# Patient Record
Sex: MALE | Race: BLACK OR AFRICAN AMERICAN | Employment: UNEMPLOYED | URBAN - METROPOLITAN AREA
[De-identification: names, ages, dates, MRNs, and addresses within clinical notes are randomized per-mention and may not be internally consistent; named-entity substitution may affect disease eponyms.]

---

## 2017-03-01 ENCOUNTER — OFFICE VISIT (OUTPATIENT)
Dept: ENDOCRINOLOGY | Age: 72
End: 2017-03-01

## 2017-03-01 VITALS
SYSTOLIC BLOOD PRESSURE: 124 MMHG | BODY MASS INDEX: 41.91 KG/M2 | HEIGHT: 66 IN | RESPIRATION RATE: 16 BRPM | DIASTOLIC BLOOD PRESSURE: 62 MMHG | TEMPERATURE: 97.1 F | WEIGHT: 260.8 LBS | HEART RATE: 74 BPM

## 2017-03-01 DIAGNOSIS — I10 ESSENTIAL HYPERTENSION: ICD-10-CM

## 2017-03-01 DIAGNOSIS — E11.65 TYPE 2 DIABETES MELLITUS WITH HYPERGLYCEMIA, WITH LONG-TERM CURRENT USE OF INSULIN (HCC): Primary | ICD-10-CM

## 2017-03-01 DIAGNOSIS — Z79.4 TYPE 2 DIABETES MELLITUS WITH HYPERGLYCEMIA, WITH LONG-TERM CURRENT USE OF INSULIN (HCC): Primary | ICD-10-CM

## 2017-03-01 DIAGNOSIS — E03.4 HYPOTHYROIDISM DUE TO ACQUIRED ATROPHY OF THYROID: ICD-10-CM

## 2017-03-01 DIAGNOSIS — E78.2 MIXED HYPERLIPIDEMIA: ICD-10-CM

## 2017-03-01 NOTE — MR AVS SNAPSHOT
Visit Information Date & Time Provider Department Dept. Phone Encounter #  
 3/1/2017  2:45 PM Tomeka Tucker MD [facility] Diabetes & Endocrinology [phone] [id number] Follow-up Instructions Return in about 4 months (around 7/1/2017). Upcoming Health Maintenance Date Due Hepatitis C Screening 1945 FOOT EXAM Q1 12/25/1957 EYE EXAM RETINAL OR DILATED Q1 12/25/1957 DTaP/Tdap/Td series (1 - Tdap) 12/25/1968 FOBT Q 1 YEAR AGE 50-75 12/25/1997 ZOSTER VACCINE AGE 60> 12/25/2007 GLAUCOMA SCREENING Q2Y 12/25/2012 Pneumococcal 65+ Low/Medium Risk (1 of 2 - PCV13) 12/25/2012 MEDICARE YEARLY EXAM 12/25/2012 INFLUENZA AGE 9 TO ADULT 8/1/2016 LIPID PANEL Q1 9/21/2016 HEMOGLOBIN A1C Q6M 1/15/2017 MICROALBUMIN Q1 7/22/2017 Allergies as of 3/1/2017  Review Complete On: 3/1/2017 By: Tomeka Tucker MD  
 No Known Allergies Current Immunizations  Never Reviewed No immunizations on file. Not reviewed this visit You Were Diagnosed With   
  
 Codes Comments Type 2 diabetes mellitus with hyperglycemia, with long-term current use of insulin (HCC)    -  Primary ICD-10-CM: E11.65, Z79.4 ICD-9-CM: 250.00, 790.29, V58.67 Hypothyroidism due to acquired atrophy of thyroid     ICD-10-CM: E03.4 ICD-9-CM: 244.8, 246.8 Mixed hyperlipidemia     ICD-10-CM: E78.2 ICD-9-CM: 272.2 Essential hypertension     ICD-10-CM: I10 
ICD-9-CM: 401.9 Vitals BP  
  
  
  
  
  
 124/62 (BP 1 Location: Right arm, BP Patient Position: Sitting) BMI and BSA Data Body Mass Index Body Surface Area 42.09 kg/m 2 2.35 m 2 Preferred Pharmacy Pharmacy Name Phone 310 [facility] [address] ([address] [phone] Your Updated Medication List  
  
   
This list is accurate as of: 3/1/17  3:40 PM.  Always use your most recent med list.  
  
  
  
 ammonium lactate 12 % lotion Commonly known as:  LAC-HYDRIN Apply  to affected area as needed. rub in to affected area well  
  
 divalproex  mg tablet Commonly known as:  DEPAKOTE Take 3 Tabs by mouth two (2) times a day. docusate calcium 240 mg capsule Commonly known as:  Hien Primes Take 240 mg by mouth two (2) times a day. folic acid 1 mg tablet Commonly known as:  Google Take  by mouth daily. glucagon 1 mg injection Commonly known as:  GLUCAGON EMERGENCY KIT (HUMAN) 1 mg by IntraMUSCular route as needed. guaiFENesin 1,200 mg Ta12 ER tablet Commonly known as:  MobSmith Take 1 tablet by mouth two (2) times a day. insulin lispro 100 unit/mL injection Commonly known as:  HUMALOG  
by SubCUTAneous route. Use per SSI  
  
 insulin  unit/mL injection Commonly known as:  HumuLIN N Inject 24 units before breakfast 10 units at bedtime (Stop Humulin 70/30) insulin syringe-needle U-100 Dispense microfine 1 mL syringes with 30 gauge needle  
  
 levothyroxine 75 mcg tablet Commonly known as:  SYNTHROID  
TAKE 1 TABLET BY MOUTH EVERY DAY BEFORE BREAKFAST LORazepam 1 mg tablet Commonly known as:  ATIVAN Take 1 mg by mouth three (3) times daily. losartan 50 mg tablet Commonly known as:  COZAAR Take 50 mg by mouth daily. metFORMIN 500 mg tablet Commonly known as:  GLUCOPHAGE Take 500 mg by mouth two (2) times daily (with meals). ONETOUCH ULTRA TEST strip Generic drug:  glucose blood VI test strips USE AS DIRECTED TO TEST 2-3 TIMES DAILY potassium chloride SA 10 mEq capsule Commonly known as:  Zoe Guido Take 1 Cap by mouth daily as needed. pravastatin 20 mg tablet Commonly known as:  PRAVACHOL Take 20 mg by mouth nightly. raNITIdine 300 mg tablet Commonly known as:  ZANTAC Take 300 mg by mouth daily. thioridazine 50 mg tablet Commonly known as:  MELLARIL  
 Take 50 mg by mouth three (3) times daily. topiramate 200 mg tablet Commonly known as:  TOPAMAX Take  by mouth two (2) times a day. triamterene-hydroCHLOROthiazide 37.5-25 mg per tablet Commonly known as:  Jerral Cullens Take 1 Tab by mouth daily. trihexyphenidyl 2 mg tablet Commonly known as:  ARTANE Take 2 mg by mouth three (3) times daily. TRUEPLUS LANCETS 30 gauge Misc Generic drug:  lancets USE AS DIRECTED 2 TO 3 TIMES DAILY  
  
 * ULTICARE 1 mL 30 gauge x 1/2\" Syrg Generic drug:  Insulin Syringe-Needle U-100  
USE AS DIRECTED * Insulin Syringe-Needle U-100 1 mL 31 gauge x 5/16 Syrg VITAMIN D2 50,000 unit capsule Generic drug:  ergocalciferol Take 50,000 Units by mouth every seven (7) days. * Notice: This list has 2 medication(s) that are the same as other medications prescribed for you. Read the directions carefully, and ask your doctor or other care provider to review them with you. We Performed the Following HEMOGLOBIN A1C WITH EAG [25629 CPT(R)] LDL, DIRECT [id number] CPT(R)] METABOLIC PANEL, COMPREHENSIVE [73431 CPT(R)] MICROALBUMIN, UR, RAND W/ MICROALBUMIN/CREA RATIO [id number] CPT(R)] TSH 3RD GENERATION [86089 CPT(R)] Follow-up Instructions Return in about 4 months (around 7/1/2017). Introducing [facility]! Kelli Hardin introduces [employer] patient portal. Now you can access parts of your medical record, email your doctor's office, and request medication refills online. 1. In your internet browser, go to [email or web address] 2. Click on the First Time User? Click Here link in the Sign In box. You will see the New Member Sign Up page. 3. Enter your [employer] Access Code exactly as it appears below. You will not need to use this code after youve completed the sign-up process. If you do not sign up before the expiration date, you must request a new code. · [employer] Access Code: [email or web address] Expires: 5/30/2017  3:40 PM 
 
4. Enter the last four digits of your Social Security Number (xxxx) and Date of Birth (mm/dd/yyyy) as indicated and click Submit. You will be taken to the next sign-up page. 5. Create a Fighters ID. This will be your [employer] login ID and cannot be changed, so think of one that is secure and easy to remember. 6. Create a [employer] password. You can change your password at any time. 7. Enter your Password Reset Question and Answer. This can be used at a later time if you forget your password. 8. Enter your e-mail address. You will receive e-mail notification when new information is available in [address] 9. Click Sign Up. You can now view and download portions of your medical record. 10. Click the Download Summary menu link to download a portable copy of your medical information. If you have questions, please visit the Frequently Asked Questions section of the [employer] website. Remember, [employer] is NOT to be used for urgent needs. For medical emergencies, dial 911. Now available from your iPhone and Android! Please provide this summary of care documentation to your next provider. Your primary care clinician is listed as [facility] If you have any questions after today's visit, please call [phone].

## 2017-03-01 NOTE — PROGRESS NOTES
Camilo Rodrgiez is a 73 y.o. male here for   Chief Complaint   Patient presents with    Diabetes     5 mo f/u    Thyroid Problem    Cholesterol Problem    Hypertension       Functional glucose monitor and record keeping system? - yes  Eye exam within last year? - not sure  Foot exam within last year? - yes recently    Lab Results   Component Value Date/Time    Hemoglobin A1c 7.8 07/15/2016 12:00 AM    Hemoglobin A1c (POC) 6.6 12/18/2015 11:05 AM    Hemoglobin A1c, External 7.9 02/07/2015 11:37 AM       Wt Readings from Last 3 Encounters:   07/22/16 268 lb (121.6 kg)   04/15/16 285 lb 11.2 oz (129.6 kg)   12/18/15 300 lb (136.1 kg)     Temp Readings from Last 3 Encounters:   07/22/16 98.2 °F (36.8 °C) (Oral)   04/15/16 99.1 °F (37.3 °C) (Oral)   12/18/15 98.2 °F (36.8 °C) (Oral)     BP Readings from Last 3 Encounters:   07/22/16 126/79   04/15/16 119/66   12/18/15 142/70     Pulse Readings from Last 3 Encounters:   07/22/16 84   04/15/16 87   12/18/15 90

## 2017-03-01 NOTE — PROGRESS NOTES
History of Present Illness: Geovany Pantoja is a 74 y.o. male here for fu  of  Type 2 Diabetes Mellitus. He is here with his brother. Glucose  is better     Checking blood glucose twice daily,  No hypoglycemia. Checking glucose at home  Has not noticed significant hypoglycemia     Compliant with medications                Type 2 Diabetes was diagnosed  >10yrs . Cardiovascular risk factors: diabetes mellitus, obesity, male gender, hypertension   History of DKA: 2010      Wt Readings from Last 3 Encounters:   03/01/17 260 lb 12.8 oz (118.3 kg)   07/22/16 268 lb (121.6 kg)   04/15/16 285 lb 11.2 oz (129.6 kg)       BP Readings from Last 3 Encounters:   03/01/17 124/62   07/22/16 126/79   04/15/16 119/66           Past Medical History:   Diagnosis Date    Acute renal failure (HCC)     DKA (diabetic ketoacidoses) (HCC)     GERD (gastroesophageal reflux disease)     HTN (hypertension)     Schizophreniform disorder (HCC)     Seizure disorder (HCC)     Type II or unspecified type diabetes mellitus without mention of complication, uncontrolled      Current Outpatient Prescriptions   Medication Sig    ONETOUCH ULTRA TEST strip USE AS DIRECTED TO TEST 2-3 TIMES DAILY    potassium chloride SA (MICRO-K) 10 mEq capsule Take 1 Cap by mouth daily as needed.  divalproex DR (DEPAKOTE) 500 mg tablet Take 3 Tabs by mouth two (2) times a day.  Insulin Syringe-Needle U-100 1 mL 31 gauge x 5/16 syrg     triamterene-hydrochlorothiazide (MAXZIDE) 37.5-25 mg per tablet Take 1 Tab by mouth daily.  insulin NPH (HUMULIN N) 100 unit/mL injection Inject 24 units before breakfast 10 units at bedtime (Stop Humulin 70/30)    ULTICARE 1 mL 30 gauge x 1/2\" syrg USE AS DIRECTED    levothyroxine (SYNTHROID) 75 mcg tablet TAKE 1 TABLET BY MOUTH EVERY DAY BEFORE BREAKFAST    metFORMIN (GLUCOPHAGE) 500 mg tablet Take 500 mg by mouth two (2) times daily (with meals).     insulin lispro (HUMALOG) 100 unit/mL injection by SubCUTAneous route. Use per SSI    TRUEPLUS LANCETS 30 gauge misc USE AS DIRECTED 2 TO 3 TIMES DAILY    ergocalciferol (VITAMIN D2) 50,000 unit capsule Take 50,000 Units by mouth every seven (7) days.  losartan (COZAAR) 50 mg tablet Take 50 mg by mouth daily.  LORazepam (ATIVAN) 1 mg tablet Take 1 mg by mouth three (3) times daily.  guaiFENesin (MUCINEX) 1,200 mg tp12 ER tablet Take 1 tablet by mouth two (2) times a day.  insulin syringe-needle U-100 Dispense microfine 1 mL syringes with 30 gauge needle    pravastatin (PRAVACHOL) 20 mg tablet Take 20 mg by mouth nightly.  folic acid (FOLVITE) 1 mg tablet Take  by mouth daily.  ammonium lactate (LAC-HYDRIN) 12 % lotion Apply  to affected area as needed. rub in to affected area well    trihexyphenidyl (ARTANE) 2 mg tablet Take 2 mg by mouth three (3) times daily.  thioridazine (MELLARIL) 50 mg tablet Take 50 mg by mouth three (3) times daily.  docusate calcium (SURFAK) 240 mg capsule Take 240 mg by mouth two (2) times a day.  topiramate (TOPAMAX) 200 mg tablet Take  by mouth two (2) times a day.  ranitidine (ZANTAC) 300 mg tablet Take 300 mg by mouth daily.  glucagon (GLUCAGON EMERGENCY) 1 mg injection 1 mg by IntraMUSCular route as needed. No current facility-administered medications for this visit. No Known Allergies      Review of Systems: patient answers no to most of the questions  -   -     Physical Examination:   Blood pressure 124/62, pulse 74, temperature 97.1 °F (36.2 °C), temperature source Oral, resp. rate 16, height 5' 6\" (1.676 m), weight 260 lb 12.8 oz (118.3 kg). Estimated body mass index is 42.09 kg/(m^2) as calculated from the following:    Height as of this encounter: 5' 6\" (1.676 m). -   Weight as of this encounter: 260 lb 12.8 oz (118.3 kg).   - General:  no distress,no eye contact,obese  - HEENT: no pallor, no periorbital edema  - Neck: supple, no thyromegaly  - Cardiovascular: regular, normal rate, normal S1 and S2  - Respiratory: clear to auscultation bilaterally  - Gastrointestinal: soft, nontender, obese,  BS +  - Musculoskeletal:  trace edema  - Neurological: alert   - Psychiatric: normal mood and affect  - Skin: color, texture, turgor normal.       Data Reviewed:     [x] Glucose records reviewed. [x] See glucose records for details (to be scanned). [x] A1C  [] Reviewed labs    2/13 GFR 76   Non   LFTs nl  TSH 13 - started on LT4    Lab Results   Component Value Date/Time    Hemoglobin A1c 6.9 03/01/2017 03:45 PM    Hemoglobin A1c 7.8 07/15/2016 12:00 AM    Hemoglobin A1c 8.2 09/21/2015 11:15 AM    Glucose 125 03/01/2017 03:45 PM    Glucose  12/18/2015 11:05 AM    Microalb/Creat ratio (ug/mg creat.) <5.9 07/22/2016 12:00 AM    LDL,Direct 82 03/01/2017 03:45 PM    LDL, calculated 102 09/21/2015 11:15 AM    Creatinine 1.05 03/01/2017 03:45 PM    Hemoglobin A1c, External 7.9 02/07/2015 11:37 AM      Lab Results   Component Value Date/Time    ALT (SGPT) 7 03/01/2017 03:45 PM    AST (SGOT) 12 03/01/2017 03:45 PM    Alk. phosphatase 55 03/01/2017 03:45 PM    Bilirubin, total <0.2 03/01/2017 03:45 PM     Lab Results   Component Value Date/Time    GFR est AA 83 03/01/2017 03:45 PM    GFR est non-AA 72 03/01/2017 03:45 PM    Creatinine 1.05 03/01/2017 03:45 PM    BUN 13 03/01/2017 03:45 PM    Sodium 143 03/01/2017 03:45 PM    Potassium 4.4 03/01/2017 03:45 PM    Chloride 104 03/01/2017 03:45 PM    CO2 24 03/01/2017 03:45 PM      Lab Results   Component Value Date/Time    TSH 3.090 03/01/2017 03:45 PM      Lab Results   Component Value Date/Time    Glucose 125 03/01/2017 03:45 PM    Glucose  12/18/2015 11:05 AM          Last Point of Care HGB A1C  No components found for: POCA1C   Lab Results   Component Value Date/Time    TSH 3.090 03/01/2017 03:45 PM              Assessment/Plan:     1.  Type 2 Diabetes Mellitus,   Lab Results   Component Value Date/Time    Hemoglobin A1c 6.9 03/01/2017 03:45 PM    Hemoglobin A1c (POC) 6.6 12/18/2015 11:05 AM    Hemoglobin A1c, External 7.9 02/07/2015 11:37 AM     NPH ( Novolin N or Humulin N ) 24  units before breakfast  10 units at bedtime   Mr. Rajni Mcgraw is mentally challenged and dependent on his sister for diabetes care. The antipsychotic medications can worsen the insulin resistance. Given comorbidities, seizure disorder, inability to detect or report hypoglycemia tight glycemic control is not the goal in his case. 2. . HTN : continue ARB    3.  Seizure disorder : on antiepileptic medications,  vitamin D supplementation for bone health. 4 . Hypothyroid - euthyroid    5. Hyperlipidemia: continue pravastatin      Thank you for allowing me to participate in the care of this patient.     Yimi Miranda MD

## 2017-03-02 LAB
ALBUMIN SERPL-MCNC: 4 G/DL (ref 3.6–4.8)
ALBUMIN/GLOB SERPL: 1.1 {RATIO} (ref 1.1–2.5)
ALP SERPL-CCNC: 55 IU/L (ref 39–117)
ALT SERPL-CCNC: 7 IU/L (ref 0–44)
AST SERPL-CCNC: 12 IU/L (ref 0–40)
BILIRUB SERPL-MCNC: <0.2 MG/DL (ref 0–1.2)
BUN SERPL-MCNC: 13 MG/DL (ref 8–27)
BUN/CREAT SERPL: 12 (ref 10–22)
CALCIUM SERPL-MCNC: 9.5 MG/DL (ref 8.6–10.2)
CHLORIDE SERPL-SCNC: 104 MMOL/L (ref 96–106)
CO2 SERPL-SCNC: 24 MMOL/L (ref 18–29)
CREAT SERPL-MCNC: 1.05 MG/DL (ref 0.76–1.27)
EST. AVERAGE GLUCOSE BLD GHB EST-MCNC: 151 MG/DL
GLOBULIN SER CALC-MCNC: 3.6 G/DL (ref 1.5–4.5)
GLUCOSE SERPL-MCNC: 125 MG/DL (ref 65–99)
HBA1C MFR BLD: 6.9 % (ref 4.8–5.6)
LDLC SERPL DIRECT ASSAY-MCNC: 82 MG/DL (ref 0–99)
POTASSIUM SERPL-SCNC: 4.4 MMOL/L (ref 3.5–5.2)
PROT SERPL-MCNC: 7.6 G/DL (ref 6–8.5)
SODIUM SERPL-SCNC: 143 MMOL/L (ref 134–144)
TSH SERPL DL<=0.005 MIU/L-ACNC: 3.09 UIU/ML (ref 0.45–4.5)

## 2017-04-23 RX ORDER — GLUCOSAM/CHON-MSM1/C/MANG/BOSW 500-416.6
TABLET ORAL
Qty: 100 LANCET | Refills: 11 | Status: SHIPPED | OUTPATIENT
Start: 2017-04-23 | End: 2021-01-01

## 2017-05-09 ENCOUNTER — IP HISTORICAL/CONVERTED ENCOUNTER (OUTPATIENT)
Dept: OTHER | Age: 72
End: 2017-05-09

## 2017-05-20 RX ORDER — GLUCOSAM/CHON-MSM1/C/MANG/BOSW 500-416.6
TABLET ORAL
Qty: 100 LANCET | Refills: 11 | Status: SHIPPED | OUTPATIENT
Start: 2017-05-20 | End: 2017-11-01 | Stop reason: SDUPTHER

## 2017-07-31 RX ORDER — CALCIUM CARB/VITAMIN D3/VIT K1 500-100-40
TABLET,CHEWABLE ORAL
Qty: 200 SYRINGE | Refills: 11 | Status: SHIPPED | OUTPATIENT
Start: 2017-07-31 | End: 2017-11-01 | Stop reason: SDUPTHER

## 2017-11-01 ENCOUNTER — OFFICE VISIT (OUTPATIENT)
Dept: ENDOCRINOLOGY | Age: 72
End: 2017-11-01

## 2017-11-01 VITALS
SYSTOLIC BLOOD PRESSURE: 136 MMHG | DIASTOLIC BLOOD PRESSURE: 76 MMHG | RESPIRATION RATE: 14 BRPM | BODY MASS INDEX: 40.27 KG/M2 | HEART RATE: 86 BPM | HEIGHT: 66 IN | OXYGEN SATURATION: 96 % | TEMPERATURE: 97.7 F | WEIGHT: 250.6 LBS

## 2017-11-01 DIAGNOSIS — E11.29 TYPE II OR UNSPECIFIED TYPE DIABETES MELLITUS WITH RENAL MANIFESTATIONS, UNCONTROLLED(250.42) (HCC): Primary | ICD-10-CM

## 2017-11-01 DIAGNOSIS — E03.4 HYPOTHYROIDISM DUE TO ACQUIRED ATROPHY OF THYROID: ICD-10-CM

## 2017-11-01 DIAGNOSIS — E11.65 TYPE 2 DIABETES MELLITUS WITH HYPERGLYCEMIA, WITH LONG-TERM CURRENT USE OF INSULIN (HCC): Primary | ICD-10-CM

## 2017-11-01 DIAGNOSIS — E78.2 MIXED HYPERLIPIDEMIA: ICD-10-CM

## 2017-11-01 DIAGNOSIS — I10 ESSENTIAL HYPERTENSION WITH GOAL BLOOD PRESSURE LESS THAN 140/90: ICD-10-CM

## 2017-11-01 DIAGNOSIS — E11.65 TYPE II OR UNSPECIFIED TYPE DIABETES MELLITUS WITH RENAL MANIFESTATIONS, UNCONTROLLED(250.42) (HCC): Primary | ICD-10-CM

## 2017-11-01 DIAGNOSIS — Z79.4 TYPE 2 DIABETES MELLITUS WITH HYPERGLYCEMIA, WITH LONG-TERM CURRENT USE OF INSULIN (HCC): Primary | ICD-10-CM

## 2017-11-01 RX ORDER — CALCIUM CARB/VITAMIN D3/VIT K1 500-100-40
TABLET,CHEWABLE ORAL
Qty: 100 SYRINGE | Refills: 4 | Status: SHIPPED | OUTPATIENT
Start: 2017-11-01 | End: 2020-12-30 | Stop reason: ALTCHOICE

## 2017-11-01 RX ORDER — QUETIAPINE FUMARATE 50 MG/1
50 TABLET, FILM COATED ORAL 3 TIMES DAILY
COMMUNITY
End: 2020-12-30 | Stop reason: ALTCHOICE

## 2017-11-01 NOTE — PROGRESS NOTES
Sharon Moreno is a 73 y.o. male here for   Chief Complaint   Patient presents with    Diabetes    Thyroid Problem       Functional glucose monitor and record keeping system? - yes  Eye exam within last year? - has appt coming up  Foot exam within last year? - Feb 2017    1. Have you been to the ER, urgent care clinic since your last visit? Hospitalized since your last visit? -no    2. Have you seen or consulted any other health care providers outside of the [address] [place] [address] since your last visit? Include any pap smears or colon screening. -PCP      Lab Results   Component Value Date/Time    Hemoglobin A1c 6.9 03/01/2017 03:45 PM    Hemoglobin A1c (POC) 6.6 12/18/2015 11:05 AM    Hemoglobin A1c, External 7.9 02/07/2015 11:37 AM       Wt Readings from Last 3 Encounters:   03/01/17 260 lb 12.8 oz (118.3 kg)   07/22/16 268 lb (121.6 kg)   04/15/16 285 lb 11.2 oz (129.6 kg)     Temp Readings from Last 3 Encounters:   03/01/17 97.1 °F (36.2 °C) (Oral)   07/22/16 98.2 °F (36.8 °C) (Oral)   04/15/16 99.1 °F (37.3 °C) (Oral)     BP Readings from Last 3 Encounters:   03/01/17 124/62   07/22/16 126/79   04/15/16 119/66     Pulse Readings from Last 3 Encounters:   03/01/17 74   07/22/16 84   04/15/16 87

## 2017-11-01 NOTE — MR AVS SNAPSHOT
Visit Information Date & Time Provider Department Dept. Phone Encounter #  
 11/1/2017  1:45 PM Sherren Fischer, MD [facility] Diabetes & Endocrinology [phone] [id number] Follow-up Instructions Return in about 4 months (around 3/1/2018). Upcoming Health Maintenance Date Due Hepatitis C Screening 1945 FOOT EXAM Q1 12/25/1957 EYE EXAM RETINAL OR DILATED Q1 12/25/1957 DTaP/Tdap/Td series (1 - Tdap) 12/25/1968 FOBT Q 1 YEAR AGE 50-75 12/25/1997 ZOSTER VACCINE AGE 60> 10/25/2007 GLAUCOMA SCREENING Q2Y 12/25/2012 Pneumococcal 65+ Low/Medium Risk (1 of 2 - PCV13) 12/25/2012 MEDICARE YEARLY EXAM 12/25/2012 LIPID PANEL Q1 9/21/2016 MICROALBUMIN Q1 7/22/2017 INFLUENZA AGE 9 TO ADULT 8/1/2017 HEMOGLOBIN A1C Q6M 9/1/2017 Allergies as of 11/1/2017  Review Complete On: 11/1/2017 By: Sherren Fischer, MD  
 No Known Allergies Current Immunizations  Never Reviewed No immunizations on file. Not reviewed this visit You Were Diagnosed With   
  
 Codes Comments Hypothyroidism due to acquired atrophy of thyroid    -  Primary ICD-10-CM: E03.4 ICD-9-CM: 244.8, 246.8 Mixed hyperlipidemia     ICD-10-CM: E78.2 ICD-9-CM: 272.2 Essential hypertension with goal blood pressure less than 140/90     ICD-10-CM: I10 
ICD-9-CM: 401.9 Type 2 diabetes mellitus with hyperglycemia, with long-term current use of insulin (HCC)     ICD-10-CM: E11.65, Z79.4 ICD-9-CM: 250.00, 790.29, V58.67 Vitals BP Pulse Temp Resp Height(growth percentile) Weight(growth percentile) 136/76 (BP 1 Location: Left arm, BP Patient Position: Sitting) 86 97.7 °F (36.5 °C) (Oral) 14 5' 6\" (1.676 m) 250 lb 9.6 oz (113.7 kg) SpO2 BMI Smoking Status 96% 40.45 kg/m2 Never Smoker Vitals History BMI and BSA Data Body Mass Index Body Surface Area 40.45 kg/m 2 2.3 m 2 Preferred Pharmacy Pharmacy Name Phone 310 [facility] [address] [facility] [address] [facility] ([address] [phone] Your Updated Medication List  
  
   
This list is accurate as of: 11/1/17  2:22 PM.  Always use your most recent med list.  
  
  
  
  
 ammonium lactate 12 % lotion Commonly known as:  LAC-HYDRIN Apply  to affected area as needed. rub in to affected area well  
  
 divalproex  mg tablet Commonly known as:  DEPAKOTE Take 3 Tabs by mouth two (2) times a day. docusate calcium 240 mg capsule Commonly known as:  Catheline Balzarine Take 240 mg by mouth nightly. folic acid 1 mg tablet Commonly known as:  Google Take  by mouth daily. glucagon 1 mg injection Commonly known as:  GLUCAGON EMERGENCY KIT (HUMAN) 1 mg by IntraMUSCular route as needed. guaiFENesin 1,200 mg Ta12 ER tablet Commonly known as:  Joseph & Joseph Take 1 tablet by mouth two (2) times a day. HumuLIN N 100 unit/mL injection Generic drug:  insulin NPH INJECT 20 UNITS BEFORE BREAKFAST AND 10 UNITS EVERY NIGHT AT BEDTIME  
  
 insulin lispro 100 unit/mL injection Commonly known as:  HUMALOG  
by SubCUTAneous route. Use per SSI  
  
 insulin syringe-needle U-100 Dispense microfine 1 mL syringes with 30 gauge needle * Insulin Syringe-Needle U-100 1 mL 31 gauge x 5/16 Syrg * Insulin Syringe-Needle U-100 1 mL 31 gauge x 5/16 Syrg USE AS DIRECTED  
  
 levothyroxine 75 mcg tablet Commonly known as:  SYNTHROID  
TAKE 1 TABLET BY MOUTH EVERY DAY BEFORE BREAKFAST LORazepam 1 mg tablet Commonly known as:  ATIVAN Take 1 mg by mouth two (2) times daily as needed. losartan 50 mg tablet Commonly known as:  COZAAR Take 50 mg by mouth daily. metFORMIN 500 mg tablet Commonly known as:  GLUCOPHAGE Take 500 mg by mouth two (2) times daily (with meals). * ONETOUCH ULTRA TEST strip Generic drug:  glucose blood VI test strips USE AS DIRECTED TO TEST 2-3 TIMES DAILY  
  
 * ONETOUCH ULTRA TEST strip Generic drug:  glucose blood VI test strips USE AS DIRECTED TO TEST 2-3 TIMES DAILY potassium chloride SA 10 mEq capsule Commonly known as:  Georganna Males Take 1 Cap by mouth two (2) times a day. pravastatin 20 mg tablet Commonly known as:  PRAVACHOL Take 20 mg by mouth nightly. QUEtiapine 50 mg tablet Commonly known as:  SEROquel Take 50 mg by mouth three (3) times daily. raNITIdine 300 mg tablet Commonly known as:  ZANTAC Take 300 mg by mouth daily. thioridazine 50 mg tablet Commonly known as:  MELLARIL Take 50 mg by mouth three (3) times daily. topiramate 200 mg tablet Commonly known as:  TOPAMAX Take  by mouth two (2) times a day. triamterene-hydroCHLOROthiazide 37.5-25 mg per tablet Commonly known as:  Keo Yasmine Take 1 Tab by mouth daily. trihexyphenidyl 2 mg tablet Commonly known as:  ARTANE Take 2 mg by mouth three (3) times daily. TRUEPLUS LANCETS 30 gauge Misc Generic drug:  lancets USE AS DIRECTED TWO TO THREE TIMES DAILY  
  
 VITAMIN D2 50,000 unit capsule Generic drug:  ergocalciferol Take 50,000 Units by mouth every seven (7) days. * Notice: This list has 4 medication(s) that are the same as other medications prescribed for you. Read the directions carefully, and ask your doctor or other care provider to review them with you. We Performed the Following HEMOGLOBIN A1C WITH EAG [80935 CPT(R)] LDL, DIRECT [id number] CPT(R)] METABOLIC PANEL, COMPREHENSIVE [58190 CPT(R)] Follow-up Instructions Return in about 4 months (around 3/1/2018). Introducing [facility]! Corona Harrison introduces [employer] patient portal. Now you can access parts of your medical record, email your doctor's office, and request medication refills online. 1. In your internet browser, go to [email or web address] 2. Click on the First Time User? Click Here link in the Sign In box. You will see the New Member Sign Up page. 3. Enter your [email or web address] Access Code exactly as it appears below. You will not need to use this code after youve completed the sign-up process. If you do not sign up before the expiration date, you must request a new code. · [email or web address] Access Code: [email or web address] Expires: 1/30/2018  1:51 PM 
 
4. Enter the last four digits of your Social Security Number (xxxx) and Date of Birth (mm/dd/yyyy) as indicated and click Submit. You will be taken to the next sign-up page. 5. Create a [employer] ID. This will be your [employer] login ID and cannot be changed, so think of one that is secure and easy to remember. 6. Create a [employer] password. You can change your password at any time. 7. Enter your Password Reset Question and Answer. This can be used at a later time if you forget your password. 8. Enter your e-mail address. You will receive e-mail notification when new information is available in [address] 9. Click Sign Up. You can now view and download portions of your medical record. 10. Click the Download Summary menu link to download a portable copy of your medical information. If you have questions, please visit the Frequently Asked Questions section of the [email or web address] website. Remember, [employer] is NOT to be used for urgent needs. For medical emergencies, dial 911. Now available from your iPhone and Android! Please provide this summary of care documentation to your next provider. Your primary care clinician is listed as Sonya Downey. If you have any questions after today's visit, please call [phone].

## 2017-11-01 NOTE — PROGRESS NOTES
History of Present Illness: Tre Acosta is a 74 y.o. male here for fu  of  Type 2 Diabetes Mellitus. He is here with his brother. Lost weight - eating better    Glucose  is better     Checking blood glucose twice daily,  No hypoglycemia. Checking glucose at home  Has not noticed significant hypoglycemia     Compliant with medications    Type 2 Diabetes was diagnosed  >10yrs . Cardiovascular risk factors: diabetes mellitus, obesity, male gender, hypertension   History of DKA: 2010      Wt Readings from Last 3 Encounters:   11/01/17 250 lb 9.6 oz (113.7 kg)   03/01/17 260 lb 12.8 oz (118.3 kg)   07/22/16 268 lb (121.6 kg)       BP Readings from Last 3 Encounters:   11/01/17 136/76   03/01/17 124/62   07/22/16 126/79           Past Medical History:   Diagnosis Date    Acute renal failure (HCC)     DKA (diabetic ketoacidoses) (HCC)     GERD (gastroesophageal reflux disease)     HTN (hypertension)     Schizophreniform disorder (HCC)     Seizure disorder (HCC)     Type II or unspecified type diabetes mellitus without mention of complication, uncontrolled      Current Outpatient Prescriptions   Medication Sig    QUEtiapine (SEROQUEL) 50 mg tablet Take 50 mg by mouth three (3) times daily.  ONETOUCH ULTRA TEST strip USE AS DIRECTED TO TEST 2-3 TIMES DAILY    TRUEPLUS LANCETS 30 gauge misc USE AS DIRECTED TWO TO THREE TIMES DAILY    HUMULIN N 100 unit/mL injection INJECT 20 UNITS BEFORE BREAKFAST AND 10 UNITS EVERY NIGHT AT BEDTIME    ONETOUCH ULTRA TEST strip USE AS DIRECTED TO TEST 2-3 TIMES DAILY    potassium chloride SA (MICRO-K) 10 mEq capsule Take 1 Cap by mouth two (2) times a day.  divalproex DR (DEPAKOTE) 500 mg tablet Take 3 Tabs by mouth two (2) times a day.  Insulin Syringe-Needle U-100 1 mL 31 gauge x 5/16 syrg     triamterene-hydrochlorothiazide (MAXZIDE) 37.5-25 mg per tablet Take 1 Tab by mouth daily.     levothyroxine (SYNTHROID) 75 mcg tablet TAKE 1 TABLET BY MOUTH EVERY DAY BEFORE BREAKFAST    metFORMIN (GLUCOPHAGE) 500 mg tablet Take 500 mg by mouth two (2) times daily (with meals).  insulin lispro (HUMALOG) 100 unit/mL injection by SubCUTAneous route. Use per SSI    ergocalciferol (VITAMIN D2) 50,000 unit capsule Take 50,000 Units by mouth every seven (7) days.  losartan (COZAAR) 50 mg tablet Take 50 mg by mouth daily.  LORazepam (ATIVAN) 1 mg tablet Take 1 mg by mouth two (2) times daily as needed.  insulin syringe-needle U-100 Dispense microfine 1 mL syringes with 30 gauge needle    pravastatin (PRAVACHOL) 20 mg tablet Take 20 mg by mouth nightly.  trihexyphenidyl (ARTANE) 2 mg tablet Take 2 mg by mouth three (3) times daily.  thioridazine (MELLARIL) 50 mg tablet Take 50 mg by mouth three (3) times daily.  docusate calcium (SURFAK) 240 mg capsule Take 240 mg by mouth nightly.  topiramate (TOPAMAX) 200 mg tablet Take  by mouth two (2) times a day.  ranitidine (ZANTAC) 300 mg tablet Take 300 mg by mouth daily.  glucagon (GLUCAGON EMERGENCY) 1 mg injection 1 mg by IntraMUSCular route as needed.  Insulin Syringe-Needle U-100 1 mL 31 gauge x 5/16 syrg USE AS DIRECTED Dx code E11.65    guaiFENesin (MUCINEX) 1,200 mg tp12 ER tablet Take 1 tablet by mouth two (2) times a day.  folic acid (FOLVITE) 1 mg tablet Take  by mouth daily.  ammonium lactate (LAC-HYDRIN) 12 % lotion Apply  to affected area as needed. rub in to affected area well     No current facility-administered medications for this visit. No Known Allergies      Review of Systems: patient answers no to most of the questions  -   -     Physical Examination:   Blood pressure 136/76, pulse 86, temperature 97.7 °F (36.5 °C), temperature source Oral, resp. rate 14, height 5' 6\" (1.676 m), weight 250 lb 9.6 oz (113.7 kg), SpO2 96 %. Estimated body mass index is 40.45 kg/(m^2) as calculated from the following:    Height as of this encounter: 5' 6\" (1.676 m).   -   Weight as of this encounter: 250 lb 9.6 oz (113.7 kg). - General:  no distress,no eye contact,obese  - HEENT: no pallor, no periorbital edema  - Neck: supple, no thyromegaly  - Cardiovascular: regular, normal rate, normal S1 and S2  - Respiratory: clear to auscultation bilaterally  - Gastrointestinal: soft, nontender, obese,  BS +  - Musculoskeletal:  trace edema  - Neurological: alert   - Psychiatric: normal mood and affect  - Skin: color, texture, turgor normal.       Data Reviewed:     [x] Glucose records reviewed. [x] See glucose records for details (to be scanned). [x] A1C  [] Reviewed labs    2/13 GFR 76   Non   LFTs nl  TSH 13 - started on LT4    Lab Results   Component Value Date/Time    Hemoglobin A1c 6.5 11/01/2017 02:37 PM    Hemoglobin A1c 6.9 03/01/2017 03:45 PM    Hemoglobin A1c 7.8 07/15/2016 12:00 AM    Glucose 93 11/01/2017 02:37 PM    Glucose  12/18/2015 11:05 AM    Microalb/Creat ratio (ug/mg creat.) <5.9 07/22/2016 12:00 AM    LDL,Direct 92 11/01/2017 02:37 PM    LDL, calculated 102 09/21/2015 11:15 AM    Creatinine 1.07 11/01/2017 02:37 PM    Hemoglobin A1c, External 7.9 02/07/2015 11:37 AM      Lab Results   Component Value Date/Time    ALT (SGPT) 8 11/01/2017 02:37 PM    AST (SGOT) 17 11/01/2017 02:37 PM    Alk.  phosphatase 51 11/01/2017 02:37 PM    Bilirubin, total <0.2 11/01/2017 02:37 PM     Lab Results   Component Value Date/Time    GFR est AA 81 11/01/2017 02:37 PM    GFR est non-AA 70 11/01/2017 02:37 PM    Creatinine 1.07 11/01/2017 02:37 PM    BUN 13 11/01/2017 02:37 PM    Sodium 143 11/01/2017 02:37 PM    Potassium 4.2 11/01/2017 02:37 PM    Chloride 103 11/01/2017 02:37 PM    CO2 26 11/01/2017 02:37 PM      Lab Results   Component Value Date/Time    TSH 3.090 03/01/2017 03:45 PM      Lab Results   Component Value Date/Time    Glucose 93 11/01/2017 02:37 PM    Glucose  12/18/2015 11:05 AM          Last Point of Care HGB A1C  No components found for: POCA1C   Lab Results Component Value Date/Time    TSH 3.090 03/01/2017 03:45 PM              Assessment/Plan:     1. Type 2 Diabetes Mellitus,   Lab Results   Component Value Date/Time    Hemoglobin A1c 6.5 11/01/2017 02:37 PM    Hemoglobin A1c (POC) 6.6 12/18/2015 11:05 AM    Hemoglobin A1c, External 7.9 02/07/2015 11:37 AM     Controlled     NPH ( Novolin N or Humulin N ) 20  units before breakfast  10 units at bedtime   Mr. Indy Clark is mentally challenged and dependent on his sister for diabetes care. The antipsychotic medications can worsen the insulin resistance. Given comorbidities, seizure disorder, inability to detect or report hypoglycemia tight glycemic control is not the goal in his case. 2. . HTN : continue ARB    3.  Seizure disorder : on antiepileptic medications,  vitamin D supplementation for bone health. 4 . Hypothyroid - euthyroid    5. Hyperlipidemia: continue pravastatin      Thank you for allowing me to participate in the care of this patient.     Prince Tinsley MD

## 2017-11-02 LAB
ALBUMIN SERPL-MCNC: 4.1 G/DL (ref 3.6–4.8)
ALBUMIN/GLOB SERPL: 1.1 {RATIO} (ref 1.2–2.2)
ALP SERPL-CCNC: 51 IU/L (ref 39–117)
ALT SERPL-CCNC: 8 IU/L (ref 0–44)
AST SERPL-CCNC: 17 IU/L (ref 0–40)
BILIRUB SERPL-MCNC: <0.2 MG/DL (ref 0–1.2)
BUN SERPL-MCNC: 13 MG/DL (ref 8–27)
BUN/CREAT SERPL: 12 (ref 10–24)
CALCIUM SERPL-MCNC: 9.7 MG/DL (ref 8.6–10.2)
CHLORIDE SERPL-SCNC: 103 MMOL/L (ref 96–106)
CO2 SERPL-SCNC: 26 MMOL/L (ref 18–29)
CREAT SERPL-MCNC: 1.07 MG/DL (ref 0.76–1.27)
EST. AVERAGE GLUCOSE BLD GHB EST-MCNC: 140 MG/DL
GFR SERPLBLD CREATININE-BSD FMLA CKD-EPI: 70 ML/MIN/1.73
GFR SERPLBLD CREATININE-BSD FMLA CKD-EPI: 81 ML/MIN/1.73
GLOBULIN SER CALC-MCNC: 3.7 G/DL (ref 1.5–4.5)
GLUCOSE SERPL-MCNC: 93 MG/DL (ref 65–99)
HBA1C MFR BLD: 6.5 % (ref 4.8–5.6)
LDLC SERPL DIRECT ASSAY-MCNC: 92 MG/DL (ref 0–99)
POTASSIUM SERPL-SCNC: 4.2 MMOL/L (ref 3.5–5.2)
PROT SERPL-MCNC: 7.8 G/DL (ref 6–8.5)
SODIUM SERPL-SCNC: 143 MMOL/L (ref 134–144)

## 2018-03-02 ENCOUNTER — ED HISTORICAL/CONVERTED ENCOUNTER (OUTPATIENT)
Dept: OTHER | Age: 73
End: 2018-03-02

## 2018-03-18 RX ORDER — BLOOD SUGAR DIAGNOSTIC
STRIP MISCELLANEOUS
Qty: 100 STRIP | Refills: 0 | Status: SHIPPED | OUTPATIENT
Start: 2018-03-18 | End: 2018-04-21 | Stop reason: SDUPTHER

## 2018-04-16 RX ORDER — INSULIN HUMAN 100 [IU]/ML
INJECTION, SUSPENSION SUBCUTANEOUS
Qty: 10 ML | Refills: 0 | Status: SHIPPED | OUTPATIENT
Start: 2018-04-16 | End: 2018-05-30 | Stop reason: SDUPTHER

## 2018-04-22 RX ORDER — BLOOD SUGAR DIAGNOSTIC
STRIP MISCELLANEOUS
Qty: 100 STRIP | Refills: 0 | Status: SHIPPED | OUTPATIENT
Start: 2018-04-22 | End: 2018-05-26 | Stop reason: SDUPTHER

## 2018-05-31 RX ORDER — INSULIN HUMAN 100 [IU]/ML
INJECTION, SUSPENSION SUBCUTANEOUS
Qty: 10 ML | Refills: 0 | Status: SHIPPED | OUTPATIENT
Start: 2018-05-31 | End: 2018-08-16 | Stop reason: SDUPTHER

## 2018-06-06 ENCOUNTER — OP HISTORICAL/CONVERTED ENCOUNTER (OUTPATIENT)
Dept: OTHER | Age: 73
End: 2018-06-06

## 2018-06-15 ENCOUNTER — OFFICE VISIT (OUTPATIENT)
Dept: ENDOCRINOLOGY | Age: 73
End: 2018-06-15

## 2018-06-15 VITALS
BODY MASS INDEX: 39.32 KG/M2 | WEIGHT: 244.7 LBS | SYSTOLIC BLOOD PRESSURE: 133 MMHG | HEART RATE: 114 BPM | HEIGHT: 66 IN | RESPIRATION RATE: 12 BRPM | DIASTOLIC BLOOD PRESSURE: 86 MMHG | OXYGEN SATURATION: 90 %

## 2018-06-15 DIAGNOSIS — I10 ESSENTIAL HYPERTENSION WITH GOAL BLOOD PRESSURE LESS THAN 140/90: ICD-10-CM

## 2018-06-15 DIAGNOSIS — E03.4 HYPOTHYROIDISM DUE TO ACQUIRED ATROPHY OF THYROID: ICD-10-CM

## 2018-06-15 DIAGNOSIS — E78.2 MIXED HYPERLIPIDEMIA: ICD-10-CM

## 2018-06-15 DIAGNOSIS — E11.65 TYPE 2 DIABETES MELLITUS WITH HYPERGLYCEMIA, UNSPECIFIED WHETHER LONG TERM INSULIN USE (HCC): Primary | ICD-10-CM

## 2018-06-15 PROBLEM — E66.01 SEVERE OBESITY (BMI 35.0-39.9): Status: ACTIVE | Noted: 2018-06-15

## 2018-06-15 RX ORDER — ZONISAMIDE 100 MG/1
200 CAPSULE ORAL 2 TIMES DAILY
COMMUNITY
End: 2020-12-30 | Stop reason: ALTCHOICE

## 2018-06-15 NOTE — PROGRESS NOTES
Sebastian Lion is a 81 y.o. male here for   Chief Complaint   Patient presents with    Diabetes    Thyroid Problem    Hypertension       Functional glucose monitor and record keeping system? - yes  Eye exam within last year? - not within last year  Foot exam within last year? - due today    1. Have you been to the ER, urgent care clinic since your last visit? Hospitalized since your last visit? - Depakote levels too high    2. Have you seen or consulted any other health care providers outside of the [address] [place] [address] since your last visit?   Include any pap smears or colon screening.- PCP

## 2018-06-15 NOTE — MR AVS SNAPSHOT
[address] [facility] [address] [phone] Patient: Jennyfer Perea MRN: [id number] :1945 Visit Information Date & Time Provider Department Dept. Phone Encounter #  
 6/15/2018  2:30 PM Laurie Garcia MD [facility] [phone] [id number] Follow-up Instructions Return in about 5 months (around 11/15/2018). Upcoming Health Maintenance Date Due Hepatitis C Screening 1945 FOOT EXAM Q1 1957 EYE EXAM RETINAL OR DILATED Q1 1957 DTaP/Tdap/Td series (1 - Tdap) 1968 FOBT Q 1 YEAR AGE 50-75 1997 ZOSTER VACCINE AGE 60> 10/25/2007 GLAUCOMA SCREENING Q2Y 2012 Pneumococcal 65+ Low/Medium Risk (1 of 2 - PCV13) 2012 LIPID PANEL Q1 2016 MICROALBUMIN Q1 2017 HEMOGLOBIN A1C Q6M 2018 Influenza Age 5 to Adult 2018 Allergies as of 6/15/2018  Review Complete On: 6/15/2018 By: Laurie Garcia MD  
 No Known Allergies Current Immunizations  Never Reviewed No immunizations on file. Not reviewed this visit You Were Diagnosed With   
  
 Codes Comments Hypothyroidism due to acquired atrophy of thyroid    -  Primary ICD-10-CM: E03.4 ICD-9-CM: 244.8, 246.8 Type 2 diabetes mellitus with hyperglycemia, unspecified whether long term insulin use (HCC)     ICD-10-CM: E11.65 ICD-9-CM: 250.00 Vitals BP Pulse Resp Height(growth percentile) Weight(growth percentile) SpO2  
 133/86 (BP 1 Location: Left arm, BP Patient Position: Sitting) (!) 114 12 5' 6\" (1.676 m) 244 lb 11.2 oz (111 kg) 90% BMI Smoking Status 39.5 kg/m2 Never Smoker Vitals History BMI and BSA Data Body Mass Index Body Surface Area  
 39.5 kg/m 2 2.27 m 2 Preferred Pharmacy Pharmacy Name Phone  [place] [address] AT [facility] [address] [facility] ([phone] Your Updated Medication List  
  
   
This list is accurate as of 6/15/18  3:05 PM.  Always use your most recent med list.  
  
  
  
  
 ammonium lactate 12 % lotion Commonly known as:  LAC-HYDRIN Apply  to affected area as needed. rub in to affected area well  
  
 divalproex  mg tablet Commonly known as:  DEPAKOTE Take 3 Tabs by mouth two (2) times a day. docusate calcium 240 mg capsule Commonly known as:  Adonis Shall Take 240 mg by mouth nightly. folic acid 1 mg tablet Commonly known as:  [employer] Take  by mouth daily. glucagon 1 mg injection Commonly known as:  GLUCAGON EMERGENCY KIT (HUMAN) 1 mg by IntraMUSCular route as needed. guaiFENesin 1,200 mg Ta12 ER tablet Commonly known as:  [employer] Take 1 tablet by mouth two (2) times a day. HumuLIN N NPH U-100 Insulin 100 unit/mL injection Generic drug:  insulin NPH INJECT 20 UNITS BEFORE BREAKFAST AND 10 UNITS EVERY NIGHT AT BEDTIME  
  
 insulin lispro 100 unit/mL injection Commonly known as:  HUMALOG  
by SubCUTAneous route. Use per SSI  
  
 insulin syringe-needle U-100 Dispense microfine 1 mL syringes with 30 gauge needle * Insulin Syringe-Needle U-100 1 mL 31 gauge x 5/16 Syrg * Insulin Syringe-Needle U-100 1 mL 31 gauge x 5/16 Syrg USE AS DIRECTED Dx code E11.65  
  
 levothyroxine 75 mcg tablet Commonly known as:  SYNTHROID  
TAKE 1 TABLET BY MOUTH EVERY DAY BEFORE BREAKFAST LORazepam 1 mg tablet Commonly known as:  ATIVAN Take 1 mg by mouth two (2) times daily as needed. losartan 50 mg tablet Commonly known as:  COZAAR Take 50 mg by mouth daily. metFORMIN 500 mg tablet Commonly known as:  GLUCOPHAGE Take 500 mg by mouth two (2) times daily (with meals). * ONETOUCH ULTRA TEST strip Generic drug:  glucose blood VI test strips USE AS DIRECTED TO TEST 2-3 TIMES DAILY * ONETOUCH ULTRA BLUE TEST STRIP strip Generic drug:  glucose blood VI test strips USE AS DIRECTED TO TEST 2-3 TIMES DAILY potassium chloride SA 10 mEq capsule Commonly known as:  Cristina Fiddler Take 1 Cap by mouth two (2) times a day. pravastatin 20 mg tablet Commonly known as:  PRAVACHOL Take 20 mg by mouth nightly. QUEtiapine 50 mg tablet Commonly known as:  SEROquel Take 50 mg by mouth three (3) times daily. raNITIdine 300 mg tablet Commonly known as:  ZANTAC Take 300 mg by mouth daily. thioridazine 50 mg tablet Commonly known as:  MELLARIL Take 50 mg by mouth three (3) times daily. topiramate 200 mg tablet Commonly known as:  TOPAMAX Take  by mouth two (2) times a day. triamterene-hydroCHLOROthiazide 37.5-25 mg per tablet Commonly known as:  Ronald Kerr Take 1 Tab by mouth daily. trihexyphenidyl 2 mg tablet Commonly known as:  ARTANE Take 2 mg by mouth three (3) times daily. TRUEPLUS LANCETS 30 gauge Misc Generic drug:  lancets USE AS DIRECTED TWO TO THREE TIMES DAILY  
  
 VITAMIN D2 50,000 unit capsule Generic drug:  ergocalciferol Take 50,000 Units by mouth every seven (7) days. zonisamide 100 mg capsule Commonly known as:  Layne Hoof Take 200 mg by mouth two (2) times a day. * Notice: This list has 4 medication(s) that are the same as other medications prescribed for you. Read the directions carefully, and ask your doctor or other care provider to review them with you. Follow-up Instructions Return in about 5 months (around 11/15/2018). Introducing [facility]! [facility] introduces [facility] patient portal. Now you can access parts of your medical record, email your doctor's office, and request medication refills online. 1. In your internet browser, go to [email or web address] 2. Click on the First Time User? Click Here link in the Sign In box.  You will see the New Member Sign Up page. 3. Enter your [employer] Access Code exactly as it appears below. You will not need to use this code after youve completed the sign-up process. If you do not sign up before the expiration date, you must request a new code. · [employer] Access Code: [email or web address] Expires: 9/13/2018  3:05 PM 
 
4. Enter the last four digits of your Social Security Number (xxxx) and Date of Birth (mm/dd/yyyy) as indicated and click Submit. You will be taken to the next sign-up page. 5. Create a [employer] ID. This will be your [employer] login ID and cannot be changed, so think of one that is secure and easy to remember. 6. Create a [employer] password. You can change your password at any time. 7. Enter your Password Reset Question and Answer. This can be used at a later time if you forget your password. 8. Enter your e-mail address. You will receive e-mail notification when new information is available in [address]. 9. Click Sign Up. You can now view and download portions of your medical record. 10. Click the Download Summary menu link to download a portable copy of your medical information. If you have questions, please visit the Frequently Asked Questions section of the [employer] website. Remember, [employer] is NOT to be used for urgent needs. For medical emergencies, dial 911. Now available from your iPhone and Android! Please provide this summary of care documentation to your next provider. Your primary care clinician is listed as Grace York. If you have any questions after today's visit, please call [phone].

## 2018-06-15 NOTE — PROGRESS NOTES
History of Present Illness: Lydia Rg is a 74 y.o. male here for fu  of  Type 2 Diabetes Mellitus. He is here with his brother. He went to the ER because of change in his behavior, Depakote levels were found to be elevated and after adjusting he has improved. Blood glucose on the log is high, in the hospital blood glucose was normal.    Checking blood glucose twice daily,  No hypoglycemia. Compliant with medications    Type 2 Diabetes was diagnosed  >10yrs . Cardiovascular risk factors: diabetes mellitus, obesity, male gender, hypertension   History of DKA: 2010      Wt Readings from Last 3 Encounters:   06/15/18 244 lb 11.2 oz (111 kg)   11/01/17 250 lb 9.6 oz (113.7 kg)   03/01/17 260 lb 12.8 oz (118.3 kg)       BP Readings from Last 3 Encounters:   06/15/18 133/86   11/01/17 136/76   03/01/17 124/62           Past Medical History:   Diagnosis Date    Acute renal failure (HCC)     DKA (diabetic ketoacidoses) (HCC)     GERD (gastroesophageal reflux disease)     HTN (hypertension)     Schizophreniform disorder (HCC)     Seizure disorder (HCC)     Type II or unspecified type diabetes mellitus without mention of complication, uncontrolled      Current Outpatient Prescriptions   Medication Sig    zonisamide (ZONEGRAN) 100 mg capsule Take 200 mg by mouth two (2) times a day.  HUMULIN N NPH U-100 INSULIN 100 unit/mL injection INJECT 20 UNITS BEFORE BREAKFAST AND 10 UNITS EVERY NIGHT AT BEDTIME    ONETOUCH ULTRA BLUE TEST STRIP strip USE AS DIRECTED TO TEST 2-3 TIMES DAILY    QUEtiapine (SEROQUEL) 50 mg tablet Take 50 mg by mouth three (3) times daily.  Insulin Syringe-Needle U-100 1 mL 31 gauge x 5/16 syrg USE AS DIRECTED Dx code E11.65    TRUEPLUS LANCETS 30 gauge misc USE AS DIRECTED TWO TO THREE TIMES DAILY    ONETOUCH ULTRA TEST strip USE AS DIRECTED TO TEST 2-3 TIMES DAILY    potassium chloride SA (MICRO-K) 10 mEq capsule Take 1 Cap by mouth two (2) times a day.     divalproex DR (DEPAKOTE) 500 mg tablet Take 3 Tabs by mouth two (2) times a day.  triamterene-hydrochlorothiazide (MAXZIDE) 37.5-25 mg per tablet Take 1 Tab by mouth daily.  levothyroxine (SYNTHROID) 75 mcg tablet TAKE 1 TABLET BY MOUTH EVERY DAY BEFORE BREAKFAST    metFORMIN (GLUCOPHAGE) 500 mg tablet Take 500 mg by mouth two (2) times daily (with meals).  ergocalciferol (VITAMIN D2) 50,000 unit capsule Take 50,000 Units by mouth every seven (7) days.  losartan (COZAAR) 50 mg tablet Take 50 mg by mouth daily.  LORazepam (ATIVAN) 1 mg tablet Take 1 mg by mouth two (2) times daily as needed.  insulin syringe-needle U-100 Dispense microfine 1 mL syringes with 30 gauge needle    pravastatin (PRAVACHOL) 20 mg tablet Take 20 mg by mouth nightly.  folic acid (FOLVITE) 1 mg tablet Take  by mouth daily.  trihexyphenidyl (ARTANE) 2 mg tablet Take 2 mg by mouth three (3) times daily.  thioridazine (MELLARIL) 50 mg tablet Take 50 mg by mouth three (3) times daily.  docusate calcium (SURFAK) 240 mg capsule Take 240 mg by mouth nightly.  topiramate (TOPAMAX) 200 mg tablet Take  by mouth two (2) times a day.  ranitidine (ZANTAC) 300 mg tablet Take 300 mg by mouth daily.  glucagon (GLUCAGON EMERGENCY) 1 mg injection 1 mg by IntraMUSCular route as needed.  Insulin Syringe-Needle U-100 1 mL 31 gauge x 5/16 syrg     insulin lispro (HUMALOG) 100 unit/mL injection by SubCUTAneous route. Use per SSI    guaiFENesin (MUCINEX) 1,200 mg tp12 ER tablet Take 1 tablet by mouth two (2) times a day.  ammonium lactate (LAC-HYDRIN) 12 % lotion Apply  to affected area as needed. rub in to affected area well     No current facility-administered medications for this visit. No Known Allergies      Review of Systems: patient answers no to most of the questions  -   -     Physical Examination:   Blood pressure 133/86, pulse (!) 114, resp.  rate 12, height 5' 6\" (1.676 m), weight 244 lb 11.2 oz (111 kg), SpO2 90 %. Estimated body mass index is 39.5 kg/(m^2) as calculated from the following:    Height as of this encounter: 5' 6\" (1.676 m). -   Weight as of this encounter: 244 lb 11.2 oz (111 kg). - General:  no distress,no eye contact,obese  - HEENT: no pallor, no periorbital edema  - Neck: supple, no thyromegaly  - Cardiovascular: regular, normal rate, normal S1 and S2  - Respiratory: clear to auscultation bilaterally  - Gastrointestinal: soft, nontender, obese,  BS +  - Musculoskeletal:  trace edema  - Neurological: alert   - Psychiatric: normal mood and affect  - Skin: color, texture, turgor normal.     Diabetic foot exam: June 2018    Left Foot:   Visual Exam: callous - great toe    Pulse DP: 1+ (weak)   Filament test: absent sensation    Vibratory sensation: absent      Right Foot:   Visual Exam: callous - great toe   Pulse DP: 1+ (weak)   Filament test: absent sensation    Vibratory sensation: absent    Data Reviewed:     [x] Glucose records reviewed. [x] See glucose records for details (to be scanned). [x] A1C  [] Reviewed labs    2/13 GFR 76   Non   LFTs nl  TSH 13 - started on LT4    Lab Results   Component Value Date/Time    Hemoglobin A1c 6.5 (H) 11/01/2017 02:37 PM    Hemoglobin A1c 6.9 (H) 03/01/2017 03:45 PM    Hemoglobin A1c 7.8 (H) 07/15/2016 12:00 AM    Glucose 93 11/01/2017 02:37 PM    Glucose  12/18/2015 11:05 AM    Microalb/Creat ratio (ug/mg creat.) <5.9 07/22/2016 12:00 AM    LDL,Direct 92 11/01/2017 02:37 PM    LDL, calculated 102 (H) 09/21/2015 11:15 AM    Creatinine 1.07 11/01/2017 02:37 PM    Hemoglobin A1c, External 7.9 02/07/2015 11:37 AM      Lab Results   Component Value Date/Time    ALT (SGPT) 8 11/01/2017 02:37 PM    AST (SGOT) 17 11/01/2017 02:37 PM    Alk.  phosphatase 51 11/01/2017 02:37 PM    Bilirubin, total <0.2 11/01/2017 02:37 PM     Lab Results   Component Value Date/Time    GFR est AA 81 11/01/2017 02:37 PM    GFR est non-AA 70 11/01/2017 02:37 PM Creatinine 1.07 11/01/2017 02:37 PM    BUN 13 11/01/2017 02:37 PM    Sodium 143 11/01/2017 02:37 PM    Potassium 4.2 11/01/2017 02:37 PM    Chloride 103 11/01/2017 02:37 PM    CO2 26 11/01/2017 02:37 PM      Lab Results   Component Value Date/Time    TSH 3.090 03/01/2017 03:45 PM      Lab Results   Component Value Date/Time    Glucose 93 11/01/2017 02:37 PM    Glucose  12/18/2015 11:05 AM          Last Point of Care HGB A1C  No components found for: POCA1C   Lab Results   Component Value Date/Time    TSH 3.090 03/01/2017 03:45 PM              Assessment/Plan:     1. Type 2 Diabetes Mellitus,   Lab Results   Component Value Date/Time    Hemoglobin A1c 6.5 (H) 11/01/2017 02:37 PM    Hemoglobin A1c (POC) 6.6 12/18/2015 11:05 AM    Hemoglobin A1c, External 7.9 02/07/2015 11:37 AM     Labs today  Change the glucose monitor    NPH ( Novolin N or Humulin N ) 20  units before breakfast  10 units at bedtime   Mr. Paty Omer is mentally challenged and dependent on his sister for diabetes care. The antipsychotic medications can worsen the insulin resistance. Given comorbidities, seizure disorder, inability to detect or report hypoglycemia tight glycemic control is not the goal in his case. 2. . HTN : continue ARB    3.  Seizure disorder : on antiepileptic medications,  vitamin D supplementation for bone health. 4 . Hypothyroid - euthyroid    5. Hyperlipidemia: continue pravastatin      Thank you for allowing me to participate in the care of this patient.     Rossy Stone MD    Caregiver verbalized understanding

## 2018-06-16 LAB
ALBUMIN SERPL-MCNC: 4.2 G/DL (ref 3.5–4.8)
ALBUMIN/GLOB SERPL: 1.2 {RATIO} (ref 1.2–2.2)
ALP SERPL-CCNC: 63 IU/L (ref 39–117)
ALT SERPL-CCNC: 13 IU/L (ref 0–44)
AST SERPL-CCNC: 14 IU/L (ref 0–40)
BILIRUB SERPL-MCNC: <0.2 MG/DL (ref 0–1.2)
BUN SERPL-MCNC: 16 MG/DL (ref 8–27)
BUN/CREAT SERPL: 13 (ref 10–24)
CALCIUM SERPL-MCNC: 9.7 MG/DL (ref 8.6–10.2)
CHLORIDE SERPL-SCNC: 105 MMOL/L (ref 96–106)
CO2 SERPL-SCNC: 24 MMOL/L (ref 20–29)
CREAT SERPL-MCNC: 1.25 MG/DL (ref 0.76–1.27)
CREAT UR-MCNC: NORMAL MG/DL
EST. AVERAGE GLUCOSE BLD GHB EST-MCNC: 134 MG/DL
GFR SERPLBLD CREATININE-BSD FMLA CKD-EPI: 58 ML/MIN/1.73
GFR SERPLBLD CREATININE-BSD FMLA CKD-EPI: 67 ML/MIN/1.73
GLOBULIN SER CALC-MCNC: 3.6 G/DL (ref 1.5–4.5)
GLUCOSE SERPL-MCNC: 78 MG/DL (ref 65–99)
HBA1C MFR BLD: 6.3 % (ref 4.8–5.6)
INTERPRETATION: NORMAL
LDLC SERPL DIRECT ASSAY-MCNC: 64 MG/DL (ref 0–99)
Lab: NORMAL
MICROALBUMIN UR-MCNC: NORMAL
POTASSIUM SERPL-SCNC: 4.1 MMOL/L (ref 3.5–5.2)
PROT SERPL-MCNC: 7.8 G/DL (ref 6–8.5)
SODIUM SERPL-SCNC: 143 MMOL/L (ref 134–144)

## 2018-06-17 PROBLEM — E11.21 TYPE 2 DIABETES WITH NEPHROPATHY (HCC): Status: ACTIVE | Noted: 2018-06-17

## 2018-09-28 LAB
CREATININE, EXTERNAL: 0.87
HBA1C MFR BLD HPLC: 6.2 %

## 2018-11-14 ENCOUNTER — OFFICE VISIT (OUTPATIENT)
Dept: ENDOCRINOLOGY | Age: 73
End: 2018-11-14

## 2018-11-14 VITALS
OXYGEN SATURATION: 97 % | HEIGHT: 66 IN | HEART RATE: 90 BPM | TEMPERATURE: 98.1 F | RESPIRATION RATE: 16 BRPM | WEIGHT: 243.9 LBS | BODY MASS INDEX: 39.2 KG/M2 | DIASTOLIC BLOOD PRESSURE: 79 MMHG | SYSTOLIC BLOOD PRESSURE: 141 MMHG

## 2018-11-14 DIAGNOSIS — I10 ESSENTIAL HYPERTENSION WITH GOAL BLOOD PRESSURE LESS THAN 140/90: ICD-10-CM

## 2018-11-14 DIAGNOSIS — E78.2 MIXED HYPERLIPIDEMIA: ICD-10-CM

## 2018-11-14 DIAGNOSIS — E11.21 TYPE 2 DIABETES WITH NEPHROPATHY (HCC): ICD-10-CM

## 2018-11-14 DIAGNOSIS — E03.4 HYPOTHYROIDISM DUE TO ACQUIRED ATROPHY OF THYROID: ICD-10-CM

## 2018-11-14 DIAGNOSIS — Z79.4 TYPE 2 DIABETES MELLITUS WITH HYPERGLYCEMIA, WITH LONG-TERM CURRENT USE OF INSULIN (HCC): Primary | ICD-10-CM

## 2018-11-14 DIAGNOSIS — E11.65 TYPE 2 DIABETES MELLITUS WITH HYPERGLYCEMIA, WITH LONG-TERM CURRENT USE OF INSULIN (HCC): Primary | ICD-10-CM

## 2018-11-14 RX ORDER — CHOLECALCIFEROL (VITAMIN D3) 125 MCG
CAPSULE ORAL AS NEEDED
COMMUNITY
End: 2020-12-30 | Stop reason: ALTCHOICE

## 2018-11-14 RX ORDER — INSULIN PUMP SYRINGE, 3 ML
EACH MISCELLANEOUS
Qty: 1 KIT | Refills: 0 | Status: SHIPPED | OUTPATIENT
Start: 2018-11-14 | End: 2021-01-01

## 2018-11-14 NOTE — PROGRESS NOTES
Cooper Spivey is a 81 y.o. male here for Chief Complaint Patient presents with  Diabetes Functional glucose monitor and record keeping system? - yes Eye exam within last year? - not yet Foot exam within last year? - on file 1. Have you been to the ER, urgent care clinic since your last visit? Hospitalized since your last visit? -no 
 
2. Have you seen or consulted any other health care providers outside of the [address] [place] [address] since your last visit? Include any pap smears or colon screening. -PCP

## 2018-11-14 NOTE — PROGRESS NOTES
History of Present Illness: Danish Verma is a 74 y.o. male here for fu  of  Type 2 Diabetes Mellitus. He is here with his brother. Mood has improved and he is eating less, he has lost weight Some of the psychotropic medications have been adjusted, no hypoglycemia Checking blood glucose twice daily, Compliant with medications Type 2 Diabetes was diagnosed  >10yrs . Cardiovascular risk factors: diabetes mellitus, obesity, male gender, hypertension History of DKA: 2010 Wt Readings from Last 3 Encounters:  
11/14/18 243 lb 14.4 oz (110.6 kg) 06/15/18 244 lb 11.2 oz (111 kg) 11/01/17 250 lb 9.6 oz (113.7 kg) BP Readings from Last 3 Encounters:  
11/14/18 141/79  
06/15/18 133/86  
11/01/17 136/76 Past Medical History:  
Diagnosis Date  Acute renal failure ([facility]  DKA (diabetic ketoacidoses) ([facility]  GERD (gastroesophageal reflux disease)  HTN (hypertension)  Schizophreniform disorder ([facility]  Seizure disorder ([facility]  Type II or unspecified type diabetes mellitus without mention of complication, uncontrolled Current Outpatient Medications Medication Sig  cholecalciferol, vitamin D3, (VITAMIN D3) 2,000 unit tab Take  by mouth as needed.  HUMULIN N NPH U-100 INSULIN 100 unit/mL injection INJECT 20 UNITS BEFORE BREAKFAST AND 10 UNITS EVERY NIGHT AT BEDTIME  zonisamide (ZONEGRAN) 100 mg capsule Take 200 mg by mouth two (2) times a day.  glucose blood VI test strips (ONETOUCH VERIO) strip Use as directed to check blood sugars 3 times daily. DX Code: E11.65  
 ONETOUCH ULTRA BLUE TEST STRIP strip USE AS DIRECTED TO TEST 2-3 TIMES DAILY  QUEtiapine (SEROQUEL) 50 mg tablet Take 50 mg by mouth three (3) times daily.  Insulin Syringe-Needle U-100 1 mL 31 gauge x 5/16 syrg USE AS DIRECTED Dx code E11.65  
 TRUEPLUS LANCETS 30 gauge misc USE AS DIRECTED TWO TO THREE TIMES DAILY  ONETOUCH ULTRA TEST strip USE AS DIRECTED TO TEST 2-3 TIMES DAILY  potassium chloride SA (MICRO-K) 10 mEq capsule Take 1 Cap by mouth two (2) times a day.  Insulin Syringe-Needle U-100 1 mL 31 gauge x 5/16 syrg  triamterene-hydrochlorothiazide (MAXZIDE) 37.5-25 mg per tablet Take 1 Tab by mouth daily.  levothyroxine (SYNTHROID) 75 mcg tablet TAKE 1 TABLET BY MOUTH EVERY DAY BEFORE BREAKFAST  metFORMIN (GLUCOPHAGE) 500 mg tablet Take 500 mg by mouth two (2) times daily (with meals).  losartan (COZAAR) 50 mg tablet Take 50 mg by mouth daily.  LORazepam (ATIVAN) 1 mg tablet Take 1 mg by mouth two (2) times daily as needed.  insulin syringe-needle U-100 Dispense microfine 1 mL syringes with 30 gauge needle  pravastatin (PRAVACHOL) 20 mg tablet Take 20 mg by mouth nightly.  folic acid (FOLVITE) 1 mg tablet Take  by mouth daily.  trihexyphenidyl (ARTANE) 2 mg tablet Take 2 mg by mouth three (3) times daily.  docusate calcium (SURFAK) 240 mg capsule Take 240 mg by mouth nightly.  topiramate (TOPAMAX) 200 mg tablet Take  by mouth two (2) times a day.  ranitidine (ZANTAC) 300 mg tablet Take 300 mg by mouth daily.  glucagon (GLUCAGON EMERGENCY) 1 mg injection 1 mg by IntraMUSCular route as needed. No current facility-administered medications for this visit. No Known Allergies Review of Systems: patient answers no to most of the questions -  
- Physical Examination: 
 Blood pressure 141/79, pulse 90, temperature 98.1 °F (36.7 °C), temperature source Oral, resp. rate 16, height 5' 6\" (1.676 m), weight 243 lb 14.4 oz (110.6 kg), SpO2 97 %. Estimated body mass index is 39.37 kg/m² as calculated from the following: 
  Height as of this encounter: 5' 6\" (1.676 m). -   Weight as of this encounter: 243 lb 14.4 oz (110.6 kg). - General:  no distress,no eye contact,obese 
- HEENT: no pallor, no periorbital edema 
- Neck: supple, no thyromegaly - Cardiovascular: regular, normal rate, normal S1 and S2 
- Respiratory: clear to auscultation bilaterally - Gastrointestinal: soft, nontender, obese,  BS + 
- Musculoskeletal:  trace edema 
- Neurological: alert - Psychiatric: normal mood and affect 
- Skin: color, texture, turgor normal.  
 
Diabetic foot exam: June 2018 Left Foot: 
 Visual Exam: callous - great toe Pulse DP: 1+ (weak) Filament test: absent sensation Vibratory sensation: absent Right Foot: 
 Visual Exam: callous - great toe Pulse DP: 1+ (weak) Filament test: absent sensation Vibratory sensation: absent Data Reviewed:  
 
[x] Glucose records reviewed. [x] See glucose records for details (to be scanned). [x] A1C [] Reviewed labs 2/13 GFR 76   Non   LFTs nl TSH 13 - started on LT4 Lab Results Component Value Date/Time Hemoglobin A1c 6.3 (H) 06/15/2018 03:32 PM  
 Hemoglobin A1c 6.5 (H) 11/01/2017 02:37 PM  
 Hemoglobin A1c 6.9 (H) 03/01/2017 03:45 PM  
 Glucose 78 06/15/2018 03:32 PM  
 Glucose  12/18/2015 11:05 AM  
 Microalb/Creat ratio (ug/mg creat.) <5.9 07/22/2016 12:00 AM  
 LDL,Direct 64 06/15/2018 03:32 PM  
 LDL, calculated 102 (H) 09/21/2015 11:15 AM  
 Creatinine 1.25 06/15/2018 03:32 PM  
 Hemoglobin A1c, External 7.9 02/07/2015 11:37 AM  
  
Lab Results Component Value Date/Time ALT (SGPT) 13 06/15/2018 03:32 PM  
 AST (SGOT) 14 06/15/2018 03:32 PM  
 Alk. phosphatase 63 06/15/2018 03:32 PM  
 Bilirubin, total <0.2 06/15/2018 03:32 PM  
 
Lab Results Component Value Date/Time GFR est AA 67 06/15/2018 03:32 PM  
 GFR est non-AA 58 (L) 06/15/2018 03:32 PM  
 Creatinine 1.25 06/15/2018 03:32 PM  
 BUN 16 06/15/2018 03:32 PM  
 Sodium 143 06/15/2018 03:32 PM  
 Potassium 4.1 06/15/2018 03:32 PM  
 Chloride 105 06/15/2018 03:32 PM  
 CO2 24 06/15/2018 03:32 PM  
  
Lab Results Component Value Date/Time TSH 3.090 03/01/2017 03:45 PM  
  
Lab Results Component Value Date/Time Glucose 78 06/15/2018 03:32 PM  
 Glucose  12/18/2015 11:05 AM  
  
 
 
Last Point of Care HGB A1C No components found for: POCA1C Lab Results Component Value Date/Time TSH 3.090 03/01/2017 03:45 PM  
  
 
 
 
 
Assessment/Plan: 1. Type 2 Diabetes Mellitus, Lab Results Component Value Date/Time Hemoglobin A1c 6.3 (H) 06/15/2018 03:32 PM  
 Hemoglobin A1c (POC) 6.6 12/18/2015 11:05 AM  
 Hemoglobin A1c, External 7.9 02/07/2015 11:37 AM  
A1c 6.2 September 2018 NPH ( Novolin N or Humulin N ) 20  units before breakfast  10 units at bedtime Mr. Gwendolyn West is mentally challenged and dependent on his sister for diabetes care. The antipsychotic medications can worsen the insulin resistance. Given comorbidities, seizure disorder, inability to detect or report hypoglycemia tight glycemic control is not the goal in his case. 2. . HTN : continue ARB 3.  Seizure disorder : on antiepileptic medications,  vitamin D supplementation for bone health. 4 . Hypothyroid - euthyroid 5. Hyperlipidemia: continue pravastatin Thank you for allowing me to participate in the care of this patient. Margy Monge MD 
 
Caregiver verbalized understanding

## 2018-12-22 ENCOUNTER — OP HISTORICAL/CONVERTED ENCOUNTER (OUTPATIENT)
Dept: OTHER | Age: 73
End: 2018-12-22

## 2018-12-29 ENCOUNTER — IP HISTORICAL/CONVERTED ENCOUNTER (OUTPATIENT)
Dept: OTHER | Age: 73
End: 2018-12-29

## 2019-03-26 PROBLEM — F03.90 DEMENTIA (HCC): Status: ACTIVE | Noted: 2017-07-26

## 2019-06-22 ENCOUNTER — IP HISTORICAL/CONVERTED ENCOUNTER (OUTPATIENT)
Dept: OTHER | Age: 74
End: 2019-06-22

## 2020-02-13 ENCOUNTER — OP HISTORICAL/CONVERTED ENCOUNTER (OUTPATIENT)
Dept: OTHER | Age: 75
End: 2020-02-13

## 2020-03-09 ENCOUNTER — OP HISTORICAL/CONVERTED ENCOUNTER (OUTPATIENT)
Dept: OTHER | Age: 75
End: 2020-03-09

## 2020-03-09 LAB — HBA1C MFR BLD HPLC: 6.8 %

## 2020-06-18 ENCOUNTER — IP HISTORICAL/CONVERTED ENCOUNTER (OUTPATIENT)
Dept: OTHER | Age: 75
End: 2020-06-18

## 2020-09-01 RX ORDER — METFORMIN HYDROCHLORIDE 500 MG/1
TABLET ORAL
Qty: 180 TAB | Refills: 0 | Status: SHIPPED | OUTPATIENT
Start: 2020-09-01 | End: 2020-11-30

## 2020-10-30 RX ORDER — PRAVASTATIN SODIUM 20 MG/1
TABLET ORAL
Qty: 90 TAB | Refills: 0 | Status: SHIPPED | OUTPATIENT
Start: 2020-10-30 | End: 2020-11-09

## 2020-11-19 VITALS
OXYGEN SATURATION: 96 % | TEMPERATURE: 98.6 F | HEART RATE: 65 BPM | BODY MASS INDEX: 36.53 KG/M2 | RESPIRATION RATE: 18 BRPM | SYSTOLIC BLOOD PRESSURE: 127 MMHG | WEIGHT: 241 LBS | HEIGHT: 68 IN | DIASTOLIC BLOOD PRESSURE: 82 MMHG

## 2020-11-30 RX ORDER — METFORMIN HYDROCHLORIDE 500 MG/1
TABLET ORAL
Qty: 180 TAB | Refills: 0 | Status: SHIPPED | OUTPATIENT
Start: 2020-11-30 | End: 2020-12-30 | Stop reason: SDUPTHER

## 2020-12-30 ENCOUNTER — OFFICE VISIT (OUTPATIENT)
Dept: INTERNAL MEDICINE CLINIC | Age: 75
End: 2020-12-30
Payer: MEDICAID

## 2020-12-30 VITALS
HEIGHT: 68 IN | WEIGHT: 254 LBS | DIASTOLIC BLOOD PRESSURE: 84 MMHG | HEART RATE: 72 BPM | BODY MASS INDEX: 38.49 KG/M2 | RESPIRATION RATE: 18 BRPM | OXYGEN SATURATION: 98 % | SYSTOLIC BLOOD PRESSURE: 120 MMHG

## 2020-12-30 DIAGNOSIS — K21.9 GASTROESOPHAGEAL REFLUX DISEASE WITHOUT ESOPHAGITIS: ICD-10-CM

## 2020-12-30 DIAGNOSIS — E66.01 MORBID OBESITY (HCC): ICD-10-CM

## 2020-12-30 DIAGNOSIS — G40.909 SEIZURE DISORDER (HCC): ICD-10-CM

## 2020-12-30 DIAGNOSIS — E78.2 MIXED HYPERLIPIDEMIA: ICD-10-CM

## 2020-12-30 DIAGNOSIS — F20.81 SCHIZOPHRENIFORM DISORDER (HCC): ICD-10-CM

## 2020-12-30 DIAGNOSIS — E55.9 VITAMIN D DEFICIENCY: ICD-10-CM

## 2020-12-30 DIAGNOSIS — F03.90 DEMENTIA WITHOUT BEHAVIORAL DISTURBANCE, UNSPECIFIED DEMENTIA TYPE: ICD-10-CM

## 2020-12-30 DIAGNOSIS — I10 ESSENTIAL HYPERTENSION: ICD-10-CM

## 2020-12-30 DIAGNOSIS — E11.8 CONTROLLED TYPE 2 DIABETES MELLITUS WITH COMPLICATION, WITHOUT LONG-TERM CURRENT USE OF INSULIN (HCC): ICD-10-CM

## 2020-12-30 LAB — GLUCOSE POC: 130 MG/DL

## 2020-12-30 PROCEDURE — 99214 OFFICE O/P EST MOD 30 MIN: CPT | Performed by: INTERNAL MEDICINE

## 2020-12-30 PROCEDURE — 82962 GLUCOSE BLOOD TEST: CPT | Performed by: INTERNAL MEDICINE

## 2020-12-30 RX ORDER — LACOSAMIDE 200 MG/1
200 TABLET ORAL 2 TIMES DAILY
COMMUNITY
End: 2021-01-01 | Stop reason: SDUPTHER

## 2020-12-30 RX ORDER — DIVALPROEX SODIUM 500 MG/1
500 TABLET, DELAYED RELEASE ORAL 2 TIMES DAILY
COMMUNITY

## 2020-12-30 RX ORDER — METFORMIN HYDROCHLORIDE 500 MG/1
500 TABLET ORAL 2 TIMES DAILY WITH MEALS
Qty: 180 TAB | Refills: 2 | Status: ON HOLD | OUTPATIENT
Start: 2020-12-30 | End: 2021-01-01 | Stop reason: SDUPTHER

## 2020-12-30 RX ORDER — LEVETIRACETAM 1000 MG/1
1000 TABLET ORAL 2 TIMES DAILY
COMMUNITY

## 2020-12-30 RX ORDER — HYDROCHLOROTHIAZIDE 25 MG/1
25 TABLET ORAL EVERY MORNING
Qty: 90 TAB | Refills: 2 | Status: SHIPPED | OUTPATIENT
Start: 2020-12-30 | End: 2021-01-01

## 2020-12-30 RX ORDER — OLANZAPINE 2.5 MG/1
2.5 TABLET ORAL 2 TIMES DAILY
COMMUNITY
End: 2021-01-01 | Stop reason: SDUPTHER

## 2020-12-30 RX ORDER — HYDROCHLOROTHIAZIDE 25 MG/1
25 TABLET ORAL DAILY
COMMUNITY
End: 2020-12-30 | Stop reason: SDUPTHER

## 2020-12-30 RX ORDER — DOCUSATE CALCIUM 240 MG
240 CAPSULE ORAL
Qty: 90 CAP | Refills: 2 | Status: SHIPPED | OUTPATIENT
Start: 2020-12-30 | End: 2021-01-01

## 2020-12-30 RX ORDER — LEVOTHYROXINE SODIUM 75 UG/1
75 TABLET ORAL
Qty: 90 TAB | Refills: 2 | Status: SHIPPED | OUTPATIENT
Start: 2020-12-30 | End: 2021-01-01 | Stop reason: SDUPTHER

## 2020-12-30 RX ORDER — AMLODIPINE BESYLATE 5 MG/1
5 TABLET ORAL DAILY
COMMUNITY
End: 2021-01-01

## 2020-12-30 NOTE — PROGRESS NOTES
Lucy Conde is a 70 y.o. male and presents with Follow Up Chronic Condition (htn ,kirill ,diabetes ) He is brought by his brother according to his brother for last 1 year he does not have any recent admission in the hospital and recently, he was visited by home health, but he does not know, his sister takes care of him Mr. Javier Croft has intellectual deficiency with intermittent deficiency and mental,, retardation with, history of schizophreniform disorder with history of dementia and seizure disorder. He also has hypertension with diabetes and today his random blood sugar is 130. According to his brother his sister checks blood sugar off and on he does not have hypoglycemia after being off from insulin taking Metformin ,. Patient does not participate in providing history due to his,  neurological and mental condition, he walks with walker. He does not remain too drowsy,. He has consulted virtually with Dr. Jaime aMrtin,. He is due for his blood work. He has good appetite. His BMI is 38.6. Review of Systems Review of Systems Constitutional: Negative. HENT: Negative for hearing loss, sinus pain and sore throat. Eyes: Negative for blurred vision. Respiratory: Negative for cough, shortness of breath and wheezing. Cardiovascular: Negative. Gastrointestinal: Negative. Genitourinary: Negative. Musculoskeletal:  
      walking with walker Neurological: Negative for dizziness, tingling, tremors and headaches. Patient has neurological and mental health problems and  having probably severe intelligent deficiency Psychiatric/Behavioral: Negative for depression. The patient does not have insomnia. Difficult to assess Past Medical History:  
Diagnosis Date  Acute renal failure ([facility] [address] [facility]  Anemia  Arthritis  DKA (diabetic ketoacidoses) ([facility] [facility]  GERD (gastroesophageal reflux disease)  HTN (hypertension)  Hypercholesterolemia  Schizophrenia ([facility]  Schizophreniform disorder ([facility]  Seizure disorder ([facility]  Type II or unspecified type diabetes mellitus without mention of complication, uncontrolled No past surgical history on file. Social History Socioeconomic History  Marital status: SINGLE Spouse name: Not on file  Number of children: Not on file  Years of education: Not on file  Highest education level: Not on file Tobacco Use  Smoking status: Never Smoker  Smokeless tobacco: Never Used Substance and Sexual Activity  Alcohol use: No  
 Drug use: No  
 Sexual activity: Never Family History Problem Relation Age of Onset  Stroke Father Current Outpatient Medications Medication Sig Dispense Refill  lacosamide (Vimpat) 200 mg tab tablet Take 200 mg by mouth two (2) times a day.  levETIRAcetam 1,000 mg tablet Take 1,000 mg by mouth two (2) times a day.  OLANZapine (ZyPREXA) 2.5 mg tablet Take 2.5 mg by mouth two (2) times a day.  divalproex DR (DEPAKOTE) 500 mg tablet Take 500 mg by mouth two (2) times a day. Take two tablets twice daily  amLODIPine (NORVASC) 5 mg tablet Take 5 mg by mouth daily.  hydroCHLOROthiazide (HYDRODIURIL) 25 mg tablet Take 1 Tab by mouth Every morning. 90 Tab 2  
 docusate calcium (SURFAK) 240 mg capsule Take 1 Cap by mouth nightly. 90 Cap 2  
 metFORMIN (GLUCOPHAGE) 500 mg tablet Take 1 Tab by mouth two (2) times daily (with meals). 180 Tab 2  
 levothyroxine (SYNTHROID) 75 mcg tablet Take 1 Tab by mouth every morning. Take in early morning. 90 Tab 2  pravastatin (PRAVACHOL) 20 mg tablet TAKE 1 TABLET BY MOUTH EVERY DAY AT BEDTIME 90 Tab 1  
 losartan (COZAAR) 100 mg tablet TAKE 1 TABLET BY MOUTH EVERY DAY AS DIRECTED 90 Tab 1  Blood-Glucose Meter (ONETOUCH ULTRA2) monitoring kit Use to check blood glucose 3 times daily.  Dx code: E11.65 1 Kit 0  
  glucose blood VI test strips (ONETOUCH VERIO) strip Use as directed to check blood sugars 3 times daily. DX Code: E11.65 100 Strip 11  
 ONETOUCH ULTRA BLUE TEST STRIP strip USE AS DIRECTED TO TEST 2-3 TIMES DAILY 100 Strip 0  
 TRUEPLUS LANCETS 30 gauge misc USE AS DIRECTED TWO TO THREE TIMES DAILY 100 Lancet 11  
 ONETOUCH ULTRA TEST strip USE AS DIRECTED TO TEST 2-3 TIMES DAILY 300 Strip 98  
 glucagon (GLUCAGON EMERGENCY) 1 mg injection 1 mg by IntraMUSCular route as needed. 1 mg 2 No Known Allergies Objective: 
Visit Vitals /84 (BP 1 Location: Left arm, BP Patient Position: Sitting) Pulse 72 Resp 18 Ht 5' 8\" (1.727 m) Wt 254 lb (115.2 kg) SpO2 98% BMI 38.62 kg/m² Physical Exam:  
Constitutional: General Appearance: . Well dressed, not answering questions much,, sleepy level of Distress: NAD. Psychiatric: Mental Status: normal mood and affect Orientation: to time, place, and person. ,normal eye contact. Head: Head: normocephalic and atraumatic. Eyes: Pupils: PERRLA. Sclerae: non-icteric. Neck: Neck: supple, trachea midline, and no masses. Lymph Nodes: no cervical LAD. Thyroid: no enlargement or nodules and non-tender. Lungs: Respiratory effort: no dyspnea. Auscultation: no wheezing, rales/crackles, or rhonchi and breath sounds normal and good air movement. Cardiovascular: Apical Impulse: not displaced. Heart Auscultation: normal S1 and S2; no murmurs, rubs, or gallops; and RRR. Neck vessels: no carotid bruits. Pulses including femoral / pedal: normal throughout. Abdomen: Bowel Sounds: normal. Inspection and Palpation: no tenderness, guarding, or masses and soft and non-distended. Liver: non-tender and no hepatomegaly. Spleen: non-tender and no splenomegaly. Musculoskeletal[de-identified] Extremities: no edema,no varicosities. No Calf tenderness. Neurologic: Gait and Station:  walking with walker Motor Strength not able to assess Skin: Inspection and palpation: no rash, lesions, or ulcer. Results for orders placed or performed in visit on 12/30/20 AMB POC GLUCOSE BLOOD, BY GLUCOSE MONITORING DEVICE Result Value Ref Range Glucose  mg/dL Assessment/Plan: ICD-10-CM ICD-9-CM 1. Essential hypertension  I10 401.9 CBC WITH AUTOMATED DIFF  
   METABOLIC PANEL, COMPREHENSIVE 2. Controlled type 2 diabetes mellitus with complication, without long-term current use of insulin ([facility])  E11.8 250.90 LIPID PANEL  
   HEMOGLOBIN A1C WITH EAG  
   TSH 3RD GENERATION  
   AMB POC GLUCOSE BLOOD, BY GLUCOSE MONITORING DEVICE 3. Mixed hyperlipidemia  E78.2 272.2 4. Seizure disorder ([facility]  G40.909 345.90   
5. Dementia without behavioral disturbance, unspecified dementia type ([facility]  F03.90 294.20   
6. Gastroesophageal reflux disease without esophagitis  K21.9 530.81   
7. Morbid obesity ([facility]  E66.01 278.01   
8. Schizophreniform disorder ([facility]  F20.81 295.40   
9. Vitamin D deficiency  E55.9 268.9 VITAMIN D, 25 HYDROXY Orders Placed This Encounter  CBC WITH AUTOMATED DIFF  
 METABOLIC PANEL, COMPREHENSIVE  LIPID PANEL  
 HEMOGLOBIN A1C WITH EAG  
 TSH 3RD GENERATION  
 VITAMIN D, 25 HYDROXY  AMB POC GLUCOSE BLOOD, BY GLUCOSE MONITORING DEVICE  
 lacosamide (Vimpat) 200 mg tab tablet Sig: Take 200 mg by mouth two (2) times a day.  levETIRAcetam 1,000 mg tablet Sig: Take 1,000 mg by mouth two (2) times a day.  DISCONTD: hydroCHLOROthiazide (HYDRODIURIL) 25 mg tablet Sig: Take 25 mg by mouth daily.  OLANZapine (ZyPREXA) 2.5 mg tablet Sig: Take 2.5 mg by mouth two (2) times a day.  divalproex DR (DEPAKOTE) 500 mg tablet Sig: Take 500 mg by mouth two (2) times a day. Take two tablets twice daily  amLODIPine (NORVASC) 5 mg tablet Sig: Take 5 mg by mouth daily.  hydroCHLOROthiazide (HYDRODIURIL) 25 mg tablet Sig: Take 1 Tab by mouth Every morning. Dispense:  90 Tab Refill:  2  
 docusate calcium (SURFAK) 240 mg capsule Sig: Take 1 Cap by mouth nightly. Dispense:  90 Cap Refill:  2  
 metFORMIN (GLUCOPHAGE) 500 mg tablet Sig: Take 1 Tab by mouth two (2) times daily (with meals). Dispense:  180 Tab Refill:  2  
 levothyroxine (SYNTHROID) 75 mcg tablet Sig: Take 1 Tab by mouth every morning. Take in early morning. Dispense:  90 Tab Refill:  2 Hypertension controlled continue amlodipine 5 mg once a day. Hydrochlorothiazide 25 mg/day. Losartan 100 mg/day. Diet low in sodium. Labs ordered. Type 2 diabetes mellitus controlled. No episodes of hypoglycemia. Random blood sugar is 130 in the office. His sister checks blood sugar at home but no log available his brother brought him here. Patient is having psychological and neurological problems and not able to participate in providing history. Continue Metformin 500 mg twice a day with meal.  Diabetic diet. Try to lose weight. Continue low-dose aspirin and statin. Labs ordered. Regular ophthalmologic checkup. Hypercholesteremia continue pravastatin 20 mg at bedtime. Diet low in fat. Labs ordered. Hypothyroidism labs ordered. Continue levothyroxine 75 mcg once a day. Refills given. Seizure disorder  with history of severe intelligent deficiency and psychological mental health problems since childhood and history of follow-up with Dr. Wesly Mancilla since many years and and getting all antiseizure medications including levetiracetam, Vimpat, olanzapine divalproex/Depakote,. Follow the recommendation given by Dr. Wesly Mancilla. Patient has no behavioral health problems. Patient is quiet walking with walker and does not participate in history and no agitation or violent behavior. History of vitamin D deficiency. Labs ordered. GERD continue omeprazole. Morbid obesity recommended to restrict excessive calories in the diet and diet low in starch sugar and fat. Fasting labs ordered. Refills given. Follow-up in 6 months. Avoid hypoglycemia and hypoglycemia education given. lose weight, increase physical activity, continue present plan, routine labs ordered, call if any problems There are no Patient Instructions on file for this visit. Follow-up and Dispositions · Return in about 6 months (around 6/30/2021) for htn kirill ,diabetes ,fasting labs now.

## 2021-01-01 ENCOUNTER — TELEPHONE (OUTPATIENT)
Dept: INTERNAL MEDICINE CLINIC | Age: 76
End: 2021-01-01

## 2021-01-01 ENCOUNTER — APPOINTMENT (OUTPATIENT)
Dept: GENERAL RADIOLOGY | Age: 76
DRG: 426 | End: 2021-01-01
Attending: HOSPITALIST
Payer: MEDICAID

## 2021-01-01 ENCOUNTER — HOSPITAL ENCOUNTER (EMERGENCY)
Age: 76
Discharge: HOME | End: 2021-10-07
Attending: STUDENT IN AN ORGANIZED HEALTH CARE EDUCATION/TRAINING PROGRAM
Payer: MEDICAID

## 2021-01-01 ENCOUNTER — APPOINTMENT (OUTPATIENT)
Dept: GENERAL RADIOLOGY | Age: 76
DRG: 426 | End: 2021-01-01
Attending: EMERGENCY MEDICINE
Payer: MEDICAID

## 2021-01-01 ENCOUNTER — APPOINTMENT (OUTPATIENT)
Dept: GENERAL RADIOLOGY | Age: 76
End: 2021-01-01
Attending: EMERGENCY MEDICINE
Payer: MEDICAID

## 2021-01-01 ENCOUNTER — APPOINTMENT (OUTPATIENT)
Dept: CT IMAGING | Age: 76
End: 2021-01-01
Attending: STUDENT IN AN ORGANIZED HEALTH CARE EDUCATION/TRAINING PROGRAM
Payer: MEDICAID

## 2021-01-01 ENCOUNTER — APPOINTMENT (OUTPATIENT)
Dept: CT IMAGING | Age: 76
DRG: 463 | End: 2021-01-01
Attending: EMERGENCY MEDICINE
Payer: MEDICAID

## 2021-01-01 ENCOUNTER — APPOINTMENT (OUTPATIENT)
Dept: GENERAL RADIOLOGY | Age: 76
End: 2021-01-01
Attending: STUDENT IN AN ORGANIZED HEALTH CARE EDUCATION/TRAINING PROGRAM
Payer: MEDICAID

## 2021-01-01 ENCOUNTER — APPOINTMENT (OUTPATIENT)
Dept: NON INVASIVE DIAGNOSTICS | Age: 76
DRG: 426 | End: 2021-01-01
Attending: HOSPITALIST
Payer: MEDICAID

## 2021-01-01 ENCOUNTER — HOSPITAL ENCOUNTER (EMERGENCY)
Age: 76
Discharge: HOME | End: 2021-03-13
Attending: STUDENT IN AN ORGANIZED HEALTH CARE EDUCATION/TRAINING PROGRAM
Payer: MEDICAID

## 2021-01-01 ENCOUNTER — HOSPITAL ENCOUNTER (INPATIENT)
Age: 76
LOS: 9 days | Discharge: HOME HEALTH | DRG: 463 | End: 2021-06-12
Attending: STUDENT IN AN ORGANIZED HEALTH CARE EDUCATION/TRAINING PROGRAM | Admitting: INTERNAL MEDICINE
Payer: MEDICAID

## 2021-01-01 ENCOUNTER — HOSPITAL ENCOUNTER (INPATIENT)
Age: 76
LOS: 8 days | Discharge: HOME HEALTH | DRG: 426 | End: 2021-05-26
Attending: EMERGENCY MEDICINE | Admitting: HOSPITALIST
Payer: MEDICAID

## 2021-01-01 ENCOUNTER — HOSPITAL ENCOUNTER (EMERGENCY)
Age: 76
Discharge: HOME | End: 2021-06-30
Attending: EMERGENCY MEDICINE
Payer: MEDICAID

## 2021-01-01 ENCOUNTER — OFFICE VISIT (OUTPATIENT)
Dept: INTERNAL MEDICINE CLINIC | Age: 76
End: 2021-01-01
Payer: MEDICAID

## 2021-01-01 ENCOUNTER — APPOINTMENT (OUTPATIENT)
Dept: GENERAL RADIOLOGY | Age: 76
DRG: 463 | End: 2021-01-01
Attending: EMERGENCY MEDICINE
Payer: MEDICAID

## 2021-01-01 ENCOUNTER — APPOINTMENT (OUTPATIENT)
Dept: CT IMAGING | Age: 76
DRG: 426 | End: 2021-01-01
Attending: EMERGENCY MEDICINE
Payer: MEDICAID

## 2021-01-01 ENCOUNTER — APPOINTMENT (OUTPATIENT)
Dept: GENERAL RADIOLOGY | Age: 76
DRG: 463 | End: 2021-01-01
Attending: PHYSICIAN ASSISTANT
Payer: MEDICAID

## 2021-01-01 VITALS
TEMPERATURE: 98.5 F | BODY MASS INDEX: 32.95 KG/M2 | OXYGEN SATURATION: 94 % | HEIGHT: 69 IN | WEIGHT: 222.44 LBS | DIASTOLIC BLOOD PRESSURE: 96 MMHG | SYSTOLIC BLOOD PRESSURE: 149 MMHG | RESPIRATION RATE: 20 BRPM | HEART RATE: 83 BPM

## 2021-01-01 VITALS
OXYGEN SATURATION: 98 % | HEIGHT: 72 IN | WEIGHT: 245 LBS | TEMPERATURE: 98.4 F | RESPIRATION RATE: 19 BRPM | HEART RATE: 74 BPM | BODY MASS INDEX: 33.18 KG/M2 | SYSTOLIC BLOOD PRESSURE: 148 MMHG | DIASTOLIC BLOOD PRESSURE: 90 MMHG

## 2021-01-01 VITALS
DIASTOLIC BLOOD PRESSURE: 68 MMHG | BODY MASS INDEX: 33.05 KG/M2 | RESPIRATION RATE: 12 BRPM | SYSTOLIC BLOOD PRESSURE: 120 MMHG | OXYGEN SATURATION: 98 % | HEIGHT: 72 IN | WEIGHT: 244 LBS | HEART RATE: 80 BPM

## 2021-01-01 VITALS
BODY MASS INDEX: 31.93 KG/M2 | RESPIRATION RATE: 18 BRPM | DIASTOLIC BLOOD PRESSURE: 82 MMHG | TEMPERATURE: 97.3 F | HEART RATE: 87 BPM | WEIGHT: 215.61 LBS | SYSTOLIC BLOOD PRESSURE: 151 MMHG | OXYGEN SATURATION: 97 % | HEIGHT: 69 IN

## 2021-01-01 VITALS
RESPIRATION RATE: 19 BRPM | HEART RATE: 87 BPM | BODY MASS INDEX: 35.89 KG/M2 | SYSTOLIC BLOOD PRESSURE: 151 MMHG | DIASTOLIC BLOOD PRESSURE: 86 MMHG | HEIGHT: 72 IN | OXYGEN SATURATION: 96 % | WEIGHT: 265 LBS | TEMPERATURE: 97.9 F

## 2021-01-01 VITALS
WEIGHT: 200 LBS | HEART RATE: 68 BPM | BODY MASS INDEX: 27.09 KG/M2 | TEMPERATURE: 98.6 F | DIASTOLIC BLOOD PRESSURE: 97 MMHG | OXYGEN SATURATION: 98 % | RESPIRATION RATE: 14 BRPM | HEIGHT: 72 IN | SYSTOLIC BLOOD PRESSURE: 157 MMHG

## 2021-01-01 DIAGNOSIS — Q87.89: ICD-10-CM

## 2021-01-01 DIAGNOSIS — F20.81 SCHIZOPHRENIFORM DISORDER (HCC): ICD-10-CM

## 2021-01-01 DIAGNOSIS — Z09 HOSPITAL DISCHARGE FOLLOW-UP: ICD-10-CM

## 2021-01-01 DIAGNOSIS — R73.9 HYPERGLYCEMIA: ICD-10-CM

## 2021-01-01 DIAGNOSIS — F03.90 DEMENTIA WITHOUT BEHAVIORAL DISTURBANCE, UNSPECIFIED DEMENTIA TYPE: ICD-10-CM

## 2021-01-01 DIAGNOSIS — I10 HYPERTENSION, UNSPECIFIED TYPE: Primary | ICD-10-CM

## 2021-01-01 DIAGNOSIS — Q66.80: ICD-10-CM

## 2021-01-01 DIAGNOSIS — R56.9 SEIZURE (HCC): ICD-10-CM

## 2021-01-01 DIAGNOSIS — E87.6 HYPOKALEMIA: ICD-10-CM

## 2021-01-01 DIAGNOSIS — G40.909 SEIZURE DISORDER (HCC): Primary | ICD-10-CM

## 2021-01-01 DIAGNOSIS — G40.909 SEIZURE DISORDER (HCC): ICD-10-CM

## 2021-01-01 DIAGNOSIS — R41.82 ALTERED MENTAL STATUS, UNSPECIFIED ALTERED MENTAL STATUS TYPE: Primary | ICD-10-CM

## 2021-01-01 DIAGNOSIS — N17.9 ACUTE RENAL FAILURE, UNSPECIFIED ACUTE RENAL FAILURE TYPE (HCC): ICD-10-CM

## 2021-01-01 DIAGNOSIS — F72 SEVERE INTELLECTUAL DISABILITY WITH INTELLIGENCE QUOTIENT 20 TO 34: ICD-10-CM

## 2021-01-01 DIAGNOSIS — Q75.2: ICD-10-CM

## 2021-01-01 DIAGNOSIS — E87.0 HYPERNATREMIA: ICD-10-CM

## 2021-01-01 DIAGNOSIS — E11.65 UNCONTROLLED TYPE 2 DIABETES MELLITUS WITH HYPERGLYCEMIA (HCC): ICD-10-CM

## 2021-01-01 DIAGNOSIS — E86.0 DEHYDRATION: Primary | ICD-10-CM

## 2021-01-01 DIAGNOSIS — E78.2 MIXED HYPERLIPIDEMIA: ICD-10-CM

## 2021-01-01 DIAGNOSIS — R26.9 GAIT ABNORMALITY: ICD-10-CM

## 2021-01-01 DIAGNOSIS — Q16.1: ICD-10-CM

## 2021-01-01 DIAGNOSIS — R79.89 ABNORMAL LFTS: ICD-10-CM

## 2021-01-01 DIAGNOSIS — I10 ESSENTIAL HYPERTENSION: ICD-10-CM

## 2021-01-01 DIAGNOSIS — G25.2 RESTING TREMOR: Primary | ICD-10-CM

## 2021-01-01 DIAGNOSIS — E86.0 DEHYDRATION: ICD-10-CM

## 2021-01-01 DIAGNOSIS — G40.919 BREAKTHROUGH SEIZURE (HCC): Primary | ICD-10-CM

## 2021-01-01 LAB
ALBUMIN SERPL-MCNC: 2.3 G/DL (ref 3.5–5)
ALBUMIN SERPL-MCNC: 2.4 G/DL (ref 3.5–5)
ALBUMIN SERPL-MCNC: 2.6 G/DL (ref 3.5–5)
ALBUMIN SERPL-MCNC: 2.7 G/DL (ref 3.5–5)
ALBUMIN SERPL-MCNC: 2.9 G/DL (ref 3.5–5)
ALBUMIN SERPL-MCNC: 3.3 G/DL (ref 3.5–5)
ALBUMIN SERPL-MCNC: 3.4 G/DL (ref 3.5–5)
ALBUMIN SERPL-MCNC: 3.6 G/DL (ref 3.5–5)
ALBUMIN/GLOB SERPL: 0.5 {RATIO} (ref 1.1–2.2)
ALBUMIN/GLOB SERPL: 0.6 {RATIO} (ref 1.1–2.2)
ALBUMIN/GLOB SERPL: 0.6 {RATIO} (ref 1.1–2.2)
ALBUMIN/GLOB SERPL: 0.7 {RATIO} (ref 1.1–2.2)
ALBUMIN/GLOB SERPL: 0.8 {RATIO} (ref 1.1–2.2)
ALBUMIN/GLOB SERPL: 0.8 {RATIO} (ref 1.1–2.2)
ALP SERPL-CCNC: 53 U/L (ref 45–117)
ALP SERPL-CCNC: 62 U/L (ref 45–117)
ALP SERPL-CCNC: 63 U/L (ref 45–117)
ALP SERPL-CCNC: 65 U/L (ref 45–117)
ALP SERPL-CCNC: 70 U/L (ref 45–117)
ALP SERPL-CCNC: 81 U/L (ref 45–117)
ALP SERPL-CCNC: 88 U/L (ref 45–117)
ALP SERPL-CCNC: 94 U/L (ref 45–117)
ALT SERPL-CCNC: 11 U/L (ref 12–78)
ALT SERPL-CCNC: 12 U/L (ref 12–78)
ALT SERPL-CCNC: 16 U/L (ref 12–78)
ALT SERPL-CCNC: 20 U/L (ref 12–78)
ALT SERPL-CCNC: 20 U/L (ref 12–78)
ALT SERPL-CCNC: 26 U/L (ref 12–78)
ALT SERPL-CCNC: 30 U/L (ref 12–78)
ALT SERPL-CCNC: 46 U/L (ref 12–78)
AMMONIA PLAS-SCNC: 52 UMOL/L
AMPHET UR QL SCN: NEGATIVE
ANION GAP SERPL CALC-SCNC: 1 MMOL/L (ref 5–15)
ANION GAP SERPL CALC-SCNC: 1 MMOL/L (ref 5–15)
ANION GAP SERPL CALC-SCNC: 2 MMOL/L (ref 5–15)
ANION GAP SERPL CALC-SCNC: 3 MMOL/L (ref 5–15)
ANION GAP SERPL CALC-SCNC: 4 MMOL/L (ref 5–15)
ANION GAP SERPL CALC-SCNC: 4 MMOL/L (ref 5–15)
ANION GAP SERPL CALC-SCNC: 5 MMOL/L (ref 5–15)
ANION GAP SERPL CALC-SCNC: 6 MMOL/L (ref 5–15)
ANION GAP SERPL CALC-SCNC: 7 MMOL/L (ref 5–15)
ANION GAP SERPL CALC-SCNC: 7 MMOL/L (ref 5–15)
ANION GAP SERPL CALC-SCNC: 8 MMOL/L (ref 5–15)
ANION GAP SERPL CALC-SCNC: 8 MMOL/L (ref 5–15)
ANION GAP SERPL CALC-SCNC: 9 MMOL/L (ref 5–15)
ANION GAP SERPL CALC-SCNC: 9 MMOL/L (ref 5–15)
APPEARANCE UR: ABNORMAL
ARTERIAL PATENCY WRIST A: ABNORMAL
AST SERPL W P-5'-P-CCNC: 12 U/L (ref 15–37)
AST SERPL W P-5'-P-CCNC: 12 U/L (ref 15–37)
AST SERPL W P-5'-P-CCNC: 13 U/L (ref 15–37)
AST SERPL W P-5'-P-CCNC: 16 U/L (ref 15–37)
AST SERPL W P-5'-P-CCNC: 33 U/L (ref 15–37)
AST SERPL W P-5'-P-CCNC: 39 U/L (ref 15–37)
AST SERPL W P-5'-P-CCNC: 48 U/L (ref 15–37)
AST SERPL W P-5'-P-CCNC: 79 U/L (ref 15–37)
ATRIAL RATE: 109 BPM
ATRIAL RATE: 114 BPM
ATRIAL RATE: 72 BPM
ATRIAL RATE: 80 BPM
ATRIAL RATE: 99 BPM
BACTERIA SPEC CULT: NORMAL
BACTERIA URNS QL MICRO: NEGATIVE /HPF
BARBITURATES UR QL SCN: NEGATIVE
BASE DEFICIT BLDV-SCNC: 0.9 MMOL/L (ref 0–2)
BASE EXCESS BLDA CALC-SCNC: 7.7 MMOL/L (ref 0–2)
BASOPHILS # BLD: 0 K/UL (ref 0–0.1)
BASOPHILS # BLD: 0.1 K/UL (ref 0–0.1)
BASOPHILS NFR BLD: 0 % (ref 0–1)
BASOPHILS NFR BLD: 1 % (ref 0–1)
BDY SITE: ABNORMAL
BDY SITE: ABNORMAL
BENZODIAZ UR QL: NEGATIVE
BILIRUB SERPL-MCNC: 0.2 MG/DL (ref 0.2–1)
BILIRUB SERPL-MCNC: 0.3 MG/DL (ref 0.2–1)
BILIRUB SERPL-MCNC: 0.3 MG/DL (ref 0.2–1)
BILIRUB SERPL-MCNC: 0.5 MG/DL (ref 0.2–1)
BILIRUB SERPL-MCNC: 0.6 MG/DL (ref 0.2–1)
BILIRUB UR QL: NEGATIVE
BNP SERPL-MCNC: 262 PG/ML
BNP SERPL-MCNC: 265 PG/ML
BNP SERPL-MCNC: 306 PG/ML
BUN SERPL-MCNC: 12 MG/DL (ref 6–20)
BUN SERPL-MCNC: 13 MG/DL (ref 6–20)
BUN SERPL-MCNC: 13 MG/DL (ref 6–20)
BUN SERPL-MCNC: 14 MG/DL (ref 6–20)
BUN SERPL-MCNC: 15 MG/DL (ref 6–20)
BUN SERPL-MCNC: 15 MG/DL (ref 6–20)
BUN SERPL-MCNC: 16 MG/DL (ref 6–20)
BUN SERPL-MCNC: 19 MG/DL (ref 6–20)
BUN SERPL-MCNC: 21 MG/DL (ref 6–20)
BUN SERPL-MCNC: 21 MG/DL (ref 6–20)
BUN SERPL-MCNC: 24 MG/DL (ref 6–20)
BUN SERPL-MCNC: 25 MG/DL (ref 6–20)
BUN SERPL-MCNC: 26 MG/DL (ref 6–20)
BUN SERPL-MCNC: 27 MG/DL (ref 6–20)
BUN SERPL-MCNC: 27 MG/DL (ref 6–20)
BUN SERPL-MCNC: 30 MG/DL (ref 6–20)
BUN SERPL-MCNC: 31 MG/DL (ref 6–20)
BUN SERPL-MCNC: 33 MG/DL (ref 6–20)
BUN SERPL-MCNC: 34 MG/DL (ref 6–20)
BUN SERPL-MCNC: 34 MG/DL (ref 6–20)
BUN SERPL-MCNC: 35 MG/DL (ref 6–20)
BUN SERPL-MCNC: 36 MG/DL (ref 6–20)
BUN SERPL-MCNC: 36 MG/DL (ref 6–20)
BUN SERPL-MCNC: 8 MG/DL (ref 6–20)
BUN SERPL-MCNC: 9 MG/DL (ref 6–20)
BUN/CREAT SERPL: 12 (ref 12–20)
BUN/CREAT SERPL: 12 (ref 12–20)
BUN/CREAT SERPL: 13 (ref 12–20)
BUN/CREAT SERPL: 13 (ref 12–20)
BUN/CREAT SERPL: 14 (ref 12–20)
BUN/CREAT SERPL: 14 (ref 12–20)
BUN/CREAT SERPL: 15 (ref 12–20)
BUN/CREAT SERPL: 17 (ref 12–20)
BUN/CREAT SERPL: 17 (ref 12–20)
BUN/CREAT SERPL: 18 (ref 12–20)
BUN/CREAT SERPL: 19 (ref 12–20)
BUN/CREAT SERPL: 19 (ref 12–20)
BUN/CREAT SERPL: 20 (ref 12–20)
BUN/CREAT SERPL: 21 (ref 12–20)
BUN/CREAT SERPL: 22 (ref 12–20)
BUN/CREAT SERPL: 23 (ref 12–20)
BUN/CREAT SERPL: 24 (ref 12–20)
BUN/CREAT SERPL: 24 (ref 12–20)
BUN/CREAT SERPL: 25 (ref 12–20)
BUN/CREAT SERPL: 27 (ref 12–20)
BUN/CREAT SERPL: 29 (ref 12–20)
BUN/CREAT SERPL: 30 (ref 12–20)
BUN/CREAT SERPL: 30 (ref 12–20)
BUN/CREAT SERPL: 31 (ref 12–20)
BUN/CREAT SERPL: 31 (ref 12–20)
CA-I BLD-MCNC: 10 MG/DL (ref 8.5–10.1)
CA-I BLD-MCNC: 10.2 MG/DL (ref 8.5–10.1)
CA-I BLD-MCNC: 11 MG/DL (ref 8.5–10.1)
CA-I BLD-MCNC: 7 MG/DL (ref 8.5–10.1)
CA-I BLD-MCNC: 8.3 MG/DL (ref 8.5–10.1)
CA-I BLD-MCNC: 8.3 MG/DL (ref 8.5–10.1)
CA-I BLD-MCNC: 8.4 MG/DL (ref 8.5–10.1)
CA-I BLD-MCNC: 8.4 MG/DL (ref 8.5–10.1)
CA-I BLD-MCNC: 8.6 MG/DL (ref 8.5–10.1)
CA-I BLD-MCNC: 8.6 MG/DL (ref 8.5–10.1)
CA-I BLD-MCNC: 8.7 MG/DL (ref 8.5–10.1)
CA-I BLD-MCNC: 8.8 MG/DL (ref 8.5–10.1)
CA-I BLD-MCNC: 8.9 MG/DL (ref 8.5–10.1)
CA-I BLD-MCNC: 9 MG/DL (ref 8.5–10.1)
CA-I BLD-MCNC: 9.1 MG/DL (ref 8.5–10.1)
CA-I BLD-MCNC: 9.2 MG/DL (ref 8.5–10.1)
CA-I BLD-MCNC: 9.2 MG/DL (ref 8.5–10.1)
CA-I BLD-MCNC: 9.3 MG/DL (ref 8.5–10.1)
CA-I BLD-MCNC: 9.4 MG/DL (ref 8.5–10.1)
CA-I BLD-MCNC: 9.4 MG/DL (ref 8.5–10.1)
CA-I BLD-MCNC: 9.5 MG/DL (ref 8.5–10.1)
CA-I BLD-MCNC: 9.5 MG/DL (ref 8.5–10.1)
CA-I BLD-MCNC: 9.6 MG/DL (ref 8.5–10.1)
CALCULATED P AXIS, ECG09: 46 DEGREES
CALCULATED P AXIS, ECG09: 61 DEGREES
CALCULATED P AXIS, ECG09: 63 DEGREES
CALCULATED P AXIS, ECG09: 67 DEGREES
CALCULATED P AXIS, ECG09: 70 DEGREES
CALCULATED R AXIS, ECG10: -30 DEGREES
CALCULATED R AXIS, ECG10: -33 DEGREES
CALCULATED R AXIS, ECG10: -33 DEGREES
CALCULATED R AXIS, ECG10: -39 DEGREES
CALCULATED R AXIS, ECG10: -48 DEGREES
CALCULATED T AXIS, ECG11: -23 DEGREES
CALCULATED T AXIS, ECG11: -59 DEGREES
CALCULATED T AXIS, ECG11: 112 DEGREES
CALCULATED T AXIS, ECG11: 22 DEGREES
CALCULATED T AXIS, ECG11: 50 DEGREES
CANNABINOIDS UR QL SCN: NEGATIVE
CHLORIDE SERPL-SCNC: 103 MMOL/L (ref 97–108)
CHLORIDE SERPL-SCNC: 104 MMOL/L (ref 97–108)
CHLORIDE SERPL-SCNC: 105 MMOL/L (ref 97–108)
CHLORIDE SERPL-SCNC: 105 MMOL/L (ref 97–108)
CHLORIDE SERPL-SCNC: 107 MMOL/L (ref 97–108)
CHLORIDE SERPL-SCNC: 108 MMOL/L (ref 97–108)
CHLORIDE SERPL-SCNC: 109 MMOL/L (ref 97–108)
CHLORIDE SERPL-SCNC: 111 MMOL/L (ref 97–108)
CHLORIDE SERPL-SCNC: 114 MMOL/L (ref 97–108)
CHLORIDE SERPL-SCNC: 115 MMOL/L (ref 97–108)
CHLORIDE SERPL-SCNC: 115 MMOL/L (ref 97–108)
CHLORIDE SERPL-SCNC: 116 MMOL/L (ref 97–108)
CHLORIDE SERPL-SCNC: 117 MMOL/L (ref 97–108)
CHLORIDE SERPL-SCNC: 117 MMOL/L (ref 97–108)
CHLORIDE SERPL-SCNC: 121 MMOL/L (ref 97–108)
CHLORIDE SERPL-SCNC: 121 MMOL/L (ref 97–108)
CHLORIDE SERPL-SCNC: 122 MMOL/L (ref 97–108)
CHLORIDE SERPL-SCNC: 124 MMOL/L (ref 97–108)
CHLORIDE SERPL-SCNC: 124 MMOL/L (ref 97–108)
CHLORIDE SERPL-SCNC: 125 MMOL/L (ref 97–108)
CHLORIDE SERPL-SCNC: 127 MMOL/L (ref 97–108)
CHLORIDE SERPL-SCNC: 128 MMOL/L (ref 97–108)
CHLORIDE SERPL-SCNC: 129 MMOL/L (ref 97–108)
CHLORIDE SERPL-SCNC: 130 MMOL/L (ref 97–108)
CO2 SERPL-SCNC: 24 MMOL/L (ref 21–32)
CO2 SERPL-SCNC: 25 MMOL/L (ref 21–32)
CO2 SERPL-SCNC: 25 MMOL/L (ref 21–32)
CO2 SERPL-SCNC: 26 MMOL/L (ref 21–32)
CO2 SERPL-SCNC: 27 MMOL/L (ref 21–32)
CO2 SERPL-SCNC: 28 MMOL/L (ref 21–32)
CO2 SERPL-SCNC: 29 MMOL/L (ref 21–32)
CO2 SERPL-SCNC: 30 MMOL/L (ref 21–32)
CO2 SERPL-SCNC: 31 MMOL/L (ref 21–32)
CO2 SERPL-SCNC: 32 MMOL/L (ref 21–32)
CO2 SERPL-SCNC: 32 MMOL/L (ref 21–32)
CO2 SERPL-SCNC: 34 MMOL/L (ref 21–32)
COCAINE UR QL SCN: NEGATIVE
COLOR UR: ABNORMAL
CREAT SERPL-MCNC: 0.66 MG/DL (ref 0.7–1.3)
CREAT SERPL-MCNC: 0.76 MG/DL (ref 0.7–1.3)
CREAT SERPL-MCNC: 0.81 MG/DL (ref 0.7–1.3)
CREAT SERPL-MCNC: 0.81 MG/DL (ref 0.7–1.3)
CREAT SERPL-MCNC: 0.82 MG/DL (ref 0.7–1.3)
CREAT SERPL-MCNC: 0.83 MG/DL (ref 0.7–1.3)
CREAT SERPL-MCNC: 0.85 MG/DL (ref 0.7–1.3)
CREAT SERPL-MCNC: 0.86 MG/DL (ref 0.7–1.3)
CREAT SERPL-MCNC: 0.87 MG/DL (ref 0.7–1.3)
CREAT SERPL-MCNC: 0.88 MG/DL (ref 0.7–1.3)
CREAT SERPL-MCNC: 0.89 MG/DL (ref 0.7–1.3)
CREAT SERPL-MCNC: 0.94 MG/DL (ref 0.7–1.3)
CREAT SERPL-MCNC: 0.97 MG/DL (ref 0.7–1.3)
CREAT SERPL-MCNC: 0.97 MG/DL (ref 0.7–1.3)
CREAT SERPL-MCNC: 0.99 MG/DL (ref 0.7–1.3)
CREAT SERPL-MCNC: 1 MG/DL (ref 0.7–1.3)
CREAT SERPL-MCNC: 1.01 MG/DL (ref 0.7–1.3)
CREAT SERPL-MCNC: 1.08 MG/DL (ref 0.7–1.3)
CREAT SERPL-MCNC: 1.08 MG/DL (ref 0.7–1.3)
CREAT SERPL-MCNC: 1.13 MG/DL (ref 0.7–1.3)
CREAT SERPL-MCNC: 1.19 MG/DL (ref 0.7–1.3)
CREAT SERPL-MCNC: 1.42 MG/DL (ref 0.7–1.3)
CREAT SERPL-MCNC: 1.59 MG/DL (ref 0.7–1.3)
CREAT SERPL-MCNC: 1.75 MG/DL (ref 0.7–1.3)
CREAT SERPL-MCNC: 1.85 MG/DL (ref 0.7–1.3)
DIAGNOSIS, 93000: NORMAL
DIFFERENTIAL METHOD BLD: ABNORMAL
DIFFERENTIAL METHOD BLD: NORMAL
DRUG SCRN COMMENT,DRGCM: NORMAL
ECHO AO ROOT DIAM: 3.3 CM
ECHO AV PEAK GRADIENT: 6 MMHG
ECHO EST RA PRESSURE: 3 MMHG
ECHO LA AREA 4C: 9.17 CM2
ECHO LA MAJOR AXIS: 3.8 CM
ECHO LA MINOR AXIS: 1.76 CM
ECHO LV E' SEPTAL VELOCITY: 5.95 CM/S
ECHO LV EDV A2C: 75.7 CM3
ECHO LV EJECTION FRACTION BIPLANE: 65 % (ref 55–100)
ECHO LV ESV A2C: 20.6 CM3
ECHO LV INTERNAL DIMENSION DIASTOLIC: 4.23 CM (ref 4.2–5.9)
ECHO LV INTERNAL DIMENSION SYSTOLIC: 2.74 CM
ECHO LV IVSD: 1.39 CM (ref 0.6–1)
ECHO LV MASS 2D: 221.8 G (ref 88–224)
ECHO LV MASS INDEX 2D: 102.7 G/M2 (ref 49–115)
ECHO LV POSTERIOR WALL DIASTOLIC: 1.37 CM (ref 0.6–1)
ECHO LVOT PEAK GRADIENT: 4 MMHG
ECHO MV A VELOCITY: 76.5 CM/S
ECHO MV AREA PHT: 2.68 CM2
ECHO MV E DECELERATION TIME (DT): 197 MS
ECHO MV E VELOCITY: 49.4 CM/S
ECHO MV E/A RATIO: 0.65
ECHO MV E/E' SEPTAL: 8.3
ECHO MV PRESSURE HALF TIME (PHT): 82 MS
ECHO PV PEAK INSTANTANEOUS GRADIENT SYSTOLIC: 6 MMHG
ECHO PV REGURGITANT MAX VELOCITY: 104 CM/S
ECHO PV REGURGITANT MAX VELOCITY: 118 CM/S
ECHO PV REGURGITANT MAX VELOCITY: 119 CM/S
ECHO PVEIN A DURATION: 106 MS
ECHO PVEIN A VELOCITY: 42.8 CM/S
ECHO RA AREA 4C: 10.13 CM2
ECHO RIGHT VENTRICULAR SYSTOLIC PRESSURE (RVSP): 32 MMHG
ECHO RV INTERNAL DIMENSION: 2.97 CM
ECHO TV MAX VELOCITY: 268 CM/S
ECHO TV REGURGITANT PEAK GRADIENT: 29 MMHG
EOSINOPHIL # BLD: 0 K/UL (ref 0–0.4)
EOSINOPHIL # BLD: 0.1 K/UL (ref 0–0.4)
EOSINOPHIL # BLD: 0.2 K/UL (ref 0–0.4)
EOSINOPHIL NFR BLD: 0 % (ref 0–7)
EOSINOPHIL NFR BLD: 0 % (ref 0–7)
EOSINOPHIL NFR BLD: 1 % (ref 0–7)
EOSINOPHIL NFR BLD: 2 % (ref 0–7)
EOSINOPHIL NFR BLD: 3 % (ref 0–7)
ERYTHROCYTE [DISTWIDTH] IN BLOOD BY AUTOMATED COUNT: 12.7 % (ref 11.5–14.5)
ERYTHROCYTE [DISTWIDTH] IN BLOOD BY AUTOMATED COUNT: 12.8 % (ref 11.5–14.5)
ERYTHROCYTE [DISTWIDTH] IN BLOOD BY AUTOMATED COUNT: 12.9 % (ref 11.5–14.5)
ERYTHROCYTE [DISTWIDTH] IN BLOOD BY AUTOMATED COUNT: 13.2 % (ref 11.5–14.5)
ERYTHROCYTE [DISTWIDTH] IN BLOOD BY AUTOMATED COUNT: 13.3 % (ref 11.5–14.5)
ERYTHROCYTE [DISTWIDTH] IN BLOOD BY AUTOMATED COUNT: 13.4 % (ref 11.5–14.5)
ERYTHROCYTE [DISTWIDTH] IN BLOOD BY AUTOMATED COUNT: 13.9 % (ref 11.5–14.5)
ERYTHROCYTE [DISTWIDTH] IN BLOOD BY AUTOMATED COUNT: 14.1 % (ref 11.5–14.5)
ERYTHROCYTE [DISTWIDTH] IN BLOOD BY AUTOMATED COUNT: 14.3 % (ref 11.5–14.5)
ERYTHROCYTE [DISTWIDTH] IN BLOOD BY AUTOMATED COUNT: 14.7 % (ref 11.5–14.5)
ERYTHROCYTE [DISTWIDTH] IN BLOOD BY AUTOMATED COUNT: 14.7 % (ref 11.5–14.5)
EST. AVERAGE GLUCOSE BLD GHB EST-MCNC: 263 MG/DL
GAS FLOW.O2 O2 DELIVERY SYS: 1 L/MIN
GAS FLOW.O2 O2 DELIVERY SYS: 2 L/MIN
GLOBULIN SER CALC-MCNC: 3.7 G/DL (ref 2–4)
GLOBULIN SER CALC-MCNC: 4.2 G/DL (ref 2–4)
GLOBULIN SER CALC-MCNC: 4.3 G/DL (ref 2–4)
GLOBULIN SER CALC-MCNC: 4.6 G/DL (ref 2–4)
GLOBULIN SER CALC-MCNC: 4.6 G/DL (ref 2–4)
GLOBULIN SER CALC-MCNC: 4.9 G/DL (ref 2–4)
GLOBULIN SER CALC-MCNC: 5.2 G/DL (ref 2–4)
GLOBULIN SER CALC-MCNC: 5.9 G/DL (ref 2–4)
GLUCOSE BLD STRIP.AUTO-MCNC: 141 MG/DL (ref 65–117)
GLUCOSE BLD STRIP.AUTO-MCNC: 147 MG/DL (ref 65–117)
GLUCOSE BLD STRIP.AUTO-MCNC: 151 MG/DL (ref 65–117)
GLUCOSE BLD STRIP.AUTO-MCNC: 151 MG/DL (ref 65–117)
GLUCOSE BLD STRIP.AUTO-MCNC: 157 MG/DL (ref 65–117)
GLUCOSE BLD STRIP.AUTO-MCNC: 162 MG/DL (ref 65–117)
GLUCOSE BLD STRIP.AUTO-MCNC: 163 MG/DL (ref 65–117)
GLUCOSE BLD STRIP.AUTO-MCNC: 171 MG/DL (ref 65–117)
GLUCOSE BLD STRIP.AUTO-MCNC: 179 MG/DL (ref 65–117)
GLUCOSE BLD STRIP.AUTO-MCNC: 183 MG/DL (ref 65–117)
GLUCOSE BLD STRIP.AUTO-MCNC: 186 MG/DL (ref 65–117)
GLUCOSE BLD STRIP.AUTO-MCNC: 187 MG/DL (ref 65–117)
GLUCOSE BLD STRIP.AUTO-MCNC: 189 MG/DL (ref 65–117)
GLUCOSE BLD STRIP.AUTO-MCNC: 190 MG/DL (ref 65–117)
GLUCOSE BLD STRIP.AUTO-MCNC: 192 MG/DL (ref 65–117)
GLUCOSE BLD STRIP.AUTO-MCNC: 194 MG/DL (ref 65–117)
GLUCOSE BLD STRIP.AUTO-MCNC: 194 MG/DL (ref 65–117)
GLUCOSE BLD STRIP.AUTO-MCNC: 200 MG/DL (ref 65–117)
GLUCOSE BLD STRIP.AUTO-MCNC: 206 MG/DL (ref 65–117)
GLUCOSE BLD STRIP.AUTO-MCNC: 206 MG/DL (ref 65–117)
GLUCOSE BLD STRIP.AUTO-MCNC: 207 MG/DL (ref 65–117)
GLUCOSE BLD STRIP.AUTO-MCNC: 209 MG/DL (ref 65–117)
GLUCOSE BLD STRIP.AUTO-MCNC: 209 MG/DL (ref 65–117)
GLUCOSE BLD STRIP.AUTO-MCNC: 210 MG/DL (ref 65–117)
GLUCOSE BLD STRIP.AUTO-MCNC: 210 MG/DL (ref 65–117)
GLUCOSE BLD STRIP.AUTO-MCNC: 211 MG/DL (ref 65–117)
GLUCOSE BLD STRIP.AUTO-MCNC: 212 MG/DL (ref 65–117)
GLUCOSE BLD STRIP.AUTO-MCNC: 213 MG/DL (ref 65–117)
GLUCOSE BLD STRIP.AUTO-MCNC: 214 MG/DL (ref 65–117)
GLUCOSE BLD STRIP.AUTO-MCNC: 216 MG/DL (ref 65–117)
GLUCOSE BLD STRIP.AUTO-MCNC: 217 MG/DL (ref 65–117)
GLUCOSE BLD STRIP.AUTO-MCNC: 218 MG/DL (ref 65–117)
GLUCOSE BLD STRIP.AUTO-MCNC: 220 MG/DL (ref 65–117)
GLUCOSE BLD STRIP.AUTO-MCNC: 223 MG/DL (ref 65–117)
GLUCOSE BLD STRIP.AUTO-MCNC: 225 MG/DL (ref 65–117)
GLUCOSE BLD STRIP.AUTO-MCNC: 225 MG/DL (ref 65–117)
GLUCOSE BLD STRIP.AUTO-MCNC: 228 MG/DL (ref 65–117)
GLUCOSE BLD STRIP.AUTO-MCNC: 230 MG/DL (ref 65–117)
GLUCOSE BLD STRIP.AUTO-MCNC: 230 MG/DL (ref 65–117)
GLUCOSE BLD STRIP.AUTO-MCNC: 235 MG/DL (ref 65–117)
GLUCOSE BLD STRIP.AUTO-MCNC: 238 MG/DL (ref 65–117)
GLUCOSE BLD STRIP.AUTO-MCNC: 242 MG/DL (ref 65–117)
GLUCOSE BLD STRIP.AUTO-MCNC: 244 MG/DL (ref 65–117)
GLUCOSE BLD STRIP.AUTO-MCNC: 246 MG/DL (ref 65–117)
GLUCOSE BLD STRIP.AUTO-MCNC: 246 MG/DL (ref 65–117)
GLUCOSE BLD STRIP.AUTO-MCNC: 253 MG/DL (ref 65–117)
GLUCOSE BLD STRIP.AUTO-MCNC: 255 MG/DL (ref 65–117)
GLUCOSE BLD STRIP.AUTO-MCNC: 265 MG/DL (ref 65–117)
GLUCOSE BLD STRIP.AUTO-MCNC: 268 MG/DL (ref 65–117)
GLUCOSE BLD STRIP.AUTO-MCNC: 268 MG/DL (ref 65–117)
GLUCOSE BLD STRIP.AUTO-MCNC: 272 MG/DL (ref 65–117)
GLUCOSE BLD STRIP.AUTO-MCNC: 278 MG/DL (ref 65–117)
GLUCOSE BLD STRIP.AUTO-MCNC: 283 MG/DL (ref 65–117)
GLUCOSE BLD STRIP.AUTO-MCNC: 285 MG/DL (ref 65–117)
GLUCOSE BLD STRIP.AUTO-MCNC: 287 MG/DL (ref 65–117)
GLUCOSE BLD STRIP.AUTO-MCNC: 287 MG/DL (ref 65–117)
GLUCOSE BLD STRIP.AUTO-MCNC: 292 MG/DL (ref 65–117)
GLUCOSE BLD STRIP.AUTO-MCNC: 295 MG/DL (ref 65–117)
GLUCOSE BLD STRIP.AUTO-MCNC: 299 MG/DL (ref 65–117)
GLUCOSE BLD STRIP.AUTO-MCNC: 299 MG/DL (ref 65–117)
GLUCOSE BLD STRIP.AUTO-MCNC: 301 MG/DL (ref 65–117)
GLUCOSE BLD STRIP.AUTO-MCNC: 301 MG/DL (ref 65–117)
GLUCOSE BLD STRIP.AUTO-MCNC: 305 MG/DL (ref 65–117)
GLUCOSE BLD STRIP.AUTO-MCNC: 308 MG/DL (ref 65–117)
GLUCOSE BLD STRIP.AUTO-MCNC: 310 MG/DL (ref 65–117)
GLUCOSE BLD STRIP.AUTO-MCNC: 310 MG/DL (ref 65–117)
GLUCOSE BLD STRIP.AUTO-MCNC: 311 MG/DL (ref 65–117)
GLUCOSE BLD STRIP.AUTO-MCNC: 320 MG/DL (ref 65–117)
GLUCOSE BLD STRIP.AUTO-MCNC: 321 MG/DL (ref 65–117)
GLUCOSE BLD STRIP.AUTO-MCNC: 323 MG/DL (ref 65–117)
GLUCOSE BLD STRIP.AUTO-MCNC: 323 MG/DL (ref 65–117)
GLUCOSE BLD STRIP.AUTO-MCNC: 354 MG/DL (ref 65–117)
GLUCOSE BLD STRIP.AUTO-MCNC: 355 MG/DL (ref 65–117)
GLUCOSE BLD STRIP.AUTO-MCNC: 357 MG/DL (ref 65–117)
GLUCOSE BLD STRIP.AUTO-MCNC: 371 MG/DL (ref 65–117)
GLUCOSE BLD STRIP.AUTO-MCNC: 383 MG/DL (ref 65–117)
GLUCOSE BLD STRIP.AUTO-MCNC: 392 MG/DL (ref 65–117)
GLUCOSE BLD STRIP.AUTO-MCNC: 400 MG/DL (ref 65–117)
GLUCOSE BLD STRIP.AUTO-MCNC: 427 MG/DL (ref 65–117)
GLUCOSE BLD STRIP.AUTO-MCNC: 442 MG/DL (ref 65–117)
GLUCOSE BLD STRIP.AUTO-MCNC: 443 MG/DL (ref 65–117)
GLUCOSE BLD STRIP.AUTO-MCNC: 446 MG/DL (ref 65–117)
GLUCOSE BLD STRIP.AUTO-MCNC: 461 MG/DL (ref 65–117)
GLUCOSE BLD STRIP.AUTO-MCNC: 506 MG/DL (ref 65–117)
GLUCOSE POC: 130 MG/DL
GLUCOSE SERPL-MCNC: 113 MG/DL (ref 65–100)
GLUCOSE SERPL-MCNC: 160 MG/DL (ref 65–100)
GLUCOSE SERPL-MCNC: 195 MG/DL (ref 65–100)
GLUCOSE SERPL-MCNC: 202 MG/DL (ref 65–100)
GLUCOSE SERPL-MCNC: 204 MG/DL (ref 65–100)
GLUCOSE SERPL-MCNC: 223 MG/DL (ref 65–100)
GLUCOSE SERPL-MCNC: 232 MG/DL (ref 65–100)
GLUCOSE SERPL-MCNC: 232 MG/DL (ref 65–100)
GLUCOSE SERPL-MCNC: 245 MG/DL (ref 65–100)
GLUCOSE SERPL-MCNC: 252 MG/DL (ref 65–100)
GLUCOSE SERPL-MCNC: 272 MG/DL (ref 65–100)
GLUCOSE SERPL-MCNC: 273 MG/DL (ref 65–100)
GLUCOSE SERPL-MCNC: 282 MG/DL (ref 65–100)
GLUCOSE SERPL-MCNC: 285 MG/DL (ref 65–100)
GLUCOSE SERPL-MCNC: 287 MG/DL (ref 65–100)
GLUCOSE SERPL-MCNC: 292 MG/DL (ref 65–100)
GLUCOSE SERPL-MCNC: 294 MG/DL (ref 65–100)
GLUCOSE SERPL-MCNC: 297 MG/DL (ref 65–100)
GLUCOSE SERPL-MCNC: 297 MG/DL (ref 65–100)
GLUCOSE SERPL-MCNC: 315 MG/DL (ref 65–100)
GLUCOSE SERPL-MCNC: 351 MG/DL (ref 65–100)
GLUCOSE SERPL-MCNC: 368 MG/DL (ref 65–100)
GLUCOSE SERPL-MCNC: 369 MG/DL (ref 65–100)
GLUCOSE SERPL-MCNC: 531 MG/DL (ref 65–100)
GLUCOSE SERPL-MCNC: 632 MG/DL (ref 65–100)
GLUCOSE UR STRIP.AUTO-MCNC: >500 MG/DL
HBA1C MFR BLD: 10.8 % (ref 4–5.6)
HCO3 BLDA-SCNC: 31 MMOL/L (ref 22–26)
HCO3 BLDV-SCNC: 23 MMOL/L (ref 22–26)
HCT VFR BLD AUTO: 36.9 % (ref 36.6–50.3)
HCT VFR BLD AUTO: 37 % (ref 36.6–50.3)
HCT VFR BLD AUTO: 38 % (ref 36.6–50.3)
HCT VFR BLD AUTO: 38.5 % (ref 36.6–50.3)
HCT VFR BLD AUTO: 38.9 % (ref 36.6–50.3)
HCT VFR BLD AUTO: 40.3 % (ref 36.6–50.3)
HCT VFR BLD AUTO: 43 % (ref 36.6–50.3)
HCT VFR BLD AUTO: 43 % (ref 36.6–50.3)
HCT VFR BLD AUTO: 43.1 % (ref 36.6–50.3)
HCT VFR BLD AUTO: 43.2 % (ref 36.6–50.3)
HCT VFR BLD AUTO: 46 % (ref 36.6–50.3)
HCT VFR BLD AUTO: 47.1 % (ref 36.6–50.3)
HCT VFR BLD AUTO: 50.4 % (ref 36.6–50.3)
HCT VFR BLD AUTO: 51.3 % (ref 36.6–50.3)
HCT VFR BLD AUTO: 52.5 % (ref 36.6–50.3)
HGB BLD-MCNC: 12 G/DL (ref 12.1–17)
HGB BLD-MCNC: 12 G/DL (ref 12.1–17)
HGB BLD-MCNC: 12.2 G/DL (ref 12.1–17)
HGB BLD-MCNC: 12.3 G/DL (ref 12.1–17)
HGB BLD-MCNC: 12.8 G/DL (ref 12.1–17)
HGB BLD-MCNC: 12.8 G/DL (ref 12.1–17)
HGB BLD-MCNC: 13.7 G/DL (ref 12.1–17)
HGB BLD-MCNC: 13.9 G/DL (ref 12.1–17)
HGB BLD-MCNC: 14 G/DL (ref 12.1–17)
HGB BLD-MCNC: 14.3 G/DL (ref 12.1–17)
HGB BLD-MCNC: 14.4 G/DL (ref 12.1–17)
HGB BLD-MCNC: 14.5 G/DL (ref 12.1–17)
HGB BLD-MCNC: 15 G/DL (ref 12.1–17)
HGB BLD-MCNC: 16 G/DL (ref 12.1–17)
HGB BLD-MCNC: 16.5 G/DL (ref 12.1–17)
HGB UR QL STRIP: ABNORMAL
IMM GRANULOCYTES # BLD AUTO: 0 K/UL
IMM GRANULOCYTES # BLD AUTO: 0 K/UL (ref 0–0.04)
IMM GRANULOCYTES # BLD AUTO: 0.1 K/UL (ref 0–0.04)
IMM GRANULOCYTES # BLD AUTO: 0.3 K/UL (ref 0–0.04)
IMM GRANULOCYTES NFR BLD AUTO: 0 %
IMM GRANULOCYTES NFR BLD AUTO: 0 % (ref 0–0.5)
IMM GRANULOCYTES NFR BLD AUTO: 1 % (ref 0–0.5)
IMM GRANULOCYTES NFR BLD AUTO: 2 % (ref 0–0.5)
IMM GRANULOCYTES NFR BLD AUTO: 5 % (ref 0–0.5)
KETONES UR QL STRIP.AUTO: NEGATIVE MG/DL
LACTATE SERPL-SCNC: 1.6 MMOL/L (ref 0.4–2)
LACTATE SERPL-SCNC: 2 MMOL/L (ref 0.4–2)
LACTATE SERPL-SCNC: 2 MMOL/L (ref 0.4–2)
LACTATE SERPL-SCNC: 2.4 MMOL/L (ref 0.4–2)
LEUKOCYTE ESTERASE UR QL STRIP.AUTO: NEGATIVE
LEVETIRACETAM SERPL-MCNC: 15.3 UG/ML (ref 10–40)
LYMPHOCYTES # BLD: 1.3 K/UL (ref 0.8–3.5)
LYMPHOCYTES # BLD: 1.8 K/UL (ref 0.8–3.5)
LYMPHOCYTES # BLD: 2 K/UL (ref 0.8–3.5)
LYMPHOCYTES # BLD: 2.3 K/UL (ref 0.8–3.5)
LYMPHOCYTES # BLD: 2.4 K/UL (ref 0.8–3.5)
LYMPHOCYTES # BLD: 2.5 K/UL (ref 0.8–3.5)
LYMPHOCYTES # BLD: 2.5 K/UL (ref 0.8–3.5)
LYMPHOCYTES # BLD: 2.6 K/UL (ref 0.8–3.5)
LYMPHOCYTES # BLD: 2.6 K/UL (ref 0.8–3.5)
LYMPHOCYTES # BLD: 2.7 K/UL (ref 0.8–3.5)
LYMPHOCYTES NFR BLD: 10 % (ref 12–49)
LYMPHOCYTES NFR BLD: 12 % (ref 12–49)
LYMPHOCYTES NFR BLD: 21 % (ref 12–49)
LYMPHOCYTES NFR BLD: 26 % (ref 12–49)
LYMPHOCYTES NFR BLD: 29 % (ref 12–49)
LYMPHOCYTES NFR BLD: 34 % (ref 12–49)
LYMPHOCYTES NFR BLD: 36 % (ref 12–49)
LYMPHOCYTES NFR BLD: 37 % (ref 12–49)
LYMPHOCYTES NFR BLD: 38 % (ref 12–49)
LYMPHOCYTES NFR BLD: 39 % (ref 12–49)
LYMPHOCYTES NFR BLD: 39 % (ref 12–49)
LYMPHOCYTES NFR BLD: 40 % (ref 12–49)
MCH RBC QN AUTO: 31.1 PG (ref 26–34)
MCH RBC QN AUTO: 31.2 PG (ref 26–34)
MCH RBC QN AUTO: 31.3 PG (ref 26–34)
MCH RBC QN AUTO: 31.3 PG (ref 26–34)
MCH RBC QN AUTO: 31.4 PG (ref 26–34)
MCH RBC QN AUTO: 31.4 PG (ref 26–34)
MCH RBC QN AUTO: 31.5 PG (ref 26–34)
MCH RBC QN AUTO: 31.6 PG (ref 26–34)
MCH RBC QN AUTO: 31.8 PG (ref 26–34)
MCH RBC QN AUTO: 31.9 PG (ref 26–34)
MCH RBC QN AUTO: 32 PG (ref 26–34)
MCH RBC QN AUTO: 32.4 PG (ref 26–34)
MCHC RBC AUTO-ENTMCNC: 29.7 G/DL (ref 30–36.5)
MCHC RBC AUTO-ENTMCNC: 29.8 G/DL (ref 30–36.5)
MCHC RBC AUTO-ENTMCNC: 29.8 G/DL (ref 30–36.5)
MCHC RBC AUTO-ENTMCNC: 31.2 G/DL (ref 30–36.5)
MCHC RBC AUTO-ENTMCNC: 31.2 G/DL (ref 30–36.5)
MCHC RBC AUTO-ENTMCNC: 31.4 G/DL (ref 30–36.5)
MCHC RBC AUTO-ENTMCNC: 31.6 G/DL (ref 30–36.5)
MCHC RBC AUTO-ENTMCNC: 31.8 G/DL (ref 30–36.5)
MCHC RBC AUTO-ENTMCNC: 32.2 G/DL (ref 30–36.5)
MCHC RBC AUTO-ENTMCNC: 32.9 G/DL (ref 30–36.5)
MCHC RBC AUTO-ENTMCNC: 33 G/DL (ref 30–36.5)
MCHC RBC AUTO-ENTMCNC: 33.3 G/DL (ref 30–36.5)
MCHC RBC AUTO-ENTMCNC: 33.3 G/DL (ref 30–36.5)
MCHC RBC AUTO-ENTMCNC: 33.4 G/DL (ref 30–36.5)
MCHC RBC AUTO-ENTMCNC: 33.7 G/DL (ref 30–36.5)
MCV RBC AUTO: 100.8 FL (ref 80–99)
MCV RBC AUTO: 101.4 FL (ref 80–99)
MCV RBC AUTO: 102.2 FL (ref 80–99)
MCV RBC AUTO: 105.4 FL (ref 80–99)
MCV RBC AUTO: 105.5 FL (ref 80–99)
MCV RBC AUTO: 107.2 FL (ref 80–99)
MCV RBC AUTO: 94.3 FL (ref 80–99)
MCV RBC AUTO: 94.9 FL (ref 80–99)
MCV RBC AUTO: 95.1 FL (ref 80–99)
MCV RBC AUTO: 95.8 FL (ref 80–99)
MCV RBC AUTO: 96.8 FL (ref 80–99)
MCV RBC AUTO: 97.1 FL (ref 80–99)
MCV RBC AUTO: 98.1 FL (ref 80–99)
MCV RBC AUTO: 98.1 FL (ref 80–99)
MCV RBC AUTO: 99 FL (ref 80–99)
METHADONE UR QL: NEGATIVE
MONOCYTES # BLD: 0.3 K/UL (ref 0–1)
MONOCYTES # BLD: 0.4 K/UL (ref 0–1)
MONOCYTES # BLD: 0.4 K/UL (ref 0–1)
MONOCYTES # BLD: 0.5 K/UL (ref 0–1)
MONOCYTES # BLD: 0.6 K/UL (ref 0–1)
MONOCYTES # BLD: 0.7 K/UL (ref 0–1)
MONOCYTES # BLD: 0.8 K/UL (ref 0–1)
MONOCYTES NFR BLD: 10 % (ref 5–13)
MONOCYTES NFR BLD: 11 % (ref 5–13)
MONOCYTES NFR BLD: 4 % (ref 5–13)
MONOCYTES NFR BLD: 4 % (ref 5–13)
MONOCYTES NFR BLD: 5 % (ref 5–13)
MONOCYTES NFR BLD: 6 % (ref 5–13)
MONOCYTES NFR BLD: 7 % (ref 5–13)
MONOCYTES NFR BLD: 9 % (ref 5–13)
MONOCYTES NFR BLD: 9 % (ref 5–13)
MRSA DNA SPEC QL NAA+PROBE: NOT DETECTED
MRSA DNA SPEC QL NAA+PROBE: NOT DETECTED
MV DEC SLOPE: 2100 MM/S2
MV DEC SLOPE: 2100 MM/S2
NEUTS SEG # BLD: 15.2 K/UL (ref 1.8–8)
NEUTS SEG # BLD: 2.3 K/UL (ref 1.8–8)
NEUTS SEG # BLD: 2.9 K/UL (ref 1.8–8)
NEUTS SEG # BLD: 2.9 K/UL (ref 1.8–8)
NEUTS SEG # BLD: 3.1 K/UL (ref 1.8–8)
NEUTS SEG # BLD: 3.2 K/UL (ref 1.8–8)
NEUTS SEG # BLD: 3.4 K/UL (ref 1.8–8)
NEUTS SEG # BLD: 3.5 K/UL (ref 1.8–8)
NEUTS SEG # BLD: 3.7 K/UL (ref 1.8–8)
NEUTS SEG # BLD: 4 K/UL (ref 1.8–8)
NEUTS SEG # BLD: 5.3 K/UL (ref 1.8–8)
NEUTS SEG # BLD: 5.7 K/UL (ref 1.8–8)
NEUTS SEG # BLD: 7.4 K/UL (ref 1.8–8)
NEUTS SEG # BLD: 8.6 K/UL (ref 1.8–8)
NEUTS SEG NFR BLD: 40 % (ref 32–75)
NEUTS SEG NFR BLD: 48 % (ref 32–75)
NEUTS SEG NFR BLD: 50 % (ref 32–75)
NEUTS SEG NFR BLD: 50 % (ref 32–75)
NEUTS SEG NFR BLD: 51 % (ref 32–75)
NEUTS SEG NFR BLD: 52 % (ref 32–75)
NEUTS SEG NFR BLD: 52 % (ref 32–75)
NEUTS SEG NFR BLD: 53 % (ref 32–75)
NEUTS SEG NFR BLD: 56 % (ref 32–75)
NEUTS SEG NFR BLD: 63 % (ref 32–75)
NEUTS SEG NFR BLD: 66 % (ref 32–75)
NEUTS SEG NFR BLD: 70 % (ref 32–75)
NEUTS SEG NFR BLD: 81 % (ref 32–75)
NEUTS SEG NFR BLD: 85 % (ref 32–75)
NITRITE UR QL STRIP.AUTO: NEGATIVE
NRBC # BLD: 0 K/UL (ref 0–0.01)
NRBC # BLD: 0.02 K/UL (ref 0–0.01)
NRBC # BLD: 0.02 K/UL (ref 0–0.01)
NRBC # BLD: 0.03 K/UL (ref 0–0.01)
NRBC # BLD: 0.04 K/UL (ref 0–0.01)
NRBC # BLD: 0.06 K/UL (ref 0–0.01)
NRBC # BLD: 0.1 K/UL (ref 0–0.01)
NRBC # BLD: 0.12 K/UL (ref 0–0.01)
NRBC BLD-RTO: 0 PER 100 WBC
NRBC BLD-RTO: 0.3 PER 100 WBC
NRBC BLD-RTO: 0.4 PER 100 WBC
NRBC BLD-RTO: 0.5 PER 100 WBC
NRBC BLD-RTO: 0.7 PER 100 WBC
NRBC BLD-RTO: 0.9 PER 100 WBC
OPIATES UR QL: NEGATIVE
P-R INTERVAL, ECG05: 112 MS
P-R INTERVAL, ECG05: 114 MS
P-R INTERVAL, ECG05: 122 MS
P-R INTERVAL, ECG05: 128 MS
P-R INTERVAL, ECG05: 132 MS
PCO2 BLDA: 52 MMHG (ref 35–45)
PCO2 BLDV: 59.5 MMHG (ref 40–50)
PCP UR QL: NEGATIVE
PERFORMED BY, TECHID: ABNORMAL
PH BLDA: 7.42 [PH] (ref 7.35–7.45)
PH BLDV: 7.27 [PH] (ref 7.31–7.41)
PH UR STRIP: 5 [PH] (ref 5–8)
PLATELET # BLD AUTO: 104 K/UL (ref 150–400)
PLATELET # BLD AUTO: 172 K/UL (ref 150–400)
PLATELET # BLD AUTO: 189 K/UL (ref 150–400)
PLATELET # BLD AUTO: 201 K/UL (ref 150–400)
PLATELET # BLD AUTO: 224 K/UL (ref 150–400)
PLATELET # BLD AUTO: 235 K/UL (ref 150–400)
PLATELET # BLD AUTO: 236 K/UL (ref 150–400)
PLATELET # BLD AUTO: 250 K/UL (ref 150–400)
PLATELET # BLD AUTO: 257 K/UL (ref 150–400)
PLATELET # BLD AUTO: 269 K/UL (ref 150–400)
PLATELET # BLD AUTO: 270 K/UL (ref 150–400)
PLATELET # BLD AUTO: 276 K/UL (ref 150–400)
PLATELET # BLD AUTO: 285 K/UL (ref 150–400)
PLATELET # BLD AUTO: 329 K/UL (ref 150–400)
PLATELET # BLD AUTO: 335 K/UL (ref 150–400)
PMV BLD AUTO: 10 FL (ref 8.9–12.9)
PMV BLD AUTO: 10.5 FL (ref 8.9–12.9)
PMV BLD AUTO: 10.9 FL (ref 8.9–12.9)
PMV BLD AUTO: 11.1 FL (ref 8.9–12.9)
PMV BLD AUTO: 11.2 FL (ref 8.9–12.9)
PMV BLD AUTO: 11.2 FL (ref 8.9–12.9)
PMV BLD AUTO: 11.3 FL (ref 8.9–12.9)
PMV BLD AUTO: 11.6 FL (ref 8.9–12.9)
PMV BLD AUTO: 11.7 FL (ref 8.9–12.9)
PMV BLD AUTO: 11.9 FL (ref 8.9–12.9)
PMV BLD AUTO: 12 FL (ref 8.9–12.9)
PMV BLD AUTO: 12.1 FL (ref 8.9–12.9)
PMV BLD AUTO: 12.2 FL (ref 8.9–12.9)
PMV BLD AUTO: 12.3 FL (ref 8.9–12.9)
PMV BLD AUTO: 12.3 FL (ref 8.9–12.9)
PO2 BLDA: 73 MMHG (ref 75–100)
PO2 BLDV: 41 MMHG (ref 36–42)
POTASSIUM SERPL-SCNC: 2.8 MMOL/L (ref 3.5–5.1)
POTASSIUM SERPL-SCNC: 3 MMOL/L (ref 3.5–5.1)
POTASSIUM SERPL-SCNC: 3.1 MMOL/L (ref 3.5–5.1)
POTASSIUM SERPL-SCNC: 3.1 MMOL/L (ref 3.5–5.1)
POTASSIUM SERPL-SCNC: 3.2 MMOL/L (ref 3.5–5.1)
POTASSIUM SERPL-SCNC: 3.3 MMOL/L (ref 3.5–5.1)
POTASSIUM SERPL-SCNC: 3.4 MMOL/L (ref 3.5–5.1)
POTASSIUM SERPL-SCNC: 3.4 MMOL/L (ref 3.5–5.1)
POTASSIUM SERPL-SCNC: 3.5 MMOL/L (ref 3.5–5.1)
POTASSIUM SERPL-SCNC: 3.5 MMOL/L (ref 3.5–5.1)
POTASSIUM SERPL-SCNC: 3.6 MMOL/L (ref 3.5–5.1)
POTASSIUM SERPL-SCNC: 3.7 MMOL/L (ref 3.5–5.1)
POTASSIUM SERPL-SCNC: 3.8 MMOL/L (ref 3.5–5.1)
POTASSIUM SERPL-SCNC: 4 MMOL/L (ref 3.5–5.1)
POTASSIUM SERPL-SCNC: 4.1 MMOL/L (ref 3.5–5.1)
PROT SERPL-MCNC: 6.4 G/DL (ref 6.4–8.2)
PROT SERPL-MCNC: 6.5 G/DL (ref 6.4–8.2)
PROT SERPL-MCNC: 7 G/DL (ref 6.4–8.2)
PROT SERPL-MCNC: 7.7 G/DL (ref 6.4–8.2)
PROT SERPL-MCNC: 7.8 G/DL (ref 6.4–8.2)
PROT SERPL-MCNC: 7.8 G/DL (ref 6.4–8.2)
PROT SERPL-MCNC: 8.2 G/DL (ref 6.4–8.2)
PROT SERPL-MCNC: 9.2 G/DL (ref 6.4–8.2)
PROT UR STRIP-MCNC: 100 MG/DL
Q-T INTERVAL, ECG07: 360 MS
Q-T INTERVAL, ECG07: 368 MS
Q-T INTERVAL, ECG07: 376 MS
Q-T INTERVAL, ECG07: 394 MS
Q-T INTERVAL, ECG07: 440 MS
QRS DURATION, ECG06: 86 MS
QRS DURATION, ECG06: 92 MS
QRS DURATION, ECG06: 94 MS
QRS DURATION, ECG06: 96 MS
QRS DURATION, ECG06: 98 MS
QTC CALCULATION (BEZET), ECG08: 424 MS
QTC CALCULATION (BEZET), ECG08: 431 MS
QTC CALCULATION (BEZET), ECG08: 482 MS
QTC CALCULATION (BEZET), ECG08: 484 MS
QTC CALCULATION (BEZET), ECG08: 606 MS
RBC # BLD AUTO: 3.77 M/UL (ref 4.1–5.7)
RBC # BLD AUTO: 3.82 M/UL (ref 4.1–5.7)
RBC # BLD AUTO: 3.84 M/UL (ref 4.1–5.7)
RBC # BLD AUTO: 3.85 M/UL (ref 4.1–5.7)
RBC # BLD AUTO: 4.02 M/UL (ref 4.1–5.7)
RBC # BLD AUTO: 4.11 M/UL (ref 4.1–5.7)
RBC # BLD AUTO: 4.36 M/UL (ref 4.1–5.7)
RBC # BLD AUTO: 4.45 M/UL (ref 4.1–5.7)
RBC # BLD AUTO: 4.47 M/UL (ref 4.1–5.7)
RBC # BLD AUTO: 4.52 M/UL (ref 4.1–5.7)
RBC # BLD AUTO: 4.53 M/UL (ref 4.1–5.7)
RBC # BLD AUTO: 4.57 M/UL (ref 4.1–5.7)
RBC # BLD AUTO: 4.7 M/UL (ref 4.1–5.7)
RBC # BLD AUTO: 5.02 M/UL (ref 4.1–5.7)
RBC # BLD AUTO: 5.18 M/UL (ref 4.1–5.7)
RBC #/AREA URNS HPF: ABNORMAL /HPF (ref 0–5)
RBC MORPH BLD: ABNORMAL
SAO2 % BLD: 95 %
SAO2 % BLDV: 66 % (ref 60–80)
SAO2% DEVICE SAO2% SENSOR NAME: ABNORMAL
SAO2% DEVICE SAO2% SENSOR NAME: ABNORMAL
SODIUM SERPL-SCNC: 138 MMOL/L (ref 136–145)
SODIUM SERPL-SCNC: 139 MMOL/L (ref 136–145)
SODIUM SERPL-SCNC: 140 MMOL/L (ref 136–145)
SODIUM SERPL-SCNC: 143 MMOL/L (ref 136–145)
SODIUM SERPL-SCNC: 143 MMOL/L (ref 136–145)
SODIUM SERPL-SCNC: 145 MMOL/L (ref 136–145)
SODIUM SERPL-SCNC: 147 MMOL/L (ref 136–145)
SODIUM SERPL-SCNC: 148 MMOL/L (ref 136–145)
SODIUM SERPL-SCNC: 148 MMOL/L (ref 136–145)
SODIUM SERPL-SCNC: 149 MMOL/L (ref 136–145)
SODIUM SERPL-SCNC: 150 MMOL/L (ref 136–145)
SODIUM SERPL-SCNC: 151 MMOL/L (ref 136–145)
SODIUM SERPL-SCNC: 155 MMOL/L (ref 136–145)
SODIUM SERPL-SCNC: 156 MMOL/L (ref 136–145)
SODIUM SERPL-SCNC: 158 MMOL/L (ref 136–145)
SODIUM SERPL-SCNC: 159 MMOL/L (ref 136–145)
SODIUM SERPL-SCNC: 159 MMOL/L (ref 136–145)
SODIUM SERPL-SCNC: 160 MMOL/L (ref 136–145)
SODIUM SERPL-SCNC: 161 MMOL/L (ref 136–145)
SODIUM SERPL-SCNC: 163 MMOL/L (ref 136–145)
SP GR UR REFRACTOMETRY: 1.03 (ref 1–1.03)
SPECIAL REQUESTS,SREQ: NORMAL
T4 FREE SERPL-MCNC: 1.2 NG/DL (ref 0.8–1.5)
TROPONIN I SERPL-MCNC: 0.07 NG/ML
TROPONIN I SERPL-MCNC: 0.14 NG/ML
TROPONIN I SERPL-MCNC: 0.22 NG/ML
TROPONIN I SERPL-MCNC: 0.33 NG/ML
TROPONIN I SERPL-MCNC: 0.33 NG/ML
TROPONIN I SERPL-MCNC: <0.05 NG/ML
TSH SERPL DL<=0.05 MIU/L-ACNC: 4.47 UIU/ML (ref 0.36–3.74)
UROBILINOGEN UR QL STRIP.AUTO: 0.1 EU/DL (ref 0.1–1)
VENTRICULAR RATE, ECG03: 109 BPM
VENTRICULAR RATE, ECG03: 114 BPM
VENTRICULAR RATE, ECG03: 72 BPM
VENTRICULAR RATE, ECG03: 80 BPM
VENTRICULAR RATE, ECG03: 99 BPM
WBC # BLD AUTO: 10.7 K/UL (ref 4.1–11.1)
WBC # BLD AUTO: 10.7 K/UL (ref 4.1–11.1)
WBC # BLD AUTO: 13.1 K/UL (ref 4.1–11.1)
WBC # BLD AUTO: 17.8 K/UL (ref 4.1–11.1)
WBC # BLD AUTO: 5.4 K/UL (ref 4.1–11.1)
WBC # BLD AUTO: 5.8 K/UL (ref 4.1–11.1)
WBC # BLD AUTO: 6 K/UL (ref 4.1–11.1)
WBC # BLD AUTO: 6.1 K/UL (ref 4.1–11.1)
WBC # BLD AUTO: 6.6 K/UL (ref 4.1–11.1)
WBC # BLD AUTO: 6.8 K/UL (ref 4.1–11.1)
WBC # BLD AUTO: 6.9 K/UL (ref 4.1–11.1)
WBC # BLD AUTO: 7 K/UL (ref 4.1–11.1)
WBC # BLD AUTO: 7.2 K/UL (ref 4.1–11.1)
WBC # BLD AUTO: 8.4 K/UL (ref 4.1–11.1)
WBC # BLD AUTO: 8.5 K/UL (ref 4.1–11.1)
WBC URNS QL MICRO: ABNORMAL /HPF (ref 0–4)

## 2021-01-01 PROCEDURE — 74011636637 HC RX REV CODE- 636/637: Performed by: INTERNAL MEDICINE

## 2021-01-01 PROCEDURE — 84484 ASSAY OF TROPONIN QUANT: CPT

## 2021-01-01 PROCEDURE — C9254 INJECTION, LACOSAMIDE: HCPCS | Performed by: HOSPITALIST

## 2021-01-01 PROCEDURE — 82962 GLUCOSE BLOOD TEST: CPT

## 2021-01-01 PROCEDURE — 36415 COLL VENOUS BLD VENIPUNCTURE: CPT

## 2021-01-01 PROCEDURE — 83605 ASSAY OF LACTIC ACID: CPT

## 2021-01-01 PROCEDURE — 99214 OFFICE O/P EST MOD 30 MIN: CPT | Performed by: INTERNAL MEDICINE

## 2021-01-01 PROCEDURE — 87040 BLOOD CULTURE FOR BACTERIA: CPT

## 2021-01-01 PROCEDURE — 74011250637 HC RX REV CODE- 250/637: Performed by: PHYSICIAN ASSISTANT

## 2021-01-01 PROCEDURE — 65660000001 HC RM ICU INTERMED STEPDOWN

## 2021-01-01 PROCEDURE — 94760 N-INVAS EAR/PLS OXIMETRY 1: CPT

## 2021-01-01 PROCEDURE — 1111F DSCHRG MED/CURRENT MED MERGE: CPT | Performed by: INTERNAL MEDICINE

## 2021-01-01 PROCEDURE — 97530 THERAPEUTIC ACTIVITIES: CPT

## 2021-01-01 PROCEDURE — 92610 EVALUATE SWALLOWING FUNCTION: CPT

## 2021-01-01 PROCEDURE — 65610000006 HC RM INTENSIVE CARE

## 2021-01-01 PROCEDURE — 99285 EMERGENCY DEPT VISIT HI MDM: CPT

## 2021-01-01 PROCEDURE — 85025 COMPLETE CBC W/AUTO DIFF WBC: CPT

## 2021-01-01 PROCEDURE — 74011250636 HC RX REV CODE- 250/636: Performed by: HOSPITALIST

## 2021-01-01 PROCEDURE — 74011250636 HC RX REV CODE- 250/636: Performed by: INTERNAL MEDICINE

## 2021-01-01 PROCEDURE — 74011636637 HC RX REV CODE- 636/637: Performed by: HOSPITALIST

## 2021-01-01 PROCEDURE — 74011250637 HC RX REV CODE- 250/637: Performed by: HOSPITALIST

## 2021-01-01 PROCEDURE — 74011000250 HC RX REV CODE- 250: Performed by: INTERNAL MEDICINE

## 2021-01-01 PROCEDURE — 80053 COMPREHEN METABOLIC PANEL: CPT

## 2021-01-01 PROCEDURE — 65270000029 HC RM PRIVATE

## 2021-01-01 PROCEDURE — 74011250636 HC RX REV CODE- 250/636: Performed by: PHYSICIAN ASSISTANT

## 2021-01-01 PROCEDURE — 77010033678 HC OXYGEN DAILY

## 2021-01-01 PROCEDURE — 74011636637 HC RX REV CODE- 636/637: Performed by: STUDENT IN AN ORGANIZED HEALTH CARE EDUCATION/TRAINING PROGRAM

## 2021-01-01 PROCEDURE — 74011000250 HC RX REV CODE- 250: Performed by: STUDENT IN AN ORGANIZED HEALTH CARE EDUCATION/TRAINING PROGRAM

## 2021-01-01 PROCEDURE — 80048 BASIC METABOLIC PNL TOTAL CA: CPT

## 2021-01-01 PROCEDURE — 74011636637 HC RX REV CODE- 636/637: Performed by: PHYSICIAN ASSISTANT

## 2021-01-01 PROCEDURE — 71045 X-RAY EXAM CHEST 1 VIEW: CPT

## 2021-01-01 PROCEDURE — 74011250637 HC RX REV CODE- 250/637: Performed by: INTERNAL MEDICINE

## 2021-01-01 PROCEDURE — 93306 TTE W/DOPPLER COMPLETE: CPT

## 2021-01-01 PROCEDURE — 74011250636 HC RX REV CODE- 250/636: Performed by: EMERGENCY MEDICINE

## 2021-01-01 PROCEDURE — 70450 CT HEAD/BRAIN W/O DYE: CPT

## 2021-01-01 PROCEDURE — 74011000250 HC RX REV CODE- 250: Performed by: HOSPITALIST

## 2021-01-01 PROCEDURE — 96375 TX/PRO/DX INJ NEW DRUG ADDON: CPT

## 2021-01-01 PROCEDURE — 82962 GLUCOSE BLOOD TEST: CPT | Performed by: INTERNAL MEDICINE

## 2021-01-01 PROCEDURE — 74011000258 HC RX REV CODE- 258: Performed by: HOSPITALIST

## 2021-01-01 PROCEDURE — 93005 ELECTROCARDIOGRAM TRACING: CPT

## 2021-01-01 PROCEDURE — 97161 PT EVAL LOW COMPLEX 20 MIN: CPT

## 2021-01-01 PROCEDURE — 83880 ASSAY OF NATRIURETIC PEPTIDE: CPT

## 2021-01-01 PROCEDURE — 87086 URINE CULTURE/COLONY COUNT: CPT

## 2021-01-01 PROCEDURE — 85027 COMPLETE CBC AUTOMATED: CPT

## 2021-01-01 PROCEDURE — 82803 BLOOD GASES ANY COMBINATION: CPT

## 2021-01-01 PROCEDURE — 96376 TX/PRO/DX INJ SAME DRUG ADON: CPT

## 2021-01-01 PROCEDURE — 74011250636 HC RX REV CODE- 250/636: Performed by: STUDENT IN AN ORGANIZED HEALTH CARE EDUCATION/TRAINING PROGRAM

## 2021-01-01 PROCEDURE — 97162 PT EVAL MOD COMPLEX 30 MIN: CPT

## 2021-01-01 PROCEDURE — 97165 OT EVAL LOW COMPLEX 30 MIN: CPT

## 2021-01-01 PROCEDURE — 83036 HEMOGLOBIN GLYCOSYLATED A1C: CPT

## 2021-01-01 PROCEDURE — 92526 ORAL FUNCTION THERAPY: CPT

## 2021-01-01 PROCEDURE — 74011250636 HC RX REV CODE- 250/636

## 2021-01-01 PROCEDURE — 87641 MR-STAPH DNA AMP PROBE: CPT

## 2021-01-01 PROCEDURE — 80307 DRUG TEST PRSMV CHEM ANLYZR: CPT

## 2021-01-01 PROCEDURE — 81001 URINALYSIS AUTO W/SCOPE: CPT

## 2021-01-01 PROCEDURE — 84439 ASSAY OF FREE THYROXINE: CPT

## 2021-01-01 PROCEDURE — 84443 ASSAY THYROID STIM HORMONE: CPT

## 2021-01-01 PROCEDURE — 96374 THER/PROPH/DIAG INJ IV PUSH: CPT

## 2021-01-01 PROCEDURE — 99284 EMERGENCY DEPT VISIT MOD MDM: CPT

## 2021-01-01 PROCEDURE — 82140 ASSAY OF AMMONIA: CPT

## 2021-01-01 PROCEDURE — 80177 DRUG SCRN QUAN LEVETIRACETAM: CPT

## 2021-01-01 PROCEDURE — 72190 X-RAY EXAM OF PELVIS: CPT

## 2021-01-01 PROCEDURE — 74011636637 HC RX REV CODE- 636/637: Performed by: EMERGENCY MEDICINE

## 2021-01-01 RX ORDER — AMLODIPINE BESYLATE 5 MG/1
10 TABLET ORAL DAILY
Status: DISCONTINUED | OUTPATIENT
Start: 2021-01-01 | End: 2021-01-01 | Stop reason: HOSPADM

## 2021-01-01 RX ORDER — ISOPROPYL ALCOHOL 70 ML/100ML
1 SWAB TOPICAL ONCE
Qty: 100 PAD | Refills: 3 | Status: SHIPPED | OUTPATIENT
Start: 2021-01-01 | End: 2021-01-01

## 2021-01-01 RX ORDER — AMLODIPINE BESYLATE 10 MG/1
10 TABLET ORAL DAILY
Qty: 30 TABLET | Refills: 0 | Status: SHIPPED | OUTPATIENT
Start: 2021-01-01 | End: 2021-01-01 | Stop reason: SDUPTHER

## 2021-01-01 RX ORDER — SODIUM CHLORIDE 0.9 % (FLUSH) 0.9 %
5-40 SYRINGE (ML) INJECTION AS NEEDED
Status: DISCONTINUED | OUTPATIENT
Start: 2021-01-01 | End: 2021-01-01

## 2021-01-01 RX ORDER — LORAZEPAM 2 MG/ML
2 INJECTION INTRAMUSCULAR ONCE
Status: COMPLETED | OUTPATIENT
Start: 2021-01-01 | End: 2021-01-01

## 2021-01-01 RX ORDER — LACOSAMIDE 200 MG/1
200 TABLET ORAL 2 TIMES DAILY
Qty: 60 TABLET | Refills: 1 | Status: SHIPPED | OUTPATIENT
Start: 2021-01-01

## 2021-01-01 RX ORDER — ONDANSETRON 4 MG/1
4 TABLET, ORALLY DISINTEGRATING ORAL
Status: DISCONTINUED | OUTPATIENT
Start: 2021-01-01 | End: 2021-01-01 | Stop reason: HOSPADM

## 2021-01-01 RX ORDER — DEXTROSE 50 % IN WATER (D50W) INTRAVENOUS SYRINGE
25-50 AS NEEDED
Status: DISCONTINUED | OUTPATIENT
Start: 2021-01-01 | End: 2021-01-01 | Stop reason: HOSPADM

## 2021-01-01 RX ORDER — HEPARIN SODIUM 5000 [USP'U]/ML
5000 INJECTION, SOLUTION INTRAVENOUS; SUBCUTANEOUS EVERY 8 HOURS
Status: DISCONTINUED | OUTPATIENT
Start: 2021-01-01 | End: 2021-01-01 | Stop reason: HOSPADM

## 2021-01-01 RX ORDER — LOSARTAN POTASSIUM 50 MG/1
50 TABLET ORAL DAILY
Qty: 30 TABLET | Refills: 2 | Status: SHIPPED | OUTPATIENT
Start: 2021-01-01 | End: 2021-01-01 | Stop reason: SDUPTHER

## 2021-01-01 RX ORDER — LORAZEPAM 2 MG/ML
1 INJECTION INTRAMUSCULAR ONCE
Status: COMPLETED | OUTPATIENT
Start: 2021-01-01 | End: 2021-01-01

## 2021-01-01 RX ORDER — AMOXICILLIN 500 MG/1
500 CAPSULE ORAL EVERY 8 HOURS
Status: DISCONTINUED | OUTPATIENT
Start: 2021-01-01 | End: 2021-01-01 | Stop reason: HOSPADM

## 2021-01-01 RX ORDER — POTASSIUM CHLORIDE 20 MEQ/1
40 TABLET, EXTENDED RELEASE ORAL
Status: DISCONTINUED | OUTPATIENT
Start: 2021-01-01 | End: 2021-01-01

## 2021-01-01 RX ORDER — IBUPROFEN 200 MG
CAPSULE ORAL
Qty: 100 STRIP | Refills: 3 | Status: SHIPPED | OUTPATIENT
Start: 2021-01-01

## 2021-01-01 RX ORDER — PRAVASTATIN SODIUM 20 MG/1
20 TABLET ORAL
Qty: 90 TABLET | Refills: 1 | Status: SHIPPED | OUTPATIENT
Start: 2021-01-01 | End: 2021-01-01 | Stop reason: SDUPTHER

## 2021-01-01 RX ORDER — LOSARTAN POTASSIUM 50 MG/1
50 TABLET ORAL DAILY
Qty: 90 TABLET | Refills: 2 | Status: SHIPPED | OUTPATIENT
Start: 2021-01-01

## 2021-01-01 RX ORDER — SODIUM CHLORIDE 450 MG/100ML
100 INJECTION, SOLUTION INTRAVENOUS CONTINUOUS
Status: DISCONTINUED | OUTPATIENT
Start: 2021-01-01 | End: 2021-01-01

## 2021-01-01 RX ORDER — POTASSIUM CHLORIDE 1.5 G/1.77G
20 POWDER, FOR SOLUTION ORAL 2 TIMES DAILY WITH MEALS
Status: DISCONTINUED | OUTPATIENT
Start: 2021-01-01 | End: 2021-01-01 | Stop reason: HOSPADM

## 2021-01-01 RX ORDER — LEVETIRACETAM 500 MG/1
1000 TABLET ORAL 2 TIMES DAILY
Status: DISCONTINUED | OUTPATIENT
Start: 2021-01-01 | End: 2021-01-01

## 2021-01-01 RX ORDER — MAGNESIUM SULFATE 100 %
4 CRYSTALS MISCELLANEOUS AS NEEDED
Status: DISCONTINUED | OUTPATIENT
Start: 2021-01-01 | End: 2021-01-01 | Stop reason: SDUPTHER

## 2021-01-01 RX ORDER — LORAZEPAM 2 MG/ML
1 INJECTION INTRAMUSCULAR
Status: COMPLETED | OUTPATIENT
Start: 2021-01-01 | End: 2021-01-01

## 2021-01-01 RX ORDER — METFORMIN HYDROCHLORIDE 500 MG/1
500 TABLET ORAL 2 TIMES DAILY WITH MEALS
Status: CANCELLED | OUTPATIENT
Start: 2021-01-01

## 2021-01-01 RX ORDER — LEVETIRACETAM 250 MG/1
1000 TABLET ORAL 2 TIMES DAILY
Status: DISCONTINUED | OUTPATIENT
Start: 2021-01-01 | End: 2021-01-01

## 2021-01-01 RX ORDER — METFORMIN HYDROCHLORIDE 500 MG/1
500 TABLET ORAL 2 TIMES DAILY WITH MEALS
Qty: 180 TABLET | Refills: 2 | Status: SHIPPED | OUTPATIENT
Start: 2021-01-01 | End: 2021-01-01 | Stop reason: SDUPTHER

## 2021-01-01 RX ORDER — LACOSAMIDE 100 MG/1
200 TABLET ORAL 2 TIMES DAILY
Status: DISCONTINUED | OUTPATIENT
Start: 2021-01-01 | End: 2021-01-01 | Stop reason: HOSPADM

## 2021-01-01 RX ORDER — DIVALPROEX SODIUM 500 MG/1
500 TABLET, DELAYED RELEASE ORAL 2 TIMES DAILY
Status: DISCONTINUED | OUTPATIENT
Start: 2021-01-01 | End: 2021-01-01

## 2021-01-01 RX ORDER — ONDANSETRON 2 MG/ML
4 INJECTION INTRAMUSCULAR; INTRAVENOUS
Status: DISCONTINUED | OUTPATIENT
Start: 2021-01-01 | End: 2021-01-01 | Stop reason: HOSPADM

## 2021-01-01 RX ORDER — METFORMIN HYDROCHLORIDE 500 MG/1
500 TABLET ORAL 2 TIMES DAILY WITH MEALS
Qty: 180 TABLET | Refills: 2 | Status: SHIPPED | OUTPATIENT
Start: 2021-01-01

## 2021-01-01 RX ORDER — OLANZAPINE 2.5 MG/1
2.5 TABLET ORAL 2 TIMES DAILY
Qty: 180 TABLET | Refills: 0 | Status: ON HOLD | OUTPATIENT
Start: 2021-01-01 | End: 2022-01-01 | Stop reason: SDUPTHER

## 2021-01-01 RX ORDER — LEVETIRACETAM 500 MG/1
1000 TABLET ORAL 2 TIMES DAILY
Status: DISCONTINUED | OUTPATIENT
Start: 2021-01-01 | End: 2021-01-01 | Stop reason: HOSPADM

## 2021-01-01 RX ORDER — ACETAMINOPHEN 325 MG/1
650 TABLET ORAL
Status: DISCONTINUED | OUTPATIENT
Start: 2021-01-01 | End: 2021-01-01 | Stop reason: HOSPADM

## 2021-01-01 RX ORDER — DEXTROSE MONOHYDRATE 50 MG/ML
125 INJECTION, SOLUTION INTRAVENOUS CONTINUOUS
Status: DISCONTINUED | OUTPATIENT
Start: 2021-01-01 | End: 2021-01-01

## 2021-01-01 RX ORDER — POTASSIUM CHLORIDE 1.5 G/1.77G
40 POWDER, FOR SOLUTION ORAL
Status: COMPLETED | OUTPATIENT
Start: 2021-01-01 | End: 2021-01-01

## 2021-01-01 RX ORDER — POLYETHYLENE GLYCOL 3350 17 G/17G
17 POWDER, FOR SOLUTION ORAL DAILY PRN
Status: DISCONTINUED | OUTPATIENT
Start: 2021-01-01 | End: 2021-01-01 | Stop reason: HOSPADM

## 2021-01-01 RX ORDER — INSULIN LISPRO 100 [IU]/ML
INJECTION, SOLUTION INTRAVENOUS; SUBCUTANEOUS
Qty: 90 VIAL | Refills: 1 | Status: SHIPPED
Start: 2021-01-01 | End: 2021-01-01 | Stop reason: ALTCHOICE

## 2021-01-01 RX ORDER — LEVETIRACETAM 10 MG/ML
1000 INJECTION INTRAVASCULAR ONCE
Status: COMPLETED | OUTPATIENT
Start: 2021-01-01 | End: 2021-01-01

## 2021-01-01 RX ORDER — LORAZEPAM 2 MG/ML
INJECTION INTRAMUSCULAR
Status: COMPLETED
Start: 2021-01-01 | End: 2021-01-01

## 2021-01-01 RX ORDER — INSULIN GLARGINE 100 [IU]/ML
25 INJECTION, SOLUTION SUBCUTANEOUS
Status: DISCONTINUED | OUTPATIENT
Start: 2021-01-01 | End: 2021-01-01 | Stop reason: HOSPADM

## 2021-01-01 RX ORDER — AMLODIPINE BESYLATE 5 MG/1
10 TABLET ORAL
Status: DISCONTINUED | OUTPATIENT
Start: 2021-01-01 | End: 2021-01-01 | Stop reason: HOSPADM

## 2021-01-01 RX ORDER — LEVOTHYROXINE SODIUM 75 UG/1
75 TABLET ORAL
Status: DISCONTINUED | OUTPATIENT
Start: 2021-01-01 | End: 2021-01-01 | Stop reason: HOSPADM

## 2021-01-01 RX ORDER — POTASSIUM CHLORIDE 20 MEQ/1
40 TABLET, EXTENDED RELEASE ORAL 2 TIMES DAILY
Status: DISCONTINUED | OUTPATIENT
Start: 2021-01-01 | End: 2021-01-01

## 2021-01-01 RX ORDER — LEVETIRACETAM 10 MG/ML
1000 INJECTION INTRAVASCULAR ONCE
Status: DISCONTINUED | OUTPATIENT
Start: 2021-01-01 | End: 2021-01-01 | Stop reason: SDUPTHER

## 2021-01-01 RX ORDER — LEVOTHYROXINE SODIUM 75 UG/1
75 TABLET ORAL
Qty: 90 TABLET | Refills: 2 | Status: SHIPPED | OUTPATIENT
Start: 2021-01-01

## 2021-01-01 RX ORDER — OLANZAPINE 2.5 MG/1
2.5 TABLET ORAL 2 TIMES DAILY
Status: DISCONTINUED | OUTPATIENT
Start: 2021-01-01 | End: 2021-01-01 | Stop reason: HOSPADM

## 2021-01-01 RX ORDER — NALOXONE HYDROCHLORIDE 1 MG/ML
1 INJECTION INTRAMUSCULAR; INTRAVENOUS; SUBCUTANEOUS
Status: COMPLETED | OUTPATIENT
Start: 2021-01-01 | End: 2021-01-01

## 2021-01-01 RX ORDER — AMLODIPINE BESYLATE 5 MG/1
5 TABLET ORAL DAILY
Status: DISCONTINUED | OUTPATIENT
Start: 2021-01-01 | End: 2021-01-01

## 2021-01-01 RX ORDER — INSULIN GLARGINE 100 [IU]/ML
18 INJECTION, SOLUTION SUBCUTANEOUS
Status: DISCONTINUED | OUTPATIENT
Start: 2021-01-01 | End: 2021-01-01

## 2021-01-01 RX ORDER — INSULIN LISPRO 100 [IU]/ML
INJECTION, SOLUTION INTRAVENOUS; SUBCUTANEOUS
Status: DISCONTINUED | OUTPATIENT
Start: 2021-01-01 | End: 2021-01-01 | Stop reason: HOSPADM

## 2021-01-01 RX ORDER — SODIUM CHLORIDE 9 MG/ML
100 INJECTION, SOLUTION INTRAVENOUS CONTINUOUS
Status: DISCONTINUED | OUTPATIENT
Start: 2021-01-01 | End: 2021-01-01

## 2021-01-01 RX ORDER — SODIUM CHLORIDE 450 MG/100ML
75 INJECTION, SOLUTION INTRAVENOUS CONTINUOUS
Status: DISCONTINUED | OUTPATIENT
Start: 2021-01-01 | End: 2021-01-01

## 2021-01-01 RX ORDER — LACOSAMIDE 200 MG/1
200 TABLET ORAL 2 TIMES DAILY
Status: DISCONTINUED | OUTPATIENT
Start: 2021-01-01 | End: 2021-01-01 | Stop reason: HOSPADM

## 2021-01-01 RX ORDER — MAGNESIUM SULFATE HEPTAHYDRATE 40 MG/ML
2 INJECTION, SOLUTION INTRAVENOUS ONCE
Status: COMPLETED | OUTPATIENT
Start: 2021-01-01 | End: 2021-01-01

## 2021-01-01 RX ORDER — ENOXAPARIN SODIUM 100 MG/ML
40 INJECTION SUBCUTANEOUS DAILY
Status: DISCONTINUED | OUTPATIENT
Start: 2021-01-01 | End: 2021-01-01

## 2021-01-01 RX ORDER — LEVETIRACETAM 10 MG/ML
1000 INJECTION INTRAVASCULAR EVERY 12 HOURS
Status: COMPLETED | OUTPATIENT
Start: 2021-01-01 | End: 2021-01-01

## 2021-01-01 RX ORDER — INSULIN GLARGINE 100 [IU]/ML
25 INJECTION, SOLUTION SUBCUTANEOUS
Qty: 30 VIAL | Refills: 0 | Status: SHIPPED | OUTPATIENT
Start: 2021-01-01 | End: 2021-01-01 | Stop reason: ALTCHOICE

## 2021-01-01 RX ORDER — PANTOPRAZOLE SODIUM 40 MG/1
40 TABLET, DELAYED RELEASE ORAL
Status: DISCONTINUED | OUTPATIENT
Start: 2021-01-01 | End: 2021-01-01 | Stop reason: HOSPADM

## 2021-01-01 RX ORDER — INSULIN GLARGINE 100 [IU]/ML
15 INJECTION, SOLUTION SUBCUTANEOUS
Status: DISCONTINUED | OUTPATIENT
Start: 2021-01-01 | End: 2021-01-01

## 2021-01-01 RX ORDER — MAGNESIUM SULFATE 100 %
4 CRYSTALS MISCELLANEOUS AS NEEDED
Status: DISCONTINUED | OUTPATIENT
Start: 2021-01-01 | End: 2021-01-01 | Stop reason: HOSPADM

## 2021-01-01 RX ORDER — LACOSAMIDE 200 MG/1
200 TABLET ORAL 2 TIMES DAILY
COMMUNITY

## 2021-01-01 RX ORDER — PRAVASTATIN SODIUM 20 MG/1
20 TABLET ORAL
Status: DISCONTINUED | OUTPATIENT
Start: 2021-01-01 | End: 2021-01-01 | Stop reason: HOSPADM

## 2021-01-01 RX ORDER — INSULIN PUMP SYRINGE, 3 ML
EACH MISCELLANEOUS
Qty: 1 KIT | Refills: 1 | Status: SHIPPED | OUTPATIENT
Start: 2021-01-01

## 2021-01-01 RX ORDER — ACETAMINOPHEN 650 MG/1
650 SUPPOSITORY RECTAL
Status: DISCONTINUED | OUTPATIENT
Start: 2021-01-01 | End: 2021-01-01 | Stop reason: HOSPADM

## 2021-01-01 RX ORDER — HEPARIN SODIUM 5000 [USP'U]/ML
5000 INJECTION, SOLUTION INTRAVENOUS; SUBCUTANEOUS EVERY 8 HOURS
Status: DISCONTINUED | OUTPATIENT
Start: 2021-01-01 | End: 2021-01-01

## 2021-01-01 RX ORDER — DIVALPROEX SODIUM 125 MG/1
500 CAPSULE, COATED PELLETS ORAL EVERY 12 HOURS
Status: DISCONTINUED | OUTPATIENT
Start: 2021-01-01 | End: 2021-01-01 | Stop reason: HOSPADM

## 2021-01-01 RX ORDER — LANCETS
EACH MISCELLANEOUS
Qty: 1 EACH | Refills: 11 | Status: SHIPPED | OUTPATIENT
Start: 2021-01-01

## 2021-01-01 RX ORDER — INSULIN LISPRO 100 [IU]/ML
5 INJECTION, SOLUTION INTRAVENOUS; SUBCUTANEOUS
Status: DISCONTINUED | OUTPATIENT
Start: 2021-01-01 | End: 2021-01-01 | Stop reason: HOSPADM

## 2021-01-01 RX ORDER — DIVALPROEX SODIUM 250 MG/1
500 TABLET, DELAYED RELEASE ORAL 2 TIMES DAILY
Status: DISCONTINUED | OUTPATIENT
Start: 2021-01-01 | End: 2021-01-01 | Stop reason: HOSPADM

## 2021-01-01 RX ORDER — SODIUM CHLORIDE 0.9 % (FLUSH) 0.9 %
5-40 SYRINGE (ML) INJECTION EVERY 8 HOURS
Status: DISCONTINUED | OUTPATIENT
Start: 2021-01-01 | End: 2021-01-01

## 2021-01-01 RX ORDER — OLANZAPINE 2.5 MG/1
2.5 TABLET ORAL 2 TIMES DAILY
Status: DISCONTINUED | OUTPATIENT
Start: 2021-01-01 | End: 2021-01-01

## 2021-01-01 RX ORDER — LABETALOL HCL 20 MG/4 ML
10 SYRINGE (ML) INTRAVENOUS
Status: DISCONTINUED | OUTPATIENT
Start: 2021-01-01 | End: 2021-01-01 | Stop reason: HOSPADM

## 2021-01-01 RX ORDER — PRAVASTATIN SODIUM 20 MG/1
20 TABLET ORAL
Qty: 90 TABLET | Refills: 2 | Status: SHIPPED | OUTPATIENT
Start: 2021-01-01

## 2021-01-01 RX ORDER — INSULIN LISPRO 100 [IU]/ML
INJECTION, SOLUTION INTRAVENOUS; SUBCUTANEOUS EVERY 4 HOURS
Status: DISCONTINUED | OUTPATIENT
Start: 2021-01-01 | End: 2021-01-01 | Stop reason: HOSPADM

## 2021-01-01 RX ORDER — INSULIN LISPRO 100 [IU]/ML
INJECTION, SOLUTION INTRAVENOUS; SUBCUTANEOUS EVERY 6 HOURS
Status: DISCONTINUED | OUTPATIENT
Start: 2021-01-01 | End: 2021-01-01

## 2021-01-01 RX ORDER — LEVETIRACETAM 10 MG/ML
1000 INJECTION INTRAVASCULAR EVERY 12 HOURS
Status: DISCONTINUED | OUTPATIENT
Start: 2021-01-01 | End: 2021-01-01

## 2021-01-01 RX ORDER — AMLODIPINE BESYLATE 10 MG/1
10 TABLET ORAL DAILY
Qty: 90 TABLET | Refills: 2 | Status: SHIPPED | OUTPATIENT
Start: 2021-01-01

## 2021-01-01 RX ADMIN — LEVETIRACETAM 1000 MG: 10 INJECTION INTRAVENOUS at 22:16

## 2021-01-01 RX ADMIN — CEFTRIAXONE SODIUM 1 G: 1 INJECTION, POWDER, FOR SOLUTION INTRAMUSCULAR; INTRAVENOUS at 14:57

## 2021-01-01 RX ADMIN — HEPARIN SODIUM 5000 UNITS: 5000 INJECTION INTRAVENOUS; SUBCUTANEOUS at 23:02

## 2021-01-01 RX ADMIN — INSULIN LISPRO 2 UNITS: 100 INJECTION, SOLUTION INTRAVENOUS; SUBCUTANEOUS at 22:34

## 2021-01-01 RX ADMIN — INSULIN GLARGINE 25 UNITS: 100 INJECTION, SOLUTION SUBCUTANEOUS at 22:29

## 2021-01-01 RX ADMIN — INSULIN LISPRO 5 UNITS: 100 INJECTION, SOLUTION INTRAVENOUS; SUBCUTANEOUS at 16:22

## 2021-01-01 RX ADMIN — DEXTROSE MONOHYDRATE 125 ML/HR: 50 INJECTION, SOLUTION INTRAVENOUS at 22:50

## 2021-01-01 RX ADMIN — LACOSAMIDE 200 MG: 200 TABLET, FILM COATED ORAL at 10:25

## 2021-01-01 RX ADMIN — LEVETIRACETAM 1000 MG: 10 INJECTION INTRAVENOUS at 14:08

## 2021-01-01 RX ADMIN — DIVALPROEX SODIUM 500 MG: 250 TABLET, DELAYED RELEASE ORAL at 23:01

## 2021-01-01 RX ADMIN — OLANZAPINE 2.5 MG: 2.5 TABLET, FILM COATED ORAL at 22:29

## 2021-01-01 RX ADMIN — PRAVASTATIN SODIUM 20 MG: 20 TABLET ORAL at 21:57

## 2021-01-01 RX ADMIN — OLANZAPINE 2.5 MG: 2.5 TABLET, FILM COATED ORAL at 08:46

## 2021-01-01 RX ADMIN — INSULIN LISPRO 4 UNITS: 100 INJECTION, SOLUTION INTRAVENOUS; SUBCUTANEOUS at 13:01

## 2021-01-01 RX ADMIN — HEPARIN SODIUM 5000 UNITS: 5000 INJECTION INTRAVENOUS; SUBCUTANEOUS at 05:14

## 2021-01-01 RX ADMIN — WHITE PETROLATUM,ZINC OXIDE: 57; 17 PASTE TOPICAL at 16:00

## 2021-01-01 RX ADMIN — LEVETIRACETAM 1000 MG: 500 TABLET ORAL at 22:34

## 2021-01-01 RX ADMIN — PRAVASTATIN SODIUM 20 MG: 20 TABLET ORAL at 21:40

## 2021-01-01 RX ADMIN — CEFTRIAXONE SODIUM 1 G: 1 INJECTION, POWDER, FOR SOLUTION INTRAMUSCULAR; INTRAVENOUS at 15:14

## 2021-01-01 RX ADMIN — INSULIN GLARGINE 25 UNITS: 100 INJECTION, SOLUTION SUBCUTANEOUS at 23:17

## 2021-01-01 RX ADMIN — AMLODIPINE BESYLATE 10 MG: 5 TABLET ORAL at 10:32

## 2021-01-01 RX ADMIN — INSULIN LISPRO 5 UNITS: 100 INJECTION, SOLUTION INTRAVENOUS; SUBCUTANEOUS at 13:01

## 2021-01-01 RX ADMIN — INSULIN LISPRO 5 UNITS: 100 INJECTION, SOLUTION INTRAVENOUS; SUBCUTANEOUS at 11:34

## 2021-01-01 RX ADMIN — INSULIN GLARGINE 25 UNITS: 100 INJECTION, SOLUTION SUBCUTANEOUS at 22:28

## 2021-01-01 RX ADMIN — DIVALPROEX SODIUM 500 MG: 125 CAPSULE, COATED PELLETS ORAL at 21:09

## 2021-01-01 RX ADMIN — INSULIN LISPRO 3 UNITS: 100 INJECTION, SOLUTION INTRAVENOUS; SUBCUTANEOUS at 08:20

## 2021-01-01 RX ADMIN — INSULIN LISPRO 5 UNITS: 100 INJECTION, SOLUTION INTRAVENOUS; SUBCUTANEOUS at 08:19

## 2021-01-01 RX ADMIN — WHITE PETROLATUM,ZINC OXIDE: 57; 17 PASTE TOPICAL at 10:47

## 2021-01-01 RX ADMIN — INSULIN LISPRO 5 UNITS: 100 INJECTION, SOLUTION INTRAVENOUS; SUBCUTANEOUS at 18:25

## 2021-01-01 RX ADMIN — DIVALPROEX SODIUM 500 MG: 125 CAPSULE, COATED PELLETS ORAL at 09:58

## 2021-01-01 RX ADMIN — WHITE PETROLATUM,ZINC OXIDE: 57; 17 PASTE TOPICAL at 22:00

## 2021-01-01 RX ADMIN — AMOXICILLIN 500 MG: 500 CAPSULE ORAL at 21:20

## 2021-01-01 RX ADMIN — INSULIN LISPRO 5 UNITS: 100 INJECTION, SOLUTION INTRAVENOUS; SUBCUTANEOUS at 09:50

## 2021-01-01 RX ADMIN — PANTOPRAZOLE SODIUM 40 MG: 40 TABLET, DELAYED RELEASE ORAL at 09:50

## 2021-01-01 RX ADMIN — INSULIN LISPRO 5 UNITS: 100 INJECTION, SOLUTION INTRAVENOUS; SUBCUTANEOUS at 11:30

## 2021-01-01 RX ADMIN — AMLODIPINE BESYLATE 10 MG: 5 TABLET ORAL at 09:50

## 2021-01-01 RX ADMIN — LEVETIRACETAM 1000 MG: 10 INJECTION INTRAVENOUS at 21:57

## 2021-01-01 RX ADMIN — LEVETIRACETAM 1000 MG: 500 TABLET ORAL at 10:08

## 2021-01-01 RX ADMIN — HEPARIN SODIUM 5000 UNITS: 5000 INJECTION INTRAVENOUS; SUBCUTANEOUS at 23:48

## 2021-01-01 RX ADMIN — LEVETIRACETAM 1000 MG: 500 TABLET ORAL at 08:18

## 2021-01-01 RX ADMIN — INSULIN LISPRO 7 UNITS: 100 INJECTION, SOLUTION INTRAVENOUS; SUBCUTANEOUS at 13:00

## 2021-01-01 RX ADMIN — PANTOPRAZOLE SODIUM 40 MG: 40 TABLET, DELAYED RELEASE ORAL at 10:32

## 2021-01-01 RX ADMIN — INSULIN LISPRO 5 UNITS: 100 INJECTION, SOLUTION INTRAVENOUS; SUBCUTANEOUS at 15:30

## 2021-01-01 RX ADMIN — INSULIN LISPRO 10 UNITS: 100 INJECTION, SOLUTION INTRAVENOUS; SUBCUTANEOUS at 11:46

## 2021-01-01 RX ADMIN — DIVALPROEX SODIUM 500 MG: 250 TABLET, DELAYED RELEASE ORAL at 09:50

## 2021-01-01 RX ADMIN — INSULIN LISPRO 5 UNITS: 100 INJECTION, SOLUTION INTRAVENOUS; SUBCUTANEOUS at 08:45

## 2021-01-01 RX ADMIN — INSULIN GLARGINE 25 UNITS: 100 INJECTION, SOLUTION SUBCUTANEOUS at 21:10

## 2021-01-01 RX ADMIN — INSULIN LISPRO 5 UNITS: 100 INJECTION, SOLUTION INTRAVENOUS; SUBCUTANEOUS at 18:26

## 2021-01-01 RX ADMIN — HEPARIN SODIUM 5000 UNITS: 5000 INJECTION INTRAVENOUS; SUBCUTANEOUS at 00:43

## 2021-01-01 RX ADMIN — INSULIN LISPRO 2 UNITS: 100 INJECTION, SOLUTION INTRAVENOUS; SUBCUTANEOUS at 22:37

## 2021-01-01 RX ADMIN — INSULIN LISPRO 7 UNITS: 100 INJECTION, SOLUTION INTRAVENOUS; SUBCUTANEOUS at 12:44

## 2021-01-01 RX ADMIN — INSULIN LISPRO 4 UNITS: 100 INJECTION, SOLUTION INTRAVENOUS; SUBCUTANEOUS at 23:02

## 2021-01-01 RX ADMIN — WHITE PETROLATUM,ZINC OXIDE: 57; 17 PASTE TOPICAL at 08:20

## 2021-01-01 RX ADMIN — LEVOTHYROXINE SODIUM 75 MCG: 75 TABLET ORAL at 08:35

## 2021-01-01 RX ADMIN — LEVOTHYROXINE SODIUM 75 MCG: 75 TABLET ORAL at 05:15

## 2021-01-01 RX ADMIN — INSULIN LISPRO 10 UNITS: 100 INJECTION, SOLUTION INTRAVENOUS; SUBCUTANEOUS at 23:28

## 2021-01-01 RX ADMIN — INSULIN HUMAN 10 UNITS: 100 INJECTION, SOLUTION PARENTERAL at 16:26

## 2021-01-01 RX ADMIN — LABETALOL HYDROCHLORIDE 10 MG: 5 INJECTION, SOLUTION INTRAVENOUS at 18:40

## 2021-01-01 RX ADMIN — AMOXICILLIN 500 MG: 500 CAPSULE ORAL at 18:26

## 2021-01-01 RX ADMIN — OLANZAPINE 2.5 MG: 2.5 TABLET, FILM COATED ORAL at 23:16

## 2021-01-01 RX ADMIN — LEVETIRACETAM 1000 MG: 10 INJECTION INTRAVENOUS at 23:16

## 2021-01-01 RX ADMIN — DIVALPROEX SODIUM 500 MG: 125 CAPSULE, COATED PELLETS ORAL at 09:06

## 2021-01-01 RX ADMIN — LACOSAMIDE 200 MG: 100 TABLET, FILM COATED ORAL at 08:45

## 2021-01-01 RX ADMIN — HEPARIN SODIUM 5000 UNITS: 5000 INJECTION INTRAVENOUS; SUBCUTANEOUS at 11:05

## 2021-01-01 RX ADMIN — SODIUM CHLORIDE 100 ML/HR: 9 INJECTION, SOLUTION INTRAVENOUS at 22:18

## 2021-01-01 RX ADMIN — DIVALPROEX SODIUM 500 MG: 250 TABLET, DELAYED RELEASE ORAL at 12:58

## 2021-01-01 RX ADMIN — INSULIN LISPRO 2 UNITS: 100 INJECTION, SOLUTION INTRAVENOUS; SUBCUTANEOUS at 21:21

## 2021-01-01 RX ADMIN — LACOSAMIDE 200 MG: 100 TABLET, FILM COATED ORAL at 01:09

## 2021-01-01 RX ADMIN — AMLODIPINE BESYLATE 10 MG: 5 TABLET ORAL at 11:35

## 2021-01-01 RX ADMIN — OLANZAPINE 2.5 MG: 2.5 TABLET, FILM COATED ORAL at 08:18

## 2021-01-01 RX ADMIN — WHITE PETROLATUM,ZINC OXIDE: 57; 17 PASTE TOPICAL at 16:23

## 2021-01-01 RX ADMIN — POTASSIUM CHLORIDE 40 MEQ: 1.5 FOR SOLUTION ORAL at 13:01

## 2021-01-01 RX ADMIN — HEPARIN SODIUM 5000 UNITS: 5000 INJECTION INTRAVENOUS; SUBCUTANEOUS at 00:32

## 2021-01-01 RX ADMIN — POTASSIUM CHLORIDE 20 MEQ: 1.5 FOR SOLUTION ORAL at 16:22

## 2021-01-01 RX ADMIN — DIVALPROEX SODIUM 500 MG: 250 TABLET, DELAYED RELEASE ORAL at 08:35

## 2021-01-01 RX ADMIN — Medication 10 ML: at 13:49

## 2021-01-01 RX ADMIN — LACOSAMIDE 200 MG: 200 TABLET, FILM COATED ORAL at 10:04

## 2021-01-01 RX ADMIN — SODIUM CHLORIDE: 234 INJECTION, SOLUTION, CONCENTRATE INTRAVENOUS at 06:30

## 2021-01-01 RX ADMIN — HEPARIN SODIUM 5000 UNITS: 5000 INJECTION INTRAVENOUS; SUBCUTANEOUS at 17:54

## 2021-01-01 RX ADMIN — INSULIN GLARGINE 25 UNITS: 100 INJECTION, SOLUTION SUBCUTANEOUS at 21:58

## 2021-01-01 RX ADMIN — SODIUM CHLORIDE: 234 INJECTION, SOLUTION, CONCENTRATE INTRAVENOUS at 16:31

## 2021-01-01 RX ADMIN — Medication 10 ML: at 17:42

## 2021-01-01 RX ADMIN — WHITE PETROLATUM,ZINC OXIDE: 57; 17 PASTE TOPICAL at 18:08

## 2021-01-01 RX ADMIN — INSULIN LISPRO 5 UNITS: 100 INJECTION, SOLUTION INTRAVENOUS; SUBCUTANEOUS at 17:54

## 2021-01-01 RX ADMIN — INSULIN LISPRO 3 UNITS: 100 INJECTION, SOLUTION INTRAVENOUS; SUBCUTANEOUS at 10:04

## 2021-01-01 RX ADMIN — AMLODIPINE BESYLATE 10 MG: 5 TABLET ORAL at 08:46

## 2021-01-01 RX ADMIN — WHITE PETROLATUM,ZINC OXIDE: 57; 17 PASTE TOPICAL at 09:00

## 2021-01-01 RX ADMIN — INSULIN LISPRO 4 UNITS: 100 INJECTION, SOLUTION INTRAVENOUS; SUBCUTANEOUS at 16:19

## 2021-01-01 RX ADMIN — LEVOTHYROXINE SODIUM 75 MCG: 75 TABLET ORAL at 06:24

## 2021-01-01 RX ADMIN — OLANZAPINE 2.5 MG: 2.5 TABLET, FILM COATED ORAL at 09:59

## 2021-01-01 RX ADMIN — INSULIN LISPRO 7 UNITS: 100 INJECTION, SOLUTION INTRAVENOUS; SUBCUTANEOUS at 13:47

## 2021-01-01 RX ADMIN — WHITE PETROLATUM,ZINC OXIDE: 57; 17 PASTE TOPICAL at 17:43

## 2021-01-01 RX ADMIN — WHITE PETROLATUM,ZINC OXIDE: 57; 17 PASTE TOPICAL at 08:46

## 2021-01-01 RX ADMIN — NALOXONE HYDROCHLORIDE 1 MG: 1 INJECTION PARENTERAL at 14:09

## 2021-01-01 RX ADMIN — HEPARIN SODIUM 5000 UNITS: 5000 INJECTION INTRAVENOUS; SUBCUTANEOUS at 17:36

## 2021-01-01 RX ADMIN — HEPARIN SODIUM 5000 UNITS: 5000 INJECTION INTRAVENOUS; SUBCUTANEOUS at 04:09

## 2021-01-01 RX ADMIN — INSULIN LISPRO 5 UNITS: 100 INJECTION, SOLUTION INTRAVENOUS; SUBCUTANEOUS at 17:42

## 2021-01-01 RX ADMIN — INSULIN LISPRO 5 UNITS: 100 INJECTION, SOLUTION INTRAVENOUS; SUBCUTANEOUS at 18:09

## 2021-01-01 RX ADMIN — AMLODIPINE BESYLATE 10 MG: 5 TABLET ORAL at 08:18

## 2021-01-01 RX ADMIN — PRAVASTATIN SODIUM 20 MG: 20 TABLET ORAL at 22:30

## 2021-01-01 RX ADMIN — DIVALPROEX SODIUM 500 MG: 250 TABLET, DELAYED RELEASE ORAL at 10:32

## 2021-01-01 RX ADMIN — AMOXICILLIN 500 MG: 500 CAPSULE ORAL at 22:04

## 2021-01-01 RX ADMIN — PANTOPRAZOLE SODIUM 40 MG: 40 TABLET, DELAYED RELEASE ORAL at 08:34

## 2021-01-01 RX ADMIN — OLANZAPINE 2.5 MG: 2.5 TABLET, FILM COATED ORAL at 22:35

## 2021-01-01 RX ADMIN — LEVOTHYROXINE SODIUM 75 MCG: 75 TABLET ORAL at 06:22

## 2021-01-01 RX ADMIN — DEXTROSE MONOHYDRATE 125 ML/HR: 50 INJECTION, SOLUTION INTRAVENOUS at 13:47

## 2021-01-01 RX ADMIN — HEPARIN SODIUM 5000 UNITS: 5000 INJECTION INTRAVENOUS; SUBCUTANEOUS at 18:23

## 2021-01-01 RX ADMIN — WHITE PETROLATUM,ZINC OXIDE: 57; 17 PASTE TOPICAL at 10:03

## 2021-01-01 RX ADMIN — LACOSAMIDE 200 MG: 200 TABLET, FILM COATED ORAL at 21:40

## 2021-01-01 RX ADMIN — PANTOPRAZOLE SODIUM 40 MG: 40 TABLET, DELAYED RELEASE ORAL at 15:33

## 2021-01-01 RX ADMIN — INSULIN GLARGINE 25 UNITS: 100 INJECTION, SOLUTION SUBCUTANEOUS at 21:14

## 2021-01-01 RX ADMIN — INSULIN LISPRO 2 UNITS: 100 INJECTION, SOLUTION INTRAVENOUS; SUBCUTANEOUS at 11:04

## 2021-01-01 RX ADMIN — INSULIN LISPRO 5 UNITS: 100 INJECTION, SOLUTION INTRAVENOUS; SUBCUTANEOUS at 15:52

## 2021-01-01 RX ADMIN — DIVALPROEX SODIUM 500 MG: 125 CAPSULE, COATED PELLETS ORAL at 21:15

## 2021-01-01 RX ADMIN — OLANZAPINE 2.5 MG: 2.5 TABLET, FILM COATED ORAL at 13:49

## 2021-01-01 RX ADMIN — PRAVASTATIN SODIUM 20 MG: 10 TABLET ORAL at 23:01

## 2021-01-01 RX ADMIN — LACOSAMIDE 200 MG: 100 TABLET, FILM COATED ORAL at 09:50

## 2021-01-01 RX ADMIN — INSULIN LISPRO 4 UNITS: 100 INJECTION, SOLUTION INTRAVENOUS; SUBCUTANEOUS at 19:35

## 2021-01-01 RX ADMIN — HEPARIN SODIUM 5000 UNITS: 5000 INJECTION INTRAVENOUS; SUBCUTANEOUS at 23:38

## 2021-01-01 RX ADMIN — INSULIN LISPRO 4 UNITS: 100 INJECTION, SOLUTION INTRAVENOUS; SUBCUTANEOUS at 12:50

## 2021-01-01 RX ADMIN — OLANZAPINE 2.5 MG: 2.5 TABLET, FILM COATED ORAL at 22:16

## 2021-01-01 RX ADMIN — DEXTROSE MONOHYDRATE 125 ML/HR: 50 INJECTION, SOLUTION INTRAVENOUS at 07:39

## 2021-01-01 RX ADMIN — OLANZAPINE 2.5 MG: 2.5 TABLET, FILM COATED ORAL at 21:09

## 2021-01-01 RX ADMIN — LEVETIRACETAM 1000 MG: 500 TABLET ORAL at 23:16

## 2021-01-01 RX ADMIN — LEVETIRACETAM 1000 MG: 10 INJECTION INTRAVENOUS at 08:57

## 2021-01-01 RX ADMIN — INSULIN LISPRO 7 UNITS: 100 INJECTION, SOLUTION INTRAVENOUS; SUBCUTANEOUS at 09:58

## 2021-01-01 RX ADMIN — LACOSAMIDE 200 MG: 100 TABLET, FILM COATED ORAL at 09:00

## 2021-01-01 RX ADMIN — HEPARIN SODIUM 5000 UNITS: 5000 INJECTION INTRAVENOUS; SUBCUTANEOUS at 22:08

## 2021-01-01 RX ADMIN — Medication 10 ML: at 23:03

## 2021-01-01 RX ADMIN — OLANZAPINE 2.5 MG: 2.5 TABLET, FILM COATED ORAL at 21:16

## 2021-01-01 RX ADMIN — OLANZAPINE 2.5 MG: 2.5 TABLET, FILM COATED ORAL at 14:44

## 2021-01-01 RX ADMIN — INSULIN GLARGINE 15 UNITS: 100 INJECTION, SOLUTION SUBCUTANEOUS at 23:01

## 2021-01-01 RX ADMIN — OLANZAPINE 2.5 MG: 2.5 TABLET, FILM COATED ORAL at 10:36

## 2021-01-01 RX ADMIN — OLANZAPINE 2.5 MG: 2.5 TABLET, FILM COATED ORAL at 08:34

## 2021-01-01 RX ADMIN — INSULIN LISPRO 3 UNITS: 100 INJECTION, SOLUTION INTRAVENOUS; SUBCUTANEOUS at 16:22

## 2021-01-01 RX ADMIN — INSULIN LISPRO 5 UNITS: 100 INJECTION, SOLUTION INTRAVENOUS; SUBCUTANEOUS at 18:08

## 2021-01-01 RX ADMIN — INSULIN LISPRO 11 UNITS: 100 INJECTION, SOLUTION INTRAVENOUS; SUBCUTANEOUS at 18:26

## 2021-01-01 RX ADMIN — INSULIN LISPRO 3 UNITS: 100 INJECTION, SOLUTION INTRAVENOUS; SUBCUTANEOUS at 08:51

## 2021-01-01 RX ADMIN — HEPARIN SODIUM 5000 UNITS: 5000 INJECTION INTRAVENOUS; SUBCUTANEOUS at 07:01

## 2021-01-01 RX ADMIN — INSULIN LISPRO 2 UNITS: 100 INJECTION, SOLUTION INTRAVENOUS; SUBCUTANEOUS at 23:38

## 2021-01-01 RX ADMIN — AMOXICILLIN 500 MG: 500 CAPSULE ORAL at 14:19

## 2021-01-01 RX ADMIN — LEVETIRACETAM 1000 MG: 10 INJECTION INTRAVENOUS at 21:15

## 2021-01-01 RX ADMIN — INSULIN GLARGINE 25 UNITS: 100 INJECTION, SOLUTION SUBCUTANEOUS at 22:16

## 2021-01-01 RX ADMIN — LEVETIRACETAM 1000 MG: 500 TABLET ORAL at 21:20

## 2021-01-01 RX ADMIN — LACOSAMIDE 200 MG: 100 TABLET, FILM COATED ORAL at 08:19

## 2021-01-01 RX ADMIN — HEPARIN SODIUM 5000 UNITS: 5000 INJECTION INTRAVENOUS; SUBCUTANEOUS at 06:47

## 2021-01-01 RX ADMIN — DIVALPROEX SODIUM 500 MG: 250 TABLET, DELAYED RELEASE ORAL at 23:17

## 2021-01-01 RX ADMIN — LACOSAMIDE 200 MG: 200 TABLET, FILM COATED ORAL at 21:16

## 2021-01-01 RX ADMIN — INSULIN LISPRO 4 UNITS: 100 INJECTION, SOLUTION INTRAVENOUS; SUBCUTANEOUS at 17:00

## 2021-01-01 RX ADMIN — HEPARIN SODIUM 5000 UNITS: 5000 INJECTION INTRAVENOUS; SUBCUTANEOUS at 18:32

## 2021-01-01 RX ADMIN — INSULIN GLARGINE 15 UNITS: 100 INJECTION, SOLUTION SUBCUTANEOUS at 22:39

## 2021-01-01 RX ADMIN — DIVALPROEX SODIUM 500 MG: 250 TABLET, DELAYED RELEASE ORAL at 22:29

## 2021-01-01 RX ADMIN — OLANZAPINE 2.5 MG: 2.5 TABLET, FILM COATED ORAL at 22:39

## 2021-01-01 RX ADMIN — INSULIN LISPRO 5 UNITS: 100 INJECTION, SOLUTION INTRAVENOUS; SUBCUTANEOUS at 23:01

## 2021-01-01 RX ADMIN — INSULIN LISPRO 15 UNITS: 100 INJECTION, SOLUTION INTRAVENOUS; SUBCUTANEOUS at 21:15

## 2021-01-01 RX ADMIN — INSULIN LISPRO 3 UNITS: 100 INJECTION, SOLUTION INTRAVENOUS; SUBCUTANEOUS at 07:51

## 2021-01-01 RX ADMIN — CEFTRIAXONE SODIUM 1 G: 1 INJECTION, POWDER, FOR SOLUTION INTRAMUSCULAR; INTRAVENOUS at 14:43

## 2021-01-01 RX ADMIN — DIVALPROEX SODIUM 500 MG: 250 TABLET, DELAYED RELEASE ORAL at 21:20

## 2021-01-01 RX ADMIN — OLANZAPINE 2.5 MG: 2.5 TABLET, FILM COATED ORAL at 11:56

## 2021-01-01 RX ADMIN — INSULIN LISPRO 10 UNITS: 100 INJECTION, SOLUTION INTRAVENOUS; SUBCUTANEOUS at 12:14

## 2021-01-01 RX ADMIN — LACOSAMIDE 200 MG: 200 TABLET, FILM COATED ORAL at 09:59

## 2021-01-01 RX ADMIN — SODIUM CHLORIDE: 234 INJECTION, SOLUTION, CONCENTRATE INTRAVENOUS at 03:00

## 2021-01-01 RX ADMIN — LEVOTHYROXINE SODIUM 75 MCG: 75 TABLET ORAL at 06:47

## 2021-01-01 RX ADMIN — LEVETIRACETAM 1000 MG: 10 INJECTION INTRAVENOUS at 21:11

## 2021-01-01 RX ADMIN — HEPARIN SODIUM 5000 UNITS: 5000 INJECTION INTRAVENOUS; SUBCUTANEOUS at 03:29

## 2021-01-01 RX ADMIN — POTASSIUM CHLORIDE 40 MEQ: 1.5 FOR SOLUTION ORAL at 17:55

## 2021-01-01 RX ADMIN — LACOSAMIDE 200 MG: 100 TABLET, FILM COATED ORAL at 21:20

## 2021-01-01 RX ADMIN — DIVALPROEX SODIUM 500 MG: 125 CAPSULE, COATED PELLETS ORAL at 08:56

## 2021-01-01 RX ADMIN — LEVETIRACETAM 1000 MG: 500 TABLET ORAL at 22:04

## 2021-01-01 RX ADMIN — LACOSAMIDE 200 MG: 200 TABLET, FILM COATED ORAL at 09:06

## 2021-01-01 RX ADMIN — HEPARIN SODIUM 5000 UNITS: 5000 INJECTION INTRAVENOUS; SUBCUTANEOUS at 12:58

## 2021-01-01 RX ADMIN — OLANZAPINE 2.5 MG: 2.5 TABLET, FILM COATED ORAL at 15:34

## 2021-01-01 RX ADMIN — OLANZAPINE 2.5 MG: 2.5 TABLET, FILM COATED ORAL at 22:04

## 2021-01-01 RX ADMIN — PRAVASTATIN SODIUM 20 MG: 10 TABLET ORAL at 23:16

## 2021-01-01 RX ADMIN — Medication 10 ML: at 15:15

## 2021-01-01 RX ADMIN — HEPARIN SODIUM 5000 UNITS: 5000 INJECTION INTRAVENOUS; SUBCUTANEOUS at 09:40

## 2021-01-01 RX ADMIN — LEVOTHYROXINE SODIUM 75 MCG: 75 TABLET ORAL at 06:30

## 2021-01-01 RX ADMIN — HEPARIN SODIUM 5000 UNITS: 5000 INJECTION INTRAVENOUS; SUBCUTANEOUS at 18:08

## 2021-01-01 RX ADMIN — INSULIN LISPRO 18 UNITS: 100 INJECTION, SOLUTION INTRAVENOUS; SUBCUTANEOUS at 14:44

## 2021-01-01 RX ADMIN — LACOSAMIDE 200 MG: 200 TABLET, FILM COATED ORAL at 08:57

## 2021-01-01 RX ADMIN — DIVALPROEX SODIUM 500 MG: 250 TABLET, DELAYED RELEASE ORAL at 09:30

## 2021-01-01 RX ADMIN — LORAZEPAM 2 MG: 2 INJECTION INTRAMUSCULAR at 23:13

## 2021-01-01 RX ADMIN — SODIUM CHLORIDE 200 MG: 0.9 INJECTION INTRAVENOUS at 00:57

## 2021-01-01 RX ADMIN — OLANZAPINE 2.5 MG: 2.5 TABLET, FILM COATED ORAL at 21:20

## 2021-01-01 RX ADMIN — LEVETIRACETAM 1000 MG: 500 TABLET ORAL at 22:36

## 2021-01-01 RX ADMIN — LEVETIRACETAM 1000 MG: 10 INJECTION INTRAVENOUS at 10:04

## 2021-01-01 RX ADMIN — HEPARIN SODIUM 5000 UNITS: 5000 INJECTION INTRAVENOUS; SUBCUTANEOUS at 05:35

## 2021-01-01 RX ADMIN — INSULIN LISPRO 7 UNITS: 100 INJECTION, SOLUTION INTRAVENOUS; SUBCUTANEOUS at 10:06

## 2021-01-01 RX ADMIN — INSULIN LISPRO 4 UNITS: 100 INJECTION, SOLUTION INTRAVENOUS; SUBCUTANEOUS at 05:17

## 2021-01-01 RX ADMIN — DIVALPROEX SODIUM 500 MG: 250 TABLET, DELAYED RELEASE ORAL at 08:18

## 2021-01-01 RX ADMIN — AMLODIPINE BESYLATE 5 MG: 5 TABLET ORAL at 09:30

## 2021-01-01 RX ADMIN — HEPARIN SODIUM 5000 UNITS: 5000 INJECTION INTRAVENOUS; SUBCUTANEOUS at 09:07

## 2021-01-01 RX ADMIN — OLANZAPINE 2.5 MG: 2.5 TABLET, FILM COATED ORAL at 10:03

## 2021-01-01 RX ADMIN — OLANZAPINE 2.5 MG: 2.5 TABLET, FILM COATED ORAL at 10:25

## 2021-01-01 RX ADMIN — LACOSAMIDE 200 MG: 100 TABLET, FILM COATED ORAL at 23:01

## 2021-01-01 RX ADMIN — PRAVASTATIN SODIUM 20 MG: 20 TABLET ORAL at 21:15

## 2021-01-01 RX ADMIN — INSULIN LISPRO 2 UNITS: 100 INJECTION, SOLUTION INTRAVENOUS; SUBCUTANEOUS at 11:26

## 2021-01-01 RX ADMIN — DIVALPROEX SODIUM 500 MG: 125 CAPSULE, COATED PELLETS ORAL at 22:16

## 2021-01-01 RX ADMIN — AMOXICILLIN 500 MG: 500 CAPSULE ORAL at 06:00

## 2021-01-01 RX ADMIN — INSULIN LISPRO 7 UNITS: 100 INJECTION, SOLUTION INTRAVENOUS; SUBCUTANEOUS at 19:47

## 2021-01-01 RX ADMIN — INSULIN GLARGINE 25 UNITS: 100 INJECTION, SOLUTION SUBCUTANEOUS at 22:00

## 2021-01-01 RX ADMIN — OLANZAPINE 2.5 MG: 2.5 TABLET, FILM COATED ORAL at 09:49

## 2021-01-01 RX ADMIN — POTASSIUM CHLORIDE 20 MEQ: 1.5 FOR SOLUTION ORAL at 09:50

## 2021-01-01 RX ADMIN — INSULIN LISPRO 4 UNITS: 100 INJECTION, SOLUTION INTRAVENOUS; SUBCUTANEOUS at 22:28

## 2021-01-01 RX ADMIN — INSULIN LISPRO 4 UNITS: 100 INJECTION, SOLUTION INTRAVENOUS; SUBCUTANEOUS at 04:27

## 2021-01-01 RX ADMIN — LACOSAMIDE 200 MG: 200 TABLET, FILM COATED ORAL at 22:30

## 2021-01-01 RX ADMIN — INSULIN LISPRO 5 UNITS: 100 INJECTION, SOLUTION INTRAVENOUS; SUBCUTANEOUS at 12:45

## 2021-01-01 RX ADMIN — INSULIN LISPRO 5 UNITS: 100 INJECTION, SOLUTION INTRAVENOUS; SUBCUTANEOUS at 11:26

## 2021-01-01 RX ADMIN — INSULIN LISPRO 10 UNITS: 100 INJECTION, SOLUTION INTRAVENOUS; SUBCUTANEOUS at 19:44

## 2021-01-01 RX ADMIN — INSULIN LISPRO 3 UNITS: 100 INJECTION, SOLUTION INTRAVENOUS; SUBCUTANEOUS at 11:51

## 2021-01-01 RX ADMIN — INSULIN GLARGINE 18 UNITS: 100 INJECTION, SOLUTION SUBCUTANEOUS at 21:21

## 2021-01-01 RX ADMIN — WHITE PETROLATUM,ZINC OXIDE: 57; 17 PASTE TOPICAL at 01:12

## 2021-01-01 RX ADMIN — WHITE PETROLATUM,ZINC OXIDE: 57; 17 PASTE TOPICAL at 22:10

## 2021-01-01 RX ADMIN — PRAVASTATIN SODIUM 20 MG: 10 TABLET ORAL at 01:10

## 2021-01-01 RX ADMIN — INSULIN GLARGINE 25 UNITS: 100 INJECTION, SOLUTION SUBCUTANEOUS at 22:33

## 2021-01-01 RX ADMIN — LEVETIRACETAM 1000 MG: 500 TABLET ORAL at 22:29

## 2021-01-01 RX ADMIN — HEPARIN SODIUM 5000 UNITS: 5000 INJECTION INTRAVENOUS; SUBCUTANEOUS at 16:19

## 2021-01-01 RX ADMIN — INSULIN LISPRO 5 UNITS: 100 INJECTION, SOLUTION INTRAVENOUS; SUBCUTANEOUS at 17:41

## 2021-01-01 RX ADMIN — PANTOPRAZOLE SODIUM 40 MG: 40 TABLET, DELAYED RELEASE ORAL at 11:35

## 2021-01-01 RX ADMIN — AMLODIPINE BESYLATE 10 MG: 5 TABLET ORAL at 12:58

## 2021-01-01 RX ADMIN — HEPARIN SODIUM 5000 UNITS: 5000 INJECTION INTRAVENOUS; SUBCUTANEOUS at 23:36

## 2021-01-01 RX ADMIN — PANTOPRAZOLE SODIUM 40 MG: 40 TABLET, DELAYED RELEASE ORAL at 10:07

## 2021-01-01 RX ADMIN — INSULIN LISPRO 4 UNITS: 100 INJECTION, SOLUTION INTRAVENOUS; SUBCUTANEOUS at 00:02

## 2021-01-01 RX ADMIN — LEVOTHYROXINE SODIUM 75 MCG: 75 TABLET ORAL at 06:35

## 2021-01-01 RX ADMIN — HEPARIN SODIUM 5000 UNITS: 5000 INJECTION INTRAVENOUS; SUBCUTANEOUS at 05:39

## 2021-01-01 RX ADMIN — INSULIN LISPRO 5 UNITS: 100 INJECTION, SOLUTION INTRAVENOUS; SUBCUTANEOUS at 09:51

## 2021-01-01 RX ADMIN — AMOXICILLIN 500 MG: 500 CAPSULE ORAL at 13:24

## 2021-01-01 RX ADMIN — LEVETIRACETAM 1000 MG: 500 TABLET ORAL at 08:35

## 2021-01-01 RX ADMIN — INSULIN LISPRO 3 UNITS: 100 INJECTION, SOLUTION INTRAVENOUS; SUBCUTANEOUS at 08:34

## 2021-01-01 RX ADMIN — INSULIN LISPRO 3 UNITS: 100 INJECTION, SOLUTION INTRAVENOUS; SUBCUTANEOUS at 00:53

## 2021-01-01 RX ADMIN — LEVETIRACETAM 1000 MG: 10 INJECTION INTRAVENOUS at 23:03

## 2021-01-01 RX ADMIN — WHITE PETROLATUM,ZINC OXIDE: 57; 17 PASTE TOPICAL at 08:35

## 2021-01-01 RX ADMIN — WHITE PETROLATUM,ZINC OXIDE: 57; 17 PASTE TOPICAL at 18:27

## 2021-01-01 RX ADMIN — HEPARIN SODIUM 5000 UNITS: 5000 INJECTION INTRAVENOUS; SUBCUTANEOUS at 23:26

## 2021-01-01 RX ADMIN — LEVETIRACETAM 1000 MG: 10 INJECTION INTRAVENOUS at 09:08

## 2021-01-01 RX ADMIN — POTASSIUM CHLORIDE 40 MEQ: 1.5 FOR SOLUTION ORAL at 11:56

## 2021-01-01 RX ADMIN — LEVETIRACETAM 1000 MG: 10 INJECTION INTRAVENOUS at 10:03

## 2021-01-01 RX ADMIN — POTASSIUM CHLORIDE 40 MEQ: 1.5 FOR SOLUTION ORAL at 13:14

## 2021-01-01 RX ADMIN — HEPARIN SODIUM 5000 UNITS: 5000 INJECTION INTRAVENOUS; SUBCUTANEOUS at 11:35

## 2021-01-01 RX ADMIN — WHITE PETROLATUM,ZINC OXIDE: 57; 17 PASTE TOPICAL at 23:21

## 2021-01-01 RX ADMIN — PRAVASTATIN SODIUM 20 MG: 10 TABLET ORAL at 22:36

## 2021-01-01 RX ADMIN — INSULIN LISPRO 2 UNITS: 100 INJECTION, SOLUTION INTRAVENOUS; SUBCUTANEOUS at 15:53

## 2021-01-01 RX ADMIN — AMOXICILLIN 500 MG: 500 CAPSULE ORAL at 23:17

## 2021-01-01 RX ADMIN — SODIUM CHLORIDE 75 ML/HR: 4.5 INJECTION, SOLUTION INTRAVENOUS at 17:00

## 2021-01-01 RX ADMIN — LACOSAMIDE 200 MG: 200 TABLET, FILM COATED ORAL at 21:08

## 2021-01-01 RX ADMIN — LEVETIRACETAM 1000 MG: 10 INJECTION INTRAVENOUS at 10:26

## 2021-01-01 RX ADMIN — LEVETIRACETAM 1000 MG: 10 INJECTION INTRAVENOUS at 09:59

## 2021-01-01 RX ADMIN — WHITE PETROLATUM,ZINC OXIDE: 57; 17 PASTE TOPICAL at 02:58

## 2021-01-01 RX ADMIN — INSULIN LISPRO 5 UNITS: 100 INJECTION, SOLUTION INTRAVENOUS; SUBCUTANEOUS at 11:33

## 2021-01-01 RX ADMIN — WHITE PETROLATUM,ZINC OXIDE: 57; 17 PASTE TOPICAL at 23:20

## 2021-01-01 RX ADMIN — INSULIN LISPRO 3 UNITS: 100 INJECTION, SOLUTION INTRAVENOUS; SUBCUTANEOUS at 17:26

## 2021-01-01 RX ADMIN — PRAVASTATIN SODIUM 20 MG: 10 TABLET ORAL at 22:29

## 2021-01-01 RX ADMIN — DIVALPROEX SODIUM 500 MG: 250 TABLET, DELAYED RELEASE ORAL at 22:37

## 2021-01-01 RX ADMIN — LEVOTHYROXINE SODIUM 75 MCG: 75 TABLET ORAL at 05:35

## 2021-01-01 RX ADMIN — Medication 10 ML: at 14:00

## 2021-01-01 RX ADMIN — DIVALPROEX SODIUM 500 MG: 250 TABLET, DELAYED RELEASE ORAL at 08:45

## 2021-01-01 RX ADMIN — HEPARIN SODIUM 5000 UNITS: 5000 INJECTION INTRAVENOUS; SUBCUTANEOUS at 12:44

## 2021-01-01 RX ADMIN — LACOSAMIDE 200 MG: 200 TABLET, FILM COATED ORAL at 10:05

## 2021-01-01 RX ADMIN — INSULIN LISPRO 7 UNITS: 100 INJECTION, SOLUTION INTRAVENOUS; SUBCUTANEOUS at 10:31

## 2021-01-01 RX ADMIN — SODIUM CHLORIDE 1000 ML: 9 INJECTION, SOLUTION INTRAVENOUS at 13:49

## 2021-01-01 RX ADMIN — MAGNESIUM SULFATE HEPTAHYDRATE 2 G: 40 INJECTION, SOLUTION INTRAVENOUS at 11:56

## 2021-01-01 RX ADMIN — HEPARIN SODIUM 5000 UNITS: 5000 INJECTION INTRAVENOUS; SUBCUTANEOUS at 11:27

## 2021-01-01 RX ADMIN — LEVOTHYROXINE SODIUM 75 MCG: 75 TABLET ORAL at 07:01

## 2021-01-01 RX ADMIN — INSULIN LISPRO 3 UNITS: 100 INJECTION, SOLUTION INTRAVENOUS; SUBCUTANEOUS at 09:07

## 2021-01-01 RX ADMIN — WHITE PETROLATUM,ZINC OXIDE: 57; 17 PASTE TOPICAL at 02:53

## 2021-01-01 RX ADMIN — LEVETIRACETAM 1000 MG: 500 TABLET ORAL at 08:46

## 2021-01-01 RX ADMIN — PANTOPRAZOLE SODIUM 40 MG: 40 TABLET, DELAYED RELEASE ORAL at 06:35

## 2021-01-01 RX ADMIN — LACOSAMIDE 200 MG: 100 TABLET, FILM COATED ORAL at 22:42

## 2021-01-01 RX ADMIN — DIVALPROEX SODIUM 500 MG: 125 CAPSULE, COATED PELLETS ORAL at 10:04

## 2021-01-01 RX ADMIN — PANTOPRAZOLE SODIUM 40 MG: 40 TABLET, DELAYED RELEASE ORAL at 06:24

## 2021-01-01 RX ADMIN — PRAVASTATIN SODIUM 20 MG: 20 TABLET ORAL at 21:16

## 2021-01-01 RX ADMIN — AMOXICILLIN 500 MG: 500 CAPSULE ORAL at 06:35

## 2021-01-01 RX ADMIN — DEXTROSE MONOHYDRATE 125 ML/HR: 50 INJECTION, SOLUTION INTRAVENOUS at 07:01

## 2021-01-01 RX ADMIN — LEVOTHYROXINE SODIUM 75 MCG: 75 TABLET ORAL at 09:13

## 2021-01-01 RX ADMIN — PRAVASTATIN SODIUM 20 MG: 10 TABLET ORAL at 21:20

## 2021-01-01 RX ADMIN — INSULIN HUMAN 10 UNITS: 100 INJECTION, SOLUTION PARENTERAL at 15:12

## 2021-01-01 RX ADMIN — LEVETIRACETAM 1000 MG: 500 TABLET ORAL at 09:50

## 2021-01-01 RX ADMIN — SODIUM CHLORIDE: 234 INJECTION, SOLUTION, CONCENTRATE INTRAVENOUS at 16:19

## 2021-01-01 RX ADMIN — PRAVASTATIN SODIUM 20 MG: 10 TABLET ORAL at 22:39

## 2021-01-01 RX ADMIN — LEVOTHYROXINE SODIUM 75 MCG: 75 TABLET ORAL at 05:22

## 2021-01-01 RX ADMIN — LACOSAMIDE 200 MG: 100 TABLET, FILM COATED ORAL at 09:30

## 2021-01-01 RX ADMIN — AMOXICILLIN 500 MG: 500 CAPSULE ORAL at 14:25

## 2021-01-01 RX ADMIN — WHITE PETROLATUM,ZINC OXIDE: 57; 17 PASTE TOPICAL at 23:42

## 2021-01-01 RX ADMIN — LEVETIRACETAM 1000 MG: 10 INJECTION INTRAVENOUS at 09:13

## 2021-01-01 RX ADMIN — OLANZAPINE 2.5 MG: 2.5 TABLET, FILM COATED ORAL at 01:09

## 2021-01-01 RX ADMIN — LACOSAMIDE 200 MG: 200 TABLET, FILM COATED ORAL at 21:57

## 2021-01-01 RX ADMIN — DIVALPROEX SODIUM 500 MG: 125 CAPSULE, COATED PELLETS ORAL at 10:14

## 2021-01-01 RX ADMIN — OLANZAPINE 2.5 MG: 2.5 TABLET, FILM COATED ORAL at 21:57

## 2021-01-01 RX ADMIN — PANTOPRAZOLE SODIUM 40 MG: 40 TABLET, DELAYED RELEASE ORAL at 06:11

## 2021-01-01 RX ADMIN — LEVOTHYROXINE SODIUM 75 MCG: 75 TABLET ORAL at 11:38

## 2021-01-01 RX ADMIN — INSULIN LISPRO 5 UNITS: 100 INJECTION, SOLUTION INTRAVENOUS; SUBCUTANEOUS at 13:48

## 2021-01-01 RX ADMIN — INSULIN LISPRO 2 UNITS: 100 INJECTION, SOLUTION INTRAVENOUS; SUBCUTANEOUS at 22:04

## 2021-01-01 RX ADMIN — LACOSAMIDE 200 MG: 200 TABLET, FILM COATED ORAL at 09:13

## 2021-01-01 RX ADMIN — INSULIN LISPRO 10 UNITS: 100 INJECTION, SOLUTION INTRAVENOUS; SUBCUTANEOUS at 19:21

## 2021-01-01 RX ADMIN — INSULIN LISPRO 3 UNITS: 100 INJECTION, SOLUTION INTRAVENOUS; SUBCUTANEOUS at 08:45

## 2021-01-01 RX ADMIN — LEVOTHYROXINE SODIUM 75 MCG: 75 TABLET ORAL at 05:38

## 2021-01-01 RX ADMIN — INSULIN LISPRO 20 UNITS: 100 INJECTION, SOLUTION INTRAVENOUS; SUBCUTANEOUS at 08:55

## 2021-01-01 RX ADMIN — AMLODIPINE BESYLATE 10 MG: 5 TABLET ORAL at 08:35

## 2021-01-01 RX ADMIN — LEVETIRACETAM 1000 MG: 500 TABLET ORAL at 22:39

## 2021-01-01 RX ADMIN — WHITE PETROLATUM,ZINC OXIDE: 57; 17 PASTE TOPICAL at 09:52

## 2021-01-01 RX ADMIN — HEPARIN SODIUM 5000 UNITS: 5000 INJECTION INTRAVENOUS; SUBCUTANEOUS at 17:00

## 2021-01-01 RX ADMIN — LACOSAMIDE 200 MG: 200 TABLET, FILM COATED ORAL at 22:16

## 2021-01-01 RX ADMIN — INSULIN LISPRO 10 UNITS: 100 INJECTION, SOLUTION INTRAVENOUS; SUBCUTANEOUS at 17:10

## 2021-01-01 RX ADMIN — WHITE PETROLATUM,ZINC OXIDE: 57; 17 PASTE TOPICAL at 22:51

## 2021-01-01 RX ADMIN — DIVALPROEX SODIUM 500 MG: 125 CAPSULE, COATED PELLETS ORAL at 10:26

## 2021-01-01 RX ADMIN — DIVALPROEX SODIUM 500 MG: 125 CAPSULE, COATED PELLETS ORAL at 21:57

## 2021-01-01 RX ADMIN — SODIUM CHLORIDE: 234 INJECTION, SOLUTION, CONCENTRATE INTRAVENOUS at 13:52

## 2021-01-01 RX ADMIN — DIVALPROEX SODIUM 500 MG: 250 TABLET, DELAYED RELEASE ORAL at 22:34

## 2021-01-01 RX ADMIN — OLANZAPINE 2.5 MG: 2.5 TABLET, FILM COATED ORAL at 22:37

## 2021-01-01 RX ADMIN — WHITE PETROLATUM,ZINC OXIDE: 57; 17 PASTE TOPICAL at 09:33

## 2021-01-01 RX ADMIN — DIVALPROEX SODIUM 500 MG: 250 TABLET, DELAYED RELEASE ORAL at 01:10

## 2021-01-01 RX ADMIN — AMOXICILLIN 500 MG: 500 CAPSULE ORAL at 06:24

## 2021-01-01 RX ADMIN — DIVALPROEX SODIUM 500 MG: 250 TABLET, DELAYED RELEASE ORAL at 22:04

## 2021-01-01 RX ADMIN — INSULIN LISPRO 5 UNITS: 100 INJECTION, SOLUTION INTRAVENOUS; SUBCUTANEOUS at 09:31

## 2021-01-01 RX ADMIN — LACOSAMIDE 200 MG: 200 TABLET, FILM COATED ORAL at 09:41

## 2021-01-01 RX ADMIN — INSULIN LISPRO 4 UNITS: 100 INJECTION, SOLUTION INTRAVENOUS; SUBCUTANEOUS at 00:43

## 2021-01-01 RX ADMIN — INSULIN LISPRO 4 UNITS: 100 INJECTION, SOLUTION INTRAVENOUS; SUBCUTANEOUS at 23:27

## 2021-01-01 RX ADMIN — DIVALPROEX SODIUM 500 MG: 250 TABLET, DELAYED RELEASE ORAL at 10:07

## 2021-01-01 RX ADMIN — HEPARIN SODIUM 5000 UNITS: 5000 INJECTION INTRAVENOUS; SUBCUTANEOUS at 08:55

## 2021-01-01 RX ADMIN — PRAVASTATIN SODIUM 20 MG: 10 TABLET ORAL at 22:34

## 2021-01-01 RX ADMIN — OLANZAPINE 2.5 MG: 2.5 TABLET, FILM COATED ORAL at 22:30

## 2021-01-01 RX ADMIN — LEVETIRACETAM 1000 MG: 500 TABLET ORAL at 09:29

## 2021-01-01 RX ADMIN — AMOXICILLIN 500 MG: 500 CAPSULE ORAL at 22:34

## 2021-01-01 RX ADMIN — CEFTRIAXONE SODIUM 1 G: 1 INJECTION, POWDER, FOR SOLUTION INTRAMUSCULAR; INTRAVENOUS at 17:41

## 2021-01-01 RX ADMIN — INSULIN LISPRO 4 UNITS: 100 INJECTION, SOLUTION INTRAVENOUS; SUBCUTANEOUS at 04:01

## 2021-01-01 RX ADMIN — INSULIN LISPRO 10 UNITS: 100 INJECTION, SOLUTION INTRAVENOUS; SUBCUTANEOUS at 21:14

## 2021-01-01 RX ADMIN — Medication 10 ML: at 05:29

## 2021-01-01 RX ADMIN — DEXTROSE MONOHYDRATE 125 ML/HR: 50 INJECTION, SOLUTION INTRAVENOUS at 19:30

## 2021-01-01 RX ADMIN — INSULIN LISPRO 5 UNITS: 100 INJECTION, SOLUTION INTRAVENOUS; SUBCUTANEOUS at 13:47

## 2021-01-01 RX ADMIN — INSULIN LISPRO 4 UNITS: 100 INJECTION, SOLUTION INTRAVENOUS; SUBCUTANEOUS at 17:30

## 2021-01-01 RX ADMIN — INSULIN LISPRO 5 UNITS: 100 INJECTION, SOLUTION INTRAVENOUS; SUBCUTANEOUS at 10:07

## 2021-01-01 RX ADMIN — HEPARIN SODIUM 5000 UNITS: 5000 INJECTION INTRAVENOUS; SUBCUTANEOUS at 23:32

## 2021-01-01 RX ADMIN — INSULIN LISPRO 10 UNITS: 100 INJECTION, SOLUTION INTRAVENOUS; SUBCUTANEOUS at 17:32

## 2021-01-01 RX ADMIN — PRAVASTATIN SODIUM 20 MG: 20 TABLET ORAL at 22:16

## 2021-01-01 RX ADMIN — DIVALPROEX SODIUM 500 MG: 250 TABLET, DELAYED RELEASE ORAL at 11:35

## 2021-01-01 RX ADMIN — INSULIN LISPRO 2 UNITS: 100 INJECTION, SOLUTION INTRAVENOUS; SUBCUTANEOUS at 23:36

## 2021-01-01 RX ADMIN — INSULIN LISPRO 3 UNITS: 100 INJECTION, SOLUTION INTRAVENOUS; SUBCUTANEOUS at 04:45

## 2021-01-01 RX ADMIN — INSULIN LISPRO 4 UNITS: 100 INJECTION, SOLUTION INTRAVENOUS; SUBCUTANEOUS at 13:14

## 2021-01-01 RX ADMIN — Medication 10 ML: at 05:55

## 2021-01-01 RX ADMIN — HEPARIN SODIUM 5000 UNITS: 5000 INJECTION INTRAVENOUS; SUBCUTANEOUS at 23:28

## 2021-01-01 RX ADMIN — INSULIN LISPRO 4 UNITS: 100 INJECTION, SOLUTION INTRAVENOUS; SUBCUTANEOUS at 10:25

## 2021-01-01 RX ADMIN — INSULIN LISPRO 5 UNITS: 100 INJECTION, SOLUTION INTRAVENOUS; SUBCUTANEOUS at 10:31

## 2021-01-01 RX ADMIN — INSULIN LISPRO 5 UNITS: 100 INJECTION, SOLUTION INTRAVENOUS; SUBCUTANEOUS at 18:24

## 2021-01-01 RX ADMIN — LEVOTHYROXINE SODIUM 75 MCG: 75 TABLET ORAL at 09:41

## 2021-01-01 RX ADMIN — HEPARIN SODIUM 5000 UNITS: 5000 INJECTION INTRAVENOUS; SUBCUTANEOUS at 17:11

## 2021-01-01 RX ADMIN — SODIUM CHLORIDE 100 ML/HR: 4.5 INJECTION, SOLUTION INTRAVENOUS at 15:10

## 2021-01-01 RX ADMIN — LACOSAMIDE 200 MG: 100 TABLET, FILM COATED ORAL at 22:29

## 2021-01-01 RX ADMIN — LACOSAMIDE 200 MG: 100 TABLET, FILM COATED ORAL at 22:04

## 2021-01-01 RX ADMIN — OLANZAPINE 2.5 MG: 2.5 TABLET, FILM COATED ORAL at 00:01

## 2021-01-01 RX ADMIN — WHITE PETROLATUM,ZINC OXIDE: 57; 17 PASTE TOPICAL at 18:26

## 2021-01-01 RX ADMIN — DIVALPROEX SODIUM 500 MG: 250 TABLET, DELAYED RELEASE ORAL at 22:39

## 2021-01-01 RX ADMIN — HEPARIN SODIUM 5000 UNITS: 5000 INJECTION INTRAVENOUS; SUBCUTANEOUS at 18:21

## 2021-01-01 RX ADMIN — POTASSIUM CHLORIDE 20 MEQ: 1.5 FOR SOLUTION ORAL at 10:32

## 2021-01-01 RX ADMIN — OLANZAPINE 2.5 MG: 2.5 TABLET, FILM COATED ORAL at 08:57

## 2021-01-01 RX ADMIN — OLANZAPINE 2.5 MG: 2.5 TABLET, FILM COATED ORAL at 09:40

## 2021-01-01 RX ADMIN — LEVETIRACETAM 1000 MG: 500 TABLET ORAL at 01:10

## 2021-01-01 RX ADMIN — HEPARIN SODIUM 5000 UNITS: 5000 INJECTION INTRAVENOUS; SUBCUTANEOUS at 10:07

## 2021-01-01 RX ADMIN — LACOSAMIDE 200 MG: 100 TABLET, FILM COATED ORAL at 08:34

## 2021-01-01 RX ADMIN — DIVALPROEX SODIUM 500 MG: 125 CAPSULE, COATED PELLETS ORAL at 09:40

## 2021-01-01 RX ADMIN — INSULIN GLARGINE 18 UNITS: 100 INJECTION, SOLUTION SUBCUTANEOUS at 01:10

## 2021-01-01 RX ADMIN — HEPARIN SODIUM 5000 UNITS: 5000 INJECTION INTRAVENOUS; SUBCUTANEOUS at 10:00

## 2021-01-01 RX ADMIN — LORAZEPAM 2 MG: 2 INJECTION INTRAMUSCULAR; INTRAVENOUS at 23:13

## 2021-01-01 RX ADMIN — PRAVASTATIN SODIUM 20 MG: 10 TABLET ORAL at 22:04

## 2021-01-01 RX ADMIN — LEVETIRACETAM 1000 MG: 500 TABLET ORAL at 12:58

## 2021-01-01 RX ADMIN — LEVOTHYROXINE SODIUM 75 MCG: 75 TABLET ORAL at 05:56

## 2021-01-01 RX ADMIN — AMOXICILLIN 500 MG: 500 CAPSULE ORAL at 05:22

## 2021-01-01 RX ADMIN — LEVOTHYROXINE SODIUM 75 MCG: 75 TABLET ORAL at 06:11

## 2021-01-01 RX ADMIN — INSULIN LISPRO 3 UNITS: 100 INJECTION, SOLUTION INTRAVENOUS; SUBCUTANEOUS at 01:11

## 2021-01-01 RX ADMIN — INSULIN GLARGINE 25 UNITS: 100 INJECTION, SOLUTION SUBCUTANEOUS at 21:40

## 2021-01-01 RX ADMIN — DIVALPROEX SODIUM 500 MG: 125 CAPSULE, COATED PELLETS ORAL at 21:40

## 2021-01-01 RX ADMIN — LORAZEPAM 1 MG: 2 INJECTION INTRAMUSCULAR; INTRAVENOUS at 14:32

## 2021-01-01 RX ADMIN — LACOSAMIDE 200 MG: 100 TABLET, FILM COATED ORAL at 10:36

## 2021-01-01 RX ADMIN — HEPARIN SODIUM 5000 UNITS: 5000 INJECTION INTRAVENOUS; SUBCUTANEOUS at 18:26

## 2021-01-01 RX ADMIN — LACOSAMIDE 200 MG: 100 TABLET, FILM COATED ORAL at 22:34

## 2021-01-01 RX ADMIN — LEVETIRACETAM 1000 MG: 10 INJECTION INTRAVENOUS at 23:29

## 2021-01-01 RX ADMIN — INSULIN LISPRO 5 UNITS: 100 INJECTION, SOLUTION INTRAVENOUS; SUBCUTANEOUS at 11:04

## 2021-01-01 RX ADMIN — INSULIN LISPRO 7 UNITS: 100 INJECTION, SOLUTION INTRAVENOUS; SUBCUTANEOUS at 15:37

## 2021-01-01 RX ADMIN — LACOSAMIDE 200 MG: 100 TABLET, FILM COATED ORAL at 10:07

## 2021-01-01 RX ADMIN — HEPARIN SODIUM 5000 UNITS: 5000 INJECTION INTRAVENOUS; SUBCUTANEOUS at 17:26

## 2021-01-01 RX ADMIN — INSULIN LISPRO 4 UNITS: 100 INJECTION, SOLUTION INTRAVENOUS; SUBCUTANEOUS at 04:13

## 2021-01-01 RX ADMIN — LEVETIRACETAM 1000 MG: 500 TABLET ORAL at 11:35

## 2021-01-01 RX ADMIN — INSULIN LISPRO 2 UNITS: 100 INJECTION, SOLUTION INTRAVENOUS; SUBCUTANEOUS at 17:42

## 2021-01-01 RX ADMIN — HEPARIN SODIUM 5000 UNITS: 5000 INJECTION INTRAVENOUS; SUBCUTANEOUS at 15:43

## 2021-01-01 RX ADMIN — INSULIN LISPRO 5 UNITS: 100 INJECTION, SOLUTION INTRAVENOUS; SUBCUTANEOUS at 08:34

## 2021-01-01 RX ADMIN — LACOSAMIDE 200 MG: 100 TABLET, FILM COATED ORAL at 22:37

## 2021-01-01 RX ADMIN — INSULIN GLARGINE 18 UNITS: 100 INJECTION, SOLUTION SUBCUTANEOUS at 22:38

## 2021-01-01 RX ADMIN — LORAZEPAM 1 MG: 2 INJECTION INTRAMUSCULAR; INTRAVENOUS at 23:50

## 2021-01-01 RX ADMIN — LACOSAMIDE 200 MG: 100 TABLET, FILM COATED ORAL at 11:38

## 2021-01-01 RX ADMIN — LEVETIRACETAM 1000 MG: 500 TABLET ORAL at 23:01

## 2021-01-01 RX ADMIN — LACOSAMIDE 200 MG: 200 TABLET, FILM COATED ORAL at 21:15

## 2021-01-01 RX ADMIN — INSULIN LISPRO 4 UNITS: 100 INJECTION, SOLUTION INTRAVENOUS; SUBCUTANEOUS at 21:07

## 2021-01-01 RX ADMIN — INSULIN GLARGINE 25 UNITS: 100 INJECTION, SOLUTION SUBCUTANEOUS at 22:03

## 2021-01-01 RX ADMIN — SODIUM CHLORIDE: 234 INJECTION, SOLUTION, CONCENTRATE INTRAVENOUS at 21:39

## 2021-01-01 RX ADMIN — AMLODIPINE BESYLATE 5 MG: 5 TABLET ORAL at 10:07

## 2021-01-01 RX ADMIN — SODIUM CHLORIDE 100 ML/HR: 9 INJECTION, SOLUTION INTRAVENOUS at 12:00

## 2021-01-01 RX ADMIN — AMOXICILLIN 500 MG: 500 CAPSULE ORAL at 05:53

## 2021-01-01 RX ADMIN — LACOSAMIDE 200 MG: 100 TABLET, FILM COATED ORAL at 23:16

## 2021-01-01 RX ADMIN — HEPARIN SODIUM 5000 UNITS: 5000 INJECTION INTRAVENOUS; SUBCUTANEOUS at 09:30

## 2021-01-01 RX ADMIN — WHITE PETROLATUM,ZINC OXIDE: 57; 17 PASTE TOPICAL at 15:53

## 2021-01-01 RX ADMIN — INSULIN LISPRO 10 UNITS: 100 INJECTION, SOLUTION INTRAVENOUS; SUBCUTANEOUS at 17:31

## 2021-01-01 RX ADMIN — HEPARIN SODIUM 5000 UNITS: 5000 INJECTION INTRAVENOUS; SUBCUTANEOUS at 03:01

## 2021-01-01 RX ADMIN — PRAVASTATIN SODIUM 20 MG: 20 TABLET ORAL at 21:09

## 2021-01-01 RX ADMIN — SODIUM CHLORIDE 100 ML/HR: 9 INJECTION, SOLUTION INTRAVENOUS at 17:36

## 2021-01-01 RX ADMIN — HEPARIN SODIUM 5000 UNITS: 5000 INJECTION INTRAVENOUS; SUBCUTANEOUS at 19:38

## 2021-01-01 RX ADMIN — INSULIN LISPRO 4 UNITS: 100 INJECTION, SOLUTION INTRAVENOUS; SUBCUTANEOUS at 10:04

## 2021-01-01 RX ADMIN — HEPARIN SODIUM 5000 UNITS: 5000 INJECTION INTRAVENOUS; SUBCUTANEOUS at 15:31

## 2021-01-01 RX ADMIN — LEVETIRACETAM 1000 MG: 500 TABLET ORAL at 10:32

## 2021-01-01 RX ADMIN — DIVALPROEX SODIUM 500 MG: 125 CAPSULE, COATED PELLETS ORAL at 22:29

## 2021-01-01 RX ADMIN — LABETALOL HYDROCHLORIDE 10 MG: 5 INJECTION, SOLUTION INTRAVENOUS at 00:09

## 2021-01-01 RX ADMIN — HEPARIN SODIUM 5000 UNITS: 5000 INJECTION INTRAVENOUS; SUBCUTANEOUS at 02:55

## 2021-01-01 RX ADMIN — OLANZAPINE 2.5 MG: 2.5 TABLET, FILM COATED ORAL at 11:38

## 2021-03-13 NOTE — ED NOTES
Received report from night shift RN. Was advised Pt is autistic and was ready for discharge. Called brother to get him to come  Pt.

## 2021-03-13 NOTE — ED NOTES
Patient responding to verbal stimuli and following commands. Repsonding to questions and when asked if he felt better said \"yes\". Provider informed. Vitals stable, HR improved, oxygen maintaining >95% with no supplemental oxygen.

## 2021-03-13 NOTE — ED NOTES
During triaging patient in the room patient actively began seizing. Oxygen saturation dropped to high 80's and HR increased to 130's. . Oxygen applied. 2mg Ativan IV given, seizure ceased. 1g keppra IV given. Dr. Maris Mancini at bedside. Occupational Therapy  Visit Type: initial evaluation  Precautions:  Medical precautions:  fall risk; contact precautions.  The patient is a 81 year old female who was admitted to [facility] on 2/8/2021 with diarrhea.  She has h/o CVA with residual right sided weakness, CKD on peritoneal dialysis, spinal fusion.  Patient seen on 4n nursing unit.      Hearing: hard of hearing  Safety Measures: chair alarm    SUBJECTIVE                                                                                                            Patient agreed to participate in therapy this date.  \"My  helps me with everything.\"    Pain     At onset of session (out of 10): 0  RN informed on pain level      OBJECTIVE                                                                                                              Level of consciousness: alert (0/3 for STM)    Oriented to person, place and situation     Disoriented to time    Affect/Behavior: calm, cooperative and pleasant  Functional Communication/Cognition    Overall status:  Impaired  Range of Motion (measured in degrees unless otherwise indicated)  ROM Details: Limited AROM to R UE due to prior CVA.  Contracted at elbow and poor volitional mobility observed to shoulder/elbow/wrist and digits.  Pt explained her spouse does assist c AAROM/PROM exs to R UE.  L UE AROM WFL.    Transfers:      Sit to stand: minimal assist    Stand to sit: minimal assist    Training completed:    Tasks: sit to stand, stand to sit and stand pivot  Activities of Daily Living (ADLs):  Eating:     Assist: set up and minimal assist    Position: chair  Lower Body Dressing:     Assist: moderate assist    Position: chair and standing      Interventions                                                                                                                   Rehab Safety  Therapist donned the following PPE for this patient encounter:  Gloves, Surgical Mask and Face Shield    The  patient was wearing a mask during this session:  No    Therapist with patient for approx 23 minutes.        ASSESSMENT                                                                                                                Patient presents at baseline which was moderate assist with functional household mobility and activities of daily living.  For safe return to prior living situation the patient needs to be at a moderate assist/min a  level for ADL skills.      Patient is currently functioning at moderate assist/min a  for ADL skills and moderate assist/min a for IADL.  Pt c poor STM(0/3), 0x3.  Pt has care from her spouse c all BADL.  No further OT needs, pt performing at her baseline.        Discharge Recommendations    Recommendations for Discharge: OT IL: Patient requires 24 hour nonskilled assistance to perform mobility and/or ADLs safely                   Skilled therapy is not required due to performing at her baseline  Progress: improving as expected  Clinical decision making: Low - Patient has few limitations (1-3), comorbidities and/or complexities, as noted in problem focused assessment noted above, that impact their occupational profile.  Resulting in few treatment options and no task modification consistent with low clinical decision making complexity.    End of Session:   Safety measures: alarm system in place/re-engaged    PLAN                                                                                                                          Suggestions for next session as indicated:             Agreement to plan and goals: patient agrees with goals and treatment plan      Documented in the chart in the following areas: Prior Level of Function. Pain. Assessment. Plan. Patient Education.

## 2021-03-13 NOTE — ED TRIAGE NOTES
Patient arrived via EMS for a seizure complaint. Patient is currently postictal, patient was not given any medications. Patient has a hx of epilepsy.

## 2021-03-13 NOTE — ED PROVIDER NOTES
EMERGENCY DEPARTMENT HISTORY AND PHYSICAL EXAM 
 
 
Date: 3/12/2021 Patient Name: Celia Freitas History of Presenting Illness Chief Complaint Patient presents with  Seizure History Provided By: EMS, patient's brother [facility] [place] HPI: Celia Freitas, 70 y.o. male with a past medical history significant seizure, dm, htn presents to the ED with cc of seizure at home today. Patient has known seizure disorder, as per brother patient had several small seizures this evening, no generalized seizure, but was had noted shaking in extremities. Patient has been admitted in the past for status epilepticus, patient's brother called EMS, on arrival EMS noted no seizures, continued to be tremulous. No medication given on route. Blood glucose noted to be 220 in field. On arrival patient awake, not following commands, still  tremulous in extremities. Cannot obtain history from patient. There are no other complaints, changes, or physical findings at this time. PCP: Allison Sanchez MD 
 
Current Outpatient Medications Medication Sig Dispense Refill  lacosamide (Vimpat) 200 mg tab tablet Take 200 mg by mouth two (2) times a day.  levETIRAcetam 1,000 mg tablet Take 1,000 mg by mouth two (2) times a day.  OLANZapine (ZyPREXA) 2.5 mg tablet Take 2.5 mg by mouth two (2) times a day.  divalproex DR (DEPAKOTE) 500 mg tablet Take 500 mg by mouth two (2) times a day. Take two tablets twice daily  amLODIPine (NORVASC) 5 mg tablet Take 5 mg by mouth daily.  hydroCHLOROthiazide (HYDRODIURIL) 25 mg tablet Take 1 Tab by mouth Every morning. 90 Tab 2  
 docusate calcium (SURFAK) 240 mg capsule Take 1 Cap by mouth nightly. 90 Cap 2  
 metFORMIN (GLUCOPHAGE) 500 mg tablet Take 1 Tab by mouth two (2) times daily (with meals). 180 Tab 2  
 levothyroxine (SYNTHROID) 75 mcg tablet Take 1 Tab by mouth every morning. Take in early morning. 90 Tab 2  pravastatin (PRAVACHOL) 20 mg tablet TAKE 1 TABLET BY MOUTH EVERY DAY AT BEDTIME 90 Tab 1  
 losartan (COZAAR) 100 mg tablet TAKE 1 TABLET BY MOUTH EVERY DAY AS DIRECTED 90 Tab 1  Blood-Glucose Meter (ONETOUCH ULTRA2) monitoring kit Use to check blood glucose 3 times daily. Dx code: E11.65 1 Kit 0  
 glucose blood VI test strips (ONETOUCH VERIO) strip Use as directed to check blood sugars 3 times daily. DX Code: E11.65 100 Strip 11  
 ONETOUCH ULTRA BLUE TEST STRIP strip USE AS DIRECTED TO TEST 2-3 TIMES DAILY 100 Strip 0  
 TRUEPLUS LANCETS 30 gauge misc USE AS DIRECTED TWO TO THREE TIMES DAILY 100 Lancet 11  
 ONETOUCH ULTRA TEST strip USE AS DIRECTED TO TEST 2-3 TIMES DAILY 300 Strip 98  
 glucagon (GLUCAGON EMERGENCY) 1 mg injection 1 mg by IntraMUSCular route as needed. 1 mg 2 Past History Past Medical History: 
Past Medical History:  
Diagnosis Date  Acute renal failure ([facility] [address]  Anemia  Arthritis  DKA (diabetic ketoacidoses) ([facility] [address]  GERD (gastroesophageal reflux disease)  HTN (hypertension)  Hypercholesterolemia  Schizophrenia ([facility] [address]  Schizophreniform disorder ([facility] [address]  Seizure disorder ([facility] [address]  Type II or unspecified type diabetes mellitus without mention of complication, uncontrolled Past Surgical History: No past surgical history on file. Family History: 
Family History Problem Relation Age of Onset  Stroke Father Social History: 
Social History Tobacco Use  Smoking status: Never Smoker  Smokeless tobacco: Never Used Substance Use Topics  Alcohol use: No  
 Drug use: No  
 
 
Allergies: 
No Known Allergies Review of Systems Review of Systems Unable to perform ROS: Acuity of condition Physical Exam  
 
Physical Exam 
Vitals signs and nursing note reviewed. Constitutional:   
   Appearance: Normal appearance. He is normal weight. HENT:  
   Head: Normocephalic and atraumatic.   
   Nose: Nose normal.  
   Mouth/Throat:  
   Mouth: Mucous membranes are moist.  
Eyes:  
   Extraocular Movements: Extraocular movements intact. Pupils: Pupils are equal, round, and reactive to light. Neck: Musculoskeletal: Normal range of motion and neck supple. Cardiovascular:  
   Rate and Rhythm: Regular rhythm. Tachycardia present. Pulses: Normal pulses. Heart sounds: Normal heart sounds. Pulmonary:  
   Breath sounds: Normal breath sounds. Abdominal:  
   General: Abdomen is flat. Bowel sounds are normal.  
   Palpations: Abdomen is soft. Musculoskeletal: Normal range of motion. General: No swelling or tenderness. Skin: 
   General: Skin is warm and dry. Capillary Refill: Capillary refill takes less than 2 seconds. Neurological:  
   General: No focal deficit present. Mental Status: He is alert. Cranial Nerves: No cranial nerve deficit. Sensory: No sensory deficit. Motor: No weakness. Comments: tremulous Psychiatric:     
   Mood and Affect: Mood normal.  
 
 
 
Diagnostic Study Results Labs - Recent Results (from the past 12 hour(s)) CBC WITH AUTOMATED DIFF Collection Time: 03/12/21 11:15 PM  
Result Value Ref Range WBC 8.5 4.1 - 11.1 K/uL  
 RBC 4.02 (L) 4.10 - 5.70 M/uL  
 HGB 12.8 12.1 - 17.0 g/dL HCT 38.9 36.6 - 50.3 % MCV 96.8 80.0 - 99.0 FL  
 MCH 31.8 26.0 - 34.0 PG  
 MCHC 32.9 30.0 - 36.5 g/dL  
 RDW 12.9 11.5 - 14.5 % PLATELET 344 617 - 802 K/uL MPV 11.2 8.9 - 12.9 FL  
 NEUTROPHILS 66 32 - 75 % LYMPHOCYTES 26 12 - 49 % MONOCYTES 6 5 - 13 % EOSINOPHILS 1 0 - 7 % BASOPHILS 0 0 - 1 % IMMATURE GRANULOCYTES 1 (H) 0.0 - 0.5 % ABS. NEUTROPHILS 5.7 1.8 - 8.0 K/UL  
 ABS. LYMPHOCYTES 2.3 0.8 - 3.5 K/UL  
 ABS. MONOCYTES 0.5 0.0 - 1.0 K/UL  
 ABS. EOSINOPHILS 0.0 0.0 - 0.4 K/UL  
 ABS. BASOPHILS 0.0 0.0 - 0.1 K/UL  
 ABS. IMM. GRANS. 0.1 (H) 0.00 - 0.04 K/UL  
 DF AUTOMATED METABOLIC PANEL, COMPREHENSIVE  Collection Time: 03/12/21 11:15 PM  
Result Value Ref Range Sodium 140 136 - 145 mmol/L Potassium 3.8 3.5 - 5.1 mmol/L Chloride 107 97 - 108 mmol/L  
 CO2 28 21 - 32 mmol/L Anion gap 5 5 - 15 mmol/L Glucose 160 (H) 65 - 100 mg/dL BUN 8 6 - 20 mg/dL Creatinine 0.66 (L) 0.70 - 1.30 mg/dL BUN/Creatinine ratio 12 12 - 20 GFR est AA >60 >60 ml/min/1.73m2 GFR est non-AA >60 >60 ml/min/1.73m2 Calcium 7.0 (L) 8.5 - 10.1 mg/dL Bilirubin, total 0.2 0.2 - 1.0 mg/dL AST (SGOT) 39 (H) 15 - 37 U/L  
 ALT (SGPT) 16 12 - 78 U/L Alk. phosphatase 53 45 - 117 U/L Protein, total 6.4 6.4 - 8.2 g/dL Albumin 2.7 (L) 3.5 - 5.0 g/dL Globulin 3.7 2.0 - 4.0 g/dL A-G Ratio 0.7 (L) 1.1 - 2.2 Radiologic Studies -  
[unfilled] CT Results  (Last 48 hours) None CXR Results  (Last 48 hours) None Medical Decision Making and ED Course I am the first provider for this patient. I reviewed the vital signs, available nursing notes, past medical history, past surgical history, family history and social history. Vital Signs-Reviewed the patient's vital signs. Patient Vitals for the past 12 hrs: 
 Temp Pulse Resp BP SpO2  
03/13/21 0736  89 15 (!) 142/75 98 % 03/13/21 0306 97.9 °F (36.6 °C)      
03/13/21 0203  98 16 (!) 169/91 97 % 03/12/21 2336  (!) 112 28  94 % 03/12/21 2256 100.2 °F (37.9 °C) (!) 104 19 (!) 167/89 96 % EKG interpretation: (Preliminary) completed 2319, interpreted 2320 by myself Sinus tach 114, no ST elevations, left axis deviation intermittent poor baseline due to tremulousness Records Reviewed: Nursing Notes and Old Medical Records The patient presents with seizure with a differential diagnosis of breakthrough seizure, electrolyte abnormality, noncompliance with medication, status epilepticus Provider Notes (Medical Decision Making): MDM  
53-year-old male, past medical history of seizure disorder, schizophreniform disorder, intellectual delay, presents emergency department from home due to seizure activity at home. On arrival patient awake, nonverbal, tremulous in extremities, not providing history. Physical exam shows nonverbal male, stable vitals, afebrile, tachycardic. Not following commands, however opens eyes and pays attention to voice. Immediately after evaluation patient suddenly became less responsive, tremors increased and heart rate increased to 130. Assumed patient having a generalized seizure, ativan 2mg IV ordered, patient continued to be tremulous, keppra 1gm IV ordered. Patient ED Course:  
Initial assessment performed. The patients presenting problems have been discussed, and they are in agreement with the care plan formulated and outlined with them. I have encouraged them to ask questions as they arise throughout their visit. ED Course as of Mar 13 0821 Sat Mar 13, 2021  
0136 Reassessed, sleeping, vital signs have normalized, heart rate ,   
 [PZ] 0203 Patient awake alert, responding to questions now, at this time suspect that patient may have not taken a dose of antiepileptic medications, or had a breakthrough seizure, will continue to observe do not necessarily feel patient requires admission at this time. [PZ] 3822 Patient observed in ER for several hours no seizure-like activities continues to be resting comfortably, when awoken and spoken to. At this point feel that patient is safe to be discharged home. [PZ] ED Course User Index [PZ] Yolanda Escoto MD  
 
 
 
Procedures Lanie Judd MD 
Procedures Disposition Discharge Remove if not discharged DISCHARGE PLAN: 
1. Current Discharge Medication List  
  
CONTINUE these medications which have NOT CHANGED Details  
lacosamide (Vimpat) 200 mg tab tablet Take 200 mg by mouth two (2) times a day. levETIRAcetam 1,000 mg tablet Take 1,000 mg by mouth two (2) times a day. OLANZapine (ZyPREXA) 2.5 mg tablet Take 2.5 mg by mouth two (2) times a day. divalproex DR (DEPAKOTE) 500 mg tablet Take 500 mg by mouth two (2) times a day. Take two tablets twice daily  
  
amLODIPine (NORVASC) 5 mg tablet Take 5 mg by mouth daily. hydroCHLOROthiazide (HYDRODIURIL) 25 mg tablet Take 1 Tab by mouth Every morning. Qty: 90 Tab, Refills: 2  
  
docusate calcium (SURFAK) 240 mg capsule Take 1 Cap by mouth nightly. Qty: 90 Cap, Refills: 2  
  
metFORMIN (GLUCOPHAGE) 500 mg tablet Take 1 Tab by mouth two (2) times daily (with meals). Qty: 180 Tab, Refills: 2  
  
levothyroxine (SYNTHROID) 75 mcg tablet Take 1 Tab by mouth every morning. Take in early morning. Qty: 90 Tab, Refills: 2  
  
pravastatin (PRAVACHOL) 20 mg tablet TAKE 1 TABLET BY MOUTH EVERY DAY AT BEDTIME Qty: 90 Tab, Refills: 1  
  
losartan (COZAAR) 100 mg tablet TAKE 1 TABLET BY MOUTH EVERY DAY AS DIRECTED Qty: 90 Tab, Refills: 1 Comments: **Patient requests 90 days supply** Blood-Glucose Meter (ONETOUCH ULTRA2) monitoring kit Use to check blood glucose 3 times daily. Dx code: E11.65 Qty: 1 Kit, Refills: 0 Associated Diagnoses: Type 2 diabetes mellitus with hyperglycemia, with long-term current use of insulin ([address] ! ! glucose blood VI test strips (ONETOUCH VERIO) strip Use as directed to check blood sugars 3 times daily. DX Code: E11.65 Qty: 100 Strip, Refills: 11  
 Associated Diagnoses: Hypothyroidism due to acquired atrophy of thyroid; Type 2 diabetes mellitus with hyperglycemia, unspecified whether long term insulin use ([facility])  
  
!! ONETOUCH ULTRA BLUE TEST STRIP strip USE AS DIRECTED TO TEST 2-3 TIMES DAILY Qty: 100 Strip, Refills: 0  
  
TRUEPLUS LANCETS 30 gauge misc USE AS DIRECTED TWO TO THREE TIMES DAILY Qty: 100 Lancet, Refills: 11  
  
!! ONETOUCH ULTRA TEST strip USE AS DIRECTED TO TEST 2-3 TIMES DAILY Qty: 300 Strip, Refills: 98  
 Comments: **Patient requests 90 days supply**  
 glucagon (GLUCAGON EMERGENCY) 1 mg injection 1 mg by IntraMUSCular route as needed. Qty: 1 mg, Refills: 2 Associated Diagnoses: DM (diabetes mellitus) ([address] ! ! - Potential duplicate medications found. Please discuss with provider. 2.  
Follow-up Information Follow up With Specialties Details Why Contact Info Eloy Hanley MD Internal Medicine In 3 days As needed [address] [facility] [address] [id number] [phone] [facility] EMERGENCY DEPT Emergency Medicine  If symptoms worsen [address] [address] [phone] 3. Return to ED if worse Diagnosis Clinical Impression: 1. Breakthrough seizure ([facility] [address] Attestations: 
 
Vikash Pool MD 
 
Please note that this dictation was completed with [employer], the computer voice recognition software. Quite often unanticipated grammatical, syntax, homophones, and other interpretive errors are inadvertently transcribed by the computer software. Please disregard these errors. Please excuse any errors that have escaped final proofreading. Thank you.

## 2021-05-18 PROBLEM — G93.40 ACUTE ENCEPHALOPATHY: Status: ACTIVE | Noted: 2021-01-01

## 2021-05-18 NOTE — ED PROVIDER NOTES
EMERGENCY DEPARTMENT HISTORY AND PHYSICAL EXAM 
 
 
Date: 5/18/2021 Patient Name: Feliberto Corea History of Presenting Illness Chief Complaint Patient presents with  Unresponsive History Provided By: EMS 
 
HPI: Feliberto Corea, 70 y.o. male presents to the ED with cc of Chief Complaint Patient presents with  Unresponsive Patient was brought by EMS with complaint of unresponsiveness and possible seizure activity, history of diabetes and seizure activity in the past also possible history of narcotic use in the past, Narcan was given in route with no response, patient responsive to painful stimuli There are no other complaints, changes, or physical findings at this time. PCP: Cele Paz MD 
 
No current facility-administered medications on file prior to encounter. Current Outpatient Medications on File Prior to Encounter Medication Sig Dispense Refill  amLODIPine (NORVASC) 5 mg tablet TAKE 1 TABLET BY MOUTH EVERY DAY IN THE MORNING 90 Tab 1  
 lacosamide (Vimpat) 200 mg tab tablet Take 200 mg by mouth two (2) times a day.  levETIRAcetam 1,000 mg tablet Take 1,000 mg by mouth two (2) times a day.  OLANZapine (ZyPREXA) 2.5 mg tablet Take 2.5 mg by mouth two (2) times a day.  divalproex DR (DEPAKOTE) 500 mg tablet Take 500 mg by mouth two (2) times a day. Take two tablets twice daily  hydroCHLOROthiazide (HYDRODIURIL) 25 mg tablet Take 1 Tab by mouth Every morning. 90 Tab 2  
 metFORMIN (GLUCOPHAGE) 500 mg tablet Take 1 Tab by mouth two (2) times daily (with meals). 180 Tab 2  
 levothyroxine (SYNTHROID) 75 mcg tablet Take 1 Tab by mouth every morning. Take in early morning. 90 Tab 2  
 [DISCONTINUED] docusate calcium (SURFAK) 240 mg capsule Take 1 Cap by mouth nightly. 90 Cap 2  
 [DISCONTINUED] pravastatin (PRAVACHOL) 20 mg tablet TAKE 1 TABLET BY MOUTH EVERY DAY AT BEDTIME 90 Tab 1  [DISCONTINUED] losartan (COZAAR) 100 mg tablet TAKE 1 TABLET BY MOUTH EVERY DAY AS DIRECTED 90 Tab 1  [DISCONTINUED] Blood-Glucose Meter (ONETOUCH ULTRA2) monitoring kit Use to check blood glucose 3 times daily. Dx code: E11.65 1 Kit 0  
 [DISCONTINUED] glucose blood VI test strips (ONETOUCH VERIO) strip Use as directed to check blood sugars 3 times daily. DX Code: E11.65 100 Strip 11  
 [DISCONTINUED] ONETOUCH ULTRA BLUE TEST STRIP strip USE AS DIRECTED TO TEST 2-3 TIMES DAILY 100 Strip 0  
 [DISCONTINUED] TRUEPLUS LANCETS 30 gauge misc USE AS DIRECTED TWO TO THREE TIMES DAILY 100 Lancet 11  
 [DISCONTINUED] ONETOUCH ULTRA TEST strip USE AS DIRECTED TO TEST 2-3 TIMES DAILY 300 Strip 98  [DISCONTINUED] glucagon (GLUCAGON EMERGENCY) 1 mg injection 1 mg by IntraMUSCular route as needed. 1 mg 2 Past History Past Medical History: 
Past Medical History:  
Diagnosis Date  Acute renal failure ([facility] [address]  Anemia  Arthritis  DKA (diabetic ketoacidoses) ([facility] [address]  GERD (gastroesophageal reflux disease)  HTN (hypertension)  Hypercholesterolemia  Schizophrenia ([facility] [address]  Schizophreniform disorder ([facility] [address]  Seizure disorder ([facility] [address]  Type II or unspecified type diabetes mellitus without mention of complication, uncontrolled Past Surgical History: No past surgical history on file. Family History: 
Family History Problem Relation Age of Onset  Stroke Father Social History: 
Social History Tobacco Use  Smoking status: Never Smoker  Smokeless tobacco: Never Used Substance Use Topics  Alcohol use: No  
 Drug use: No  
 
 
Allergies: 
No Known Allergies Review of Systems Review of Systems Unable to perform ROS: Mental status change Physical Exam  
Physical Exam 
Vitals signs and nursing note reviewed. Constitutional:   
   Appearance: Normal appearance. He is obese. HENT:  
   Head: Normocephalic and atraumatic. Nose: No congestion or rhinorrhea.   
Eyes:  
   Pupils: Pupils are equal, round, and reactive to light. Neck: Musculoskeletal: Normal range of motion and neck supple. Cardiovascular:  
   Rate and Rhythm: Regular rhythm. Tachycardia present. Pulmonary:  
   Effort: Pulmonary effort is normal.  
   Breath sounds: Normal breath sounds. Abdominal:  
   General: Abdomen is flat. Bowel sounds are normal. There is no distension. Tenderness: There is no abdominal tenderness. There is no guarding. Musculoskeletal: Normal range of motion. Skin: 
   General: Skin is warm and dry. Neurological:  
   General: No focal deficit present. Mental Status: He is alert. Comments: Respond to painful stimuli, not responsive to verbal commands Diagnostic Study Results Labs - Recent Results (from the past 12 hour(s)) CBC WITH AUTOMATED DIFF Collection Time: 05/18/21  2:00 PM  
Result Value Ref Range WBC 10.7 4.1 - 11.1 K/uL  
 RBC 5.02 4.10 - 5.70 M/uL  
 HGB 16.0 12.1 - 17.0 g/dL HCT 51.3 (H) 36.6 - 50.3 % .2 (H) 80.0 - 99.0 FL  
 MCH 31.9 26.0 - 34.0 PG  
 MCHC 31.2 30.0 - 36.5 g/dL  
 RDW 13.9 11.5 - 14.5 % PLATELET 690 201 - 025 K/uL MPV 12.0 8.9 - 12.9 FL  
 NRBC 0.3 (H) 0.0  WBC ABSOLUTE NRBC 0.03 (H) 0.00 - 0.01 K/uL NEUTROPHILS 81 (H) 32 - 75 % LYMPHOCYTES 12 12 - 49 % MONOCYTES 7 5 - 13 % EOSINOPHILS 0 0 - 7 % BASOPHILS 0 0 - 1 % IMMATURE GRANULOCYTES 0 0 - 0.5 % ABS. NEUTROPHILS 8.6 (H) 1.8 - 8.0 K/UL  
 ABS. LYMPHOCYTES 1.3 0.8 - 3.5 K/UL  
 ABS. MONOCYTES 0.8 0.0 - 1.0 K/UL  
 ABS. EOSINOPHILS 0.0 0.0 - 0.4 K/UL  
 ABS. BASOPHILS 0.0 0.0 - 0.1 K/UL  
 ABS. IMM. GRANS. 0.0 0.00 - 0.04 K/UL  
 DF AUTOMATED METABOLIC PANEL, COMPREHENSIVE Collection Time: 05/18/21  2:00 PM  
Result Value Ref Range Sodium 160 (H) 136 - 145 mmol/L Potassium 3.7 3.5 - 5.1 mmol/L Chloride 125 (H) 97 - 108 mmol/L  
 CO2 26 21 - 32 mmol/L Anion gap 9 5 - 15 mmol/L  Glucose 632 (HH) 65 - 100 mg/dL  
 BUN 35 (H) 6 - 20 mg/dL Creatinine 1.75 (H) 0.70 - 1.30 mg/dL BUN/Creatinine ratio 20 12 - 20 GFR est AA 47 (L) >60 ml/min/1.73m2 GFR est non-AA 38 (L) >60 ml/min/1.73m2 Calcium 10.0 8.5 - 10.1 mg/dL Bilirubin, total 0.5 0.2 - 1.0 mg/dL AST (SGOT) 48 (H) 15 - 37 U/L  
 ALT (SGPT) 26 12 - 78 U/L Alk. phosphatase 65 45 - 117 U/L Protein, total 8.2 6.4 - 8.2 g/dL Albumin 3.6 3.5 - 5.0 g/dL Globulin 4.6 (H) 2.0 - 4.0 g/dL A-G Ratio 0.8 (L) 1.1 - 2.2    
TROPONIN I Collection Time: 05/18/21  2:00 PM  
Result Value Ref Range Troponin-I, Qt. 0.14 (H) <0.05 ng/mL BNP Collection Time: 05/18/21  2:00 PM  
Result Value Ref Range NT pro- (H) <125 pg/mL EKG, 12 LEAD, INITIAL Collection Time: 05/18/21  2:06 PM  
Result Value Ref Range Ventricular Rate 99 BPM  
 Atrial Rate 99 BPM  
 P-R Interval 122 ms QRS Duration 98 ms Q-T Interval 376 ms QTC Calculation (Bezet) 482 ms Calculated P Axis 67 degrees Calculated R Axis -39 degrees Calculated T Axis 50 degrees Diagnosis Normal sinus rhythm Left axis deviation Nonspecific ST abnormality Prolonged QT Abnormal ECG When compared with ECG of 12-MAR-2021 23:19, 
ST now depressed in Anterior leads T wave inversion no longer evident in Inferior leads T wave inversion no longer evident in Anterior leads Confirmed by Edward Bonilla [phone]) on 5/18/2021 2:13:11 PM 
  
GLUCOSE, POC Collection Time: 05/18/21  4:22 PM  
Result Value Ref Range Glucose (POC) 446 (H) 65 - 117 mg/dL Performed by Jose Antonio Olivo, POC Collection Time: 05/18/21  5:12 PM  
Result Value Ref Range Glucose (POC) 442 (H) 65 - 117 mg/dL Performed by Ramos Vance   
TROPONIN I Collection Time: 05/18/21  5:30 PM  
Result Value Ref Range Troponin-I, Qt. 0.22 (H) <0.05 ng/mL LACTIC ACID Collection Time: 05/18/21  5:30 PM  
Result Value Ref Range  Lactic acid 2.0 0.4 - 2.0 mmol/L GLUCOSE, POC Collection Time: 05/18/21  6:36 PM  
Result Value Ref Range Glucose (POC) 443 (H) 65 - 117 mg/dL Performed by Jennifer Alicia Labs reviewed by me Radiologic Studies -  
XR CHEST PORT Final Result Underexpanded lungs with bibasilar atelectasis. XR CHEST PORT Final Result No acute pulmonary process. CT HEAD WO CONT Final Result No acute intracranial process. Other findings as above. XR PELV 3 V Final Result No evidence of fracture. CT Results  (Last 48 hours) 05/18/21 1455  CT HEAD WO CONT Final result Impression:  No acute intracranial process. Other findings as above. Narrative:  CT head, 5/18/2021 History: Unresponsive. Technique: No intravenous contrast was administered. Multiple contiguous axial  
images were acquired from the vertex to the skull base. Coronal and sagittal  
reconstruction was made. All CT scans at this facility are performed using dose reduction optimization  
techniques as appropriate to perform the exam including the following: Automated  
exposure control, adjustments of the mA and/or kV according to patient size, or  
use iterative reconstruction technique. Comparison: Including CT-6/18/2020. Findings:  Cortical and cerebellar volume loss and associated ventricular system  
dilatation are stable. Gray-white differentiation is preserved. No acute  
intracranial hemorrhage or midline shift is identified. The calvarium is intact. The orbits and globes are intact. There is trace opacification of the left  
maxillary sinus. There is no specific CT evidence of acute sinusitis. The  
remainder of the visualized paranasal sinuses and mastoid air cells are clear. CXR Results  (Last 48 hours) 05/18/21 1645  XR CHEST PORT Final result Impression:  Underexpanded lungs with bibasilar atelectasis.   
   
  
 Narrative: Chest, frontal view, 5/18/2021 History: Aspiration. Comparison: Including chest, same day. Findings: The cardiac silhouette is within normal limits. The lungs are  
underexpanded with bibasilar atelectasis. No hydrostatic edema is present. No  
pleural effusions or pneumothorax is identified. The osseous structures are  
stable. 05/18/21 1617  XR CHEST PORT Final result Impression:  No acute pulmonary process. Narrative:  Chest, frontal view, 5/18/2021 History: Altered mental status. Comparison: Including chest 6/24/2019. Findings: The cardiac silhouette is within normal limits. The lungs are under  
expanded. No hydrostatic edema is present. No focal consolidation, pleural  
effusions or pneumothorax is identified. No acute osseous findings are  
definitively seen. Medical Decision Making I am the first provider for this patient. I reviewed the vital signs, available nursing notes, past medical history, past surgical history, family history and social history. RADIOLOGY report and LABS reviewed by me Vital Signs-Reviewed the patient's vital signs. Patient Vitals for the past 12 hrs: 
 Temp Pulse Resp BP SpO2  
05/18/21 1813 97.6 °F (36.4 °C) 89 16 133/72 98 % 05/18/21 1726 98.2 °F (36.8 °C) 92 20 97/61 96 % 05/18/21 1630 98.4 °F (36.9 °C) 98 16 128/69 96 % 05/18/21 1515 98.6 °F (37 °C) 97 16 138/65 96 % 05/18/21 1435     96 % 05/18/21 1411  100 16 133/70 97 % 05/18/21 1346 98.8 °F (37.1 °C) (!) 101 22 124/61 94 % EKG interpretation: (Preliminary) Records Reviewed: Nurse's note. Provider Notes (Medical Decision Making): 
 
Patient presents with DIFF DX : Seizure, hypoglycemia, dehydration ED Course:  
Initial assessment performed. The patients presenting problems have been discussed, and they are in agreement with the care plan formulated and outlined with them.   I have encouraged them to ask questions as they arise throughout their visit. CRITICAL CARE NOTE : 
3:25 PM 
Amount of Critical Care Time: ___60_(minutes)__ IMPENDING DETERIORATION -Airway, Respiratory and CNS 
ASSOCIATED RISK FACTORS - Dysrhythmia, Metabolic changes, Dehydration and CNS Decompensation MANAGEMENT- Bedside Assessment INTERPRETATION -  Xrays, CT Scan and ECG INTERVENTIONS - Neurologic interventions CASE REVIEW - Hospitalist/Intensivist 
TREATMENT RESPONSE -Stable PERFORMED BY - Self NOTES   : 
I have spent critical care time involved in lab review, consultations with specialist, family decision- making, bedside attention and documentation. This time excludes time spent in any separate billed procedures. During this entire length of time I was immediately available to the patient . Miguelangel Yun MD 
 
 
 
 
 
 
 
 
TREATMENT RESPONSE -Stable Miguelangel Yun MD 
 
 
Disposition: 
Admitted Diagnostic tests were reviewed and questions answered. Diagnosis, care plan and treatment options were discussed. The patient understand instructions and will follow up as directed. Condition stable Admitting Provider: 
Radha Woo MD  
 
Consulting Provider: No ref. provider found DISCHARGE PLAN: 
1. Current Discharge Medication List  
  
 
2. Follow-up Information None 3. Return to ED if worse Diagnosis Clinical Impression: ICD-10-CM ICD-9-CM 1. Altered mental status, unspecified altered mental status type  R41.82 780.97 2. Seizure ([address]  R56.9 780.39   
3. Hyperglycemia  R73.9 790.29   
4. Atresia of external auditory canal, vertical talus, and hypertelorism syndrome  Q87.89 759.89   
 Q16.1 744.02   
 Q66.80 754.61   
 Q75.2 756.0 5. Dehydration  E86.0 276.51   
6. Acute renal failure, unspecified acute renal failure type ([address]  N17.9 584.9 Attestations: 
 
Miguelangel Yun MD 
 
Please note that this dictation was completed with Pedro the computer voice recognition software. Quite often unanticipated grammatical, syntax, homophones, and other interpretive errors are inadvertently transcribed by the computer software. Please disregard these errors. Please excuse any errors that have escaped final proofreading. Thank you.

## 2021-05-18 NOTE — Clinical Note
Status[de-identified] INPATIENT [101]   Type of Bed: Remote Telemetry [29]   Cardiac Monitoring Required?: No   Inpatient Hospitalization Certified Necessary for the Following Reasons: 9.  Other (further clarification in H&P documentation)   Admitting Diagnosis: Acute encephalopathy [[id number]]   Admitting Physician: Arlene Garcia [[id number]]   Attending Physician: Arlene Garcia [[id number]]   Estimated Length of Stay: 3-4 Midnights   Discharge Plan[de-identified] Extended Care Facility (e.g. Adult Home, Nursing Home, etc.)

## 2021-05-18 NOTE — ED NOTES
Discussed patients condition with dr. Lisbet Headley, elevated blood glucose, elevated trop, and sodium level, and pt continues to be altered at this time.  switched pt to ICU admission. Notified bed coordinator.

## 2021-05-18 NOTE — PROGRESS NOTES
5/18/21. CM called contact [facility] Bryan Loud @ [phone]) - message left to return call to CM/ED. Unable to interview pt - unresponsive.

## 2021-05-18 NOTE — H&P
History and Physical 
 
Subjective: Chief Complaint : unresponsive  Since 1 days Source of information : EMS/ ER. History of present illness:  
 
75F, h/o seizures, DMII on oral meds, schizophrenia with unresponsiveness since 1 day He is from a group home and patient is unresponsive, so history is unclear Gradual, wprsening, constant, drosy but waking up to painful stimuli ER: seizure s/p keppra and ativan. She also received opiods for possible opiod overdose. Also, Na 160 with sugar 640. On 4L NC 
       CT head negative for IC bleed XR pelvis to fracture Denies any diarrhea, fever, chills, falls, prior to arrival 
 
Past Medical History:  
Diagnosis Date  Acute renal failure ([facility] [address]  Anemia  Arthritis  DKA (diabetic ketoacidoses) ([facility] [address]  GERD (gastroesophageal reflux disease)  HTN (hypertension)  Hypercholesterolemia  Schizophrenia ([facility] [address]  Schizophreniform disorder ([facility] [address]  Seizure disorder ([facility] [address]  Type II or unspecified type diabetes mellitus without mention of complication, uncontrolled No past surgical history on file. Family History Problem Relation Age of Onset  Stroke Father Social History Tobacco Use  Smoking status: Never Smoker  Smokeless tobacco: Never Used Substance Use Topics  Alcohol use: No  
   
Prior to Admission medications Medication Sig Start Date End Date Taking? Authorizing Provider  
amLODIPine (NORVASC) 5 mg tablet TAKE 1 TABLET BY MOUTH EVERY DAY IN THE MORNING 5/16/21   Esteban Saldana MD  
lacosamide (Vimpat) 200 mg tab tablet Take 200 mg by mouth two (2) times a day. Provider, Historical  
levETIRAcetam 1,000 mg tablet Take 1,000 mg by mouth two (2) times a day. Provider, Historical  
OLANZapine (ZyPREXA) 2.5 mg tablet Take 2.5 mg by mouth two (2) times a day.     Provider, Historical  
divalproex DR (DEPAKOTE) 500 mg tablet Take 500 mg by mouth two (2) times a day. Take two tablets twice daily    Provider, Camilla  
hydroCHLOROthiazide (HYDRODIURIL) 25 mg tablet Take 1 Tab by mouth Every morning. 12/30/20   Davie Posey MD  
docusate calcium (SURFAK) 240 mg capsule Take 1 Cap by mouth nightly. 12/30/20   Davie Posey MD  
metFORMIN (GLUCOPHAGE) 500 mg tablet Take 1 Tab by mouth two (2) times daily (with meals). 12/30/20   Davie Posey MD  
levothyroxine (SYNTHROID) 75 mcg tablet Take 1 Tab by mouth every morning. Take in early morning. 12/30/20   Davie Posey MD  
pravastatin (PRAVACHOL) 20 mg tablet TAKE 1 TABLET BY MOUTH EVERY DAY AT BEDTIME 11/9/20   Davie Posey MD  
losartan (COZAAR) 100 mg tablet TAKE 1 TABLET BY MOUTH EVERY DAY AS DIRECTED 11/9/20   Davie Posey MD  
Blood-Glucose Meter [facility] ULTRA2) monitoring kit Use to check blood glucose 3 times daily. Dx code: E11.65 11/14/18   Allegra Scott MD  
glucose blood VI test strips (ONETOUCH VERIO) strip Use as directed to check blood sugars 3 times daily. DX Code: E11.65 6/15/18   Allegra Scott MD  
ONETOUCH ULTRA BLUE TEST STRIP strip USE AS DIRECTED TO TEST 2-3 TIMES DAILY 5/28/18   Allegra Scott MD  
TRUEPLUS LANCETS 30 gauge misc USE AS DIRECTED TWO TO THREE TIMES DAILY 4/23/17   Allegra Scott MD  
ONETOUCH ULTRA TEST strip USE AS DIRECTED TO TEST 2-3 TIMES DAILY 9/19/16   Allegra Scott MD  
glucagon (GLUCAGON EMERGENCY) 1 mg injection 1 mg by IntraMUSCular route as needed. 2/25/13   Allegra Scott MD  
 
No Known Allergies Review of Systems: 
Unable to perform ROS due to acute encephaloapthy Vitals:  
 
Visit Vitals /70 Pulse 100 Temp 98.8 °F (37.1 °C) Resp 16 Ht 5' 9\" (1.753 m) Wt 120.2 kg (265 lb) SpO2 96% BMI 39.13 kg/m² Physical Exam 
Vitals signs and nursing note reviewed. Constitutional:   
   Appearance: Normal appearance. He is obese. HENT:  
   Head: Normocephalic and atraumatic.   
   Nose: No congestion or rhinorrhea. Eyes:  
   Pupils: Pupils are equal, round, and reactive to light. Neck: Musculoskeletal: Normal range of motion and neck supple. Cardiovascular:  
   Rate and Rhythm: Regular rhythm. Tachycardia present. Pulmonary:  
   Effort: Pulmonary effort is normal.  
   Breath sounds: Normal breath sounds. Abdominal:  
   General: Abdomen is flat. Bowel sounds are normal. There is no distension. Tenderness: There is no abdominal tenderness. There is no guarding. Musculoskeletal: Normal range of motion. Skin: 
   General: Skin is warm and dry. Neurological:  
   General: No focal deficit present. Mental Status: He is alert. Comments: Respond to painful stimuli, not responsive to verbal commands Data Review:  
Recent Results (from the past 24 hour(s)) CBC WITH AUTOMATED DIFF Collection Time: 05/18/21  2:00 PM  
Result Value Ref Range WBC 10.7 4.1 - 11.1 K/uL  
 RBC 5.02 4.10 - 5.70 M/uL  
 HGB 16.0 12.1 - 17.0 g/dL HCT 51.3 (H) 36.6 - 50.3 % .2 (H) 80.0 - 99.0 FL  
 MCH 31.9 26.0 - 34.0 PG  
 MCHC 31.2 30.0 - 36.5 g/dL  
 RDW 13.9 11.5 - 14.5 % PLATELET 914 831 - 888 K/uL MPV 12.0 8.9 - 12.9 FL  
 NRBC 0.3 (H) 0.0  WBC ABSOLUTE NRBC 0.03 (H) 0.00 - 0.01 K/uL NEUTROPHILS 81 (H) 32 - 75 % LYMPHOCYTES 12 12 - 49 % MONOCYTES 7 5 - 13 % EOSINOPHILS 0 0 - 7 % BASOPHILS 0 0 - 1 % IMMATURE GRANULOCYTES 0 0 - 0.5 % ABS. NEUTROPHILS 8.6 (H) 1.8 - 8.0 K/UL  
 ABS. LYMPHOCYTES 1.3 0.8 - 3.5 K/UL  
 ABS. MONOCYTES 0.8 0.0 - 1.0 K/UL  
 ABS. EOSINOPHILS 0.0 0.0 - 0.4 K/UL  
 ABS. BASOPHILS 0.0 0.0 - 0.1 K/UL  
 ABS. IMM. GRANS. 0.0 0.00 - 0.04 K/UL  
 DF AUTOMATED METABOLIC PANEL, COMPREHENSIVE Collection Time: 05/18/21  2:00 PM  
Result Value Ref Range Sodium 160 (H) 136 - 145 mmol/L Potassium 3.7 3.5 - 5.1 mmol/L Chloride 125 (H) 97 - 108 mmol/L  
 CO2 26 21 - 32 mmol/L  Anion gap 9 5 - 15 mmol/L Glucose 632 (HH) 65 - 100 mg/dL BUN 35 (H) 6 - 20 mg/dL Creatinine 1.75 (H) 0.70 - 1.30 mg/dL BUN/Creatinine ratio 20 12 - 20 GFR est AA 47 (L) >60 ml/min/1.73m2 GFR est non-AA 38 (L) >60 ml/min/1.73m2 Calcium 10.0 8.5 - 10.1 mg/dL Bilirubin, total 0.5 0.2 - 1.0 mg/dL AST (SGOT) 48 (H) 15 - 37 U/L  
 ALT (SGPT) 26 12 - 78 U/L Alk. phosphatase 65 45 - 117 U/L Protein, total 8.2 6.4 - 8.2 g/dL Albumin 3.6 3.5 - 5.0 g/dL Globulin 4.6 (H) 2.0 - 4.0 g/dL A-G Ratio 0.8 (L) 1.1 - 2.2    
TROPONIN I Collection Time: 05/18/21  2:00 PM  
Result Value Ref Range Troponin-I, Qt. 0.14 (H) <0.05 ng/mL BNP Collection Time: 05/18/21  2:00 PM  
Result Value Ref Range NT pro- (H) <125 pg/mL EKG, 12 LEAD, INITIAL Collection Time: 05/18/21  2:06 PM  
Result Value Ref Range Ventricular Rate 99 BPM  
 Atrial Rate 99 BPM  
 P-R Interval 122 ms QRS Duration 98 ms Q-T Interval 376 ms QTC Calculation (Bezet) 482 ms Calculated P Axis 67 degrees Calculated R Axis -39 degrees Calculated T Axis 50 degrees Diagnosis Normal sinus rhythm Left axis deviation Nonspecific ST abnormality Prolonged QT Abnormal ECG When compared with ECG of 12-MAR-2021 23:19, 
ST now depressed in Anterior leads T wave inversion no longer evident in Inferior leads T wave inversion no longer evident in Anterior leads Confirmed by Silke Dc [phone]) on 5/18/2021 2:13:11 PM 
  
 
 
 
 
 
Assessment and Plan : (1) Acute encephalopathy : GCS 13. Metabolic and seizure. s/t hypernatremia Start 0.22% saline, strict ACCU control. S/p ativan and keppra. Continue keppra, lacosamide and consult neurology. I am going to hold olanzapine for today. (2) Acute hypoxic respiratory failure: likely central with ativan/ keppra. Will order XR chest for any aspiration. (3) hypernatremia: 0.22 saline. Repeat at 2100 hours and then in AM. Strict sugar control (4) ANDRES: continue IVF. (5) DMII: complicates #1. SSI. Hold metformin 
 
(6) HTN: hold amlodipine, HCTZ, losartan. Resume as BP tolerates 
 
(7) HLD: pravastatin (8) Schizophrenia: olanzipine. Will hold today. (9) increased troponin: type II. Repeat Hussein. ECHO  
 
DVT ppx: heparin subQ DISPO: PT once alert. Likely 3-4 days of stay. But would need rehab at the least 
 
 
Signed By: Caleb Valdovinos MD   
 May 18, 2021

## 2021-05-18 NOTE — CONSULTS
Neurology Consult Note HPI Mr. Colt Foote is a 70 y.o.  male with a history significant for Seizures, Schizophrenia, HTN, HL, DM2, GERD who presented with decreased responsiveness. Per chart review, patient reportedly has been having decreased responsiveness and confusion for about a day prior to presentation which has slowly been worsening. In the ED, there was concern for possible seizure activity for which patient was treated with IV lorazepam and loaded with IV levetiracetam.  Patient was also given naloxone for concern for opioid overdose. Emergent CT head was negative for any acute findings. On initial workup, patient with sodium of 160, glucose of 632, creatinine of 1.75 and increasing troponins from 0.14 to 0.22. Patient lethargic and confused on examination but able to follow central peripheral commands throughout. No receptive aphasia but difficult to test for expressive language due to adentition and dysarthria. Home AEDs: Levetiracetam 1000 BID, Lacosamide 200 BID, Valproic Acid  BID 
 
  
CT WO 
NA[place] 
[facility] IMPRESSION Mr. Colt Foote is a 70 y.o.  male with the above history presented with decreased responsiveness and increasing confusion over day prior to presentation. There is concern for seizure activity for which patient was treated with IV lorazepam and then subsequently loaded with IV levetiracetam in the ED. Emergent CT head was negative for any acute findings. Patient's initial workup concerning for hypernatremia, elevated glucose, elevated creatinine and increasing troponins. Etiology of patient's encephalopathy likely related to toxic/metabolic derangements given elevated glucose and sodium. This likely resulted in decreasing seizure threshold and breakthrough seizure. Will recommend continue home AED dosing at the current time and address metabolic derangements. Reviewed patient's CT head which is reassuring.  
 
Patient with a head tremor on examination which may be a result of metabolic derangements but underlying essential tremor is also possible. RECOMMENDATIONS Encephalopathy, Likely d/t Toxic/Metabolic Derangements Breakthrough Seizure Activity Possible Essential Tremor Associated: Hypernatremia, Hyperglycemia, Elevated Creat, Elevated Creat   
-Q4Hr NeuroChecks, TELE 
-Seizure Precautions 
-Seizure Prophylaxis: Levetiracetam 1000 BID, Lacosamide 200 BID, Valproic Acid  BID 
-Do not drop sodium by more than 12 mEq and a 24 hour period to avoid central osmotic demyelination 
-STAT IV Lorazepam 2 mg with any clinical seizure activity lasting > 3 minutes and contact Neurology for further recommendations -PT/OT/ST as needed 
-Management of metabolic/infectious derangements to referring teams Review of Systems Unable to fully ascertain due to mental status Physical/Neurological Exam 
General: Elderly -American male Cardiovascular: tachycardic at times Pulmonary: no increased work of breathing Gastrointestinal/Abdomen: soft w/hypoactive bowel sounds Skin: warm and dry Patient lethargic but awakens with minor physical stimulation and follows a central and peripheral commands persistently No receptive aphasia but difficult to test expressive language due to dysarthria Pupils react to light bilaterally; EOM Intact Blinks to threat bilaterally Intact to light touch on face bilaterally No facial droop No gross hearing loss Tongue is midline Motor: Antigravity throughout to command High-frequency low amplitude persistent head tremor Sensation to light touch intact grossly throughout Past Medical History:  
Diagnosis Date  Acute renal failure ([address]  Anemia  Arthritis  DKA (diabetic ketoacidoses) ([address]  GERD (gastroesophageal reflux disease)  HTN (hypertension)  Hypercholesterolemia  Schizophrenia ([facility]  Schizophreniform disorder ([facility]  Seizure disorder ([facility]  Type II or unspecified type diabetes mellitus without mention of complication, uncontrolled No past surgical history on file. No Known Allergies Family History Problem Relation Age of Onset  Stroke Father Relationships Social connections  Talks on phone: Not on file  Gets together: Not on file  Attends [employer] service: Not on file  Active member of club or organization: Not on file  Attends meetings of clubs or organizations: Not on file  Relationship status: Not on file Medications Current Outpatient Medications Medication Instructions  amLODIPine (NORVASC) 5 mg tablet TAKE 1 TABLET BY MOUTH EVERY DAY IN THE MORNING  
 divalproex DR (DEPAKOTE) 500 mg, Oral, 2 TIMES DAILY, Take two tablets twice daily  hydroCHLOROthiazide (HYDRODIURIL) 25 mg, Oral, EVERY MORNING  
 lacosamide (VIMPAT) 200 mg, Oral, 2 TIMES DAILY  levETIRAcetam 1,000 mg, Oral, 2 TIMES DAILY  levothyroxine (SYNTHROID) 75 mcg, Oral, 7AM, Take in early morning.  metFORMIN (GLUCOPHAGE) 500 mg, Oral, 2 TIMES DAILY WITH MEALS  
 OLANZapine (ZYPREXA) 2.5 mg, Oral, 2 TIMES DAILY Current Facility-Administered Medications Medication Dose Route Frequency  sodium chloride 4 MEQ/ML (23.4%) 0.225 % in sterile water 1,000 mL infusion   IntraVENous CONTINUOUS  
 divalproex DR (DEPAKOTE) tablet 500 mg  500 mg Oral BID  lacosamide (VIMPAT) tablet 200 mg  200 mg Oral BID  levETIRAcetam (KEPPRA) tablet 1,000 mg  1,000 mg Oral BID  [START ON 5/19/2021] levothyroxine (SYNTHROID) tablet 75 mcg  75 mcg Oral 7am  
 pravastatin (PRAVACHOL) tablet 20 mg  20 mg Oral QHS  dextrose (D50W) injection syrg 12.5-25 g  25-50 mL IntraVENous PRN  
 glucagon (GLUCAGEN) injection 1 mg  1 mg IntraMUSCular PRN  
 heparin (porcine) injection 5,000 Units  5,000 Units SubCUTAneous Q8H  
 glucose chewable tablet 16 g  4 Tab Oral PRN  
 dextrose (D50W) injection syrg 12.5-25 g  25-50 mL IntraVENous PRN  
 insulin lispro (HUMALOG) injection   SubCUTAneous Q4H  
 insulin glargine (LANTUS) injection 15 Units  15 Units SubCUTAneous QHS Current Outpatient Medications Medication Sig  
 amLODIPine (NORVASC) 5 mg tablet TAKE 1 TABLET BY MOUTH EVERY DAY IN THE MORNING  
 lacosamide (Vimpat) 200 mg tab tablet Take 200 mg by mouth two (2) times a day.  levETIRAcetam 1,000 mg tablet Take 1,000 mg by mouth two (2) times a day.  OLANZapine (ZyPREXA) 2.5 mg tablet Take 2.5 mg by mouth two (2) times a day.  divalproex DR (DEPAKOTE) 500 mg tablet Take 500 mg by mouth two (2) times a day. Take two tablets twice daily  hydroCHLOROthiazide (HYDRODIURIL) 25 mg tablet Take 1 Tab by mouth Every morning.  metFORMIN (GLUCOPHAGE) 500 mg tablet Take 1 Tab by mouth two (2) times daily (with meals).  levothyroxine (SYNTHROID) 75 mcg tablet Take 1 Tab by mouth every morning. Take in early morning. Objective Temp:  [97.6 °F (36.4 °C)-98.8 °F (37.1 °C)] Pulse (Heart Rate):  [] BP: ()/(61-72) Resp Rate:  [16-22] O2 Sat (%):  [94 %-98 %] Weight:  [120.2 kg (265 lb)] No intake or output data in the 24 hours ending 05/18/21 1851 Wt Readings from Last 3 Encounters:  
05/18/21 120.2 kg (265 lb)  
03/12/21 120.2 kg (265 lb) 12/30/20 115.2 kg (254 lb) Labs Recent Results (from the past 24 hour(s)) CBC WITH AUTOMATED DIFF Collection Time: 05/18/21  2:00 PM  
Result Value Ref Range WBC 10.7 4.1 - 11.1 K/uL  
 RBC 5.02 4.10 - 5.70 M/uL  
 HGB 16.0 12.1 - 17.0 g/dL HCT 51.3 (H) 36.6 - 50.3 % .2 (H) 80.0 - 99.0 FL  
 MCH 31.9 26.0 - 34.0 PG  
 MCHC 31.2 30.0 - 36.5 g/dL  
 RDW 13.9 11.5 - 14.5 % PLATELET 353 531 - 400 K/uL MPV 12.0 8.9 - 12.9 FL  
 NRBC 0.3 (H) 0.0  WBC ABSOLUTE NRBC 0.03 (H) 0.00 - 0.01 K/uL NEUTROPHILS 81 (H) 32 - 75 % LYMPHOCYTES 12 12 - 49 % MONOCYTES 7 5 - 13 % EOSINOPHILS 0 0 - 7 %  BASOPHILS 0 0 - 1 % IMMATURE GRANULOCYTES 0 0 - 0.5 % ABS. NEUTROPHILS 8.6 (H) 1.8 - 8.0 K/UL  
 ABS. LYMPHOCYTES 1.3 0.8 - 3.5 K/UL  
 ABS. MONOCYTES 0.8 0.0 - 1.0 K/UL  
 ABS. EOSINOPHILS 0.0 0.0 - 0.4 K/UL  
 ABS. BASOPHILS 0.0 0.0 - 0.1 K/UL  
 ABS. IMM. GRANS. 0.0 0.00 - 0.04 K/UL  
 DF AUTOMATED METABOLIC PANEL, COMPREHENSIVE Collection Time: 05/18/21  2:00 PM  
Result Value Ref Range Sodium 160 (H) 136 - 145 mmol/L Potassium 3.7 3.5 - 5.1 mmol/L Chloride 125 (H) 97 - 108 mmol/L  
 CO2 26 21 - 32 mmol/L Anion gap 9 5 - 15 mmol/L Glucose 632 (HH) 65 - 100 mg/dL BUN 35 (H) 6 - 20 mg/dL Creatinine 1.75 (H) 0.70 - 1.30 mg/dL BUN/Creatinine ratio 20 12 - 20 GFR est AA 47 (L) >60 ml/min/1.73m2 GFR est non-AA 38 (L) >60 ml/min/1.73m2 Calcium 10.0 8.5 - 10.1 mg/dL Bilirubin, total 0.5 0.2 - 1.0 mg/dL AST (SGOT) 48 (H) 15 - 37 U/L  
 ALT (SGPT) 26 12 - 78 U/L Alk. phosphatase 65 45 - 117 U/L Protein, total 8.2 6.4 - 8.2 g/dL Albumin 3.6 3.5 - 5.0 g/dL Globulin 4.6 (H) 2.0 - 4.0 g/dL A-G Ratio 0.8 (L) 1.1 - 2.2    
TROPONIN I Collection Time: 05/18/21  2:00 PM  
Result Value Ref Range Troponin-I, Qt. 0.14 (H) <0.05 ng/mL BNP Collection Time: 05/18/21  2:00 PM  
Result Value Ref Range NT pro- (H) <125 pg/mL EKG, 12 LEAD, INITIAL Collection Time: 05/18/21  2:06 PM  
Result Value Ref Range Ventricular Rate 99 BPM  
 Atrial Rate 99 BPM  
 P-R Interval 122 ms QRS Duration 98 ms Q-T Interval 376 ms QTC Calculation (Bezet) 482 ms Calculated P Axis 67 degrees Calculated R Axis -39 degrees Calculated T Axis 50 degrees Diagnosis Normal sinus rhythm Left axis deviation Nonspecific ST abnormality Prolonged QT Abnormal ECG When compared with ECG of 12-MAR-2021 23:19, 
ST now depressed in Anterior leads T wave inversion no longer evident in Inferior leads T wave inversion no longer evident in Anterior leads Confirmed by Jolynn Horvath [phone]) on 5/18/2021 2:13:11 PM 
  
GLUCOSE, POC Collection Time: 05/18/21  4:22 PM  
Result Value Ref Range Glucose (POC) 446 (H) 65 - 117 mg/dL Performed by Sylvia Diana, POC Collection Time: 05/18/21  5:12 PM  
Result Value Ref Range Glucose (POC) 442 (H) 65 - 117 mg/dL Performed by Gricelda Davis   
TROPONIN I Collection Time: 05/18/21  5:30 PM  
Result Value Ref Range Troponin-I, Qt. 0.22 (H) <0.05 ng/mL LACTIC ACID Collection Time: 05/18/21  5:30 PM  
Result Value Ref Range Lactic acid 2.0 0.4 - 2.0 mmol/L  
GLUCOSE, POC Collection Time: 05/18/21  6:36 PM  
Result Value Ref Range Glucose (POC) 443 (H) 65 - 117 mg/dL Performed by Gricelda Davis Significant Diagnostic Studies All images independently visualized XR CHEST PORT Final Result Underexpanded lungs with bibasilar atelectasis. XR CHEST PORT Final Result No acute pulmonary process. CT HEAD WO CONT Final Result No acute intracranial process. Other findings as above. XR PELV 3 V Final Result No evidence of fracture. This document has been prepared by the Dragon voice recognition system, typographical errors may have occurred.  Attempts have been made to correct errors, however inadvertent errors may persist.

## 2021-05-19 NOTE — PROGRESS NOTES
Patient lethargic and difficult to keep awake. Unable to perform bedside swallow eval at this time. Per nsg, patient tolerated PO meds and sips of water w/o coughing. ST to follow.

## 2021-05-19 NOTE — PROGRESS NOTES
NEUROLOGY PROGRESS NOTE Admission History/Pertinent Events Jelena Cade is a 70y.o. year old male who presented on 5/18/2021. Patient has a past medical history of Seizures, Schizophrenia, HTN, HL, DM2, GERD who presented with decreased responsiveness. Per chart review, patient reportedly has been having decreased responsiveness and confusion for about a day prior to presentation which has slowly been worsening. In the ED, there was concern for possible seizure activity for which patient was treated with IV lorazepam and loaded with IV levetiracetam.  Patient was also given naloxone for concern for opioid overdose. Emergent CT head was negative for any acute findings. On initial workup, patient with sodium of 160, glucose of 632, creatinine of 1.75 and increasing troponins from 0.14 to 0.22. Home AEDs: Levetiracetam 1000 BID, Lacosamide 200 BID, Valproic Acid  BID ASSESSMENT/PLAN Impression Will continue patient on AEDs per below and continue to treat metabolic derangements. [address] Plan Encephalopathy, Likely d/t Toxic/Metabolic Derangements Breakthrough Seizure Activity Possible Essential Tremor Associated: Hypernatremia, Hyperglycemia, Elevated Creat 
-Q6Hr NeuroChecks 
-Seizure Precautions 
-Seizure Prophylaxis: Levetiracetam 1000 BID, Lacosamide 200 BID, Valproic Acid  BID 
-Do not drop sodium by more than 12 mEq and a 24 hour period to avoid central osmotic demyelination 
-STAT IV Lorazepam 2 mg with any clinical seizure activity lasting > 3 minutes and contact Neurology for further recommendations -PT/OT/ST as needed 
-Management of metabolic/infectious derangements to referring teams SUBJECTIVE Patient sodium 159 this morning. No other significant changes to his neurological examination. Physical/Neurological Exam 
General: Elderly -American male Patient lethargic but awakens with minor physical stimulation and follows a central and peripheral commands persistently No significant receptive or expressive aphasia; moderate dysarthria Pupils react to light bilaterally; EOM Intact Blinks to threat bilaterally Intact to light touch on face bilaterally No facial droop Motor: Antigravity throughout to command High-frequency low amplitude persistent head tremor Sensation to light touch intact grossly throughout OBJECTIVE Vital Signs Temp:  [97.6 °F (36.4 °C)-98.8 °F (37.1 °C)] Pulse (Heart Rate):  [] BP: ()/(61-72) Resp Rate:  [15-22] O2 Sat (%):  [94 %-98 %] Weight:  [100.9 kg (222 lb 7.1 oz)-120.2 kg (265 lb)] MEDICATIONS Current Facility-Administered Medications:  
  sodium chloride 4 MEQ/ML (23.4%) 0.225 % in sterile water 1,000 mL infusion, , IntraVENous, CONTINUOUS, Maryuri Osborne MD, Last Rate: 100 mL/hr at 05/18/21 1631, New Bag at 05/18/21 1631   divalproex DR (DEPAKOTE) tablet 500 mg, 500 mg, Oral, BID, Scotty Nuñez MD 
  lacosamide (VIMPAT) tablet 200 mg, 200 mg, Oral, BID, Scotty Nuñez MD 
  levETIRAcetam (KEPPRA) tablet 1,000 mg, 1,000 mg, Oral, BID, Scotty Nuñez MD 
  levothyroxine (SYNTHROID) tablet 75 mcg, 75 mcg, Oral, 7am, Marisol Nuñez MD 
  pravastatin (PRAVACHOL) tablet 20 mg, 20 mg, Oral, QHS, Scotty Nuñez MD 
  dextrose (D50W) injection syrg 12.5-25 g, 25-50 mL, IntraVENous, PRN, Maryuri Osborne MD 
  glucagon [facility]) injection 1 mg, 1 mg, IntraMUSCular, PRN, Maryuri Osborne MD 
  heparin (porcine) injection 5,000 Units, 5,000 Units, SubCUTAneous, Q8H, Maryuri Osborne MD, 5,000 Units at 05/18/21 2302   glucose chewable tablet 16 g, 4 Tablet, Oral, PRN, Maryuri Osborne MD 
  dextrose (D50W) injection syrg 12.5-25 g, 25-50 mL, IntraVENous, PRN, Maryuri Osborne MD 
  insulin lispro (HUMALOG) injection, , SubCUTAneous, Q4H, Maryuri Osborne MD, 5 Units at 05/18/21 7850   insulin glargine (LANTUS) injection 15 Units, 15 Units, SubCUTAneous, QHS, Tory Arita MD, 15 Units at 05/18/21 2301 Labs: I've reviewed the labs for today This document has been prepared by the Dragon voice recognition system, typographical errors may have occurred.  Attempts have been made to correct errors, however inadvertent errors may persist.

## 2021-05-19 NOTE — PROGRESS NOTES
Progress Note Patient: Amber Vargas MRN: [id number]  SSN: [id number] YOB: 1945  Age: 70 y.o. Sex: male Admit Date: 5/18/2021 LOS: 1 day Subjective:  
 
75F, h/o seizures, DMII on oral meds, schizophrenia with unresponsiveness since 1 day likely s/t hypernatremia in the setting of ANDRES, seizures, His baseline isnt clear, but appears a little alert compared to yesterday. Will continue 0.225% saline. Will now consult nephrology. Aggressively treat uncontrolled sugars. NG placement- if morning labs doesnot show improvement in Na will add free water through NG. Review of Systems: 
Unable to perform ROS due to encephalopathy Objective:  
 
Vitals:  
 05/19/21 0400 05/19/21 0500 05/19/21 0600 05/19/21 0700 BP: (!) 155/72 (!) 160/75 (!) 151/76 Pulse: 72 74 72 Resp: 15 15 14 Temp:    98.2 °F (36.8 °C) SpO2: 98% 99% 98% Weight:      
Height:      
  
 
Intake and Output: 
Current Shift: No intake/output data recorded. Last three shifts: No intake/output data recorded. Physical Exam:  
Physical Exam 
Vitals signs and nursing note reviewed. Constitutional:   
   Appearance: Normal appearance. He is obese. HENT:  
   Head: Normocephalic and atraumatic.  
   Nose: No congestion or rhinorrhea. Eyes:  
   Pupils: Pupils are equal, round, and reactive to light. Neck:  
   Musculoskeletal: Normal range of motion and neck supple. Cardiovascular:  
   Rate and Rhythm: Regular rhythm. Tachycardia present. Pulmonary:  
   Effort: Pulmonary effort is normal.  
   Breath sounds: Normal breath sounds. Abdominal:  
   General: Abdomen is flat. Bowel sounds are normal. There is no distension.  
   Tenderness: There is no abdominal tenderness. There is no guarding. Musculoskeletal: Normal range of motion. Skin: 
   General: Skin is warm and dry. Neurological:  
   General: No focal deficit present.  
   Mental Status: He is alert.    Comments: Respond to painful stimuli, not responsive to verbal commands  
 
Lab/Data Review: 
Recent Results (from the past 24 hour(s)) CBC WITH AUTOMATED DIFF Collection Time: 05/18/21  2:00 PM  
Result Value Ref Range WBC 10.7 4.1 - 11.1 K/uL  
 RBC 5.02 4.10 - 5.70 M/uL  
 HGB 16.0 12.1 - 17.0 g/dL HCT 51.3 (H) 36.6 - 50.3 % .2 (H) 80.0 - 99.0 FL  
 MCH 31.9 26.0 - 34.0 PG  
 MCHC 31.2 30.0 - 36.5 g/dL  
 RDW 13.9 11.5 - 14.5 % PLATELET 413 058 - 451 K/uL MPV 12.0 8.9 - 12.9 FL  
 NRBC 0.3 (H) 0.0  WBC ABSOLUTE NRBC 0.03 (H) 0.00 - 0.01 K/uL NEUTROPHILS 81 (H) 32 - 75 % LYMPHOCYTES 12 12 - 49 % MONOCYTES 7 5 - 13 % EOSINOPHILS 0 0 - 7 % BASOPHILS 0 0 - 1 % IMMATURE GRANULOCYTES 0 0 - 0.5 % ABS. NEUTROPHILS 8.6 (H) 1.8 - 8.0 K/UL  
 ABS. LYMPHOCYTES 1.3 0.8 - 3.5 K/UL  
 ABS. MONOCYTES 0.8 0.0 - 1.0 K/UL  
 ABS. EOSINOPHILS 0.0 0.0 - 0.4 K/UL  
 ABS. BASOPHILS 0.0 0.0 - 0.1 K/UL  
 ABS. IMM. GRANS. 0.0 0.00 - 0.04 K/UL  
 DF AUTOMATED METABOLIC PANEL, COMPREHENSIVE Collection Time: 05/18/21  2:00 PM  
Result Value Ref Range Sodium 160 (H) 136 - 145 mmol/L Potassium 3.7 3.5 - 5.1 mmol/L Chloride 125 (H) 97 - 108 mmol/L  
 CO2 26 21 - 32 mmol/L Anion gap 9 5 - 15 mmol/L Glucose 632 (HH) 65 - 100 mg/dL BUN 35 (H) 6 - 20 mg/dL Creatinine 1.75 (H) 0.70 - 1.30 mg/dL BUN/Creatinine ratio 20 12 - 20 GFR est AA 47 (L) >60 ml/min/1.73m2 GFR est non-AA 38 (L) >60 ml/min/1.73m2 Calcium 10.0 8.5 - 10.1 mg/dL Bilirubin, total 0.5 0.2 - 1.0 mg/dL AST (SGOT) 48 (H) 15 - 37 U/L  
 ALT (SGPT) 26 12 - 78 U/L Alk. phosphatase 65 45 - 117 U/L Protein, total 8.2 6.4 - 8.2 g/dL Albumin 3.6 3.5 - 5.0 g/dL Globulin 4.6 (H) 2.0 - 4.0 g/dL A-G Ratio 0.8 (L) 1.1 - 2.2    
TROPONIN I Collection Time: 05/18/21  2:00 PM  
Result Value Ref Range Troponin-I, Qt. 0.14 (H) <0.05 ng/mL BNP  Collection Time: 05/18/21 2:00 PM  
Result Value Ref Range NT pro- (H) <125 pg/mL EKG, 12 LEAD, INITIAL Collection Time: 05/18/21  2:06 PM  
Result Value Ref Range Ventricular Rate 99 BPM  
 Atrial Rate 99 BPM  
 P-R Interval 122 ms QRS Duration 98 ms Q-T Interval 376 ms QTC Calculation (Bezet) 482 ms Calculated P Axis 67 degrees Calculated R Axis -39 degrees Calculated T Axis 50 degrees Diagnosis Normal sinus rhythm Left axis deviation Nonspecific ST abnormality Prolonged QT Abnormal ECG When compared with ECG of 12-MAR-2021 23:19, 
ST now depressed in Anterior leads T wave inversion no longer evident in Inferior leads T wave inversion no longer evident in Anterior leads Confirmed by Radha Moreno [phone]) on 5/18/2021 2:13:11 PM 
  
GLUCOSE, POC Collection Time: 05/18/21  4:22 PM  
Result Value Ref Range Glucose (POC) 446 (H) 65 - 117 mg/dL Performed by Sharda Maya, POC Collection Time: 05/18/21  5:12 PM  
Result Value Ref Range Glucose (POC) 442 (H) 65 - 117 mg/dL Performed by [place] [employer]   
TROPONIN I Collection Time: 05/18/21  5:30 PM  
Result Value Ref Range Troponin-I, Qt. 0.22 (H) <0.05 ng/mL LACTIC ACID Collection Time: 05/18/21  5:30 PM  
Result Value Ref Range Lactic acid 2.0 0.4 - 2.0 mmol/L  
GLUCOSE, POC Collection Time: 05/18/21  6:36 PM  
Result Value Ref Range Glucose (POC) 443 (H) 65 - 117 mg/dL Performed by Sharda Maya, POC Collection Time: 05/18/21  9:59 PM  
Result Value Ref Range Glucose (POC) 323 (H) 65 - 117 mg/dL Performed by [address] [place] [address],  [address], BASIC Collection Time: 05/18/21 11:10 PM  
Result Value Ref Range Sodium 161 (HH) 136 - 145 mmol/L Potassium 2.8 (L) 3.5 - 5.1 mmol/L Chloride 128 (H) 97 - 108 mmol/L  
 CO2 30 21 - 32 mmol/L Anion gap 3 (L) 5 - 15 mmol/L Glucose 368 (H) 65 - 100 mg/dL BUN 34 (H) 6 - 20 mg/dL  Creatinine 1.59 (H) 0.70 - 1.30 mg/dL  
 BUN/Creatinine ratio 21 (H) 12 - 20 GFR est AA 52 (L) >60 ml/min/1.73m2 GFR est non-AA 43 (L) >60 ml/min/1.73m2 Calcium 10.2 (H) 8.5 - 10.1 mg/dL TROPONIN I Collection Time: 05/18/21 11:10 PM  
Result Value Ref Range Troponin-I, Qt. 0.33 (H) <0.05 ng/mL LACTIC ACID Collection Time: 05/18/21 11:10 PM  
Result Value Ref Range Lactic acid 2.0 0.4 - 2.0 mmol/L  
AMMONIA Collection Time: 05/18/21 11:10 PM  
Result Value Ref Range Ammonia 52 (H) <32 umol/L  
GLUCOSE, POC Collection Time: 05/19/21  4:15 AM  
Result Value Ref Range Glucose (POC) 292 (H) 65 - 117 mg/dL Performed by [facility] [employer], [address], BASIC Collection Time: 05/19/21  5:30 AM  
Result Value Ref Range Sodium 160 (H) 136 - 145 mmol/L Potassium 3.7 3.5 - 5.1 mmol/L Chloride 129 (H) 97 - 108 mmol/L  
 CO2 27 21 - 32 mmol/L Anion gap 4 (L) 5 - 15 mmol/L Glucose 351 (H) 65 - 100 mg/dL BUN 36 (H) 6 - 20 mg/dL Creatinine 1.42 (H) 0.70 - 1.30 mg/dL BUN/Creatinine ratio 25 (H) 12 - 20 GFR est AA 59 (L) >60 ml/min/1.73m2 GFR est non-AA 49 (L) >60 ml/min/1.73m2 Calcium 10.0 8.5 - 10.1 mg/dL TROPONIN I Collection Time: 05/19/21  5:30 AM  
Result Value Ref Range Troponin-I, Qt. 0.33 (H) <0.05 ng/mL GLUCOSE, POC Collection Time: 05/19/21  7:33 AM  
Result Value Ref Range Glucose (POC) 354 (H) 65 - 117 mg/dL Performed by Bren Peer Assessment and plan:   
 
(1) Acute encephalopathy : GCS 13. Metabolic and seizure. s/t hypernatremia Start 0.22% saline, strict ACCU control. S/p ativan and keppra. Continue keppra, lacosamide and consult neurology. I am going to hold olanzapine for today.  
  
(2) Acute hypoxic respiratory failure: likely central with ativan/ keppra. Will order XR chest for any aspiration.  
  
(3) hypernatremia: 0.22 saline.  Repeat at 2100 hours and then in AM. Strict sugar control 
  
(4) ANDRES: continue IVF. 
  
(5) DMII: complicates #1. SSI. Hold metformin 
  
(6) HTN: hold amlodipine, HCTZ, losartan. Resume as BP tolerates 
  
(7) HLD: pravastatin 
  
(8) Schizophrenia: olanzipine. Will hold today.  
  
(9) increased troponin: type II. Repeat Hussein. ECHO  
  
DVT ppx: heparin subQ 
  
DISPO: PT once alert. Likely 3-4 days of stay. But would need rehab at the least 
 
Signed By: Augie Schwab, MD   
 May 19, 2021

## 2021-05-19 NOTE — WOUND CARE
Wound Care Note: Wound Care into see patient because of high risk pressure injury, Oracio score 11. Pt very lethargic. Assisted JORDAN Crockett in transferring patient to low air loss mattress. Slight redness to scrotum,  Primo fit in place. Skin Care & Pressure Relief Recommendations: 
Minimize layers of linen Pads under patient to optimize support surface and microclimate Turn/reposition approximately every 2 hours. Pillow Wedges Manage incontinence Promote continence; Skin Protective lotion to buttocks and sacrum daily and as needed with incontinence care Offload heels with pillows or offloading boots.  
 
Discussed above plan with JORDAN Crockett

## 2021-05-19 NOTE — PROGRESS NOTES
Weekly Skin assessment completed with Mindy Tai RN. Patient has multiple scabs area on his right anterior shin, otherwise his skin is dry and intact.

## 2021-05-19 NOTE — ROUTINE PROCESS
TRANSFER - OUT REPORT: 
 
Verbal report given to lisa sweeney on Bishop Kessler  being transferred to icu (unit) for routine progression of care Report consisted of patients Situation, Background, Assessment and  
Recommendations(SBAR). Information from the following report(s) SBAR, Kardex, ED Summary and Intake/Output was reviewed with the receiving nurse. Lines:  
Peripheral IV 05/18/21 Anterior;Distal;Right Forearm (Active) Peripheral IV 05/18/21 Anterior; Left Hand (Active) Opportunity for questions and clarification was provided. Patient transported with: 
 O2 @ 2 liters Registered Nurse

## 2021-05-20 NOTE — PROGRESS NOTES
SPEECH LANGUAGE PATHOLOGY BEDSIDE SWALLOW EVALUATIONS Patient: Alexa Godinez  (70 y.o. ) Date: 5/20/2021 Primary Diagnosis: Acute encephalopathy Precautions:  Fall ASSESSMENT : 
Patient sleeping upon arrival, verbally responds however keeps eyes closed throughout evaluation. He is oriented x1 and follows basic commands. Patient perseverates on speaking of the devil. Dry oral mucosa ?oral candida. Patient presents w/ mild oropharyngeal dysphagia. Oral phase c/b reduced bolus formation and manipulation improves w/ verbal cue and delayed A-P transit. Pharyngeal phase c/b mild swallow delay (intermittent) and wfl HLE upon palpation. Overt s/s of pen/asp c/b cough x1 w/ thin via straw. Patient will benefit from skilled intervention to address the above impairments. Patients rehabilitation potential is considered to be Good PLAN : 
Recommendations and Planned Interventions: 
Rec ground/thin w/ strict asp/GERD precautions. ONLY feed when alert, slow rate of intake, small bites/sips and no straws. Frequency/Duration: Patient will be followed by speech-language pathology 4 times a week to address goals. Discharge Recommendations: [place] SUBJECTIVE:  
Patient talking about devil and praying throughout session. OBJECTIVE:  
Patient admitted w/ decreased responsiveness and increased confusion. Past Medical History:  
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   
   
  
 Narrative: Chest, frontal view, 5/18/2021 History: Aspiration. Comparison: Including chest, same day. Findings: The cardiac silhouette is within normal limits. The lungs are  
underexpanded with bibasilar atelectasis. No hydrostatic edema is present. No  
pleural effusions or pneumothorax is identified. The osseous structures are  
stable. 05/18/21 1617  XR CHEST PORT Final result Impression:  No acute pulmonary process. Narrative:  Chest, frontal view, 5/18/2021 History: Altered mental status. Comparison: Including chest 6/24/2019. Findings: The cardiac silhouette is within normal limits. The lungs are under  
expanded. No hydrostatic edema is present. No focal consolidation, pleural  
effusions or pneumothorax is identified. No acute osseous findings are  
definitively seen. Diet prior to admission: unknown Current Diet:  DIET DIABETIC CONSISTENT CARB Cognitive and Communication Status: 
Neurologic State: [place]  Orientation Level: Oriented to person Cognition: Decreased attention/concentration, Follows commands, Impaired decision making Swallowing Evaluation:  
Oral Assessment: 
Oral Assessment Labial: No impairment Dentition: Limited Oral Hygiene: white coating on tongue and b/l buccal cavity Lingual: No impairment Velum: No impairment Mandible: No impairment P.O. Trials: 
Patient Position: upright in bed Vocal quality prior to P.O.: Low volume Consistency Presented: Puree; Solid; Thin liquid How Presented: SLP-fed/presented;Cup/sip;Spoon;Straw;Successive swallows Bolus Acceptance: No impairment Bolus Formation/Control: Impaired Type of Impairment: Delayed;Mastication Propulsion: Delayed (# of seconds) Oral Residue: Less than 10% of bolus Initiation of Swallow: Delayed (# of seconds) Laryngeal Elevation: Functional 
Aspiration Signs/Symptoms: Strong cough Pharyngeal Phase Characteristics: No impairment, issues, or problems Oral Phase Severity: Mild Pharyngeal Phase Severity : Mild Voice: 
 
Vocal Quality: Low volume Pain: 
Pain Scale 1: (P) Numeric (0 - 10) Pain Intensity 1: (P) 0 After treatment:  
Patient left in no apparent distress in bed, Call bell within reach, and Nursing notified COMMUNICATION/EDUCATION:  
Patient's alertness and confusion negatively impacts comprehension and carryover of education. The patient's plan of care including recommendations, planned interventions, and recommended diet changes were discussed with: Registered nurse. Patient is unable to participate in goal setting and plan of care. Problem: Dysphagia (Adult) Goal: *Acute Goals and Plan of Care (Insert Text) Description: Speech Therapy Swallow Goals Initiated 5/20/2021 
-Patient will tolerate ground diet with thin liquids without clinical indicators of aspiration given minimal cues within 7day(s). -Patient will tolerate PO trials without clinical indicators of aspiration given minimal cues within 7 day(s). -Patient will participate in modified barium swallow study within 7 day(s). -Patient will demonstrate understanding of swallow safety precautions and aspiration precautions, diet recs with minimal cues within 7 day(s). Outcome: Initial 
 Thank you for this referral. 
Mariluz Burrows M.S., M.Ed., CCC-SLP Time Calculation: 32 mins

## 2021-05-20 NOTE — PROGRESS NOTES
Progress Note Patient: Jelena Cade MRN: [id number]  SSN: [id number] YOB: 1945  Age: 70 y.o. Sex: male Admit Date: 5/18/2021 LOS: 2 days Subjective:  
 
75F, h/o seizures, DMII on oral meds, schizophrenia with unresponsiveness since 1 day likely s/t hypernatremia in the setting of ANDRES, seizures, His baseline isnt clear, but appears a little alert compared to yesterday. Will continue 0.225% saline. Will now consult nephrology. Aggressively treat uncontrolled sugars. NG placement- if morning labs doesnot show improvement in Na will add free water through NG. Review of Systems: 
Unable to perform ROS due to encephalopathy Objective:  
 
Vitals:  
 05/20/21 0700 05/20/21 0800 05/20/21 0900 05/20/21 1300 BP: (!) 157/81 (!) 157/74 (!) 141/63 (!) 140/70 Pulse: 74 73 75 73 Resp: 15 16 15 17 Temp:   97.4 °F (36.3 °C) 98 °F (36.7 °C) SpO2: 96% 96% 93% 92% Weight:      
Height:      
  
 
Intake and Output: 
Current Shift: 05/20 0701 - 05/20 1900 In: 400 [P.O.:400] Out: - Last three shifts: 05/18 1901 - 05/20 0700 In: -  
Out: 552 [HCFDA:573] Physical Exam:  
Physical Exam 
Vitals signs and nursing note reviewed. Constitutional:   
   Appearance: Normal appearance. He is obese. HENT:  
   Head: Normocephalic and atraumatic.  
   Nose: No congestion or rhinorrhea. Eyes:  
   Pupils: Pupils are equal, round, and reactive to light. Neck:  
   Musculoskeletal: Normal range of motion and neck supple. Cardiovascular:  
   Rate and Rhythm: Regular rhythm. Tachycardia present. Pulmonary:  
   Effort: Pulmonary effort is normal.  
   Breath sounds: Normal breath sounds. Abdominal:  
   General: Abdomen is flat. Bowel sounds are normal. There is no distension.  
   Tenderness: There is no abdominal tenderness. There is no guarding. Musculoskeletal: Normal range of motion. Skin: 
   General: Skin is warm and dry.   
Neurological:    General: No focal deficit present.  
   Mental Status: He is alert.  
   Comments: Respond to painful stimuli, not responsive to verbal commands  
 
Lab/Data Review: 
Recent Results (from the past 24 hour(s)) GLUCOSE, POC Collection Time: 05/19/21  3:19 PM  
Result Value Ref Range Glucose (POC) 214 (H) 65 - 117 mg/dL Performed by Kd Vera   
MRSA SCREEN - PCR (NASAL) Collection Time: 05/19/21  4:25 PM  
Result Value Ref Range MRSA by PCR, Nasal Not Detected Not Detected GLUCOSE, POC Collection Time: 05/19/21  7:30 PM  
Result Value Ref Range Glucose (POC) 218 (H) 65 - 117 mg/dL Performed by Mitzi Cochran   
GLUCOSE, POC Collection Time: 05/19/21 11:31 PM  
Result Value Ref Range Glucose (POC) 200 (H) 65 - 117 mg/dL Performed by [address] [place] [address] SW[facility], [address], BASIC Collection Time: 05/20/21  3:15 AM  
Result Value Ref Range Sodium 159 (H) 136 - 145 mmol/L Potassium 3.3 (L) 3.5 - 5.1 mmol/L Chloride 124 (H) 97 - 108 mmol/L  
 CO2 34 (H) 21 - 32 mmol/L Anion gap 1 (L) 5 - 15 mmol/L Glucose 232 (H) 65 - 100 mg/dL BUN 36 (H) 6 - 20 mg/dL Creatinine 1.19 0.70 - 1.30 mg/dL BUN/Creatinine ratio 30 (H) 12 - 20 GFR est AA >60 >60 ml/min/1.73m2 GFR est non-AA 60 (L) >60 ml/min/1.73m2 Calcium 9.2 8.5 - 10.1 mg/dL CBC WITH AUTOMATED DIFF Collection Time: 05/20/21  3:15 AM  
Result Value Ref Range WBC 10.7 4.1 - 11.1 K/uL  
 RBC 5.18 4.10 - 5.70 M/uL  
 HGB 16.5 12.1 - 17.0 g/dL HCT 52.5 (H) 36.6 - 50.3 % .4 (H) 80.0 - 99.0 FL  
 MCH 31.9 26.0 - 34.0 PG  
 MCHC 31.4 30.0 - 36.5 g/dL  
 RDW 14.1 11.5 - 14.5 % PLATELET 974 (L) 145 - 400 K/uL MPV 12.1 8.9 - 12.9 FL  
 NRBC 0.9 (H) 0.0  WBC ABSOLUTE NRBC 0.10 (H) 0.00 - 0.01 K/uL NEUTROPHILS 70 32 - 75 % LYMPHOCYTES 21 12 - 49 % MONOCYTES 7 5 - 13 % EOSINOPHILS 1 0 - 7 % BASOPHILS 0 0 - 1 % IMMATURE GRANULOCYTES 1 (H) 0 - 0.5 % ABS. NEUTROPHILS 7.4 1.8 - 8.0 K/UL  
 ABS. LYMPHOCYTES 2.3 0.8 - 3.5 K/UL  
 ABS. MONOCYTES 0.7 0.0 - 1.0 K/UL  
 ABS. EOSINOPHILS 0.1 0.0 - 0.4 K/UL  
 ABS. BASOPHILS 0.0 0.0 - 0.1 K/UL  
 ABS. IMM. GRANS. 0.1 (H) 0.00 - 0.04 K/UL  
 DF AUTOMATED Assessment and plan:   
 
(1) Acute encephalopathy : GCS 13. Metabolic and seizure. s/t hypernatremia Start 0.22% saline, strict ACCU control. S/p ativan and keppra. Continue keppra, lacosamide and consult neurology. I am going to hold olanzapine for today.  
  
(2) Acute hypoxic respiratory failure: likely central with ativan/ keppra. Will order XR chest for any aspiration.  
  
(3) hypernatremia: 0.22 saline. Repeat at 2100 hours and then in AM. Strict sugar control 
  
(4) ANDRES: continue IVF. 
  
(5) DMII: complicates #1. SSI. Hold metformin 
  
(6) HTN: hold amlodipine, HCTZ, losartan. Resume as BP tolerates 
  
(7) HLD: pravastatin 
  
(8) Schizophrenia: olanzipine. Will hold today.  
  
(9) increased troponin: type II. Repeat Hussein. ECHO  
  
DVT ppx: heparin subQ 
  
DISPO: PT once alert. Likely 3-4 days of stay. But would need rehab at the least 
 
Signed By: Lula Subramanian MD   
 May 20, 2021

## 2021-05-20 NOTE — PROGRESS NOTES
Patient transferred to [facility].  Telephone report called to receiving RN prior. Pt VSS. Patient lethargic but responds to voice. No signs distress

## 2021-05-20 NOTE — CONSULTS
Consult Date: 5/20/2021 IP CONSULT TO NEPHROLOGY Consult performed by: Abhilash Noland MD 
Consult ordered by: Tiarra Peter MD 
 
 
 
 
Subjective HISTORY OF PRESENTING ILLNESS : Patient is 61-year-old -American male with a history of hypertension, diabetes, seizures, schizophrenia, acid reflux who is here for altered mental state found to have hyper glycemia and hyponatremia. He is accordingly started on IV fluids and both his sodium and potassium have improved. While he has been lethargic needing soft wrist restraints, his mental state has improved and he can answer a few questions and give his name and location. He still confused why he is here and is not able to answer any questions in detail. He is currently receiving 0.225 NaCl at 100 mL/h. Past Medical History:  
Diagnosis Date  Acute renal failure ([facility]  Anemia  Arthritis  DKA (diabetic ketoacidoses) ([facility]  GERD (gastroesophageal reflux disease)  HTN (hypertension)  Hypercholesterolemia  Schizophrenia ([facility]  Schizophreniform disorder ([facility]  Seizure disorder ([facility]  Type II or unspecified type diabetes mellitus without mention of complication, uncontrolled No past surgical history on file. Family History Problem Relation Age of Onset  Stroke Father Social History Tobacco Use  Smoking status: Never Smoker  Smokeless tobacco: Never Used Substance Use Topics  Alcohol use: No  
   
Current Facility-Administered Medications Medication Dose Route Frequency Provider Last Rate Last Admin  insulin glargine (LANTUS) injection 25 Units  25 Units SubCUTAneous QHS Tiarra Peter MD   25 Units at 05/19/21 2200  levETIRAcetam in saline (iso-os) (KEPPRA) infusion 1,000 mg  1,000 mg IntraVENous Q12H Tiarra Peter MD   1,000 mg at 05/20/21 1004  divalproex (DEPAKOTE SPRINKLE) capsule 500 mg  500 mg Oral Q12H Tiarra Peter MD   500 mg at 05/20/21 1004  
 zinc oxide-white petrolatum 17-57 % topical paste   Topical TID Carlos Braxton MD   Given at 05/20/21 0900  
 sodium chloride 4 MEQ/ML (23.4%) 0.225 % in sterile water 1,000 mL infusion   IntraVENous CONTINUOUS Nedra De Santiago  mL/hr at 05/20/21 0954 Rate Change at 05/20/21 8003  lacosamide (VIMPAT) tablet 200 mg  200 mg Oral BID Carlos Braxton MD   200 mg at 05/20/21 1005  levothyroxine (SYNTHROID) tablet 75 mcg  75 mcg Oral 7am Carlos Braxton MD   75 mcg at 05/20/21 5449  pravastatin (PRAVACHOL) tablet 20 mg  20 mg Oral QHS Carlos Braxton MD   20 mg at 05/19/21 2115  dextrose (D50W) injection syrg 12.5-25 g  25-50 mL IntraVENous PRN Carlos Braxton MD      
 glucagon [facility]) injection 1 mg  1 mg IntraMUSCular PRN Carlos Braxton MD      
 heparin (porcine) injection 5,000 Units  5,000 Units SubCUTAneous Q8H Carlos Braxton MD   5,000 Units at 05/20/21 1582  
 glucose chewable tablet 16 g  4 Tablet Oral PRN Carlos Braxton MD      
 dextrose (D50W) injection syrg 12.5-25 g  25-50 mL IntraVENous PRN Carlos Braxton MD      
 insulin lispro (HUMALOG) injection   SubCUTAneous Q4H Carlos Braxton MD   4 Units at 05/20/21 1004 Review of Systems Unable to perform ROS: Mental status change All other systems reviewed and are negative. Objective Vital signs for last 24 hours: 
Visit Vitals BP (!) 151/81 Pulse 72 Temp 97.4 °F (36.3 °C) Resp 15 Ht 5' 9\" (1.753 m) Wt 100.9 kg (222 lb 7.1 oz) SpO2 96% BMI 32.85 kg/m² [facility], Well Nourished, No Acute Distress, Alert & Oriented x 3, appropriate affect HEENT - atraumatic normocephalic No pallor or icterus Neck- supple, no JVD 
CV- regular rate and rhythm  
no MRG Lung- clear bilaterally Abd- soft, nontender, nondistended Ext- no edema bilaterally. Skin- warm and dry; rash Urine appearancedark kimi-colored Recent Results (from the past 24 hour(s)) ECHO ADULT COMPLETE Collection Time: 05/19/21 11:22 AM  
Result Value Ref Range LV Mass .8 88.0 - 224.0 g  
 LV Mass AL Index 102.7 49.0 - 115.0 g/m2 Pulmonic Regurgitant End Max Velocity 118.00 cm/s AoV PG 6.00 mmHg IVSd 1.39 (A) 0.60 - 1.00 cm LVIDd 4.23 4.20 - 5.90 cm LVIDs 2.74 cm Pulmonic Regurgitant End Max Velocity 104.00 cm/s LVOT Peak Gradient 4.00 mmHg LVPWd 1.37 (A) 0.60 - 1.00 cm LV E' Septal Velocity 5.95 cm/s LV ED Vol A2C 75.70 cm3 BP EF 65.0 55.0 - 100.0 % LV ES Vol A2C 20.60 cm3 E/E' septal 8.30 Mitral Valve Deceleration [place] 2,100.00 mm/s2 Mitral Valve Deceleration [place] 2,100.00 mm/s2 Mitral Valve E Wave Deceleration Time 197.00 ms Mitral Valve Pressure Half-time 82.00 ms MV A James 76.50 cm/s  
 MV E James 49.40 cm/s  
 MVA (PHT) 2.68 cm2  
 MV E/A 0.65 Pulmonic Regurgitant End Max Velocity 119.00 cm/s Pulmonic Valve Systolic Peak Instantaneous Gradient 6.00 mmHg P Vein A Dur 106.00 ms Pulmonary Vein \"A\" Wave Velocity 42.80 cm/s  
 Est. RA Pressure 3.00 mmHg RVIDd 2.97 cm RVSP 32.00 mmHg Tricuspid Valve Max Velocity 268.00 cm/s Triscuspid Valve Regurgitation Peak Gradient 29.00 mmHg Right Atrial Area 4C 10.13 cm2 LA Area 4C 9.17 cm2 Left Atrium Minor Axis 1.76 cm Left Atrium Major Axis 3.80 cm Ao Root D 3.30 cm GLUCOSE, POC Collection Time: 05/19/21 11:23 AM  
Result Value Ref Range Glucose (POC) 310 (H) 65 - 117 mg/dL Performed by Candido Medley, POC Collection Time: 05/19/21  3:19 PM  
Result Value Ref Range Glucose (POC) 214 (H) 65 - 117 mg/dL Performed by Lorelei Jane   
MRSA SCREEN - PCR (NASAL) Collection Time: 05/19/21  4:25 PM  
Result Value Ref Range MRSA by PCR, Nasal Not Detected Not Detected GLUCOSE, POC Collection Time: 05/19/21  7:30 PM  
Result Value Ref Range Glucose (POC) 218 (H) 65 - 117 mg/dL  Performed by IZAIAH HERMANN   
GLUCOSE, POC Collection Time: 05/19/21 11:31 PM  
Result Value Ref Range Glucose (POC) 200 (H) 65 - 117 mg/dL Performed by [address] [place] [address],  [address], BASIC Collection Time: 05/20/21  3:15 AM  
Result Value Ref Range Sodium 159 (H) 136 - 145 mmol/L Potassium 3.3 (L) 3.5 - 5.1 mmol/L Chloride 124 (H) 97 - 108 mmol/L  
 CO2 34 (H) 21 - 32 mmol/L Anion gap 1 (L) 5 - 15 mmol/L Glucose 232 (H) 65 - 100 mg/dL BUN 36 (H) 6 - 20 mg/dL Creatinine 1.19 0.70 - 1.30 mg/dL BUN/Creatinine ratio 30 (H) 12 - 20 GFR est AA >60 >60 ml/min/1.73m2 GFR est non-AA 60 (L) >60 ml/min/1.73m2 Calcium 9.2 8.5 - 10.1 mg/dL CBC WITH AUTOMATED DIFF Collection Time: 05/20/21  3:15 AM  
Result Value Ref Range WBC 10.7 4.1 - 11.1 K/uL  
 RBC 5.18 4.10 - 5.70 M/uL  
 HGB 16.5 12.1 - 17.0 g/dL HCT 52.5 (H) 36.6 - 50.3 % .4 (H) 80.0 - 99.0 FL  
 MCH 31.9 26.0 - 34.0 PG  
 MCHC 31.4 30.0 - 36.5 g/dL  
 RDW 14.1 11.5 - 14.5 % PLATELET 917 (L) 991 - 400 K/uL MPV 12.1 8.9 - 12.9 FL  
 NRBC 0.9 (H) 0.0  WBC ABSOLUTE NRBC 0.10 (H) 0.00 - 0.01 K/uL NEUTROPHILS 70 32 - 75 % LYMPHOCYTES 21 12 - 49 % MONOCYTES 7 5 - 13 % EOSINOPHILS 1 0 - 7 % BASOPHILS 0 0 - 1 % IMMATURE GRANULOCYTES 1 (H) 0 - 0.5 % ABS. NEUTROPHILS 7.4 1.8 - 8.0 K/UL  
 ABS. LYMPHOCYTES 2.3 0.8 - 3.5 K/UL  
 ABS. MONOCYTES 0.7 0.0 - 1.0 K/UL  
 ABS. EOSINOPHILS 0.1 0.0 - 0.4 K/UL  
 ABS. BASOPHILS 0.0 0.0 - 0.1 K/UL  
 ABS. IMM. GRANS. 0.1 (H) 0.00 - 0.04 K/UL  
 DF AUTOMATED Intake/Output Summary (Last 24 hours) at 5/20/2021 1107 Last data filed at 5/20/2021 0959 Gross per 24 hour Intake  Output 600 ml Net -600 ml Current Shift: No intake/output data recorded. Last 3 Shifts: 05/18 1901 - 05/20 0700 In: -  
Out: 281 [[id number]] Physical Exam  
 
Data Review:  
Recent Results (from the past 24 hour(s)) ECHO ADULT COMPLETE  Collection Time: 05/19/21 11:22 AM  
Result Value Ref Range LV Mass .8 88.0 - 224.0 g  
 LV Mass AL Index 102.7 49.0 - 115.0 g/m2 Pulmonic Regurgitant End Max Velocity 118.00 cm/s AoV PG 6.00 mmHg IVSd 1.39 (A) 0.60 - 1.00 cm LVIDd 4.23 4.20 - 5.90 cm LVIDs 2.74 cm Pulmonic Regurgitant End Max Velocity 104.00 cm/s LVOT Peak Gradient 4.00 mmHg LVPWd 1.37 (A) 0.60 - 1.00 cm LV E' Septal Velocity 5.95 cm/s LV ED Vol A2C 75.70 cm3 BP EF 65.0 55.0 - 100.0 % LV ES Vol A2C 20.60 cm3 E/E' septal 8.30 Mitral Valve Deceleration Trigg 2,100.00 mm/s2 Mitral Valve Deceleration Trigg 2,100.00 mm/s2 Mitral Valve E Wave Deceleration Time 197.00 ms Mitral Valve Pressure Half-time 82.00 ms MV A James 76.50 cm/s  
 MV E James 49.40 cm/s  
 MVA (PHT) 2.68 cm2  
 MV E/A 0.65 Pulmonic Regurgitant End Max Velocity 119.00 cm/s Pulmonic Valve Systolic Peak Instantaneous Gradient 6.00 mmHg P Vein A Dur 106.00 ms Pulmonary Vein \"A\" Wave Velocity 42.80 cm/s  
 Est. RA Pressure 3.00 mmHg RVIDd 2.97 cm RVSP 32.00 mmHg Tricuspid Valve Max Velocity 268.00 cm/s Triscuspid Valve Regurgitation Peak Gradient 29.00 mmHg Right Atrial Area 4C 10.13 cm2 LA Area 4C 9.17 cm2 Left Atrium Minor Axis 1.76 cm Left Atrium Major Axis 3.80 cm Ao Root D 3.30 cm GLUCOSE, POC Collection Time: 05/19/21 11:23 AM  
Result Value Ref Range Glucose (POC) 310 (H) 65 - 117 mg/dL Performed by Genia Vazquez, POC Collection Time: 05/19/21  3:19 PM  
Result Value Ref Range Glucose (POC) 214 (H) 65 - 117 mg/dL Performed by Aletha Houston   
MRSA SCREEN - PCR (NASAL) Collection Time: 05/19/21  4:25 PM  
Result Value Ref Range MRSA by PCR, Nasal Not Detected Not Detected GLUCOSE, POC Collection Time: 05/19/21  7:30 PM  
Result Value Ref Range Glucose (POC) 218 (H) 65 - 117 mg/dL Performed by Tess Michael   
GLUCOSE, POC  Collection Time: 05/19/21 11:31 PM  
Result Value Ref Range Glucose (POC) 200 (H) 65 - 117 mg/dL Performed by [address] [place] [address] [employer] [facility], [address], BASIC Collection Time: 05/20/21  3:15 AM  
Result Value Ref Range Sodium 159 (H) 136 - 145 mmol/L Potassium 3.3 (L) 3.5 - 5.1 mmol/L Chloride 124 (H) 97 - 108 mmol/L  
 CO2 34 (H) 21 - 32 mmol/L Anion gap 1 (L) 5 - 15 mmol/L Glucose 232 (H) 65 - 100 mg/dL BUN 36 (H) 6 - 20 mg/dL Creatinine 1.19 0.70 - 1.30 mg/dL BUN/Creatinine ratio 30 (H) 12 - 20 GFR est AA >60 >60 ml/min/1.73m2 GFR est non-AA 60 (L) >60 ml/min/1.73m2 Calcium 9.2 8.5 - 10.1 mg/dL CBC WITH AUTOMATED DIFF Collection Time: 05/20/21  3:15 AM  
Result Value Ref Range WBC 10.7 4.1 - 11.1 K/uL  
 RBC 5.18 4.10 - 5.70 M/uL  
 HGB 16.5 12.1 - 17.0 g/dL HCT 52.5 (H) 36.6 - 50.3 % .4 (H) 80.0 - 99.0 FL  
 MCH 31.9 26.0 - 34.0 PG  
 MCHC 31.4 30.0 - 36.5 g/dL  
 RDW 14.1 11.5 - 14.5 % PLATELET 414 (L) 422 - 400 K/uL MPV 12.1 8.9 - 12.9 FL  
 NRBC 0.9 (H) 0.0  WBC ABSOLUTE NRBC 0.10 (H) 0.00 - 0.01 K/uL NEUTROPHILS 70 32 - 75 % LYMPHOCYTES 21 12 - 49 % MONOCYTES 7 5 - 13 % EOSINOPHILS 1 0 - 7 % BASOPHILS 0 0 - 1 % IMMATURE GRANULOCYTES 1 (H) 0 - 0.5 % ABS. NEUTROPHILS 7.4 1.8 - 8.0 K/UL  
 ABS. LYMPHOCYTES 2.3 0.8 - 3.5 K/UL  
 ABS. MONOCYTES 0.7 0.0 - 1.0 K/UL  
 ABS. EOSINOPHILS 0.1 0.0 - 0.4 K/UL  
 ABS. BASOPHILS 0.0 0.0 - 0.1 K/UL  
 ABS. IMM. GRANS. 0.1 (H) 0.00 - 0.04 K/UL  
 DF AUTOMATED    
 
 
 
XR CHEST PORT Final Result Underexpanded lungs with bibasilar atelectasis. XR CHEST PORT Final Result No acute pulmonary process. CT HEAD WO CONT Final Result No acute intracranial process. Other findings as above. XR PELV 3 V Final Result No evidence of fracture. Patient Active Problem List  
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  
 Hypothyroidism due to acquired atrophy of thyroid E03.4  Mixed hyperlipidemia E78.2  
 Essential hypertension with goal blood pressure less than 140/90 I10  Severe obesity (BMI 35.0-39. 9) E66.01  
 Type 2 diabetes with nephropathy ([facility]) E11.21  
 D[facility] F03.90  
 Controlled type 2 diabetes mellitus with complication, without long-term current use of insulin ([facility]) E11.8  Vitamin D deficiency E55.9  Morbid obesity ([facility]) E66.01  
 Acute encephalopathy G93.40 DIAGNOSES: 
1. Severe hypernatremia with confused mental state- improving 2. Hypokalemiaimproving 3. Hyperglycemiaimproving 4. Encephalopathy due to above 5. History of hypothyroidism 6. History of hypertension 7. History of dementia/schizophreniform disorder 8. Severe dehydration DISCUSSION: 
 Potassium has been aptly corrected  Sodium needs to be adjusted to blood glucose values.  Current sodium is 162.  The goal sodium for correction is going to be 154 to 156  by tomorrow morning  Underlying cause for this is dehydrationsevere or overdiuresisfurther details yet to be ascertained. PLAN: 
 Check TSH  Check urine osmolality  Track I/oscurrently with a pure wick male catheter which is acceptable as long as we can track urine output.  As per calculations, raise the 0.225 normal saline to run at 220 mill per hour.  BMP to be done every 8 hoursMD to be called if sodium gets worse or more than 159 or corrects too fast that is less than 155. Thanks for consulting me. Please don't hesitate to contact me if any questions arise of if I can assist in any manner. This dictation was done by dragon, computer voice recognition software.   Often unanticipated grammatical, syntax, phones and other interpretive errors are inadvertently transcribed. Please excuse errors that have escaped final proofreading. Please contact me if you suspect dictation or transcription errors. Dr Felisha Colin 
[address], [address] Cell Phone: [phone] Office phone: [phone] Fax: [phone]

## 2021-05-20 NOTE — PROGRESS NOTES
NEUROLOGY PROGRESS NOTE Admission History/Pertinent Events Travis Cunningham is a 70y.o. year old male who presented on 5/18/2021. Patient has a past medical history of Seizures, Schizophrenia, HTN, HL, DM2, GERD who presented with decreased responsiveness. Per chart review, patient reportedly has been having decreased responsiveness and confusion for about a day prior to presentation which has slowly been worsening. In the ED, there was concern for possible seizure activity for which patient was treated with IV lorazepam and loaded with IV levetiracetam.  Patient was also given naloxone for concern for opioid overdose. Emergent CT head was negative for any acute findings. On initial workup, patient with sodium of 160, glucose of 632, creatinine of 1.75 and increasing troponins from 0.14 to 0.22. Home AEDs: Levetiracetam 1000 BID, Lacosamide 200 BID, Valproic Acid  BID ASSESSMENT/PLAN Impression Will continue patient on AEDs per below and continue to treat metabolic derangements. [address] Plan Encephalopathy, Likely d/t Toxic/Metabolic Derangements Breakthrough Seizure Activity Possible Essential Tremor Associated: Hypernatremia, Hyperglycemia, Elevated Creat 
-Q8Hr NeuroChecks 
-Seizure Precautions 
-Seizure Prophylaxis: Levetiracetam 1000 BID, Lacosamide 200 BID, Valproic Acid  BID 
-Do not drop sodium by more than 12 mEq and a 24 hour period to avoid central osmotic demyelination 
-STAT IV Lorazepam 2 mg with any clinical seizure activity lasting > 3 minutes and contact Neurology for further recommendations -PT/OT/ST as needed 
-Management of metabolic/infectious derangements to referring teams SUBJECTIVE Patient with systolic blood pressures up to 200s. Patient remains lethargic and mildly confused but able to follow central peripheral commands and answer questions appropriately when engaged.  
 
 
Physical/Neurological Exam 
General: Elderly -American male Patient lethargic but awakens with minor physical stimulation and follows a central and peripheral commands persistently No significant receptive or expressive aphasia; moderate dysarthria Pupils react to light bilaterally; EOM Intact Blinks to threat bilaterally Intact to light touch on face bilaterally No facial droop Motor: Antigravity throughout to command High-frequency low amplitude persistent head tremor Sensation to light touch intact grossly throughout OBJECTIVE Vital Signs Temp:  [97.6 °F (36.4 °C)-98.8 °F (37.1 °C)] Pulse (Heart Rate):  [] BP: ()/() Resp Rate:  [12-30] O2 Sat (%):  [91 %-99 %] Weight:  [100.9 kg (222 lb 7.1 oz)-120.2 kg (265 lb)] MEDICATIONS Current Facility-Administered Medications:  
  insulin glargine (LANTUS) injection 25 Units, 25 Units, SubCUTAneous, QHS, Maylin Diaz MD, 25 Units at 05/19/21 2200   levETIRAcetam in saline (iso-os) (KEPPRA) infusion 1,000 mg, 1,000 mg, IntraVENous, Q12H, Maylin Diaz MD, 1,000 mg at 05/19/21 2115   divalproex (DEPAKOTE SPRINKLE) capsule 500 mg, 500 mg, Oral, Q12H, Maylin Diaz MD, 500 mg at 05/19/21 2115   zinc oxide-white petrolatum 17-57 % topical paste, , Topical, TID, Maylin Diaz MD, Given at 05/20/21 9659   sodium chloride 4 MEQ/ML (23.4%) 0.225 % in sterile water 1,000 mL infusion, , IntraVENous, CONTINUOUS, Maylin Diaz MD, Last Rate: 100 mL/hr at 05/20/21 0300, New Bag at 05/20/21 0300 
  lacosamide (VIMPAT) tablet 200 mg, 200 mg, Oral, BID, Maylin Diaz MD, 200 mg at 05/19/21 2115   levothyroxine (SYNTHROID) tablet 75 mcg, 75 mcg, Oral, 7am, Maylin Diaz MD, 75 mcg at 05/20/21 9499   pravastatin (PRAVACHOL) tablet 20 mg, 20 mg, Oral, QHS, Maylin Diaz MD, 20 mg at 05/19/21 2115   dextrose (D50W) injection syrg 12.5-25 g, 25-50 mL, IntraVENous, PRN, Savannah, Mike Harrison MD 
  glucagon [facility]) injection 1 mg, 1 mg, IntraMUSCular, PRN, Maryuri Osborne MD 
  heparin (porcine) injection 5,000 Units, 5,000 Units, SubCUTAneous, Q8H, Maryuri Osborne MD, 5,000 Units at 05/20/21 3890 
  glucose chewable tablet 16 g, 4 Tablet, Oral, PRN, Maryuri Osborne MD 
  dextrose (D50W) injection syrg 12.5-25 g, 25-50 mL, IntraVENous, PRN, Maryuri Osborne MD 
  insulin lispro (HUMALOG) injection, , SubCUTAneous, Q4H, Maryuri Osborne MD, 2 Units at 05/19/21 1086 Labs: I've reviewed the labs for today This document has been prepared by the Dragon voice recognition system, typographical errors may have occurred.  Attempts have been made to correct errors, however inadvertent errors may persist.

## 2021-05-21 NOTE — PROGRESS NOTES
Bedside and Verbal shift change report given to Linda Chin RN  (oncoming nurse) by Wilian Dos Santos (offgoing nurse). Report included the following information SBAR, MAR and Recent Results.

## 2021-05-21 NOTE — PROGRESS NOTES
NEUROLOGY PROGRESS NOTE Admission History/Pertinent Events Jelena Cade is a 70y.o. year old male who presented on 5/18/2021. Patient has a past medical history of Seizures, Schizophrenia, HTN, HL, DM2, GERD who presented with decreased responsiveness. Per chart review, patient reportedly has been having decreased responsiveness and confusion for about a day prior to presentation which has slowly been worsening. In the ED, there was concern for possible seizure activity for which patient was treated with IV lorazepam and loaded with IV levetiracetam.  Patient was also given naloxone for concern for opioid overdose. Emergent CT head was negative for any acute findings. On initial workup, patient with sodium of 160, glucose of 632, creatinine of 1.75 and increasing troponins from 0.14 to 0.22. Home AEDs: Levetiracetam 1000 BID, Lacosamide 200 BID, Valproic Acid  BID ASSESSMENT/PLAN Impression Will continue patient on AEDs per below and continue to treat metabolic derangements. [address] Plan Encephalopathy, Likely d/t Toxic/Metabolic Derangements Breakthrough Seizure Activity Possible Essential Tremor Associated: Hypernatremia, Hyperglycemia, Elevated Creat 
-Q8Hr NeuroChecks 
-Seizure Precautions 
-Seizure Prophylaxis: Levetiracetam 1000 BID, Lacosamide 200 BID, Valproic Acid  BID 
-Do not drop sodium by more than 12 mEq and a 24 hour period to avoid central osmotic demyelination 
-STAT IV Lorazepam 2 mg with any clinical seizure activity lasting > 3 minutes and contact Neurology for further recommendations -PT/OT/ST as needed 
-Management of metabolic/infectious derangements to referring teams SUBJECTIVE Patient sodium at 150. No significant other neurologic changes on examination. Physical/Neurological Exam 
General: Elderly -American male Patient lethargic but awakens with minor physical stimulation and follows a central and peripheral commands persistently No significant receptive or expressive aphasia; moderate dysarthria Pupils react to light bilaterally; EOM Intact Blinks to threat bilaterally Intact to light touch on face bilaterally No facial droop Motor: Antigravity throughout to command High-frequency low amplitude persistent head tremor Sensation to light touch intact grossly throughout OBJECTIVE Vital Signs Temp:  [97.4 °F (36.3 °C)-98.8 °F (37.1 °C)] Pulse (Heart Rate):  [] BP: ()/() Resp Rate:  [12-30] O2 Sat (%):  [91 %-99 %] Weight:  [100.9 kg (222 lb 7.1 oz)-120.2 kg (265 lb)] MEDICATIONS Current Facility-Administered Medications:  
  labetaloL (NORMODYNE;TRANDATE) 20 mg/4 mL (5 mg/mL) injection 10 mg, 10 mg, IntraVENous, Q4H PRN, John Arcos MD, 10 mg at 05/20/21 1840 
  insulin glargine (LANTUS) injection 25 Units, 25 Units, SubCUTAneous, QHS, John Arcos MD, 25 Units at 05/20/21 2140   levETIRAcetam in saline (iso-os) (KEPPRA) infusion 1,000 mg, 1,000 mg, IntraVENous, Q12H, John Arcos MD, 1,000 mg at 05/20/21 2303   divalproex (DEPAKOTE SPRINKLE) capsule 500 mg, 500 mg, Oral, Q12H, John Arcos MD, 500 mg at 05/20/21 2140   zinc oxide-white petrolatum 17-57 % topical paste, , Topical, TID, John Arcos MD, Given at 05/20/21 2320   sodium chloride 4 MEQ/ML (23.4%) 0.225 % in sterile water 1,000 mL infusion, , IntraVENous, CONTINUOUS, Rayray Mujica MD, Last Rate: 220 mL/hr at 05/21/21 0630, New Bag at 05/21/21 0630 
  lacosamide (VIMPAT) tablet 200 mg, 200 mg, Oral, BID, John Arcos MD, 200 mg at 05/20/21 2140   levothyroxine (SYNTHROID) tablet 75 mcg, 75 mcg, Oral, 7am, John Arcos MD, 75 mcg at 05/21/21 0630   pravastatin (PRAVACHOL) tablet 20 mg, 20 mg, Oral, QHS, BhatyChris MD, 20 mg at 05/20/21 2140 
  dextrose (D50W) injection syrg 12.5-25 g, 25-50 mL, IntraVENous, PRN, Savannah, Vivian Leonardo MD 
  glucagon [facility]) injection 1 mg, 1 mg, IntraMUSCular, PRN, Rebecca Pretty MD 
  heparin (porcine) injection 5,000 Units, 5,000 Units, SubCUTAneous, Q8H, Rebecca Pretty MD, 5,000 Units at 05/20/21 2348   glucose chewable tablet 16 g, 4 Tablet, Oral, PRN, Rebecca Pretty MD 
  dextrose (D50W) injection syrg 12.5-25 g, 25-50 mL, IntraVENous, PRN, Rebecca Pretty MD 
  insulin lispro (HUMALOG) injection, , SubCUTAneous, Q4H, Rebecca Pretty MD, 4 Units at 05/21/21 3791 Labs: I've reviewed the labs for today This document has been prepared by the Dragon voice recognition system, typographical errors may have occurred.  Attempts have been made to correct errors, however inadvertent errors may persist.

## 2021-05-21 NOTE — PROGRESS NOTES
Subjective: 
Patient was seen and examined. He was lethargic and nonverbal to me. He does open his eyes. Apparently Storey was placed and lifted out. As a result he has hematuria now. Currently we have the male pure wick catheter which is still leaking and urine output is not accurate. Past Medical History:  
Diagnosis Date  Acute renal failure ([facility] [address]  Anemia  Arthritis  DKA (diabetic ketoacidoses) ([facility] [address]  GERD (gastroesophageal reflux disease)  HTN (hypertension)  Hypercholesterolemia  Schizophrenia ([facility] [address]  Schizophreniform disorder ([facility] [address]  Seizure disorder ([facility] [address]  Type II or unspecified type diabetes mellitus without mention of complication, uncontrolled No past surgical history on file. Family History Problem Relation Age of Onset  Stroke Father Social History Tobacco Use  Smoking status: Never Smoker  Smokeless tobacco: Never Used Substance Use Topics  Alcohol use: No  
   
Current Facility-Administered Medications Medication Dose Route Frequency Provider Last Rate Last Admin  OLANZapine (ZyPREXA) tablet 2.5 mg  2.5 mg Oral BID Brant Eldridge MD   2.5 mg at 05/21/21 1156  labetaloL (NORMODYNE;TRANDATE) 20 mg/4 mL (5 mg/mL) injection 10 mg  10 mg IntraVENous Q4H PRN Brant Eldridge MD   10 mg at 05/20/21 1840  
 insulin glargine (LANTUS) injection 25 Units  25 Units SubCUTAneous QHS Brant Eldridge MD   25 Units at 05/20/21 2140  levETIRAcetam in saline (iso-os) (KEPPRA) infusion 1,000 mg  1,000 mg IntraVENous Q12H Brant Eldridge MD   1,000 mg at 05/21/21 3833  divalproex (DEPAKOTE SPRINKLE) capsule 500 mg  500 mg Oral Q12H Brant Eldridge MD   500 mg at 05/21/21 2996  zinc oxide-white petrolatum 17-57 % topical paste   Topical TID Brant Eldridge MD   Given at 05/21/21 0900  
 lacosamide (VIMPAT) tablet 200 mg  200 mg Oral BID Brant Eldridge MD   200 mg at 05/21/21 0405  levothyroxine (SYNTHROID) tablet 75 mcg  75 mcg Oral 7am Zuleyma Hull MD   75 mcg at 05/21/21 0630  pravastatin (PRAVACHOL) tablet 20 mg  20 mg Oral QHS Zuleyma Hull MD   20 mg at 05/20/21 2140  
 dextrose (D50W) injection syrg 12.5-25 g  25-50 mL IntraVENous PRCLYDE Hull MD      
 glucagon [facility]) injection 1 mg  1 mg IntraMUSCular PRN Zuleyma Hull MD      
 heparin (porcine) injection 5,000 Units  5,000 Units SubCUTAneous Q8H Zuleyma Hull MD   5,000 Units at 05/21/21 3585  
 glucose chewable tablet 16 g  4 Tablet Oral PRN Zuleyma Hull MD      
 dextrose (D50W) injection syrg 12.5-25 g  25-50 mL IntraVENous PRN Zuleyma Hull MD      
 insulin lispro (HUMALOG) injection   SubCUTAneous Q4H Zuleyma Hull MD   4 Units at 05/21/21 1314 Review of Systems Unable to perform ROS: Mental status change All other systems reviewed and are negative. Objective Vital signs for last 24 hours: 
Visit Vitals /78 (BP 1 Location: Right upper arm, BP Patient Position: At rest) Pulse 79 Temp 98.8 °F (37.1 °C) Resp 16 Ht 5' 9\" (1.753 m) Wt 100.9 kg (222 lb 7.1 oz) SpO2 97% BMI 32.85 kg/m² General-obese, ill-appearing, lethargic, unable to orient HEENT - atraumatic normocephalic No pallor or icterus Neck- supple, no JVD 
CV- regular rate and rhythm  
no MRG Lung- clear bilaterally Abd- soft, nontender, nondistended Ext- no edema bilaterally. Skin- warm and dry; rash Urine appearanceluisito hematuria; pure wick catheter Recent Results (from the past 24 hour(s)) GLUCOSE, POC Collection Time: 05/20/21  5:25 PM  
Result Value Ref Range Glucose (POC) 216 (H) 65 - 117 mg/dL Performed by Raúl Fernandez(PCT) METABOLIC PANEL, BASIC Collection Time: 05/20/21  7:49 PM  
Result Value Ref Range Sodium 155 (H) 136 - 145 mmol/L Potassium 3.5 3.5 - 5.1 mmol/L  Chloride 121 (H) 97 - 108 mmol/L  
 CO2 31 21 - 32 mmol/L Anion gap 3 (L) 5 - 15 mmol/L Glucose 195 (H) 65 - 100 mg/dL BUN 31 (H) 6 - 20 mg/dL Creatinine 1.00 0.70 - 1.30 mg/dL BUN/Creatinine ratio 31 (H) 12 - 20 GFR est AA >60 >60 ml/min/1.73m2 GFR est non-AA >60 >60 ml/min/1.73m2 Calcium 8.9 8.5 - 10.1 mg/dL GLUCOSE, POC Collection Time: 05/20/21  8:04 PM  
Result Value Ref Range Glucose (POC) 162 (H) 65 - 117 mg/dL Performed by Lay Lauren, POC Collection Time: 05/20/21 11:53 PM  
Result Value Ref Range Glucose (POC) 187 (H) 65 - 117 mg/dL Performed by Apolonia Michel METABOLIC PANEL, BASIC Collection Time: 05/21/21  5:01 AM  
Result Value Ref Range Sodium 151 (H) 136 - 145 mmol/L Potassium 3.3 (L) 3.5 - 5.1 mmol/L Chloride 117 (H) 97 - 108 mmol/L  
 CO2 28 21 - 32 mmol/L Anion gap 6 5 - 15 mmol/L Glucose 195 (H) 65 - 100 mg/dL BUN 26 (H) 6 - 20 mg/dL Creatinine 0.86 0.70 - 1.30 mg/dL BUN/Creatinine ratio 30 (H) 12 - 20 GFR est AA >60 >60 ml/min/1.73m2 GFR est non-AA >60 >60 ml/min/1.73m2 Calcium 8.6 8.5 - 10.1 mg/dL GLUCOSE, POC Collection Time: 05/21/21  5:04 AM  
Result Value Ref Range Glucose (POC) 211 (H) 65 - 117 mg/dL Performed by Av Koo, POC Collection Time: 05/21/21  8:01 AM  
Result Value Ref Range Glucose (POC) 194 (H) 65 - 117 mg/dL Performed by [facility] METABOLIC PANEL, BASIC Collection Time: 05/21/21 10:46 AM  
Result Value Ref Range Sodium 148 (H) 136 - 145 mmol/L Potassium 3.2 (L) 3.5 - 5.1 mmol/L Chloride 115 (H) 97 - 108 mmol/L  
 CO2 30 21 - 32 mmol/L Anion gap 3 (L) 5 - 15 mmol/L Glucose 204 (H) 65 - 100 mg/dL BUN 27 (H) 6 - 20 mg/dL Creatinine 0.94 0.70 - 1.30 mg/dL BUN/Creatinine ratio 29 (H) 12 - 20 GFR est AA >60 >60 ml/min/1.73m2 GFR est non-AA >60 >60 ml/min/1.73m2 Calcium 8.3 (L) 8.5 - 10.1 mg/dL GLUCOSE, POC  Collection Time: 05/21/21 11:29 AM  
Result Value Ref Range Glucose (POC) 213 (H) 65 - 117 mg/dL Performed by Vinicio Raines, POC Collection Time: 05/21/21  3:26 PM  
Result Value Ref Range Glucose (POC) 230 (H) 65 - 117 mg/dL Performed by Lauren Monroe No intake or output data in the 24 hours ending 05/21/21 1605 Current Shift: No intake/output data recorded. Last 3 Shifts: 05/19 1901 - 05/21 0700 In: 400 [P.O.:400] Out: 600 [Urine:600] Physical Exam  
 
Data Review:  
Recent Results (from the past 24 hour(s)) GLUCOSE, POC Collection Time: 05/20/21  5:25 PM  
Result Value Ref Range Glucose (POC) 216 (H) 65 - 117 mg/dL Performed by Ani Fernandez(PCT) METABOLIC PANEL, BASIC Collection Time: 05/20/21  7:49 PM  
Result Value Ref Range Sodium 155 (H) 136 - 145 mmol/L Potassium 3.5 3.5 - 5.1 mmol/L Chloride 121 (H) 97 - 108 mmol/L  
 CO2 31 21 - 32 mmol/L Anion gap 3 (L) 5 - 15 mmol/L Glucose 195 (H) 65 - 100 mg/dL BUN 31 (H) 6 - 20 mg/dL Creatinine 1.00 0.70 - 1.30 mg/dL BUN/Creatinine ratio 31 (H) 12 - 20 GFR est AA >60 >60 ml/min/1.73m2 GFR est non-AA >60 >60 ml/min/1.73m2 Calcium 8.9 8.5 - 10.1 mg/dL GLUCOSE, POC Collection Time: 05/20/21  8:04 PM  
Result Value Ref Range Glucose (POC) 162 (H) 65 - 117 mg/dL Performed by Reva Hoyos, POC Collection Time: 05/20/21 11:53 PM  
Result Value Ref Range Glucose (POC) 187 (H) 65 - 117 mg/dL Performed by Sally [place] METABOLIC PANEL, BASIC Collection Time: 05/21/21  5:01 AM  
Result Value Ref Range Sodium 151 (H) 136 - 145 mmol/L Potassium 3.3 (L) 3.5 - 5.1 mmol/L Chloride 117 (H) 97 - 108 mmol/L  
 CO2 28 21 - 32 mmol/L Anion gap 6 5 - 15 mmol/L Glucose 195 (H) 65 - 100 mg/dL BUN 26 (H) 6 - 20 mg/dL Creatinine 0.86 0.70 - 1.30 mg/dL BUN/Creatinine ratio 30 (H) 12 - 20 GFR est AA >60 >60 ml/min/1.73m2 GFR est non-AA >60 >60 ml/min/1.73m2  Calcium 8.6 8.5 - 10.1 mg/dL GLUCOSE, POC Collection Time: 05/21/21  5:04 AM  
Result Value Ref Range Glucose (POC) 211 (H) 65 - 117 mg/dL Performed by Karl Roman, POC Collection Time: 05/21/21  8:01 AM  
Result Value Ref Range Glucose (POC) 194 (H) 65 - 117 mg/dL Performed by [facility] METABOLIC PANEL, BASIC Collection Time: 05/21/21 10:46 AM  
Result Value Ref Range Sodium 148 (H) 136 - 145 mmol/L Potassium 3.2 (L) 3.5 - 5.1 mmol/L Chloride 115 (H) 97 - 108 mmol/L  
 CO2 30 21 - 32 mmol/L Anion gap 3 (L) 5 - 15 mmol/L Glucose 204 (H) 65 - 100 mg/dL BUN 27 (H) 6 - 20 mg/dL Creatinine 0.94 0.70 - 1.30 mg/dL BUN/Creatinine ratio 29 (H) 12 - 20 GFR est AA >60 >60 ml/min/1.73m2 GFR est non-AA >60 >60 ml/min/1.73m2 Calcium 8.3 (L) 8.5 - 10.1 mg/dL GLUCOSE, POC Collection Time: 05/21/21 11:29 AM  
Result Value Ref Range Glucose (POC) 213 (H) 65 - 117 mg/dL Performed by Chepe Matias, POC Collection Time: 05/21/21  3:26 PM  
Result Value Ref Range Glucose (POC) 230 (H) 65 - 117 mg/dL Performed by Oscar King   
 
 
 
XR CHEST PORT Final Result Underexpanded lungs with bibasilar atelectasis. XR CHEST PORT Final Result No acute pulmonary process. CT HEAD WO CONT Final Result No acute intracranial process. Other findings as above. XR PELV 3 V Final Result No evidence of fracture. Patient Active Problem List  
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  
 Hypothyroidism due to acquired atrophy of thyroid E03.4  Mixed hyperlipidemia E78.2  
 Essential hypertension with goal blood pressure less than 140/90 I10  Severe obesity (BMI 35.0-39. 9) E66.01  
 Type 2 diabetes with nephropathy ([facility]) E11.21  
 Dementia ([facility] [address] F03.90  
 Controlled type 2 diabetes mellitus with complication, without long-term current use of insulin ([facility]) E11.8  Vitamin D deficiency E55.9  Morbid obesity ([facility]) E66.01  
 Acute encephalopathy G93.40 DIAGNOSES: 
1. Mild hyper natremiasodium improved mostly 2. Hypokalemiaimproving 3. Hyperglycemiaimproving 4. Encephalopathy due to above 5. History of hypothyroidism 6. History of hypertension 7. History of dementia/schizophreniform disorder 8. Severe dehydration DISCUSSION: 
 Sodium improved by 11 units  k is almost normal 
 
PLAN: 
 Will give normal saline in an attempt to keep sodium level from falling further.  If he is getting fluid overloaded , use lasix 40 mg iV Thanks for consulting me. Please don't hesitate to contact me if any questions arise of if I can assist in any manner. This dictation was done by dragon, computer voice recognition software. Often unanticipated grammatical, syntax, phones and other interpretive errors are inadvertently transcribed. Please excuse errors that have escaped final proofreading. Please contact me if you suspect dictation or transcription errors. Dr Lora Borden 
[address], [address] [place], [address] [place] [address] Cell Phone: [phone] Office phone: [phone] Fax: [phone]

## 2021-05-21 NOTE — PROGRESS NOTES
Reason for Admission:  Acute Encephalopathy RUR Score:   18% Plan for utilizing home health:      ? PCP: First and Last name:  Deidre Baptiste MD 
 
 Name of Practice:  
 Are you a current patient: Yes/No: Yes Approximate date of last visit: Has an appointment scheduled for June. Pt was seen by his PCP 4 months ago. Can you participate in a virtual visit with your PCP:  
                 
Current Advanced Directive/Advance Care Plan: Full Code Healthcare Decision Maker:  
Rachael Montiel (brother) [phone] Click here to complete [employer] including selection of the Healthcare Decision Maker Relationship (ie \"Primary\") Transition of Care Plan:                   
 
CM stopped by pt's room. Pt was resting with mitts on. Family was not present. Cm called pt's emergency contact - Rachael Montiel [phone]. Mr. Rachael Montiel indicated he is pt's brother and POA. CM verified home address. Pt lives with his brother and his brother's wife, however pt stays with his sister while his brother is at work. Pt has a wheelchair, cane, and bedside commode. Pt is not current with home health services. Cm asked Mr. Lauren Kelly if he would like pt receiving home health services and he indicated he does not want to make a decision at this time. Cm to f/up with Mr. Lauren Kelly at a later time for his decision on home health. Mr. Lauren Kelly indicated a  by the name of Oksana Trivedi is working on getting hand rails installed for the patient. Mr. Lauren Kelly states Oksana Trivedi is a  in [place]. Hospital visitation was discussed. Cm will continue to follow.

## 2021-05-21 NOTE — ROUTINE PROCESS
Bedside and Verbal shift change report given to Jah Esparza RN (oncoming nurse) by Mario Mcdermott (offgoing nurse). Report included the following information SBAR, Kardex, Intake/Output, MAR, Accordion and Recent Results.

## 2021-05-21 NOTE — PROGRESS NOTES
SPEECH LANGUAGE PATHOLOGY DYSPHAGIA TREATMENT Patient: Donnie Duque (22 y.o. male) Date: 5/21/2021 Diagnosis: Acute encephalopathy [G93.40] Precautions:  Aspiration and fall ASSESSMENT : 
Patient drowsy, oriented x1, and follows basic commands w/ mod cues. PO trials of thin, hard and soft solids. Oral phase c/b slow mastication s/t alertness and dentition w/ reduced A-P transit. Pharyngeal phase c/b mild swallow delay and HLE is wfl upon palpation. No overt s/s of pen/asp observed. Patient remains aspiration risk s/t alertness and confusion. Patient will benefit from skilled intervention to address the above impairments. Patients rehabilitation potential is considered to be Fair PLAN : 
Recommendations and Planned Interventions: 
Rec ground/thin w/ strict asp/GERD precautions. ONLY feed when alert, slow rate of intake, small bites/sips and no straws. Frequency/Duration: Patient will be followed by speech-language pathology 4 times a week to address goals. Discharge Recommendations: [facility] SUBJECTIVE:  
Patient denies difficulty w/ lunch. PCT reports consumed 100% w/o coughing. OBJECTIVE:  
 
Past Medical History:  
Diagnosis Date Acute renal failure ([address] Anemia Arthritis DKA (diabetic ketoacidoses) ([facility] [address] GERD (gastroesophageal reflux disease) HTN (hypertension) Hypercholesterolemia Schizophrenia ([facility] [address] Schizophreniform disorder ([facility] [address] Seizure disorder ([facility] [address] Type II or unspecified type diabetes mellitus without mention of complication, uncontrolled XR CHEST PORT Final Result Underexpanded lungs with bibasilar atelectasis. XR CHEST PORT Final Result No acute pulmonary process. CT HEAD WO CONT Final Result No acute intracranial process. Other findings as above. XR PELV 3 V Final Result No evidence of fracture. Current Diet:  DIET DIABETIC CONSISTENT CARB Cognitive and Communication Status: 
Neurologic State: Martha Romp Orientation Level: Oriented to person Cognition: Appropriate for age attention/concentration Pain: 
Pain Scale 1: Visual 
Pain Intensity 1: 0 After treatment:  
Patient left in no apparent distress in bed, Call bell within reach, and Nursing notified COMMUNICATION/EDUCATION:  
Patient's alertness and confusion negatively impacts comprehension and carryover of education. The patient's plan of care including recommendations, planned interventions, and recommended diet changes were discussed with: Registered nurse. Patient is unable to participate in goal setting and plan of care. Problem: Dysphagia (Adult) Goal: *Acute Goals and Plan of Care (Insert Text) Description: Speech Therapy Swallow Goals Initiated 5/20/2021 
-Patient will tolerate ground diet with thin liquids without clinical indicators of aspiration given minimal cues within 7day(s). -Patient will tolerate PO trials without clinical indicators of aspiration given minimal cues within 7 day(s). -Patient will participate in modified barium swallow study within 7 day(s). -Patient will demonstrate understanding of swallow safety precautions and aspiration precautions, diet recs with minimal cues within 7 day(s). Outcome: Progressing Towards Goal 
 Thank you for this referral. 
Heath Colon M.S., M.Ed., CCC-SLP Time Calculation: 8 mins

## 2021-05-21 NOTE — WOUND CARE
WCN consulted for Oracio Score of 12 / Risk Assessment. WCN evaluated patient on 5/19/21. Preventative measures in place. PrimoFit in place and working properly, heel boots in place, and order for wedge and zinc paste in place. Per Cora Almonte, sacrum intact, no wounds other than a few scabs on RLE. Re-consult WCN if skin condition changes.

## 2021-05-21 NOTE — PROGRESS NOTES
Comprehensive Nutrition Assessment Type and Reason for Visit: Initial (poor PO, dysphagia) Nutrition Recommendations/Plan:  
Continue Diabetic, Ground/ Thin diet Add Ensure Compact TID Add Ensure Pdg TID Document all PO intakes in EMR Nutrition Assessment:  AMS pta. CT head negative. Suspect encephalopathy 2/2 toxic/metabolic derangements. Remains lethargic, requiring restraints. Initially NPO x2, SLP eval'd yesterday and rec'd Ground/thin. Remains ordered. No PO intakes documented in EMR- spoke with RN who reports limited d/t AMS, pt requires slow 1:1 feeds and frequently refuses. RD to add supplementation. Pt may benefit from NG placement for initiation of TF if PO intakes remain poor over weekend. Labs: Na 148, BUN 27, Cr wnl, , BG , K 3.2. Meds: insulin, heparin, keppra, statin, zinc. 
 
Malnutrition Assessment: 
Malnutrition Status:  Severe malnutrition Context:  Acute illness Findings of the 6 clinical characteristics of malnutrition:  
Energy Intake:  7 - 50% or less of est energy requirements for 5 or more days (poor PO x3) Weight Loss:  7.00 - Greater than 7.5% over 3 months Body Fat Loss:  No significant body fat loss, Muscle Mass Loss:  No significant muscle mass loss, Fluid Accumulation:  No significant fluid accumulation,   
 
Estimated Daily Nutrient Needs: 
Energy (kcal): 2025kcal (20kcal/kg); Weight Used for Energy Requirements: Current Protein (g): 101g (1g/kg); Weight Used for Protein Requirements: Current Fluid (ml/day): 2025mL; Method Used for Fluid Requirements: 1 ml/kcal 
 
Nutrition Related Findings:  NFPE without acute findings. No documented hx dysphagia, n/v, or c/d. No recent BM documented, +BS. No edema documented. Wounds:   
None Current Nutrition Therapies: DIET DIABETIC CONSISTENT CARB Mechanical Soft Anthropometric Measures: 
· Height:  5' 9\" (175.3 cm) · Current Body Wt:  100.9 kg (222 lb 7.1 oz) (5/19) · Ideal Body Wt:  160 lbs:  139 % · BMI Category:  Obese class 1 (BMI 30.0-34.9) (80kg) Wt Readings from Last 3 Encounters:  
05/19/21 100.9 kg (222 lb 7.1 oz) 03/12/21 120.2 kg (265 lb) 12/30/20 115.2 kg (254 lb) Limited wt hx indicates 15%BW loss x 6 months, presuming wt from 03/12 in error. Nutrition Diagnosis: · Biting/chewing (masticatory) difficulty related to cognitive or neurological impairment as evidenced by  (SLP rec'd dysphagia diet) Nutrition Interventions:  
Food and/or Nutrient Delivery: Continue current diet, Start oral nutrition supplement Nutrition Education and Counseling: No recommendations at this time Coordination of Nutrition Care: Continue to monitor while inpatient Goals: 
Meet >75% EENS via PO, Wt loss +/- 0.5kg per week, Maintain skin integrity Nutrition Monitoring and Evaluation:  
Behavioral-Environmental Outcomes: None identified Food/Nutrient Intake Outcomes: Food and nutrient intake, Supplement intake Physical Signs/Symptoms Outcomes: Weight, Skin Discharge Planning: Too soon to determine Electronically signed by Apolinar Palacios on 5/21/2021 at 3:46 PM 
 
Contact:  Emily/Neonatologist Neonatologist/Emily

## 2021-05-21 NOTE — PROGRESS NOTES
Dr. 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

## 2021-05-22 NOTE — PROGRESS NOTES
NEUROLOGY PROGRESS NOTE Admission History/Pertinent Events Victor Hugo Light is a 70y.o. year old male who presented on 5/18/2021. Patient has a past medical history of Seizures, Schizophrenia, HTN, HL, DM2, GERD who presented with decreased responsiveness. Per chart review, patient reportedly has been having decreased responsiveness and confusion for about a day prior to presentation which has slowly been worsening. In the ED, there was concern for possible seizure activity for which patient was treated with IV lorazepam and loaded with IV levetiracetam.  Patient was also given naloxone for concern for opioid overdose. Emergent CT head was negative for any acute findings. On initial workup, patient with sodium of 160, glucose of 632, creatinine of 1.75 and increasing troponins from 0.14 to 0.22. Home AEDs: Levetiracetam 1000 BID, Lacosamide 200 BID, Valproic Acid  BID ASSESSMENT/PLAN Impression Will continue patient on AEDs per below and continue to treat metabolic derangements. [address] Plan Encephalopathy, Likely d/t Toxic/Metabolic Derangements Breakthrough Seizure Activity Possible Essential Tremor Associated: Hypernatremia, Hyperglycemia, Elevated Creat 
-Q8Hr NeuroChecks 
-Seizure Precautions 
-Seizure Prophylaxis: Levetiracetam 1000 BID, Lacosamide 200 BID, Valproic Acid  BID 
-Do not drop sodium by more than 12 mEq and a 24 hour period to avoid central osmotic demyelination 
-STAT IV Lorazepam 2 mg with any clinical seizure activity lasting > 3 minutes and contact Neurology for further recommendations -PT/OT/ST as needed 
-Management of metabolic/infectious derangements to referring teams Please contact neurology with any further questions/concerns SUBJECTIVE Sodium at 148 this morning. No significant other neurologic changes on examination. Physical/Neurological Exam 
General: Elderly -American male Patient lethargic but awakens with minor physical stimulation and follows a central and peripheral commands persistently No significant receptive or expressive aphasia; moderate dysarthria Pupils react to light bilaterally; EOM Intact Blinks to threat bilaterally Intact to light touch on face bilaterally No facial droop Motor: Antigravity throughout to command High-frequency low amplitude persistent head tremor Sensation to light touch intact grossly throughout OBJECTIVE Vital Signs Temp:  [97.4 °F (36.3 °C)-99.1 °F (37.3 °C)] Pulse (Heart Rate):  [68-93] BP: (127-197)/() Resp Rate:  [12-30] O2 Sat (%):  [91 %-98 %] Weight: -- MEDICATIONS Current Facility-Administered Medications:  
  OLANZapine (ZyPREXA) tablet 2.5 mg, 2.5 mg, Oral, BID, Lonna Leyden, MD, 2.5 mg at 05/22/21 0857 
  0.9% sodium chloride infusion, 100 mL/hr, IntraVENous, CONTINUOUS, Shadia Mujica MD, Last Rate: 100 mL/hr at 05/21/21 1736, 100 mL/hr at 05/21/21 1736   labetaloL (NORMODYNE;TRANDATE) 20 mg/4 mL (5 mg/mL) injection 10 mg, 10 mg, IntraVENous, Q4H PRN, Lonna Leyden, MD, 10 mg at 05/20/21 1840 
  insulin glargine (LANTUS) injection 25 Units, 25 Units, SubCUTAneous, QHS, Lonna Leyden, MD, 25 Units at 05/21/21 2114   levETIRAcetam in saline (iso-os) (KEPPRA) infusion 1,000 mg, 1,000 mg, IntraVENous, Q12H, Lonna Leyden, MD, 1,000 mg at 05/22/21 3445   divalproex (DEPAKOTE SPRINKLE) capsule 500 mg, 500 mg, Oral, Q12H, Lonna Leyden, MD, 500 mg at 05/22/21 0856 
  zinc oxide-white petrolatum 17-57 % topical paste, , Topical, TID, Lonna Leyden, MD, Given at 05/22/21 0900 
  lacosamide (VIMPAT) tablet 200 mg, 200 mg, Oral, BID, Lonna Leyden, MD, 200 mg at 05/22/21 0758   levothyroxine (SYNTHROID) tablet 75 mcg, 75 mcg, Oral, 7am, Lonna Leyden, MD, 75 mcg at 05/22/21 0796   pravastatin (PRAVACHOL) tablet 20 mg, 20 mg, Oral, QHS, Lonna Leyden, MD, 20 mg at 05/21/21 2116 
  dextrose (D50W) injection syrg 12.5-25 g, 25-50 mL, IntraVENous, PRN, Topher Nuñez MD 
  glucagon [facility]) injection 1 mg, 1 mg, IntraMUSCular, PRN, Madelin Banuelos MD 
  heparin (porcine) injection 5,000 Units, 5,000 Units, SubCUTAneous, Q8H, Madelin Banuelos MD, 5,000 Units at 05/22/21 0701 
  glucose chewable tablet 16 g, 4 Tablet, Oral, PRN, Madelin Banuelos MD 
  dextrose (D50W) injection syrg 12.5-25 g, 25-50 mL, IntraVENous, PRN, Madelin Banuelos MD 
  insulin lispro (HUMALOG) injection, , SubCUTAneous, Q4H, Madelin Banuelos MD, 3 Units at 05/22/21 0742 Labs: I've reviewed the labs for today This document has been prepared by the Dragon voice recognition system, typographical errors may have occurred.  Attempts have been made to correct errors, however inadvertent errors may persist.

## 2021-05-22 NOTE — PROGRESS NOTES
Problem: Pressure Injury - Risk of 
Goal: *Prevention of pressure injury Description: Document Oracio Scale and appropriate interventions in the flowsheet. Outcome: Progressing Towards Goal 
Note: Pressure Injury Interventions: 
Sensory Interventions: Assess changes in LOC, Assess need for specialty bed, Avoid rigorous massage over bony prominences, Discuss PT/OT consult with provider, Float heels, Keep linens dry and wrinkle-free, Maintain/enhance activity level, Minimize linen layers, Monitor skin under medical devices, Pad between skin to skin, Pressure redistribution bed/mattress (bed type), Turn and reposition approx. every two hours (pillows and wedges if needed) Moisture Interventions: Absorbent underpads, Apply protective barrier, creams and emollients, Assess need for specialty bed, Check for incontinence Q2 hours and as needed, Internal/External urinary devices, Maintain skin hydration (lotion/cream), Limit adult briefs, Minimize layers, Moisture barrier, Offer toileting Q_hr Activity Interventions: Pressure redistribution bed/mattress(bed type), PT/OT evaluation, Assess need for specialty bed Mobility Interventions: Float heels, HOB 30 degrees or less, Pressure redistribution bed/mattress (bed type), PT/OT evaluation, Turn and reposition approx. every two hours(pillow and wedges) Nutrition Interventions: Document food/fluid/supplement intake, Discuss nutritional consult with provider, Offer support with meals,snacks and hydration Friction and Shear Interventions: HOB 30 degrees or less, Apply protective barrier, creams and emollients, Minimize layers Problem: Patient Education: Go to Patient Education Activity Goal: Patient/Family Education Outcome: Progressing Towards Goal 
  
Problem: Falls - Risk of 
Goal: *Absence of Falls Description: Document Camamalia Pillai Fall Risk and appropriate interventions in the flowsheet.  
Outcome: Progressing Towards Goal 
Note: Fall Risk Interventions: 
  
 
Mentation Interventions: Adequate sleep, hydration, pain control, Bed/chair exit alarm, Evaluate medications/consider consulting pharmacy, Increase mobility, Room close to nurse's station, Reorient patient, Toileting rounds, Update white board Medication Interventions: Bed/chair exit alarm, Evaluate medications/consider consulting pharmacy, Patient to call before getting OOB Elimination Interventions: Bed/chair exit alarm, Call light in reach, Patient to call for help with toileting needs, Stay With Me (per policy), Toileting schedule/hourly rounds Problem: Patient Education: Go to Patient Education Activity Goal: Patient/Family Education Outcome: Progressing Towards Goal 
  
Problem: Diabetes Self-Management Goal: *Disease process and treatment process Description: Define diabetes and identify own type of diabetes; list 3 options for treating diabetes. Outcome: Progressing Towards Goal 
Goal: *Incorporating nutritional management into lifestyle Description: Describe effect of type, amount and timing of food on blood glucose; list 3 methods for planning meals. Outcome: Progressing Towards Goal 
Goal: *Incorporating physical activity into lifestyle Description: State effect of exercise on blood glucose levels. Outcome: Progressing Towards Goal 
Goal: *Developing strategies to promote health/change behavior Description: Define the ABC's of diabetes; identify appropriate screenings, schedule and personal plan for screenings. Outcome: Progressing Towards Goal 
Goal: *Using medications safely Description: State effect of diabetes medications on diabetes; name diabetes medication taking, action and side effects. Outcome: Progressing Towards Goal 
Goal: *Monitoring blood glucose, interpreting and using results Description: Identify recommended blood glucose targets  and personal targets.  
Outcome: Progressing Towards Goal 
Goal: *Prevention, detection, treatment of acute complications Description: List symptoms of hyper- and hypoglycemia; describe how to treat low blood sugar and actions for lowering  high blood glucose level. Outcome: Progressing Towards Goal 
Goal: *Prevention, detection and treatment of chronic complications Description: Define the natural course of diabetes and describe the relationship of blood glucose levels to long term complications of diabetes. Outcome: Progressing Towards Goal 
Goal: *Developing strategies to address psychosocial issues Description: Describe feelings about living with diabetes; identify support needed and support network Outcome: Progressing Towards Goal 
Goal: *Insulin pump training Outcome: Progressing Towards Goal 
Goal: *Sick day guidelines Outcome: Progressing Towards Goal 
Goal: *Patient Specific Goal (EDIT GOAL, INSERT TEXT) Outcome: Progressing Towards Goal 
  
Problem: Patient Education: Go to Patient Education Activity Goal: Patient/Family Education Outcome: Progressing Towards Goal 
  
Problem: Patient Education: Go to Patient Education Activity Goal: Patient/Family Education Outcome: Progressing Towards Goal

## 2021-05-22 NOTE — PROGRESS NOTES
Problem: Pressure Injury - Risk of 
Goal: *Prevention of pressure injury Description: Document Oracio Scale and appropriate interventions in the flowsheet. Outcome: Progressing Towards Goal 
Note: Pressure Injury Interventions: 
Sensory Interventions: Assess changes in LOC, Assess need for specialty bed, Avoid rigorous massage over bony prominences, Float heels, Keep linens dry and wrinkle-free, Maintain/enhance activity level, Minimize linen layers, Monitor skin under medical devices Moisture Interventions: Absorbent underpads, Apply protective barrier, creams and emollients, Assess need for specialty bed, Maintain skin hydration (lotion/cream), Minimize layers, Moisture barrier Activity Interventions: Increase time out of bed, Pressure redistribution bed/mattress(bed type), PT/OT evaluation Mobility Interventions: HOB 30 degrees or less, Pressure redistribution bed/mattress (bed type), PT/OT evaluation Nutrition Interventions: Document food/fluid/supplement intake Friction and Shear Interventions: Lift sheet, Minimize layers Problem: Patient Education: Go to Patient Education Activity Goal: Patient/Family Education Outcome: Progressing Towards Goal 
  
Problem: Falls - Risk of 
Goal: *Absence of Falls Description: Document Baylee Pillai Fall Risk and appropriate interventions in the flowsheet. Outcome: Progressing Towards Goal 
Note: Fall Risk Interventions: 
  
 
Mentation Interventions: Adequate sleep, hydration, pain control, Bed/chair exit alarm, Door open when patient unattended, Evaluate medications/consider consulting pharmacy, More frequent rounding, Reorient patient, Room close to nurse's station, Toileting rounds, Update white board Medication Interventions: Bed/chair exit alarm, Evaluate medications/consider consulting pharmacy Elimination Interventions: Bed/chair exit alarm, Call light in reach, Toileting schedule/hourly rounds Problem: Patient Education: Go to Patient Education Activity Goal: Patient/Family Education Outcome: Progressing Towards Goal 
  
Problem: Diabetes Self-Management Goal: *Disease process and treatment process Description: Define diabetes and identify own type of diabetes; list 3 options for treating diabetes. Outcome: Progressing Towards Goal 
Goal: *Incorporating nutritional management into lifestyle Description: Describe effect of type, amount and timing of food on blood glucose; list 3 methods for planning meals. Outcome: Progressing Towards Goal 
Goal: *Incorporating physical activity into lifestyle Description: State effect of exercise on blood glucose levels. Outcome: Progressing Towards Goal 
Goal: *Developing strategies to promote health/change behavior Description: Define the ABC's of diabetes; identify appropriate screenings, schedule and personal plan for screenings. Outcome: Progressing Towards Goal 
Goal: *Using medications safely Description: State effect of diabetes medications on diabetes; name diabetes medication taking, action and side effects. Outcome: Progressing Towards Goal 
Goal: *Monitoring blood glucose, interpreting and using results Description: Identify recommended blood glucose targets  and personal targets. Outcome: Progressing Towards Goal 
Goal: *Prevention, detection, treatment of acute complications Description: List symptoms of hyper- and hypoglycemia; describe how to treat low blood sugar and actions for lowering  high blood glucose level. Outcome: Progressing Towards Goal 
Goal: *Prevention, detection and treatment of chronic complications Description: Define the natural course of diabetes and describe the relationship of blood glucose levels to long term complications of diabetes. Outcome: Progressing Towards Goal 
Goal: *Developing strategies to address psychosocial issues Description: Describe feelings about living with diabetes; identify support needed and support network Outcome: Progressing Towards Goal 
Goal: *Insulin pump training Outcome: Progressing Towards Goal 
Goal: *Sick day guidelines Outcome: Progressing Towards Goal 
Goal: *Patient Specific Goal (EDIT GOAL, INSERT TEXT) Outcome: Progressing Towards Goal 
  
Problem: Patient Education: Go to Patient Education Activity Goal: Patient/Family Education Outcome: Progressing Towards Goal 
  
Problem: Patient Education: Go to Patient Education Activity Goal: Patient/Family Education Outcome: Progressing Towards Goal

## 2021-05-22 NOTE — PROGRESS NOTES
Bedside and Verbal shift change report given to Swati Subramanian RN (oncoming nurse) by Lora Clark (offgoing nurse). Report included the following information SBAR, Kardex, Intake/Output, MAR, Accordion and Recent Results.

## 2021-05-22 NOTE — PROGRESS NOTES
Progress Note Patient: Vernon Burton MRN: [id number]  SSN: [id number] YOB: 1945  Age: 70 y.o. Sex: male Admit Date: 5/18/2021 LOS: 4 days Subjective:  
 
75F, h/o seizures, DMII on oral meds, schizophrenia with unresponsiveness since 1 day likely s/t hypernatremia in the setting of ANDRES, seizures, His baseline isnt clear, but appears a little alert compared to yesterday. Will continue 0.225% saline. Will now consult nephrology. Aggressively treat uncontrolled sugars. NG placement- if morning labs doesnot show improvement in Na will add free water through NG. Review of Systems: 
Unable to perform ROS due to encephalopathy Objective:  
 
Vitals:  
 05/21/21 5806 05/21/21 1553 05/21/21 2019 05/22/21 5677 BP: 130/78 (!) 146/74 138/76 135/77 Pulse:  82 93 85 Resp: 16 16 18 16 Temp: 98.8 °F (37.1 °C) 98.9 °F (37.2 °C) 99.1 °F (37.3 °C) 99.1 °F (37.3 °C) SpO2: 97% 97% 98% 94% Weight:      
Height:  5' 9\" (1.753 m) Intake and Output: 
Current Shift: 05/22 0701 - 05/22 1900 In: 240 [P.O.:240] Out: 825 [Urine:825] Last three shifts: 05/20 1901 - 05/22 0700 In: 540 [P.O.:540] Out: 550 [Urine:550] Physical Exam:  
Physical Exam 
Vitals signs and nursing note reviewed. Constitutional:   
   Appearance: Normal appearance. He is obese. HENT:  
   Head: Normocephalic and atraumatic.  
   Nose: No congestion or rhinorrhea. Eyes:  
   Pupils: Pupils are equal, round, and reactive to light. Neck:  
   Musculoskeletal: Normal range of motion and neck supple. Cardiovascular:  
   Rate and Rhythm: Regular rhythm. Tachycardia present. Pulmonary:  
   Effort: Pulmonary effort is normal.  
   Breath sounds: Normal breath sounds. Abdominal:  
   General: Abdomen is flat. Bowel sounds are normal. There is no distension.  
   Tenderness: There is no abdominal tenderness. There is no guarding. Musculoskeletal: Normal range of motion. Skin: 
   General: Skin is warm and dry. Neurological:  
   General: No focal deficit present.  
   Mental Status: He is alert.  
   Comments: Respond to painful stimuli, not responsive to verbal commands  
 
Lab/Data Review: 
Recent Results (from the past 24 hour(s)) GLUCOSE, POC Collection Time: 05/21/21  3:26 PM  
Result Value Ref Range Glucose (POC) 230 (H) 65 - 117 mg/dL Performed by Joaquina Barnes Collection Time: 05/21/21  7:36 PM  
Result Value Ref Range Sodium 147 (H) 136 - 145 mmol/L Potassium 3.8 3.5 - 5.1 mmol/L Chloride 115 (H) 97 - 108 mmol/L  
 CO2 31 21 - 32 mmol/L Anion gap 1 (L) 5 - 15 mmol/L Glucose 292 (H) 65 - 100 mg/dL BUN 25 (H) 6 - 20 mg/dL Creatinine 0.99 0.70 - 1.30 mg/dL BUN/Creatinine ratio 25 (H) 12 - 20 GFR est AA >60 >60 ml/min/1.73m2 GFR est non-AA >60 >60 ml/min/1.73m2 Calcium 8.4 (L) 8.5 - 10.1 mg/dL GLUCOSE, POC Collection Time: 05/21/21  8:22 PM  
Result Value Ref Range Glucose (POC) 310 (H) 65 - 117 mg/dL Performed by Abril Villalobos GLUCOSE, POC Collection Time: 05/22/21 12:31 AM  
Result Value Ref Range Glucose (POC) 210 (H) 65 - 117 mg/dL Performed by Storm [facility] METABOLIC PANEL, BASIC Collection Time: 05/22/21  3:52 AM  
Result Value Ref Range Sodium 150 (H) 136 - 145 mmol/L Potassium 3.4 (L) 3.5 - 5.1 mmol/L Chloride 117 (H) 97 - 108 mmol/L  
 CO2 28 21 - 32 mmol/L Anion gap 5 5 - 15 mmol/L Glucose 195 (H) 65 - 100 mg/dL BUN 21 (H) 6 - 20 mg/dL Creatinine 0.87 0.70 - 1.30 mg/dL BUN/Creatinine ratio 24 (H) 12 - 20 GFR est AA >60 >60 ml/min/1.73m2 GFR est non-AA >60 >60 ml/min/1.73m2 Calcium 8.3 (L) 8.5 - 10.1 mg/dL GLUCOSE, POC Collection Time: 05/22/21  4:19 AM  
Result Value Ref Range Glucose (POC) 157 (H) 65 - 117 mg/dL Performed by VERENICE QUEZADA   
GLUCOSE, POC  Collection Time: 05/22/21  7:28 AM  
Result Value Ref Range Glucose (POC) 151 (H) 65 - 117 mg/dL Performed by Roddy Marshall, POC Collection Time: 05/22/21 11:41 AM  
Result Value Ref Range Glucose (POC) 223 (H) 65 - 117 mg/dL Performed by Eamon Dunne Assessment and plan:   
 
(1) Acute encephalopathy : GCS 13. Metabolic and seizure. s/t hypernatremia Start 0.22% saline, strict ACCU control. S/p ativan and keppra. Continue keppra, lacosamide and consult neurology. I am going to hold olanzapine for today.  
  
(2) Acute hypoxic respiratory failure: likely central with ativan/ keppra. Will order XR chest for any aspiration.  
  
(3) hypernatremia: 0.22 saline. Repeat at 2100 hours and then in AM. Strict sugar control 
  
(4) ANDRES: continue IVF. 
  
(5) DMII: complicates #1. SSI. Hold metformin 
  
(6) HTN: hold amlodipine, HCTZ, losartan. Resume as BP tolerates 
  
(7) HLD: pravastatin 
  
(8) Schizophrenia: olanzipine. Will hold today.  
  
(9) increased troponin: type II. Repeat Hussein. ECHO  
  
DVT ppx: heparin subQ 
  
DISPO: PT once alert. Likely 3-4 days of stay. But would need rehab at the least 
 
Signed By: Hever Mcdermott MD   
 May 22, 2021

## 2021-05-22 NOTE — PROGRESS NOTES
Subjective: 
Patient is seen and examined. He is more alert and doesn't have any clue why he is here No resp distress noted Past Medical History:  
Diagnosis Date  Acute renal failure ([facility] [address]  Anemia  Arthritis  DKA (diabetic ketoacidoses) [facility]  GERD (gastroesophageal reflux disease)  HTN (hypertension)  Hypercholesterolemia  Schizophrenia ([facility]  Schizophreniform disorder ([facility]  Seizure disorder ([facility]  Type II or unspecified type diabetes mellitus without mention of complication, uncontrolled No past surgical history on file. Family History Problem Relation Age of Onset  Stroke Father Social History Tobacco Use  Smoking status: Never Smoker  Smokeless tobacco: Never Used Substance Use Topics  Alcohol use: No  
   
Current Facility-Administered Medications Medication Dose Route Frequency Provider Last Rate Last Admin  potassium chloride (KLOR-CON) packet for solution 40 mEq  40 mEq Oral Q2H Lorry Bence, MD   40 mEq at 05/22/21 1301  OLANZapine (ZyPREXA) tablet 2.5 mg  2.5 mg Oral BID Lorry Bence, MD   2.5 mg at 05/22/21 0857  
 0.9% sodium chloride infusion  100 mL/hr IntraVENous CONTINUOUS Beatrice Stafford  mL/hr at 05/21/21 1736 100 mL/hr at 05/21/21 1736  labetaloL (NORMODYNE;TRANDATE) 20 mg/4 mL (5 mg/mL) injection 10 mg  10 mg IntraVENous Q4H PRN Lorry Bence, MD   10 mg at 05/20/21 1840  
 insulin glargine (LANTUS) injection 25 Units  25 Units SubCUTAneous QHS Lorry Bence, MD   25 Units at 05/21/21 2114  levETIRAcetam in saline (iso-os) (KEPPRA) infusion 1,000 mg  1,000 mg IntraVENous Q12H Lorry Bence, MD   1,000 mg at 05/22/21 1445  divalproex (DEPAKOTE SPRINKLE) capsule 500 mg  500 mg Oral Q12H Lorry Bence, MD   500 mg at 05/22/21 3630  
 zinc oxide-white petrolatum 17-57 % topical paste   Topical TID Lorry Bence, MD   Given at 05/22/21 0900  
 lacosamide (VIMPAT) tablet 200 mg  200 mg Oral BID Louie Rowan MD   200 mg at 05/22/21 1103  levothyroxine (SYNTHROID) tablet 75 mcg  75 mcg Oral 7am Louie Rowan MD   75 mcg at 05/22/21 3783  pravastatin (PRAVACHOL) tablet 20 mg  20 mg Oral QHS Louie Rowan MD   20 mg at 05/21/21 2116  dextrose (D50W) injection syrg 12.5-25 g  25-50 mL IntraVENous PRN Louie Rowan MD      
 glucagon [facility]) injection 1 mg  1 mg IntraMUSCular PRN Louie Rowan MD      
 heparin (porcine) injection 5,000 Units  5,000 Units SubCUTAneous Q8H Louie Rowan MD   5,000 Units at 05/22/21 0701  
 glucose chewable tablet 16 g  4 Tablet Oral PRN Louie Rowan MD      
 dextrose (D50W) injection syrg 12.5-25 g  25-50 mL IntraVENous PRN Louie Rowan MD      
 insulin lispro (HUMALOG) injection   SubCUTAneous Q4H Louie Rowan MD   4 Units at 05/22/21 1301 Review of Systems Unable to perform ROS: Mental status change All other systems reviewed and are negative. Objective Vital signs for last 24 hours: 
Visit Vitals /77 (BP 1 Location: Right upper arm, BP Patient Position: At rest) Pulse 85 Temp 99.1 °F (37.3 °C) Resp 16 Ht 5' 9\" (1.753 m) Wt 100.9 kg (222 lb 7.1 oz) SpO2 94% BMI 32.85 kg/m² Recent Results (from the past 24 hour(s)) METABOLIC PANEL, BASIC Collection Time: 05/21/21  7:36 PM  
Result Value Ref Range Sodium 147 (H) 136 - 145 mmol/L Potassium 3.8 3.5 - 5.1 mmol/L Chloride 115 (H) 97 - 108 mmol/L  
 CO2 31 21 - 32 mmol/L Anion gap 1 (L) 5 - 15 mmol/L Glucose 292 (H) 65 - 100 mg/dL BUN 25 (H) 6 - 20 mg/dL Creatinine 0.99 0.70 - 1.30 mg/dL BUN/Creatinine ratio 25 (H) 12 - 20 GFR est AA >60 >60 ml/min/1.73m2 GFR est non-AA >60 >60 ml/min/1.73m2 Calcium 8.4 (L) 8.5 - 10.1 mg/dL GLUCOSE, POC Collection Time: 05/21/21  8:22 PM  
Result Value Ref Range  Glucose (POC) 310 (H) 65 - 117 mg/dL Performed by Delonte Rodríguez GLUCOSE, POC Collection Time: 05/22/21 12:31 AM  
Result Value Ref Range Glucose (POC) 210 (H) 65 - 117 mg/dL Performed by Jimena Villeda METABOLIC PANEL, BASIC Collection Time: 05/22/21  3:52 AM  
Result Value Ref Range Sodium 150 (H) 136 - 145 mmol/L Potassium 3.4 (L) 3.5 - 5.1 mmol/L Chloride 117 (H) 97 - 108 mmol/L  
 CO2 28 21 - 32 mmol/L Anion gap 5 5 - 15 mmol/L Glucose 195 (H) 65 - 100 mg/dL BUN 21 (H) 6 - 20 mg/dL Creatinine 0.87 0.70 - 1.30 mg/dL BUN/Creatinine ratio 24 (H) 12 - 20 GFR est AA >60 >60 ml/min/1.73m2 GFR est non-AA >60 >60 ml/min/1.73m2 Calcium 8.3 (L) 8.5 - 10.1 mg/dL GLUCOSE, POC Collection Time: 05/22/21  4:19 AM  
Result Value Ref Range Glucose (POC) 157 (H) 65 - 117 mg/dL Performed by VERENICE VELOZ, POC Collection Time: 05/22/21  7:28 AM  
Result Value Ref Range Glucose (POC) 151 (H) 65 - 117 mg/dL Performed by Vanessa Headley, POC Collection Time: 05/22/21 11:41 AM  
Result Value Ref Range Glucose (POC) 223 (H) 65 - 117 mg/dL Performed by Jim Hatch   
TROPONIN I Collection Time: 05/22/21  3:30 PM  
Result Value Ref Range Troponin-I, Qt. <0.05 <0.05 ng/mL METABOLIC PANEL, BASIC Collection Time: 05/22/21  3:30 PM  
Result Value Ref Range Sodium 148 (H) 136 - 145 mmol/L Potassium 3.8 3.5 - 5.1 mmol/L Chloride 116 (H) 97 - 108 mmol/L  
 CO2 30 21 - 32 mmol/L Anion gap 2 (L) 5 - 15 mmol/L Glucose 315 (H) 65 - 100 mg/dL BUN 21 (H) 6 - 20 mg/dL Creatinine 0.97 0.70 - 1.30 mg/dL BUN/Creatinine ratio 22 (H) 12 - 20 GFR est AA >60 >60 ml/min/1.73m2 GFR est non-AA >60 >60 ml/min/1.73m2 Calcium 8.4 (L) 8.5 - 10.1 mg/dL Intake/Output Summary (Last 24 hours) at 5/22/2021 1703 Last data filed at 5/22/2021 6810 Gross per 24 hour Intake 540 ml Output 1375 ml Net -835 ml Current Shift: 05/22 0701 - 05/22 1900 In: 240 [P.O.:240] Out: 825 [Urine:825] Last 3 Shifts: 05/20 1901 - 05/22 0700 In: 540 [P.O.:540] Out: 550 [Urine:550] Physical Exam 
Constitutional:   
   Appearance: He is obese. HENT:  
   Head: Atraumatic. Nose: Nose normal.  
   Mouth/Throat:  
   Mouth: Mucous membranes are moist.  
Cardiovascular:  
   Rate and Rhythm: Normal rate and regular rhythm. Heart sounds: Normal heart sounds. Musculoskeletal:  
   Cervical back: Neck supple. Neurological:  
   Mental Status: He is alert. Data Review:  
Recent Results (from the past 24 hour(s)) METABOLIC PANEL, BASIC Collection Time: 05/21/21  7:36 PM  
Result Value Ref Range Sodium 147 (H) 136 - 145 mmol/L Potassium 3.8 3.5 - 5.1 mmol/L Chloride 115 (H) 97 - 108 mmol/L  
 CO2 31 21 - 32 mmol/L Anion gap 1 (L) 5 - 15 mmol/L Glucose 292 (H) 65 - 100 mg/dL BUN 25 (H) 6 - 20 mg/dL Creatinine 0.99 0.70 - 1.30 mg/dL BUN/Creatinine ratio 25 (H) 12 - 20 GFR est AA >60 >60 ml/min/1.73m2 GFR est non-AA >60 >60 ml/min/1.73m2 Calcium 8.4 (L) 8.5 - 10.1 mg/dL GLUCOSE, POC Collection Time: 05/21/21  8:22 PM  
Result Value Ref Range Glucose (POC) 310 (H) 65 - 117 mg/dL Performed by Poncho Zhou GLUCOSE, POC Collection Time: 05/22/21 12:31 AM  
Result Value Ref Range Glucose (POC) 210 (H) 65 - 117 mg/dL Performed by Janelle Banerjee METABOLIC PANEL, BASIC Collection Time: 05/22/21  3:52 AM  
Result Value Ref Range Sodium 150 (H) 136 - 145 mmol/L Potassium 3.4 (L) 3.5 - 5.1 mmol/L Chloride 117 (H) 97 - 108 mmol/L  
 CO2 28 21 - 32 mmol/L Anion gap 5 5 - 15 mmol/L Glucose 195 (H) 65 - 100 mg/dL BUN 21 (H) 6 - 20 mg/dL Creatinine 0.87 0.70 - 1.30 mg/dL BUN/Creatinine ratio 24 (H) 12 - 20 GFR est AA >60 >60 ml/min/1.73m2 GFR est non-AA >60 >60 ml/min/1.73m2 Calcium 8.3 (L) 8.5 - 10.1 mg/dL GLUCOSE, POC  Collection Time: 05/22/21  4:19 AM Result Value Ref Range Glucose (POC) 157 (H) 65 - 117 mg/dL Performed by VERENICE QUEZADA   
GLUCOSE, POC Collection Time: 05/22/21  7:28 AM  
Result Value Ref Range Glucose (POC) 151 (H) 65 - 117 mg/dL Performed by Yamileth Carmona, POC Collection Time: 05/22/21 11:41 AM  
Result Value Ref Range Glucose (POC) 223 (H) 65 - 117 mg/dL Performed by Rupal Yang   
TROPONIN I Collection Time: 05/22/21  3:30 PM  
Result Value Ref Range Troponin-I, Qt. <0.05 <0.05 ng/mL METABOLIC PANEL, BASIC Collection Time: 05/22/21  3:30 PM  
Result Value Ref Range Sodium 148 (H) 136 - 145 mmol/L Potassium 3.8 3.5 - 5.1 mmol/L Chloride 116 (H) 97 - 108 mmol/L  
 CO2 30 21 - 32 mmol/L Anion gap 2 (L) 5 - 15 mmol/L Glucose 315 (H) 65 - 100 mg/dL BUN 21 (H) 6 - 20 mg/dL Creatinine 0.97 0.70 - 1.30 mg/dL BUN/Creatinine ratio 22 (H) 12 - 20 GFR est AA >60 >60 ml/min/1.73m2 GFR est non-AA >60 >60 ml/min/1.73m2 Calcium 8.4 (L) 8.5 - 10.1 mg/dL XR CHEST PORT Final Result Underexpanded lungs with bibasilar atelectasis. XR CHEST PORT Final Result No acute pulmonary process. CT HEAD WO CONT Final Result No acute intracranial process. Other findings as above. XR PELV 3 V Final Result No evidence of fracture. Patient Active Problem List  
Diagnosis Code  DM (diabetes mellitus) ([facility] [address] E11.9  Seizures (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  
 Hypothyroidism due to acquired atrophy of thyroid E03.4  Mixed hyperlipidemia E78.2  
 Essential hypertension with goal blood pressure less than 140/90 I10  Severe obesity (BMI 35.0-39. 9) E66.01  
 Type 2 diabetes with nephropathy (HCC) E11.21  
 Dementia ([address] F03.90  
 Controlled type 2 diabetes mellitus with complication, without long-term current use of insulin ([facility]) E11.8  Vitamin D deficiency E55.9  Morbid obesity ([facility]) E66.01  
 Acute encephalopathy G93.40 DIAGNOSES: 
1. Mild hyper natremia-  sodium remains high 2. Hypokalemia -3.4 3. Hyperglycemiaimproving 4. Encephalopathy due to above 5. History of hypothyroidism 6. History of hypertension 7. History of dementia/schizophreniform disorder 8. Severe dehydration Plan-   
Continue IV fluids - but change it to half normal and run at  75 mL/hour 
 obtain BMP only daily 
 keep up with intake and output 
 supplement potassium to maintain potassium between 4-4.5

## 2021-05-22 NOTE — PROGRESS NOTES
Progress Note Patient: Josy Kelley MRN: [id number]  SSN: [id number] YOB: 1945  Age: 70 y.o. Sex: male Admit Date: 5/18/2021 LOS: 4 days Subjective:  
 
75F, h/o seizures, DMII on oral meds, schizophrenia with unresponsiveness since 1 day likely s/t hypernatremia in the setting of ANDRES, seizures, His baseline isnt clear, but appears a little alert compared to yesterday. Na improved. Will consult PT today for disposition. Resumed all home meds. Review of Systems: 
Unable to perform ROS due to encephalopathy Objective:  
 
Vitals:  
 05/21/21 4485 05/21/21 1553 05/21/21 2019 05/22/21 1477 BP: 130/78 (!) 146/74 138/76 135/77 Pulse:  82 93 85 Resp: 16 16 18 16 Temp: 98.8 °F (37.1 °C) 98.9 °F (37.2 °C) 99.1 °F (37.3 °C) 99.1 °F (37.3 °C) SpO2: 97% 97% 98% 94% Weight:      
Height:  5' 9\" (1.753 m) Intake and Output: 
Current Shift: 05/22 0701 - 05/22 1900 In: 240 [P.O.:240] Out: 825 [Urine:825] Last three shifts: 05/20 1901 - 05/22 0700 In: 540 [P.O.:540] Out: 550 [Urine:550] Physical Exam:  
Physical Exam 
Vitals signs and nursing note reviewed. Constitutional:   
   Appearance: Normal appearance. He is obese. HENT:  
   Head: Normocephalic and atraumatic.  
   Nose: No congestion or rhinorrhea. Eyes:  
   Pupils: Pupils are equal, round, and reactive to light. Neck:  
   Musculoskeletal: Normal range of motion and neck supple. Cardiovascular:  
   Rate and Rhythm: Regular rhythm. Tachycardia present. Pulmonary:  
   Effort: Pulmonary effort is normal.  
   Breath sounds: Normal breath sounds. Abdominal:  
   General: Abdomen is flat. Bowel sounds are normal. There is no distension.  
   Tenderness: There is no abdominal tenderness. There is no guarding. Musculoskeletal: Normal range of motion. Skin: 
   General: Skin is warm and dry.   
Neurological:  
   General: No focal deficit present.  
   Mental Status: He is alert.  
   Comments: Respond to painful stimuli, not responsive to verbal commands  
 
Lab/Data Review: 
Recent Results (from the past 24 hour(s)) GLUCOSE, POC Collection Time: 05/21/21  3:26 PM  
Result Value Ref Range Glucose (POC) 230 (H) 65 - 117 mg/dL Performed by Darlene Boas Collection Time: 05/21/21  7:36 PM  
Result Value Ref Range Sodium 147 (H) 136 - 145 mmol/L Potassium 3.8 3.5 - 5.1 mmol/L Chloride 115 (H) 97 - 108 mmol/L  
 CO2 31 21 - 32 mmol/L Anion gap 1 (L) 5 - 15 mmol/L Glucose 292 (H) 65 - 100 mg/dL BUN 25 (H) 6 - 20 mg/dL Creatinine 0.99 0.70 - 1.30 mg/dL BUN/Creatinine ratio 25 (H) 12 - 20 GFR est AA >60 >60 ml/min/1.73m2 GFR est non-AA >60 >60 ml/min/1.73m2 Calcium 8.4 (L) 8.5 - 10.1 mg/dL GLUCOSE, POC Collection Time: 05/21/21  8:22 PM  
Result Value Ref Range Glucose (POC) 310 (H) 65 - 117 mg/dL Performed by Kam Rodrigez GLUCOSE, POC Collection Time: 05/22/21 12:31 AM  
Result Value Ref Range Glucose (POC) 210 (H) 65 - 117 mg/dL Performed by Miguel Ramirez METABOLIC PANEL, BASIC Collection Time: 05/22/21  3:52 AM  
Result Value Ref Range Sodium 150 (H) 136 - 145 mmol/L Potassium 3.4 (L) 3.5 - 5.1 mmol/L Chloride 117 (H) 97 - 108 mmol/L  
 CO2 28 21 - 32 mmol/L Anion gap 5 5 - 15 mmol/L Glucose 195 (H) 65 - 100 mg/dL BUN 21 (H) 6 - 20 mg/dL Creatinine 0.87 0.70 - 1.30 mg/dL BUN/Creatinine ratio 24 (H) 12 - 20 GFR est AA >60 >60 ml/min/1.73m2 GFR est non-AA >60 >60 ml/min/1.73m2 Calcium 8.3 (L) 8.5 - 10.1 mg/dL GLUCOSE, POC Collection Time: 05/22/21  4:19 AM  
Result Value Ref Range Glucose (POC) 157 (H) 65 - 117 mg/dL Performed by VERENICE QUEZADA   
GLUCOSE, POC Collection Time: 05/22/21  7:28 AM  
Result Value Ref Range Glucose (POC) 151 (H) 65 - 117 mg/dL Performed by Trevor Dudley, POC  Collection Time: 05/22/21 11:41 AM  
Result Value Ref Range Glucose (POC) 223 (H) 65 - 117 mg/dL Performed by [facility] Human Assessment and plan:   
 
(1) Acute encephalopathy : GCS 13. Metabolic and seizure. s/t hypernatremia Start 0.22% saline, strict ACCU control. S/p ativan and keppra. Continue keppra, lacosamide and consult neurology. I am going to hold olanzapine for today.  
  
(2) Acute hypoxic respiratory failure: likely central with ativan/ keppra. Will order XR chest for any aspiration.  
  
(3) hypernatremia: 0.22 saline. Repeat at 2100 hours and then in AM. Strict sugar control 
  
(4) ANDRES: continue IVF. 
  
(5) DMII: complicates #1. SSI. Hold metformin 
  
(6) HTN: hold amlodipine, HCTZ, losartan. Resume as BP tolerates 
  
(7) HLD: pravastatin 
  
(8) Schizophrenia: olanzipine 
  
  
DVT ppx: heparin subQ 
  
DISPO:PT today Signed By: Catrachito Eagle MD   
 May 22, 2021

## 2021-05-23 NOTE — PROGRESS NOTES
Problem: Pressure Injury - Risk of 
Goal: *Prevention of pressure injury Description: Document Oracio Scale and appropriate interventions in the flowsheet. Outcome: Progressing Towards Goal 
Note: Pressure Injury Interventions: 
Sensory Interventions: Assess changes in LOC, Assess need for specialty bed, Keep linens dry and wrinkle-free, Maintain/enhance activity level, Minimize linen layers, Turn and reposition approx. every two hours (pillows and wedges if needed) Moisture Interventions: Absorbent underpads, Apply protective barrier, creams and emollients, Assess need for specialty bed, Maintain skin hydration (lotion/cream), Moisture barrier, Minimize layers Activity Interventions: Increase time out of bed, Pressure redistribution bed/mattress(bed type), PT/OT evaluation Mobility Interventions: HOB 30 degrees or less, Pressure redistribution bed/mattress (bed type), PT/OT evaluation Nutrition Interventions: Document food/fluid/supplement intake Friction and Shear Interventions: Lift sheet, Minimize layers Problem: Patient Education: Go to Patient Education Activity Goal: Patient/Family Education Outcome: Progressing Towards Goal 
  
Problem: Falls - Risk of 
Goal: *Absence of Falls Description: Document Chavez Maillard Fall Risk and appropriate interventions in the flowsheet. Outcome: Progressing Towards Goal 
Note: Fall Risk Interventions: 
  
 
Mentation Interventions: Adequate sleep, hydration, pain control, Reorient patient, Self-releasing belt Medication Interventions: Bed/chair exit alarm, Evaluate medications/consider consulting pharmacy, Teach patient to arise slowly Elimination Interventions: Bed/chair exit alarm, Call light in reach, Patient to call for help with toileting needs Problem: Patient Education: Go to Patient Education Activity Goal: Patient/Family Education Outcome: Progressing Towards Goal 
  
Problem: Diabetes Self-Management Goal: *Disease process and treatment process Description: Define diabetes and identify own type of diabetes; list 3 options for treating diabetes. Outcome: Progressing Towards Goal 
Goal: *Incorporating nutritional management into lifestyle Description: Describe effect of type, amount and timing of food on blood glucose; list 3 methods for planning meals. Outcome: Progressing Towards Goal 
Goal: *Incorporating physical activity into lifestyle Description: State effect of exercise on blood glucose levels. Outcome: Progressing Towards Goal 
Goal: *Developing strategies to promote health/change behavior Description: Define the ABC's of diabetes; identify appropriate screenings, schedule and personal plan for screenings. Outcome: Progressing Towards Goal 
Goal: *Using medications safely Description: State effect of diabetes medications on diabetes; name diabetes medication taking, action and side effects. Outcome: Progressing Towards Goal 
Goal: *Monitoring blood glucose, interpreting and using results Description: Identify recommended blood glucose targets  and personal targets. Outcome: Progressing Towards Goal 
Goal: *Prevention, detection, treatment of acute complications Description: List symptoms of hyper- and hypoglycemia; describe how to treat low blood sugar and actions for lowering  high blood glucose level. Outcome: Progressing Towards Goal 
Goal: *Prevention, detection and treatment of chronic complications Description: Define the natural course of diabetes and describe the relationship of blood glucose levels to long term complications of diabetes. Outcome: Progressing Towards Goal 
Goal: *Developing strategies to address psychosocial issues Description: Describe feelings about living with diabetes; identify support needed and support network Outcome: Progressing Towards Goal 
Goal: *Insulin pump training Outcome: Progressing Towards Goal 
Goal: *Sick day guidelines Outcome: Progressing Towards Goal 
Goal: *Patient Specific Goal (EDIT GOAL, INSERT TEXT) Outcome: Progressing Towards Goal 
  
Problem: Patient Education: Go to Patient Education Activity Goal: Patient/Family Education Outcome: Progressing Towards Goal 
  
Problem: Patient Education: Go to Patient Education Activity Goal: Patient/Family Education Outcome: Progressing Towards Goal

## 2021-05-23 NOTE — PROGRESS NOTES
Subjective: 
Patient is seen and examined. patient is doing well, better than yesterday 
 serum sodium is down to 148 No resp distress noted Past Medical History:  
Diagnosis Date  Acute renal failure ([facility] [facility]  Anemia  Arthritis  DKA (diabetic ketoacidoses) ([facility] [address] [facility]  GERD (gastroesophageal reflux disease)  HTN (hypertension)  Hypercholesterolemia  Schizophrenia ([facility] [address] [facility]  [facility]  Seizure disorder ([facility]  Type II or unspecified type diabetes mellitus without mention of complication, uncontrolled No past surgical history on file. Family History Problem Relation Age of Onset  Stroke Father Social History Tobacco Use  Smoking status: Never Smoker  Smokeless tobacco: Never Used Substance Use Topics  Alcohol use: No  
   
Current Facility-Administered Medications Medication Dose Route Frequency Provider Last Rate Last Admin  
 0.45% sodium chloride infusion  75 mL/hr IntraVENous CONTINUOUS Roseanna Pendleton MD      
 OLANZapine (ZyPREXA) tablet 2.5 mg  2.5 mg Oral BID Roseanna Pendleton MD   2.5 mg at 05/23/21 1003  labetaloL (NORMODYNE;TRANDATE) 20 mg/4 mL (5 mg/mL) injection 10 mg  10 mg IntraVENous Q4H PRN Roseanna Pendleton MD   10 mg at 05/20/21 1840  
 insulin glargine (LANTUS) injection 25 Units  25 Units SubCUTAneous QHS Roseanna Pendleton MD   25 Units at 05/22/21 2216  levETIRAcetam in saline (iso-os) (KEPPRA) infusion 1,000 mg  1,000 mg IntraVENous Q12H Roseanna Pendleton MD   1,000 mg at 05/23/21 1003  divalproex (DEPAKOTE SPRINKLE) capsule 500 mg  500 mg Oral Q12H Roseanna Pendleton MD   500 mg at 05/23/21 1004  zinc oxide-white petrolatum 17-57 % topical paste   Topical TID Roseanna Pendleton MD   Given at 05/23/21 1003  lacosamide (VIMPAT) tablet 200 mg  200 mg Oral BID Roseanna Pendleton MD   200 mg at 05/23/21 1004  levothyroxine (SYNTHROID) tablet 75 mcg  75 mcg Oral 7am Savannah Scotty Salinas MD   75 mcg at 05/23/21 2438  pravastatin (PRAVACHOL) tablet 20 mg  20 mg Oral QHS Arden Roberts MD   20 mg at 05/22/21 2216  dextrose (D50W) injection syrg 12.5-25 g  25-50 mL IntraVENous PRN Arden Roberts MD      
 glucagon [facility]) injection 1 mg  1 mg IntraMUSCular PRN Arden Roberts MD      
 heparin (porcine) injection 5,000 Units  5,000 Units SubCUTAneous Q8H Arden Roberts MD   5,000 Units at 05/23/21 0535  
 glucose chewable tablet 16 g  4 Tablet Oral PRN Arden Roberts MD      
 dextrose (D50W) injection syrg 12.5-25 g  25-50 mL IntraVENous PRN Arden Roberts MD      
 insulin lispro (HUMALOG) injection   SubCUTAneous Q4H Arden Roberts MD   4 Units at 05/23/21 1250 Objective Vital signs for last 24 hours: 
Visit Vitals BP (!) 164/96 (BP 1 Location: Left upper arm, BP Patient Position: At rest) Pulse 83 Temp 98 °F (36.7 °C) Resp 16 Ht 5' 9\" (1.753 m) Wt 100.9 kg (222 lb 7.1 oz) SpO2 94% BMI 32.85 kg/m² Recent Results (from the past 8 hour(s)) GLUCOSE, POC Collection Time: 05/23/21  7:43 AM  
Result Value Ref Range Glucose (POC) 151 (H) 65 - 117 mg/dL Performed by Yordy Kumar, POC Collection Time: 05/23/21 12:44 PM  
Result Value Ref Range Glucose (POC) 228 (H) 65 - 117 mg/dL Performed by Rossana Garcia Intake/Output Summary (Last 24 hours) at 5/23/2021 1537 Last data filed at 5/23/2021 1003 Gross per 24 hour Intake 720 ml Output 1700 ml Net -980 ml  
  
Current Shift: 05/23 0701 - 05/23 1900 In: 360 [P.O.:360] Out: 250 [Urine:250] Last 3 Shifts: 05/21 1901 - 05/23 0700 In: 1260 [P.O.:1260] Out: 1531 [Urine:2275] Physical Exam 
Constitutional:   
   Appearance: Normal appearance. He is obese. HENT:  
   Head: Atraumatic.   
   Nose: Nose normal.  
   Mouth/Throat:  
   Mouth: Mucous membranes are moist.  
Cardiovascular:  
   Rate and Rhythm: Normal rate and regular rhythm. Heart sounds: Normal heart sounds. Musculoskeletal:  
   Cervical back: Neck supple. Skin: 
   General: Skin is warm and dry. Neurological:  
   Mental Status: He is alert. Comments: Patient is more alert responding to questions appropriately Psychiatric:     
   Mood and Affect: Mood normal.     
   Behavior: Behavior normal.  
 
  
 serum sodium 148 
 k 3.8 Data Review:  
Recent Results (from the past 24 hour(s)) GLUCOSE, POC Collection Time: 05/22/21  5:53 PM  
Result Value Ref Range Glucose (POC) 323 (H) 65 - 117 mg/dL Performed by Haley Sparks, POC Collection Time: 05/22/21  7:40 PM  
Result Value Ref Range Glucose (POC) 371 (H) 65 - 117 mg/dL Performed by Tk Lazolich GLUCOSE, POC Collection Time: 05/22/21 11:22 PM  
Result Value Ref Range Glucose (POC) 320 (H) 65 - 117 mg/dL Performed by Tk Lazolich GLUCOSE, POC Collection Time: 05/23/21  4:09 AM  
Result Value Ref Range Glucose (POC) 209 (H) 65 - 117 mg/dL Performed by Tk Lazolich GLUCOSE, POC Collection Time: 05/23/21  7:43 AM  
Result Value Ref Range Glucose (POC) 151 (H) 65 - 117 mg/dL Performed by Windy Arenas, POC Collection Time: 05/23/21 12:44 PM  
Result Value Ref Range Glucose (POC) 228 (H) 65 - 117 mg/dL Performed by Jimena Tyson   
 
 
 
XR CHEST PORT Final Result Underexpanded lungs with bibasilar atelectasis. XR CHEST PORT Final Result No acute pulmonary process. CT HEAD WO CONT Final Result No acute intracranial process. Other findings as above. XR PELV 3 V Final Result No evidence of fracture. Patient Active Problem List  
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  
 Hypothyroidism due to acquired atrophy of thyroid E03.4  Mixed hyperlipidemia E78.2  
 Essential hypertension with goal blood pressure less than 140/90 I10  Severe obesity (BMI 35.0-39. 9) E66.01  
 Type 2 diabetes with nephropathy (HCC) E11.21  
 Dementia ([address] F03.90  
 Controlled type 2 diabetes mellitus with complication, without long-term current use of insulin (HCC) E11.8  Vitamin D deficiency E55.9  Morbid obesity (HCC) E66.01  
 Acute encephalopathy G93.40 DIAGNOSES: 
1. Mild hypernatremia-    improving 2. Hypokalemia - resolved 3. Hyperglycemiaimproving 4. Encephalopathy due to above 5. History of hypothyroidism 6. History of hypertension 7. History of dementia/schizophreniform disorder 8. Severe dehydration Plan-   
 encourage p.o. fluids 
 stop IV fluids 
 repeat BMP in am

## 2021-05-23 NOTE — PROGRESS NOTES
Progress Note Patient: Danielle Garcia MRN: [id number]  SSN: [id number] YOB: 1945  Age: 70 y.o. Sex: male Admit Date: 5/18/2021 LOS: 5 days Subjective:  
 
75F, h/o seizures, DMII on oral meds, schizophrenia with unresponsiveness since 1 day likely s/t hypernatremia in the setting of ANDRES, seizures, His baseline isnt clear, he is alert, and oriented x2. Na improved. Will consult PT today for disposition. Resumed all home meds. Patient may very well do [facility] has wheel chair and family do make sure that 24/7 care is there. Will let PT assess disposition. Review of Systems: 
Unable to perform ROS due to encephalopathy Objective:  
 
Vitals:  
 05/22/21 5885 05/22/21 1931 05/23/21 0230 05/23/21 5338 BP: 135/77 (!) 152/76 (!) 143/79 (!) 164/96 Pulse: 85 94 85 83 Resp: 16 16 16 16 Temp: 99.1 °F (37.3 °C) 100.1 °F (37.8 °C) 98.5 °F (36.9 °C) 98 °F (36.7 °C) SpO2: 94% 92% 94% 94% Weight:      
Height:      
  
 
Intake and Output: 
Current Shift: 05/23 0701 - 05/23 1900 In: 360 [P.O.:360] Out: 250 [Urine:250] Last three shifts: 05/21 1901 - 05/23 0700 In: 1260 [P.O.:1260] Out: 7877 [Urine:2275] Physical Exam:  
Physical Exam 
Vitals signs and nursing note reviewed. Constitutional:   
   Appearance: Normal appearance. He is obese. HENT:  
   Head: Normocephalic and atraumatic.  
   Nose: No congestion or rhinorrhea. Eyes:  
   Pupils: Pupils are equal, round, and reactive to light. Neck:  
   Musculoskeletal: Normal range of motion and neck supple. Cardiovascular:  
   Rate and Rhythm: Regular rhythm. Tachycardia present. Pulmonary:  
   Effort: Pulmonary effort is normal.  
   Breath sounds: Normal breath sounds. Abdominal:  
   General: Abdomen is flat. Bowel sounds are normal. There is no distension.  
   Tenderness: There is no abdominal tenderness. There is no guarding. Musculoskeletal: Normal range of motion.   
Skin:    General: Skin is warm and dry. Neurological:  
   General: No focal deficit present.  
   Mental Status: He is alert.  
   Comments: Respond to painful stimuli, not responsive to verbal commands  
 
Lab/Data Review: 
Recent Results (from the past 24 hour(s)) TROPONIN I Collection Time: 05/22/21  3:30 PM  
Result Value Ref Range Troponin-I, Qt. <0.05 <0.05 ng/mL METABOLIC PANEL, BASIC Collection Time: 05/22/21  3:30 PM  
Result Value Ref Range Sodium 148 (H) 136 - 145 mmol/L Potassium 3.8 3.5 - 5.1 mmol/L Chloride 116 (H) 97 - 108 mmol/L  
 CO2 30 21 - 32 mmol/L Anion gap 2 (L) 5 - 15 mmol/L Glucose 315 (H) 65 - 100 mg/dL BUN 21 (H) 6 - 20 mg/dL Creatinine 0.97 0.70 - 1.30 mg/dL BUN/Creatinine ratio 22 (H) 12 - 20 GFR est AA >60 >60 ml/min/1.73m2 GFR est non-AA >60 >60 ml/min/1.73m2 Calcium 8.4 (L) 8.5 - 10.1 mg/dL GLUCOSE, POC Collection Time: 05/22/21  5:53 PM  
Result Value Ref Range Glucose (POC) 323 (H) 65 - 117 mg/dL Performed by Saeed Vega, POC Collection Time: 05/22/21  7:40 PM  
Result Value Ref Range Glucose (POC) 371 (H) 65 - 117 mg/dL Performed by Radhames Sigala GLUCOSE, POC Collection Time: 05/22/21 11:22 PM  
Result Value Ref Range Glucose (POC) 320 (H) 65 - 117 mg/dL Performed by Radhames Sigala GLUCOSE, POC Collection Time: 05/23/21  4:09 AM  
Result Value Ref Range Glucose (POC) 209 (H) 65 - 117 mg/dL Performed by Radhames Sigala GLUCOSE, POC Collection Time: 05/23/21  7:43 AM  
Result Value Ref Range Glucose (POC) 151 (H) 65 - 117 mg/dL Performed by Elli Mederos Assessment and plan:   
 
(1) Acute encephalopathy : GCS 14. On 0.45% saline (2) Acute hypoxic respiratory failure: likely central with ativan/ keppra. Will order XR chest for any aspiration.  
  
(3) hypernatremia:0.45% saline 
  
(4) ANDRES: continue IVF. 
  
(5) DMII: complicates #1. SSI.  Hold metformin 
  
(6) HTN: hold amlodipine, HCTZ, losartan. Resume as BP tolerates 
  
(7) HLD: pravastatin 
  
(8) Schizophrenia: olanzipine 
  
  
DVT ppx: heparin subQ 
  
DISPO:PT today, likely dc emiliana if they recommend [facility] Signed By: Rafael Townsend MD   
 May 23, 2021

## 2021-05-23 NOTE — ROUTINE PROCESS
Bedside and verbal shift change report given to Tomas Foster RN (oncoming nurse) by Lam Brock RN (offgoing nurse). Report included the following information SBAR, Kardex, Intake/Output, MAR, Recent Results, and Quality Measures.

## 2021-05-23 NOTE — ROUTINE PROCESS
Bedside and Verbal shift change report given to Trent Araujo RN (oncoming nurse) by Rosemarie Enrique (offgoing nurse). Report included the following information SBAR, Kardex, Intake/Output, MAR, Accordion and Recent Results.

## 2021-05-24 NOTE — ROUTINE PROCESS
Bedside and verbal shift change report given to Catracho Hawthorne RN (oncoming nurse) by Patti Stewart RN (offgoing nurse). Report included the following information SBAR, Kardex, Intake/Output, MAR, Recent Results, and Quality Measures.

## 2021-05-24 NOTE — PROGRESS NOTES
Subjective: 
Patient is seen and examined. He is comfortable lying supine in bed. Past Medical History:  
Diagnosis Date  Acute renal failure ([facility] [address]  Anemia  Arthritis  [facility]  GERD (gastroesophageal reflux disease)  HTN (hypertension)  Hypercholesterolemia  Schizophrenia ([facility]  Schizophreniform disorder ([facility]  Seizure disorder ([facility]  Type II or unspecified type diabetes mellitus without mention of complication, uncontrolled No past surgical history on file. Family History Problem Relation Age of Onset  Stroke Father Social History Tobacco Use  Smoking status: Never Smoker  Smokeless tobacco: Never Used Substance Use Topics  Alcohol use: No  
   
Current Facility-Administered Medications Medication Dose Route Frequency Provider Last Rate Last Admin  
 0.45% sodium chloride infusion  75 mL/hr IntraVENous CONTINUOUS Henrry Triplett MD 75 mL/hr at 05/23/21 1700 75 mL/hr at 05/23/21 1700  OLANZapine (ZyPREXA) tablet 2.5 mg  2.5 mg Oral BID Henrry Triplett MD   2.5 mg at 05/24/21 1025  labetaloL (NORMODYNE;TRANDATE) 20 mg/4 mL (5 mg/mL) injection 10 mg  10 mg IntraVENous Q4H PRN Henrry Triplett MD   10 mg at 05/20/21 1840  
 insulin glargine (LANTUS) injection 25 Units  25 Units SubCUTAneous QHS Henrry Triplett MD   25 Units at 05/23/21 2110  levETIRAcetam in saline (iso-os) (KEPPRA) infusion 1,000 mg  1,000 mg IntraVENous Q12H Henryr Triplett MD   1,000 mg at 05/24/21 1026  divalproex (DEPAKOTE SPRINKLE) capsule 500 mg  500 mg Oral Q12H Henrry Triplett MD   500 mg at 05/24/21 1026  
 zinc oxide-white petrolatum 17-57 % topical paste   Topical TID Henrry Triplett MD   Given at 05/24/21 0900  
 lacosamide (VIMPAT) tablet 200 mg  200 mg Oral BID Henrry Triplett MD   200 mg at 05/24/21 1025  levothyroxine (SYNTHROID) tablet 75 mcg  75 mcg Oral 7am Henrry Triplett MD 75 mcg at 05/24/21 3497  pravastatin (PRAVACHOL) tablet 20 mg  20 mg Oral QHS Maryuri Osborne MD   20 mg at 05/23/21 2109  
 dextrose (D50W) injection syrg 12.5-25 g  25-50 mL IntraVENous PRCLYDE Osborne MD      
 glucagon [facility]) injection 1 mg  1 mg IntraMUSCular PRN Maryuri Osborne MD      
 heparin (porcine) injection 5,000 Units  5,000 Units SubCUTAneous Q8H Maryuri Osborne MD   5,000 Units at 05/24/21 0539  
 glucose chewable tablet 16 g  4 Tablet Oral PRN Maryuri Osborne MD      
 dextrose (D50W) injection syrg 12.5-25 g  25-50 mL IntraVENous PRN Maryuri Osborne MD      
 insulin lispro (HUMALOG) injection   SubCUTAneous Q4H Maryuri Osborne MD   10 Units at 05/24/21 1214 Objective Vital signs for last 24 hours: 
Visit Vitals BP (!) 153/83 (BP 1 Location: Left upper arm, BP Patient Position: At rest;Lying) Pulse (!) 105 Temp 98.7 °F (37.1 °C) Resp 20 Ht 5' 9\" (1.753 m) Wt 100.9 kg (222 lb 7.1 oz) SpO2 93% BMI 32.85 kg/m² Recent Results (from the past 8 hour(s)) GLUCOSE, POC Collection Time: 05/24/21 11:57 AM  
Result Value Ref Range Glucose (POC) 320 (H) 65 - 117 mg/dL Performed by Arturo Cooper Intake/Output Summary (Last 24 hours) at 5/24/2021 1623 Last data filed at 5/24/2021 1400 Gross per 24 hour Intake 720 ml Output 1501 ml Net -781 ml  
  
Current Shift: 05/24 0701 - 05/24 1900 In: 240 [P.O.:240] Out: 701 [Urine:700] Last 3 Shifts: 05/22 1901 - 05/24 0700 In: 1200 [P.O.:1200] Out: 2500 [Urine:2500] Physical Exam 
Constitutional:   
   Appearance: He is obese. HENT:  
   Mouth/Throat:  
   Mouth: Mucous membranes are moist.  
Cardiovascular:  
   Rate and Rhythm: Normal rate and regular rhythm. Heart sounds: Normal heart sounds. Abdominal:  
   General: Bowel sounds are normal.  
   Palpations: Abdomen is soft. Skin: 
   General: Skin is warm and dry.   
Neurological:  
   General: No focal deficit present. Mental Status: He is alert. Answers simple questions appropriately Data Review:  
Recent Results (from the past 24 hour(s)) GLUCOSE, POC Collection Time: 05/23/21  7:08 PM  
Result Value Ref Range Glucose (POC) 320 (H) 65 - 117 mg/dL Performed by Kaley Quick GLUCOSE, POC Collection Time: 05/23/21 11:16 PM  
Result Value Ref Range Glucose (POC) 235 (H) 65 - 117 mg/dL Performed by Kaley Quick GLUCOSE, POC Collection Time: 05/24/21  5:59 AM  
Result Value Ref Range Glucose (POC) 179 (H) 65 - 117 mg/dL Performed by [facility] Jim(PCT) GLUCOSE, POC Collection Time: 05/24/21  8:11 AM  
Result Value Ref Range Glucose (POC) 225 (H) 65 - 117 mg/dL Performed by Hanny Hartmann GLUCOSE, POC Collection Time: 05/24/21 11:57 AM  
Result Value Ref Range Glucose (POC) 320 (H) 65 - 117 mg/dL Performed by Hanny Hartmann   
 
 
 
XR CHEST PORT Final Result Underexpanded lungs with bibasilar atelectasis. XR CHEST PORT Final Result No acute pulmonary process. CT HEAD WO CONT Final Result No acute intracranial process. Other findings as above. XR PELV 3 V Final Result No evidence of fracture. Patient Active Problem List  
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  
 Hypothyroidism due to acquired atrophy of thyroid E03.4  Mixed hyperlipidemia E78.2  
 Essential hypertension with goal blood pressure less than 140/90 I10  Severe obesity (BMI 35.0-39. 9) E66.01  
 Type 2 diabetes with nephropathy (HCC) E11.21  
 Dementia ([facility] [address] F03.90  
 Controlled type 2 diabetes mellitus with complication, without long-term current use of insulin (HCC) E11.8  Vitamin D deficiency E55.9  Morbid obesity (HCC) E66.01  
 Acute encephalopathy G93.40 DIAGNOSES: 
1. Mild hypernatremia 2. Hypokalemia - resolved 3. Hyperglycemia 4. Encephalopathy resolved 5. History of hypothyroidism 6. History of hypertension 7. History of dementia/schizophrenia Plan-   
 encourage p.o. fluids 
 repeat BMP in am

## 2021-05-24 NOTE — PROGRESS NOTES
Problem: Mobility Impaired (Adult and Pediatric) Goal: *Acute Goals and Plan of Care (Insert Text) Description: CGA x1 with rolling L and R in bed x7 days CGA x1 with supine to sit EOB x7 days CGAx 1-2 for sit to stand transfer and stand pivot transfer from bed to chair x 7 days Pt will be able to participate with LE HEP with min cuing for technique x7 days Outcome: Not Met PHYSICAL THERAPY EVALUATION Patient: Victor Hugo Light (10 y.o. male) Date: 5/24/2021 Primary Diagnosis: Acute encephalopathy [G93.40] Precautions: fall ASSESSMENT 
 
79yo M admitted to hospital after being found unresponsive at group home. Pt admitted with ARF/encephalopathy now presents to PT with decreased bed mob, transfers, LE strength, gt, activity tolerance, balance, and overall functional mobility. PMH listed below. Pt confused upon PT arrival, unclear if subjective hx/PLOF given is accurate. Unclear of home environment pt comes from, per chart pt from group home, unclear if it is 1 or 2 stories. Pt states he uses his w/c, but unclear if pt ambulates much at baseline. It appears that he mostly may transfer from bed to his w/c per his report given this session. Unclear how much assist pt requires with ADLs at baseline. Currently, pt is mod A with rolling L and R in bed. Pt mod A for supine to sit EOB, max A to return to supine. Pt was able to perform sit to stand x 3 trials. First 2 trials he was able to stand with min Ax2 and last trial required mod Ax2 to stand. Pt only able to stand for brief periods of time before needing to sit back down. Unable to assess ambulation at this time as pt not able to stand long enough to be able to prep for amb, however unclear how much pt actually ambulates at baseline, he may be close to his functional baseline. Pt may benefit from skilled PT to address his functional deficits. Recommend d/c back to group home with HHPT at this time.     
 
PLAN : 
Recommendations and Planned Interventions: bed mobility training, transfer training, gait training, therapeutic exercises, patient and family training/education, and therapeutic activities Frequency/Duration: Patient will be followed by physical therapy:  3 times a week to address goals. Recommendation for discharge: (in order for the patient to meet his/her long term goals) Return to group home with HHPT SUBJECTIVE:  
Patient supine in bed upon PT arrival, agreeable to work with PT OBJECTIVE DATA SUMMARY:  
HISTORY:   
Past Medical History:  
Diagnosis Date Acute renal failure ([facility] Anemia Arthritis [facility]diabetic k[facility]acid[facility] GERD (gastroesophageal reflux disease) HTN (hypertension) Hypercholesterolemia Schizophrenia ([facility] Sch[facility]iform disorder [facility] Seizure disorder ([facility] Type II or unspecified type diabetes mellitus without mention of complication, uncontrolled No past surgical history on file. Personal factors and/or comorbidities impacting plan of care:  
 
Home Situation Home Environment: Group [place] One/Two Story Residence: Other (Comment) (unknown) Living Alone:  (unknown) Support Systems: Other (comments) (staff at group [place]) Patient Expects to be Discharged to[de-identified] Unknown Current DME Used/Available at Home: Wheelchair EXAMINATION/PRESENTATION/DECISION MAKING:  
Critical Behavior: 
Neurologic State: Alert, Confused Orientation Level: Oriented to person, Disoriented to situation, Disoriented to place, Disoriented to time Cognition: Decreased attention/concentration, Decreased command following, Impulsive Edema: slight edema noted in L hand upon removal of soft mitt Range Of Motion: 
AROM: Generally decreased, functional 
 B LE Strength:   
Strength: Generally decreased, functional 
Grossly 4-/5 Tone & Sensation:  
Sensation appears intact to LT B LE 
 
 
Functional Mobility: 
Bed Mobility: 
Rolling:  Moderate assistance Supine to Sit: Moderate assistance;Minimum assistance Sit to Supine: Maximum assistance;Assist x2 Transfers: 
Sit to Stand: Minimum assistance; Moderate assistance;Assist x2 (x3 trials, min A for first 2 trials mod A x last trial) Stand to Sit: Minimum assistance; Moderate assistance Balance:  
 Poor standing balance as pt fatigues. Pt did not req a lot of assistance x first 2 trials for standing, however as he fatigues his balance decreases and he requires more assist to stand and maintain balance Ambulation/Gait Training: 
Deferred, see assessment above Functional Measure: 
[address] [place] [id number] AM-PAC 6 Clicks Basic Mobility Inpatient Short Form How much difficulty does the patient currently have. .. Unable A Lot A Little None 1. Turning over in bed (including adjusting bedclothes, sheets and blankets)? [] 1   [] 2   [x] 3   [] 4  
2. Sitting down on and standing up from a chair with arms ( e.g., wheelchair, bedside commode, etc.)   [] 1   [] 2   [x] 3   [] 4  
3. Moving from lying on back to sitting on the side of the bed? [] 1   [] 2   [x] 3   [] 4 How much help from another person does the patient currently need. .. Total A Lot A Little None 4. Moving to and from a bed to a chair (including a wheelchair)? [] 1   [x] 2   [] 3   [] 4  
5. Need to walk in hospital room? [] 1   [x] 2   [] 3   [] 4  
6. Climbing 3-5 steps with a railing? [] 1   [x] 2   [] 3   [] 4  
© 2007, Trustees of [address] [place] [address] [address] [address], under license to [employer]. All rights reserved Score:  Initial: 15 Most Recent: X (Date: -- ) Interpretation of Tool:  Represents activities that are increasingly more difficult (i.e. Bed mobility, Transfers, Gait). Score 24 23 22-20 19-15 14-10 9-7 6 Modifier CH CI CJ CK CL CM CN Physical Therapy Evaluation Charge Determination History Examination Presentation Decision-Making MEDIUM  Complexity : 1-2 comorbidities / personal factors will impact the outcome/ POC  MEDIUM Complexity : 3 Standardized tests and measures addressing body structure, function, activity limitation and / or participation in recreation  LOW Complexity : Stable, uncomplicated  Other outcome measures [facility]  MEDIUM Based on the above components, the patient evaluation is determined to be of the following complexity level: MEDIUM Pain Rating: 
No c/o pain during session today Activity Tolerance:  
Fair Please refer to the flowsheet for vital signs taken during this treatment. After treatment patient left in no apparent distress:  
Supine in bed, Heels elevated for pressure relief, Call bell within reach, Bed / chair alarm activated, and Side rails x 3 
 
 
COMMUNICATION/EDUCATION:  
The patients plan of care was discussed with: Registered nurse and Case management. Patient is unable to participate in goal setting and plan of care. Thank you for this referral. 
Alexsandra Villanueva Time Calculation: 32 mins

## 2021-05-24 NOTE — PROGRESS NOTES
Progress Note Patient: Raheem Glynn MRN: [id number]  SSN: [id number] YOB: 1945  Age: 70 y.o. Sex: male Admit Date: 5/18/2021 LOS: 6 days Subjective:  
 
75F, h/o seizures, DMII on oral meds, schizophrenia with unresponsiveness since 1 day likely s/t hypernatremia in the setting of ANDRES, seizures, His baseline isnt clear, he is alert, and oriented x2. CURRENT Na improved. PT for disposition Likely need SNF Review of Systems: 
Unable to perform ROS due to encephalopathy Objective:  
 
Vitals:  
 05/23/21 0743 05/23/21 1542 05/23/21 1902 05/24/21 0215 BP: (!) 164/96 (!) 157/82 (!) 142/79 (!) 154/80 Pulse: 83 83 85 74 Resp: 16 16 16 18 Temp: 98 °F (36.7 °C) 99 °F (37.2 °C) 99.1 °F (37.3 °C) 98.4 °F (36.9 °C) SpO2: 94% 95% 95% 95% Weight:      
Height:      
  
 
Intake and Output: 
Current Shift: No intake/output data recorded. Last three shifts: 05/22 1901 - 05/24 0700 In: 1200 [P.O.:1200] Out: 2500 [Urine:2500] Physical Exam:  
Physical Exam 
Vitals signs and nursing note reviewed. Constitutional:   
   Appearance: Normal appearance. He is obese. HENT:  
   Head: Normocephalic and atraumatic.  
   Nose: No congestion or rhinorrhea. Eyes:  
   Pupils: Pupils are equal, round, and reactive to light. Neck:  
   Musculoskeletal: Normal range of motion and neck supple. Cardiovascular:  
   Rate and Rhythm: Regular rhythm. Tachycardia present. Pulmonary:  
   Effort: Pulmonary effort is normal.  
   Breath sounds: Normal breath sounds. Abdominal:  
   General: Abdomen is flat. Bowel sounds are normal. There is no distension.  
   Tenderness: There is no abdominal tenderness. There is no guarding. Musculoskeletal: Normal range of motion. Skin: 
   General: Skin is warm and dry.   
Neurological:  
   General: No focal deficit present.  
   Mental Status: He is alert.  
   Comments: Respond to painful stimuli, not responsive to verbal commands  
 
Lab/Data Review: 
Recent Results (from the past 24 hour(s)) GLUCOSE, POC Collection Time: 05/23/21  4:04 PM  
Result Value Ref Range Glucose (POC) 305 (H) 65 - 117 mg/dL Performed by Leonie Fuentes, POC Collection Time: 05/23/21  7:08 PM  
Result Value Ref Range Glucose (POC) 320 (H) 65 - 117 mg/dL Performed by Mar Alberto GLUCOSE, POC Collection Time: 05/23/21 11:16 PM  
Result Value Ref Range Glucose (POC) 235 (H) 65 - 117 mg/dL Performed by Mar Alberto GLUCOSE, POC Collection Time: 05/24/21  5:59 AM  
Result Value Ref Range Glucose (POC) 179 (H) 65 - 117 mg/dL Performed by Juanis Fernandez(PCT) GLUCOSE, POC Collection Time: 05/24/21  8:11 AM  
Result Value Ref Range Glucose (POC) 225 (H) 65 - 117 mg/dL Performed by Tawny Dunne GLUCOSE, POC Collection Time: 05/24/21 11:57 AM  
Result Value Ref Range Glucose (POC) 320 (H) 65 - 117 mg/dL Performed by Tawny Dunne Assessment and plan:   
 
(1) Acute encephalopathy : GCS 14. On 0.45% saline (2) Acute hypoxic respiratory failure: resolved 
  
(3) hypernatremia: 0.45% saline 
  
(4) ANDRES: continue IVF. 
  
(5) DMII: complicates #1. SSI. Hold metformin 
  
(6) HTN: hold amlodipine, HCTZ, losartan. Resume as BP tolerates 
  
(7) HLD: pravastatin 
  
(8) Schizophrenia: olanzipine 
  
  
DVT ppx: heparin subQ 
  
DISPO:likely need SNF,  PT for evaluation Signed By: Caleb Valdovinos MD   
 May 24, 2021

## 2021-05-24 NOTE — PROGRESS NOTES
Problem: Pressure Injury - Risk of 
Goal: *Prevention of pressure injury Description: Document Oracio Scale and appropriate interventions in the flowsheet. Outcome: Progressing Towards Goal 
Note: Pressure Injury Interventions: 
Sensory Interventions: Assess changes in LOC, Assess need for specialty bed, Avoid rigorous massage over bony prominences, Keep linens dry and wrinkle-free, Maintain/enhance activity level, Minimize linen layers, Monitor skin under medical devices Moisture Interventions: Absorbent underpads, Apply protective barrier, creams and emollients, Assess need for specialty bed, Maintain skin hydration (lotion/cream), Minimize layers, Moisture barrier Activity Interventions: Increase time out of bed, Pressure redistribution bed/mattress(bed type), PT/OT evaluation Mobility Interventions: Assess need for specialty bed, HOB 30 degrees or less, Pressure redistribution bed/mattress (bed type), PT/OT evaluation Nutrition Interventions: Document food/fluid/supplement intake Friction and Shear Interventions: Lift sheet, Lift team/patient mobility team, Minimize layers Problem: Patient Education: Go to Patient Education Activity Goal: Patient/Family Education Outcome: Progressing Towards Goal 
  
Problem: Falls - Risk of 
Goal: *Absence of Falls Description: Document Baylee Pillai Fall Risk and appropriate interventions in the flowsheet. Outcome: Progressing Towards Goal 
Note: Fall Risk Interventions: 
  
 
Mentation Interventions: Adequate sleep, hydration, pain control, Bed/chair exit alarm, Door open when patient unattended, Evaluate medications/consider consulting pharmacy, Increase mobility, More frequent rounding, Reorient patient, Toileting rounds, Update white board Medication Interventions: Bed/chair exit alarm, Patient to call before getting OOB Elimination Interventions: Bed/chair exit alarm, Call light in reach, Patient to call for help with toileting needs Problem: Patient Education: Go to Patient Education Activity Goal: Patient/Family Education Outcome: Progressing Towards Goal 
  
Problem: Diabetes Self-Management Goal: *Disease process and treatment process Description: Define diabetes and identify own type of diabetes; list 3 options for treating diabetes. Outcome: Progressing Towards Goal 
Goal: *Incorporating nutritional management into lifestyle Description: Describe effect of type, amount and timing of food on blood glucose; list 3 methods for planning meals. Outcome: Progressing Towards Goal 
Goal: *Incorporating physical activity into lifestyle Description: State effect of exercise on blood glucose levels. Outcome: Progressing Towards Goal 
Goal: *Developing strategies to promote health/change behavior Description: Define the ABC's of diabetes; identify appropriate screenings, schedule and personal plan for screenings. Outcome: Progressing Towards Goal 
Goal: *Using medications safely Description: State effect of diabetes medications on diabetes; name diabetes medication taking, action and side effects. Outcome: Progressing Towards Goal 
Goal: *Monitoring blood glucose, interpreting and using results Description: Identify recommended blood glucose targets  and personal targets. Outcome: Progressing Towards Goal 
Goal: *Prevention, detection, treatment of acute complications Description: List symptoms of hyper- and hypoglycemia; describe how to treat low blood sugar and actions for lowering  high blood glucose level. Outcome: Progressing Towards Goal 
Goal: *Prevention, detection and treatment of chronic complications Description: Define the natural course of diabetes and describe the relationship of blood glucose levels to long term complications of diabetes. Outcome: Progressing Towards Goal 
Goal: *Developing strategies to address psychosocial issues Description: Describe feelings about living with diabetes; identify support needed and support network Outcome: Progressing Towards Goal 
Goal: *Insulin pump training Outcome: Progressing Towards Goal 
Goal: *Sick day guidelines Outcome: Progressing Towards Goal 
Goal: *Patient Specific Goal (EDIT GOAL, INSERT TEXT) Outcome: Progressing Towards Goal 
  
Problem: Patient Education: Go to Patient Education Activity Goal: Patient/Family Education Outcome: Progressing Towards Goal 
  
Problem: Patient Education: Go to Patient Education Activity Goal: Patient/Family Education Outcome: Progressing Towards Goal

## 2021-05-25 NOTE — PROGRESS NOTES
Subjective: 
Patient is seen and examined. He is comfortable lying supine in bed. He answers simple questions. He is on aspiration precautions. Past Medical History:  
Diagnosis Date  Acute renal failure ([facility]  Anemia  Arthritis  [facility]  GERD (gastroesophageal reflux disease)  HTN (hypertension)  Hypercholesterolemia  Schizophrenia ([facility]  [facility]  Seizure disorder ([facility]  Type II or unspecified type diabetes mellitus without mention of complication, uncontrolled No past surgical history on file. Family History Problem Relation Age of Onset  Stroke Father Social History Tobacco Use  Smoking status: Never Smoker  Smokeless tobacco: Never Used Substance Use Topics  Alcohol use: No  
   
Current Facility-Administered Medications Medication Dose Route Frequency Provider Last Rate Last Admin  OLANZapine (ZyPREXA) tablet 2.5 mg  2.5 mg Oral BID Yamileth Cross MD   2.5 mg at 05/25/21 2993  labetaloL (NORMODYNE;TRANDATE) 20 mg/4 mL (5 mg/mL) injection 10 mg  10 mg IntraVENous Q4H PRN Yamileth Cross MD   10 mg at 05/25/21 0009  insulin glargine (LANTUS) injection 25 Units  25 Units SubCUTAneous QHS Yamileth Cross MD   25 Units at 05/24/21 2158  divalproex (DEPAKOTE SPRINKLE) capsule 500 mg  500 mg Oral Q12H Yamileth Cross MD   500 mg at 05/25/21 1960  zinc oxide-white petrolatum 17-57 % topical paste   Topical TID Yamileth Cross MD   Given at 05/25/21 0900  
 lacosamide (VIMPAT) tablet 200 mg  200 mg Oral BID Yamileth Cross MD   200 mg at 05/25/21 3332  levothyroxine (SYNTHROID) tablet 75 mcg  75 mcg Oral 7am Yamileth Cross MD   75 mcg at 05/25/21 3311  pravastatin (PRAVACHOL) tablet 20 mg  20 mg Oral QHS Yamileth Cross MD   20 mg at 05/24/21 2157  dextrose (D50W) injection syrg 12.5-25 g  25-50 mL IntraVENous PRN Yamileth Bouquet, MD      
 glucagon (GLUCAGEN) injection 1 mg  1 mg IntraMUSCular PRN Brant Eldridge MD      
 heparin (porcine) injection 5,000 Units  5,000 Units SubCUTAneous Q8H Brant Eldridge MD   5,000 Units at 05/25/21 0514  
 glucose chewable tablet 16 g  4 Tablet Oral PRN Brant Eldridge MD      
 dextrose (D50W) injection syrg 12.5-25 g  25-50 mL IntraVENous PRN Brant Eldridge MD      
 insulin lispro (HUMALOG) injection   SubCUTAneous Q4H Brant Eldridge MD   7 Units at 05/25/21 2577 Objective Vital signs for last 24 hours: 
Visit Vitals BP (!) 140/77 Pulse 74 Temp 98 °F (36.7 °C) Resp 17 Ht 5' 9\" (1.753 m) Wt 100.9 kg (222 lb 7.1 oz) SpO2 100% BMI 32.85 kg/m² Recent Results (from the past 8 hour(s)) GLUCOSE, POC Collection Time: 05/25/21  8:11 AM  
Result Value Ref Range Glucose (POC) 217 (H) 65 - 117 mg/dL Performed by Miguel Le GLUCOSE, POC Collection Time: 05/25/21 12:21 PM  
Result Value Ref Range Glucose (POC) 190 (H) 65 - 117 mg/dL Performed by Luis Yousif Intake/Output Summary (Last 24 hours) at 5/25/2021 1558 Last data filed at 5/25/2021 0002 Gross per 24 hour Intake  Output 1200 ml Net -1200 ml Current Shift: No intake/output data recorded. Last 3 Shifts: 05/23 1901 - 05/25 0700 In: 240 [P.O.:240] Out: 1901 [WEQOZ:4904] Physical Exam 
Constitutional:   
   Appearance: He is obese. HENT:  
   Mouth/Throat:  
   Mouth: Mucous membranes are moist.  
Cardiovascular:  
   Rate and Rhythm: Normal rate and regular rhythm. Heart sounds: Normal heart sounds. Abdominal:  
   General: Bowel sounds are normal.  
   Palpations: Abdomen is soft. Skin: 
   General: Skin is warm and dry. Neurological:  
   General: No focal deficit present. Mental Status: He is alert. Answers simple questions appropriately Data Review:  
Recent Results (from the past 24 hour(s)) METABOLIC PANEL, BASIC Collection Time: 05/24/21  4:13 PM  
Result Value Ref Range Sodium 139 136 - 145 mmol/L Potassium 3.6 3.5 - 5.1 mmol/L Chloride 105 97 - 108 mmol/L  
 CO2 29 21 - 32 mmol/L Anion gap 5 5 - 15 mmol/L Glucose 297 (H) 65 - 100 mg/dL BUN 13 6 - 20 mg/dL Creatinine 0.97 0.70 - 1.30 mg/dL BUN/Creatinine ratio 13 12 - 20 GFR est AA >60 >60 ml/min/1.73m2 GFR est non-AA >60 >60 ml/min/1.73m2 Calcium 9.5 8.5 - 10.1 mg/dL GLUCOSE, POC Collection Time: 05/24/21  7:39 PM  
Result Value Ref Range Glucose (POC) 287 (H) 65 - 117 mg/dL Performed by Helcrow [email or web address] GLUCOSE, POC Collection Time: 05/24/21 11:23 PM  
Result Value Ref Range Glucose (POC) 246 (H) 65 - 117 mg/dL Performed by [email or web address] GLUCOSE, POC Collection Time: 05/25/21  4:19 AM  
Result Value Ref Range Glucose (POC) 230 (H) 65 - 117 mg/dL Performed by Heloise Heimlich METABOLIC PANEL, BASIC Collection Time: 05/25/21  7:48 AM  
Result Value Ref Range Sodium 140 136 - 145 mmol/L Potassium 3.4 (L) 3.5 - 5.1 mmol/L Chloride 107 97 - 108 mmol/L  
 CO2 27 21 - 32 mmol/L Anion gap 6 5 - 15 mmol/L Glucose 232 (H) 65 - 100 mg/dL BUN 12 6 - 20 mg/dL Creatinine 0.85 0.70 - 1.30 mg/dL BUN/Creatinine ratio 14 12 - 20 GFR est AA >60 >60 ml/min/1.73m2 GFR est non-AA >60 >60 ml/min/1.73m2 Calcium 9.1 8.5 - 10.1 mg/dL GLUCOSE, POC Collection Time: 05/25/21  8:11 AM  
Result Value Ref Range Glucose (POC) 217 (H) 65 - 117 mg/dL Performed by Laura Loving GLUCOSE, POC Collection Time: 05/25/21 12:21 PM  
Result Value Ref Range Glucose (POC) 190 (H) 65 - 117 mg/dL Performed by Cal January   
 
 
 
XR CHEST PORT Final Result Underexpanded lungs with bibasilar atelectasis. XR CHEST PORT Final Result No acute pulmonary process. CT HEAD WO CONT Final Result No acute intracranial process. Other findings as above.   
  
  
  
XR PELV 3 V Final Result No evidence of fracture. Patient Active Problem List  
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  
 Hypothyroidism due to acquired atrophy of thyroid E03.4  Mixed hyperlipidemia E78.2  
 Essential hypertension with goal blood pressure less than 140/90 I10  Severe obesity (BMI 35.0-39. 9) E66.01  
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  
 Dementia [facility] F03.90  
 Controlled type 2 diabetes mellitus with complication, without long-term current use of insulin ([facility]) E11.8  Vitamin D deficiency E55.9  Morbid obesity ([facility]) E66.01  
 Acute encephalopathy G93.40 DIAGNOSES: 
1. Hypernatremia-resolved 2. Hypokalemia - resolved 3. Hyperglycemia 4. Encephalopathy resolved 5. History of hypothyroidism 6. History of hypertension. Last blood pressure is 140 /77 
7. History of dementia/schizophrenia Plan-   
 encourage p.o. fluids

## 2021-05-25 NOTE — PROGRESS NOTES
CM spoke to patient brother Estela Dale [phone]) regarding if he had made a decision regarding home health for patient. Brother reported that he spoke to sister and the patient currently receives home health with a company. Brother cannot remember home health company name. Brother will call his sister to find out home health company name and will call CM back.

## 2021-05-25 NOTE — PROGRESS NOTES
Comprehensive Nutrition Assessment Type and Reason for Visit: Reassess (interim) Nutrition Recommendations/Plan:  
Continue Diabetic, Mechanical soft diet 
  
Continue Ensure Compact TID Continue Ensure Pdg TID Document all PO intakes in EMR Nutrition Assessment:  AMS pta. CT head negative. Acute encephalopathy 2/2 toxic/metabolic derangements. Remains lethargic, requiring restraints. Initially NPO x2, SLP eval'd 5/20and rec'd Ground/thin, now upgraded to mechanical soft. . Pt consumed 26-50% of B on 5/21, and 100% ONS on 5/23. Spoke with RN today who reports pt ate 100% of B, but refused L. Pt does well with ONS. Pt requires slow 1:1 feeds and frequently refuses. Pt may benefit from NG placement for initiation of TF if PO intakes remain poor. Labs: Glu , K 3.4. Meds: insulin, heparin, statin, zinc.  
 
Malnutrition Assessment: 
Malnutrition Status:  Severe malnutrition Context:  Acute illness Findings of the 6 clinical characteristics of malnutrition:  
Energy Intake:  7 - 50% or less of est energy requirements for 5 or more days (poor PO x3) Weight Loss:  7.00 - Greater than 7.5% over 3 months Body Fat Loss:  No significant body fat loss, Muscle Mass Loss:  No significant muscle mass loss, Fluid Accumulation:  No significant fluid accumulation,   
 
Estimated Daily Nutrient Needs: 
Energy (kcal): 2025kcal (20kcal/kg); Weight Used for Energy Requirements: Current Protein (g): 101g (1g/kg); Weight Used for Protein Requirements: Current Fluid (ml/day): 2025mL; Method Used for Fluid Requirements: 1 ml/kcal 
 
Nutrition Related Findings:  NFPE without acute findings. No documented hx dysphagia, n/v, or c/d. Last BM documented on 5/25, +BS. No edema documented. Wounds:   
None Current Nutrition Therapies: DIET DIABETIC CONSISTENT CARB Mechanical Soft DIET NUTRITIONAL SUPPLEMENTS Breakfast, Lunch, Dinner; [employer] DIET NUTRITIONAL SUPPLEMENTS Breakfast, Lunch, Dinner; [employer] Anthropometric Measures: 
· Height:  5' 9\" (175.3 cm) · Current Body Wt:  100.9 kg (222 lb 7.1 oz) (5/19) · Ideal Body Wt:  160 lbs:  139 % · BMI Category:  Obese class 1 (BMI 30.0-34.9) (80kg) Wt Readings from Last 3 Encounters:  
05/19/21 100.9 kg (222 lb 7.1 oz) 03/12/21 120.2 kg (265 lb) 12/30/20 115.2 kg (254 lb) * Wt change of 12.3% in 5 months Nutrition Diagnosis: · Biting/chewing (masticatory) difficulty related to cognitive or neurological impairment as evidenced by  (SLP rec'd dysphagia diet) Nutrition Interventions:  
Food and/or Nutrient Delivery: Continue current diet, Start oral nutrition supplement Nutrition Education and Counseling: No recommendations at this time Coordination of Nutrition Care: Continue to monitor while inpatient Goals: 
Meet >75% EENS via PO, Wt loss +/- 0.5kg per week, Maintain skin integrity Nutrition Monitoring and Evaluation:  
Behavioral-Environmental Outcomes: None identified Food/Nutrient Intake Outcomes: Food and nutrient intake, Supplement intake Physical Signs/Symptoms Outcomes: Weight, Skin Discharge Planning: Too soon to determine Electronically signed by Fran Orellana RD on 5/25/2021 at 3:45 PM 
 
Contact: [phone]

## 2021-05-25 NOTE — ROUTINE PROCESS
Bedside and verbal shift change report given to Jaswinder Salcido RN (oncoming nurse) by Luan Aponte RN (offgoing nurse). Report included the following information SBAR, Kardex, Intake/Output, MAR, Recent Results, and Quality Measures.

## 2021-05-25 NOTE — PROGRESS NOTES
Problem: Pressure Injury - Risk of 
Goal: *Prevention of pressure injury Description: Document Oracio Scale and appropriate interventions in the flowsheet. Outcome: Progressing Towards Goal 
Note: Pressure Injury Interventions: 
Sensory Interventions: Assess changes in LOC, Assess need for specialty bed, Avoid rigorous massage over bony prominences, Keep linens dry and wrinkle-free, Maintain/enhance activity level, Minimize linen layers Moisture Interventions: Absorbent underpads, Apply protective barrier, creams and emollients, Assess need for specialty bed, Maintain skin hydration (lotion/cream), Minimize layers, Moisture barrier Activity Interventions: PT/OT evaluation Mobility Interventions: HOB 30 degrees or less, Pressure redistribution bed/mattress (bed type), PT/OT evaluation Nutrition Interventions: Document food/fluid/supplement intake Friction and Shear Interventions: Lift sheet, Minimize layers Problem: Patient Education: Go to Patient Education Activity Goal: Patient/Family Education Outcome: Progressing Towards Goal 
  
Problem: Falls - Risk of 
Goal: *Absence of Falls Description: Document Fracisco Raza Fall Risk and appropriate interventions in the flowsheet. Outcome: Progressing Towards Goal 
Note: Fall Risk Interventions: 
  
 
Mentation Interventions: Adequate sleep, hydration, pain control, More frequent rounding, Reorient patient Medication Interventions: Assess postural VS orthostatic hypotension, Patient to call before getting OOB Elimination Interventions: Bed/chair exit alarm, Call light in reach, Patient to call for help with toileting needs Problem: Patient Education: Go to Patient Education Activity Goal: Patient/Family Education Outcome: Progressing Towards Goal 
  
Problem: Diabetes Self-Management Goal: *Disease process and treatment process Description: Define diabetes and identify own type of diabetes; list 3 options for treating diabetes. Outcome: Progressing Towards Goal 
Goal: *Incorporating nutritional management into lifestyle Description: Describe effect of type, amount and timing of food on blood glucose; list 3 methods for planning meals. Outcome: Progressing Towards Goal 
Goal: *Incorporating physical activity into lifestyle Description: State effect of exercise on blood glucose levels. Outcome: Progressing Towards Goal 
Goal: *Developing strategies to promote health/change behavior Description: Define the ABC's of diabetes; identify appropriate screenings, schedule and personal plan for screenings. Outcome: Progressing Towards Goal 
Goal: *Using medications safely Description: State effect of diabetes medications on diabetes; name diabetes medication taking, action and side effects. Outcome: Progressing Towards Goal 
Goal: *Monitoring blood glucose, interpreting and using results Description: Identify recommended blood glucose targets  and personal targets. Outcome: Progressing Towards Goal 
Goal: *Prevention, detection, treatment of acute complications Description: List symptoms of hyper- and hypoglycemia; describe how to treat low blood sugar and actions for lowering  high blood glucose level. Outcome: Progressing Towards Goal 
Goal: *Prevention, detection and treatment of chronic complications Description: Define the natural course of diabetes and describe the relationship of blood glucose levels to long term complications of diabetes. Outcome: Progressing Towards Goal 
Goal: *Developing strategies to address psychosocial issues Description: Describe feelings about living with diabetes; identify support needed and support network Outcome: Progressing Towards Goal 
Goal: *Insulin pump training Outcome: Progressing Towards Goal 
Goal: *Sick day guidelines Outcome: Progressing Towards Goal 
Goal: *Patient Specific Goal (EDIT GOAL, INSERT TEXT) Outcome: Progressing Towards Goal 
  
Problem: Patient Education: Go to Patient Education Activity Goal: Patient/Family Education Outcome: Progressing Towards Goal 
  
Problem: Patient Education: Go to Patient Education Activity Goal: Patient/Family Education Outcome: Progressing Towards Goal 
  
Problem: Patient Education: Go to Patient Education Activity Goal: Patient/Family Education Outcome: Progressing Towards Goal

## 2021-05-25 NOTE — PROGRESS NOTES
Progress Note Patient: Feliberto Corea MRN: [id number]  SSN: [id number] YOB: 1945  Age: 70 y.o. Sex: male Admit Date: 5/18/2021 LOS: 7 days Subjective:  
 
75F, h/o seizures, DMII on oral meds, schizophrenia with unresponsiveness since 1 day likely s/t hypernatremia in the setting of ANDRES, seizures, His baseline isnt clear, he is alert, and oriented x2. CURRENT Na improved. Patient is on IV fluids, Oral intake looks good, will d/c IV fluids and watch BMP off iV fluids. Review of Systems: 
Limited review of systems  Due to AMS. Objective:  
 
Vitals:  
 05/24/21 0830 05/24/21 1936 05/25/21 0002 05/25/21 0032 BP: (!) 153/83 (!) 154/79 (!) 170/84 (!) 140/77 Pulse: (!) 105 83 74 Resp: 20 17 17 Temp: 98.7 °F (37.1 °C) 98.8 °F (37.1 °C) 98 °F (36.7 °C) SpO2: 93% 96% 100% Weight:      
Height:      
  
 
Intake and Output: 
Current Shift: No intake/output data recorded. Last three shifts: 05/23 1901 - 05/25 0700 In: 240 [P.O.:240] Out: 1901 [CVAQM:[id number]] PHYSICAL EXAM: 
General: Alert and awake, In nodistress Skin: Extremities and face reveal no rashes. No joseph erythema. No telangiectasias on the chest wall. HEENT: Sclerae anicteric. Extra-occular muscles are intact. No oral ulcers. No ENT discharge. The neck is supple. Cardiovascular: Regular rate and rhythm. No murmurs, gallops, or rubs. PMI nondisplaced. Carotids without bruits. Respiratory: Comfortable breathing with no accessory muscle use. Clear breath sounds with no wheezes, rales, or rhonchi. GI: Abdomen nondistended, soft, and nontender. Normal active bowel sounds. No enlargement of the liver or spleen. No masses palpable. Rectal: Deferred Musculoskeletal: No pitting edema of the lower legs. Extremities have good range of motion. No costovertebral tenderness. Neurological: awake, follow commands Psychiatric: Mood appears appropriate with judgement intact. Lab/Data Review: 
Recent Results (from the past 24 hour(s)) GLUCOSE, POC Collection Time: 05/24/21 11:57 AM  
Result Value Ref Range Glucose (POC) 320 (H) 65 - 117 mg/dL Performed by [place]am [place] GLUCOSE, POC Collection Time: 05/24/21  3:52 PM  
Result Value Ref Range Glucose (POC) 321 (H) 65 - 117 mg/dL Performed by Maegan Howard Collection Time: 05/24/21  4:13 PM  
Result Value Ref Range Sodium 139 136 - 145 mmol/L Potassium 3.6 3.5 - 5.1 mmol/L Chloride 105 97 - 108 mmol/L  
 CO2 29 21 - 32 mmol/L Anion gap 5 5 - 15 mmol/L Glucose 297 (H) 65 - 100 mg/dL BUN 13 6 - 20 mg/dL Creatinine 0.97 0.70 - 1.30 mg/dL BUN/Creatinine ratio 13 12 - 20 GFR est AA >60 >60 ml/min/1.73m2 GFR est non-AA >60 >60 ml/min/1.73m2 Calcium 9.5 8.5 - 10.1 mg/dL GLUCOSE, POC Collection Time: 05/24/21  7:39 PM  
Result Value Ref Range Glucose (POC) 287 (H) 65 - 117 mg/dL Performed by Ankit Anthony GLUCOSE, POC Collection Time: 05/24/21 11:23 PM  
Result Value Ref Range Glucose (POC) 246 (H) 65 - 117 mg/dL Performed by Ankit Anthony GLUCOSE, POC Collection Time: 05/25/21  4:19 AM  
Result Value Ref Range Glucose (POC) 230 (H) 65 - 117 mg/dL Performed by Ankit Anthony METABOLIC PANEL, BASIC Collection Time: 05/25/21  7:48 AM  
Result Value Ref Range Sodium 140 136 - 145 mmol/L Potassium 3.4 (L) 3.5 - 5.1 mmol/L Chloride 107 97 - 108 mmol/L  
 CO2 27 21 - 32 mmol/L Anion gap 6 5 - 15 mmol/L Glucose 232 (H) 65 - 100 mg/dL BUN 12 6 - 20 mg/dL Creatinine 0.85 0.70 - 1.30 mg/dL BUN/Creatinine ratio 14 12 - 20 GFR est AA >60 >60 ml/min/1.73m2 GFR est non-AA >60 >60 ml/min/1.73m2 Calcium 9.1 8.5 - 10.1 mg/dL GLUCOSE, POC Collection Time: 05/25/21  8:11 AM  
Result Value Ref Range Glucose (POC) 217 (H) 65 - 117 mg/dL Performed by Stephanie Hand Assessment and plan: (1) Acute encephalopathy : GCS 14. D/c IV fluids (2) Acute hypoxic respiratory failure: resolved 
  
(3) Hypernatremia: 0.45% saline 
  
(4) ANDRES: hold iv fluids and watch. 
  
(5) DMII: complicates #1. SSI. Hold metformin 
  
(6) HTN: Hold amlodipine, HCTZ, Losartan. Resume as BP tolerates 
  
(7) HLD: Pravastatin 
  
(8) Schizophrenia: olanzipine 
  
DVT ppx: heparin subQ 
  
DISPO: PT evaluation appreciated Signed By: Jennifer Cohen MD   
 May 25, 2021

## 2021-05-25 NOTE — PROGRESS NOTES
Midline catheter placed to left basilic, patient is down the mitchell from nursing station with mits on but continues to pull IV accesses. Suggestion was made to primary RN Q. To get an order for soft wrist restraints because patient is running out of IV access points.

## 2021-05-26 PROBLEM — E87.0 HYPERNATREMIA: Status: ACTIVE | Noted: 2021-01-01

## 2021-05-26 NOTE — DISCHARGE SUMMARY
HOSPITALIST DISCHARGE SUMMARY 
 
NAME: Juliane Jimenez :  1945 MRN:  [id number] Date/Time:  2021 9:42 AM 
 
DISCHARGE DIAGNOSIS: 
Principal Problem: 
  Acute encephalopathy (2021) Active Problems: 
  DM (diabetes mellitus) ([facility] [address] (2013) Seizures ([address] (2013) Schizophreniform disorder ([address] () Essential hypertension (2015) Hypothyroidism due to acquired atrophy of thyroid (2015) Mixed hyperlipidemia (2016) Severe obesity with body mass index (BMI) of 35.0 to 39.9 with serious comorbidity ([facility] [address] (6/15/2018) Type 2 diabetes with nephropathy ([facility] [address] (2018) Dementia ([facility] [address] (2017) Hypernatremia (2021) Admission Diagnosis: 
Encephalopathy CONSULTATIONS: Nephrology Procedures: see electronic medical records for all procedures/Xrays and details which were not copied into this note but were reviewed prior to creation of Plan. Please follow-up tests/labs that are still pendin. None DISCHARGE SUMMARY/HOSPITAL COURSE: for full details see H&P, daily progress notes, labs, consult notes. Briefly As Per HPI: 
72-year-old male with history of seizure disorder, schizophrenia, dementia, diabetes mellitus, hypertension was admitted to the hospital with a complaint of acute renal failure and seizures. Patient was found to have hyponatremia. He was given IV hydration. Patient's renal functions have slowly improved now. His mental status is also returned to baseline. Patient was seen by physical therapy and home with home health OT PT has been recommended. I have discussed patient's condition with patient's sister who agrees with the discharge plan. 
_______________________________________________________________________ Patient seen and examined by me on day of discharge. Pertinent findings are: 
Vitals: 
Visit Vitals BP (!) 152/82 (BP 1 Location: Left upper arm, BP Patient Position: At rest) Pulse 63 Temp 97.6 °F (36.4 °C) Resp 16 Ht 5' 9\" (1.753 m) Wt 100.9 kg (222 lb 7.1 oz) SpO2 94% BMI 32.85 kg/m² Temp (24hrs), Av.1 °F (36.7 °C), Min:97.6 °F (36.4 °C), Max:98.5 °F (36.9 °C) Last 24hr Input/Output: 
 
Intake/Output Summary (Last 24 hours) at [address]216 Last data filed at 2021 2614 Gross per 24 hour Intake  Output 600 ml Net -600 ml PHYSICAL EXAM:  
General: Alert and awake Skin: Extremities and face reveal no rashes. No joseph erythema. No telangiectasias on the chest wall. HEENT: Sclerae anicteric. Extra-occular muscles are intact. No oral ulcers. No ENT discharge. The neck is supple. Cardiovascular: Regular rate and rhythm. No murmurs, gallops, or rubs. PMI nondisplaced. Carotids without bruits. Respiratory: Comfortable breathing with no accessory muscle use. Clear breath sounds with no wheezes, rales, or rhonchi. GI: Abdomen nondistended, soft, and nontender. Normal active bowel sounds. No enlargement of the liver or spleen. No masses palpable. Rectal: Deferred Musculoskeletal: No pitting edema of the lower legs. Extremities have good range of motion. No costovertebral tenderness. Neurological:  Patient is alert and oriented. Power 5/5 Psychiatric: Mood appears appropriate with judgement intact. Lymphatic: No cervical or supraclavicular adenopathy. See Discharge Instructions for further details. _______________________________________________________________________ DISPOSITION:   
Home with Family:   
Home with HH/PT/OT/RN: x  
SNF/LTC:   
LUIS MANUEL:   
OTHER:   
________________________________________________________________________ Medications Reviewed: 
Current Discharge Medication List  
  
CONTINUE these medications which have CHANGED Details  
pravastatin (PRAVACHOL) 20 mg tablet Take 1 Tablet by mouth nightly. Qty: 90 Tablet, Refills: 1 Start date: 2021  
  
metFORMIN (GLUCOPHAGE) 500 mg tablet Take 1 Tablet by mouth two (2) times daily (with meals). Qty: 180 Tablet, Refills: 2 Start date: 5/26/2021 CONTINUE these medications which have NOT CHANGED Details  
amLODIPine (NORVASC) 5 mg tablet TAKE 1 TABLET BY MOUTH EVERY DAY IN THE MORNING Qty: 90 Tab, Refills: 1  
  
lacosamide (Vimpat) 200 mg tab tablet Take 200 mg by mouth two (2) times a day. levETIRAcetam 1,000 mg tablet Take 1,000 mg by mouth two (2) times a day. OLANZapine (ZyPREXA) 2.5 mg tablet Take 2.5 mg by mouth two (2) times a day. divalproex DR (DEPAKOTE) 500 mg tablet Take 500 mg by mouth two (2) times a day. Take two tablets twice daily  
  
levothyroxine (SYNTHROID) 75 mcg tablet Take 1 Tab by mouth every morning. Take in early morning. Qty: 90 Tab, Refills: 2 STOP taking these medications  
  
 hydroCHLOROthiazide (HYDRODIURIL) 25 mg tablet Comments:  
Reason for Stopping:   
   
  
 
 
PMH/ reviewed - no change compared to H&P 
________________________________________________________________________ Recommended diet:  DIET DIABETIC CONSISTENT CARB 
DIET NUTRITIONAL SUPPLEMENTS 
DIET NUTRITIONAL SUPPLEMENTS Recommended activity:Activity as tolerated Follow Up: Follow-up Information Follow up With Specialties Details Why Contact Info Ernestina Pryor MD Internal Medicine In 1 week  [id number] [facility] [address] [id number] [phone] 
  
  
 
 
 ______________________________________________________________________ Total time spent in discharge (min): >35 min  
________________________________________________________________________ Care Plan discussed with: 
  Comments Patient x Family  x   
RN x Care Manager x Consultant:     
________________________________________________________________________ Attending Physician: Ace Pashto, MD 
________________________________________________________________________ **Lab Data Reviewed: Recent Results (from the past 24 hour(s)) GLUCOSE, POC Collection Time: 05/25/21 12:21 PM  
Result Value Ref Range Glucose (POC) 190 (H) 65 - 117 mg/dL Performed by Chela Negrete GLUCOSE, POC Collection Time: 05/25/21  3:43 PM  
Result Value Ref Range Glucose (POC) 163 (H) 65 - 117 mg/dL Performed by Melida Pineda, POC Collection Time: 05/25/21  8:23 PM  
Result Value Ref Range Glucose (POC) 212 (H) 65 - 117 mg/dL Performed by Melida Pineda, POC Collection Time: 05/25/21 11:27 PM  
Result Value Ref Range Glucose (POC) 220 (H) 65 - 117 mg/dL Performed by Sylvia Tay   
GLUCOSE, POC Collection Time: 05/26/21  3:56 AM  
Result Value Ref Range Glucose (POC) 207 (H) 65 - 117 mg/dL Performed by Hipolito Dhaliwal GLUCOSE, POC Collection Time: 05/26/21  8:31 AM  
Result Value Ref Range Glucose (POC) 189 (H) 65 - 117 mg/dL Performed by Aura Fernandez(PCT) XR CHEST PORT Final Result Underexpanded lungs with bibasilar atelectasis. XR CHEST PORT Final Result No acute pulmonary process. CT HEAD WO CONT Final Result No acute intracranial process. Other findings as above. XR PELV 3 V Final Result No evidence of fracture. Recent Days: 
No results for input(s): WBC, HGB, HCT, PLT, HGBEXT, HCTEXT, PLTEXT in the last 72 hours. Recent Labs  
  05/25/21 
0748 05/24/21 
1613  139  
K 3.4* 3.6  105 CO2 27 29 * 297* BUN 12 13 CREA 0.85 0.97 CA 9.1 9.5

## 2021-05-26 NOTE — DISCHARGE INSTRUCTIONS
Patient Education     Altered Mental Status: Care Instructions  Your Care Instructions  Altered mental status is a change in how well your brain is working. As a result, you may be confused, be less alert than usual, or act in odd ways. This may include seeing or hearing things that aren't really there (hallucinations). A mental status change has many possible causes. For example, it may be the result of an infection, an imbalance of chemicals in the body, or a chronic disease such as diabetes or COPD. It can also be caused by things such as a head injury, taking certain medicines, or using alcohol or drugs. The doctor may do tests to look for the cause. These tests may include urine tests, blood tests, and imaging tests such as a CT scan. Sometimes a clear cause isn't found. But tests can help the doctor rule out a serious cause of your symptoms. A change in mental status can be scary. But mental status will often return to normal when the cause is treated. So it is important to get any follow-up testing or treatment the doctor has suggested. The doctor has checked you carefully, but problems can develop later. If you notice any problems or new symptoms, get medical treatment right away. Follow-up care is a key part of your treatment and safety. Be sure to make and go to all appointments, and call your doctor if you are having problems. It's also a good idea to know your test results and keep a list of the medicines you take. How can you care for yourself at home? · Be safe with medicines. Take your medicines exactly as prescribed. Call your doctor if you think you are having a problem with your medicine. · Have another adult stay with you until you are better. This can help keep you safe. Ask that person to watch for signs that your mental status is getting worse. When should you call for help? Call 911 anytime you think you may need emergency care.  For example, call if:  · You passed out (lost consciousness). Call your doctor now or seek immediate medical care if:  · Your mental status is getting worse. · You have new symptoms, such as a fever, chills, or shortness of breath. · You do not feel safe. Watch closely for changes in your health, and be sure to contact your doctor if:  · You do not get better as expected. Where can you learn more? Go to ZeaVision.be  Enter J452 in the search box to learn more about \"Altered Mental Status: Care Instructions. \"   © 0309-9903 [employer]. Care instructions adapted under license by [address] [place] [address] (which disclaims liability or warranty for this information). This care instruction is for use with your licensed healthcare professional. If you have questions about a medical condition or this instruction, always ask your healthcare professional. [place] [address] any warranty or liability for your use of this information. Content Version: 58.8.454669; Current as of: November 20, 2015           Patient Education        Dehydration: Care Instructions  Your Care Instructions  Dehydration happens when your body loses too much fluid. This might happen when you do not drink enough water or you lose large amounts of fluids from your body because of diarrhea, vomiting, or sweating. Severe dehydration can be life-threatening. Water and minerals called electrolytes help put your body fluids back in balance. Learn the early signs of fluid loss, and drink more fluids to prevent dehydration. Follow-up care is a key part of your treatment and safety. Be sure to make and go to all appointments, and call your doctor if you are having problems. It's also a good idea to know your test results and keep a list of the medicines you take. How can you care for yourself at home? · To prevent dehydration, drink plenty of fluids. Choose water and other caffeine-free clear liquids until you feel better.  If you have kidney, heart, or liver disease and have to limit fluids, talk with your doctor before you increase the amount of fluids you drink. · If you do not feel like eating or drinking, try taking small sips of water, sports drinks, or other rehydration drinks. · Get plenty of rest.  To prevent dehydration  · Add more fluids to your diet and daily routine, unless your doctor has told you not to. · During hot weather, drink more fluids. Drink even more fluids if you exercise a lot. Stay away from drinks with alcohol or caffeine. · Watch for the symptoms of dehydration. These include:  ? A dry, sticky mouth. ? Not much urine. ? Dry and sunken eyes. ? Feeling very tired. · Learn what problems can lead to dehydration. These include:  ? Diarrhea, fever, and vomiting. ? Any illness with a fever, such as pneumonia or the flu. ? Activities that cause heavy sweating, such as endurance races and heavy outdoor work in hot or humid weather. ? Alcohol or drug use or problems caused by quitting their use (withdrawal). ? Certain medicines, such as cold and allergy pills (antihistamines), diet pills (diuretics), and laxatives. ? Certain diseases, such as diabetes, cancer, and heart or kidney disease. When should you call for help? Call 911 anytime you think you may need emergency care. For example, call if:    · You passed out (lost consciousness). Call your doctor now or seek immediate medical care if:    · You are confused and cannot think clearly.     · You are dizzy or lightheaded, or you feel like you may faint.     · You have signs of needing more fluids. You have sunken eyes, a dry mouth, and you pass only a little urine.     · You cannot keep fluids down. Watch closely for changes in your health, and be sure to contact your doctor if:    · You are not making tears.     · Your skin is very dry and sags slowly back into place after you pinch it.     · Your mouth and eyes are very dry. Where can you learn more?   Go to http://www.gray.com/  Enter Y656 in the search box to learn more about \"Dehydration: Care Instructions. \"  Current as of: February 26, 2020               Content Version: 12.8  © 0858-5965 [employer]. Care instructions adapted under license by [employer] (which disclaims liability or warranty for this information). If you have questions about a medical condition or this instruction, always ask your healthcare professional. [place] [address] any warranty or liability for your use of this information.

## 2021-05-26 NOTE — PROGRESS NOTES
CM spoke with pt's brother - Rd Stephens [phone] to f/up on home health and to notify him of discharge. Mr. Marga Hancock was already aware of discharge. He indicated he spoke with the doctor about an hour ago. Mr. Marga Hancock is currently out of town, but will be at the hospital around 5-5:30pm to  patient and take him home. Mr. Marga Hancock provided writer with his sister Nii Santillan phone# [phone] to f/up on home health. Cm called pt's sister Nii Santillan [phone]. Patricia provided writer with the following agency name: [facility]. Cm questioned if "[facility] [employer] provided therapy services. Cm was informed Cm can speak with Mrs. Claudy Zheng at [facility]. Cm called "[facility] [facility] [phone]. Cm was informed Mrs. Wells was not available. Cm was informed they only provide personal care services, not therapy. "[facility] will continue to provide services either this evening or tomorrow. Cm called pt's brother - Mr. Sauceda back. Mr. Marga Hancock is agreeable with writer finding a home health company in network with [employer], however he would like to know if pt will be financially responsible or not for PT/OT services and that will determine if he will want home health or not. Referral made via [employer]. ______________________________________________ Pt accepted with [facility]. Per the [employer] health company, pt does not have a copay for home health services. Cm message them via [employer] to f/up with pt's brother - Delmy to see if he still wants home health for pt. Discharge order uploaded via [employer]. SOC: 5/28/21 (Thursday)

## 2021-05-26 NOTE — PROGRESS NOTES
Discharge plan of care/case management plan validated with provider discharge order. I have reviewed discharge instructions with the Pt brother, The pt brother verbalized understanding.

## 2021-05-27 NOTE — TELEPHONE ENCOUNTER
Patient discharged from [facility]. Will be receiving Home Health services. Will you follow?

## 2021-06-03 PROBLEM — E86.0 DEHYDRATION: Status: ACTIVE | Noted: 2021-01-01

## 2021-06-03 NOTE — ED PROVIDER NOTES
EMERGENCY DEPARTMENT HISTORY AND PHYSICAL EXAM 
 
 
Date: 6/3/2021 Patient Name: Victor Hugo Light History of Presenting Illness Chief Complaint Patient presents with  Altered mental status History Provided By: EMS, primary nurse HPI: Victor Hugo Light, 70 y.o. male with a past medical history significant diabetes, hypertension, hyperlipidemia and Schizophrenia, seizure disorder presents to the ED with cc of weakness, altered mental status, presents from home. Patient has history of encephalopathy, hyponatremia, seizures. Patient was admitted 2 weeks ago for similar. Unable to obtain history from patient, according to primary nurse patient lives at home with his mother who is also chronically ill. There are no other complaints, changes, or physical findings at this time. PCP: Deidre Baptiste MD 
 
Current Facility-Administered Medications Medication Dose Route Frequency Provider Last Rate Last Admin  insulin regular (NOVOLIN R, HUMULIN R) injection 10 Units  10 Units IntraVENous NOW Jodi Leija MD      
 0.45% sodium chloride infusion  100 mL/hr IntraVENous CONTINUOUS Jodi Leija MD      
 
Current Outpatient Medications Medication Sig Dispense Refill  pravastatin (PRAVACHOL) 20 mg tablet Take 1 Tablet by mouth nightly. 90 Tablet 1  
 metFORMIN (GLUCOPHAGE) 500 mg tablet Take 1 Tablet by mouth two (2) times daily (with meals). 180 Tablet 2  
 amLODIPine (NORVASC) 5 mg tablet TAKE 1 TABLET BY MOUTH EVERY DAY IN THE MORNING 90 Tab 1  
 lacosamide (Vimpat) 200 mg tab tablet Take 200 mg by mouth two (2) times a day.  levETIRAcetam 1,000 mg tablet Take 1,000 mg by mouth two (2) times a day.  OLANZapine (ZyPREXA) 2.5 mg tablet Take 2.5 mg by mouth two (2) times a day.  divalproex DR (DEPAKOTE) 500 mg tablet Take 500 mg by mouth two (2) times a day. Take two tablets twice daily  levothyroxine (SYNTHROID) 75 mcg tablet Take 1 Tab by mouth every morning. Take in early morning. 90 Tab 2 Past History Past Medical History: 
Past Medical History:  
Diagnosis Date  Acute [facility] failure ([facility]  Anemia  Arthritis  DKA (diabetic ketoacidoses) ([facility]  GERD (gastroesophageal reflux disease)  HTN (hypertension)  Hypercholesterolemia  Schizophrenia ([facility]  Schizophreniform disorder ([facility]  Seizure disorder ([facility]  Type II or unspecified type diabetes mellitus without mention of complication, uncontrolled Past Surgical History: No past surgical history on file. Family History: 
Family History Problem Relation Age of Onset  Stroke Father Social History: 
Social History Tobacco Use  Smoking status: Never Smoker  Smokeless tobacco: Never Used Substance Use Topics  Alcohol use: No  
 Drug use: No  
 
 
Allergies: 
No Known Allergies Review of Systems Review of Systems Unable to perform ROS: Mental status change Physical Exam  
 
Physical Exam 
Vitals and nursing note reviewed. Constitutional:   
   Appearance: Normal appearance. He is obese. He is ill-appearing. HENT:  
   Head: Normocephalic and atraumatic. Nose: Nose normal.  
   Mouth/Throat:  
   Mouth: Mucous membranes are dry. Eyes:  
   Extraocular Movements: Extraocular movements intact. Pupils: Pupils are equal, round, and reactive to light. Cardiovascular:  
   Rate and Rhythm: Regular rhythm. Tachycardia present. Pulses: Normal pulses. Heart sounds: Normal heart sounds. Pulmonary:  
   Effort: Pulmonary effort is normal.  
   Breath sounds: Normal breath sounds. Abdominal:  
   General: Abdomen is flat. Bowel sounds are normal.  
   Palpations: Abdomen is soft. Musculoskeletal:     
   General: No swelling or tenderness. Normal range of motion. Cervical back: Normal range of motion and neck supple. No rigidity. Lymphadenopathy:  
   Cervical: No cervical adenopathy.   
Skin: 
 General: Skin is warm and dry. Capillary Refill: Capillary refill takes less than 2 seconds. Neurological:  
   General: No focal deficit present. Mental Status: He is lethargic. Cranial Nerves: No cranial nerve deficit. Sensory: No sensory deficit. Motor: No weakness. Comments: Patient lethargic, awakens to voice, however nonverbal, does not follow commands. Diagnostic Study Results Labs - Recent Results (from the past 12 hour(s)) METABOLIC PANEL, COMPREHENSIVE Collection Time: 06/03/21  1:30 PM  
Result Value Ref Range Sodium 159 (H) 136 - 145 mmol/L Potassium 3.8 3.5 - 5.1 mmol/L Chloride 122 (H) 97 - 108 mmol/L  
 CO2 28 21 - 32 mmol/L Anion gap 9 5 - 15 mmol/L Glucose 531 (H) 65 - 100 mg/dL BUN 34 (H) 6 - 20 mg/dL Creatinine 1.85 (H) 0.70 - 1.30 mg/dL BUN/Creatinine ratio 18 12 - 20 GFR est AA 44 (L) >60 ml/min/1.73m2 GFR est non-AA 36 (L) >60 ml/min/1.73m2 Calcium 11.0 (H) 8.5 - 10.1 mg/dL Bilirubin, total 0.6 0.2 - 1.0 mg/dL AST (SGOT) 79 (H) 15 - 37 U/L  
 ALT (SGPT) 46 12 - 78 U/L Alk. phosphatase 94 45 - 117 U/L Protein, total 9.2 (H) 6.4 - 8.2 g/dL Albumin 3.3 (L) 3.5 - 5.0 g/dL Globulin 5.9 (H) 2.0 - 4.0 g/dL A-G Ratio 0.6 (L) 1.1 - 2.2    
TROPONIN I Collection Time: 06/03/21  1:30 PM  
Result Value Ref Range Troponin-I, Qt. 0.07 (H) <0.05 ng/mL LACTIC ACID Collection Time: 06/03/21  1:30 PM  
Result Value Ref Range Lactic acid 2.4 (HH) 0.4 - 2.0 mmol/L  
VENOUS BLOOD GAS Collection Time: 06/03/21  1:30 PM  
Result Value Ref Range VENOUS PH 7.27 (L) 7.31 - 7.41 VENOUS PCO2 59.5 (H) 40 - 50 mmHg VENOUS PO2 41 36 - 42 mmHg VENOUS O2 SATURATION 66 60 - 80 % VENOUS BICARBONATE 23 22 - 26 mmol/L  
 VENOUS BASE DEFICIT 0.9 0 - 2 mmol/L  
 O2 METHOD Nasal Cannula O2 FLOW RATE 2 L/min SITE Right Radial    
BNP  Collection Time: 06/03/21  1:30 PM  
Result Value Ref Range NT pro- (H) <125 pg/mL Radiologic Studies -  
[unfilled] CT Results  (Last 48 hours) 06/03/21 1410  CT HEAD WO CONT Final result Impression:  1. No acute findings. 2.  Stable appearance of advanced cortical and cerebellar volume loss. 3.  Minor right ethmoid sinus opacification. Narrative:     
History: Patient is unresponsive. Comparison: CT May 18, 2021. Technique: Continuous helical CT images of the head were obtained without IV  
contrast and reconstructed in 3 mm axial intervals. Coronal and sagittal  
reconstructions were also performed. Dose reduction: Department policy specifies  
that all CT scans at this facility are performed using dose reduction  
optimization techniques as appropriate to a performed exam including the  
following: automated exposure control, adjustments of the mA and/or kV according  
to patient size, or use of iterative reconstruction technique. Findings: There is significant cortical and cerebellar volume loss especially in the left  
frontal region, though similar/unchanged compared to prior exam.  
   
There is no evidence of acute hemorrhage, mass or mass effect. The extracalvarial soft tissues are unremarkable. The skull base and calvarium  
are intact. Minimal opacification of the right posterior ethmoid sinuses,  
otherwise the paranasal sinuses are clear. The globes are intact with lenses in  
place. CXR Results  (Last 48 hours) 06/03/21 1348  XR CHEST PORT Final result Impression:  Findings/impression:  
   
Lungs are underinflated. Streaky bibasilar airspace disease/atelectasis. No  
pleural effusion or pneumothorax. Cardiomediastinal contours are within normal limits. No pulmonary edema. No acute osseous abnormality identified. Narrative:  Study: XR CHEST PORT Clinical indication: unresponsive Comparison: Chest x-ray 5/18/2021 Medical Decision Making and ED Course I am the first provider for this patient. I reviewed the vital signs, available nursing notes, past medical history, past surgical history, family history and social history. Vital Signs-Reviewed the patient's vital signs. Patient Vitals for the past 12 hrs: 
 Temp Pulse Resp BP SpO2  
06/03/21 1319 98.3 °F (36.8 °C) (!) 110 16 (!) 150/91 94 % EKG interpretation: (Preliminary) Sinus tach 109, no ST elevations, nonspecific ST changes inferior lateral leads Records Reviewed: Nursing Notes, Old Medical Records and Previous electrocardiograms The patient presents with weakness altered mental status with a differential diagnosis of sepsis, hyperglycemia, dehydration, metabolic encephalopathy, seizure disorder Provider Notes (Medical Decision Making): MDM  
30-year-old male, history of schizophrenia, diabetes, hypertension, seizure disorder, presents from home for reported altered mental status. Patient somnolent, responds to voice, dry mucous membranes, mild tachycardia 110. Otherwise vital stable, normotensive, afebrile, saturations 94%, or sick noted to be high at triage Patient maintaining airway at this time no need for intervention. We will draw basic lab work including VBG, chest x-ray CT head ordered. will order 1 L normal saline, continue to assess ED Course:  
Initial assessment performed. The patients presenting problems have been discussed, and they are in agreement with the care plan formulated and outlined with them. I have encouraged them to ask questions as they arise throughout their visit. ED Course as of Jun 03 1442 Thu Jun 03, 2021  
1434 Lab work resulting, lactic acid elevated 2.4, metabolic panel shows marked hypernatremia 159, hyperchloremia 122, bicarb 28 glucose 531 anion gap only 9. Patient is showing some renal insufficiency BUN 34 creatinine 125. [PZ] [phone] X-ray shows streaky bibasilar artifact, no clear infiltrate or effusion. Urinalysis ordered, blood cultures ordered today still pending.  
 [PZ] ED Course User Index [PZ] Sharon Braun MD  
 
2:41 PM 
Discussed case with Dr. Rich Valles, will admit patient for dehyration, ams, hyperglycemia Procedures Natividad Abreu MD 
Procedures Disposition Admitted Diagnosis Clinical Impression: 1. Dehydration 2. Hyperglycemia 3. Hypernatremia Attestations: 
 
Natividad Abreu MD 
 
Please note that this dictation was completed with [email or web address], the computer voice recognition software. Quite often unanticipated grammatical, syntax, homophones, and other interpretive errors are inadvertently transcribed by the computer software. Please disregard these errors. Please excuse any errors that have escaped final proofreading. Thank you.

## 2021-06-03 NOTE — ROUTINE PROCESS
TRANSFER - OUT REPORT: 
 
Verbal report given to Yadira RN(name) on Librauvhamzah Bound  being transferred to [address](unit) for routine progression of care Report consisted of patients Situation, Background, Assessment and  
Recommendations(SBAR). Information from the following report(s) SBAR, ED Summary, Intake/Output, MAR, Recent Results and Cardiac Rhythm Sinus Tach was reviewed with the receiving nurse. Lines:  
Peripheral IV 06/03/21 Left Antecubital (Active) Site Assessment Clean, dry, & intact 06/03/21 1330 Phlebitis Assessment 0 06/03/21 1330 Infiltration Assessment 0 06/03/21 1330 Dressing Status Clean, dry, & intact 06/03/21 1330 Dressing Type Transparent;Tape 06/03/21 1330 Hub Color/Line Status Pink 06/03/21 1330 Peripheral IV 06/03/21 Right; Anterior;Mid Forearm (Active) Site Assessment Clean, dry, & intact 06/03/21 1527 Phlebitis Assessment 0 06/03/21 1527 Infiltration Assessment 0 06/03/21 1527 Dressing Status Clean, dry, & intact 06/03/21 1527 Dressing Type Tape;Transparent;Elastic bandage 06/03/21 1527 Hub Color/Line Status Pink 06/03/21 1527 Opportunity for questions and clarification was provided. Patient transported with: 
 O2 @ 2 liters [employer] Personal belongings

## 2021-06-03 NOTE — ACP (ADVANCE CARE PLANNING)
Advance Care Planning Healthcare Decision Maker:  
 
  Primary Decision Maker: [facility] - [phone] Secondary Decision Maker: Sunita [place] [phone]

## 2021-06-03 NOTE — PROGRESS NOTES
6/3/21. PCP is Dr. Ishmael Henry. D/C Plan is home with home health & DME. Pt lives @ home with his sister Brooke Summers @ [phone]) & his brother Migel Vanessa @ [phone]) assist. Pt also has home PCA care x2 weekly via [facility] [employer]. Per brother he has recently spoke with insurance company regarding a hospital bed & w/c - needed to assist with mobility & transfers. Informed approved, but needed MD order. Pt had appt with MD today - but came to ED. Brother verbally consented to Choice Letter for home health & DME ( w/c & hospital bed. Referrals sent via [employer]. Pt will need transportation assist home.

## 2021-06-03 NOTE — ED TRIAGE NOTES
Altered Mental status and Unresponsive with EMS. Arrived to ED responding to voice, altered. Hx of DM with  EMS.

## 2021-06-03 NOTE — H&P
History & Physical 
 
Primary Care Provider: Joycelyn Greer MD 
Source of Information: Patient History of Presenting Illness:  
Doc Hearn is a 70 y.o. male with history of dementia, schizophrenia and seizure disorder who presents from home with reports of alteration in mentation. No history can be obtained from the patient because he is altered. No family member present at bedside. According to ED physician, patient lives at home with a chronically sick mother. He was noted to be altered for EMS call. CT scan of the brain unremarkable. Chest x-ray negative for obvious consolidation. Urinalysis pending. Patient is arousable but significantly dehydrated with elevated sodium and chloride levels. . Blood glucose noted to be over 600 with normal bicarbonate. He was given a bolus of IV normal saline in the ED and insulin 10 units. Will be admitted for further treatment Review of Systems: 
Review of systems not obtained due to patient factors. Past Medical History:  
Diagnosis Date  Acute renal failure ([address]  Anemia  Arthritis  DKA (diabetic ketoacidoses) [facility]  GERD (gastroesophageal reflux disease)  HTN (hypertension)  Hypercholesterolemia  Schizophrenia ([facility]  Schizophreniform disorder ([facility]  Seizure disorder ([facility]  Type II or unspecified type diabetes mellitus without mention of complication, uncontrolled Prior to Admission medications Medication Sig Start Date End Date Taking? Authorizing Provider  
pravastatin (PRAVACHOL) 20 mg tablet Take 1 Tablet by mouth nightly. 5/26/21   Keyon Caro MD  
metFORMIN (GLUCOPHAGE) 500 mg tablet Take 1 Tablet by mouth two (2) times daily (with meals).  5/26/21   Keyon Caro MD  
amLODIPine (NORVASC) 5 mg tablet TAKE 1 TABLET BY MOUTH EVERY DAY IN THE MORNING 5/16/21   Joycelyn Greer MD  
lacosamide (Vimpat) 200 mg tab tablet Take 200 mg by mouth two (2) times a day. Provider, Historical  
levETIRAcetam 1,000 mg tablet Take 1,000 mg by mouth two (2) times a day. Provider, Historical  
OLANZapine (ZyPREXA) 2.5 mg tablet Take 2.5 mg by mouth two (2) times a day. Provider, Historical  
divalproex DR (DEPAKOTE) 500 mg tablet Take 500 mg by mouth two (2) times a day. Take two tablets twice daily    Provider, Historical  
levothyroxine (SYNTHROID) 75 mcg tablet Take 1 Tab by mouth every morning. Take in early morning. 12/30/20   Everardo Paez MD  
 
No Known Allergies Family History Problem Relation Age of Onset  Stroke Father SOCIAL HISTORY: 
Patient resides: 
Independently Assisted Living SNF With family care x Smoking history:  
None x Former Chronic Alcohol history:  
None x Social   
Chronic Ambulates:  
Independently   
w/cane   
w/walker   
w/wc x CODE STATUS: 
DNR Full x Other Objective:  
 
Physical Exam:  
 
Visit Vitals BP (!) 153/81 Pulse (!) 112 Temp 98.3 °F (36.8 °C) Resp 22 Ht 5' 9\" (1.753 m) Wt 100.7 kg (222 lb) SpO2 94% BMI 32.78 kg/m² O2 Flow Rate (L/min): 2 l/min O2 Device: Nasal cannula General:  Arousable but confused, no distress, appears stated age. Head:  Normocephalic, without obvious abnormality, atraumatic. Eyes:  Conjunctivae/corneas clear. PERRL, EOMs intact. Nose: Nares normal. Septum midline. Mucosa normal. No drainage or sinus tenderness. Throat: Lips, mucosa dry, and tongue normal. Teeth and gums normal.  
Neck: Supple, symmetrical, trachea midline, no adenopathy, thyroid: no enlargement/tenderness/nodules, no carotid bruit and no JVD. Back:   Symmetric, no curvature. ROM normal. No CVA tenderness. Lungs:   Clear to auscultation bilaterally. Chest wall:  No tenderness or deformity. Heart:  Regular rate and rhythm, S1, S2 normal, no murmur, click, rub or gallop. Abdomen:   Soft, non-tender.  Bowel sounds normal. No masses,  No organomegaly. Extremities: Extremities normal, atraumatic, no cyanosis or edema. Pulses: 2+ and symmetric all extremities. Skin: Skin color, texture, turgor normal. No rashes or lesions Neurologic: CNII-XII intact. No motor or sensory deficits. EKG:  nonspecific ST and T waves changes. Data Review:  
 
Recent Days: 
Recent Labs  
  06/03/21 
1330 WBC 17.8* HGB 15.0  
HCT 50.4*  Recent Labs  
  06/03/21 
1330 *  
K 3.8 * CO2 28  
* BUN 34* CREA 1.85* CA 11.0* ALB 3.3* TBILI 0.6 ALT 46 No results for input(s): PH, PCO2, PO2, HCO3, FIO2 in the last 72 hours. 24 Hour Results: 
Recent Results (from the past 24 hour(s)) CBC WITH AUTOMATED DIFF Collection Time: 06/03/21  1:30 PM  
Result Value Ref Range WBC 17.8 (H) 4.1 - 11.1 K/uL  
 RBC 4.70 4. 10 - 5.70 M/uL  
 HGB 15.0 12.1 - 17.0 g/dL HCT 50.4 (H) 36.6 - 50.3 % .2 (H) 80.0 - 99.0 FL  
 MCH 31.9 26.0 - 34.0 PG  
 MCHC 29.8 (L) 30.0 - 36.5 g/dL  
 RDW 14.7 (H) 11.5 - 14.5 % PLATELET 940 853 - 743 K/uL MPV 11.7 8.9 - 12.9 FL  
 NRBC 0.7 (H) 0.0  WBC ABSOLUTE NRBC 0.12 (H) 0.00 - 0.01 K/uL NEUTROPHILS 85 (H) 32 - 75 % LYMPHOCYTES 10 (L) 12 - 49 % MONOCYTES 4 (L) 5 - 13 % EOSINOPHILS 0 0 - 7 % BASOPHILS 0 0 - 1 % IMMATURE GRANULOCYTES 1 (H) 0 - 0.5 % ABS. NEUTROPHILS 15.2 (H) 1.8 - 8.0 K/UL  
 ABS. LYMPHOCYTES 1.8 0.8 - 3.5 K/UL  
 ABS. MONOCYTES 0.7 0.0 - 1.0 K/UL  
 ABS. EOSINOPHILS 0.0 0.0 - 0.4 K/UL  
 ABS. BASOPHILS 0.1 0.0 - 0.1 K/UL  
 ABS. IMM. GRANS. 0.1 (H) 0.00 - 0.04 K/UL  
 DF AUTOMATED METABOLIC PANEL, COMPREHENSIVE Collection Time: 06/03/21  1:30 PM  
Result Value Ref Range Sodium 159 (H) 136 - 145 mmol/L Potassium 3.8 3.5 - 5.1 mmol/L Chloride 122 (H) 97 - 108 mmol/L  
 CO2 28 21 - 32 mmol/L Anion gap 9 5 - 15 mmol/L Glucose 531 (H) 65 - 100 mg/dL BUN 34 (H) 6 - 20 mg/dL  Creatinine 1.85 (H) 0.70 - 1.30 mg/dL BUN/Creatinine ratio 18 12 - 20 GFR est AA 44 (L) >60 ml/min/1.73m2 GFR est non-AA 36 (L) >60 ml/min/1.73m2 Calcium 11.0 (H) 8.5 - 10.1 mg/dL Bilirubin, total 0.6 0.2 - 1.0 mg/dL AST (SGOT) 79 (H) 15 - 37 U/L  
 ALT (SGPT) 46 12 - 78 U/L Alk. phosphatase 94 45 - 117 U/L Protein, total 9.2 (H) 6.4 - 8.2 g/dL Albumin 3.3 (L) 3.5 - 5.0 g/dL Globulin 5.9 (H) 2.0 - 4.0 g/dL A-G Ratio 0.6 (L) 1.1 - 2.2    
TROPONIN I Collection Time: 06/03/21  1:30 PM  
Result Value Ref Range Troponin-I, Qt. 0.07 (H) <0.05 ng/mL LACTIC ACID Collection Time: 06/03/21  1:30 PM  
Result Value Ref Range Lactic acid 2.4 (HH) 0.4 - 2.0 mmol/L  
VENOUS BLOOD GAS Collection Time: 06/03/21  1:30 PM  
Result Value Ref Range VENOUS PH 7.27 (L) 7.31 - 7.41 VENOUS PCO2 59.5 (H) 40 - 50 mmHg VENOUS PO2 41 36 - 42 mmHg VENOUS O2 SATURATION 66 60 - 80 % VENOUS BICARBONATE 23 22 - 26 mmol/L  
 VENOUS BASE DEFICIT 0.9 0 - 2 mmol/L  
 O2 METHOD Nasal Cannula O2 FLOW RATE 2 L/min SITE Right Radial    
BNP Collection Time: 06/03/21  1:30 PM  
Result Value Ref Range NT pro- (H) <125 pg/mL Imaging:  
CT HEAD WO CONT Final Result 1. No acute findings. 2.  Stable appearance of advanced cortical and cerebellar volume loss. 3.  Minor right ethmoid sinus opacification. XR CHEST PORT Final Result Findings/impression:  
  
Lungs are underinflated. Streaky bibasilar airspace disease/atelectasis. No  
pleural effusion or pneumothorax. Cardiomediastinal contours are within normal limits. No pulmonary edema. No acute osseous abnormality identified. Assessment:  
 
Dehydration likely from poor oral intake Acute encephalopathy Probably urinary tract infection Alzheimer's dementia History of schizophrenia Uncontrolled diabetes Acute Kidney injury from dehydration Plan:  
 
Admit to medical telemetry floor inpatient Blood and urine cultures obtained and empiric IV Rocephin started Continue half-normal saline at 100 cc an hour Obtain speech therapies evaluation prior to initiation of oral feeds Blood sugar AC at bedtime/sliding scale insulin DVT GI prophylaxis Monitor daily electrolytes PT OT evaluation once patient fully awakes He is full code by default Signed By: Lidya Raymond MD   
 [place] 3, 2021

## 2021-06-03 NOTE — PROGRESS NOTES
Reason for Admission:  Dehydration RUR Score:   19% PCP: First and Last name:   Leighann Norwood MD 
 
 Name of Practice:  
 Are you a current patient: Yes/No: Yes Approximate date of last visit: Seen approx 4 mos ago & call recently regarding DME ( Bed & W/C). Can you participate in a virtual visit if needed: Yes/call Do you (patient/family) have any concerns for transition/discharge? [address] to assist with repositioning & transfers. Plan for utilizing home health: Brother ( Leane Litten ) signed Choice Letter for home health & DME. Current Advanced Directive/Advance Care Plan:  Full Code Healthcare Decision Maker:  
Primary HCDM is brother: Leane Litten @ [phone]. Transition of Care Plan:    D/C Plan is home with home health, hospital bed, & w/c. Pt lives with sister Meeta Tez Summers) & brother  ( Leane Litten) assists. Pt has PCA service via [facility]. Pt will need transportation home.

## 2021-06-04 NOTE — PROGRESS NOTES
2 person skin assessment completed on admission with Sana Gomez RN and noted small scabs to upper bilateral shins, but no skin breakdown noted.

## 2021-06-04 NOTE — PROGRESS NOTES
Physician Progress Note Carl Sosa 
CSN #:                  [id number] :                       1945 ADMIT DATE:       6/3/2021 1:12 PM 
[address] [place] DATE: 
RESPONDING 
PROVIDER #:        Juliet PITTMAN PA-C 
 
 
 
 
QUERY TEXT: 
 
Pt admitted with AMS and has encephalopathy documented. If possible, please document in progress notes and discharge summary further specificity regarding the type of encephalopathy: 
 
 
 
The medical record reflects the following: 
Risk Factors: 70year old male, AMS, history of dementia Clinical Indicators: 6/3 H&P - Acute encephalopathy Probably urinary tract infection WBC: 17.8-13.1 Na: 159-163 Glucose: 531-369 Creatinine: 1.85-1.13 Lactic Acid: 2.4-1.6 Treatment: IVF D5 125ml/hr, IV Ceftriaxone, Insulin Please call [phone] with any questions Options provided: 
-- Anoxic encephalopathy 
-- Metabolic encephalopathy 
-- Septic encephalopathy 
-- Other - I will add my own diagnosis -- Disagree - Not applicable / Not valid -- Disagree - Clinically unable to determine / Unknown 
-- Refer to Clinical Documentation Reviewer PROVIDER RESPONSE TEXT: 
 
This patient has metabolic encephalopathy. Query created by:  Fiordaliza aGrza on 2021 11:37 AM 
 
 
Electronically signed by:  Monisha Silva PA-C 2021 12:49 PM

## 2021-06-04 NOTE — PROGRESS NOTES
Hospitalist Progress Note Subjective:  
Daily Progress Note: 6/4/2021 7:33 AM 
 
Hospital Course: Patient is a 79-year-old male with a history of dementia, schizophrenia, seizure disorders that presented to the emergency room on 6/3/2021 for altered mental status. CT of the head showed no acute findings, stable appearance of advanced cortical and cerebral volume loss, minor right ethmoid sinus opacification. Chest x-ray shows lungs underinflated, streaky airspace disease/atelectasis, no pleural effusion or pneumothorax. Blood cultures are pending. Urinalysis showed signs concerning for UTI. Urine culture is pending. Patient currently on IV Rocephin. Lactic acid elevated at 2.4 but after hydration trended down to 1.6. Also of note patient sodium was elevated at 159. The following day patient sodium increased to 163. Changed half-normal saline to dextrose 150 cc/h. HGB A1C in May 10.8. Patient on insulin sliding scale and have it on Lantus 15 units at bedtime along with basal insulin 5 units with each meal. 
 
 
Subjective: No acute issues overnight. Patient is still altered. Current Facility-Administered Medications Medication Dose Route Frequency  dextrose 5% infusion  125 mL/hr IntraVENous CONTINUOUS  
 sodium chloride (NS) flush 5-40 mL  5-40 mL IntraVENous Q8H  
 sodium chloride (NS) flush 5-40 mL  5-40 mL IntraVENous PRN  
 acetaminophen (TYLENOL) tablet 650 mg  650 mg Oral Q6H PRN Or  
 acetaminophen (TYLENOL) suppository 650 mg  650 mg Rectal Q6H PRN  polyethylene glycol (MIRALAX) packet 17 g  17 g Oral DAILY PRN  
 ondansetron (ZOFRAN ODT) tablet 4 mg  4 mg Oral Q8H PRN Or  
 ondansetron (ZOFRAN) injection 4 mg  4 mg IntraVENous Q6H PRN  
 enoxaparin (LOVENOX) injection 40 mg  40 mg SubCUTAneous DAILY  cefTRIAXone (ROCEPHIN) 1 g in sterile water (preservative free) 10 mL IV syringe  1 g IntraVENous Q24H  
 amLODIPine (NORVASC) tablet 5 mg  5 mg Oral DAILY  divalproex DR (DEPAKOTE) tablet 500 mg  500 mg Oral BID  lacosamide (VIMPAT) tablet 200 mg  200 mg Oral BID  levETIRAcetam (KEPPRA) tablet 1,000 mg  1,000 mg Oral BID  levothyroxine (SYNTHROID) tablet 75 mcg  75 mcg Oral 7am  
 OLANZapine (ZyPREXA) tablet 2.5 mg  2.5 mg Oral BID  pravastatin (PRAVACHOL) tablet 20 mg  20 mg Oral QHS  insulin lispro (HUMALOG) injection   SubCUTAneous AC&HS Review of Systems Constitutional: No fevers, No chills, No sweats, ++ fatigue, ++ Weakness Eyes: No redness Ears, nose, mouth, throat, and face: No nasal congestion, No sore throat, No voice change Respiratory: No Shortness of Breath, No cough, No wheezing Cardiovascular: No chest pain, No palpitations, No extremity edema Gastrointestinal: No nausea, No vomiting, No diarrhea, No abdominal pain Genitourinary: No frequency, No dysuria, No hematuria Integument/breast: No skin lesion(s) Neurological: No Confusion, No headaches, No dizziness Objective:  
 
Visit Vitals /70 Pulse 93 Temp 98.2 °F (36.8 °C) Resp 18 Ht 5' 9\" (1.753 m) Wt 97.8 kg (215 lb 9.8 oz) SpO2 94% BMI 31.84 kg/m² O2 Flow Rate (L/min): 3 l/min O2 Device: Nasal cannula Temp (24hrs), Av.2 °F (36.8 °C), Min:97.9 °F (36.6 °C), Max:98.3 °F (36.8 °C) No intake/output data recorded.  1901 -  0700 In: 1060 [P.O.:50; I.V.:1010] Out: 450 [Urine:450] PHYSICAL EXAM: 
Constitutional: lethargic. No acute distress Skin: Extremities and face reveal no rashes. HEENT: Sclerae anicteric. Extra-occular muscles are intact. No oral ulcers. The neck is supple and no masses. Cardiovascular: Regular rate and rhythm. Respiratory:  Clear breath sounds bilaterally with no wheezes, rales, or rhonchi. GI: Abdomen nondistended, soft, and nontender. Normal active bowel sounds. Musculoskeletal: No pitting edema of the lower legs. Able to move all ext Neurological:  Patient is alert and oriented.  Cranial nerves II-XII grossly intact Psychiatric: Mood appears appropriate Data Review Recent Results (from the past 24 hour(s)) EKG, 12 LEAD, INITIAL Collection Time: 06/03/21  1:17 PM  
Result Value Ref Range Ventricular Rate 109 BPM  
 Atrial Rate 109 BPM  
 P-R Interval 114 ms QRS Duration 86 ms  
 Q-T Interval 360 ms QTC Calculation (Bezet) 484 ms Calculated P Axis 70 degrees Calculated R Axis -48 degrees Calculated T Axis 112 degrees Diagnosis Sinus tachycardia Left anterior fascicular block Nonspecific ST and T wave abnormality Abnormal ECG When compared with ECG of 18-MAY-2021 14:06, No significant change was found Confirmed by [facility] Hospital[facility]UAN C Leonor 85 [phone]) on 6/3/2021 4:24:36 PM 
  
CBC WITH AUTOMATED DIFF Collection Time: 06/03/21  1:30 PM  
Result Value Ref Range WBC 17.8 (H) 4.1 - 11.1 K/uL  
 RBC 4.70 4. 10 - 5.70 M/uL  
 HGB 15.0 12.1 - 17.0 g/dL HCT 50.4 (H) 36.6 - 50.3 % .2 (H) 80.0 - 99.0 FL  
 MCH 31.9 26.0 - 34.0 PG  
 MCHC 29.8 (L) 30.0 - 36.5 g/dL  
 RDW 14.7 (H) 11.5 - 14.5 % PLATELET 856 610 - 144 K/uL MPV 11.7 8.9 - 12.9 FL  
 NRBC 0.7 (H) 0.0  WBC ABSOLUTE NRBC 0.12 (H) 0.00 - 0.01 K/uL NEUTROPHILS 85 (H) 32 - 75 % LYMPHOCYTES 10 (L) 12 - 49 % MONOCYTES 4 (L) 5 - 13 % EOSINOPHILS 0 0 - 7 % BASOPHILS 0 0 - 1 % IMMATURE GRANULOCYTES 1 (H) 0 - 0.5 % ABS. NEUTROPHILS 15.2 (H) 1.8 - 8.0 K/UL  
 ABS. LYMPHOCYTES 1.8 0.8 - 3.5 K/UL  
 ABS. MONOCYTES 0.7 0.0 - 1.0 K/UL  
 ABS. EOSINOPHILS 0.0 0.0 - 0.4 K/UL  
 ABS. BASOPHILS 0.1 0.0 - 0.1 K/UL  
 ABS. IMM. GRANS. 0.1 (H) 0.00 - 0.04 K/UL  
 DF AUTOMATED METABOLIC PANEL, COMPREHENSIVE Collection Time: 06/03/21  1:30 PM  
Result Value Ref Range Sodium 159 (H) 136 - 145 mmol/L Potassium 3.8 3.5 - 5.1 mmol/L Chloride 122 (H) 97 - 108 mmol/L  
 CO2 28 21 - 32 mmol/L Anion gap 9 5 - 15 mmol/L Glucose 531 (H) 65 - 100 mg/dL  BUN 34 (H) 6 - 20 mg/dL Creatinine 1.85 (H) 0.70 - 1.30 mg/dL BUN/Creatinine ratio 18 12 - 20 GFR est AA 44 (L) >60 ml/min/1.73m2 GFR est non-AA 36 (L) >60 ml/min/1.73m2 Calcium 11.0 (H) 8.5 - 10.1 mg/dL Bilirubin, total 0.6 0.2 - 1.0 mg/dL AST (SGOT) 79 (H) 15 - 37 U/L  
 ALT (SGPT) 46 12 - 78 U/L Alk. phosphatase 94 45 - 117 U/L Protein, total 9.2 (H) 6.4 - 8.2 g/dL Albumin 3.3 (L) 3.5 - 5.0 g/dL Globulin 5.9 (H) 2.0 - 4.0 g/dL A-G Ratio 0.6 (L) 1.1 - 2.2    
TROPONIN I Collection Time: 06/03/21  1:30 PM  
Result Value Ref Range Troponin-I, Qt. 0.07 (H) <0.05 ng/mL LACTIC ACID Collection Time: 06/03/21  1:30 PM  
Result Value Ref Range Lactic acid 2.4 (HH) 0.4 - 2.0 mmol/L  
VENOUS BLOOD GAS Collection Time: 06/03/21  1:30 PM  
Result Value Ref Range VENOUS PH 7.27 (L) 7.31 - 7.41 VENOUS PCO2 59.5 (H) 40 - 50 mmHg VENOUS PO2 41 36 - 42 mmHg VENOUS O2 SATURATION 66 60 - 80 % VENOUS BICARBONATE 23 22 - 26 mmol/L  
 VENOUS BASE DEFICIT 0.9 0 - 2 mmol/L  
 O2 METHOD Nasal Cannula O2 FLOW RATE 2 L/min SITE Right Radial    
BNP Collection Time: 06/03/21  1:30 PM  
Result Value Ref Range NT pro- (H) <125 pg/mL GLUCOSE, POC Collection Time: 06/03/21  3:32 PM  
Result Value Ref Range Glucose (POC) 427 (H) 65 - 117 mg/dL Performed by Ana Link, POC Collection Time: 06/03/21  4:45 PM  
Result Value Ref Range Glucose (POC) 461 (H) 65 - 117 mg/dL Performed by JUANI RAMIREZ   
LACTIC ACID Collection Time: 06/03/21  6:50 PM  
Result Value Ref Range Lactic acid 1.6 0.4 - 2.0 mmol/L METABOLIC PANEL, BASIC Collection Time: 06/04/21  5:19 AM  
Result Value Ref Range Sodium 163 (HH) 136 - 145 mmol/L Potassium 3.8 3.5 - 5.1 mmol/L Chloride 127 (H) 97 - 108 mmol/L  
 CO2 29 21 - 32 mmol/L Anion gap 7 5 - 15 mmol/L Glucose 369 (H) 65 - 100 mg/dL BUN 30 (H) 6 - 20 mg/dL  Creatinine 1.13 0.70 - 1.30 mg/dL  
 BUN/Creatinine ratio 27 (H) 12 - 20 GFR est AA >60 >60 ml/min/1.73m2 GFR est non-AA >60 >60 ml/min/1.73m2 Calcium 10.0 8.5 - 10.1 mg/dL CBC W/O DIFF Collection Time: 06/04/21  5:19 AM  
Result Value Ref Range WBC 13.1 (H) 4.1 - 11.1 K/uL  
 RBC 4.47 4.10 - 5.70 M/uL  
 HGB 14.0 12.1 - 17.0 g/dL HCT 47.1 36.6 - 50.3 % .4 (H) 80.0 - 99.0 FL  
 MCH 31.3 26.0 - 34.0 PG  
 MCHC 29.7 (L) 30.0 - 36.5 g/dL  
 RDW 14.7 (H) 11.5 - 14.5 % PLATELET 472 089 - 781 K/uL MPV 11.6 8.9 - 12.9 FL  
 NRBC 0.5 (H) 0.0  WBC ABSOLUTE NRBC 0.06 (H) 0.00 - 0.01 K/uL Radiology review: CT of head/Chest xray Assessment: 1. Acute kidney injury/hypernatremia/dehydration due to poor oral intake 2. Acute encephalopathy 3. UTI 4. Alzheimer's dementia 5. History of schizophrenia 6. Uncontrolled type 2 diabetes 7. Levothyroxine Plan: 1. Patient sodium increased 163. Change to D50. Renal function trending downward. 2.  CT of the head unremarkable. 3.  Blood cultures are pending. Urinalysis shows signs concerning for UTI. Urine culture pending. On IV Rocephin. Lactic acidosis has resolved 4. Patient on Depakote and Zyprexa 5. Continue with Keppra and Vimpat 6. Glucose levels are elevated. Last hemoglobin C in May 2000 2110.8. Continue with insulin sliding scale. We will add on basal insulin along with 15 units of Lantus at bedtime 7. Continue with Synthroid 8. CBC BMP in a.m. CODE STATUS Full DVT prophylaxis: Heparin Ulcer prophylaxis: Protonix Care Plan discussed with: Patient/Family, Nurse and  Total time spent with patient: 34 minutes.

## 2021-06-04 NOTE — PROGRESS NOTES
Bedside shift change report given to Eleonora Blake (oncoming nurse) by Andria Kennedy (offgoing nurse). Report included the following information SBAR.

## 2021-06-04 NOTE — PROGRESS NOTES
Problem: Falls - Risk of 
Goal: *Absence of Falls Description: Document Joshua Geovani Fall Risk and appropriate interventions in the flowsheet. Outcome: Not Progressing Towards Goal 
Note: Fall Risk Interventions: 
  
 
Mentation Interventions: Bed/chair exit alarm Medication Interventions: Bed/chair exit alarm Elimination Interventions: Bed/chair exit alarm, Call light in reach Patient confused x4 and constantly squirming down in bed, bed alarm on and in room beside nurse's station.

## 2021-06-05 NOTE — WOUND CARE
Wound Care Note: Wound Care into see patient because of Oracio Score of 12 / Risk Assessment Skin Care & Pressure Relief Recommendations: 
Minimize layers of linen Pads under patient to optimize support surface and microclimate Turn/reposition approximately every 2 hours. Pillow Wedges Manage incontinence Promote continence; Skin Protective lotion to buttocks and sacrum daily and as needed with incontinence care Offload heels with pillows or offloading boots. Discussed above plan with Mirian Mantilla Rn: [address] [place] [address] for  incontinence (wrapped applied). Float heels with pillows for offloading and to prevent skin breakdown. Apply zinc paste to buttocks and sacrum and to prevent MASD. Use foam wedge to turn q2h at 30 degrees to offload sacrum and buttocks and prevent skin breakdown. Apply waffle overlay over foam mattress (I delivered to room and informed [address]). Blanchable erythema to right buttock also has 2 slightly darkened areas that need to be monitored closely. Incontinent care provided and linens changed with RN, see flowchart. Re-consult [facility] if skin condition changes.

## 2021-06-05 NOTE — PROGRESS NOTES
Hospitalist Progress Note Subjective:  
Daily Progress Note: 6/5/2021 7:33 AM 
 
Hospital Course: Patient is a 81-year-old male with a history of dementia, schizophrenia, seizure disorders that presented to the emergency room on 6/3/2021 for altered mental status. CT of the head showed no acute findings, stable appearance of advanced cortical and cerebral volume loss, minor right ethmoid sinus opacification. Chest x-ray shows lungs underinflated, streaky airspace disease/atelectasis, no pleural effusion or pneumothorax. Blood cultures are pending. Urinalysis showed signs concerning for UTI. Urine culture is pending. Patient currently on IV Rocephin. Lactic acid elevated at 2.4 but after hydration trended down to 1.6. Also of note patient sodium was elevated at 159. The following day patient sodium increased to 163. Changed half-normal saline to dextrose 150 cc/h. HGB A1C in May 10.8. Patient on insulin sliding scale and Lantus 18 units at bedtime along with basal insulin 5 units with each meal. 
 
 
Subjective: No acute issues overnight. Patient is alert to self and place. Denies any pain. Current Facility-Administered Medications Medication Dose Route Frequency  insulin glargine (LANTUS) injection 18 Units  18 Units SubCUTAneous QHS  dextrose 5% infusion  125 mL/hr IntraVENous CONTINUOUS  
 insulin lispro (HUMALOG) injection 5 Units  5 Units SubCUTAneous TIDAC  heparin (porcine) injection 5,000 Units  5,000 Units SubCUTAneous Q8H  
 pantoprazole (PROTONIX) tablet 40 mg  40 mg Oral ACB  sodium chloride (NS) flush 5-40 mL  5-40 mL IntraVENous Q8H  
 sodium chloride (NS) flush 5-40 mL  5-40 mL IntraVENous PRN  
 acetaminophen (TYLENOL) tablet 650 mg  650 mg Oral Q6H PRN Or  
 acetaminophen (TYLENOL) suppository 650 mg  650 mg Rectal Q6H PRN  polyethylene glycol (MIRALAX) packet 17 g  17 g Oral DAILY PRN  
 ondansetron (ZOFRAN ODT) tablet 4 mg  4 mg Oral Q8H PRN  Or  
 ondansetron (ZOFRAN) injection 4 mg  4 mg IntraVENous Q6H PRN  
 cefTRIAXone (ROCEPHIN) 1 g in sterile water (preservative free) 10 mL IV syringe  1 g IntraVENous Q24H  
 amLODIPine (NORVASC) tablet 5 mg  5 mg Oral DAILY  divalproex DR (DEPAKOTE) tablet 500 mg  500 mg Oral BID  lacosamide (VIMPAT) tablet 200 mg  200 mg Oral BID  levETIRAcetam (KEPPRA) tablet 1,000 mg  1,000 mg Oral BID  levothyroxine (SYNTHROID) tablet 75 mcg  75 mcg Oral 7am  
 OLANZapine (ZyPREXA) tablet 2.5 mg  2.5 mg Oral BID  pravastatin (PRAVACHOL) tablet 20 mg  20 mg Oral QHS  insulin lispro (HUMALOG) injection   SubCUTAneous AC&HS Review of Systems Constitutional: No fevers, No chills, No sweats, ++ fatigue, ++ Weakness Eyes: No redness Ears, nose, mouth, throat, and face: No nasal congestion, No sore throat, No voice change Respiratory: No Shortness of Breath, No cough, No wheezing Cardiovascular: No chest pain, No palpitations, No extremity edema Gastrointestinal: No nausea, No vomiting, No diarrhea, No abdominal pain Genitourinary: No frequency, No dysuria, No hematuria Integument/breast: No skin lesion(s) Neurological: + Confusion, No headaches, No dizziness Objective:  
 
Visit Vitals BP (!) 154/89 (BP 1 Location: Left upper arm, BP Patient Position: At rest) Pulse 75 Temp 98.2 °F (36.8 °C) Resp 18 Ht 5' 9\" (1.753 m) Wt 97.8 kg (215 lb 9.8 oz) SpO2 94% BMI 31.84 kg/m² O2 Flow Rate (L/min): 2 l/min O2 Device: Nasal cannula Temp (24hrs), Av.1 °F (36.7 °C), Min:97.8 °F (36.6 °C), Max:98.2 °F (36.8 °C) No intake/output data recorded.  1901 -  0700 In: 2421.7 [P.O.:520; I.V.:1901.7] Out: 1250 [[id number]] PHYSICAL EXAM: 
Constitutional: lethargic. No acute distress Skin: Extremities and face reveal no rashes. HEENT: Sclerae anicteric. Extra-occular muscles are intact. No oral ulcers. The neck is supple and no masses.   
Cardiovascular: RRR Respiratory:  Clear breath sounds bilaterally with no wheezes, rales, or rhonchi. GI: Abdomen nondistended, soft, and nontender. Normal active bowel sounds. Musculoskeletal: No pitting edema of the lower legs. Able to move all ext Neurological:  Patient is alert and oriented. Cranial nerves II-XII grossly intact Psychiatric: Mood appears appropriate Data Review Recent Results (from the past 24 hour(s)) GLUCOSE, POC Collection Time: 06/04/21 12:40 PM  
Result Value Ref Range Glucose (POC) 506 (H) 65 - 117 mg/dL Performed by Viral Coy, POC Collection Time: 06/04/21  4:30 PM  
Result Value Ref Range Glucose (POC) 255 (H) 65 - 117 mg/dL Performed by Isabella [facility] METABOLIC PANEL, BASIC Collection Time: 06/04/21  4:40 PM  
Result Value Ref Range Sodium 160 (H) 136 - 145 mmol/L Potassium 4.1 3.5 - 5.1 mmol/L Chloride 130 (H) 97 - 108 mmol/L  
 CO2 25 21 - 32 mmol/L Anion gap 5 5 - 15 mmol/L Glucose 294 (H) 65 - 100 mg/dL BUN 27 (H) 6 - 20 mg/dL Creatinine 1.08 0.70 - 1.30 mg/dL BUN/Creatinine ratio 25 (H) 12 - 20 GFR est AA >60 >60 ml/min/1.73m2 GFR est non-AA >60 >60 ml/min/1.73m2 Calcium 9.6 8.5 - 10.1 mg/dL GLUCOSE, POC Collection Time: 06/04/21  7:22 PM  
Result Value Ref Range Glucose (POC) 183 (H) 65 - 117 mg/dL Performed by Sayra Edge, BASIC Collection Time: 06/05/21  6:10 AM  
Result Value Ref Range Sodium 158 (H) 136 - 145 mmol/L Potassium 3.3 (L) 3.5 - 5.1 mmol/L Chloride 124 (H) 97 - 108 mmol/L  
 CO2 31 21 - 32 mmol/L Anion gap 3 (L) 5 - 15 mmol/L Glucose 285 (H) 65 - 100 mg/dL BUN 24 (H) 6 - 20 mg/dL Creatinine 1.01 0.70 - 1.30 mg/dL BUN/Creatinine ratio 24 (H) 12 - 20 GFR est AA >60 >60 ml/min/1.73m2 GFR est non-AA >60 >60 ml/min/1.73m2 Calcium 9.4 8.5 - 10.1 mg/dL CBC WITH AUTOMATED DIFF  Collection Time: 06/05/21  6:10 AM  
Result Value Ref Range WBC 8.4 4.1 - 11.1 K/uL  
 RBC 4.36 4.10 - 5.70 M/uL  
 HGB 13.7 12.1 - 17.0 g/dL HCT 46.0 36.6 - 50.3 % .5 (H) 80.0 - 99.0 FL  
 MCH 31.4 26.0 - 34.0 PG  
 MCHC 29.8 (L) 30.0 - 36.5 g/dL  
 RDW 14.3 11.5 - 14.5 % PLATELET 942 407 - 127 K/uL MPV 11.9 8.9 - 12.9 FL  
 NRBC 0.5 (H) 0.0  WBC ABSOLUTE NRBC 0.04 (H) 0.00 - 0.01 K/uL NEUTROPHILS 63 32 - 75 % LYMPHOCYTES 29 12 - 49 % MONOCYTES 4 (L) 5 - 13 % EOSINOPHILS 3 0 - 7 % BASOPHILS 1 0 - 1 % IMMATURE GRANULOCYTES 0 0 - 0.5 % ABS. NEUTROPHILS 5.3 1.8 - 8.0 K/UL  
 ABS. LYMPHOCYTES 2.5 0.8 - 3.5 K/UL  
 ABS. MONOCYTES 0.3 0.0 - 1.0 K/UL  
 ABS. EOSINOPHILS 0.2 0.0 - 0.4 K/UL  
 ABS. BASOPHILS 0.1 0.0 - 0.1 K/UL  
 ABS. IMM. GRANS. 0.0 0.00 - 0.04 K/UL  
 DF AUTOMATED    
GLUCOSE, POC Collection Time: 06/05/21  8:17 AM  
Result Value Ref Range Glucose (POC) 283 (H) 65 - 117 mg/dL Performed by Niko Mccall Radiology review: CT of head/Chest xray Assessment: 1. Acute kidney injury/hypernatremia/dehydration due to poor oral intake 2. Acute encephalopathy 3. UTI 4. Alzheimer's dementia 5. History of schizophrenia 6. Uncontrolled type 2 diabetes 7. Levothyroxine Plan: 1. Patient sodium increased 163 --> 158. On D50. Renal function trending downward. 2.  CT of the head unremarkable. 3.  Blood cultures remain negative. Urinalysis shows signs concerning for UTI. Urine culture pending. On IV Rocephin. Lactic acidosis has resolved 4. Patient on Depakote and Zyprexa 5. Continue with Keppra and Vimpat 6. Glucose levels are elevated. Last hemoglobin A1C in May 2021 was 10.8. Continue with insulin sliding scale. Increase lantus to 18 units. Add basal insulin along with 15 units of Lantus at bedtime 7. Continue with Synthroid 8. CBC BMP in a.m. CODE STATUS Full DVT prophylaxis: Heparin Ulcer prophylaxis: Protonix Care Plan discussed with: Patient/Family, Nurse and  Total time spent with patient: 33 minutes.

## 2021-06-06 NOTE — PROGRESS NOTES
Bedside shift change report given to MARLON Amor (oncoming nurse) by Opal Ghosh (offgoing nurse). Report included the following information SBAR.

## 2021-06-06 NOTE — PROGRESS NOTES
Problem: Mobility Impaired (Adult and Pediatric) Goal: *Acute Goals and Plan of Care (Insert Text) Description: Bed mobility with Ind within 7 days. Transfers with CGA within 7 days. Amb approx 5 ft with LRAD and CGA within 7 days. Ind with HEP for LE within 7 days. Outcome: Not Met PHYSICAL THERAPY EVALUATION Patient: Ana Cristina Cristina (85 y.o. male) Date: 6/6/2021 Primary Diagnosis: Dehydration [E86.0] Precautions:   Fall, Bed Alarm ASSESSMENT Based on the objective data described below, the patient presents with Decr ROM, strength, bed mobility, transfers, balance, gait, activity tolerance, safety awareness, and functional mobility. Pt admitted 6/3/21 following increased AMS and unresponsives admitted for Dehydration likely from poor oral intake, Acute encephalopathy, and possible urinary tract infection. Pmx: dementia, schizophrenia, acute renal failure, DKA, arthritis, anemia, HTN, DM II, and seizure disorder . Pt states they live with mother and sister in 1 lvl home with no TINA. Pt reports assisted PLOF with dressing/bathing and Mod I amb with SPC. Pt is a  questionable historian due to AMS, provided information may be untrue, unclear of PLOF and home environment. Pt reports having WC, SPC< hospital bed, and supp O2. Pt agreeable to PT evaluation, A&O x 3 not oriented to time and situation. Pt presents confused, lethargic with condom cath and on 2L supp O2; denies pain and recent falls. Pt completed bed mobility with Mod A/Mod A x2, transfers with Mod Ax2, tolerates 20s of supported standing with Max A x2, unable to take standing marches due to weakness. Pt has poor activity tolerance. Pt educated on LE HEP to complete throughout the day to prevent decline. Pt would benefit from acute skilled PT services to address above deficits and progress towards goals. PT d/c recc SNF when medically appropriate. Current Level of Function Impacting Discharge (mobility/balance):  Mod A x2 for mobility, AMS Patient will benefit from skilled therapy intervention to address the above noted impairments. PLAN : 
Recommendations and Planned Interventions: bed mobility training, transfer training, gait training, therapeutic exercises, neuromuscular re-education, patient and family training/education, and therapeutic activities Frequency/Duration: Patient will be followed by physical therapy:  3 times a week to address goals. Recommendation for discharge: (in order for the patient to meet his/her long term goals) PT d/c rec SNF when medically appropriate. This discharge recommendation: 
Has been made in collaboration with the attending provider and/or case management IF patient discharges home will need the following DME: patient owns DME required for discharge SUBJECTIVE:  
Patient stated I walk with a cane.  OBJECTIVE DATA SUMMARY:  
HISTORY:   
Past Medical History:  
Diagnosis Date Acute renal failure ([facility] [address] Anemia Arthritis DKA (diabetic ketoacidoses) ([facility] [address] GERD (gastroesophageal reflux disease) HTN (hypertension) Hypercholesterolemia Schizophrenia ([facility] [address] Schizophreniform disorder ([facility] [address] Seizure disorder ([facility] [address] Type II or unspecified type diabetes mellitus without mention of complication, uncontrolled No past surgical history on file. Personal factors and/or comorbidities impacting plan of care: complicated medical history, multiple co morbidities, unreliable historian, unknown PLOF Home Situation Home Environment: Private residence # Steps to Enter: 0 One/Two Story Residence: One story # of Interior Steps:  (unknown) Living Alone: No 
Support Systems: Family member(s) Patient Expects to be Discharged to[de-identified] Skilled nursing facility Current DME Used/Available at Home: Cane, straight, Hospital bed, Wheelchair EXAMINATION/PRESENTATION/DECISION MAKING:  
Critical Behavior: 
Neurologic State: Confused, Lethargic Orientation Level: Oriented to person, Oriented to place Cognition: Decreased attention/concentration, Decreased command following, Impaired decision making Hearing: Auditory Auditory Impairment: None Range Of Motion: 
AROM: Within functional limits Strength:   
Strength: Generally decreased, functional (Gross BLE 3+/5) Tone & Sensation:  
 Sensation: Intact Functional Mobility: 
Bed Mobility: 
Rolling: Moderate assistance Supine to Sit: Moderate assistance;Maximum assistance Sit to Supine: Moderate assistance;Assist x2 Scooting: Moderate assistance;Assist x2 Transfers: 
Sit to Stand: Moderate assistance;Assist x2 Stand to Sit: Moderate assistance;Assist x2 Balance:  
Sitting: Impaired; With support Sitting - Static: Fair (occasional) Sitting - Dynamic: Fair (occasional) Standing: Impaired;Pull to stand; With support Standing - Static: Poor Standing - Dynamic : Poor Pt completed bed mobility with Mod A/Mod A x2, transfers with Mod Ax2, tolerates 20s of supported standing with Max A x2 for 2 trials, unable to take standing marches due to weakness. Pt req max cues for hand placement, sequencing, upright sitting and standing, and safety throughout to prevent LOB/falls. Pt has poor activity tolerance. Pt educated on LE HEP to complete throughout the day to prevent decline. Pt repositioned with Max A x2 to [facility] in bed semisupine with all needs met, set up with breakfast tray and notified CNA of need to be listed as a feeder due to BUE shakiness and inability to efficiently feed himself. Functional Measure: 
[address] [facility] [address] [address] AM-PAC 6 Clicks Basic Mobility Inpatient Short Form How much difficulty does the patient currently have. .. Unable A Lot A Little None 1. Turning over in bed (including adjusting bedclothes, sheets and blankets)? [] 1   [x] 2   [] 3   [] 4  
2.   Sitting down on and standing up from a chair with arms ( e.g., wheelchair, bedside commode, etc.)   [] 1 [x] 2   [] 3   [] 4  
3. Moving from lying on back to sitting on the side of the bed? [] 1   [x] 2   [] 3   [] 4 How much help from another person does the patient currently need. .. Total A Lot A Little None 4. Moving to and from a bed to a chair (including a wheelchair)? [] 1   [x] 2   [] 3   [] 4  
5. Need to walk in hospital room? [x] 1   [] 2   [] 3   [] 4  
6. Climbing 3-5 steps with a railing? [x] 1   [] 2   [] 3   [] 4  
© , Trustees of [facility] [employer], under license to [employer]. All rights reserved Score:  Initial: 10 Most Recent: X (Date: -- ) Interpretation of Tool:  Represents activities that are increasingly more difficult (i.e. Bed mobility, Transfers, Gait). Score 24 23 22-20 19-15 14-10 9-7 6 Modifier CH CI CJ CK CL CM CN Physical Therapy Evaluation Charge Determination History Examination Presentation Decision-Making HIGH Complexity :3+ comorbidities / personal factors will impact the outcome/ POC  HIGH Complexity : 4+ Standardized tests and measures addressing body structure, function, activity limitation and / or participation in recreation  MEDIUM Complexity : Evolving with changing characteristics  Other Functional Measure [facility] 6 10 Based on the above components, the patient evaluation is determined to be of the following complexity level: MEDIUM Pain Ratin/10 Activity Tolerance:  
Poor Please refer to the flowsheet for vital signs taken during this treatment. After treatment patient left in no apparent distress:  
Supine in bed, Call bell within reach, Bed / chair alarm activated, and Side rails x 3 
 
COMMUNICATION/EDUCATION:  
The patients plan of care was discussed with: Occupational therapist and Registered nurse. PT/OT sessions occurred together for increased safety of pt and clinician. Fall prevention education was provided and the patient/caregiver indicated understanding.  and Patient/family have participated as able in goal setting and plan of care. Thank you for this referral. 
Jules Shepard, PT, DPT Time Calculation: 34 mins

## 2021-06-06 NOTE — PROGRESS NOTES
Problem: Self Care Deficits Care Plan (Adult) Goal: *Acute Goals and Plan of Care (Insert Text) Description: Pt will be SBA sup<->sit in prep for EOB ADL's Pt will be min A  LB dressing EOB level Pt will be SBA  sit EOB 10 minutes in prep for EOB ADL's Pt will be SBA  grooming EOB level Pt will be SBA  sit<-> prep for toilet transfer Pt will be SBA  BSC progress to standard toilet transfer with LRAD Pt will be SBA  toileting/cloth mgmt LRAD Pt will progress to SBA  grooming standing sink Pt will be SBA bathing sitting/standing sink/chair level LRAD Pt will be MI chantell UE HEP in prep for self care tasks Outcome: Not Met OCCUPATIONAL THERAPY EVALUATION Patient: Rianna Mccullough (26 y.o. male) Date: 6/6/2021 Primary Diagnosis: Dehydration [E86.0] Precautions: Fall, Bed Alarm ASSESSMENT Pt is 71 y/o male came [facility] with AMS and adm 6/3/2021 for dehydration, acute encephalopathy, uncontrolled DM, ANDRES, hypernatremia. Pt has hx of dementia, schizophrenia, seizure disorder, anemia, arthritis, DKA, GERD, HTN, hypercholesterolemia, schizophreniform disorder, seizure disorder, dm type II. Pt received seated EOB with PT present, A&O to self, place (not situation or time) and agreeable for OT/PT eval/tx. Per pt report, pt lives with mother and sister in 1 story home with no steps to enter and requires assist for self care and is MI for functional transfers/mobility with use of SPC however accuracy of information unknown (per chart review, pt lives with sister and brother, has PCA 2 times a week and family looking into hospital bed and WC).   
 
Pt currently presents with decreased balance, decreased activity tolerance, generalized weakness, decreased safety awareness and increased need for assist with self care (SBA to min A self feeding 2/2 tremors, total A LB dressing EOB simulated, total A toileting simulated during stance)) and functional transfers/mobility (mod Ax2 sit<->stand, mod Ax2 sit->sup and scooting bed level). Pt would benefit from skilled OT services while at [facility] in order to increase safety and independence with self care and functional transfers/mobility. D/C recommendation TBD between SNF vs HHOT with 24/7 care pending PLOF information confirmation. Other factors to consider for discharge: time since onset, severity of deficits, PLOF 
   
 
PLAN : 
Recommendations and Planned Interventions: self care training, functional mobility training, therapeutic exercise, balance training, therapeutic activities, endurance activities, patient education, and home safety training Frequency/Duration: Patient will be followed by occupational therapy 3 times a week to address goals. Recommendation for discharge: (in order for the patient to meet his/her long term goals) TBD SNF vs HHOT pending PLOF information This discharge recommendation: 
Has been made in collaboration with the attending provider and/or case management IF patient discharges home will need the following DME: WC, BSC  
 
 
SUBJECTIVE:  
Patient stated I can eat it when set up for self feeding OBJECTIVE DATA SUMMARY:  
HISTORY:  
Past Medical History:  
Diagnosis Date Acute renal failure ([facility] [address] Anemia Arthritis DKA (diabetic ketoacidoses) ([facility] [address] GERD (gastroesophageal reflux disease) HTN (hypertension) Hypercholesterolemia Schizophrenia ([facility] [address] Schizophreniform disorder ([facility] [address] Seizure disorder ([facility] [address] Type II or unspecified type diabetes mellitus without mention of complication, uncontrolled No past surgical history on file. Expanded or extensive additional review of patient history:  
 
Home Situation Home Environment: Private residence # Steps to Enter: 0 One/Two Story Residence: One story # of Interior Steps:  (unknown) Living Alone: No 
Support Systems: Family member(s) Patient Expects to be Discharged to[de-identified] Skilled nursing facility Current DME Used/Available at Home: [facility], [place], straight, Hospital bed, Wheelchair PLOF: Pt requires assist for ADLS/IADLS, MI with mobility prior to admission. Hand dominance: Right EXAMINATION OF PERFORMANCE DEFICITS: 
Cognitive/Behavioral Status: 
Neurologic State: Confused; Lethargic Orientation Level: Oriented to person;Oriented to place Cognition: Decreased attention/concentration;Decreased command following; Impaired decision making Hearing: Auditory Auditory Impairment: None Range of Motion: 
AROM: Generally decreased, functional 
  
Strength: 
Strength: Generally decreased, functional (chantell UE grossly observed to be 3+/5) Tone & Sensation: 
Sensation: Intact Balance: 
Sitting: Impaired; With support Sitting - Static: Fair (occasional) Sitting - Dynamic: Fair (occasional) Standing: Impaired;Pull to stand; With support Standing - Static: Constant support;Poor Standing - Dynamic : Constant support;Poor Functional Mobility and Transfers for ADLs: 
Bed Mobility: 
Rolling: Moderate assistance Supine to Sit: Moderate assistance;Maximum assistance Sit to Supine: Moderate assistance;Assist x2 Scooting: Moderate assistance;Assist x2 Transfers: 
Sit to Stand: Moderate assistance;Assist x2 Stand to Sit: Moderate assistance;Assist x2 Assistive Device : Gait Belt;Walker, rolling ADL Assessment: 
Feeding: Stand-by assistance;Minimum assistance Oral Facial Hygiene/Grooming: Stand-by assistance Lower Body Dressing: Total assistance (simulated) Toileting: Total assistance (simulated) ADL Intervention and task modifications: 
Feeding Feeding Assistance: Set-up; Stand-by assistance;Minimum assistance (pt with intermittent tremors requiring assist) Container Management: Set-up Cutting Food: Set-up Utensil Management: Set-up; Stand-by assistance;Minimum assistance Food to Mouth: Set-up; Stand-by assistance;Minimum assistance Drink to Mouth: Set-up; Stand-by assistance;Minimum assistance Grooming Grooming Assistance: Stand-by assistance Position Performed:  (semi supine in bed) Washing Face: Stand-by assistance Washing Hands: Stand-by assistance Lower Body Dressing Assistance Socks: Total assistance (dependent) Position Performed: Seated edge of bed Toileting Toileting Assistance: Total assistance(dependent) (simulated) [address] [place] [address] AM-PACTM \"6 Clicks\" Daily Activity Inpatient Short Form How much help from another person does the patient currently need. .. Total; A Lot A Little None 1. Putting on and taking off regular lower body clothing? []  1 [x]  2 []  3 []  4  
2. Bathing (including washing, rinsing, drying)? []  1 [x]  2 []  3 []  4  
3. Toileting, which includes using toilet, bedpan or urinal? [] 1 [x]  2 []  3 []  4  
4. Putting on and taking off regular upper body clothing? []  1 [x]  2 []  3 []  4  
5. Taking care of personal grooming such as brushing teeth? []  1 []  2 [x]  3 []  4  
6. Eating meals? []  1 []  2 [x]  3 []  4  
© 2007, Trustees of [address] [place] [id number], under license to [employer]. All rights reserved Score: 14/24 Interpretation of Tool:  Represents clinically-significant functional categories (i.e. Activities of daily living). Percentage of Impairment CH 
 
0% 
 CI 
 
1-19% CJ 
 
20-39% CK 
 
40-59% CL 
 
60-79% CM 
 
80-99% CN  
 
100% [facility] Score 6-24 24 23 20-22 15-19 10-14 7-9 6 Occupational Therapy Evaluation Charge Determination History Examination Decision-Making LOW Complexity : Brief history review  MEDIUM Complexity : 3-5 performance deficits relating to physical, cognitive , or psychosocial skils that result in activity limitations and / or participation restrictions MEDIUM Complexity : Patient may present with comorbidities that affect occupational performnce.  Miniml to moderate modification of tasks or assistance (eg, physical or verbal ) with assesment(s) is necessary to enable patient to complete evaluation Based on the above components, the patient evaluation is determined to be of the following complexity level: LOW Pain Rating: 
No pain reported Activity Tolerance:  
Fair and requires rest breaks Please refer to the flowsheet for vital signs taken during this treatment. After treatment patient left in no apparent distress:   
Supine in bed, Call bell within reach, Bed / chair alarm activated, and Side rails x 3 
 
COMMUNICATION/EDUCATION:  
The patients plan of care was discussed with: Physical therapist and Registered nurse. PT/OT sessions occurred together for increased safety of pt and clinician. Home safety education was provided and the patient/caregiver indicated understanding. and Patient/family have participated as able in goal setting and plan of care. This patients plan of care is appropriate for delegation to [facility]. Thank you for this referral. 
Quang Fernandes Time Calculation: 24 mins

## 2021-06-06 NOTE — PROGRESS NOTES
Hospitalist Progress Note Subjective:  
Daily Progress Note: 6/6/2021 7:33 AM 
 
Hospital Course: Patient is a 44-year-old male with a history of dementia, schizophrenia, seizure disorders that presented to the emergency room on 6/3/2021 for altered mental status. CT of the head showed no acute findings, stable appearance of advanced cortical and cerebral volume loss, minor right ethmoid sinus opacification. Chest x-ray shows lungs underinflated, streaky airspace disease/atelectasis, no pleural effusion or pneumothorax. Blood cultures are pending. Urinalysis showed signs concerning for UTI. Urine culture is pending. Patient currently on IV Rocephin. Lactic acid elevated at 2.4 but after hydration trended down to 1.6. Also of note patient sodium was elevated at 159. The following day patient sodium increased to 163. Changed half-normal saline to dextrose 150 cc/h. HGB A1C in May 10.8. Patient on insulin sliding scale and Lantus 18 units at bedtime along with basal insulin 5 units with each meal. Blood pressure has been elevated. Started on Norvasc 10mg daily. Subjective: No acute issues overnight. Denies any pain Current Facility-Administered Medications Medication Dose Route Frequency  insulin glargine (LANTUS) injection 18 Units  18 Units SubCUTAneous QHS  zinc oxide-white petrolatum 17-57 % topical paste   Topical TID  dextrose 5% infusion  125 mL/hr IntraVENous CONTINUOUS  
 insulin lispro (HUMALOG) injection 5 Units  5 Units SubCUTAneous TIDAC  heparin (porcine) injection 5,000 Units  5,000 Units SubCUTAneous Q8H  
 pantoprazole (PROTONIX) tablet 40 mg  40 mg Oral ACB  sodium chloride (NS) flush 5-40 mL  5-40 mL IntraVENous Q8H  
 sodium chloride (NS) flush 5-40 mL  5-40 mL IntraVENous PRN  
 acetaminophen (TYLENOL) tablet 650 mg  650 mg Oral Q6H PRN Or  
 acetaminophen (TYLENOL) suppository 650 mg  650 mg Rectal Q6H PRN  polyethylene glycol (MIRALAX) packet 17 g  17 g Oral DAILY PRN  
 ondansetron (ZOFRAN ODT) tablet 4 mg  4 mg Oral Q8H PRN Or  
 ondansetron (ZOFRAN) injection 4 mg  4 mg IntraVENous Q6H PRN  
 cefTRIAXone (ROCEPHIN) 1 g in sterile water (preservative free) 10 mL IV syringe  1 g IntraVENous Q24H  
 amLODIPine (NORVASC) tablet 5 mg  5 mg Oral DAILY  divalproex DR (DEPAKOTE) tablet 500 mg  500 mg Oral BID  lacosamide (VIMPAT) tablet 200 mg  200 mg Oral BID  levETIRAcetam (KEPPRA) tablet 1,000 mg  1,000 mg Oral BID  levothyroxine (SYNTHROID) tablet 75 mcg  75 mcg Oral 7am  
 OLANZapine (ZyPREXA) tablet 2.5 mg  2.5 mg Oral BID  pravastatin (PRAVACHOL) tablet 20 mg  20 mg Oral QHS  insulin lispro (HUMALOG) injection   SubCUTAneous AC&HS Review of Systems Constitutional: No fevers, No chills, No sweats, ++ fatigue, ++ Weakness Eyes: No redness Ears, nose, mouth, throat, and face: No nasal congestion, No sore throat, No voice change Respiratory: No Shortness of Breath, No cough, No wheezing Cardiovascular: No chest pain, No palpitations, No extremity edema Gastrointestinal: No nausea, No vomiting, No diarrhea, No abdominal pain Genitourinary: No frequency, No dysuria, No hematuria Integument/breast: No skin lesion(s) Neurological: + intermittent Confusion, No headaches, No dizziness Objective:  
 
Visit Vitals BP (!) 174/73 (BP 1 Location: Left lower arm, BP Patient Position: At rest) Pulse 75 Temp 97.6 °F (36.4 °C) Resp 18 Ht 5' 9\" (1.753 m) Wt 97.8 kg (215 lb 9.8 oz) SpO2 98% BMI 31.84 kg/m² O2 Flow Rate (L/min): 2 l/min O2 Device: Nasal cannula Temp (24hrs), Av.2 °F (36.8 °C), Min:97.6 °F (36.4 °C), Max:99 °F (37.2 °C) No intake/output data recorded.  1901 -  0700 In: 2951.7 [P.O.:60; I.V.:2891.7] Out: 400 [Urine:400] PHYSICAL EXAM: 
Constitutional:  No acute distress Skin: Extremities and face reveal no rashes. HEENT: Sclerae anicteric.  Extra-occular muscles are intact. Cardiovascular: RRR Respiratory:  Clear breath sounds bilaterally with no wheezes, rales, or rhonchi. GI: Abdomen nondistended, soft, and nontender. Normal active bowel sounds. Musculoskeletal: No pitting edema of the lower legs. Able to move all ext Neurological:  Patient is alert and oriented. Cranial nerves II-XII grossly intact Psychiatric: Mood appears appropriate Data Review Recent Results (from the past 24 hour(s)) GLUCOSE, POC Collection Time: 06/05/21 12:40 PM  
Result Value Ref Range Glucose (POC) 311 (H) 65 - 117 mg/dL Performed by Fabricio Manzano, POC Collection Time: 06/05/21  6:11 PM  
Result Value Ref Range Glucose (POC) 355 (H) 65 - 117 mg/dL Performed by Charmayne Burr GLUCOSE, POC Collection Time: 06/05/21  8:11 PM  
Result Value Ref Range Glucose (POC) 225 (H) 65 - 117 mg/dL Performed by Nata Rosenthal GLUCOSE, POC Collection Time: 06/06/21  7:40 AM  
Result Value Ref Range Glucose (POC) 295 (H) 65 - 117 mg/dL Performed by Roxanna Ricks Radiology review: CT of head/Chest xray Assessment: 1. Acute kidney injury/hypernatremia/dehydration due to poor oral intake 2. Acute encephalopathy 3. UTI 4. Alzheimer's dementia 5. History of schizophrenia 6. Uncontrolled type 2 diabetes 7. Levothyroxine 8. HTN Plan: 1. Patient sodium increased 163 --> 158 --> pending for today. On D50. Renal function trending downward. 2.  CT of the head unremarkable. 3.  Blood cultures remain negative. Urinalysis shows signs concerning for UTI. Urine culture pending. On IV Rocephin. Lactic acidosis has resolved 4. Patient on Depakote and Zyprexa 5. Continue with Keppra and Vimpat 6. Glucose levels are elevated. Last hemoglobin A1C in May 2021 was 10.8. Continue with insulin sliding scale. Increased lantus to 18 units. On ISS 7. Continue with Synthroid 8.  BP elevated. Started on Norvasc 9.  CBC and Bmp in the AM  
 
CODE STATUS Full DVT prophylaxis: Heparin Ulcer prophylaxis: Protonix Care Plan discussed with: Patient/Family, Nurse and  Total time spent with patient: 34 minutes.

## 2021-06-07 NOTE — PROGRESS NOTES
CM is working to arrange [place] and DME for patient at discharge. Referrals have been sent awaiting approval at this time.

## 2021-06-07 NOTE — PROGRESS NOTES
Bedside shift change report given to MARLON Amor (oncoming nurse) by Andria Kennedy (offgoing nurse). Report included the following information SBAR.

## 2021-06-07 NOTE — PROGRESS NOTES
Hospitalist Progress Note Subjective:  
Daily Progress Note: 6/7/2021 7:33 AM 
 
Hospital Course: Patient is a 79-year-old male with a history of dementia, schizophrenia, seizure disorders that presented to the emergency room on 6/3/2021 for altered mental status. CT of the head showed no acute findings, stable appearance of advanced cortical and cerebral volume loss, minor right ethmoid sinus opacification. Chest x-ray shows lungs underinflated, streaky airspace disease/atelectasis, no pleural effusion or pneumothorax. Blood cultures are pending. Urinalysis showed signs concerning for UTI. Urine culture is pending. Patient currently on IV Rocephin. We will stop and start oral amoxicillin for 3 more days to complete a 1 week course. Lactic acid elevated at 2.4 but after hydration trended down to 1.6. Also of note patient sodium was elevated at 159. The following day patient sodium increased to 163. Changed half-normal saline to dextrose 150 cc/h. HGB A1C in May 10.8. Patient on insulin sliding scale and Lantus 18 units at bedtime along with basal insulin 5 units with each meal. Blood pressure has been elevated. Started on Norvasc 10mg daily. Subjective: No acute issues overnight. Denies any pain Current Facility-Administered Medications Medication Dose Route Frequency  amLODIPine (NORVASC) tablet 10 mg  10 mg Oral DAILY  rivaroxaban (XARELTO) tablet 10 mg  10 mg Oral DAILY WITH BREAKFAST  insulin glargine (LANTUS) injection 18 Units  18 Units SubCUTAneous QHS  zinc oxide-white petrolatum 17-57 % topical paste   Topical TID  insulin lispro (HUMALOG) injection 5 Units  5 Units SubCUTAneous TIDAC  pantoprazole (PROTONIX) tablet 40 mg  40 mg Oral ACB  sodium chloride (NS) flush 5-40 mL  5-40 mL IntraVENous Q8H  
 sodium chloride (NS) flush 5-40 mL  5-40 mL IntraVENous PRN  
 acetaminophen (TYLENOL) tablet 650 mg  650 mg Oral Q6H PRN  Or  
 acetaminophen (TYLENOL) suppository 650 mg  650 mg Rectal Q6H PRN  polyethylene glycol (MIRALAX) packet 17 g  17 g Oral DAILY PRN  
 ondansetron (ZOFRAN ODT) tablet 4 mg  4 mg Oral Q8H PRN Or  
 ondansetron (ZOFRAN) injection 4 mg  4 mg IntraVENous Q6H PRN  
 cefTRIAXone (ROCEPHIN) 1 g in sterile water (preservative free) 10 mL IV syringe  1 g IntraVENous Q24H  
 divalproex DR (DEPAKOTE) tablet 500 mg  500 mg Oral BID  lacosamide (VIMPAT) tablet 200 mg  200 mg Oral BID  levETIRAcetam (KEPPRA) tablet 1,000 mg  1,000 mg Oral BID  levothyroxine (SYNTHROID) tablet 75 mcg  75 mcg Oral 7am  
 OLANZapine (ZyPREXA) tablet 2.5 mg  2.5 mg Oral BID  pravastatin (PRAVACHOL) tablet 20 mg  20 mg Oral QHS  insulin lispro (HUMALOG) injection   SubCUTAneous AC&HS Review of Systems Constitutional: No fevers, No chills, No sweats, ++ fatigue, ++ Weakness Eyes: No redness Ears, nose, mouth, throat, and face: No nasal congestion, No sore throat, No voice change Respiratory: No Shortness of Breath, No cough, No wheezing Cardiovascular: No chest pain, No palpitations, No extremity edema Gastrointestinal: No nausea, No vomiting, No diarrhea, No abdominal pain Genitourinary: No frequency, No dysuria, No hematuria Integument/breast: No skin lesion(s) Neurological: + intermittent Confusion, No headaches, No dizziness Objective:  
 
Visit Vitals BP (!) 152/79 (BP 1 Location: Left upper arm, BP Patient Position: At rest) Pulse 80 Temp 99 °F (37.2 °C) Resp 18 Ht 5' 9\" (1.753 m) Wt 97.8 kg (215 lb 9.8 oz) SpO2 97% BMI 31.84 kg/m² O2 Flow Rate (L/min): 2 l/min O2 Device: None (Room air) Temp (24hrs), Av.8 °F (37.1 °C), Min:98.3 °F (36.8 °C), Max:99.1 °F (37.3 °C) No intake/output data recorded.  1901 -  0700 In: 1015 [P.O.:1450; I.V.:1000] Out: 1300 [Urine:1300] PHYSICAL EXAM: 
Constitutional:  No acute distress Skin: Extremities and face reveal no rashes.   
HEENT: Sclerae anicteric. Extra-occular muscles are intact. Cardiovascular: RRR Respiratory:  Clear breath sounds bilaterally with no wheezes, rales, or rhonchi. GI: Abdomen nondistended, soft, and nontender. Normal active bowel sounds. Musculoskeletal: No pitting edema of the lower legs. Able to move all ext Neurological:  Patient is alert and oriented. Cranial nerves II-XII grossly intact Psychiatric: Mood appears appropriate Data Review Recent Results (from the past 24 hour(s)) GLUCOSE, POC Collection Time: 06/06/21 11:20 AM  
Result Value Ref Range Glucose (POC) 383 (H) 65 - 117 mg/dL Performed by Darian Moeller GLUCOSE, POC Collection Time: 06/06/21  5:31 PM  
Result Value Ref Range Glucose (POC) 299 (H) 65 - 117 mg/dL Performed by Mirella Newman, POC Collection Time: 06/06/21  7:52 PM  
Result Value Ref Range Glucose (POC) 357 (H) 65 - 117 mg/dL Performed by Roseanne Monroy GLUCOSE, POC Collection Time: 06/07/21 12:53 AM  
Result Value Ref Range Glucose (POC) 265 (H) 65 - 117 mg/dL Performed by [facility], BASIC Collection Time: 06/07/21  7:43 AM  
Result Value Ref Range Sodium 145 136 - 145 mmol/L Potassium 3.5 3.5 - 5.1 mmol/L Chloride 111 (H) 97 - 108 mmol/L  
 CO2 30 21 - 32 mmol/L Anion gap 4 (L) 5 - 15 mmol/L Glucose 287 (H) 65 - 100 mg/dL BUN 16 6 - 20 mg/dL Creatinine 0.83 0.70 - 1.30 mg/dL BUN/Creatinine ratio 19 12 - 20 GFR est AA >60 >60 ml/min/1.73m2 GFR est non-AA >60 >60 ml/min/1.73m2 Calcium 8.7 8.5 - 10.1 mg/dL CBC WITH AUTOMATED DIFF Collection Time: 06/07/21  7:43 AM  
Result Value Ref Range WBC 6.1 4.1 - 11.1 K/uL  
 RBC 3.84 (L) 4.10 - 5.70 M/uL  
 HGB 12.0 (L) 12.1 - 17.0 g/dL HCT 38.0 36.6 - 50.3 % MCV 99.0 80.0 - 99.0 FL  
 MCH 31.3 26.0 - 34.0 PG  
 MCHC 31.6 30.0 - 36.5 g/dL  
 RDW 12.9 11.5 - 14.5 % PLATELET 387 843 - 307 K/uL  MPV 12.2 8.9 - 12.9 FL NRBC 0.5 (H) 0.0  WBC ABSOLUTE NRBC 0.03 (H) 0.00 - 0.01 K/uL NEUTROPHILS 51 32 - 75 % LYMPHOCYTES 39 12 - 49 % MONOCYTES 6 5 - 13 % EOSINOPHILS 2 0 - 7 % BASOPHILS 1 0 - 1 % IMMATURE GRANULOCYTES 1 (H) 0 - 0.5 % ABS. NEUTROPHILS 3.1 1.8 - 8.0 K/UL  
 ABS. LYMPHOCYTES 2.4 0.8 - 3.5 K/UL  
 ABS. MONOCYTES 0.3 0.0 - 1.0 K/UL  
 ABS. EOSINOPHILS 0.1 0.0 - 0.4 K/UL  
 ABS. BASOPHILS 0.0 0.0 - 0.1 K/UL  
 ABS. IMM. GRANS. 0.1 (H) 0.00 - 0.04 K/UL  
 DF AUTOMATED    
GLUCOSE, POC Collection Time: 06/07/21  8:51 AM  
Result Value Ref Range Glucose (POC) 299 (H) 65 - 117 mg/dL Performed by Becki Clark Radiology review: CT of head/Chest xray Assessment: 1. Acute kidney injury/hypernatremia/dehydration due to poor oral intake 2. Acute encephalopathy 3. UTI 4. Alzheimer's dementia 5. History of schizophrenia 6. Uncontrolled type 2 diabetes 7. Levothyroxine 8. HTN Plan: 1. Patient sodium increased 163 --> 158 --> 145. Was on IV On D5. Stopped on 6/6/2021  Renal function trending downward. 2.  CT of the head unremarkable. 3.  Blood cultures remain negative. Urinalysis shows signs concerning for UTI. Urine culture pending. On IV Rocephin. Lactic acidosis has resolved. Will switch to oral amoxicillin for 3 more days to complete a 1 week course of antibiotics. 4.  Patient on Depakote and Zyprexa 5. Continue with Keppra and Vimpat 6. Glucose levels are elevated. Last hemoglobin A1C in May 2021 was 10.8. Continue with insulin sliding scale. Increased lantus to 18 units. On ISS 7. Continue with Synthroid 8.  BP elevated. Started on Norvasc 9. CBC and Bmp in the AM  
10. Dispo pending DME equipment and home health Spoke to brother and updated on further course and plan. CODE STATUS Full DVT prophylaxis: Heparin Ulcer prophylaxis: Protonix Care Plan discussed with: Patient/Family, Nurse and  Total time spent with patient: 34 minutes. Patient will require a hospital bed secondary to Alzheimer's dementia and not being able to maneuver visibly with an ordinary bed and requires body positioning not feasible in an ordinary bed. In order to prevent skin breakdown patient requires frequent changes in body position. Pillows and wedges have been tried and unsuccessful. Patient will also require wheelchair due to limited mobility. That impairs their ability to participate in in their daily activities; including toileting and bathing. This cannot be resolved by the use of a cane or walker patient. Patient has a family member that can propel the chair within their home.

## 2021-06-07 NOTE — PROGRESS NOTES
OCCUPATIONAL THERAPY TREATMENT Patient: Jose Lawler (40 y.o. male) Date: 6/7/2021 Diagnosis: Dehydration [E86.0] <principal problem not specified> Precautions: Fall, Bed Alarm Chart, occupational therapy assessment, plan of care, and goals were reviewed. ASSESSMENT Patient continues with skilled OT services and is progressing towards goals. Patient semi supine in bed upon OT arrival, legs between rails on side of bed and reports needing to go to the bathroom. Patient mod A bed mobility, max A sup <> sit, max A scooting. Patient mod A x2 sit <> stand and stand pivot transfer from bed to recliner with max A x2 using gait belt and RW. Recliner taken into bathroom and patient provided stand pivot transfer recliner <> commode. Patient sat on commode for several minutes using back of toilet for support and CGA for sitting balance. Patient using gait belt and grab bars for transfers to and from toilet. Patient then max A x2 recliner back to bed transfer, attempted x1 with walker but patient fatigued and unable to manage so performed stand pivot without RW. Patient performed bed level toileting with total A for bowel care. Patient total A LE dressing sitting EOB. Continue to progress toward patient goals. Recommend discharge SNF vs HHOT with 24/7 care when medically appropriate. Current Level of Function Impacting Discharge (ADLs): total A toileting and LE dressing Other factors to consider for discharge: severity of deficits, family support PLAN : 
Patient continues to benefit from skilled intervention to address the above impairments. Continue treatment per established plan of care. to address goals. Recommend with staff: bedpan for toileting, A of 2 if attempting BSC transfer Recommend next OT session: grooming EOB/sitting in chair Recommendation for discharge: (in order for the patient to meet his/her long term goals) SNF vs HHOT This discharge recommendation: 
Has been made in collaboration with the attending provider and/or case management IF patient discharges home will need the following DME: hospital bed and wheelchair SUBJECTIVE:  
Patient stated I need to use the restroom.  OBJECTIVE DATA SUMMARY:  
Cognitive/Behavioral Status: 
Neurologic State: Alert;Drowsy Functional Mobility and Transfers for ADLs: 
Bed Mobility: 
Rolling: Moderate assistance Supine to Sit: Maximum assistance Sit to Supine: Maximum assistance Scooting: Maximum assistance Transfers: 
Sit to Stand: Moderate assistance;Assist x2 Functional Transfers Toilet Transfer : Maximum assistance;Assist x2 Bed to Chair: Maximum assistance;Assist x2 Balance: 
Sitting: Impaired; With support Sitting - Static: Poor (constant support) Sitting - Dynamic: Poor (constant support) Standing: Impaired; With support Standing - Static: Poor;Constant support Standing - Dynamic : Poor;Constant support ADL Intervention: Lower Body Dressing Assistance Dressing Assistance: Total assistance(dependent) Socks: Total assistance (dependent) Position Performed: Seated edge of bed Toileting Toileting Assistance: Total assistance(dependent) Bowel Hygiene: Total assistance (dependent) Clothing Management: Total assistance (dependent) Therapeutic Exercises:  
Not performed at this time 2/2 fatigue following mobility Pain: 
0/10 Activity Tolerance:  
Fair, desaturates with exertion and requires oxygen and requires frequent rest breaks On 2 L of oxygen via nasal cannula After treatment patient left in no apparent distress:  
Supine in bed, Call bell within reach, Bed / chair alarm activated and Side rails x 3 
 
COMMUNICATION/COLLABORATION:  
The patients plan of care was discussed with: Certified nursing assistant/patient care technician.   
 
Mary Tao, OT student, assisting with patient therapy session and documentation, with patient verbal consent, under direct supervision of Zaria Baeza Shellie OTR/L. Problem: Self Care Deficits Care Plan (Adult) Goal: *Acute Goals and Plan of Care (Insert Text) Description: Pt will be SBA sup<->sit in prep for EOB ADL's Pt will be min A  LB dressing EOB level Pt will be SBA  sit EOB 10 minutes in prep for EOB ADL's Pt will be SBA  grooming EOB level Pt will be SBA  sit<-> prep for toilet transfer Pt will be SBA  BSC progress to standard toilet transfer with LRAD Pt will be SBA  toileting/cloth mgmt LRAD Pt will progress to SBA  grooming standing sink Pt will be SBA bathing sitting/standing sink/chair level LRAD Pt will be MI chantell UE HEP in prep for self care tasks Outcome: Progressing Towards Goal 
  
Valeriano Hurtado OTR/L Time Calculation: 31 mins

## 2021-06-08 NOTE — PROGRESS NOTES
CM awaiting approval of hospital bed and wheelchair. Have reached out to [facility] multiple times via phone and on [employer] with no response. Patient has been accepted with [address] for [facility] services, however, patient's brother is to come to the hospital today to visit and see if the patient is at his baseline or not and if SNF would be a better option.

## 2021-06-08 NOTE — PROGRESS NOTES
OCCUPATIONAL THERAPY TREATMENT Patient: Louvenia Bound (80 y.o. male) Date: 6/8/2021 Diagnosis: Dehydration [E86.0] <principal problem not specified> Precautions: Fall, Bed Alarm Chart, occupational therapy assessment, plan of care, and goals were reviewed. ASSESSMENT Patient continues with skilled OT services and is progressing towards goals. Pt. Received sleeping in bed on arrival and woke with name called. Pt agreeable to therapy session. Patient required max A x 2 for bed mobility. Performed sit-> stand with max A x 2 and RW for balance upon standing, unable to stand fully upright on the third attempt we performed sit-> stand. Pt. Demonstrated poor  static sitting balance while seated EOB, occasionally losing balance posteriorly and right lateral lean- requiring Mod A / Max A for sitting balance. Therapist assisted with hand positioning while seated EOB in an attempt increased pt's poor sitting balance. Pt drowsy during session and required v/c's to continue to follow task. Pt unable to take steps forward or side stepping at this time, so return to semi supine position with max A x 2. Performed UE therex in semi supine position see details below. Pt left in bed with call bell in reach and needs met, bed alarm on. Recommending d/c to SNF once medically appropriate Other factors to consider for discharge: PLOF, time since onset PLAN : 
Patient continues to benefit from skilled intervention to address the above impairments. Continue treatment per established plan of care. to address goals. Recommendation for discharge: (in order for the patient to meet his/her long term goals) Therapy up to 5 days/week in SNF setting This discharge recommendation: 
Has been made in collaboration with the attending provider and/or case management IF patient discharges home will need the following DME: TBD SUBJECTIVE:  
Patient stated i'm feeling weak.  OBJECTIVE DATA SUMMARY: Cognitive/Behavioral Status: 
Neurologic State: Drowsy; Confused Cognition: Decreased attention/concentration;Decreased command following Functional Mobility and Transfers for ADLs: 
Bed Mobility: 
Rolling: Maximum assistance Supine to Sit: Maximum assistance;Assist x2 Sit to Supine: Maximum assistance;Assist x2 Scooting: Maximum assistance Transfers: 
Sit to Stand: Maximum assistance;Assist x2 Balance: 
Sitting: Impaired Sitting - Static: Poor (constant support) Sitting - Dynamic: Poor (constant support) Standing: Impaired; With support Standing - Static: Constant support;Poor Therapeutic Exercises: chantell UE's Exercise Sets Reps AROM AAROM PROM Self PROM Comments Shoulder flex/ext 1 10 [x] [] [] [] Elbow flex/ext 1 10 [x] [] [] []   
Wrist flex/ext 1 10 [x] [] [] []   
   [] [] [] []   
 
 
 
Pain: 
0/10 pain reported Activity Tolerance:  
Poor Please refer to the flowsheet for vital signs taken during this treatment. After treatment patient left in no apparent distress:  
Supine in bed, Call bell within reach, Bed / chair alarm activated, and Side rails x 3 
 
COMMUNICATION/COLLABORATION:  
The patients plan of care was discussed with: Physical therapy assistant and Registered nurse. Co-tx with PTA for increased assistance with transfers and safety Viktoria Fuentes Time Calculation: 23 mins Problem: Self Care Deficits Care Plan (Adult) Goal: *Acute Goals and Plan of Care (Insert Text) Description: Pt will be SBA sup<->sit in prep for EOB ADL's Pt will be min A  LB dressing EOB level Pt will be SBA  sit EOB 10 minutes in prep for EOB ADL's Pt will be SBA  grooming EOB level Pt will be SBA  sit<-> prep for toilet transfer Pt will be SBA  BSC progress to standard toilet transfer with LRAD Pt will be SBA  toileting/cloth mgmt LRAD Pt will progress to SBA  grooming standing sink Pt will be SBA bathing sitting/standing sink/chair level LRAD Pt will be MI chantell UE HEP in prep for self care tasks Outcome: Progressing Towards Goal

## 2021-06-08 NOTE — PROGRESS NOTES
PHYSICAL THERAPY TREATMENT Patient: Donnie Duque (89 y.o. male) Date: 6/8/2021 Diagnosis: Dehydration [E86.0] <principal problem not specified> Precautions: Fall, Bed Alarm Chart, physical therapy assessment, plan of care and goals were reviewed. ASSESSMENT Patient continues with skilled PT services and is progressing towards goals. Pt semi supine in bed upon arrival and agreeable to session. Patient required max A x 2 for bed mobility. Performed STS with max A x 2 and RW for balance upon standing, unable to stand fully upright on the 3x we performed STS. Noted with poor sitting balance while seated EOB, occasionally losing balance posteriorly and to the right. Pt drowsy during session and required VC to continue to follow task. Pt unable to take steps forward or side stepping at this time, so return to semi supine position with max A x 2. Performed LE therex in semi supine position see details below. Pt left in bed with call bell in reach and needs met, bed alarm on. Recommending d/c to SNF once medically appropriate Current Level of Function Impacting Discharge (mobility/balance): assistance required for all mobility Other factors to consider for discharge: PLOF, time since onset, severity of deficits. PLAN : 
Patient continues to benefit from skilled intervention to address the above impairments. Continue treatment per established plan of care. to address goals. Recommendation for discharge: (in order for the patient to meet his/her long term goals) Therapy up to 5 days/week in SNF setting This discharge recommendation: 
Has been made in collaboration with the attending provider and/or case management IF patient discharges home will need the following DME: to be determined (TBD) SUBJECTIVE:  
Patient stated i'm a little bit weak.  OBJECTIVE DATA SUMMARY:  
Critical Behavior: 
Neurologic State: Drowsy, Confused Orientation Level: Oriented to person, Disoriented to situation, Disoriented to place, Disoriented to time Cognition: Decreased attention/concentration, Decreased command following Functional Mobility Training: 
Bed Mobility: 
Rolling: Maximum assistance Supine to Sit: Maximum assistance;Assist x2 Sit to Supine: Maximum assistance;Assist x2 Scooting: Maximum assistance Transfers: 
Sit to Stand: Maximum assistance;Assist x2 Stand to Sit: Maximum assistance;Assist x2 Balance: 
Sitting: Impaired Sitting - Static: Poor (constant support) Sitting - Dynamic: Poor (constant support) Standing: Impaired; With support Standing - Static: Constant support;Poor Therapeutic Exercises:  
1 x 5  LAQ 
1 x 5 AP 
1 x 10 heel slides Pain Ratin/10 Activity Tolerance:  
Poor Please refer to the flowsheet for vital signs taken during this treatment. After treatment patient left in no apparent distress:  
Supine in bed, Call bell within reach, Bed / chair alarm activated, and Side rails x 3 
 
COMMUNICATION/COLLABORATION:  
The patients plan of care was discussed with: Occupational therapy assistant and Registered nurse. OT/PT sessions occurred together for increased patient and clinician safety Problem: Mobility Impaired (Adult and Pediatric) Goal: *Acute Goals and Plan of Care (Insert Text) Description: Bed mobility with Ind within 7 days. Transfers with CGA within 7 days. Amb approx 5 ft with LRAD and CGA within 7 days. Ind with HEP for LE within 7 days. Outcome: Progressing Towards Goal 
  
 
Roman Hodge PTA Time Calculation: 23 mins

## 2021-06-08 NOTE — PROGRESS NOTES
Pt A&O x1 overnight and denies pain at this time. Pt found to desat between 88%-91% while sleeping so he was placed on 2L NC and is now at 94%. VS stable. Will continue to monitor.

## 2021-06-08 NOTE — PROGRESS NOTES
Hospitalist Progress Note    Subjective:   Daily Progress Note: 6/8/2021     Hospital Course: Patient is a 37-year-old male with a history of dementia, schizophrenia, seizure disorders that presented to the emergency room on 6/3/2021 for altered mental status. CT of the head showed no acute findings, stable appearance of advanced cortical and cerebral volume loss, minor right ethmoid sinus opacification. Chest x-ray shows lungs underinflated, streaky airspace disease/atelectasis, no pleural effusion or pneumothorax. Blood cultures are pending. Urinalysis showed signs concerning for UTI. Urine culture is pending. Patient currently on IV Rocephin. We will stop and start oral amoxicillin for 3 more days to complete a 1 week course. Lactic acid elevated at 2.4 but after hydration trended down to 1.6. Also of note patient sodium was elevated at 159. The following day patient sodium increased to 163. Changed half-normal saline to dextrose 150 cc/h. HGB A1C in May 10.8. Patient on insulin sliding scale and Lantus 18 units at bedtime along with basal insulin 5 units with each meal. Blood pressure has been elevated. Started on Norvasc 10mg daily. Subjective: No acute issues overnight.   Denies any pain    Current Facility-Administered Medications   Medication Dose Route Frequency    amoxicillin (AMOXIL) capsule 500 mg  500 mg Oral Q8H    heparin (porcine) injection 5,000 Units  5,000 Units SubCUTAneous Q8H    amLODIPine (NORVASC) tablet 10 mg  10 mg Oral DAILY    insulin glargine (LANTUS) injection 18 Units  18 Units SubCUTAneous QHS    zinc oxide-white petrolatum 17-57 % topical paste   Topical TID    insulin lispro (HUMALOG) injection 5 Units  5 Units SubCUTAneous TIDAC    pantoprazole (PROTONIX) tablet 40 mg  40 mg Oral ACB    sodium chloride (NS) flush 5-40 mL  5-40 mL IntraVENous Q8H    sodium chloride (NS) flush 5-40 mL  5-40 mL IntraVENous PRN    acetaminophen (TYLENOL) tablet 650 mg  650 mg Oral Q6H PRN    Or    acetaminophen (TYLENOL) suppository 650 mg  650 mg Rectal Q6H PRN    polyethylene glycol (MIRALAX) packet 17 g  17 g Oral DAILY PRN    ondansetron (ZOFRAN ODT) tablet 4 mg  4 mg Oral Q8H PRN    Or    ondansetron (ZOFRAN) injection 4 mg  4 mg IntraVENous Q6H PRN    divalproex DR (DEPAKOTE) tablet 500 mg  500 mg Oral BID    lacosamide (VIMPAT) tablet 200 mg  200 mg Oral BID    levETIRAcetam (KEPPRA) tablet 1,000 mg  1,000 mg Oral BID    levothyroxine (SYNTHROID) tablet 75 mcg  75 mcg Oral 7am    OLANZapine (ZyPREXA) tablet 2.5 mg  2.5 mg Oral BID    pravastatin (PRAVACHOL) tablet 20 mg  20 mg Oral QHS    insulin lispro (HUMALOG) injection   SubCUTAneous AC&HS        Review of Systems  Constitutional: No fevers, No chills, No sweats, + fatigue, + Weakness  Eyes: No redness  Ears, nose, mouth, throat, and face: No nasal congestion, No sore throat, No voice change  Respiratory: No Shortness of Breath, No cough, No wheezing  Cardiovascular: No chest pain, No palpitations, No extremity edema  Gastrointestinal: No nausea, No vomiting, No diarrhea, No abdominal pain  Genitourinary: No frequency, No dysuria, No hematuria  Integument/breast: No skin lesion(s)   Neurological: + intermittent Confusion, No headaches, No dizziness      Objective:     Visit Vitals  /74 (BP 1 Location: Left upper arm, BP Patient Position: At rest)   Pulse 79   Temp 98.2 °F (36.8 °C)   Resp 18   Ht 5' 9\" (1.753 m)   Wt 97.8 kg (215 lb 9.8 oz)   SpO2 98%   BMI 31.84 kg/m²    O2 Flow Rate (L/min): 2 l/min O2 Device: Nasal cannula    Temp (24hrs), Av.3 °F (36.8 °C), Min:97.5 °F (36.4 °C), Max:99 °F (37.2 °C)      No intake/output data recorded.  1901 -  0700  In: 120 [P.O.:120]  Out: 400 [Urine:400]    PHYSICAL EXAM:  Constitutional:  No acute distress  Skin: Extremities and face reveal no rashes. HEENT: Sclerae anicteric. Extra-occular muscles are intact.   Cardiovascular: RRR  Respiratory:  Clear breath sounds bilaterally with no wheezes, rales, or rhonchi. GI: Abdomen nondistended, soft, and nontender. Normal active bowel sounds. Musculoskeletal: No pitting edema of the lower legs. Able to move all ext  Neurological:  Patient is alert and oriented.  Cranial nerves II-XII grossly intact  Psychiatric: Mood appears appropriate       Data Review    Recent Results (from the past 24 hour(s))   GLUCOSE, POC    Collection Time: 06/07/21  4:27 PM   Result Value Ref Range    Glucose (POC) 268 (H) 65 - 117 mg/dL    Performed by [address] [place] [address], POC    Collection Time: 06/07/21  9:15 PM   Result Value Ref Range    Glucose (POC) 244 (H) 65 - 117 mg/dL    Performed by Evelyn Sanchez    GLUCOSE, POC    Collection Time: 06/08/21  7:30 AM   Result Value Ref Range    Glucose (POC) 209 (H) 65 - 117 mg/dL    Performed by [address], BASIC    Collection Time: 06/08/21 10:03 AM   Result Value Ref Range    Sodium 143 136 - 145 mmol/L    Potassium 3.3 (L) 3.5 - 5.1 mmol/L    Chloride 109 (H) 97 - 108 mmol/L    CO2 28 21 - 32 mmol/L    Anion gap 6 5 - 15 mmol/L    Glucose 273 (H) 65 - 100 mg/dL    BUN 15 6 - 20 mg/dL    Creatinine 0.81 0.70 - 1.30 mg/dL    BUN/Creatinine ratio 19 12 - 20      GFR est AA >60 >60 ml/min/1.73m2    GFR est non-AA >60 >60 ml/min/1.73m2    Calcium 9.0 8.5 - 10.1 mg/dL   CBC WITH AUTOMATED DIFF    Collection Time: 06/08/21 10:03 AM   Result Value Ref Range    WBC 5.4 4.1 - 11.1 K/uL    RBC 4.11 4.10 - 5.70 M/uL    HGB 12.8 12.1 - 17.0 g/dL    HCT 40.3 36.6 - 50.3 %    MCV 98.1 80.0 - 99.0 FL    MCH 31.1 26.0 - 34.0 PG    MCHC 31.8 30.0 - 36.5 g/dL    RDW 12.8 11.5 - 14.5 %    PLATELET 165 066 - 528 K/uL    MPV 12.3 8.9 - 12.9 FL    NRBC 0.0 0.0  WBC    ABSOLUTE NRBC 0.00 0.00 - 0.01 K/uL    NEUTROPHILS 52 32 - 75 %    LYMPHOCYTES 37 12 - 49 %    MONOCYTES 7 5 - 13 %    EOSINOPHILS 2 0 - 7 %    BASOPHILS 1 0 - 1 %    IMMATURE GRANULOCYTES 1 (H) 0 - 0.5 % ABS. NEUTROPHILS 2.9 1.8 - 8.0 K/UL    ABS. LYMPHOCYTES 2.0 0.8 - 3.5 K/UL    ABS. MONOCYTES 0.4 0.0 - 1.0 K/UL    ABS. EOSINOPHILS 0.1 0.0 - 0.4 K/UL    ABS. BASOPHILS 0.0 0.0 - 0.1 K/UL    ABS. IMM. GRANS. 0.1 (H) 0.00 - 0.04 K/UL    DF AUTOMATED     GLUCOSE, POC    Collection Time: 06/08/21 11:17 AM   Result Value Ref Range    Glucose (POC) 392 (H) 65 - 117 mg/dL    Performed by Melvin Bailey        Radiology review: CT of head/Chest xray    Assessment:   1. Acute kidney injury/hypernatremia/dehydration due to poor oral intake  2. Acute encephalopathy  3. UTI  4. Alzheimer's dementia  5. History of schizophrenia  6. Uncontrolled type 2 diabetes  7. Hypothyroidism  8. HTN    Plan:    1. Acute kidney injury  Creatinine normal, resolved    2. Hypernatremia  Sodium 163 --> 158 --> 145  Resolved    3. Acute metabolic encephalopathy  CT of the head unremarkable. Improving    4. Urinary tract infection  Blood cultures remain negative. Urinalysis shows concerning for UTI. Urine culture negative. Lactic acidosis has resolved. Complete amoxicillin     5. History of schizophrenia  Continue Depakote and Zyprexa    6. History of seizure disorder  Continue with Keppra and Vimpat    7. Uncontrolled type 2 diabetes mellitus  Last hemoglobin A1C in May 2021 was 10.8. Continue with insulin sliding scale. Increased lantus to 18 units. Sliding scale insulin    8. Hypothyroidism  Synthroid    9. Hypertension  Started on Norvasc    10. Elevated troponin  Most likely demand ischemia repeat troponin in a.m. Dispo pending DME equipment and home health    Spoke to brother and updated on further course and plan. Will visit patient later today    CODE STATUS Full     DVT prophylaxis: Heparin  Ulcer prophylaxis: Protonix    Care Plan discussed with: Patient/Family, Nurse and     Total time spent with patient: 34 minutes.

## 2021-06-08 NOTE — PROGRESS NOTES
Problem: Diabetes Self-Management Goal: *Disease process and treatment process Description: Define diabetes and identify own type of diabetes; list 3 options for treating diabetes. Outcome: Progressing Towards Goal 
Goal: *Incorporating nutritional management into lifestyle Description: Describe effect of type, amount and timing of food on blood glucose; list 3 methods for planning meals. Outcome: Progressing Towards Goal 
Goal: *Incorporating physical activity into lifestyle Description: State effect of exercise on blood glucose levels. Outcome: Progressing Towards Goal 
Goal: *Developing strategies to promote health/change behavior Description: Define the ABC's of diabetes; identify appropriate screenings, schedule and personal plan for screenings. Outcome: Progressing Towards Goal 
Goal: *Using medications safely Description: State effect of diabetes medications on diabetes; name diabetes medication taking, action and side effects. Outcome: Progressing Towards Goal 
Goal: *Monitoring blood glucose, interpreting and using results Description: Identify recommended blood glucose targets  and personal targets. Outcome: Progressing Towards Goal 
Goal: *Prevention, detection, treatment of acute complications Description: List symptoms of hyper- and hypoglycemia; describe how to treat low blood sugar and actions for lowering  high blood glucose level. Outcome: Progressing Towards Goal 
Goal: *Prevention, detection and treatment of chronic complications Description: Define the natural course of diabetes and describe the relationship of blood glucose levels to long term complications of diabetes. Outcome: Progressing Towards Goal 
Goal: *Developing strategies to address psychosocial issues Description: Describe feelings about living with diabetes; identify support needed and support network Outcome: Progressing Towards Goal 
Goal: *Insulin pump training Outcome: Progressing Towards Goal 
Goal: *Sick day guidelines Outcome: Progressing Towards Goal 
Goal: *Patient Specific Goal (EDIT GOAL, INSERT TEXT) Outcome: Progressing Towards Goal 
  
Problem: Patient Education: Go to Patient Education Activity Goal: Patient/Family Education Outcome: Progressing Towards Goal 
  
Problem: Falls - Risk of 
Goal: *Absence of Falls Description: Document Cesar Hudson Fall Risk and appropriate interventions in the flowsheet. Outcome: Progressing Towards Goal 
Note: Fall Risk Interventions: 
Mobility Interventions: Bed/chair exit alarm, Patient to call before getting OOB Mentation Interventions: Bed/chair exit alarm, Evaluate medications/consider consulting pharmacy Medication Interventions: Bed/chair exit alarm, Evaluate medications/consider consulting pharmacy Elimination Interventions: Bed/chair exit alarm, Call light in reach, Patient to call for help with toileting needs Problem: Patient Education: Go to Patient Education Activity Goal: Patient/Family Education Outcome: Progressing Towards Goal 
  
Problem: Patient Education: Go to Patient Education Activity Goal: Patient/Family Education Outcome: Progressing Towards Goal 
  
Problem: Patient Education: Go to Patient Education Activity Goal: Patient/Family Education Outcome: Progressing Towards Goal 
  
Problem: Pressure Injury - Risk of 
Goal: *Prevention of pressure injury Description: Document Oracio Scale and appropriate interventions in the flowsheet. Outcome: Progressing Towards Goal 
Note: Pressure Injury Interventions: 
Sensory Interventions: Float heels, Keep linens dry and wrinkle-free, Maintain/enhance activity level, Minimize linen layers, Turn and reposition approx. every two hours (pillows and wedges if needed) Moisture Interventions: Absorbent underpads, Apply protective barrier, creams and emollients, Internal/External urinary devices, Minimize layers, Moisture barrier Activity Interventions: PT/OT evaluation, Assess need for specialty bed Mobility Interventions: Float heels, HOB 30 degrees or less, PT/OT evaluation, Turn and reposition approx. every two hours(pillow and wedges) Nutrition Interventions: Document food/fluid/supplement intake, Offer support with meals,snacks and hydration Friction and Shear Interventions: Apply protective barrier, creams and emollients, HOB 30 degrees or less, Minimize layers

## 2021-06-09 PROBLEM — N17.9 AKI (ACUTE KIDNEY INJURY) (HCC): Status: ACTIVE | Noted: 2021-01-01

## 2021-06-09 PROBLEM — E11.65 UNCONTROLLED TYPE 2 DIABETES MELLITUS WITH HYPERGLYCEMIA (HCC): Status: ACTIVE | Noted: 2021-01-01

## 2021-06-09 PROBLEM — G93.41 ACUTE METABOLIC ENCEPHALOPATHY: Status: ACTIVE | Noted: 2021-01-01

## 2021-06-09 PROBLEM — E87.0 ACUTE HYPERNATREMIA: Status: ACTIVE | Noted: 2021-01-01

## 2021-06-09 NOTE — DISCHARGE SUMMARY
Admit date: 6/3/2021   Admitting Provider: Lesa Ballard MD    Discharge date: 6/9/2021  Discharging Provider: Sai Bosch PA-C      * Admission Diagnoses: Dehydration [E86.0]    * Discharge Diagnoses:    Hospital Problems as of 6/9/2021 Date Reviewed: 12/30/2020        Codes Class Noted - Resolved POA    Acute hypernatremia ICD-10-CM: E87.0  ICD-9-CM: 276.0  6/9/2021 - Present Unknown        Uncontrolled type 2 diabetes mellitus with hyperglycemia ([facility] ICD-10-CM: E11.65  ICD-9-CM: 250.02  6/9/2021 - Present Unknown        Acute metabolic encephalopathy OND-77-EV: G93.41  ICD-9-CM: 348.31  6/9/2021 - Present Unknown        ANDRES (acute kidney injury) ([facility] ICD-10-CM: N17.9  ICD-9-CM: 584.9  6/9/2021 - Present Unknown        Dehydration ICD-10-CM: E86.0  ICD-9-CM: 276.51  6/3/2021 - Present Unknown              * Hospital Course:   Patient is a 68-year-old male with a history of dementia, schizophrenia, seizure disorders that presented to the emergency room on 6/3/2021 for altered mental status. CT of the head showed no acute findings, stable appearance of advanced cortical and cerebral volume loss, minor right ethmoid sinus opacification. Chest x-ray shows lungs underinflated, streaky airspace disease/atelectasis, no pleural effusion or pneumothorax. Blood cultures are negative. Urinalysis showed signs concerning for UTI. Urine culture negative. Patient treated with Rocephin andl amoxicillin  to complete a 1 week course. Lactic acid elevated at 2.4 but after hydration trended down to 1.6. Also of note patient sodium was elevated at 159-->163. Treated with  dextrose and hypernatremia resolved. HGB A1C in May, 10.8. Patient on insulin sliding scale and Lantus 18 units at bedtime which was increased to 25 units along with SSI with meals. Blood pressure has been elevated. Started on Norvasc 10mg daily. Patient will be discharged home with home health in a hospital bed to his sister's residence.   The Lantus insulin scale insulin peers to be chronic medications. Will discuss with family and may need a glucometer as well as lancets. * Procedures:   * No surgery found *      Consults:   None    Significant Diagnostic Studies: As discussed in hospital course    Discharge Exam:  Visit Vitals  /77 (BP 1 Location: Left upper arm, BP Patient Position: At rest)   Pulse 74   Temp 98.6 °F (37 °C)   Resp 20   Ht 5' 9\" (1.753 m)   Wt 97.8 kg (215 lb 9.8 oz)   SpO2 96%   BMI 31.84 kg/m²     PHYSICAL EXAM:  Constitutional: Alert in no acute distress   HEENT: Sclerae anicteric, The neck is supple. Cardiovascular: Regular rate and rhythm. No murmurs, gallops, or rubs. Orrie Peacock Respiratory: Clear breath sounds with no wheezes, rales, or rhonchi. GI: Abdomen nondistended, soft, and nontender. Normal active bowel sounds. Rectal: Deferred   Musculoskeletal: No pitting edema of the lower legs. Extremities have good range of motion. Neurological:  Patient is alert and oriented. Cranial nerves II-XII intact  Psychiatric: Mood appears appropriate with judgement intact. Lymphatic: No cervical or supraclavicular adenopathy. Skin: No rashes or breakdown of the skin      * Discharge Condition: stable  * Disposition: Home with home health    Discharge Medications:  Current Discharge Medication List      START taking these medications    Details   insulin glargine (LANTUS) 100 unit/mL injection 25 Units by SubCUTAneous route nightly. Qty: 30 Vial, Refills: 0  Start date: 6/9/2021      insulin lispro (HUMALOG) 100 unit/mL injection INITIATE CORRECTIVE INSULIN PROTOCOL (ROSITA):  RX ROSITA Normal Sensitivity (Average weight)    AC (before meals), Q6H, and Q4H CORRECTIONAL SCALE only For Blood Sugar (mg/dl) of :             140-199=2 units            200-249=3 units  250-299=5 units  300-349=7 units  350 or greater = Call MD  Give in addition to basal medications.   Do Not Hold for NPO    BEDTIME CORRECTIONAL sliding scale when scheduled:  200-249=2 units  250-299=3 units   300-349=4 units  350 or greater = Call MD  Give in addition to basal medications. Do Not Hold for NPO Fast Acting - Administer Immediately - or within 15 minutes of start of meal, if mealtime coverage. Qty: 80 Vial, Refills: 1  Start date: 6/9/2021         CONTINUE these medications which have NOT CHANGED    Details   pravastatin (PRAVACHOL) 20 mg tablet Take 1 Tablet by mouth nightly. Qty: 90 Tablet, Refills: 1      metFORMIN (GLUCOPHAGE) 500 mg tablet Take 1 Tablet by mouth two (2) times daily (with meals). Qty: 180 Tablet, Refills: 2      lacosamide (Vimpat) 200 mg tab tablet Take 200 mg by mouth two (2) times a day. levETIRAcetam 1,000 mg tablet Take 1,000 mg by mouth two (2) times a day. OLANZapine (ZyPREXA) 2.5 mg tablet Take 2.5 mg by mouth two (2) times a day. divalproex DR (DEPAKOTE) 500 mg tablet Take 500 mg by mouth two (2) times a day. Take two tablets twice daily      levothyroxine (SYNTHROID) 75 mcg tablet Take 1 Tab by mouth every morning. Take in early morning. Qty: 90 Tab, Refills: 2         STOP taking these medications       amLODIPine (NORVASC) 5 mg tablet Comments:   Reason for Stopping:               * Follow-up Care/Patient Instructions:   Activity: Activity as tolerated  Diet: Diabetic Diet  Wound Care: None needed    Follow-up Information     Follow up With Specialties Details Why Contact Info    Arlyn Schuster MD Internal Medicine In 2 weeks  [address] [place] [address]  [phone]            Discharge summary greater than 35 minutes spent with the patient performing discharge instructions, medication review and physical exam    Signed:  Rafael Arzola PA-C  6/9/2021  10:48 AM

## 2021-06-09 NOTE — PROGRESS NOTES
Patient's hospital bed and wheelchair have been approved through [facility] and are to be delivered to the patient's home today. Home Health has been arranged with [facility]. The barrier at this time is CM has been unable to reach patient's family. I have attempted to call the patient's brother listed as Lashawn Singh at [phone] with no answer. Voice message left requesting returned call. Also placed call to patient's sister Adela Dejesus at [phone] with no answer. CM needs to confirm discharge plan and DME delivery with family prior to patient discharging.

## 2021-06-09 NOTE — PROGRESS NOTES
Comprehensive Nutrition Assessment Type and Reason for Visit: RD nutrition re-screen/LOS Nutrition Recommendations/Plan:  
Continue current diet Nursing to monitor BM, I/Os, %PO and ONS Nutrition Assessment:  Admitted 2/2 dehydration, AMS. Current appetite reported as good per RN. Per EMR % PO Carbohydrate Control Restriction: 4 carb choices (60 gm/meal)Sodium Restriction: Low Sodium (2 gm). Per previous note pt does well with ONS. Nutrition intervention- RD to change diet to easy to chew, add roderick ensure, ensure pudding. Labs: -299. GLuc poc 242. Alb 2.6. Meds reviewed. Malnutrition Assessment: 
Malnutrition Status:  No malnutrition Nutrition Related Findings:  No NFPE performed, appears nourished. RN denies N/V/C/D. BM AMBER. Per RN denies C/S issues. Pt missing some teeth and does well with soft foods. No edema doc. Wounds:   
Moisture associate skin damage (Right buttocks blanchable erythema) Current Nutrition Therapies: ADULT ORAL NUTRITION SUPPLEMENT Dinner; Standard High Calorie/High Protein ADULT ORAL NUTRITION SUPPLEMENT PM Snack; Fortified Pudding ADULT DIET Easy to Chew; 4 carb choices (60 gm/meal); Low Sodium (2 gm) Anthropometric Measures: 
· Height:  5' 9\" (175.3 cm) · Current Body Wt:  97.8 kg (215 lb 9.8 oz) · Admission Body Wt:  222 lb · Usual Body Wt:  115 kg (253 lb 8.5 oz) · Ideal Body Wt:  160 lbs:  134.8 % · BMI Category:  Obese class 1 (BMI 30.0-34. 9) Wt Readings from Last 3 Encounters:  
06/03/21 97.8 kg (215 lb 9.8 oz) 05/19/21 100.9 kg (222 lb 7.1 oz) 03/12/21 120.2 kg (265 lb) · Nutrition Interventions:  
Food and/or Nutrient Delivery: Continue current diet Nutrition Education and Counseling: No recommendations at this time Coordination of Nutrition Care: No recommendation at this time Nutrition Monitoring and Evaluation:  
Behavioral-Environmental Outcomes: None identified Food/Nutrient Intake Outcomes: Food and nutrient intake Physical Signs/Symptoms Outcomes: Biochemical data, Weight, Skin Discharge Planning:   
Continue current diet Electronically signed by Yoselyn Shah RD on 6/9/2021 at 12:39 PM 
 
Contact: Ext [phone]

## 2021-06-10 NOTE — PROGRESS NOTES
4 eye skin assessment preformed by myself and Danay Tripathi RN. Pt has some moisture associated damage to his sacrum. Z guard was applied to the area. No other skin issues at this time. Will continue to monitor.

## 2021-06-10 NOTE — PROGRESS NOTES
PHYSICAL THERAPY TREATMENT Patient: Raheem Glynn (12 y.o. male) Date: 6/10/2021 Diagnosis: Dehydration [E86.0] <principal problem not specified> Precautions: Fall, Bed Alarm Chart, physical therapy assessment, plan of care and goals were reviewed. ASSESSMENT Patient continues with skilled PT services and is progressing towards goals. Pt semi supine in bed upon arrival and agreeable to session. Pt nargis pad soiled in urine upon arrival and required max A to roll  L/R; replaced nargis and performed pericare, see OT note for details. Patient completed sup>sit with max A x 2 and HOB elevated. Pt noted with poor sitting balance at EOB, requiring constant assistance to remain sitting upright. Noted with posterior LOB and L lateral LOB. Completed 1 x 10 LAQ at EOB, however due to patient fatigue requiring max A for sitting balance so returned to semi supine position with max A x 2. Completed semi supine therex, see details below. Pt set up with breakfast and left resting comfortably in bed with call bell in reach and needs met. Recommending d/c to SNF once medically appropriate. Radha Profit Current Level of Function Impacting Discharge (mobility/balance): assistance required for all mobility Other factors to consider for discharge: severity of deficits, time since onset. PLAN : 
Patient continues to benefit from skilled intervention to address the above impairments. Continue treatment per established plan of care. to address goals. Recommendation for discharge: (in order for the patient to meet his/her long term goals) Therapy up to 5 days/week in SNF setting This discharge recommendation: 
Has been made in collaboration with the attending provider and/or case management IF patient discharges home will need the following DME: to be determined (TBD) SUBJECTIVE:  
Patient stated my name is Raheem Glynn OBJECTIVE DATA SUMMARY:  
Critical Behavior: 
Neurologic State: Confused, Alert Orientation Level: Oriented to person Cognition: Follows commands Functional Mobility Training: 
Bed Mobility: 
Rolling: Maximum assistance Supine to Sit: Maximum assistance;Assist x2 Sit to Supine: Maximum assistance;Assist x2 Scooting: Maximum assistance;Assist x2 Transfers: 
Sit to Stand: Maximum assistance;Assist x2 Stand to Sit: Maximum assistance;Assist x2 Balance: 
Sitting: Impaired Sitting - Static: Poor (constant support) Sitting - Dynamic: Poor (constant support) Standing: Impaired; With support Standing - Static: Constant support;Poor Therapeutic Exercises:  
1 x 10 LAQ 
1 x 10 hip ab/ad in semi supine 1 x 10 AP in semi supine, requires assistance to perform movement Pain Ratin/10 Activity Tolerance:  
Fair and requires rest breaks Please refer to the flowsheet for vital signs taken during this treatment. After treatment patient left in no apparent distress:  
Supine in bed, Call bell within reach, and Side rails x 3 
 
COMMUNICATION/COLLABORATION:  
The patients plan of care was discussed with: Occupational therapy assistant. OT/PT sessions occurred together for increased patient and clinician safety Problem: Mobility Impaired (Adult and Pediatric) Goal: *Acute Goals and Plan of Care (Insert Text) Description: Bed mobility with Ind within 7 days. Transfers with CGA within 7 days. Amb approx 5 ft with LRAD and CGA within 7 days. Ind with HEP for LE within 7 days. Outcome: Progressing Towards Goal 
  
 
Ralph Shook PTA Time Calculation: 25 mins

## 2021-06-10 NOTE — PROGRESS NOTES
Problem: Falls - Risk of 
Goal: *Absence of Falls Description: Document Leidyjustusannemarie Hoang Fall Risk and appropriate interventions in the flowsheet. Outcome: Progressing Towards Goal 
Note: Fall Risk Interventions: 
Mobility Interventions: Bed/chair exit alarm Mentation Interventions: Bed/chair exit alarm Medication Interventions: Bed/chair exit alarm Elimination Interventions: Call light in reach

## 2021-06-10 NOTE — PROGRESS NOTES
OCCUPATIONAL THERAPY TREATMENT Patient: Ana Jimenez (89 y.o. male) Date: 6/10/2021 Diagnosis: Dehydration [E86.0] <principal problem not specified> Precautions: Fall, Bed Alarm Chart, occupational therapy assessment, plan of care, and goals were reviewed. ASSESSMENT Patient continues with skilled OT services and is progressing towards goals. Pt. Received semi-supine in bed and agreeable to therapy session. Pt. Soiled in urine on arrival and agreeable to therapy session. Pt. required max A to roll  L/R; replaced nargis and performed nasrin-care while side-lying with total A. Patient completed supine>sit with max A x 2 with bed modified and elevated. Pt noted with poor sitting balance at EOB, requiring constant assistance to remain sitting upright. Pt. Demonstrates posterior lean and L lateral lean while seated at EOB. Attempted to have pt perform UE therex while seated at EOB with using one hand to balance self on at EOB, however due to patient fatigue and heavy posterior and lateral lean requiring max A for sitting balance so returned to semi supine position with max A x 2. Completed semi supine therex, see details below. Pt set up with breakfast and left resting comfortably in bed with call bell in reach and needs met. Recommending d/c to SNF once medically appropriate. Other factors to consider for discharge: PLOF, time since on set, DME, family support PLAN : 
Patient continues to benefit from skilled intervention to address the above impairments. Continue treatment per established plan of care. to address goals. Recommendation for discharge: (in order for the patient to meet his/her long term goals) Therapy up to 5 days/week in SNF setting This discharge recommendation: 
Has been made in collaboration with the attending provider and/or case management IF patient discharges home will need the following DME: TBD SUBJECTIVE:  
Patient agreeable to therapy session OBJECTIVE DATA SUMMARY:  
Cognitive/Behavioral Status: 
Neurologic State: Confused; Alert Orientation Level: Oriented to person Cognition: Follows commands Functional Mobility and Transfers for ADLs: 
Bed Mobility: 
Rolling: Maximum assistance Supine to Sit: Maximum assistance;Assist x2 Sit to Supine: Maximum assistance;Assist x2 Scooting: Maximum assistance;Assist x2 Transfers: 
Sit to Stand: Maximum assistance;Assist x2 Balance: 
Sitting: Impaired Sitting - Static: Poor (constant support) Sitting - Dynamic: Poor (constant support) Standing: Impaired; With support Standing - Static: Constant support;Poor ADL Intervention Toileting Bladder Hygiene: Total assistance (dependent) Therapeutic Exercises: chantell UE's Exercise Sets Reps AROM AAROM PROM Self PROM Comments Shoulder flex/ext 1 10 [x] [] [] [] Elbow flex/ext 1 10 [x] [] [] []   
Wrist flex/ext   [] [] [] []   
   [] [] [] []   
 
 
 
Pain: 
0/ 10 pain reported Activity Tolerance:  
Fair Please refer to the flowsheet for vital signs taken during this treatment. After treatment patient left in no apparent distress:  
Supine in bed, Call bell within reach, Bed / chair alarm activated, and Side rails x 3 
 
COMMUNICATION/COLLABORATION:  
The patients plan of care was discussed with: Physical therapy assistant. Daron Shea Time Calculation: 24 mins Problem: Self Care Deficits Care Plan (Adult) Goal: *Acute Goals and Plan of Care (Insert Text) Description: Pt will be SBA sup<->sit in prep for EOB ADL's Pt will be min A  LB dressing EOB level Pt will be SBA  sit EOB 10 minutes in prep for EOB ADL's Pt will be SBA  grooming EOB level Pt will be SBA  sit<-> prep for toilet transfer Pt will be SBA  BSC progress to standard toilet transfer with LRAD Pt will be SBA  toileting/cloth mgmt LRAD Pt will progress to SBA  grooming standing sink Pt will be SBA bathing sitting/standing sink/chair level LRAD Pt will be MI chantell UE HEP in prep for self care tasks Outcome: Progressing Towards Goal

## 2021-06-10 NOTE — DISCHARGE SUMMARY
Admit date: 6/3/2021   Admitting Provider: Jeanette Acosta MD    Discharge date: 6/10/2021  Discharging Provider: Joel Orellana PA-C      * Admission Diagnoses: Dehydration [E86.0]    * Discharge Diagnoses:    Hospital Problems as of 6/10/2021 Date Reviewed: 12/30/2020        Codes Class Noted - Resolved POA    Acute hypernatremia ICD-10-CM: E87.0  ICD-9-CM: 276.0  6/9/2021 - Present Unknown        Uncontrolled type 2 diabetes mellitus with hyperglycemia ([facility].) ICD-10-CM: E11.65  ICD-9-CM: 250.02  6/9/2021 - Present Unknown        Acute metabolic encephalopathy QXQ-96-GD: G93.41  ICD-9-CM: 348.31  6/9/2021 - Present Unknown        ANDRES (acute kidney injury) ([facility].) ICD-10-CM: N17.9  ICD-9-CM: 584.9  6/9/2021 - Present Unknown        Dehydration ICD-10-CM: E86.0  ICD-9-CM: 276.51  6/3/2021 - Present Unknown              * Hospital Course:   Patient is a 77-year-old male with a history of dementia, schizophrenia, seizure disorders that presented to the emergency room on 6/3/2021 for altered mental status. CT of the head showed no acute findings, stable appearance of advanced cortical and cerebral volume loss, minor right ethmoid sinus opacification. Chest x-ray shows lungs underinflated, streaky airspace disease/atelectasis, no pleural effusion or pneumothorax. Blood cultures are negative. Urinalysis showed signs concerning for UTI. Urine culture negative. Patient treated with Rocephin andl amoxicillin  to complete a 1 week course. Lactic acid elevated at 2.4 but after hydration trended down to 1.6. Also of note patient sodium was elevated at 159-->163. Treated with  dextrose and hypernatremia resolved. HGB A1C in May, 10.8. Patient on insulin sliding scale and Lantus 18 units at bedtime which was increased to 25 units along with SSI with meals. Blood pressure has been elevated. Started on Norvasc 10mg daily. Patient will be discharged home with SNF.     * Procedures:   * No surgery found *      Consults: None    Significant Diagnostic Studies: As discussed in hospital course    Discharge Exam:  Visit Vitals  BP (!) 143/67 (BP 1 Location: Left upper arm, BP Patient Position: At rest;Sitting)   Pulse 80   Temp 99 °F (37.2 °C)   Resp 18   Ht 5' 9\" (1.753 m)   Wt 97.8 kg (215 lb 9.8 oz)   SpO2 96%   BMI 31.84 kg/m²     PHYSICAL EXAM:  Constitutional: Alert in no acute distress   HEENT: Sclerae anicteric, The neck is supple. Cardiovascular: Regular rate and rhythm. No murmurs, gallops, or rubs. Alida Libman Respiratory: Clear breath sounds with no wheezes, rales, or rhonchi. GI: Abdomen nondistended, soft, and nontender. Normal active bowel sounds. Rectal: Deferred   Musculoskeletal: No pitting edema of the lower legs. Extremities have good range of motion. Neurological:  Patient is alert and oriented. Cranial nerves II-XII intact  Psychiatric: Mood appears appropriate with judgement intact. Lymphatic: No cervical or supraclavicular adenopathy. Skin: No rashes or breakdown of the skin      * Discharge Condition: stable  * Disposition: Home with home health    Discharge Medications:  Current Discharge Medication List      START taking these medications    Details   insulin glargine (LANTUS) 100 unit/mL injection 25 Units by SubCUTAneous route nightly. Qty: 30 Vial, Refills: 0  Start date: 6/9/2021      insulin lispro (HUMALOG) 100 unit/mL injection INITIATE CORRECTIVE INSULIN PROTOCOL (ROSITA):  RX ROSITA Normal Sensitivity (Average weight)    AC (before meals), Q6H, and Q4H CORRECTIONAL SCALE only For Blood Sugar (mg/dl) of :             140-199=2 units            200-249=3 units  250-299=5 units  300-349=7 units  350 or greater = Call MD  Give in addition to basal medications. Do Not Hold for NPO    BEDTIME CORRECTIONAL sliding scale when scheduled:  200-249=2 units  250-299=3 units   300-349=4 units  350 or greater = Call MD  Give in addition to basal medications.   Do Not Hold for NPO Fast Acting - Elsi Stallion Immediately - or within 15 minutes of start of meal, if mealtime coverage. Qty: 80 Vial, Refills: 1  Start date: 6/9/2021         CONTINUE these medications which have NOT CHANGED    Details   pravastatin (PRAVACHOL) 20 mg tablet Take 1 Tablet by mouth nightly. Qty: 90 Tablet, Refills: 1      metFORMIN (GLUCOPHAGE) 500 mg tablet Take 1 Tablet by mouth two (2) times daily (with meals). Qty: 180 Tablet, Refills: 2      lacosamide (Vimpat) 200 mg tab tablet Take 200 mg by mouth two (2) times a day. levETIRAcetam 1,000 mg tablet Take 1,000 mg by mouth two (2) times a day. OLANZapine (ZyPREXA) 2.5 mg tablet Take 2.5 mg by mouth two (2) times a day. divalproex DR (DEPAKOTE) 500 mg tablet Take 500 mg by mouth two (2) times a day. Take two tablets twice daily      levothyroxine (SYNTHROID) 75 mcg tablet Take 1 Tab by mouth every morning. Take in early morning. Qty: 90 Tab, Refills: 2         STOP taking these medications       amLODIPine (NORVASC) 5 mg tablet Comments:   Reason for Stopping:               * Follow-up Care/Patient Instructions:   Activity: Activity as tolerated  Diet: Diabetic Diet  Wound Care: None needed    Follow-up Information     Follow up With Specialties Details Why Contact Info    Balwinder Wisdom MD Internal Medicine On 6/30/2021 Appt time 3:30pm [address] [place] [address]  [phone]            Discharge summary greater than 35 minutes spent with the patient performing discharge instructions, medication review and physical exam    Signed:  Salvatore Salvador PA-C  6/10/2021  10:48 AM

## 2021-06-11 NOTE — ROUTINE PROCESS
[facility] BEDSIDE_VERBAL_shift change report given to Phoebe Shipman RN (oncoming nurse) by Robert Khan RN (offgoing nurse).  Report included the following information from the [facility]

## 2021-06-11 NOTE — PROGRESS NOTES
CM spoke to patient brother Karlee Dent [phone]) at patient bedside. Brother declined SNF. [facility] is being [facility] [address]  with anticipated start of care on 06/12/2021. Brother asked CM to order a portable tray. CM sent message to [employer] via [employer]. KARIN received no response from [employer] [facility]. CM called brother Karlee Dent [phone]); received no answer.

## 2021-06-11 NOTE — PROGRESS NOTES
CM spoke to patient sister Jomar Layton [phone]). CM told sister that patient is ready for discharge today. Sister confirmed that hospital bed was delivered. Sister told CM to call patient brother Beni Claros [phone]). CM contacted brother; received no answer and left a voice message. CM spoke to patient sister and asked sister where brother was. Sister said that patient is at work, and encouraged CM to call brother back at 15. CM will call brother back at noon.

## 2021-06-11 NOTE — PROGRESS NOTES
Report given to Gerhardt Mccreedy, Rn. Awaiting [facility] transport. Discharge summary and instructions given to pt brother.

## 2021-06-11 NOTE — DISCHARGE SUMMARY
Admit date: 6/3/2021   Admitting Provider: Leena Steward MD    Discharge date: 6/11/2021  Discharging Provider: Mariangel Caro PA-C      * Admission Diagnoses: Dehydration [E86.0]    * Discharge Diagnoses:    Hospital Problems as of 6/11/2021 Date Reviewed: 12/30/2020        Codes Class Noted - Resolved POA    Acute hypernatremia ICD-10-CM: E87.0  ICD-9-CM: 276.0  6/9/2021 - Present Unknown        Uncontrolled type 2 diabetes mellitus with hyperglycemia ([facility] ICD-10-CM: E11.65  ICD-9-CM: 250.02  6/9/2021 - Present Unknown        Acute metabolic encephalopathy NBP-75-NG: G93.41  ICD-9-CM: 348.31  6/9/2021 - Present Unknown        ANDRES (acute kidney injury) ([facility] ICD-10-CM: N17.9  ICD-9-CM: 584.9  6/9/2021 - Present Unknown        Dehydration ICD-10-CM: E86.0  ICD-9-CM: 276.51  6/3/2021 - Present Unknown              * Hospital Course:   Patient is a 72-year-old male with a history of dementia, schizophrenia, seizure disorders that presented to the emergency room on 6/3/2021 for altered mental status. CT of the head showed no acute findings, stable appearance of advanced cortical and cerebral volume loss, minor right ethmoid sinus opacification. Chest x-ray shows lungs underinflated, streaky airspace disease/atelectasis, no pleural effusion or pneumothorax. Blood cultures are negative. Urinalysis showed signs concerning for UTI. Urine culture negative. Patient treated with Rocephin andl amoxicillin  to complete a 1 week course. Lactic acid elevated at 2.4 but after hydration trended down to 1.6. Also of note patient sodium was elevated at 159-->163. Treated with  dextrose and hypernatremia resolved. HGB A1C in May, 10.8. Patient on insulin sliding scale and Lantus 18 units at bedtime which was increased to 25 units along with SSI with meals. Blood pressure has been elevated. Started on Norvasc 10mg daily. Patient evaluated by brother, Mr. Meme Benítez.  Family has elected to take the patient home rather than SNF. * Procedures:   * No surgery found *      Consults:   None    Significant Diagnostic Studies: As discussed in hospital course    Discharge Exam:  Visit Vitals  BP (!) 142/86 (BP 1 Location: Left upper arm, BP Patient Position: At rest)   Pulse 77   Temp 97.8 °F (36.6 °C)   Resp 21   Ht 5' 9\" (1.753 m)   Wt 97.8 kg (215 lb 9.8 oz)   SpO2 94%   BMI 31.84 kg/m²     PHYSICAL EXAM:  Constitutional: Alert in no acute distress   HEENT: Sclerae anicteric, The neck is supple. Cardiovascular: Regular rate and rhythm. No murmurs, gallops, or rubs. Matthew Eaton Respiratory: Clear breath sounds with no wheezes, rales, or rhonchi. GI: Abdomen nondistended, soft, and nontender. Normal active bowel sounds. Rectal: Deferred   Musculoskeletal: No pitting edema of the lower legs. Extremities have good range of motion. Neurological:  Patient is alert and oriented. Cranial nerves II-XII intact  Psychiatric: Mood appears appropriate with judgement intact. Lymphatic: No cervical or supraclavicular adenopathy. Skin: No rashes or breakdown of the skin      * Discharge Condition: stable  * Disposition: Home with home health    Discharge Medications:  Current Discharge Medication List      START taking these medications    Details   insulin glargine (LANTUS) 100 unit/mL injection 25 Units by SubCUTAneous route nightly. Qty: 30 Vial, Refills: 0  Start date: 6/9/2021      insulin lispro (HUMALOG) 100 unit/mL injection INITIATE CORRECTIVE INSULIN PROTOCOL (ROSTIA):  RX ROSITA Normal Sensitivity (Average weight)    AC (before meals), Q6H, and Q4H CORRECTIONAL SCALE only For Blood Sugar (mg/dl) of :             140-199=2 units            200-249=3 units  250-299=5 units  300-349=7 units  350 or greater = Call MD  Give in addition to basal medications.   Do Not Hold for NPO    BEDTIME CORRECTIONAL sliding scale when scheduled:  200-249=2 units  250-299=3 units   300-349=4 units  350 or greater = Call MD  Give in addition to basal medications. Do Not Hold for NPO Fast Acting - Administer Immediately - or within 15 minutes of start of meal, if mealtime coverage. Qty: 80 Vial, Refills: 1  Start date: 6/9/2021         CONTINUE these medications which have NOT CHANGED    Details   pravastatin (PRAVACHOL) 20 mg tablet Take 1 Tablet by mouth nightly. Qty: 90 Tablet, Refills: 1      metFORMIN (GLUCOPHAGE) 500 mg tablet Take 1 Tablet by mouth two (2) times daily (with meals). Qty: 180 Tablet, Refills: 2      lacosamide (Vimpat) 200 mg tab tablet Take 200 mg by mouth two (2) times a day. levETIRAcetam 1,000 mg tablet Take 1,000 mg by mouth two (2) times a day. OLANZapine (ZyPREXA) 2.5 mg tablet Take 2.5 mg by mouth two (2) times a day. divalproex DR (DEPAKOTE) 500 mg tablet Take 500 mg by mouth two (2) times a day. Take two tablets twice daily      levothyroxine (SYNTHROID) 75 mcg tablet Take 1 Tab by mouth every morning. Take in early morning. Qty: 90 Tab, Refills: 2         STOP taking these medications       amLODIPine (NORVASC) 5 mg tablet Comments:   Reason for Stopping:               * Follow-up Care/Patient Instructions:   Activity: Activity as tolerated  Diet: Diabetic Diet  Wound Care: None needed    Follow-up Information     Follow up With Specialties Details Why Contact Info    Macrina Vargas MD Internal Medicine On 6/30/2021 Appt time 3:30pm [address] [address] [phone]            Discharge summary greater than 35 minutes spent with the patient performing discharge instructions, medication review and physical exam    Signed:  Fernanda Ibanez PA-C  6/11/2021  10:48 AM

## 2021-06-12 NOTE — PROGRESS NOTES
Problem: Diabetes Self-Management Goal: *Disease process and treatment process Description: Define diabetes and identify own type of diabetes; list 3 options for treating diabetes. Outcome: Progressing Towards Goal 
Goal: *Incorporating nutritional management into lifestyle Description: Describe effect of type, amount and timing of food on blood glucose; list 3 methods for planning meals. Outcome: Progressing Towards Goal 
Goal: *Incorporating physical activity into lifestyle Description: State effect of exercise on blood glucose levels. Outcome: Progressing Towards Goal 
Goal: *Developing strategies to promote health/change behavior Description: Define the ABC's of diabetes; identify appropriate screenings, schedule and personal plan for screenings. Outcome: Progressing Towards Goal 
Goal: *Using medications safely Description: State effect of diabetes medications on diabetes; name diabetes medication taking, action and side effects. Outcome: Progressing Towards Goal 
Goal: *Monitoring blood glucose, interpreting and using results Description: Identify recommended blood glucose targets  and personal targets. Outcome: Progressing Towards Goal 
Goal: *Prevention, detection, treatment of acute complications Description: List symptoms of hyper- and hypoglycemia; describe how to treat low blood sugar and actions for lowering  high blood glucose level. Outcome: Progressing Towards Goal 
Goal: *Prevention, detection and treatment of chronic complications Description: Define the natural course of diabetes and describe the relationship of blood glucose levels to long term complications of diabetes. Outcome: Progressing Towards Goal 
Goal: *Developing strategies to address psychosocial issues Description: Describe feelings about living with diabetes; identify support needed and support network Outcome: Progressing Towards Goal 
Goal: *Insulin pump training Outcome: Progressing Towards Goal 
Goal: *Sick day guidelines Outcome: Progressing Towards Goal 
Goal: *Patient Specific Goal (EDIT GOAL, INSERT TEXT) Outcome: Progressing Towards Goal

## 2021-06-12 NOTE — DISCHARGE INSTRUCTIONS
Patient Education        Dehydration: Care Instructions  Your Care Instructions  Dehydration happens when your body loses too much fluid. This might happen when you do not drink enough water or you lose large amounts of fluids from your body because of diarrhea, vomiting, or sweating. Severe dehydration can be life-threatening. Water and minerals called electrolytes help put your body fluids back in balance. Learn the early signs of fluid loss, and drink more fluids to prevent dehydration. Follow-up care is a key part of your treatment and safety. Be sure to make and go to all appointments, and call your doctor if you are having problems. It's also a good idea to know your test results and keep a list of the medicines you take. How can you care for yourself at home? · To prevent dehydration, drink plenty of fluids. Choose water and other caffeine-free clear liquids until you feel better. If you have kidney, heart, or liver disease and have to limit fluids, talk with your doctor before you increase the amount of fluids you drink. · If you do not feel like eating or drinking, try taking small sips of water, sports drinks, or other rehydration drinks. · Get plenty of rest.  To prevent dehydration  · Add more fluids to your diet and daily routine, unless your doctor has told you not to. · During hot weather, drink more fluids. Drink even more fluids if you exercise a lot. Stay away from drinks with alcohol or caffeine. · Watch for the symptoms of dehydration. These include:  ? A dry, sticky mouth. ? Not much urine. ? Dry and sunken eyes. ? Feeling very tired. · Learn what problems can lead to dehydration. These include:  ? Diarrhea, fever, and vomiting. ? Any illness with a fever, such as pneumonia or the flu. ? Activities that cause heavy sweating, such as endurance races and heavy outdoor work in hot or humid weather. ?  Alcohol or drug use or problems caused by quitting their use (withdrawal). ? Certain medicines, such as cold and allergy pills (antihistamines), diet pills (diuretics), and laxatives. ? Certain diseases, such as diabetes, cancer, and heart or kidney disease. When should you call for help? Call 911 anytime you think you may need emergency care. For example, call if:    · You passed out (lost consciousness). Call your doctor now or seek immediate medical care if:    · You are confused and cannot think clearly.     · You are dizzy or lightheaded, or you feel like you may faint.     · You have signs of needing more fluids. You have sunken eyes, a dry mouth, and you pass only a little urine.     · You cannot keep fluids down. Watch closely for changes in your health, and be sure to contact your doctor if:    · You are not making tears.     · Your skin is very dry and sags slowly back into place after you pinch it.     · Your mouth and eyes are very dry. Where can you learn more? Go to http://www.gray.com/  Enter Q814 in the search box to learn more about \"Dehydration: Care Instructions. \"  Current as of: February 26, 2020               Content Version: 12.8  © 2468-0839 [employer]. Care instructions adapted under license by [employer] (which disclaims liability or warranty for this information). If you have questions about a medical condition or this instruction, always ask your healthcare professional. [place] [address] any warranty or liability for your use of this information.

## 2021-06-12 NOTE — PROGRESS NOTES
Was given in shift report that [facility] transport had been set up. When Marichuy Abarca had not yet arrived to take patient back home,writer called [facility] and they stated that no reference authorization number had been called back, the only number that I,the writer,had,was a reference number [employer] said that was not a correct ref # and Elías Leslie was given a number [phone]([facility] medicaid) who stated that patient was not in their system. Writer spoke with nursing supervisor who gave 2 numbers,[phone] or [phone] to set up transport for [facility](patients insurance) when calling both the numbers Nahum Henning could not get ahold to any person, and the automatic system asked for a 12 digit policy number or social security number,which writer did not have. Spoke with NS again and they stated it would have to wait until morning to get case management involved.

## 2021-06-12 NOTE — PROGRESS NOTES
CM reviewed clinical chart. Patient in need of transportation home. CM called [phone], Transport is set up, trip ID #[id number]. CM called Brother Molly Campbell [phone], no answer to telephone, CM left voicemail. CM called patients sister Sammy Martinez [phone], she agrees with discharge today and is ready for the patient to arrive. Discharge plan of care/case management plan validated with provider discharge order. Patient has been accepted with [address] [place] [address]. CM has given [address] all necessary documentation for initiation of services, [facility] 6/14/[facility].

## 2021-06-12 NOTE — DISCHARGE SUMMARY
Admit date: 6/3/2021   Admitting Provider: Tai Lino MD    Discharge date: 6/12/2021  Discharging Provider: Robin Gar PA-C      * Admission Diagnoses: Dehydration [E86.0]    * Discharge Diagnoses:    Hospital Problems as of 6/12/2021 Date Reviewed: 12/30/2020        Codes Class Noted - Resolved POA    Acute hypernatremia ICD-10-CM: E87.0  ICD-9-CM: 276.0  6/9/2021 - Present Unknown        Uncontrolled type 2 diabetes mellitus with hyperglycemia ([facility] ICD-10-CM: E11.65  ICD-9-CM: 250.02  6/9/2021 - Present Unknown        Acute metabolic encephalopathy RPQ-56-BP: G93.41  ICD-9-CM: 348.31  6/9/2021 - Present Unknown        ANDRES (acute kidney injury) ([facility] ICD-10-CM: N17.9  ICD-9-CM: 584.9  6/9/2021 - Present Unknown        Dehydration ICD-10-CM: E86.0  ICD-9-CM: 276.51  6/3/2021 - Present Unknown              * Hospital Course:   Patient is a 72-year-old male with a history of dementia, schizophrenia, seizure disorders that presented to the emergency room on 6/3/2021 for altered mental status. CT of the head showed no acute findings, stable appearance of advanced cortical and cerebral volume loss, minor right ethmoid sinus opacification. Chest x-ray shows lungs underinflated, streaky airspace disease/atelectasis, no pleural effusion or pneumothorax. Blood cultures are negative. Urinalysis showed signs concerning for UTI. Urine culture negative. Patient treated with Rocephin andl amoxicillin  to complete a 1 week course. Lactic acid elevated at 2.4 but after hydration trended down to 1.6. Also of note patient sodium was elevated at 159-->163. Treated with  dextrose and hypernatremia resolved. HGB A1C in May, 10.8. Patient on insulin sliding scale and Lantus 18 units at bedtime which was increased to 25 units along with SSI with meals. Blood pressure has been elevated. Started on Norvasc 10mg daily. Patient evaluated by brother, Mr. Geneva Jackman.  Family has elected to take the patient home rather than SNF. Delay in transportation and patient is ready for discharge after authorization for transportation. Discharge summary printed and given to brother Lanette Dietrich. * Procedures:   * No surgery found *      Consults:   None    Significant Diagnostic Studies: As discussed in hospital course    Discharge Exam:  Visit Vitals  BP (!) 149/80 (BP 1 Location: Left upper arm, BP Patient Position: At rest;Supine)   Pulse 78   Temp 97.9 °F (36.6 °C)   Resp 20   Ht 5' 9\" (1.753 m)   Wt 97.8 kg (215 lb 9.8 oz)   SpO2 96%   BMI 31.84 kg/m²     PHYSICAL EXAM:  Constitutional: Alert in no acute distress   HEENT: Sclerae anicteric, The neck is supple. Cardiovascular: Regular rate and rhythm. No murmurs, gallops, or rubs. Cheri Dues Respiratory: Clear breath sounds with no wheezes, rales, or rhonchi. GI: Abdomen nondistended, soft, and nontender. Normal active bowel sounds. Rectal: Deferred   Musculoskeletal: No pitting edema of the lower legs. Extremities have good range of motion. Neurological:  Patient is alert and oriented. Cranial nerves II-XII intact  Psychiatric: Mood appears appropriate with judgement intact. Lymphatic: No cervical or supraclavicular adenopathy. Skin: No rashes or breakdown of the skin      * Discharge Condition: stable  * Disposition: Home with home health    Discharge Medications:  Current Discharge Medication List      START taking these medications    Details   insulin glargine (LANTUS) 100 unit/mL injection 25 Units by SubCUTAneous route nightly. Qty: 30 Vial, Refills: 0  Start date: 6/9/2021      insulin lispro (HUMALOG) 100 unit/mL injection INITIATE CORRECTIVE INSULIN PROTOCOL (ROSITA):  RX ROSITA Normal Sensitivity (Average weight)    AC (before meals), Q6H, and Q4H CORRECTIONAL SCALE only For Blood Sugar (mg/dl) of :             140-199=2 units            200-249=3 units  250-299=5 units  300-349=7 units  350 or greater = Call MD  Give in addition to basal medications.   Do Not Hold for NPO    BEDTIME CORRECTIONAL sliding scale when scheduled:  200-249=2 units  250-299=3 units   300-349=4 units  350 or greater = Call MD  Give in addition to basal medications. Do Not Hold for NPO Fast Acting - Administer Immediately - or within 15 minutes of start of meal, if mealtime coverage. Qty: 80 Vial, Refills: 1  Start date: 6/9/2021         CONTINUE these medications which have NOT CHANGED    Details   pravastatin (PRAVACHOL) 20 mg tablet Take 1 Tablet by mouth nightly. Qty: 90 Tablet, Refills: 1      metFORMIN (GLUCOPHAGE) 500 mg tablet Take 1 Tablet by mouth two (2) times daily (with meals). Qty: 180 Tablet, Refills: 2      lacosamide (Vimpat) 200 mg tab tablet Take 200 mg by mouth two (2) times a day. levETIRAcetam 1,000 mg tablet Take 1,000 mg by mouth two (2) times a day. OLANZapine (ZyPREXA) 2.5 mg tablet Take 2.5 mg by mouth two (2) times a day. divalproex DR (DEPAKOTE) 500 mg tablet Take 500 mg by mouth two (2) times a day. Take two tablets twice daily      levothyroxine (SYNTHROID) 75 mcg tablet Take 1 Tab by mouth every morning. Take in early morning. Qty: 90 Tab, Refills: 2         STOP taking these medications       amLODIPine (NORVASC) 5 mg tablet Comments:   Reason for Stopping:               * Follow-up Care/Patient Instructions:   Activity: Activity as tolerated  Diet: Diabetic Diet  Wound Care: None needed    Follow-up Information     Follow up With Specialties Details Why Contact Info    Juan Willis MD Internal Medicine On 6/30/2021 Appt time 3:30pm [address] [address] [phone]            Discharge summary greater than 35 minutes spent with the patient performing discharge instructions, medication review and physical exam    Signed:  Allie Melendez PA-C  6/12/2021  10:48 AM

## 2021-06-12 NOTE — PROGRESS NOTES
Discharge plan of care/case management plan validated with provider discharge order. Pt. Discharged home to care of brother.

## 2021-06-12 NOTE — PROGRESS NOTES
Attempt to call brother to informhim that patient was not leaving tonight,but in the morning instead

## 2021-06-16 NOTE — TELEPHONE ENCOUNTER
PT from [place] [address] called in reference to his evaluation today, his resting BP was 162/105 she sat him on the side of the bed and it went up to 193/135. Please advise what pt should do. [phone].

## 2021-06-16 NOTE — PROGRESS NOTES
I reviewed the chart. I reviewed the notes from my nurses. I reviewed the message from the home health nurse. Patient is not taken care of by his brother and his sister, the way it should be taken patient has mental, health problems with intelligent deficiency he is completely dependent on his family members, his blood pressure is elevated even after resting with 151/90, since he is diabetic I will start him on losartan 50 mg once a day and I told, not to give amlodipine and hydrochlorothiazide I reviewed his discharge summary, that he had recent  discharged on 12th June and he is taken off from amlodipine.       I sentsent sent  refill for losartan 50 mg once a day to the pharmacy

## 2021-06-16 NOTE — TELEPHONE ENCOUNTER
Returned call to Rhonda Maddox, who informed me that she does not have his medication list but spoke with the home health nurse who did imply that patient is not getting his medications as prescribed. She stated that brother keeps all the pt's medications outside the home and comes once a week to bring them. The sister is to administer medication to him but she does not. Pt only had pills for Monday in his pill box and today is Wednesday per Kiel prakash. Pt did not have any more pills filled for the rest of the week. Sister reported she gave him medications at 12pm but Kake stated she had been with the patient since 11:45am and never seen him take any meds. Last BP reading taken was 151/96. I informed Dr. Deuce Turk who suggested sending Losartan 50mg once a day after review of chart notes.

## 2021-06-17 NOTE — TELEPHONE ENCOUNTER
Per Dr. Perri Ann she is concerned that patient is not being cared for and Adult Protective services should be called.

## 2021-06-24 NOTE — TELEPHONE ENCOUNTER
Maddie Salas, care coordinator from [facility], left message requesting a doctor's order for bedside table to be sent to [facility] for home medical supplies.

## 2021-06-28 PROBLEM — E11.21 TYPE 2 DIABETES WITH NEPHROPATHY (HCC): Status: RESOLVED | Noted: 2018-06-17 | Resolved: 2021-01-01

## 2021-06-28 PROBLEM — E11.8 CONTROLLED TYPE 2 DIABETES MELLITUS WITH COMPLICATION, WITHOUT LONG-TERM CURRENT USE OF INSULIN (HCC): Status: RESOLVED | Noted: 2020-12-30 | Resolved: 2021-01-01

## 2021-06-29 NOTE — TELEPHONE ENCOUNTER
Barrie Mcmahan from [place] [address] called to inform you that pts BP was 180/120, she called EMS and pt is now at the ER.

## 2021-06-29 NOTE — ED PROVIDER NOTES
EMERGENCY DEPARTMENT HISTORY AND PHYSICAL EXAM      Date: 6/29/2021  Patient Name: Lala Neri    History of Presenting Illness     Chief Complaint   Patient presents with    Extremity Weakness       History Provided By: Patient    HPI: Lala Neri, 70 y.o. male with PMHx as noted below presents the emergency department for evaluation of hypertension. History is limited as the patient is a very poor historian however he denies any acute complaints at this time. Patient reportedly had elevated blood pressure for home health nurse who called EMS to have him evaluated. Also spoke with patient's brother who confirms there were no other concerns or complaints and was only sent here for elevated blood pressure. They do note that he had been taken off of some of his blood pressure medications recently during his hospitalization. Pt denies any other alleviating or exacerbating factors. Additionally, pt specifically denies any recent fever, chills, headache, nausea, vomiting, abdominal pain, CP, SOB, lightheadedness, dizziness, numbness, weakness, BLE swelling, heart palpitations, urinary sxs, diarrhea, constipation, melena, hematochezia, cough, or congestion. PCP: Grace Ellsworth MD    Current Facility-Administered Medications   Medication Dose Route Frequency Provider Last Rate Last Admin    amLODIPine (NORVASC) tablet 10 mg  10 mg Oral NOW Nicanor Chang MD         Current Outpatient Medications   Medication Sig Dispense Refill    amLODIPine (NORVASC) 10 mg tablet Take 1 Tablet by mouth daily. 30 Tablet 0    Blood-Glucose Meter monitoring kit He is having a seizure he needs blood sugar to be checked twice a day before breakfast and at bedtime, 1 Kit 1    losartan (COZAAR) 50 mg tablet Take 1 Tablet by mouth daily. Please stop hydrochlorothiazide and amlodipine 30 Tablet 2    insulin glargine (LANTUS) 100 unit/mL injection 25 Units by SubCUTAneous route nightly.  30 Vial 0    insulin lispro (HUMALOG) 100 unit/mL injection INITIATE CORRECTIVE INSULIN PROTOCOL (ROSITA):  RX ROSITA Normal Sensitivity (Average weight)    AC (before meals), Q6H, and Q4H CORRECTIONAL SCALE only For Blood Sugar (mg/dl) of :             140-199=2 units            200-249=3 units  250-299=5 units  300-349=7 units  350 or greater = Call MD  Give in addition to basal medications. Do Not Hold for NPO    BEDTIME CORRECTIONAL sliding scale when scheduled:  200-249=2 units  250-299=3 units   300-349=4 units  350 or greater = Call MD  Give in addition to basal medications. Do Not Hold for NPO Fast Acting - Administer Immediately - or within 15 minutes of start of meal, if mealtime coverage. 90 Vial 1    pravastatin (PRAVACHOL) 20 mg tablet Take 1 Tablet by mouth nightly. 90 Tablet 1    metFORMIN (GLUCOPHAGE) 500 mg tablet Take 1 Tablet by mouth two (2) times daily (with meals). 180 Tablet 2    lacosamide (Vimpat) 200 mg tab tablet Take 200 mg by mouth two (2) times a day.  levETIRAcetam 1,000 mg tablet Take 1,000 mg by mouth two (2) times a day.  OLANZapine (ZyPREXA) 2.5 mg tablet Take 2.5 mg by mouth two (2) times a day.  divalproex DR (DEPAKOTE) 500 mg tablet Take 500 mg by mouth two (2) times a day. Take two tablets twice daily      levothyroxine (SYNTHROID) 75 mcg tablet Take 1 Tab by mouth every morning. Take in early morning. 80 Tab 2       Past History     Past Medical History:  Past Medical History:   Diagnosis Date    Acute renal failure ([address]     Anemia     Arthritis     DKA (diabetic ketoacidoses) ([address]     GERD (gastroesophageal reflux disease)     HTN (hypertension)     Hypercholesterolemia     [facility]     Schizophreniform disorder (HCC)     Seizure disorder (HCC)     Type II or unspecified type diabetes mellitus without mention of complication, uncontrolled        Past Surgical History:  No past surgical history on file.     Family History:  Family History   Problem Relation Age of Onset    Stroke Father        Social History:  Social History     Tobacco Use    Smoking status: Never Smoker    Smokeless tobacco: Never Used   Substance Use Topics    Alcohol use: No    Drug use: No       Allergies:  No Known Allergies      Review of Systems   Review of Systems  Constitutional: Negative for fever, chills, and fatigue. HENT: Negative for congestion, sore throat, rhinorrhea, sneezing and neck stiffness   Eyes: Negative for discharge and redness. Respiratory: Negative for  shortness of breath, wheezing   Cardiovascular: Negative for chest pain, palpitations   Gastrointestinal: Negative for nausea, vomiting, abdominal pain, constipation, diarrhea and blood in stool. Genitourinary: Negative for dysuria, hematuria, flank pain, decreased urine volume, discharge,   Musculoskeletal: Negative for myalgias or joint pain . Skin: Negative for rash or lesions . Neurological: Negative weakness, light-headedness, numbness and headaches. Physical Exam   Physical Exam    GENERAL: alert  no acute distress  EYES: PEERL, No injection, discharge or icterus. ENT: Mucous membranes pink and moist.  NECK: Supple  LUNGS: Airway patent. Non-labored respirations. Breath sounds clear with good air entry bilaterally. HEART: Regular rate and rhythm. No peripheral edema  ABDOMEN: Non-distended and non-tender, without guarding or rebound.   SKIN:  warm, dry  MSK/EXTREMITIES: Without swelling, tenderness or deformity, symmetric with normal ROM  NEUROLOGICAL: Alert, oriented to person and place      Diagnostic Study Results     Labs -     Recent Results (from the past 12 hour(s))   METABOLIC PANEL, COMPREHENSIVE    Collection Time: 06/29/21  3:00 PM   Result Value Ref Range    Sodium 140 136 - 145 mmol/L    Potassium 3.6 3.5 - 5.1 mmol/L    Chloride 105 97 - 108 mmol/L    CO2 27 21 - 32 mmol/L    Anion gap 8 5 - 15 mmol/L    Glucose 202 (H) 65 - 100 mg/dL    BUN 9 6 - 20 mg/dL    Creatinine 0.76 0.70 - 1.30 mg/dL    BUN/Creatinine ratio 12 12 - 20      GFR est AA >60 >60 ml/min/1.73m2    GFR est non-AA >60 >60 ml/min/1.73m2    Calcium 9.1 8.5 - 10.1 mg/dL    Bilirubin, total 0.3 0.2 - 1.0 mg/dL    AST (SGOT) 16 15 - 37 U/L    ALT (SGPT) 11 (L) 12 - 78 U/L    Alk. phosphatase 88 45 - 117 U/L    Protein, total 7.8 6.4 - 8.2 g/dL    Albumin 2.9 (L) 3.5 - 5.0 g/dL    Globulin 4.9 (H) 2.0 - 4.0 g/dL    A-G Ratio 0.6 (L) 1.1 - 2.2     CBC WITH AUTOMATED DIFF    Collection Time: 06/29/21  3:00 PM   Result Value Ref Range    WBC 5.8 4.1 - 11.1 K/uL    RBC 4.52 4.10 - 5.70 M/uL    HGB 14.3 12.1 - 17.0 g/dL    HCT 43.0 36.6 - 50.3 %    MCV 95.1 80.0 - 99.0 FL    MCH 31.6 26.0 - 34.0 PG    MCHC 33.3 30.0 - 36.5 g/dL    RDW 13.3 11.5 - 14.5 %    PLATELET 792 221 - 570 K/uL    MPV 10.0 8.9 - 12.9 FL    NRBC 0.0 0.0  WBC    ABSOLUTE NRBC 0.00 0.00 - 0.01 K/uL    NEUTROPHILS 50 32 - 75 %    LYMPHOCYTES 40 12 - 49 %    MONOCYTES 6 5 - 13 %    EOSINOPHILS 3 0 - 7 %    BASOPHILS 0 0 - 1 %    IMMATURE GRANULOCYTES 1 (H) 0 - 0.5 %    ABS. NEUTROPHILS 2.9 1.8 - 8.0 K/UL    ABS. LYMPHOCYTES 2.3 0.8 - 3.5 K/UL    ABS. MONOCYTES 0.3 0.0 - 1.0 K/UL    ABS. EOSINOPHILS 0.2 0.0 - 0.4 K/UL    ABS. BASOPHILS 0.0 0.0 - 0.1 K/UL    ABS. IMM. GRANS. 0.1 (H) 0.00 - 0.04 K/UL    DF AUTOMATED     TROPONIN I    Collection Time: 06/29/21  3:00 PM   Result Value Ref Range    Troponin-I, Qt. <0.05 <0.05 ng/mL   BNP    Collection Time: 06/29/21  3:00 PM   Result Value Ref Range    NT pro- (H) <125 pg/mL       Radiologic Studies -   XR CHEST PORT   Final Result   Hypoinflation of the lungs giving the appearance of cardiomegaly. No   acute pulmonary disease. CT Results  (Last 48 hours)    None        CXR Results  (Last 48 hours)               06/29/21 1508  XR CHEST PORT Final result    Impression:  Hypoinflation of the lungs giving the appearance of cardiomegaly. No   acute pulmonary disease. Narrative:  History is weakness. Comparison is with the chest x-ray of 6/10/2021. An AP portable semierect view of the chest demonstrates hypoinflation of the   lungs giving the appearance of cardiomegaly. There is no pneumonia, pneumothorax   or effusion. There is degenerative change of the osseous structures. Medical Decision Making     ICandy MD am the first provider for this patient and am the attending of record for this patient encounter. I reviewed the vital signs, available nursing notes, past medical history, past surgical history, family history and social history. Vital Signs-Reviewed the patient's vital signs. Patient Vitals for the past 12 hrs:   Temp Pulse Resp BP SpO2   06/29/21 1457 98.6 °F (37 °C) 78 16 (!) 186/98 100 %       Records Reviewed: Nursing Notes and Old Medical Records    Provider Notes (Medical Decision Making): On presentation the patient is well appearing, in no acute distress with vital signs notable for hypertension. Patient is having no chest pain, abd pain, LE swelling, dyspnea, orthopnea, change in UOP, severe headaches, visual changes or confusion to suggest end organ damage resultant from hypertensive urgency/emergency, additional labs not suggestive of any endorgan damage, no changes on EKG so further workup in the Emergency Department is not indicated. Discussed the long term sequelae for elevated blood pressure including blindness, CVA, MI, and renal failure/ dialysis. I Discussed with patient at length the need for blood pressure re-evaluation and management with primary care  I  Had an extensive discussion on the necessity of outpatient follow up for his high blood pressure and that if this is not done in a timely manner that his high blood pressure may lead to serious and potentially life threatening medical problems. Patient will be restarted on his amlodipine as blood pressure should be lowered slowly at this point.   I also instructed the patient on proper exercise and dietary changes that may help his blood pressure   The patient expressed understanding of this and will follow up as instructed. ED Course:   Initial assessment performed. The patients presenting problems have been discussed, and they are in agreement with the care plan formulated and outlined with them. I have encouraged them to ask questions as they arise throughout their visit. Rani Sauceda's  results have been reviewed with him. He has been counseled regarding his diagnosis. He verbally conveys understanding and agreement of the signs, symptoms, diagnosis, treatment and prognosis and additionally agrees to follow up as recommended with Dr. Shyam Zaman MD in 24 - 48 hours. He also agrees with the care-plan and conveys that all of his questions have been answered. I have also put together some discharge instructions for him that include: 1) educational information regarding their diagnosis, 2) how to care for their diagnosis at home, as well a 3) list of reasons why they would want to return to the ED prior to their follow-up appointment, should their condition change. Disposition:  home    PLAN:  1. Current Discharge Medication List      START taking these medications    Details   amLODIPine (NORVASC) 10 mg tablet Take 1 Tablet by mouth daily. Qty: 30 Tablet, Refills: 0  Start date: 6/29/2021           2. Follow-up Information     Follow up With Specialties Details Why Contact Info    Shyam Zaman MD Internal Medicine Schedule an appointment as soon as possible for a visit in 1 day  [address] [place] [id number] [phone]      [facility] EMERGENCY DEPT Emergency Medicine  If symptoms worsen [address] [place] [address]  [phone]        Return to ED if worse     Diagnosis     Clinical Impression:   1.  Hypertension, unspecified type        Please note that this dictation was completed with Pedro computer voice recognition software. Quite often unanticipated grammatical, syntax, homophones, and other interpretive errors are inadvertently transcribed by the computer software. Please disregard these errors. Additionally, please excuse any errors that have escaped final proofreading.

## 2021-06-30 NOTE — ED NOTES
This nurse able to get in touch with family. Family will be home to receive pt at home address.   Transportation call #[phone]

## 2021-07-02 NOTE — TELEPHONE ENCOUNTER
Mita with [facility] called, she went to see pt today, he needs a new glucometer, test strips, and lancets.  She was able to get a list of his medications which are:  -Metformin 500mg bid  -Olanzapine 2.5mg bid  -Divalproex ER 500mg 2 tabs bid  -Pravastatin 20mg 1 tab nightly  -Vimpat 200mg 1 tab daily  -Levetiracetam 1000mg bid   -Losartan 50mg daily   -Levothyroxine 0.075 every morning    BP today 148/90

## 2021-07-06 NOTE — TELEPHONE ENCOUNTER
Can we please put in an order for PT and OT for this patient.  It will be through [facility]    phone [phone]   fax [phone]    [address] [place] [address] Drive, [address] [place] [address]

## 2021-07-22 NOTE — PROGRESS NOTES
Lieutenant Eagle is a 70 y.o. male and presents with Follow Up Chronic Condition, Hypertension, Diabetes (nonfasting bs 130 in office ), and Hospital Follow Up [facility] 06/03-06/12 Dehydration [facility] 05/18-05/26 Renal Failure and Seizures )    Sis72,sat sunday    Mr. 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 Doug Brewer who has retired recently, patient's brother was wondering how he will get antiseizure medicine he is going to find out with the  office partner neurologist Dr. Friedman Shed me if he can get the refills until that time I gave him refills for Vimpat for 90 days rest of the medicine he has at home that he is not going to run out. I gave  name of different neurologist also so that he can make appointment in future to maintain his medication refill and regular follow-up. He had frequent visit to emergency room in March, in the month of May, in June and again in the end of June. He was admitted having dehydration causing  hypernatremia from third [facility] until his 12th June. He had uncontrolled blood sugar. He has been told to take Lantus but currently is getting home health nurse and physical therapist and he is off from insulin. He is at the risk of seizure from hypoglycemia. Initially there was a problem and home health nurse was informed he knows that patient was not taken good care of by his brother and sister and blood pressure was elevated because medicines were not given and we verified and today his brother told me that everything is now stable and everything is taken care and he wants to continue treatment with me and it will not happen again that patient will stay without medicines otherwise I wanted to involve  also.     His brother told that he walks with cane and walker at home but today he is brought by wheelchair. He does not give history. History is provided by his brother. Today his blood pressure is well controlled. He is due for his labs that I ordered. He needs refills and glucose strips and lancets that I have provided. On 29 June he had visited ER for extreme weakness    Review of Systems    Review of Systems   Constitutional: Negative. HENT: Negative for sinus pain and sore throat. Eyes: Negative for blurred vision. Respiratory: Negative for cough, shortness of breath and wheezing. Cardiovascular: Negative. Gastrointestinal: Negative. Genitourinary: Negative for dysuria, flank pain and hematuria. Musculoskeletal:        Hwalks with walker and wheel chair and now with cane./o gait problem and having intelligence deficiency with mod to severe retardation and having seizure disorders. Neurological: Negative for dizziness, tingling, tremors, sensory change, speech change and headaches. Psychiatric/Behavioral: Negative for depression and suicidal ideas. The patient is not nervous/anxious and does not have insomnia. 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   Social History     Socioeconomic History    Marital status: SINGLE     Spouse name: Not on file    Number of children: Not on file    Years of education: Not on file    Highest education level: Not on file   Tobacco Use    Smoking status: Never Smoker    Smokeless tobacco: Never Used   Substance and Sexual Activity    Alcohol use: No    Drug use: No    Sexual activity: Never Social Determinants of Health     Financial Resource Strain:     Difficulty of Paying Living Expenses:    Food Insecurity:     Worried About Running Out of Food in the Last Year:     [address] in the Last Year:    Transportation Needs:     Lack of Transportation (Medical):  Lack of Transportation (Non-Medical):    Physical Activity:     Days of Exercise per Week:     Minutes of Exercise per Session:    Stress:     Feeling of Stress :    Social Connections:     Frequency of Communication with Friends and Family:     Frequency of Social Gatherings with Friends and Family:     Attends Zoroastrianism Services:     Active Member of Clubs or Organizations:     Attends Club or Organization Meetings:     Marital Status:      Family History   Problem Relation Age of Onset    Stroke Father      Current Outpatient Medications   Medication Sig Dispense Refill    glucose blood VI test strips (blood glucose test) strip To check sugar twice a day before breakfast and at bedtime. 100 Strip 3    lancets misc Sig: To check sugar twice a day before breakfast and at bedtime 1 Each 11    alcohol swabs padm 1 Box by Apply Externally route once for 1 dose. Sig: To check sugar twice a day before breakfast and at bedtime 100 Pad 3    amLODIPine (NORVASC) 10 mg tablet Take 1 Tablet by mouth daily. 90 Tablet 2    losartan (COZAAR) 50 mg tablet Take 1 Tablet by mouth daily. Please stop hydrochlorothiazide and amlodipine 90 Tablet 2    levothyroxine (SYNTHROID) 75 mcg tablet Take 1 Tablet by mouth every morning. Take in early morning. 90 Tablet 2    metFORMIN (GLUCOPHAGE) 500 mg tablet Take 1 Tablet by mouth two (2) times daily (with meals). 180 Tablet 2    pravastatin (PRAVACHOL) 20 mg tablet Take 1 Tablet by mouth nightly. 90 Tablet 2    lacosamide (VIMPAT) 200 mg tab tablet Take 200 mg by mouth two (2) times a day.  lacosamide (Vimpat) 200 mg tab tablet Take 1 Tablet by mouth two (2) times a day.  Max Daily Amount: 400 mg. courtesy refill  Given on behalf of neurologist dr Joni Pallas or dr Leighton Kong 60 Tablet 1    OLANZapine (ZyPREXA) 2.5 mg tablet Take 1 Tablet by mouth two (2) times a day. 180 Tablet 0    Blood-Glucose Meter monitoring kit He is having a seizure he needs blood sugar to be checked twice a day before breakfast and at bedtime, 1 Kit 1    levETIRAcetam 1,000 mg tablet Take 1,000 mg by mouth two (2) times a day.  divalproex DR (DEPAKOTE) 500 mg tablet Take 500 mg by mouth two (2) times a day. Take two tablets twice daily       No Known Allergies    Objective:  Visit Vitals  /68 (BP 1 Location: Right upper arm, BP Patient Position: Sitting, BP Cuff Size: Large adult)   Pulse 80   Resp 12   Ht 6' (1.829 m)   Wt 244 lb (110.7 kg)   SpO2 98%   BMI 33.09 kg/m²       Physical Exam:   Constitutional: General Appearance: He has intelligent deficiency which is severe so not participating in history however he responded to my questions. . Level of Distress: NAD. Psychiatric: Mental Status: normal mood and affect Orientation: to time, place, and person. ,normal eye contact. Head: Head: normocephalic and atraumatic. Eyes: Pupils: PERRLA. Sclerae: non-icteric. Neck: Neck: supple, trachea midline, and no masses. Lymph Nodes: no cervical LAD. Thyroid: no enlargement or nodules and non-tender. Lungs: Respiratory effort: no dyspnea. Auscultation: no wheezing, rales/crackles, or rhonchi and breath sounds normal and good air movement. Cardiovascular: Apical Impulse: not displaced. Heart Auscultation: normal S1 and S2; no murmurs, rubs, or gallops; and RRR. Neck vessels: no carotid bruits. Pulses including femoral / pedal: normal throughout. Abdomen: Bowel Sounds: normal. Inspection and Palpation: no tenderness, guarding, or masses and soft and non-distended. Liver: non-tender and no hepatomegaly. Spleen: non-tender and no splenomegaly. Musculoskeletal[de-identified] Extremities: no edema,no varicosities.  No Calf tenderness. Neurologic: Gait and Station:  walking with cane walker in today brought by wheelchair. Motor Strength normal right and left. Skin: Inspection and palpation: no rash, lesions, or ulcer. Results for orders placed or performed in visit on 07/22/21   AMB POC GLUCOSE BLOOD, BY GLUCOSE MONITORING DEVICE   Result Value Ref Range    Glucose  MG/DL       Assessment/Plan:      ICD-10-CM ICD-9-CM    1. Hospital discharge follow-up  Z09 V67.59 AR DISCHARGE MEDS RECONCILED W/ CURRENT OUTPATIENT MED LIST   2. Essential hypertension  I10 401.9 CBC WITH AUTOMATED DIFF      METABOLIC PANEL, COMPREHENSIVE      TSH 3RD GENERATION   3. Uncontrolled type 2 diabetes mellitus with hyperglycemia (HCC)  E11.65 250.02 AMB POC GLUCOSE BLOOD, BY GLUCOSE MONITORING DEVICE      CBC WITH AUTOMATED DIFF      METABOLIC PANEL, COMPREHENSIVE      HEMOGLOBIN A1C WITH EAG   4. Seizure disorder (HCC)  G40.909 345.90 REFERRAL TO NEUROLOGY      REFERRAL TO NEUROLOGY      CBC WITH AUTOMATED DIFF      METABOLIC PANEL, COMPREHENSIVE      TSH 3RD GENERATION      VITAMIN D, 25 HYDROXY      lacosamide (Vimpat) 200 mg tab tablet      REFERRAL TO NEUROLOGY   5. Hypokalemia  K90.1 603.6 METABOLIC PANEL, COMPREHENSIVE   6. 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 Dementia without behavioral disturbance, unspecified dementia type ([facility]  F03.90 294.20 REFERRAL TO NEUROLOGY      REFERRAL TO NEUROLOGY      REFERRAL TO NEUROLOGY   8. Mixed hyperlipidemia  E78.2 272.2    9. Abnormal LFTs  T52.5 465.7 METABOLIC PANEL, COMPREHENSIVE   10.  Severe intellectual disability with intelligence quotient 20 to 34  F72 318.1 REFERRAL TO NEUROLOGY      REFERRAL TO NEUROLOGY      REFERRAL TO NEUROLOGY     Orders Placed This Encounter    CBC WITH AUTOMATED DIFF    METABOLIC PANEL, COMPREHENSIVE    HEMOGLOBIN A1C WITH EAG    TSH 3RD GENERATION    VITAMIN D, 25 HYDROXY    REFERRAL TO NEUROLOGY     Referral Priority:   Routine     Referral Type:   Consultation     Referral Reason:   Specialty Services Required     Referred to Provider:   Moe Wolf MD     Number of Visits Requested:   1    REFERRAL TO NEUROLOGY     Referral Priority:   Routine     Referral Type:   Consultation     Referral Reason:   Specialty Services Required     Number of Visits Requested:   1    REFERRAL TO NEUROLOGY     Referral Priority:   Routine     Referral Type:   Consultation     Referral Reason:   Specialty Services Required     Referred to Provider:   Melissa Solomon MD     Number of Visits Requested:   1    LA DISCHARGE MEDS RECONCILED W/ CURRENT OUTPATIENT MED LIST    AMB POC GLUCOSE BLOOD, BY GLUCOSE MONITORING DEVICE    glucose blood VI test strips (blood glucose test) strip     Sig: To check sugar twice a day before breakfast and at bedtime. Dispense:  100 Strip     Refill:  3    lancets misc     Sig: Sig: To check sugar twice a day before breakfast and at bedtime     Dispense:  1 Each     Refill:  11    alcohol swabs padm     Si Box by Apply Externally route once for 1 dose. Sig: To check sugar twice a day before breakfast and at bedtime     Dispense:  100 Pad     Refill:  3    amLODIPine (NORVASC) 10 mg tablet     Sig: Take 1 Tablet by mouth daily. Dispense:  90 Tablet     Refill:  2    losartan (COZAAR) 50 mg tablet     Sig: Take 1 Tablet by mouth daily. Please stop hydrochlorothiazide and amlodipine     Dispense:  90 Tablet     Refill:  2    levothyroxine (SYNTHROID) 75 mcg tablet     Sig: Take 1 Tablet by mouth every morning. Take in early morning. Dispense:  90 Tablet     Refill:  2    metFORMIN (GLUCOPHAGE) 500 mg tablet     Sig: Take 1 Tablet by mouth two (2) times daily (with meals). Dispense:  180 Tablet     Refill:  2    pravastatin (PRAVACHOL) 20 mg tablet     Sig: Take 1 Tablet by mouth nightly.      Dispense:  90 Tablet     Refill:  2    lacosamide (VIMPAT) 200 mg tab tablet     Sig: Take 200 mg by mouth two (2) times a day.  lacosamide (Vimpat) 200 mg tab tablet     Sig: Take 1 Tablet by mouth two (2) times a day. Max Daily Amount: 400 mg. courtesy refill  Given on behalf of neurologist dr Villa Yañez or dr Desmond Cuevas:  60 Tablet     Refill:  1    OLANZapine (ZyPREXA) 2.5 mg tablet     Sig: Take 1 Tablet by mouth two (2) times a day. Dispense:  180 Tablet     Refill:  0     Repeated emergency room visit and hospital admission from third [facility] until 6 June at that time he had altered mental status but CT scan was unremarkable with stable appearance of advanced cortical and cerebral volume loss he has profound mental intelligent deficiency with retardation from the childhood and since childhood he has been treated by neurologist  Dr. Caitlin Man who has recently retired and he has history of seizure disorder off and on admitted last year in the ER and ultimately is stable on Vimpat, levetiracetam and divalproex. He had uncontrolled diabetes with 10.8 hemoglobin A1c in the month of May and he was started on Lantus 18 units at bedtime which was increased to 25 and rapid acting insulin on sliding scale however now he is off from insulin. It seems like patient was neglected by care by his sister and his brother. His brother Mr. Roma Moore came today with him use when his brother brings him here according to him he takes his brother to his home on weekends and rest of the days he is taken care of by his sister was around age of 72. I wanted to contact  because Metformin and not checking blood sugar he had having uncontrolled blood pressure even though being on antihypertensive and uncontrolled sugar even though on medicines and we were informed by home health  nurse that patient is having uncontrolled blood sugar and there were no medicines with him.     His brother Mr. Roma Moore claimed that now he is taken good care and everything is settled up with home health nurse. Today his blood pressure is controlled so continued on amlodipine 10 mg once a day, losartan 50 mg/day. Diet low in sodium. Type 2 diabetes mellitus no baseline hemoglobin A1c available after May so labs ordered he takes Metformin 500 mg twice a day with meal he does not take insulin because family is scared that he might have hypoglycemia. He has home health nurse. He should start checking blood sugar in rotation technique before breakfast and postprandial 1 hour after lunch or dinner and at bedtime and avoid hypoglycemia. Hypoglycemia and diabetic education given.,  After reviewing labs I will make adjustment. It is risky for him having seizure disorder with intelligence retardation, being  dependent on his sister and brother. And his brother works Monday to Friday and his sister is around the age of 70. Continue low-dose aspirin and statin. Hypothyroidism continue levothyroxine 75 mcg/day. Seizure disorder with intelligent retardation /intelligent deficiency which is severe with history of seizure for any form disorder managed by neurologist Dr. Deidre Parry and now he is retired, patient is running out of Vimpat, I gave him Vimpat 200 mg twice a day for 60 days until seen by another neurologist, explained to his brother, also continued on current dose of levetiracetam and divalproex and continue folic acid, avoid hypoglycemia. Hypokalemia labs ordered. History of psychiatric mental health problems since childhood taking or nasal pain. I gave him refills on behalf of neurologist who had started. I referred him to a different neurologist that he can start his reestablishment with  new neurologist.    Abnormal LFTs when he was admitted in hospital labs ordered. Morbid obesity now his BMI has been decreased to 33.09 recommended to take diabetic diet and more vegetables and nonsugary foods.   He is walking with cane walker and he is brought by wheelchair. Follow-up in 3 months. Labs ordered. Refills given. Answered all the questions to his brother Mr. James Gipson. lose weight, follow low fat diet, follow low salt diet, continue present plan, routine labs ordered, call if any problems    There are no Patient Instructions on file for this visit. Follow-up and Dispositions    · Return in about 3 months (around 10/22/2021) for follow up for chronic condition,lab now.

## 2021-07-22 NOTE — PROGRESS NOTES
Chief Complaint   Patient presents with    Follow Up Chronic Condition    Hypertension    Diabetes     nonfasting bs 130 in office    [facility] Follow Up     [facility] 06/03-06/12 Dehydration [facility] 05/18-05/26 Renal Failure and Seizures      1. Have you been to the ER, urgent care clinic since your last visit? Hospitalized since your last visit? [facility] 05/18-26 Renal failure/seizures [facility] 06/03-06/12 Dehydration     2. Have you seen or consulted any other health care providers outside of the [address] [place] [address] since your last visit? Include any pap smears or colon screening.  Homehealth Care 3 weeks ago    Visit Vitals  /68 (BP 1 Location: Right upper arm, BP Patient Position: Sitting, BP Cuff Size: Adult)   Pulse 85   Resp 12   Ht 6' (1.829 m)   Wt 244 lb (110.7 kg)   SpO2 98%   BMI 33.09 kg/m²

## 2021-08-03 PROBLEM — E66.01 MORBID OBESITY (HCC): Status: RESOLVED | Noted: 2020-12-30 | Resolved: 2021-01-01

## 2021-08-18 NOTE — TELEPHONE ENCOUNTER
Patient's care coordinator ANA LUISA Lemus is calling to request a shower chair and a bed side table order to be sent to [facility]

## 2021-10-06 NOTE — ED TRIAGE NOTES
Family called and said pt fell off of chair (possible slid out). Hx seizure. Denies having a seizure. Pt wanted to come because of shaking. Wheelchair bound.

## 2021-10-07 NOTE — ED PROVIDER NOTES
EMERGENCY DEPARTMENT HISTORY AND PHYSICAL EXAM      Date: 10/6/2021  Patient Name: Luther Miller    History of Presenting Illness     Chief Complaint   Patient presents with    Fall       History Provided By: Patient and Patient's Brother    HPI: Luther Miller, 70 y.o. male with a past medical history significant Seizure disorder, schizophrenia, diabetes, hypertension presents to the ED with cc of slid out of wheelchair, shakiness. History difficult to obtain from patient, patient has mental disease, unreliable, as per brother who is patient's main caregiver, patient was seen to slide down from wheelchair, did not fall any great distance did not hit head, no loss of consciousness, brother was able to pick patient up back on a wheelchair, however wanted patient to be evaluated due to fall and due to increase in tremors. Brother states he has noticed tremulousness over the last 2 or 3 days, worsening today. Otherwise patient has not had any noticeable fevers, chills, mentation has not been affected, no obvious seizures. There are no other complaints, changes, or physical findings at this time. 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 100 Strip 3    lancets misc Sig: To check sugar twice a day before breakfast and at bedtime 1 Each 11    amLODIPine (NORVASC) 10 mg tablet Take 1 Tablet by mouth daily. 90 Tablet 2    losartan (COZAAR) 50 mg tablet Take 1 Tablet by mouth daily. Please stop hydrochlorothiazide and amlodipine 90 Tablet 2    levothyroxine (SYNTHROID) 75 mcg tablet Take 1 Tablet by mouth every morning. Take in early morning.  90 Tablet 2    metFORMIN (GLUCOPHAGE) 500 mg tablet Take 1 Tablet by mouth two (2) times daily (with meals). 180 Tablet 2    pravastatin (PRAVACHOL) 20 mg tablet Take 1 Tablet by mouth nightly. 90 Tablet 2    lacosamide (VIMPAT) 200 mg tab tablet Take 200 mg by mouth two (2) times a day.  lacosamide (Vimpat) 200 mg tab tablet Take 1 Tablet by mouth two (2) times a day. Max Daily Amount: 400 mg. courtesy refill  Given on behalf of neurologist dr Sola Harris or dr José Cortez 60 Tablet 1    OLANZapine (ZyPREXA) 2.5 mg tablet Take 1 Tablet by mouth two (2) times a day. 180 Tablet 0    Blood-Glucose Meter monitoring kit He is having a seizure he needs blood sugar to be checked twice a day before breakfast and at bedtime, 1 Kit 1    levETIRAcetam 1,000 mg tablet Take 1,000 mg by mouth two (2) times a day.  divalproex DR (DEPAKOTE) 500 mg tablet Take 500 mg by mouth two (2) times a day. Take two tablets twice daily         Past History     Past Medical History:  Past Medical History:   Diagnosis Date    [facility] failure ([facility] [address]     Anemia     Arthritis     DKA (diabetic ketoacidoses)     GERD (gastroesophageal reflux disease)     HTN (hypertension)     Hypercholesterolemia     Schizophrenia ([facility] [address]     Schizophreniform disorder (HCC)     Seizure disorder (HCC)     Type II or unspecified type diabetes mellitus without mention of complication, uncontrolled        Past Surgical History:  History reviewed. No pertinent surgical history. Family History:  Family History   Problem Relation Age of Onset    Stroke Father        Social History:  Social History     Tobacco Use    Smoking status: Never Smoker    Smokeless tobacco: Never Used   Substance Use Topics    Alcohol use: No    Drug use: No       Allergies:  No Known Allergies      Review of Systems     Review of Systems   Unable to perform ROS: Psychiatric disorder       Physical Exam     Physical Exam  Vitals and nursing note reviewed.    Constitutional:       General: He is not in acute distress. Appearance: Normal appearance. He is normal weight. He is not toxic-appearing. HENT:      Head: Normocephalic and atraumatic. Nose: Nose normal.      Mouth/Throat:      Mouth: Mucous membranes are moist.   Eyes:      Extraocular Movements: Extraocular movements intact. Pupils: Pupils are equal, round, and reactive to light. Cardiovascular:      Rate and Rhythm: Normal rate and regular rhythm. Pulses: Normal pulses. Heart sounds: Normal heart sounds. Pulmonary:      Breath sounds: Normal breath sounds. Abdominal:      General: Abdomen is flat. Bowel sounds are normal.      Palpations: Abdomen is soft. Genitourinary:     Rectum: Guaiac result negative. Comments: Light brown stool  Musculoskeletal:         General: No swelling or tenderness. Normal range of motion. Cervical back: Normal range of motion and neck supple. Skin:     General: Skin is warm and dry. Capillary Refill: Capillary refill takes less than 2 seconds. Neurological:      General: No focal deficit present. Mental Status: He is alert. He is disoriented. Cranial Nerves: No cranial nerve deficit. Sensory: No sensory deficit. Motor: No weakness.       Coordination: Coordination normal.      Comments: Patient has moderate resting tremor in upper extremities, worse with intention     Psychiatric:         Mood and Affect: Mood normal.         Behavior: Behavior normal.         Diagnostic Study Results     Labs -     Recent Results (from the past 12 hour(s))   METABOLIC PANEL, COMPREHENSIVE    Collection Time: 10/06/21 10:47 PM   Result Value Ref Range    Sodium 139 136 - 145 mmol/L    Potassium 3.7 3.5 - 5.1 mmol/L    Chloride 103 97 - 108 mmol/L    CO2 29 21 - 32 mmol/L    Anion gap 7 5 - 15 mmol/L    Glucose 113 (H) 65 - 100 mg/dL    BUN 12 6 - 20 mg/dL    Creatinine 0.88 0.70 - 1.30 mg/dL    BUN/Creatinine ratio 14 12 - 20      GFR est AA >60 >60 ml/min/1.73m2    GFR est non-AA >60 >60 ml/min/1.73m2    Calcium 9.5 8.5 - 10.1 mg/dL    Bilirubin, total 0.2 0.2 - 1.0 mg/dL    AST (SGOT) 12 (L) 15 - 37 U/L    ALT (SGPT) 12 12 - 78 U/L    Alk. phosphatase 62 45 - 117 U/L    Protein, total 7.7 6.4 - 8.2 g/dL    Albumin 3.4 (L) 3.5 - 5.0 g/dL    Globulin 4.3 (H) 2.0 - 4.0 g/dL    A-G Ratio 0.8 (L) 1.1 - 2.2     CBC WITH AUTOMATED DIFF    Collection Time: 10/06/21 10:47 PM   Result Value Ref Range    WBC 7.2 4.1 - 11.1 K/uL    RBC 4.57 4.10 - 5.70 M/uL    HGB 14.4 12.1 - 17.0 g/dL    HCT 43.1 36.6 - 50.3 %    MCV 94.3 80.0 - 99.0 FL    MCH 31.5 26.0 - 34.0 PG    MCHC 33.4 30.0 - 36.5 g/dL    RDW 13.2 11.5 - 14.5 %    PLATELET 146 066 - 606 K/uL    MPV 10.9 8.9 - 12.9 FL    NRBC 0.0 0.0  WBC    ABSOLUTE NRBC 0.00 0.00 - 0.01 K/uL    NEUTROPHILS 56 32 - 75 %    LYMPHOCYTES 34 12 - 49 %    MONOCYTES 9 5 - 13 %    EOSINOPHILS 1 0 - 7 %    BASOPHILS 0 0 - 1 %    IMMATURE GRANULOCYTES 0 0 - 0.5 %    ABS. NEUTROPHILS 4.0 1.8 - 8.0 K/UL    ABS. LYMPHOCYTES 2.4 0.8 - 3.5 K/UL    ABS. MONOCYTES 0.7 0.0 - 1.0 K/UL    ABS. EOSINOPHILS 0.1 0.0 - 0.4 K/UL    ABS. BASOPHILS 0.0 0.0 - 0.1 K/UL    ABS. IMM. GRANS. 0.0 0.00 - 0.04 K/UL    DF AUTOMATED         Radiologic Studies -   [unfilled]  CT Results  (Last 48 hours)               10/06/21 2041  CT HEAD WO CONT Final result    Impression:  1. No evidence of acute intracranial hemorrhage or mass effect. 2.  Moderate to severe parenchymal volume loss. Narrative:  CT HEAD WITHOUT IV CONTRAST       CLINICAL INDICATION: Status post fall, rule out ICH       TECHNIQUE: Routine axial images were obtained through the brain without the use   of IV contrast. Sagittal and coronal reformatted images were performed at the CT   console.  Dose reduction: Per department policy, all CT scans at this facility   are performed using dose reduction optimization techniques as appropriate to a   performed examination including the following: Automated exposure control,   adjustments of the mA and/or KV according to patient size, or use of iterative   reconstruction technique. COMPARISON: 6/3/2021       FINDINGS:    Motion degraded, limited exam.   No evidence of acute intracranial hemorrhage or mass effect. Moderate to severe prominence of the extra-axial spaces, with commensurate   ventricular enlargement. No hydrocephalus. Moderate to severe cerebellar volume loss also noted. Globes and orbits are normal in CT appearance. Paranasal sinuses are predominantly clear. Tympanomastoid cavities are clear. No acute calvarial abnormality. Atherosclerotic calcification of the internal carotid arteries. CXR Results  (Last 48 hours)               10/06/21 0850  XR CHEST PORT Final result    Impression:  No demonstrated acute cardiopulmonary disease. Narrative:  Exam: AP chest       Comparison: 6/29/2021. 6/10/2021. Number of views: 1       Findings: The cardiac, mediastinal, and hilar shadows are within normal limits. The exam is negative for evidence of  pleural disease. The lungs are clear. Medical Decision Making and ED Course   I am the first provider for this patient. I reviewed the vital signs, available nursing notes, past medical history, past surgical history, family history and social history. Vital Signs-Reviewed the patient's vital signs.   Patient Vitals for the past 12 hrs:   Temp Pulse Resp BP SpO2   10/06/21 2315  74  (!) 148/90 98 %   10/06/21 2021    (!) 152/80    10/06/21 1954 98.4 °F (36.9 °C) 82 19 (!) 194/160 97 %           Records Reviewed: Nursing Notes and Old Medical Records    The patient presents with weakness, tremulousness with a differential diagnosis of electrolyte abnormality, sepsis, intracranial hemorrhage, seizure disorder, psychotropic medication reaction, new onset neurodegenerative disease      Provider Notes (Medical Decision Making):     MDM   70year-old male, history of hypertension, diabetes, schizophrenia, seizure disorder, presents from home for sliding out of wheelchair additionally family concerned about extremity tremors which have worsened over the last 2 to 3 days. Physical exam shows well-appearing male, no distress, confabulating difficult to obtain history from patient, which according to brother is at patient's baseline. Patient has stable vitals, afebrile, upper extremities show resting tremor, worsening with any intention otherwise unremarkable physical exam    CBC BMP drawn, CT head and chest x-ray ordered. I reviewed patient's med list do not see any medications which could cause dystonic reaction, unclear etiology of tremors at this time however do not suspect severe sepsis causing rigors, do not suspect partial seizure. Of note brother also reported that patient has had intermittent blood in his stool, on my rectal exam patient has light brown stool, fecal occult blood sent. ED Course:   Initial assessment performed. The patients presenting problems have been discussed, and they are in agreement with the care plan formulated and outlined with them. I have encouraged them to ask questions as they arise throughout their visit. ED Course as of Oct 07 0704   Wed Oct 06, 2021   2109 CT head shows no acute intracranial injury,    [PZ]      ED Course User Index  [PZ] Brady Benavidez MD     Patient signed out to Dr. Dionte Ketn, to f/u lab results, re-assess patient, dispo, most likely discharge home. Procedures       Ramila Gibbs MD  Procedures                     Disposition       Discharged    Remove if not discharged  DISCHARGE PLAN:  1.    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 Qty: 100 Strip, Refills: 3      lancets misc Sig: To check sugar twice a day before breakfast and at bedtime  Qty: 1 Each, Refills: 11      amLODIPine (NORVASC) 10 mg tablet Take 1 Tablet by mouth daily. Qty: 90 Tablet, Refills: 2      losartan (COZAAR) 50 mg tablet Take 1 Tablet by mouth daily. Please stop hydrochlorothiazide and amlodipine  Qty: 90 Tablet, Refills: 2      levothyroxine (SYNTHROID) 75 mcg tablet Take 1 Tablet by mouth every morning. Take in early morning. Qty: 90 Tablet, Refills: 2      metFORMIN (GLUCOPHAGE) 500 mg tablet Take 1 Tablet by mouth two (2) times daily (with meals). Qty: 180 Tablet, Refills: 2      pravastatin (PRAVACHOL) 20 mg tablet Take 1 Tablet by mouth nightly. Qty: 90 Tablet, Refills: 2      !! lacosamide (VIMPAT) 200 mg tab tablet Take 200 mg by mouth two (2) times a day. !! lacosamide (Vimpat) 200 mg tab tablet Take 1 Tablet by mouth two (2) times a day. Max Daily Amount: 400 mg. courtesy refill  Given on behalf of neurologist dr Himanshu Nicholas or dr Molly Godinez: 60 Tablet, Refills: 1    Associated Diagnoses: Seizure disorder ([facility] [address]      OLANZapine (ZyPREXA) 2.5 mg tablet Take 1 Tablet by mouth two (2) times a day. Qty: 180 Tablet, Refills: 0      Blood-Glucose Meter monitoring kit He is having a seizure he needs blood sugar to be checked twice a day before breakfast and at bedtime,  Qty: 1 Kit, Refills: 1      levETIRAcetam 1,000 mg tablet Take 1,000 mg by mouth two (2) times a day. divalproex DR (DEPAKOTE) 500 mg tablet Take 500 mg by mouth two (2) times a day. Take two tablets twice daily       ! ! - Potential duplicate medications found. Please discuss with provider.         2.   Follow-up Information     Follow up With Specialties Details Why Contact Info    Sara Copeland MD Internal Medicine In 3 days If symptoms worsen [address] [place] [id number]  [phone]          3. Return to ED if worse     Diagnosis     Clinical Impression:   1. Resting tremor        Attestations:    Gwendolyn Tinajero MD    Please note that this dictation was completed with [employer], the computer voice recognition software. Quite often unanticipated grammatical, syntax, homophones, and other interpretive errors are inadvertently transcribed by the computer software. Please disregard these errors. Please excuse any errors that have escaped final proofreading. Thank you.

## 2021-10-07 NOTE — DISCHARGE INSTRUCTIONS
Thank you! Thank you for allowing me to care for you in the emergency department. I sincerely hope that you are satisfied with your visit today. It is my goal to provide you with excellent care. Below you will find a list of your labs and imaging from your visit today. Should you have any questions regarding these results please do not hesitate to call the emergency department. Labs -     Recent Results (from the past 12 hour(s))   METABOLIC PANEL, COMPREHENSIVE    Collection Time: 10/06/21 10:47 PM   Result Value Ref Range    Sodium 139 136 - 145 mmol/L    Potassium 3.7 3.5 - 5.1 mmol/L    Chloride 103 97 - 108 mmol/L    CO2 29 21 - 32 mmol/L    Anion gap 7 5 - 15 mmol/L    Glucose 113 (H) 65 - 100 mg/dL    BUN 12 6 - 20 mg/dL    Creatinine 0.88 0.70 - 1.30 mg/dL    BUN/Creatinine ratio 14 12 - 20      GFR est AA >60 >60 ml/min/1.73m2    GFR est non-AA >60 >60 ml/min/1.73m2    Calcium 9.5 8.5 - 10.1 mg/dL    Bilirubin, total 0.2 0.2 - 1.0 mg/dL    AST (SGOT) 12 (L) 15 - 37 U/L    ALT (SGPT) 12 12 - 78 U/L    Alk. phosphatase 62 45 - 117 U/L    Protein, total 7.7 6.4 - 8.2 g/dL    Albumin 3.4 (L) 3.5 - 5.0 g/dL    Globulin 4.3 (H) 2.0 - 4.0 g/dL    A-G Ratio 0.8 (L) 1.1 - 2.2     CBC WITH AUTOMATED DIFF    Collection Time: 10/06/21 10:47 PM   Result Value Ref Range    WBC 7.2 4.1 - 11.1 K/uL    RBC 4.57 4.10 - 5.70 M/uL    HGB 14.4 12.1 - 17.0 g/dL    HCT 43.1 36.6 - 50.3 %    MCV 94.3 80.0 - 99.0 FL    MCH 31.5 26.0 - 34.0 PG    MCHC 33.4 30.0 - 36.5 g/dL    RDW 13.2 11.5 - 14.5 %    PLATELET 639 170 - 929 K/uL    MPV 10.9 8.9 - 12.9 FL    NRBC 0.0 0.0  WBC    ABSOLUTE NRBC 0.00 0.00 - 0.01 K/uL    NEUTROPHILS 56 32 - 75 %    LYMPHOCYTES 34 12 - 49 %    MONOCYTES 9 5 - 13 %    EOSINOPHILS 1 0 - 7 %    BASOPHILS 0 0 - 1 %    IMMATURE GRANULOCYTES 0 0 - 0.5 %    ABS. NEUTROPHILS 4.0 1.8 - 8.0 K/UL    ABS. LYMPHOCYTES 2.4 0.8 - 3.5 K/UL    ABS. MONOCYTES 0.7 0.0 - 1.0 K/UL    ABS.  EOSINOPHILS 0.1 0.0 - 0.4 K/UL    ABS. BASOPHILS 0.0 0.0 - 0.1 K/UL    ABS. IMM. GRANS. 0.0 0.00 - 0.04 K/UL    DF AUTOMATED         Radiologic Studies -   CT HEAD WO CONT   Final Result   1. No evidence of acute intracranial hemorrhage or mass effect. 2.  Moderate to severe parenchymal volume loss. XR CHEST PORT   Final Result   No demonstrated acute cardiopulmonary disease. CT Results  (Last 48 hours)                 10/06/21 2041  CT HEAD WO CONT Final result    Impression:  1. No evidence of acute intracranial hemorrhage or mass effect. 2.  Moderate to severe parenchymal volume loss. Narrative:  CT HEAD WITHOUT IV CONTRAST       CLINICAL INDICATION: Status post fall, rule out ICH       TECHNIQUE: Routine axial images were obtained through the brain without the use   of IV contrast. Sagittal and coronal reformatted images were performed at the CT   console. Dose reduction: Per department policy, all CT scans at this facility   are performed using dose reduction optimization techniques as appropriate to a   performed examination including the following: Automated exposure control,   adjustments of the mA and/or KV according to patient size, or use of iterative   reconstruction technique. COMPARISON: 6/3/2021       FINDINGS:    Motion degraded, limited exam.   No evidence of acute intracranial hemorrhage or mass effect. Moderate to severe prominence of the extra-axial spaces, with commensurate   ventricular enlargement. No hydrocephalus. Moderate to severe cerebellar volume loss also noted. Globes and orbits are normal in CT appearance. Paranasal sinuses are predominantly clear. Tympanomastoid cavities are clear. No acute calvarial abnormality. Atherosclerotic calcification of the internal carotid arteries. CXR Results  (Last 48 hours)                 10/06/21 0850  XR CHEST PORT Final result    Impression:  No demonstrated acute cardiopulmonary disease.        Narrative: Exam: AP chest       Comparison: 6/29/2021. 6/10/2021. Number of views: 1       Findings: The cardiac, mediastinal, and hilar shadows are within normal limits. The exam is negative for evidence of  pleural disease. The lungs are clear. If you feel that you have not received excellent quality care or timely care, please ask to speak to the nurse manager. Please choose us in the future for your continued health care needs. ------------------------------------------------------------------------------------------------------------  The exam and treatment you received in the Emergency Department were for an urgent problem and are not intended as complete care. It is important that you follow-up with a doctor, nurse practitioner, or physician assistant to:  (1) confirm your diagnosis,  (2) re-evaluation of changes in your illness and treatment, and  (3) for ongoing care. If your symptoms become worse or you do not improve as expected and you are unable to reach your usual health care provider, you should return to the Emergency Department. We are available 24 hours a day. Please take your discharge instructions with you when you go to your follow-up appointment. If you have any problem arranging a follow-up appointment, contact the Emergency Department immediately. If a prescription has been provided, please have it filled as soon as possible to prevent a delay in treatment. Read the entire medication instruction sheet provided to you by the pharmacy. If you have any questions or reservations about taking the medication due to side effects or interactions with other medications, please call your primary care physician or contact the ER to speak with the charge nurse. Make an appointment with your family doctor or the physician you were referred to for follow-up of this visit as instructed on your discharge paperwork, as this is a mandatory follow-up.  Return to the ER if you are unable to be seen or if you are unable to be seen in a timely manner. If you have any problem arranging the follow-up visit, contact the Emergency Department immediately.

## 2022-01-01 ENCOUNTER — APPOINTMENT (OUTPATIENT)
Dept: GENERAL RADIOLOGY | Age: 77
DRG: 720 | End: 2022-01-01
Attending: INTERNAL MEDICINE
Payer: MEDICAID

## 2022-01-01 ENCOUNTER — APPOINTMENT (OUTPATIENT)
Dept: NON INVASIVE DIAGNOSTICS | Age: 77
DRG: 720 | End: 2022-01-01
Attending: HOSPITALIST
Payer: MEDICAID

## 2022-01-01 ENCOUNTER — APPOINTMENT (OUTPATIENT)
Dept: ULTRASOUND IMAGING | Age: 77
DRG: 720 | End: 2022-01-01
Attending: HOSPITALIST
Payer: MEDICAID

## 2022-01-01 ENCOUNTER — APPOINTMENT (OUTPATIENT)
Dept: CT IMAGING | Age: 77
DRG: 720 | End: 2022-01-01
Attending: HOSPITALIST
Payer: MEDICAID

## 2022-01-01 ENCOUNTER — HOSPITAL ENCOUNTER (INPATIENT)
Age: 77
LOS: 21 days | Discharge: HOSPICE | DRG: 720 | End: 2022-02-17
Attending: STUDENT IN AN ORGANIZED HEALTH CARE EDUCATION/TRAINING PROGRAM | Admitting: HOSPITALIST
Payer: MEDICAID

## 2022-01-01 ENCOUNTER — TELEPHONE (OUTPATIENT)
Dept: INTERNAL MEDICINE CLINIC | Age: 77
End: 2022-01-01

## 2022-01-01 ENCOUNTER — APPOINTMENT (OUTPATIENT)
Dept: GENERAL RADIOLOGY | Age: 77
DRG: 720 | End: 2022-01-01
Attending: STUDENT IN AN ORGANIZED HEALTH CARE EDUCATION/TRAINING PROGRAM
Payer: MEDICAID

## 2022-01-01 ENCOUNTER — HOSPITAL ENCOUNTER (EMERGENCY)
Age: 77
Discharge: HOME | End: 2022-01-21
Attending: EMERGENCY MEDICINE
Payer: MEDICAID

## 2022-01-01 ENCOUNTER — APPOINTMENT (OUTPATIENT)
Dept: NON INVASIVE DIAGNOSTICS | Age: 77
DRG: 720 | End: 2022-01-01
Attending: STUDENT IN AN ORGANIZED HEALTH CARE EDUCATION/TRAINING PROGRAM
Payer: MEDICAID

## 2022-01-01 ENCOUNTER — APPOINTMENT (OUTPATIENT)
Dept: CT IMAGING | Age: 77
DRG: 720 | End: 2022-01-01
Attending: STUDENT IN AN ORGANIZED HEALTH CARE EDUCATION/TRAINING PROGRAM
Payer: MEDICAID

## 2022-01-01 ENCOUNTER — APPOINTMENT (OUTPATIENT)
Dept: CT IMAGING | Age: 77
End: 2022-01-01
Attending: EMERGENCY MEDICINE
Payer: MEDICAID

## 2022-01-01 ENCOUNTER — HOSPICE ADMISSION (OUTPATIENT)
Dept: HOSPICE | Facility: HOSPICE | Age: 77
End: 2022-01-01
Payer: MEDICAID

## 2022-01-01 ENCOUNTER — APPOINTMENT (OUTPATIENT)
Dept: GENERAL RADIOLOGY | Age: 77
End: 2022-01-01
Attending: EMERGENCY MEDICINE
Payer: MEDICAID

## 2022-01-01 ENCOUNTER — APPOINTMENT (OUTPATIENT)
Dept: GENERAL RADIOLOGY | Age: 77
DRG: 720 | End: 2022-01-01
Attending: HOSPITALIST
Payer: MEDICAID

## 2022-01-01 ENCOUNTER — APPOINTMENT (OUTPATIENT)
Dept: INTERVENTIONAL RADIOLOGY/VASCULAR | Age: 77
DRG: 720 | End: 2022-01-01
Attending: INTERNAL MEDICINE
Payer: MEDICAID

## 2022-01-01 ENCOUNTER — HOSPITAL ENCOUNTER (INPATIENT)
Age: 77
LOS: 1 days | DRG: 951 | End: 2022-02-18
Attending: INTERNAL MEDICINE | Admitting: INTERNAL MEDICINE
Payer: OTHER MISCELLANEOUS

## 2022-01-01 ENCOUNTER — APPOINTMENT (OUTPATIENT)
Dept: INTERVENTIONAL RADIOLOGY/VASCULAR | Age: 77
DRG: 720 | End: 2022-01-01
Attending: HOSPITALIST
Payer: MEDICAID

## 2022-01-01 ENCOUNTER — APPOINTMENT (OUTPATIENT)
Dept: GENERAL RADIOLOGY | Age: 77
DRG: 720 | End: 2022-01-01
Attending: RADIOLOGY
Payer: MEDICAID

## 2022-01-01 VITALS
OXYGEN SATURATION: 90 % | TEMPERATURE: 100.7 F | SYSTOLIC BLOOD PRESSURE: 145 MMHG | HEART RATE: 121 BPM | HEIGHT: 72 IN | DIASTOLIC BLOOD PRESSURE: 78 MMHG | RESPIRATION RATE: 24 BRPM | WEIGHT: 241.18 LBS | BODY MASS INDEX: 32.67 KG/M2

## 2022-01-01 VITALS
SYSTOLIC BLOOD PRESSURE: 124 MMHG | OXYGEN SATURATION: 99 % | TEMPERATURE: 98.2 F | HEART RATE: 87 BPM | HEIGHT: 70 IN | WEIGHT: 222.88 LBS | BODY MASS INDEX: 31.91 KG/M2 | DIASTOLIC BLOOD PRESSURE: 76 MMHG | RESPIRATION RATE: 16 BRPM

## 2022-01-01 VITALS
SYSTOLIC BLOOD PRESSURE: 151 MMHG | HEART RATE: 110 BPM | DIASTOLIC BLOOD PRESSURE: 86 MMHG | OXYGEN SATURATION: 90 % | RESPIRATION RATE: 24 BRPM | TEMPERATURE: 99.4 F

## 2022-01-01 DIAGNOSIS — A41.9 SEPSIS, DUE TO UNSPECIFIED ORGANISM, UNSPECIFIED WHETHER ACUTE ORGAN DYSFUNCTION PRESENT (HCC): Primary | ICD-10-CM

## 2022-01-01 DIAGNOSIS — R09.89 SUSPECTED DEEP VEIN THROMBOSIS (DVT): ICD-10-CM

## 2022-01-01 DIAGNOSIS — G40.909 SEIZURE DISORDER (HCC): Primary | ICD-10-CM

## 2022-01-01 DIAGNOSIS — I21.4 NSTEMI (NON-ST ELEVATED MYOCARDIAL INFARCTION) (HCC): ICD-10-CM

## 2022-01-01 DIAGNOSIS — R09.02 HYPOXIA: ICD-10-CM

## 2022-01-01 PROBLEM — E66.01 SEVERE OBESITY WITH BODY MASS INDEX (BMI) OF 35.0 TO 39.9 WITH SERIOUS COMORBIDITY (HCC): Status: RESOLVED | Noted: 2018-06-15 | Resolved: 2022-01-01

## 2022-01-01 LAB
ABO + RH BLD: NORMAL
ABO + RH BLD: NORMAL
ADMINISTERED INITIALS, ADMINIT: NORMAL
ALBUMIN SERPL-MCNC: 1 G/DL (ref 3.5–5)
ALBUMIN SERPL-MCNC: 1.1 G/DL (ref 3.5–5)
ALBUMIN SERPL-MCNC: 1.2 G/DL (ref 3.5–5)
ALBUMIN SERPL-MCNC: 1.3 G/DL (ref 3.5–5)
ALBUMIN SERPL-MCNC: 1.4 G/DL (ref 3.5–5)
ALBUMIN SERPL-MCNC: 1.5 G/DL (ref 3.5–5)
ALBUMIN SERPL-MCNC: 1.6 G/DL (ref 3.5–5)
ALBUMIN SERPL-MCNC: 1.8 G/DL (ref 3.5–5)
ALBUMIN SERPL-MCNC: 2 G/DL (ref 3.5–5)
ALBUMIN SERPL-MCNC: 2 G/DL (ref 3.5–5)
ALBUMIN SERPL-MCNC: 3.1 G/DL (ref 3.5–5)
ALBUMIN/GLOB SERPL: 0.2 {RATIO} (ref 1.1–2.2)
ALBUMIN/GLOB SERPL: 0.3 {RATIO} (ref 1.1–2.2)
ALBUMIN/GLOB SERPL: 0.4 {RATIO} (ref 1.1–2.2)
ALBUMIN/GLOB SERPL: 0.7 {RATIO} (ref 1.1–2.2)
ALP SERPL-CCNC: 108 U/L (ref 45–117)
ALP SERPL-CCNC: 111 U/L (ref 45–117)
ALP SERPL-CCNC: 116 U/L (ref 45–117)
ALP SERPL-CCNC: 138 U/L (ref 45–117)
ALP SERPL-CCNC: 138 U/L (ref 45–117)
ALP SERPL-CCNC: 144 U/L (ref 45–117)
ALP SERPL-CCNC: 177 U/L (ref 45–117)
ALP SERPL-CCNC: 185 U/L (ref 45–117)
ALP SERPL-CCNC: 204 U/L (ref 45–117)
ALP SERPL-CCNC: 210 U/L (ref 45–117)
ALP SERPL-CCNC: 214 U/L (ref 45–117)
ALP SERPL-CCNC: 214 U/L (ref 45–117)
ALP SERPL-CCNC: 38 U/L (ref 45–117)
ALP SERPL-CCNC: 46 U/L (ref 45–117)
ALP SERPL-CCNC: 49 U/L (ref 45–117)
ALP SERPL-CCNC: 59 U/L (ref 45–117)
ALP SERPL-CCNC: 62 U/L (ref 45–117)
ALP SERPL-CCNC: 99 U/L (ref 45–117)
ALP SERPL-CCNC: 99 U/L (ref 45–117)
ALT SERPL-CCNC: 123 U/L (ref 12–78)
ALT SERPL-CCNC: 1259 U/L (ref 12–78)
ALT SERPL-CCNC: 1368 U/L (ref 12–78)
ALT SERPL-CCNC: 137 U/L (ref 12–78)
ALT SERPL-CCNC: 1382 U/L (ref 12–78)
ALT SERPL-CCNC: 1458 U/L (ref 12–78)
ALT SERPL-CCNC: 1550 U/L (ref 12–78)
ALT SERPL-CCNC: 1601 U/L (ref 12–78)
ALT SERPL-CCNC: 169 U/L (ref 12–78)
ALT SERPL-CCNC: 17 U/L (ref 12–78)
ALT SERPL-CCNC: 190 U/L (ref 12–78)
ALT SERPL-CCNC: 209 U/L (ref 12–78)
ALT SERPL-CCNC: 270 U/L (ref 12–78)
ALT SERPL-CCNC: 282 U/L (ref 12–78)
ALT SERPL-CCNC: 288 U/L (ref 12–78)
ALT SERPL-CCNC: 442 U/L (ref 12–78)
ALT SERPL-CCNC: 687 U/L (ref 12–78)
ALT SERPL-CCNC: 718 U/L (ref 12–78)
ALT SERPL-CCNC: 875 U/L (ref 12–78)
AMMONIA PLAS-SCNC: 54 UMOL/L
AMPHET UR QL SCN: NEGATIVE
ANION GAP SERPL CALC-SCNC: 10 MMOL/L (ref 5–15)
ANION GAP SERPL CALC-SCNC: 10 MMOL/L (ref 5–15)
ANION GAP SERPL CALC-SCNC: 11 MMOL/L (ref 5–15)
ANION GAP SERPL CALC-SCNC: 12 MMOL/L (ref 5–15)
ANION GAP SERPL CALC-SCNC: 13 MMOL/L (ref 5–15)
ANION GAP SERPL CALC-SCNC: 13 MMOL/L (ref 5–15)
ANION GAP SERPL CALC-SCNC: 14 MMOL/L (ref 5–15)
ANION GAP SERPL CALC-SCNC: 15 MMOL/L (ref 5–15)
ANION GAP SERPL CALC-SCNC: 16 MMOL/L (ref 5–15)
ANION GAP SERPL CALC-SCNC: 17 MMOL/L (ref 5–15)
ANION GAP SERPL CALC-SCNC: 17 MMOL/L (ref 5–15)
ANION GAP SERPL CALC-SCNC: 18 MMOL/L (ref 5–15)
ANION GAP SERPL CALC-SCNC: 6 MMOL/L (ref 5–15)
ANION GAP SERPL CALC-SCNC: 7 MMOL/L (ref 5–15)
ANION GAP SERPL CALC-SCNC: 9 MMOL/L (ref 5–15)
APPEARANCE UR: ABNORMAL
APPEARANCE UR: ABNORMAL
APTT PPP: 42.3 SEC (ref 21.2–34.1)
APTT PPP: 43.7 SEC (ref 21.2–34.1)
APTT PPP: 46.5 SEC (ref 21.2–34.1)
APTT PPP: 60.3 SEC (ref 21.2–34.1)
ARTERIAL PATENCY WRIST A: ABNORMAL
ARTERIAL PATENCY WRIST A: NORMAL
ARTERIAL PATENCY WRIST A: POSITIVE
AST SERPL W P-5'-P-CCNC: 1024 U/L (ref 15–37)
AST SERPL W P-5'-P-CCNC: 1288 U/L (ref 15–37)
AST SERPL W P-5'-P-CCNC: 222 U/L (ref 15–37)
AST SERPL W P-5'-P-CCNC: 23 U/L (ref 15–37)
AST SERPL W P-5'-P-CCNC: 274 U/L (ref 15–37)
AST SERPL W P-5'-P-CCNC: 365 U/L (ref 15–37)
AST SERPL W P-5'-P-CCNC: 415 U/L (ref 15–37)
AST SERPL W P-5'-P-CCNC: 428 U/L (ref 15–37)
AST SERPL W P-5'-P-CCNC: 470 U/L (ref 15–37)
AST SERPL W P-5'-P-CCNC: 515 U/L (ref 15–37)
AST SERPL W P-5'-P-CCNC: 680 U/L (ref 15–37)
AST SERPL W P-5'-P-CCNC: 817 U/L (ref 15–37)
AST SERPL W P-5'-P-CCNC: >1000 U/L (ref 15–37)
AST SERPL W P-5'-P-CCNC: >2000 U/L (ref 15–37)
ATRIAL RATE: 133 BPM
ATRIAL RATE: 87 BPM
BACTERIA SPEC CULT: NORMAL
BACTERIA URNS QL MICRO: NEGATIVE /HPF
BARBITURATES UR QL SCN: NEGATIVE
BASE DEFICIT BLDA-SCNC: 1.2 MMOL/L (ref 0–2)
BASE DEFICIT BLDA-SCNC: 1.4 MMOL/L (ref 0–2)
BASE DEFICIT BLDA-SCNC: 10.3 MMOL/L (ref 0–2)
BASE DEFICIT BLDA-SCNC: 2.5 MMOL/L (ref 0–2)
BASE DEFICIT BLDA-SCNC: 4.6 MMOL/L (ref 0–2)
BASE DEFICIT BLDA-SCNC: 5 MMOL/L (ref 0–2)
BASE DEFICIT BLDA-SCNC: 5.5 MMOL/L (ref 0–2)
BASE DEFICIT BLDA-SCNC: 5.7 MMOL/L (ref 0–2)
BASE DEFICIT BLDA-SCNC: 6.4 MMOL/L (ref 0–2)
BASE DEFICIT BLDA-SCNC: 6.8 MMOL/L (ref 0–2)
BASE DEFICIT BLDA-SCNC: 7 MMOL/L (ref 0–2)
BASE DEFICIT BLDA-SCNC: 7.3 MMOL/L (ref 0–2)
BASE DEFICIT BLDA-SCNC: 8.2 MMOL/L (ref 0–2)
BASE DEFICIT BLDA-SCNC: 8.5 MMOL/L (ref 0–2)
BASE DEFICIT BLDA-SCNC: 8.8 MMOL/L (ref 0–2)
BASE DEFICIT BLDA-SCNC: 8.9 MMOL/L (ref 0–2)
BASE DEFICIT BLDA-SCNC: 9 MMOL/L (ref 0–2)
BASOPHILS # BLD: 0 K/UL (ref 0–0.1)
BASOPHILS NFR BLD: 0 % (ref 0–1)
BDY SITE: ABNORMAL
BDY SITE: NORMAL
BENZODIAZ UR QL: POSITIVE
BILIRUB SERPL-MCNC: 0.2 MG/DL (ref 0.2–1)
BILIRUB SERPL-MCNC: 0.4 MG/DL (ref 0.2–1)
BILIRUB SERPL-MCNC: 0.5 MG/DL (ref 0.2–1)
BILIRUB SERPL-MCNC: 0.5 MG/DL (ref 0.2–1)
BILIRUB SERPL-MCNC: 0.6 MG/DL (ref 0.2–1)
BILIRUB SERPL-MCNC: 0.7 MG/DL (ref 0.2–1)
BILIRUB SERPL-MCNC: 0.9 MG/DL (ref 0.2–1)
BILIRUB SERPL-MCNC: 1 MG/DL (ref 0.2–1)
BILIRUB SERPL-MCNC: 1.1 MG/DL (ref 0.2–1)
BILIRUB UR QL: NEGATIVE
BILIRUB UR QL: NEGATIVE
BLD PROD TYP BPU: NORMAL
BLOOD GROUP ANTIBODIES SERPL: NEGATIVE
BLOOD GROUP ANTIBODIES SERPL: NEGATIVE
BNP SERPL-MCNC: ABNORMAL PG/ML
BPU ID: NORMAL
BUN SERPL-MCNC: 100 MG/DL (ref 6–20)
BUN SERPL-MCNC: 101 MG/DL (ref 6–20)
BUN SERPL-MCNC: 101 MG/DL (ref 6–20)
BUN SERPL-MCNC: 105 MG/DL (ref 6–20)
BUN SERPL-MCNC: 105 MG/DL (ref 6–20)
BUN SERPL-MCNC: 106 MG/DL (ref 6–20)
BUN SERPL-MCNC: 107 MG/DL (ref 6–20)
BUN SERPL-MCNC: 121 MG/DL (ref 6–20)
BUN SERPL-MCNC: 125 MG/DL (ref 6–20)
BUN SERPL-MCNC: 125 MG/DL (ref 6–20)
BUN SERPL-MCNC: 18 MG/DL (ref 6–20)
BUN SERPL-MCNC: 54 MG/DL (ref 6–20)
BUN SERPL-MCNC: 61 MG/DL (ref 6–20)
BUN SERPL-MCNC: 63 MG/DL (ref 6–20)
BUN SERPL-MCNC: 66 MG/DL (ref 6–20)
BUN SERPL-MCNC: 66 MG/DL (ref 6–20)
BUN SERPL-MCNC: 68 MG/DL (ref 6–20)
BUN SERPL-MCNC: 68 MG/DL (ref 6–20)
BUN SERPL-MCNC: 70 MG/DL (ref 6–20)
BUN SERPL-MCNC: 71 MG/DL (ref 6–20)
BUN SERPL-MCNC: 71 MG/DL (ref 6–20)
BUN SERPL-MCNC: 72 MG/DL (ref 6–20)
BUN SERPL-MCNC: 76 MG/DL (ref 6–20)
BUN SERPL-MCNC: 84 MG/DL (ref 6–20)
BUN SERPL-MCNC: 87 MG/DL (ref 6–20)
BUN SERPL-MCNC: 90 MG/DL (ref 6–20)
BUN SERPL-MCNC: 92 MG/DL (ref 6–20)
BUN SERPL-MCNC: 94 MG/DL (ref 6–20)
BUN SERPL-MCNC: 95 MG/DL (ref 6–20)
BUN/CREAT SERPL: 12 (ref 12–20)
BUN/CREAT SERPL: 12 (ref 12–20)
BUN/CREAT SERPL: 13 (ref 12–20)
BUN/CREAT SERPL: 14 (ref 12–20)
BUN/CREAT SERPL: 14 (ref 12–20)
BUN/CREAT SERPL: 15 (ref 12–20)
BUN/CREAT SERPL: 16 (ref 12–20)
BUN/CREAT SERPL: 17 (ref 12–20)
BUN/CREAT SERPL: 17 (ref 12–20)
CA-I BLD-MCNC: 5.9 MG/DL (ref 8.5–10.1)
CA-I BLD-MCNC: 6.4 MG/DL (ref 8.5–10.1)
CA-I BLD-MCNC: 6.8 MG/DL (ref 8.5–10.1)
CA-I BLD-MCNC: 6.9 MG/DL (ref 8.5–10.1)
CA-I BLD-MCNC: 6.9 MG/DL (ref 8.5–10.1)
CA-I BLD-MCNC: 7.2 MG/DL (ref 8.5–10.1)
CA-I BLD-MCNC: 7.3 MG/DL (ref 8.5–10.1)
CA-I BLD-MCNC: 7.3 MG/DL (ref 8.5–10.1)
CA-I BLD-MCNC: 7.4 MG/DL (ref 8.5–10.1)
CA-I BLD-MCNC: 7.4 MG/DL (ref 8.5–10.1)
CA-I BLD-MCNC: 7.5 MG/DL (ref 8.5–10.1)
CA-I BLD-MCNC: 7.6 MG/DL (ref 8.5–10.1)
CA-I BLD-MCNC: 7.7 MG/DL (ref 8.5–10.1)
CA-I BLD-MCNC: 7.8 MG/DL (ref 8.5–10.1)
CA-I BLD-MCNC: 7.9 MG/DL (ref 8.5–10.1)
CA-I BLD-MCNC: 8 MG/DL (ref 8.5–10.1)
CA-I BLD-MCNC: 8.5 MG/DL (ref 8.5–10.1)
CA-I BLD-MCNC: 8.6 MG/DL (ref 8.5–10.1)
CA-I BLD-MCNC: 8.8 MG/DL (ref 8.5–10.1)
CA-I BLD-MCNC: 9.3 MG/DL (ref 8.5–10.1)
CA-I BLD-MCNC: 9.3 MG/DL (ref 8.5–10.1)
CALCULATED P AXIS, ECG09: 65 DEGREES
CALCULATED P AXIS, ECG09: 74 DEGREES
CALCULATED R AXIS, ECG10: -43 DEGREES
CALCULATED R AXIS, ECG10: -65 DEGREES
CALCULATED T AXIS, ECG11: -22 DEGREES
CALCULATED T AXIS, ECG11: 76 DEGREES
CANNABINOIDS UR QL SCN: NEGATIVE
CHLORIDE SERPL-SCNC: 101 MMOL/L (ref 97–108)
CHLORIDE SERPL-SCNC: 105 MMOL/L (ref 97–108)
CHLORIDE SERPL-SCNC: 105 MMOL/L (ref 97–108)
CHLORIDE SERPL-SCNC: 106 MMOL/L (ref 97–108)
CHLORIDE SERPL-SCNC: 110 MMOL/L (ref 97–108)
CHLORIDE SERPL-SCNC: 111 MMOL/L (ref 97–108)
CHLORIDE SERPL-SCNC: 111 MMOL/L (ref 97–108)
CHLORIDE SERPL-SCNC: 112 MMOL/L (ref 97–108)
CHLORIDE SERPL-SCNC: 114 MMOL/L (ref 97–108)
CHLORIDE SERPL-SCNC: 117 MMOL/L (ref 97–108)
CHLORIDE SERPL-SCNC: 122 MMOL/L (ref 97–108)
CHLORIDE SERPL-SCNC: 122 MMOL/L (ref 97–108)
CHLORIDE SERPL-SCNC: 91 MMOL/L (ref 97–108)
CHLORIDE SERPL-SCNC: 93 MMOL/L (ref 97–108)
CHLORIDE SERPL-SCNC: 93 MMOL/L (ref 97–108)
CHLORIDE SERPL-SCNC: 94 MMOL/L (ref 97–108)
CHLORIDE SERPL-SCNC: 94 MMOL/L (ref 97–108)
CHLORIDE SERPL-SCNC: 95 MMOL/L (ref 97–108)
CHLORIDE SERPL-SCNC: 98 MMOL/L (ref 97–108)
CHLORIDE SERPL-SCNC: 98 MMOL/L (ref 97–108)
CHLORIDE SERPL-SCNC: 99 MMOL/L (ref 97–108)
CK SERPL-CCNC: 3010 U/L (ref 39–308)
CK SERPL-CCNC: 8051 U/L (ref 39–308)
CK SERPL-CCNC: ABNORMAL U/L (ref 39–308)
CK SERPL-CCNC: ABNORMAL U/L (ref 39–308)
CO2 SERPL-SCNC: 16 MMOL/L (ref 21–32)
CO2 SERPL-SCNC: 16 MMOL/L (ref 21–32)
CO2 SERPL-SCNC: 17 MMOL/L (ref 21–32)
CO2 SERPL-SCNC: 17 MMOL/L (ref 21–32)
CO2 SERPL-SCNC: 18 MMOL/L (ref 21–32)
CO2 SERPL-SCNC: 19 MMOL/L (ref 21–32)
CO2 SERPL-SCNC: 20 MMOL/L (ref 21–32)
CO2 SERPL-SCNC: 21 MMOL/L (ref 21–32)
CO2 SERPL-SCNC: 22 MMOL/L (ref 21–32)
CO2 SERPL-SCNC: 24 MMOL/L (ref 21–32)
CO2 SERPL-SCNC: 28 MMOL/L (ref 21–32)
COCAINE UR QL SCN: NEGATIVE
COLOR UR: ABNORMAL
COLOR UR: ABNORMAL
COVID-19 RAPID TEST, COVR: DETECTED
COVID-19 RAPID TEST, COVR: DETECTED
CREAT SERPL-MCNC: 1.37 MG/DL (ref 0.7–1.3)
CREAT SERPL-MCNC: 3.47 MG/DL (ref 0.7–1.3)
CREAT SERPL-MCNC: 3.54 MG/DL (ref 0.7–1.3)
CREAT SERPL-MCNC: 4.06 MG/DL (ref 0.7–1.3)
CREAT SERPL-MCNC: 4.23 MG/DL (ref 0.7–1.3)
CREAT SERPL-MCNC: 4.28 MG/DL (ref 0.7–1.3)
CREAT SERPL-MCNC: 4.4 MG/DL (ref 0.7–1.3)
CREAT SERPL-MCNC: 4.43 MG/DL (ref 0.7–1.3)
CREAT SERPL-MCNC: 4.44 MG/DL (ref 0.7–1.3)
CREAT SERPL-MCNC: 4.48 MG/DL (ref 0.7–1.3)
CREAT SERPL-MCNC: 4.63 MG/DL (ref 0.7–1.3)
CREAT SERPL-MCNC: 4.85 MG/DL (ref 0.7–1.3)
CREAT SERPL-MCNC: 5.15 MG/DL (ref 0.7–1.3)
CREAT SERPL-MCNC: 5.62 MG/DL (ref 0.7–1.3)
CREAT SERPL-MCNC: 5.73 MG/DL (ref 0.7–1.3)
CREAT SERPL-MCNC: 6.11 MG/DL (ref 0.7–1.3)
CREAT SERPL-MCNC: 6.12 MG/DL (ref 0.7–1.3)
CREAT SERPL-MCNC: 6.58 MG/DL (ref 0.7–1.3)
CREAT SERPL-MCNC: 6.71 MG/DL (ref 0.7–1.3)
CREAT SERPL-MCNC: 6.82 MG/DL (ref 0.7–1.3)
CREAT SERPL-MCNC: 7.04 MG/DL (ref 0.7–1.3)
CREAT SERPL-MCNC: 7.5 MG/DL (ref 0.7–1.3)
CREAT SERPL-MCNC: 7.51 MG/DL (ref 0.7–1.3)
CREAT SERPL-MCNC: 7.96 MG/DL (ref 0.7–1.3)
CREAT SERPL-MCNC: 8.47 MG/DL (ref 0.7–1.3)
CREAT SERPL-MCNC: 8.61 MG/DL (ref 0.7–1.3)
CREAT SERPL-MCNC: 9.48 MG/DL (ref 0.7–1.3)
CREAT SERPL-MCNC: 9.56 MG/DL (ref 0.7–1.3)
CROSSMATCH RESULT,%XM: NORMAL
D DIMER PPP FEU-MCNC: 14.88 UG/ML(FEU)
D DIMER PPP FEU-MCNC: 14.98 UG/ML(FEU)
D DIMER PPP FEU-MCNC: 15.68 UG/ML(FEU)
D DIMER PPP FEU-MCNC: 17.64 UG/ML(FEU)
D DIMER PPP FEU-MCNC: 17.77 UG/ML(FEU)
D DIMER PPP FEU-MCNC: 19.34 UG/ML(FEU)
D DIMER PPP FEU-MCNC: 19.37 UG/ML(FEU)
D DIMER PPP FEU-MCNC: >20 UG/ML(FEU)
D50 ADMINISTERED, D50ADM: 0 ML
D50 ORDER, D50ORD: 0 ML
DIAGNOSIS, 93000: NORMAL
DIAGNOSIS, 93000: NORMAL
DIFFERENTIAL METHOD BLD: ABNORMAL
DRUG SCRN COMMENT,DRGCM: ABNORMAL
ECHO AO ROOT DIAM: 3 CM
ECHO AO ROOT INDEX: 1.35 CM/M2
ECHO EST RA PRESSURE: 3 MMHG
ECHO LA DIAMETER INDEX: 1.35 CM/M2
ECHO LA DIAMETER: 3 CM
ECHO LA TO AORTIC ROOT RATIO: 1
ECHO LV EJECTION FRACTION BIPLANE: 68 % (ref 55–100)
ECHO LV FRACTIONAL SHORTENING: 37 % (ref 28–44)
ECHO LV INTERNAL DIMENSION DIASTOLE INDEX: 1.22 CM/M2
ECHO LV INTERNAL DIMENSION DIASTOLIC: 2.7 CM (ref 4.2–5.9)
ECHO LV INTERNAL DIMENSION SYSTOLIC INDEX: 0.77 CM/M2
ECHO LV INTERNAL DIMENSION SYSTOLIC: 1.7 CM
ECHO LV IVSD: 1.6 CM (ref 0.6–1)
ECHO LV MASS 2D: 138.3 G (ref 88–224)
ECHO LV MASS INDEX 2D: 62.3 G/M2 (ref 49–115)
ECHO LV POSTERIOR WALL DIASTOLIC: 1.4 CM (ref 0.6–1)
ECHO LV RELATIVE WALL THICKNESS RATIO: 1.04
ECHO RIGHT VENTRICULAR SYSTOLIC PRESSURE (RVSP): 34 MMHG
ECHO TV REGURGITANT MAX VELOCITY: 2.78 M/S
ECHO TV REGURGITANT PEAK GRADIENT: 31 MMHG
EOSINOPHIL # BLD: 0 K/UL (ref 0–0.4)
EOSINOPHIL NFR BLD: 0 % (ref 0–7)
EPAP/CPAP/PEEP, PAPEEP: 10
EPAP/CPAP/PEEP, PAPEEP: 5
EPAP/CPAP/PEEP, PAPEEP: 8
ERYTHROCYTE [DISTWIDTH] IN BLOOD BY AUTOMATED COUNT: 13.2 % (ref 11.5–14.5)
ERYTHROCYTE [DISTWIDTH] IN BLOOD BY AUTOMATED COUNT: 14.1 % (ref 11.5–14.5)
ERYTHROCYTE [DISTWIDTH] IN BLOOD BY AUTOMATED COUNT: 14.3 % (ref 11.5–14.5)
ERYTHROCYTE [DISTWIDTH] IN BLOOD BY AUTOMATED COUNT: 14.4 % (ref 11.5–14.5)
ERYTHROCYTE [DISTWIDTH] IN BLOOD BY AUTOMATED COUNT: 14.5 % (ref 11.5–14.5)
ERYTHROCYTE [DISTWIDTH] IN BLOOD BY AUTOMATED COUNT: 14.5 % (ref 11.5–14.5)
ERYTHROCYTE [DISTWIDTH] IN BLOOD BY AUTOMATED COUNT: 14.6 % (ref 11.5–14.5)
ERYTHROCYTE [DISTWIDTH] IN BLOOD BY AUTOMATED COUNT: 14.7 % (ref 11.5–14.5)
ERYTHROCYTE [DISTWIDTH] IN BLOOD BY AUTOMATED COUNT: 14.8 % (ref 11.5–14.5)
ERYTHROCYTE [DISTWIDTH] IN BLOOD BY AUTOMATED COUNT: 14.8 % (ref 11.5–14.5)
ERYTHROCYTE [DISTWIDTH] IN BLOOD BY AUTOMATED COUNT: 14.9 % (ref 11.5–14.5)
ERYTHROCYTE [DISTWIDTH] IN BLOOD BY AUTOMATED COUNT: 14.9 % (ref 11.5–14.5)
ERYTHROCYTE [DISTWIDTH] IN BLOOD BY AUTOMATED COUNT: 15 % (ref 11.5–14.5)
ERYTHROCYTE [DISTWIDTH] IN BLOOD BY AUTOMATED COUNT: 15.2 % (ref 11.5–14.5)
ERYTHROCYTE [SEDIMENTATION RATE] IN BLOOD: 106 MM/HR
ERYTHROCYTE [SEDIMENTATION RATE] IN BLOOD: 107 MM/HR
ERYTHROCYTE [SEDIMENTATION RATE] IN BLOOD: 122 MM/HR
ERYTHROCYTE [SEDIMENTATION RATE] IN BLOOD: 126 MM/HR
ERYTHROCYTE [SEDIMENTATION RATE] IN BLOOD: 127 MM/HR
ERYTHROCYTE [SEDIMENTATION RATE] IN BLOOD: 128 MM/HR
ERYTHROCYTE [SEDIMENTATION RATE] IN BLOOD: 129 MM/HR
ERYTHROCYTE [SEDIMENTATION RATE] IN BLOOD: 82 MM/HR
ERYTHROCYTE [SEDIMENTATION RATE] IN BLOOD: 89 MM/HR
EST. AVERAGE GLUCOSE BLD GHB EST-MCNC: 169 MG/DL
EST. AVERAGE GLUCOSE BLD GHB EST-MCNC: 171 MG/DL
FIBRINOGEN PPP-MCNC: 652 MG/DL (ref 220–535)
FIBRINOGEN PPP-MCNC: 787 MG/DL (ref 220–535)
FIO2 ON VENT: 100 %
FIO2 ON VENT: 45 %
FIO2 ON VENT: 45 %
FIO2 ON VENT: 50 %
FIO2 ON VENT: 55 %
FOLATE SERPL-MCNC: 9 NG/ML (ref 5–21)
GAS FLOW.O2 SETTING OXYMISER: 12 L/MIN
GAS FLOW.O2 SETTING OXYMISER: 18 L/MIN
GAS FLOW.O2 SETTING OXYMISER: 22 L/MIN
GLOBULIN SER CALC-MCNC: 4.2 G/DL (ref 2–4)
GLOBULIN SER CALC-MCNC: 4.6 G/DL (ref 2–4)
GLOBULIN SER CALC-MCNC: 4.7 G/DL (ref 2–4)
GLOBULIN SER CALC-MCNC: 4.8 G/DL (ref 2–4)
GLOBULIN SER CALC-MCNC: 5 G/DL (ref 2–4)
GLOBULIN SER CALC-MCNC: 5 G/DL (ref 2–4)
GLOBULIN SER CALC-MCNC: 5.1 G/DL (ref 2–4)
GLOBULIN SER CALC-MCNC: 5.2 G/DL (ref 2–4)
GLOBULIN SER CALC-MCNC: 5.3 G/DL (ref 2–4)
GLOBULIN SER CALC-MCNC: 5.4 G/DL (ref 2–4)
GLOBULIN SER CALC-MCNC: 5.4 G/DL (ref 2–4)
GLOBULIN SER CALC-MCNC: 5.7 G/DL (ref 2–4)
GLSCOM COMMENTS: NORMAL
GLUCOSE BLD STRIP.AUTO-MCNC: 101 MG/DL (ref 65–117)
GLUCOSE BLD STRIP.AUTO-MCNC: 102 MG/DL (ref 65–117)
GLUCOSE BLD STRIP.AUTO-MCNC: 103 MG/DL (ref 65–117)
GLUCOSE BLD STRIP.AUTO-MCNC: 107 MG/DL (ref 65–117)
GLUCOSE BLD STRIP.AUTO-MCNC: 107 MG/DL (ref 65–117)
GLUCOSE BLD STRIP.AUTO-MCNC: 108 MG/DL (ref 65–117)
GLUCOSE BLD STRIP.AUTO-MCNC: 108 MG/DL (ref 65–117)
GLUCOSE BLD STRIP.AUTO-MCNC: 111 MG/DL (ref 65–117)
GLUCOSE BLD STRIP.AUTO-MCNC: 120 MG/DL (ref 65–117)
GLUCOSE BLD STRIP.AUTO-MCNC: 123 MG/DL (ref 65–117)
GLUCOSE BLD STRIP.AUTO-MCNC: 125 MG/DL (ref 65–117)
GLUCOSE BLD STRIP.AUTO-MCNC: 126 MG/DL (ref 65–117)
GLUCOSE BLD STRIP.AUTO-MCNC: 128 MG/DL (ref 65–117)
GLUCOSE BLD STRIP.AUTO-MCNC: 128 MG/DL (ref 65–117)
GLUCOSE BLD STRIP.AUTO-MCNC: 129 MG/DL (ref 65–117)
GLUCOSE BLD STRIP.AUTO-MCNC: 131 MG/DL (ref 65–117)
GLUCOSE BLD STRIP.AUTO-MCNC: 132 MG/DL (ref 65–117)
GLUCOSE BLD STRIP.AUTO-MCNC: 134 MG/DL (ref 65–117)
GLUCOSE BLD STRIP.AUTO-MCNC: 134 MG/DL (ref 65–117)
GLUCOSE BLD STRIP.AUTO-MCNC: 135 MG/DL (ref 65–117)
GLUCOSE BLD STRIP.AUTO-MCNC: 137 MG/DL (ref 65–117)
GLUCOSE BLD STRIP.AUTO-MCNC: 138 MG/DL (ref 65–117)
GLUCOSE BLD STRIP.AUTO-MCNC: 138 MG/DL (ref 65–117)
GLUCOSE BLD STRIP.AUTO-MCNC: 139 MG/DL (ref 65–117)
GLUCOSE BLD STRIP.AUTO-MCNC: 146 MG/DL (ref 65–117)
GLUCOSE BLD STRIP.AUTO-MCNC: 148 MG/DL (ref 65–117)
GLUCOSE BLD STRIP.AUTO-MCNC: 148 MG/DL (ref 65–117)
GLUCOSE BLD STRIP.AUTO-MCNC: 150 MG/DL (ref 65–117)
GLUCOSE BLD STRIP.AUTO-MCNC: 152 MG/DL (ref 65–117)
GLUCOSE BLD STRIP.AUTO-MCNC: 152 MG/DL (ref 65–117)
GLUCOSE BLD STRIP.AUTO-MCNC: 153 MG/DL (ref 65–117)
GLUCOSE BLD STRIP.AUTO-MCNC: 155 MG/DL (ref 65–117)
GLUCOSE BLD STRIP.AUTO-MCNC: 155 MG/DL (ref 65–117)
GLUCOSE BLD STRIP.AUTO-MCNC: 158 MG/DL (ref 65–117)
GLUCOSE BLD STRIP.AUTO-MCNC: 162 MG/DL (ref 65–117)
GLUCOSE BLD STRIP.AUTO-MCNC: 162 MG/DL (ref 65–117)
GLUCOSE BLD STRIP.AUTO-MCNC: 166 MG/DL (ref 65–117)
GLUCOSE BLD STRIP.AUTO-MCNC: 167 MG/DL (ref 65–117)
GLUCOSE BLD STRIP.AUTO-MCNC: 169 MG/DL (ref 65–117)
GLUCOSE BLD STRIP.AUTO-MCNC: 170 MG/DL (ref 65–117)
GLUCOSE BLD STRIP.AUTO-MCNC: 171 MG/DL (ref 65–117)
GLUCOSE BLD STRIP.AUTO-MCNC: 172 MG/DL (ref 65–117)
GLUCOSE BLD STRIP.AUTO-MCNC: 176 MG/DL (ref 65–117)
GLUCOSE BLD STRIP.AUTO-MCNC: 176 MG/DL (ref 65–117)
GLUCOSE BLD STRIP.AUTO-MCNC: 177 MG/DL (ref 65–117)
GLUCOSE BLD STRIP.AUTO-MCNC: 178 MG/DL (ref 65–117)
GLUCOSE BLD STRIP.AUTO-MCNC: 178 MG/DL (ref 65–117)
GLUCOSE BLD STRIP.AUTO-MCNC: 181 MG/DL (ref 65–117)
GLUCOSE BLD STRIP.AUTO-MCNC: 183 MG/DL (ref 65–117)
GLUCOSE BLD STRIP.AUTO-MCNC: 184 MG/DL (ref 65–117)
GLUCOSE BLD STRIP.AUTO-MCNC: 184 MG/DL (ref 65–117)
GLUCOSE BLD STRIP.AUTO-MCNC: 189 MG/DL (ref 65–117)
GLUCOSE BLD STRIP.AUTO-MCNC: 192 MG/DL (ref 65–117)
GLUCOSE BLD STRIP.AUTO-MCNC: 193 MG/DL (ref 65–117)
GLUCOSE BLD STRIP.AUTO-MCNC: 194 MG/DL (ref 65–117)
GLUCOSE BLD STRIP.AUTO-MCNC: 194 MG/DL (ref 65–117)
GLUCOSE BLD STRIP.AUTO-MCNC: 199 MG/DL (ref 65–117)
GLUCOSE BLD STRIP.AUTO-MCNC: 203 MG/DL (ref 65–117)
GLUCOSE BLD STRIP.AUTO-MCNC: 204 MG/DL (ref 65–117)
GLUCOSE BLD STRIP.AUTO-MCNC: 205 MG/DL (ref 65–117)
GLUCOSE BLD STRIP.AUTO-MCNC: 211 MG/DL (ref 65–117)
GLUCOSE BLD STRIP.AUTO-MCNC: 211 MG/DL (ref 65–117)
GLUCOSE BLD STRIP.AUTO-MCNC: 214 MG/DL (ref 65–117)
GLUCOSE BLD STRIP.AUTO-MCNC: 229 MG/DL (ref 65–117)
GLUCOSE BLD STRIP.AUTO-MCNC: 231 MG/DL (ref 65–117)
GLUCOSE BLD STRIP.AUTO-MCNC: 233 MG/DL (ref 65–117)
GLUCOSE BLD STRIP.AUTO-MCNC: 239 MG/DL (ref 65–117)
GLUCOSE BLD STRIP.AUTO-MCNC: 243 MG/DL (ref 65–117)
GLUCOSE BLD STRIP.AUTO-MCNC: 243 MG/DL (ref 65–117)
GLUCOSE BLD STRIP.AUTO-MCNC: 244 MG/DL (ref 65–117)
GLUCOSE BLD STRIP.AUTO-MCNC: 250 MG/DL (ref 65–117)
GLUCOSE BLD STRIP.AUTO-MCNC: 258 MG/DL (ref 65–117)
GLUCOSE BLD STRIP.AUTO-MCNC: 260 MG/DL (ref 65–117)
GLUCOSE BLD STRIP.AUTO-MCNC: 271 MG/DL (ref 65–117)
GLUCOSE BLD STRIP.AUTO-MCNC: 285 MG/DL (ref 65–117)
GLUCOSE BLD STRIP.AUTO-MCNC: 300 MG/DL (ref 65–117)
GLUCOSE BLD STRIP.AUTO-MCNC: 305 MG/DL (ref 65–117)
GLUCOSE BLD STRIP.AUTO-MCNC: 339 MG/DL (ref 65–117)
GLUCOSE BLD STRIP.AUTO-MCNC: 364 MG/DL (ref 65–117)
GLUCOSE BLD STRIP.AUTO-MCNC: 397 MG/DL (ref 65–117)
GLUCOSE BLD STRIP.AUTO-MCNC: 404 MG/DL (ref 65–117)
GLUCOSE BLD STRIP.AUTO-MCNC: 412 MG/DL (ref 65–117)
GLUCOSE BLD STRIP.AUTO-MCNC: 441 MG/DL (ref 65–117)
GLUCOSE BLD STRIP.AUTO-MCNC: 443 MG/DL (ref 65–117)
GLUCOSE BLD STRIP.AUTO-MCNC: 450 MG/DL (ref 65–117)
GLUCOSE BLD STRIP.AUTO-MCNC: 480 MG/DL (ref 65–117)
GLUCOSE BLD STRIP.AUTO-MCNC: 502 MG/DL (ref 65–117)
GLUCOSE BLD STRIP.AUTO-MCNC: 514 MG/DL (ref 65–117)
GLUCOSE BLD STRIP.AUTO-MCNC: 541 MG/DL (ref 65–117)
GLUCOSE BLD STRIP.AUTO-MCNC: 555 MG/DL (ref 65–117)
GLUCOSE BLD STRIP.AUTO-MCNC: 57 MG/DL (ref 65–117)
GLUCOSE BLD STRIP.AUTO-MCNC: 65 MG/DL (ref 65–117)
GLUCOSE BLD STRIP.AUTO-MCNC: 70 MG/DL (ref 65–117)
GLUCOSE BLD STRIP.AUTO-MCNC: 80 MG/DL (ref 65–117)
GLUCOSE BLD STRIP.AUTO-MCNC: 90 MG/DL (ref 65–117)
GLUCOSE BLD STRIP.AUTO-MCNC: 96 MG/DL (ref 65–117)
GLUCOSE BLD STRIP.AUTO-MCNC: 97 MG/DL (ref 65–117)
GLUCOSE BLD STRIP.AUTO-MCNC: 97 MG/DL (ref 65–117)
GLUCOSE SERPL-MCNC: 100 MG/DL (ref 65–100)
GLUCOSE SERPL-MCNC: 114 MG/DL (ref 65–100)
GLUCOSE SERPL-MCNC: 132 MG/DL (ref 65–100)
GLUCOSE SERPL-MCNC: 146 MG/DL (ref 65–100)
GLUCOSE SERPL-MCNC: 149 MG/DL (ref 65–100)
GLUCOSE SERPL-MCNC: 149 MG/DL (ref 65–100)
GLUCOSE SERPL-MCNC: 167 MG/DL (ref 65–100)
GLUCOSE SERPL-MCNC: 167 MG/DL (ref 65–100)
GLUCOSE SERPL-MCNC: 170 MG/DL (ref 65–100)
GLUCOSE SERPL-MCNC: 182 MG/DL (ref 65–100)
GLUCOSE SERPL-MCNC: 186 MG/DL (ref 65–100)
GLUCOSE SERPL-MCNC: 201 MG/DL (ref 65–100)
GLUCOSE SERPL-MCNC: 202 MG/DL (ref 65–100)
GLUCOSE SERPL-MCNC: 208 MG/DL (ref 65–100)
GLUCOSE SERPL-MCNC: 218 MG/DL (ref 65–100)
GLUCOSE SERPL-MCNC: 220 MG/DL (ref 65–100)
GLUCOSE SERPL-MCNC: 229 MG/DL (ref 65–100)
GLUCOSE SERPL-MCNC: 230 MG/DL (ref 65–100)
GLUCOSE SERPL-MCNC: 235 MG/DL (ref 65–100)
GLUCOSE SERPL-MCNC: 262 MG/DL (ref 65–100)
GLUCOSE SERPL-MCNC: 298 MG/DL (ref 65–100)
GLUCOSE SERPL-MCNC: 358 MG/DL (ref 65–100)
GLUCOSE SERPL-MCNC: 375 MG/DL (ref 65–100)
GLUCOSE SERPL-MCNC: 555 MG/DL (ref 65–100)
GLUCOSE SERPL-MCNC: 605 MG/DL (ref 65–100)
GLUCOSE SERPL-MCNC: 628 MG/DL (ref 65–100)
GLUCOSE SERPL-MCNC: 66 MG/DL (ref 65–100)
GLUCOSE SERPL-MCNC: 88 MG/DL (ref 65–100)
GLUCOSE UR STRIP.AUTO-MCNC: 50 MG/DL
GLUCOSE UR STRIP.AUTO-MCNC: NEGATIVE MG/DL
GLUCOSE, GLC: 150 MG/DL
GLUCOSE, GLC: 152 MG/DL
GLUCOSE, GLC: 203 MG/DL
GLUCOSE, GLC: 211 MG/DL
GLUCOSE, GLC: 229 MG/DL
GLUCOSE, GLC: 243 MG/DL
GLUCOSE, GLC: 300 MG/DL
GLUCOSE, GLC: 305 MG/DL
GLUCOSE, GLC: 339 MG/DL
GLUCOSE, GLC: 364 MG/DL
GLUCOSE, GLC: 402 MG/DL
GLUCOSE, GLC: 412 MG/DL
GRAM STN SPEC: NORMAL
HBA1C MFR BLD: 7.5 % (ref 4–5.6)
HBA1C MFR BLD: 7.6 % (ref 4–5.6)
HBV CORE AB SERPL QL IA: POSITIVE
HBV SURFACE AB SER QL: REACTIVE
HBV SURFACE AB SER-ACNC: 80.04 MIU/ML
HBV SURFACE AG SER QL: <0.1 INDEX
HBV SURFACE AG SER QL: NEGATIVE
HCO3 BLDA-SCNC: 16 MMOL/L (ref 22–26)
HCO3 BLDA-SCNC: 17 MMOL/L (ref 22–26)
HCO3 BLDA-SCNC: 18 MMOL/L (ref 22–26)
HCO3 BLDA-SCNC: 19 MMOL/L (ref 22–26)
HCO3 BLDA-SCNC: 20 MMOL/L (ref 22–26)
HCO3 BLDA-SCNC: 21 MMOL/L (ref 22–26)
HCO3 BLDA-SCNC: 22 MMOL/L (ref 22–26)
HCO3 BLDA-SCNC: 23 MMOL/L (ref 22–26)
HCO3 BLDA-SCNC: 23 MMOL/L (ref 22–26)
HCT VFR BLD AUTO: 11.9 % (ref 36.6–50.3)
HCT VFR BLD AUTO: 25.4 % (ref 36.6–50.3)
HCT VFR BLD AUTO: 26.3 % (ref 36.6–50.3)
HCT VFR BLD AUTO: 27.1 % (ref 36.6–50.3)
HCT VFR BLD AUTO: 28.1 % (ref 36.6–50.3)
HCT VFR BLD AUTO: 29 % (ref 36.6–50.3)
HCT VFR BLD AUTO: 29.1 % (ref 36.6–50.3)
HCT VFR BLD AUTO: 30 % (ref 36.6–50.3)
HCT VFR BLD AUTO: 31.7 % (ref 36.6–50.3)
HCT VFR BLD AUTO: 33.2 % (ref 36.6–50.3)
HCT VFR BLD AUTO: 34 % (ref 36.6–50.3)
HCT VFR BLD AUTO: 34.4 % (ref 36.6–50.3)
HCT VFR BLD AUTO: 35.2 % (ref 36.6–50.3)
HCT VFR BLD AUTO: 36.1 % (ref 36.6–50.3)
HCT VFR BLD AUTO: 36.3 % (ref 36.6–50.3)
HCT VFR BLD AUTO: 36.7 % (ref 36.6–50.3)
HCT VFR BLD AUTO: 41 % (ref 36.6–50.3)
HCT VFR BLD AUTO: 49.7 % (ref 36.6–50.3)
HCV AB SER IA-ACNC: 0.02 INDEX
HCV AB SERPL QL IA: NONREACTIVE
HGB BLD-MCNC: 10.1 G/DL (ref 12.1–17)
HGB BLD-MCNC: 10.3 G/DL (ref 12.1–17)
HGB BLD-MCNC: 11.3 G/DL (ref 12.1–17)
HGB BLD-MCNC: 11.3 G/DL (ref 12.1–17)
HGB BLD-MCNC: 11.4 G/DL (ref 12.1–17)
HGB BLD-MCNC: 11.5 G/DL (ref 12.1–17)
HGB BLD-MCNC: 11.6 G/DL (ref 12.1–17)
HGB BLD-MCNC: 11.8 G/DL (ref 12.1–17)
HGB BLD-MCNC: 11.8 G/DL (ref 12.1–17)
HGB BLD-MCNC: 12.6 G/DL (ref 12.1–17)
HGB BLD-MCNC: 13.8 G/DL (ref 12.1–17)
HGB BLD-MCNC: 15.2 G/DL (ref 12.1–17)
HGB BLD-MCNC: 4 G/DL (ref 12.1–17)
HGB BLD-MCNC: 8.8 G/DL (ref 12.1–17)
HGB BLD-MCNC: 9.3 G/DL (ref 12.1–17)
HGB BLD-MCNC: 9.7 G/DL (ref 12.1–17)
HGB BLD-MCNC: 9.7 G/DL (ref 12.1–17)
HGB BLD-MCNC: 9.9 G/DL (ref 12.1–17)
HGB UR QL STRIP: ABNORMAL
HGB UR QL STRIP: NEGATIVE
HIGH TARGET, HITG: 130 MG/DL
HYALINE CASTS URNS QL MICRO: >20 /LPF (ref 0–5)
HYALINE CASTS URNS QL MICRO: >20 /LPF (ref 0–5)
IMM GRANULOCYTES # BLD AUTO: 0 K/UL
IMM GRANULOCYTES # BLD AUTO: 0.1 K/UL (ref 0–0.04)
IMM GRANULOCYTES # BLD AUTO: 0.2 K/UL (ref 0–0.04)
IMM GRANULOCYTES # BLD AUTO: 0.5 K/UL (ref 0–0.04)
IMM GRANULOCYTES NFR BLD AUTO: 0 %
IMM GRANULOCYTES NFR BLD AUTO: 1 % (ref 0–0.5)
IMM GRANULOCYTES NFR BLD AUTO: 1 % (ref 0–0.5)
IMM GRANULOCYTES NFR BLD AUTO: 2 % (ref 0–0.5)
INR PPP: 1.1 (ref 0.9–1.1)
INR PPP: 1.1 (ref 0.9–1.1)
INR PPP: 1.2 (ref 0.9–1.1)
INR PPP: 1.3 (ref 0.9–1.1)
INR PPP: 1.4 (ref 0.9–1.1)
INR PPP: 1.6 (ref 0.9–1.1)
INSULIN ADMINSTERED, INSADM: 10.6 UNITS/HOUR
INSULIN ADMINSTERED, INSADM: 11.7 UNITS/HOUR
INSULIN ADMINSTERED, INSADM: 12.9 UNITS/HOUR
INSULIN ADMINSTERED, INSADM: 13.7 UNITS/HOUR
INSULIN ADMINSTERED, INSADM: 14 UNITS/HOUR
INSULIN ADMINSTERED, INSADM: 14.3 UNITS/HOUR
INSULIN ADMINSTERED, INSADM: 16.5 UNITS/HOUR
INSULIN ADMINSTERED, INSADM: 16.6 UNITS/HOUR
INSULIN ADMINSTERED, INSADM: 17.2 UNITS/HOUR
INSULIN ADMINSTERED, INSADM: 18.2 UNITS/HOUR
INSULIN ADMINSTERED, INSADM: 19.2 UNITS/HOUR
INSULIN ADMINSTERED, INSADM: 20.3 UNITS/HOUR
INSULIN ORDER, INSORD: 10.6 UNITS/HOUR
INSULIN ORDER, INSORD: 11.7 UNITS/HOUR
INSULIN ORDER, INSORD: 12.9 UNITS/HOUR
INSULIN ORDER, INSORD: 13.7 UNITS/HOUR
INSULIN ORDER, INSORD: 14 UNITS/HOUR
INSULIN ORDER, INSORD: 14.3 UNITS/HOUR
INSULIN ORDER, INSORD: 16.5 UNITS/HOUR
INSULIN ORDER, INSORD: 16.6 UNITS/HOUR
INSULIN ORDER, INSORD: 17.2 UNITS/HOUR
INSULIN ORDER, INSORD: 18.2 UNITS/HOUR
INSULIN ORDER, INSORD: 19.2 UNITS/HOUR
INSULIN ORDER, INSORD: 20.3 UNITS/HOUR
IPAP/PIP, IPAPIP: 0
IPAP/PIP, IPAPIP: 0
KETONES UR QL STRIP.AUTO: 5 MG/DL
KETONES UR QL STRIP.AUTO: NEGATIVE MG/DL
LACTATE SERPL-SCNC: 1.5 MMOL/L (ref 0.4–2)
LACTATE SERPL-SCNC: 2.1 MMOL/L (ref 0.4–2)
LACTATE SERPL-SCNC: 4.2 MMOL/L (ref 0.4–2)
LACTATE SERPL-SCNC: 4.7 MMOL/L (ref 0.4–2)
LACTATE SERPL-SCNC: 5 MMOL/L (ref 0.4–2)
LACTATE SERPL-SCNC: 6.6 MMOL/L (ref 0.4–2)
LDH SERPL L TO P-CCNC: 1045 U/L (ref 85–241)
LDH SERPL L TO P-CCNC: 1132 U/L (ref 85–241)
LDH SERPL L TO P-CCNC: 1162 U/L (ref 85–241)
LDH SERPL L TO P-CCNC: 1261 U/L (ref 85–241)
LDH SERPL L TO P-CCNC: 1912 U/L (ref 85–241)
LDH SERPL L TO P-CCNC: 466 U/L (ref 85–241)
LDH SERPL L TO P-CCNC: 473 U/L (ref 85–241)
LDH SERPL L TO P-CCNC: 479 U/L (ref 85–241)
LDH SERPL L TO P-CCNC: 485 U/L (ref 85–241)
LDH SERPL L TO P-CCNC: 569 U/L (ref 85–241)
LDH SERPL L TO P-CCNC: 596 U/L (ref 85–241)
LDH SERPL L TO P-CCNC: 614 U/L (ref 85–241)
LDH SERPL L TO P-CCNC: 869 U/L (ref 85–241)
LDH SERPL L TO P-CCNC: 918 U/L (ref 85–241)
LDH SERPL L TO P-CCNC: 967 U/L (ref 85–241)
LDH SERPL L TO P-CCNC: >4000 U/L (ref 85–241)
LEUKOCYTE ESTERASE UR QL STRIP.AUTO: NEGATIVE
LEUKOCYTE ESTERASE UR QL STRIP.AUTO: NEGATIVE
LOW TARGET, LOT: 95 MG/DL
LYMPHOCYTES # BLD: 0.2 K/UL (ref 0.8–3.5)
LYMPHOCYTES # BLD: 0.4 K/UL (ref 0.8–3.5)
LYMPHOCYTES # BLD: 0.4 K/UL (ref 0.8–3.5)
LYMPHOCYTES # BLD: 0.5 K/UL (ref 0.8–3.5)
LYMPHOCYTES # BLD: 0.6 K/UL (ref 0.8–3.5)
LYMPHOCYTES # BLD: 0.7 K/UL (ref 0.8–3.5)
LYMPHOCYTES # BLD: 0.7 K/UL (ref 0.8–3.5)
LYMPHOCYTES # BLD: 1.5 K/UL (ref 0.8–3.5)
LYMPHOCYTES NFR BLD: 1 % (ref 12–49)
LYMPHOCYTES NFR BLD: 10 % (ref 12–49)
LYMPHOCYTES NFR BLD: 13 % (ref 12–49)
LYMPHOCYTES NFR BLD: 2 % (ref 12–49)
LYMPHOCYTES NFR BLD: 2 % (ref 12–49)
LYMPHOCYTES NFR BLD: 3 % (ref 12–49)
LYMPHOCYTES NFR BLD: 4 % (ref 12–49)
LYMPHOCYTES NFR BLD: 7 % (ref 12–49)
MAGNESIUM SERPL-MCNC: 2 MG/DL (ref 1.6–2.4)
MAGNESIUM SERPL-MCNC: 2.1 MG/DL (ref 1.6–2.4)
MAGNESIUM SERPL-MCNC: 2.2 MG/DL (ref 1.6–2.4)
MAGNESIUM SERPL-MCNC: 2.2 MG/DL (ref 1.6–2.4)
MAGNESIUM SERPL-MCNC: 2.4 MG/DL (ref 1.6–2.4)
MAGNESIUM SERPL-MCNC: 2.5 MG/DL (ref 1.6–2.4)
MCH RBC QN AUTO: 31.3 PG (ref 26–34)
MCH RBC QN AUTO: 31.4 PG (ref 26–34)
MCH RBC QN AUTO: 31.5 PG (ref 26–34)
MCH RBC QN AUTO: 31.5 PG (ref 26–34)
MCH RBC QN AUTO: 31.6 PG (ref 26–34)
MCH RBC QN AUTO: 31.7 PG (ref 26–34)
MCH RBC QN AUTO: 31.8 PG (ref 26–34)
MCH RBC QN AUTO: 31.8 PG (ref 26–34)
MCH RBC QN AUTO: 31.9 PG (ref 26–34)
MCH RBC QN AUTO: 31.9 PG (ref 26–34)
MCH RBC QN AUTO: 32 PG (ref 26–34)
MCH RBC QN AUTO: 32.1 PG (ref 26–34)
MCH RBC QN AUTO: 32.2 PG (ref 26–34)
MCH RBC QN AUTO: 32.4 PG (ref 26–34)
MCH RBC QN AUTO: 32.5 PG (ref 26–34)
MCH RBC QN AUTO: 32.6 PG (ref 26–34)
MCHC RBC AUTO-ENTMCNC: 30.6 G/DL (ref 30–36.5)
MCHC RBC AUTO-ENTMCNC: 31.1 G/DL (ref 30–36.5)
MCHC RBC AUTO-ENTMCNC: 32.7 G/DL (ref 30–36.5)
MCHC RBC AUTO-ENTMCNC: 33 G/DL (ref 30–36.5)
MCHC RBC AUTO-ENTMCNC: 33.6 G/DL (ref 30–36.5)
MCHC RBC AUTO-ENTMCNC: 33.7 G/DL (ref 30–36.5)
MCHC RBC AUTO-ENTMCNC: 33.8 G/DL (ref 30–36.5)
MCHC RBC AUTO-ENTMCNC: 34 G/DL (ref 30–36.5)
MCHC RBC AUTO-ENTMCNC: 34.1 G/DL (ref 30–36.5)
MCHC RBC AUTO-ENTMCNC: 34.3 G/DL (ref 30–36.5)
MCHC RBC AUTO-ENTMCNC: 34.3 G/DL (ref 30–36.5)
MCHC RBC AUTO-ENTMCNC: 34.5 G/DL (ref 30–36.5)
MCHC RBC AUTO-ENTMCNC: 34.7 G/DL (ref 30–36.5)
MCHC RBC AUTO-ENTMCNC: 35.4 G/DL (ref 30–36.5)
MCHC RBC AUTO-ENTMCNC: 35.6 G/DL (ref 30–36.5)
MCHC RBC AUTO-ENTMCNC: 35.8 G/DL (ref 30–36.5)
MCV RBC AUTO: 102.8 FL (ref 80–99)
MCV RBC AUTO: 103.1 FL (ref 80–99)
MCV RBC AUTO: 88.9 FL (ref 80–99)
MCV RBC AUTO: 89.3 FL (ref 80–99)
MCV RBC AUTO: 90.7 FL (ref 80–99)
MCV RBC AUTO: 90.9 FL (ref 80–99)
MCV RBC AUTO: 91.2 FL (ref 80–99)
MCV RBC AUTO: 91.8 FL (ref 80–99)
MCV RBC AUTO: 92.7 FL (ref 80–99)
MCV RBC AUTO: 94 FL (ref 80–99)
MCV RBC AUTO: 94.3 FL (ref 80–99)
MCV RBC AUTO: 95.2 FL (ref 80–99)
MCV RBC AUTO: 95.7 FL (ref 80–99)
MCV RBC AUTO: 96 FL (ref 80–99)
MCV RBC AUTO: 96.9 FL (ref 80–99)
MCV RBC AUTO: 97.6 FL (ref 80–99)
METHADONE UR QL: NEGATIVE
MINUTES UNTIL NEXT BG, NBG: 60 MIN
MONOCYTES # BLD: 0 K/UL (ref 0–1)
MONOCYTES # BLD: 0.2 K/UL (ref 0–1)
MONOCYTES # BLD: 0.4 K/UL (ref 0–1)
MONOCYTES # BLD: 0.5 K/UL (ref 0–1)
MONOCYTES # BLD: 0.7 K/UL (ref 0–1)
MONOCYTES # BLD: 0.9 K/UL (ref 0–1)
MONOCYTES NFR BLD: 0 % (ref 5–13)
MONOCYTES NFR BLD: 1 % (ref 5–13)
MONOCYTES NFR BLD: 3 % (ref 5–13)
MONOCYTES NFR BLD: 3 % (ref 5–13)
MONOCYTES NFR BLD: 4 % (ref 5–13)
MONOCYTES NFR BLD: 4 % (ref 5–13)
MONOCYTES NFR BLD: 6 % (ref 5–13)
MONOCYTES NFR BLD: 7 % (ref 5–13)
MUCOUS THREADS URNS QL MICRO: ABNORMAL /LPF
MUCOUS THREADS URNS QL MICRO: ABNORMAL /LPF
MULTIPLIER, MUL: 0.03
MULTIPLIER, MUL: 0.04
MULTIPLIER, MUL: 0.05
MULTIPLIER, MUL: 0.06
MULTIPLIER, MUL: 0.07
MULTIPLIER, MUL: 0.08
MULTIPLIER, MUL: 0.09
MULTIPLIER, MUL: 0.1
MULTIPLIER, MUL: 0.11
MULTIPLIER, MUL: 0.12
MULTIPLIER, MUL: 0.13
MULTIPLIER, MUL: 0.14
MYELOCYTES NFR BLD MANUAL: 3 %
NEUTS BAND NFR BLD MANUAL: 17 % (ref 0–6)
NEUTS SEG # BLD: 15.3 K/UL (ref 1.8–8)
NEUTS SEG # BLD: 15.5 K/UL (ref 1.8–8)
NEUTS SEG # BLD: 16.7 K/UL (ref 1.8–8)
NEUTS SEG # BLD: 16.9 K/UL (ref 1.8–8)
NEUTS SEG # BLD: 17.9 K/UL (ref 1.8–8)
NEUTS SEG # BLD: 18.9 K/UL (ref 1.8–8)
NEUTS SEG # BLD: 4.3 K/UL (ref 1.8–8)
NEUTS SEG # BLD: 5.4 K/UL (ref 1.8–8)
NEUTS SEG NFR BLD: 80 % (ref 32–75)
NEUTS SEG NFR BLD: 82 % (ref 32–75)
NEUTS SEG NFR BLD: 83 % (ref 32–75)
NEUTS SEG NFR BLD: 86 % (ref 32–75)
NEUTS SEG NFR BLD: 93 % (ref 32–75)
NEUTS SEG NFR BLD: 93 % (ref 32–75)
NEUTS SEG NFR BLD: 94 % (ref 32–75)
NEUTS SEG NFR BLD: 95 % (ref 32–75)
NITRITE UR QL STRIP.AUTO: NEGATIVE
NITRITE UR QL STRIP.AUTO: NEGATIVE
NRBC # BLD: 0 K/UL (ref 0–0.01)
NRBC # BLD: 0.02 K/UL (ref 0–0.01)
NRBC # BLD: 0.02 K/UL (ref 0–0.01)
NRBC # BLD: 0.03 K/UL (ref 0–0.01)
NRBC # BLD: 0.03 K/UL (ref 0–0.01)
NRBC # BLD: 0.05 K/UL (ref 0–0.01)
NRBC # BLD: 0.05 K/UL (ref 0–0.01)
NRBC # BLD: 0.07 K/UL (ref 0–0.01)
NRBC # BLD: 0.07 K/UL (ref 0–0.01)
NRBC # BLD: 0.09 K/UL (ref 0–0.01)
NRBC # BLD: 0.18 K/UL (ref 0–0.01)
NRBC BLD-RTO: 0 PER 100 WBC
NRBC BLD-RTO: 0.1 PER 100 WBC
NRBC BLD-RTO: 0.1 PER 100 WBC
NRBC BLD-RTO: 0.2 PER 100 WBC
NRBC BLD-RTO: 0.3 PER 100 WBC
NRBC BLD-RTO: 0.3 PER 100 WBC
NRBC BLD-RTO: 0.4 PER 100 WBC
NRBC BLD-RTO: 0.5 PER 100 WBC
NRBC BLD-RTO: 3.4 PER 100 WBC
OPIATES UR QL: NEGATIVE
ORDER INITIALS, ORDINIT: NORMAL
P-R INTERVAL, ECG05: 126 MS
P-R INTERVAL, ECG05: 130 MS
PCO2 BLDA: 31 MMHG (ref 35–45)
PCO2 BLDA: 32 MMHG (ref 35–45)
PCO2 BLDA: 35 MMHG (ref 35–45)
PCO2 BLDA: 36 MMHG (ref 35–45)
PCO2 BLDA: 37 MMHG (ref 35–45)
PCO2 BLDA: 38 MMHG (ref 35–45)
PCO2 BLDA: 39 MMHG (ref 35–45)
PCO2 BLDA: 41 MMHG (ref 35–45)
PCO2 BLDA: 43 MMHG (ref 35–45)
PCO2 BLDA: 47 MMHG (ref 35–45)
PCO2 BLDA: 48 MMHG (ref 35–45)
PCP UR QL: NEGATIVE
PEEP MAX SETTING VENT: 10 CM[H2O]
PERFORMED BY, TECHID: ABNORMAL
PERFORMED BY, TECHID: NORMAL
PH BLDA: 7.25 [PH] (ref 7.35–7.45)
PH BLDA: 7.25 [PH] (ref 7.35–7.45)
PH BLDA: 7.27 [PH] (ref 7.35–7.45)
PH BLDA: 7.29 [PH] (ref 7.35–7.45)
PH BLDA: 7.3 [PH] (ref 7.35–7.45)
PH BLDA: 7.31 [PH] (ref 7.35–7.45)
PH BLDA: 7.31 [PH] (ref 7.35–7.45)
PH BLDA: 7.32 [PH] (ref 7.35–7.45)
PH BLDA: 7.32 [PH] (ref 7.35–7.45)
PH BLDA: 7.33 [PH] (ref 7.35–7.45)
PH BLDA: 7.34 [PH] (ref 7.35–7.45)
PH BLDA: 7.36 [PH] (ref 7.35–7.45)
PH BLDA: 7.38 [PH] (ref 7.35–7.45)
PH BLDA: 7.4 [PH] (ref 7.35–7.45)
PH BLDA: 7.43 [PH] (ref 7.35–7.45)
PH UR STRIP: 5 [PH] (ref 5–8)
PH UR STRIP: 5 [PH] (ref 5–8)
PHOSPHATE SERPL-MCNC: 3 MG/DL (ref 2.6–4.7)
PHOSPHATE SERPL-MCNC: 5.6 MG/DL (ref 2.6–4.7)
PHOSPHATE SERPL-MCNC: 5.6 MG/DL (ref 2.6–4.7)
PHOSPHATE SERPL-MCNC: 5.7 MG/DL (ref 2.6–4.7)
PHOSPHATE SERPL-MCNC: 7.4 MG/DL (ref 2.6–4.7)
PHOSPHATE SERPL-MCNC: 7.9 MG/DL (ref 2.6–4.7)
PHOSPHATE SERPL-MCNC: 8.6 MG/DL (ref 2.6–4.7)
PHOSPHATE SERPL-MCNC: 8.7 MG/DL (ref 2.6–4.7)
PHOSPHATE SERPL-MCNC: 9 MG/DL (ref 2.6–4.7)
PHOSPHATE SERPL-MCNC: 9 MG/DL (ref 2.6–4.7)
PHOSPHATE SERPL-MCNC: 9.7 MG/DL (ref 2.6–4.7)
PLATELET # BLD AUTO: 101 K/UL (ref 150–400)
PLATELET # BLD AUTO: 151 K/UL (ref 150–400)
PLATELET # BLD AUTO: 187 K/UL (ref 150–400)
PLATELET # BLD AUTO: 228 K/UL (ref 150–400)
PLATELET # BLD AUTO: 328 K/UL (ref 150–400)
PLATELET # BLD AUTO: 378 K/UL (ref 150–400)
PLATELET # BLD AUTO: 408 K/UL (ref 150–400)
PLATELET # BLD AUTO: 41 K/UL (ref 150–400)
PLATELET # BLD AUTO: 429 K/UL (ref 150–400)
PLATELET # BLD AUTO: 434 K/UL (ref 150–400)
PLATELET # BLD AUTO: 44 K/UL (ref 150–400)
PLATELET # BLD AUTO: 44 K/UL (ref 150–400)
PLATELET # BLD AUTO: 452 K/UL (ref 150–400)
PLATELET # BLD AUTO: 469 K/UL (ref 150–400)
PLATELET # BLD AUTO: 82 K/UL (ref 150–400)
PLATELET # BLD AUTO: 84 K/UL (ref 150–400)
PLATELET # BLD AUTO: 90 K/UL (ref 150–400)
PLATELET # BLD AUTO: ABNORMAL K/UL (ref 150–400)
PLATELET COMMENTS,PCOM: ABNORMAL
PMV BLD AUTO: 10.5 FL (ref 8.9–12.9)
PMV BLD AUTO: 10.6 FL (ref 8.9–12.9)
PMV BLD AUTO: 10.9 FL (ref 8.9–12.9)
PMV BLD AUTO: 11.1 FL (ref 8.9–12.9)
PMV BLD AUTO: 11.3 FL (ref 8.9–12.9)
PMV BLD AUTO: 11.7 FL (ref 8.9–12.9)
PMV BLD AUTO: 12.6 FL (ref 8.9–12.9)
PMV BLD AUTO: 12.8 FL (ref 8.9–12.9)
PMV BLD AUTO: 13.3 FL (ref 8.9–12.9)
PMV BLD AUTO: 9.9 FL (ref 8.9–12.9)
PO2 BLDA: 108 MMHG (ref 75–100)
PO2 BLDA: 114 MMHG (ref 75–100)
PO2 BLDA: 141 MMHG (ref 75–100)
PO2 BLDA: 152 MMHG (ref 75–100)
PO2 BLDA: 299 MMHG (ref 75–100)
PO2 BLDA: 66 MMHG (ref 75–100)
PO2 BLDA: 66 MMHG (ref 75–100)
PO2 BLDA: 71 MMHG (ref 75–100)
PO2 BLDA: 76 MMHG (ref 75–100)
PO2 BLDA: 78 MMHG (ref 75–100)
PO2 BLDA: 78 MMHG (ref 75–100)
PO2 BLDA: 81 MMHG (ref 75–100)
PO2 BLDA: 83 MMHG (ref 75–100)
PO2 BLDA: 83 MMHG (ref 75–100)
PO2 BLDA: 86 MMHG (ref 75–100)
PO2 BLDA: 87 MMHG (ref 75–100)
PO2 BLDA: 92 MMHG (ref 75–100)
POTASSIUM SERPL-SCNC: 2.5 MMOL/L (ref 3.5–5.1)
POTASSIUM SERPL-SCNC: 2.8 MMOL/L (ref 3.5–5.1)
POTASSIUM SERPL-SCNC: 2.8 MMOL/L (ref 3.5–5.1)
POTASSIUM SERPL-SCNC: 3 MMOL/L (ref 3.5–5.1)
POTASSIUM SERPL-SCNC: 3.3 MMOL/L (ref 3.5–5.1)
POTASSIUM SERPL-SCNC: 3.3 MMOL/L (ref 3.5–5.1)
POTASSIUM SERPL-SCNC: 3.4 MMOL/L (ref 3.5–5.1)
POTASSIUM SERPL-SCNC: 3.4 MMOL/L (ref 3.5–5.1)
POTASSIUM SERPL-SCNC: 3.7 MMOL/L (ref 3.5–5.1)
POTASSIUM SERPL-SCNC: 3.9 MMOL/L (ref 3.5–5.1)
POTASSIUM SERPL-SCNC: 4 MMOL/L (ref 3.5–5.1)
POTASSIUM SERPL-SCNC: 4.1 MMOL/L (ref 3.5–5.1)
POTASSIUM SERPL-SCNC: 4.1 MMOL/L (ref 3.5–5.1)
POTASSIUM SERPL-SCNC: 4.4 MMOL/L (ref 3.5–5.1)
POTASSIUM SERPL-SCNC: 4.5 MMOL/L (ref 3.5–5.1)
POTASSIUM SERPL-SCNC: 4.5 MMOL/L (ref 3.5–5.1)
POTASSIUM SERPL-SCNC: 4.6 MMOL/L (ref 3.5–5.1)
POTASSIUM SERPL-SCNC: 4.7 MMOL/L (ref 3.5–5.1)
POTASSIUM SERPL-SCNC: 4.7 MMOL/L (ref 3.5–5.1)
POTASSIUM SERPL-SCNC: 4.8 MMOL/L (ref 3.5–5.1)
POTASSIUM SERPL-SCNC: 4.8 MMOL/L (ref 3.5–5.1)
POTASSIUM SERPL-SCNC: 5 MMOL/L (ref 3.5–5.1)
PROCALCITONIN SERPL-MCNC: 1.5 NG/ML
PROCALCITONIN SERPL-MCNC: 1.7 NG/ML
PROCALCITONIN SERPL-MCNC: 1.7 NG/ML
PROCALCITONIN SERPL-MCNC: 11.51 NG/ML
PROCALCITONIN SERPL-MCNC: 2.17 NG/ML
PROCALCITONIN SERPL-MCNC: 2.91 NG/ML
PROCALCITONIN SERPL-MCNC: 3.36 NG/ML
PROCALCITONIN SERPL-MCNC: 3.37 NG/ML
PROCALCITONIN SERPL-MCNC: 4.13 NG/ML
PROCALCITONIN SERPL-MCNC: 4.55 NG/ML
PROCALCITONIN SERPL-MCNC: 5.05 NG/ML
PROCALCITONIN SERPL-MCNC: 6.19 NG/ML
PROCALCITONIN SERPL-MCNC: 6.21 NG/ML
PROCALCITONIN SERPL-MCNC: 7.15 NG/ML
PROCALCITONIN SERPL-MCNC: 8.29 NG/ML
PROCALCITONIN SERPL-MCNC: 9.91 NG/ML
PROT SERPL-MCNC: 5.6 G/DL (ref 6.4–8.2)
PROT SERPL-MCNC: 5.8 G/DL (ref 6.4–8.2)
PROT SERPL-MCNC: 5.9 G/DL (ref 6.4–8.2)
PROT SERPL-MCNC: 6 G/DL (ref 6.4–8.2)
PROT SERPL-MCNC: 6.1 G/DL (ref 6.4–8.2)
PROT SERPL-MCNC: 6.2 G/DL (ref 6.4–8.2)
PROT SERPL-MCNC: 6.2 G/DL (ref 6.4–8.2)
PROT SERPL-MCNC: 6.3 G/DL (ref 6.4–8.2)
PROT SERPL-MCNC: 6.3 G/DL (ref 6.4–8.2)
PROT SERPL-MCNC: 6.4 G/DL (ref 6.4–8.2)
PROT SERPL-MCNC: 6.4 G/DL (ref 6.4–8.2)
PROT SERPL-MCNC: 6.5 G/DL (ref 6.4–8.2)
PROT SERPL-MCNC: 6.8 G/DL (ref 6.4–8.2)
PROT SERPL-MCNC: 6.9 G/DL (ref 6.4–8.2)
PROT SERPL-MCNC: 7.4 G/DL (ref 6.4–8.2)
PROT SERPL-MCNC: 7.7 G/DL (ref 6.4–8.2)
PROT SERPL-MCNC: 7.8 G/DL (ref 6.4–8.2)
PROT UR STRIP-MCNC: 100 MG/DL
PROT UR STRIP-MCNC: 30 MG/DL
PROTHROMBIN TIME: 14.2 SEC (ref 11.9–14.6)
PROTHROMBIN TIME: 14.5 SEC (ref 11.9–14.7)
PROTHROMBIN TIME: 14.7 SEC (ref 11.9–14.7)
PROTHROMBIN TIME: 15 SEC (ref 11.9–14.7)
PROTHROMBIN TIME: 15.5 SEC (ref 11.9–14.7)
PROTHROMBIN TIME: 15.6 SEC (ref 11.9–14.6)
PROTHROMBIN TIME: 15.6 SEC (ref 11.9–14.6)
PROTHROMBIN TIME: 15.8 SEC (ref 11.9–14.7)
PROTHROMBIN TIME: 15.9 SEC (ref 11.9–14.6)
PROTHROMBIN TIME: 15.9 SEC (ref 11.9–14.7)
PROTHROMBIN TIME: 16 SEC (ref 11.9–14.6)
PROTHROMBIN TIME: 16.4 SEC (ref 11.9–14.6)
PROTHROMBIN TIME: 16.6 SEC (ref 11.9–14.6)
PROTHROMBIN TIME: 16.8 SEC (ref 11.9–14.7)
PROTHROMBIN TIME: 18.8 SEC (ref 11.9–14.7)
Q-T INTERVAL, ECG07: 306 MS
Q-T INTERVAL, ECG07: 380 MS
QRS DURATION, ECG06: 114 MS
QRS DURATION, ECG06: 88 MS
QTC CALCULATION (BEZET), ECG08: 455 MS
QTC CALCULATION (BEZET), ECG08: 457 MS
RBC # BLD AUTO: 1.25 M/UL (ref 4.1–5.7)
RBC # BLD AUTO: 2.9 M/UL (ref 4.1–5.7)
RBC # BLD AUTO: 3.05 M/UL (ref 4.1–5.7)
RBC # BLD AUTO: 3.09 M/UL (ref 4.1–5.7)
RBC # BLD AUTO: 3.16 M/UL (ref 4.1–5.7)
RBC # BLD AUTO: 3.18 M/UL (ref 4.1–5.7)
RBC # BLD AUTO: 3.19 M/UL (ref 4.1–5.7)
RBC # BLD AUTO: 3.54 M/UL (ref 4.1–5.7)
RBC # BLD AUTO: 3.55 M/UL (ref 4.1–5.7)
RBC # BLD AUTO: 3.57 M/UL (ref 4.1–5.7)
RBC # BLD AUTO: 3.58 M/UL (ref 4.1–5.7)
RBC # BLD AUTO: 3.66 M/UL (ref 4.1–5.7)
RBC # BLD AUTO: 3.68 M/UL (ref 4.1–5.7)
RBC # BLD AUTO: 3.7 M/UL (ref 4.1–5.7)
RBC # BLD AUTO: 4.23 M/UL (ref 4.1–5.7)
RBC # BLD AUTO: 4.82 M/UL (ref 4.1–5.7)
RBC #/AREA URNS HPF: >100 /HPF (ref 0–5)
RBC #/AREA URNS HPF: ABNORMAL /HPF (ref 0–5)
RBC #/AREA URNS HPF: ABNORMAL /HPF (ref 0–5)
RBC MORPH BLD: ABNORMAL
REFERENCE LAB,REFLB: NORMAL
RETICS # AUTO: 0.03 M/UL (ref 0.03–0.1)
RETICS/RBC NFR AUTO: 0.8 % (ref 0.7–2.1)
SAO2 % BLD: 100 %
SAO2 % BLD: 100 %
SAO2 % BLD: 92 %
SAO2 % BLD: 92 %
SAO2 % BLD: 93 %
SAO2 % BLD: 94 %
SAO2 % BLD: 95 %
SAO2 % BLD: 95 %
SAO2 % BLD: 96 %
SAO2 % BLD: 97 %
SAO2 % BLD: 98 %
SAO2 % BLD: 99 %
SAO2 % BLD: 99 %
SAO2% DEVICE SAO2% SENSOR NAME: ABNORMAL
SAO2% DEVICE SAO2% SENSOR NAME: NORMAL
SARS-COV-2, COV2: NORMAL
SODIUM SERPL-SCNC: 127 MMOL/L (ref 136–145)
SODIUM SERPL-SCNC: 127 MMOL/L (ref 136–145)
SODIUM SERPL-SCNC: 128 MMOL/L (ref 136–145)
SODIUM SERPL-SCNC: 129 MMOL/L (ref 136–145)
SODIUM SERPL-SCNC: 130 MMOL/L (ref 136–145)
SODIUM SERPL-SCNC: 131 MMOL/L (ref 136–145)
SODIUM SERPL-SCNC: 132 MMOL/L (ref 136–145)
SODIUM SERPL-SCNC: 133 MMOL/L (ref 136–145)
SODIUM SERPL-SCNC: 133 MMOL/L (ref 136–145)
SODIUM SERPL-SCNC: 135 MMOL/L (ref 136–145)
SODIUM SERPL-SCNC: 139 MMOL/L (ref 136–145)
SODIUM SERPL-SCNC: 140 MMOL/L (ref 136–145)
SODIUM SERPL-SCNC: 142 MMOL/L (ref 136–145)
SODIUM SERPL-SCNC: 143 MMOL/L (ref 136–145)
SODIUM SERPL-SCNC: 143 MMOL/L (ref 136–145)
SODIUM SERPL-SCNC: 144 MMOL/L (ref 136–145)
SODIUM SERPL-SCNC: 144 MMOL/L (ref 136–145)
SODIUM SERPL-SCNC: 145 MMOL/L (ref 136–145)
SODIUM SERPL-SCNC: 147 MMOL/L (ref 136–145)
SODIUM SERPL-SCNC: 149 MMOL/L (ref 136–145)
SODIUM SERPL-SCNC: 154 MMOL/L (ref 136–145)
SODIUM SERPL-SCNC: 155 MMOL/L (ref 136–145)
SP GR UR REFRACTOMETRY: 1.01 (ref 1–1.03)
SP GR UR REFRACTOMETRY: 1.02 (ref 1–1.03)
SPECIAL REQUESTS,SREQ: NORMAL
SPECIMEN EXP DATE BLD: NORMAL
SPECIMEN EXP DATE BLD: NORMAL
SPECIMEN SITE: ABNORMAL
SPECIMEN SITE: ABNORMAL
SPECIMEN SOURCE: ABNORMAL
SPECIMEN SOURCE: ABNORMAL
STATUS OF UNIT,%ST: NORMAL
TEST DESCRIPTION:,ATST: NORMAL
THERAPEUTIC RANGE,PTTT: ABNORMAL SEC (ref 82–109)
TRANSFUSION STATUS PATIENT QL: NORMAL
TROPONIN-HIGH SENSITIVITY: 1359 NG/L (ref 0–76)
TROPONIN-HIGH SENSITIVITY: 14 NG/L (ref 0–76)
TROPONIN-HIGH SENSITIVITY: 1510 NG/L (ref 0–76)
TROPONIN-HIGH SENSITIVITY: 895 NG/L (ref 0–76)
UNIT DIVISION, %UDIV: 0
UROBILINOGEN UR QL STRIP.AUTO: 0.1 EU/DL (ref 0.1–1)
UROBILINOGEN UR QL STRIP.AUTO: 4 EU/DL (ref 0.1–1)
VALPROATE SERPL-MCNC: 101 UG/ML (ref 50–100)
VENTILATION MODE VENT: ABNORMAL
VENTILATION MODE VENT: NORMAL
VENTRICULAR RATE, ECG03: 133 BPM
VENTRICULAR RATE, ECG03: 87 BPM
VIT B12 SERPL-MCNC: >2000 PG/ML (ref 193–986)
VT SETTING VENT: 450 ML
VT SETTING VENT: 450 ML
VT SETTING VENT: 500 ML
WBC # BLD AUTO: 15.5 K/UL (ref 4.1–11.1)
WBC # BLD AUTO: 16.5 K/UL (ref 4.1–11.1)
WBC # BLD AUTO: 17.9 K/UL (ref 4.1–11.1)
WBC # BLD AUTO: 18.1 K/UL (ref 4.1–11.1)
WBC # BLD AUTO: 19.1 K/UL (ref 4.1–11.1)
WBC # BLD AUTO: 19.2 K/UL (ref 4.1–11.1)
WBC # BLD AUTO: 19.4 K/UL (ref 4.1–11.1)
WBC # BLD AUTO: 22 K/UL (ref 4.1–11.1)
WBC # BLD AUTO: 22.5 K/UL (ref 4.1–11.1)
WBC # BLD AUTO: 22.9 K/UL (ref 4.1–11.1)
WBC # BLD AUTO: 23.7 K/UL (ref 4.1–11.1)
WBC # BLD AUTO: 25.1 K/UL (ref 4.1–11.1)
WBC # BLD AUTO: 25.3 K/UL (ref 4.1–11.1)
WBC # BLD AUTO: 25.7 K/UL (ref 4.1–11.1)
WBC # BLD AUTO: 5.4 K/UL (ref 4.1–11.1)
WBC # BLD AUTO: 6.4 K/UL (ref 4.1–11.1)
WBC URNS QL MICRO: >100 /HPF (ref 0–4)
WBC URNS QL MICRO: ABNORMAL /HPF (ref 0–4)
WBC URNS QL MICRO: ABNORMAL /HPF (ref 0–4)

## 2022-01-01 PROCEDURE — 71045 X-RAY EXAM CHEST 1 VIEW: CPT

## 2022-01-01 PROCEDURE — 95816 EEG AWAKE AND DROWSY: CPT | Performed by: STUDENT IN AN ORGANIZED HEALTH CARE EDUCATION/TRAINING PROGRAM

## 2022-01-01 PROCEDURE — 82962 GLUCOSE BLOOD TEST: CPT

## 2022-01-01 PROCEDURE — 83615 LACTATE (LD) (LDH) ENZYME: CPT

## 2022-01-01 PROCEDURE — 81001 URINALYSIS AUTO W/SCOPE: CPT

## 2022-01-01 PROCEDURE — 87340 HEPATITIS B SURFACE AG IA: CPT

## 2022-01-01 PROCEDURE — 74011250636 HC RX REV CODE- 250/636: Performed by: INTERNAL MEDICINE

## 2022-01-01 PROCEDURE — 74011250637 HC RX REV CODE- 250/637: Performed by: HOSPITALIST

## 2022-01-01 PROCEDURE — 74011000258 HC RX REV CODE- 258: Performed by: HOSPITALIST

## 2022-01-01 PROCEDURE — 74011000258 HC RX REV CODE- 258: Performed by: INTERNAL MEDICINE

## 2022-01-01 PROCEDURE — 36415 COLL VENOUS BLD VENIPUNCTURE: CPT

## 2022-01-01 PROCEDURE — C9113 INJ PANTOPRAZOLE SODIUM, VIA: HCPCS | Performed by: HOSPITALIST

## 2022-01-01 PROCEDURE — 85027 COMPLETE CBC AUTOMATED: CPT

## 2022-01-01 PROCEDURE — 83605 ASSAY OF LACTIC ACID: CPT

## 2022-01-01 PROCEDURE — 74011636637 HC RX REV CODE- 636/637: Performed by: INTERNAL MEDICINE

## 2022-01-01 PROCEDURE — 85379 FIBRIN DEGRADATION QUANT: CPT

## 2022-01-01 PROCEDURE — 82803 BLOOD GASES ANY COMBINATION: CPT

## 2022-01-01 PROCEDURE — 85730 THROMBOPLASTIN TIME PARTIAL: CPT

## 2022-01-01 PROCEDURE — 84484 ASSAY OF TROPONIN QUANT: CPT

## 2022-01-01 PROCEDURE — 74011250637 HC RX REV CODE- 250/637: Performed by: STUDENT IN AN ORGANIZED HEALTH CARE EDUCATION/TRAINING PROGRAM

## 2022-01-01 PROCEDURE — 74011250637 HC RX REV CODE- 250/637: Performed by: INTERNAL MEDICINE

## 2022-01-01 PROCEDURE — 65610000006 HC RM INTENSIVE CARE

## 2022-01-01 PROCEDURE — 80053 COMPREHEN METABOLIC PANEL: CPT

## 2022-01-01 PROCEDURE — 74011250636 HC RX REV CODE- 250/636: Performed by: STUDENT IN AN ORGANIZED HEALTH CARE EDUCATION/TRAINING PROGRAM

## 2022-01-01 PROCEDURE — 31500 INSERT EMERGENCY AIRWAY: CPT

## 2022-01-01 PROCEDURE — 70450 CT HEAD/BRAIN W/O DYE: CPT

## 2022-01-01 PROCEDURE — 90945 DIALYSIS ONE EVALUATION: CPT

## 2022-01-01 PROCEDURE — 74011000250 HC RX REV CODE- 250: Performed by: HOSPITALIST

## 2022-01-01 PROCEDURE — 85045 AUTOMATED RETICULOCYTE COUNT: CPT

## 2022-01-01 PROCEDURE — 74011000250 HC RX REV CODE- 250: Performed by: STUDENT IN AN ORGANIZED HEALTH CARE EDUCATION/TRAINING PROGRAM

## 2022-01-01 PROCEDURE — 74011250636 HC RX REV CODE- 250/636: Performed by: HOSPITALIST

## 2022-01-01 PROCEDURE — 85610 PROTHROMBIN TIME: CPT

## 2022-01-01 PROCEDURE — 85049 AUTOMATED PLATELET COUNT: CPT

## 2022-01-01 PROCEDURE — 74011636637 HC RX REV CODE- 636/637: Performed by: HOSPITALIST

## 2022-01-01 PROCEDURE — 36600 WITHDRAWAL OF ARTERIAL BLOOD: CPT

## 2022-01-01 PROCEDURE — 84145 PROCALCITONIN (PCT): CPT

## 2022-01-01 PROCEDURE — 94003 VENT MGMT INPAT SUBQ DAY: CPT

## 2022-01-01 PROCEDURE — 84100 ASSAY OF PHOSPHORUS: CPT

## 2022-01-01 PROCEDURE — C1752 CATH,HEMODIALYSIS,SHORT-TERM: HCPCS

## 2022-01-01 PROCEDURE — 90935 HEMODIALYSIS ONE EVALUATION: CPT

## 2022-01-01 PROCEDURE — 65270000029 HC RM PRIVATE

## 2022-01-01 PROCEDURE — 74011000250 HC RX REV CODE- 250: Performed by: INTERNAL MEDICINE

## 2022-01-01 PROCEDURE — 82550 ASSAY OF CK (CPK): CPT

## 2022-01-01 PROCEDURE — 85025 COMPLETE CBC W/AUTO DIFF WBC: CPT

## 2022-01-01 PROCEDURE — 82607 VITAMIN B-12: CPT

## 2022-01-01 PROCEDURE — 85652 RBC SED RATE AUTOMATED: CPT

## 2022-01-01 PROCEDURE — 93970 EXTREMITY STUDY: CPT

## 2022-01-01 PROCEDURE — 83735 ASSAY OF MAGNESIUM: CPT

## 2022-01-01 PROCEDURE — 86900 BLOOD TYPING SEROLOGIC ABO: CPT

## 2022-01-01 PROCEDURE — 93970 EXTREMITY STUDY: CPT | Performed by: SURGERY

## 2022-01-01 PROCEDURE — 77010033678 HC OXYGEN DAILY

## 2022-01-01 PROCEDURE — 85014 HEMATOCRIT: CPT

## 2022-01-01 PROCEDURE — 36430 TRANSFUSION BLD/BLD COMPNT: CPT

## 2022-01-01 PROCEDURE — 74011250637 HC RX REV CODE- 250/637

## 2022-01-01 PROCEDURE — 05HM33Z INSERTION OF INFUSION DEVICE INTO RIGHT INTERNAL JUGULAR VEIN, PERCUTANEOUS APPROACH: ICD-10-PCS | Performed by: RADIOLOGY

## 2022-01-01 PROCEDURE — 83036 HEMOGLOBIN GLYCOSYLATED A1C: CPT

## 2022-01-01 PROCEDURE — 85018 HEMOGLOBIN: CPT

## 2022-01-01 PROCEDURE — 5A1D70Z PERFORMANCE OF URINARY FILTRATION, INTERMITTENT, LESS THAN 6 HOURS PER DAY: ICD-10-PCS | Performed by: INTERNAL MEDICINE

## 2022-01-01 PROCEDURE — 85384 FIBRINOGEN ACTIVITY: CPT

## 2022-01-01 PROCEDURE — 80048 BASIC METABOLIC PNL TOTAL CA: CPT

## 2022-01-01 PROCEDURE — 80069 RENAL FUNCTION PANEL: CPT

## 2022-01-01 PROCEDURE — 93005 ELECTROCARDIOGRAM TRACING: CPT

## 2022-01-01 PROCEDURE — 96361 HYDRATE IV INFUSION ADD-ON: CPT

## 2022-01-01 PROCEDURE — 74011250636 HC RX REV CODE- 250/636: Performed by: EMERGENCY MEDICINE

## 2022-01-01 PROCEDURE — 76770 US EXAM ABDO BACK WALL COMP: CPT

## 2022-01-01 PROCEDURE — 0656 HSPC GENERAL INPATIENT

## 2022-01-01 PROCEDURE — 02HV33Z INSERTION OF INFUSION DEVICE INTO SUPERIOR VENA CAVA, PERCUTANEOUS APPROACH: ICD-10-PCS | Performed by: NURSE PRACTITIONER

## 2022-01-01 PROCEDURE — 96365 THER/PROPH/DIAG IV INF INIT: CPT

## 2022-01-01 PROCEDURE — 3336500001 HSPC ELECTION

## 2022-01-01 PROCEDURE — 06HY33Z INSERTION OF INFUSION DEVICE INTO LOWER VEIN, PERCUTANEOUS APPROACH: ICD-10-PCS | Performed by: STUDENT IN AN ORGANIZED HEALTH CARE EDUCATION/TRAINING PROGRAM

## 2022-01-01 PROCEDURE — 87635 SARS-COV-2 COVID-19 AMP PRB: CPT

## 2022-01-01 PROCEDURE — 86706 HEP B SURFACE ANTIBODY: CPT

## 2022-01-01 PROCEDURE — 30233N1 TRANSFUSION OF NONAUTOLOGOUS RED BLOOD CELLS INTO PERIPHERAL VEIN, PERCUTANEOUS APPROACH: ICD-10-PCS | Performed by: INTERNAL MEDICINE

## 2022-01-01 PROCEDURE — 74011250636 HC RX REV CODE- 250/636: Performed by: PHYSICIAN ASSISTANT

## 2022-01-01 PROCEDURE — 0HQ4XZZ REPAIR NECK SKIN, EXTERNAL APPROACH: ICD-10-PCS | Performed by: PHYSICIAN ASSISTANT

## 2022-01-01 PROCEDURE — 0BH17EZ INSERTION OF ENDOTRACHEAL AIRWAY INTO TRACHEA, VIA NATURAL OR ARTIFICIAL OPENING: ICD-10-PCS | Performed by: STUDENT IN AN ORGANIZED HEALTH CARE EDUCATION/TRAINING PROGRAM

## 2022-01-01 PROCEDURE — 96374 THER/PROPH/DIAG INJ IV PUSH: CPT

## 2022-01-01 PROCEDURE — 80164 ASSAY DIPROPYLACETIC ACD TOT: CPT

## 2022-01-01 PROCEDURE — 86803 HEPATITIS C AB TEST: CPT

## 2022-01-01 PROCEDURE — 82140 ASSAY OF AMMONIA: CPT

## 2022-01-01 PROCEDURE — 83880 ASSAY OF NATRIURETIC PEPTIDE: CPT

## 2022-01-01 PROCEDURE — U0005 INFEC AGEN DETEC AMPLI PROBE: HCPCS

## 2022-01-01 PROCEDURE — 99285 EMERGENCY DEPT VISIT HI MDM: CPT

## 2022-01-01 PROCEDURE — 74011250636 HC RX REV CODE- 250/636

## 2022-01-01 PROCEDURE — C1894 INTRO/SHEATH, NON-LASER: HCPCS

## 2022-01-01 PROCEDURE — 87040 BLOOD CULTURE FOR BACTERIA: CPT

## 2022-01-01 PROCEDURE — 75810000455 HC PLCMT CENT VENOUS CATH LVL 2 5182

## 2022-01-01 PROCEDURE — P9016 RBC LEUKOCYTES REDUCED: HCPCS

## 2022-01-01 PROCEDURE — 94002 VENT MGMT INPAT INIT DAY: CPT

## 2022-01-01 PROCEDURE — 76937 US GUIDE VASCULAR ACCESS: CPT

## 2022-01-01 PROCEDURE — 86923 COMPATIBILITY TEST ELECTRIC: CPT

## 2022-01-01 PROCEDURE — 5A1955Z RESPIRATORY VENTILATION, GREATER THAN 96 CONSECUTIVE HOURS: ICD-10-PCS | Performed by: STUDENT IN AN ORGANIZED HEALTH CARE EDUCATION/TRAINING PROGRAM

## 2022-01-01 PROCEDURE — XW0DXM6 INTRODUCTION OF BARICITINIB INTO MOUTH AND PHARYNX, EXTERNAL APPROACH, NEW TECHNOLOGY GROUP 6: ICD-10-PCS | Performed by: INTERNAL MEDICINE

## 2022-01-01 PROCEDURE — P9047 ALBUMIN (HUMAN), 25%, 50ML: HCPCS | Performed by: INTERNAL MEDICINE

## 2022-01-01 PROCEDURE — 87070 CULTURE OTHR SPECIMN AEROBIC: CPT

## 2022-01-01 PROCEDURE — 80307 DRUG TEST PRSMV CHEM ANLYZR: CPT

## 2022-01-01 PROCEDURE — 87086 URINE CULTURE/COLONY COUNT: CPT

## 2022-01-01 PROCEDURE — 5A1D90Z PERFORMANCE OF URINARY FILTRATION, CONTINUOUS, GREATER THAN 18 HOURS PER DAY: ICD-10-PCS | Performed by: INTERNAL MEDICINE

## 2022-01-01 PROCEDURE — 86704 HEP B CORE ANTIBODY TOTAL: CPT

## 2022-01-01 PROCEDURE — 74011000250 HC RX REV CODE- 250

## 2022-01-01 PROCEDURE — 84520 ASSAY OF UREA NITROGEN: CPT

## 2022-01-01 PROCEDURE — 93308 TTE F-UP OR LMTD: CPT

## 2022-01-01 PROCEDURE — 74011000258 HC RX REV CODE- 258: Performed by: STUDENT IN AN ORGANIZED HEALTH CARE EDUCATION/TRAINING PROGRAM

## 2022-01-01 RX ORDER — CALCIUM GLUCONATE 20 MG/ML
1 INJECTION, SOLUTION INTRAVENOUS ONCE
Status: COMPLETED | OUTPATIENT
Start: 2022-01-01 | End: 2022-01-01

## 2022-01-01 RX ORDER — ONDANSETRON 4 MG/1
4 TABLET, ORALLY DISINTEGRATING ORAL
Status: DISCONTINUED | OUTPATIENT
Start: 2022-01-01 | End: 2022-01-01

## 2022-01-01 RX ORDER — DEXAMETHASONE SODIUM PHOSPHATE 4 MG/ML
4 INJECTION, SOLUTION INTRA-ARTICULAR; INTRALESIONAL; INTRAMUSCULAR; INTRAVENOUS; SOFT TISSUE EVERY 24 HOURS
Status: DISCONTINUED | OUTPATIENT
Start: 2022-01-01 | End: 2022-01-01

## 2022-01-01 RX ORDER — LEVETIRACETAM 500 MG/1
1000 TABLET ORAL 2 TIMES DAILY
Status: DISCONTINUED | OUTPATIENT
Start: 2022-01-01 | End: 2022-01-01

## 2022-01-01 RX ORDER — INSULIN LISPRO 100 [IU]/ML
20 INJECTION, SOLUTION INTRAVENOUS; SUBCUTANEOUS ONCE
Status: COMPLETED | OUTPATIENT
Start: 2022-01-01 | End: 2022-01-01

## 2022-01-01 RX ORDER — CALCIUM GLUCONATE 20 MG/ML
2 INJECTION, SOLUTION INTRAVENOUS ONCE
Status: COMPLETED | OUTPATIENT
Start: 2022-01-01 | End: 2022-01-01

## 2022-01-01 RX ORDER — MORPHINE SULFATE 2 MG/ML
2 INJECTION, SOLUTION INTRAMUSCULAR; INTRAVENOUS
Status: DISCONTINUED | OUTPATIENT
Start: 2022-01-01 | End: 2022-01-01 | Stop reason: HOSPADM

## 2022-01-01 RX ORDER — LORAZEPAM 2 MG/ML
1 INJECTION INTRAMUSCULAR
Status: DISCONTINUED | OUTPATIENT
Start: 2022-01-01 | End: 2022-01-01 | Stop reason: HOSPADM

## 2022-01-01 RX ORDER — MAGNESIUM SULFATE 100 %
4 CRYSTALS MISCELLANEOUS AS NEEDED
Status: DISCONTINUED | OUTPATIENT
Start: 2022-01-01 | End: 2022-01-01

## 2022-01-01 RX ORDER — INSULIN GLARGINE 100 [IU]/ML
40 INJECTION, SOLUTION SUBCUTANEOUS
Status: DISCONTINUED | OUTPATIENT
Start: 2022-01-01 | End: 2022-01-01

## 2022-01-01 RX ORDER — POTASSIUM CHLORIDE 7.45 MG/ML
INJECTION INTRAVENOUS
Status: COMPLETED
Start: 2022-01-01 | End: 2022-01-01

## 2022-01-01 RX ORDER — NOREPINEPHRINE BITARTRATE/D5W 8 MG/250ML
.5-3 PLASTIC BAG, INJECTION (ML) INTRAVENOUS
Status: DISCONTINUED | OUTPATIENT
Start: 2022-01-01 | End: 2022-01-01

## 2022-01-01 RX ORDER — DEXTROSE MONOHYDRATE 100 MG/ML
125-250 INJECTION, SOLUTION INTRAVENOUS AS NEEDED
Status: DISCONTINUED | OUTPATIENT
Start: 2022-01-01 | End: 2022-01-01

## 2022-01-01 RX ORDER — INSULIN GLARGINE 100 [IU]/ML
20 INJECTION, SOLUTION SUBCUTANEOUS
Status: DISCONTINUED | OUTPATIENT
Start: 2022-01-01 | End: 2022-01-01

## 2022-01-01 RX ORDER — SODIUM CHLORIDE 450 MG/100ML
50 INJECTION, SOLUTION INTRAVENOUS CONTINUOUS
Status: DISCONTINUED | OUTPATIENT
Start: 2022-01-01 | End: 2022-01-01

## 2022-01-01 RX ORDER — NOREPINEPHRINE BITARTRATE/D5W 8 MG/250ML
.5-12 PLASTIC BAG, INJECTION (ML) INTRAVENOUS
Status: DISCONTINUED | OUTPATIENT
Start: 2022-01-01 | End: 2022-01-01

## 2022-01-01 RX ORDER — SODIUM CHLORIDE 9 MG/ML
100 INJECTION, SOLUTION INTRAVENOUS CONTINUOUS
Status: DISCONTINUED | OUTPATIENT
Start: 2022-01-01 | End: 2022-01-01

## 2022-01-01 RX ORDER — GLYCOPYRROLATE 0.2 MG/ML
0.2 INJECTION INTRAMUSCULAR; INTRAVENOUS
Status: DISCONTINUED | OUTPATIENT
Start: 2022-01-01 | End: 2022-01-01 | Stop reason: HOSPADM

## 2022-01-01 RX ORDER — SODIUM CHLORIDE 0.9 % (FLUSH) 0.9 %
5-40 SYRINGE (ML) INJECTION EVERY 8 HOURS
Status: DISCONTINUED | OUTPATIENT
Start: 2022-01-01 | End: 2022-01-01

## 2022-01-01 RX ORDER — HEPARIN SODIUM 1000 [USP'U]/ML
2200 INJECTION, SOLUTION INTRAVENOUS; SUBCUTANEOUS
Status: DISCONTINUED | OUTPATIENT
Start: 2022-01-01 | End: 2022-01-01 | Stop reason: HOSPADM

## 2022-01-01 RX ORDER — SUCCINYLCHOLINE CHLORIDE 20 MG/ML INJECTION SOLUTION
100 SOLUTION
Status: COMPLETED | OUTPATIENT
Start: 2022-01-01 | End: 2022-01-01

## 2022-01-01 RX ORDER — DIVALPROEX SODIUM 500 MG/1
500 TABLET, DELAYED RELEASE ORAL 2 TIMES DAILY
Status: DISCONTINUED | OUTPATIENT
Start: 2022-01-01 | End: 2022-01-01

## 2022-01-01 RX ORDER — ACETAMINOPHEN 325 MG/1
650 TABLET ORAL
Status: DISCONTINUED | OUTPATIENT
Start: 2022-01-01 | End: 2022-01-01 | Stop reason: HOSPADM

## 2022-01-01 RX ORDER — DEXTROSE 50 % IN WATER (D50W) INTRAVENOUS SYRINGE
25-50 AS NEEDED
Status: DISCONTINUED | OUTPATIENT
Start: 2022-01-01 | End: 2022-01-01

## 2022-01-01 RX ORDER — LORAZEPAM 2 MG/ML
INJECTION INTRAMUSCULAR
Status: COMPLETED
Start: 2022-01-01 | End: 2022-01-01

## 2022-01-01 RX ORDER — ATORVASTATIN CALCIUM 40 MG/1
80 TABLET, FILM COATED ORAL DAILY
Status: DISCONTINUED | OUTPATIENT
Start: 2022-01-01 | End: 2022-01-01

## 2022-01-01 RX ORDER — DEXAMETHASONE SODIUM PHOSPHATE 4 MG/ML
6 INJECTION, SOLUTION INTRA-ARTICULAR; INTRALESIONAL; INTRAMUSCULAR; INTRAVENOUS; SOFT TISSUE ONCE
Status: COMPLETED | OUTPATIENT
Start: 2022-01-01 | End: 2022-01-01

## 2022-01-01 RX ORDER — LORAZEPAM 2 MG/ML
2 INJECTION INTRAMUSCULAR
Status: DISCONTINUED | OUTPATIENT
Start: 2022-01-01 | End: 2022-01-01

## 2022-01-01 RX ORDER — POTASSIUM CHLORIDE 7.45 MG/ML
10 INJECTION INTRAVENOUS
Status: COMPLETED | OUTPATIENT
Start: 2022-01-01 | End: 2022-01-01

## 2022-01-01 RX ORDER — ATORVASTATIN CALCIUM 40 MG/1
80 TABLET, FILM COATED ORAL
Status: DISCONTINUED | OUTPATIENT
Start: 2022-01-01 | End: 2022-01-01

## 2022-01-01 RX ORDER — SCOLOPAMINE TRANSDERMAL SYSTEM 1 MG/1
1 PATCH, EXTENDED RELEASE TRANSDERMAL
Status: DISCONTINUED | OUTPATIENT
Start: 2022-01-01 | End: 2022-01-01 | Stop reason: HOSPADM

## 2022-01-01 RX ORDER — INSULIN LISPRO 100 [IU]/ML
INJECTION, SOLUTION INTRAVENOUS; SUBCUTANEOUS EVERY 4 HOURS
Status: DISCONTINUED | OUTPATIENT
Start: 2022-01-01 | End: 2022-01-01

## 2022-01-01 RX ORDER — MAGNESIUM SULFATE HEPTAHYDRATE 40 MG/ML
2 INJECTION, SOLUTION INTRAVENOUS ONCE
Status: COMPLETED | OUTPATIENT
Start: 2022-01-01 | End: 2022-01-01

## 2022-01-01 RX ORDER — POTASSIUM CHLORIDE 1.5 G/1.77G
20 POWDER, FOR SOLUTION ORAL
Status: COMPLETED | OUTPATIENT
Start: 2022-01-01 | End: 2022-01-01

## 2022-01-01 RX ORDER — INSULIN GLARGINE 100 [IU]/ML
20 INJECTION, SOLUTION SUBCUTANEOUS
Status: COMPLETED | OUTPATIENT
Start: 2022-01-01 | End: 2022-01-01

## 2022-01-01 RX ORDER — PROPOFOL 10 MG/ML
0-50 INJECTION, EMULSION INTRAVENOUS
Status: DISCONTINUED | OUTPATIENT
Start: 2022-01-01 | End: 2022-01-01

## 2022-01-01 RX ORDER — KETOROLAC TROMETHAMINE 30 MG/ML
30 INJECTION, SOLUTION INTRAMUSCULAR; INTRAVENOUS
Status: DISCONTINUED | OUTPATIENT
Start: 2022-01-01 | End: 2022-01-01 | Stop reason: HOSPADM

## 2022-01-01 RX ORDER — OLANZAPINE 2.5 MG/1
2.5 TABLET ORAL 2 TIMES DAILY
Qty: 180 TABLET | Refills: 0 | Status: SHIPPED | OUTPATIENT
Start: 2022-01-01

## 2022-01-01 RX ORDER — NOREPINEPHRINE BIT/0.9 % NACL 8 MG/250ML
.5-16 INFUSION BOTTLE (ML) INTRAVENOUS
Status: DISCONTINUED | OUTPATIENT
Start: 2022-01-01 | End: 2022-01-01

## 2022-01-01 RX ORDER — NOREPINEPHRINE BIT/0.9 % NACL 8 MG/250ML
.5-3 INFUSION BOTTLE (ML) INTRAVENOUS
Status: DISCONTINUED | OUTPATIENT
Start: 2022-01-01 | End: 2022-01-01

## 2022-01-01 RX ORDER — POTASSIUM CHLORIDE 7.45 MG/ML
10 INJECTION INTRAVENOUS
Status: DISCONTINUED | OUTPATIENT
Start: 2022-01-01 | End: 2022-01-01

## 2022-01-01 RX ORDER — DEXAMETHASONE SODIUM PHOSPHATE 4 MG/ML
6 INJECTION, SOLUTION INTRA-ARTICULAR; INTRALESIONAL; INTRAMUSCULAR; INTRAVENOUS; SOFT TISSUE EVERY 12 HOURS
Status: DISCONTINUED | OUTPATIENT
Start: 2022-01-01 | End: 2022-01-01

## 2022-01-01 RX ORDER — POLYETHYLENE GLYCOL 3350 17 G/17G
17 POWDER, FOR SOLUTION ORAL DAILY PRN
Status: DISCONTINUED | OUTPATIENT
Start: 2022-01-01 | End: 2022-01-01

## 2022-01-01 RX ORDER — LEVETIRACETAM 10 MG/ML
1000 INJECTION INTRAVASCULAR ONCE
Status: COMPLETED | OUTPATIENT
Start: 2022-01-01 | End: 2022-01-01

## 2022-01-01 RX ORDER — NOREPINEPHRINE BIT/0.9 % NACL 8 MG/250ML
.5-12 INFUSION BOTTLE (ML) INTRAVENOUS
Status: CANCELLED | OUTPATIENT
Start: 2022-01-01

## 2022-01-01 RX ORDER — DEXAMETHASONE SODIUM PHOSPHATE 4 MG/ML
INJECTION, SOLUTION INTRA-ARTICULAR; INTRALESIONAL; INTRAMUSCULAR; INTRAVENOUS; SOFT TISSUE
Status: COMPLETED
Start: 2022-01-01 | End: 2022-01-01

## 2022-01-01 RX ORDER — SODIUM BICARBONATE 1 MEQ/ML
100 SYRINGE (ML) INTRAVENOUS ONCE
Status: COMPLETED | OUTPATIENT
Start: 2022-01-01 | End: 2022-01-01

## 2022-01-01 RX ORDER — MAGNESIUM SULFATE 100 %
4 CRYSTALS MISCELLANEOUS AS NEEDED
Status: DISCONTINUED | OUTPATIENT
Start: 2022-01-01 | End: 2022-01-01 | Stop reason: HOSPADM

## 2022-01-01 RX ORDER — SODIUM BICARBONATE 650 MG/1
1300 TABLET ORAL 3 TIMES DAILY
Status: DISCONTINUED | OUTPATIENT
Start: 2022-01-01 | End: 2022-01-01

## 2022-01-01 RX ORDER — LEVETIRACETAM 500 MG/1
1500 TABLET ORAL 2 TIMES DAILY
Status: DISCONTINUED | OUTPATIENT
Start: 2022-01-01 | End: 2022-01-01

## 2022-01-01 RX ORDER — OLANZAPINE 2.5 MG/1
2.5 TABLET ORAL 2 TIMES DAILY
Qty: 180 TABLET | Refills: 0 | Status: SHIPPED | OUTPATIENT
Start: 2022-01-01 | End: 2022-01-01 | Stop reason: SDUPTHER

## 2022-01-01 RX ORDER — ACETAMINOPHEN 650 MG/1
SUPPOSITORY RECTAL
Status: COMPLETED
Start: 2022-01-01 | End: 2022-01-01

## 2022-01-01 RX ORDER — ACETAMINOPHEN 650 MG/1
650 SUPPOSITORY RECTAL
Status: COMPLETED | OUTPATIENT
Start: 2022-01-01 | End: 2022-01-01

## 2022-01-01 RX ORDER — PROPOFOL 10 MG/ML
0-50 VIAL (ML) INTRAVENOUS
Status: DISCONTINUED | OUTPATIENT
Start: 2022-01-01 | End: 2022-01-01

## 2022-01-01 RX ORDER — ONDANSETRON 2 MG/ML
4 INJECTION INTRAMUSCULAR; INTRAVENOUS
Status: DISCONTINUED | OUTPATIENT
Start: 2022-01-01 | End: 2022-01-01

## 2022-01-01 RX ORDER — SODIUM CHLORIDE 0.9 % (FLUSH) 0.9 %
5-40 SYRINGE (ML) INJECTION AS NEEDED
Status: DISCONTINUED | OUTPATIENT
Start: 2022-01-01 | End: 2022-01-01 | Stop reason: HOSPADM

## 2022-01-01 RX ORDER — INSULIN LISPRO 100 [IU]/ML
INJECTION, SOLUTION INTRAVENOUS; SUBCUTANEOUS
Status: DISCONTINUED | OUTPATIENT
Start: 2022-01-01 | End: 2022-01-01

## 2022-01-01 RX ORDER — DEXAMETHASONE SODIUM PHOSPHATE 4 MG/ML
4 INJECTION, SOLUTION INTRA-ARTICULAR; INTRALESIONAL; INTRAMUSCULAR; INTRAVENOUS; SOFT TISSUE EVERY 12 HOURS
Status: DISCONTINUED | OUTPATIENT
Start: 2022-01-01 | End: 2022-01-01

## 2022-01-01 RX ORDER — ACETAMINOPHEN 650 MG/1
650 SUPPOSITORY RECTAL
Status: DISCONTINUED | OUTPATIENT
Start: 2022-01-01 | End: 2022-01-01 | Stop reason: HOSPADM

## 2022-01-01 RX ORDER — NOREPINEPHRINE BIT/0.9 % NACL 8 MG/250ML
.5-12 INFUSION BOTTLE (ML) INTRAVENOUS
Status: DISCONTINUED | OUTPATIENT
Start: 2022-01-01 | End: 2022-01-01

## 2022-01-01 RX ORDER — LEVOTHYROXINE SODIUM 75 UG/1
75 TABLET ORAL
Status: DISCONTINUED | OUTPATIENT
Start: 2022-01-01 | End: 2022-01-01

## 2022-01-01 RX ORDER — VALPROIC ACID 250 MG/5ML
500 SOLUTION ORAL 2 TIMES DAILY
Status: DISCONTINUED | OUTPATIENT
Start: 2022-01-01 | End: 2022-01-01

## 2022-01-01 RX ORDER — INSULIN LISPRO 100 [IU]/ML
INJECTION, SOLUTION INTRAVENOUS; SUBCUTANEOUS EVERY 6 HOURS
Status: DISCONTINUED | OUTPATIENT
Start: 2022-01-01 | End: 2022-01-01

## 2022-01-01 RX ORDER — ALBUMIN HUMAN 250 G/1000ML
25 SOLUTION INTRAVENOUS ONCE
Status: COMPLETED | OUTPATIENT
Start: 2022-01-01 | End: 2022-01-01

## 2022-01-01 RX ORDER — NOREPINEPHRINE BIT/0.9 % NACL 8 MG/250ML
INFUSION BOTTLE (ML) INTRAVENOUS
Status: COMPLETED
Start: 2022-01-01 | End: 2022-01-01

## 2022-01-01 RX ORDER — HEPARIN SODIUM 5000 [USP'U]/ML
5000 INJECTION, SOLUTION INTRAVENOUS; SUBCUTANEOUS EVERY 8 HOURS
Status: DISCONTINUED | OUTPATIENT
Start: 2022-01-01 | End: 2022-01-01

## 2022-01-01 RX ORDER — POTASSIUM CHLORIDE 20 MEQ/1
40 TABLET, EXTENDED RELEASE ORAL ONCE
Status: DISCONTINUED | OUTPATIENT
Start: 2022-01-01 | End: 2022-01-01

## 2022-01-01 RX ORDER — SODIUM CHLORIDE 9 MG/ML
250 INJECTION, SOLUTION INTRAVENOUS AS NEEDED
Status: DISCONTINUED | OUTPATIENT
Start: 2022-01-01 | End: 2022-01-01 | Stop reason: HOSPADM

## 2022-01-01 RX ORDER — LIDOCAINE HYDROCHLORIDE 10 MG/ML
INJECTION INFILTRATION; PERINEURAL
Status: DISPENSED
Start: 2022-01-01 | End: 2022-01-01

## 2022-01-01 RX ORDER — LACOSAMIDE 200 MG/1
200 TABLET ORAL 2 TIMES DAILY
Status: DISCONTINUED | OUTPATIENT
Start: 2022-01-01 | End: 2022-01-01

## 2022-01-01 RX ORDER — SODIUM BICARBONATE 1 MEQ/ML
50 SYRINGE (ML) INTRAVENOUS ONCE
Status: COMPLETED | OUTPATIENT
Start: 2022-01-01 | End: 2022-01-01

## 2022-01-01 RX ORDER — DEXTROSE 50 % IN WATER (D50W) INTRAVENOUS SYRINGE
25-50 AS NEEDED
Status: DISCONTINUED | OUTPATIENT
Start: 2022-01-01 | End: 2022-01-01 | Stop reason: CLARIF

## 2022-01-01 RX ORDER — INSULIN GLARGINE 100 [IU]/ML
25 INJECTION, SOLUTION SUBCUTANEOUS
Status: COMPLETED | OUTPATIENT
Start: 2022-01-01 | End: 2022-01-01

## 2022-01-01 RX ORDER — GUAIFENESIN 100 MG/5ML
81 LIQUID (ML) ORAL DAILY
Status: DISCONTINUED | OUTPATIENT
Start: 2022-01-01 | End: 2022-01-01

## 2022-01-01 RX ORDER — ETOMIDATE 2 MG/ML
20 INJECTION INTRAVENOUS ONCE
Status: COMPLETED | OUTPATIENT
Start: 2022-01-01 | End: 2022-01-01

## 2022-01-01 RX ORDER — SCOLOPAMINE TRANSDERMAL SYSTEM 1 MG/1
1 PATCH, EXTENDED RELEASE TRANSDERMAL
Status: DISCONTINUED | OUTPATIENT
Start: 2022-01-01 | End: 2022-01-01

## 2022-01-01 RX ORDER — POTASSIUM CHLORIDE 7.45 MG/ML
10 INJECTION INTRAVENOUS ONCE
Status: COMPLETED | OUTPATIENT
Start: 2022-01-01 | End: 2022-01-01

## 2022-01-01 RX ADMIN — PROPOFOL 50 MCG/KG/MIN: 10 INJECTION, EMULSION INTRAVENOUS at 16:26

## 2022-01-01 RX ADMIN — SODIUM BICARBONATE 1300 MG: 650 TABLET ORAL at 16:50

## 2022-01-01 RX ADMIN — LORAZEPAM 2 MG: 2 INJECTION INTRAMUSCULAR; INTRAVENOUS at 00:57

## 2022-01-01 RX ADMIN — DEXTROSE MONOHYDRATE 250 ML: 100 INJECTION, SOLUTION INTRAVENOUS at 21:00

## 2022-01-01 RX ADMIN — PIPERACILLIN AND TAZOBACTAM 3.38 G: 3; .375 INJECTION, POWDER, LYOPHILIZED, FOR SOLUTION INTRAVENOUS at 14:13

## 2022-01-01 RX ADMIN — MORPHINE SULFATE 2 MG: 2 INJECTION, SOLUTION INTRAMUSCULAR; INTRAVENOUS at 04:59

## 2022-01-01 RX ADMIN — PIPERACILLIN AND TAZOBACTAM 3.38 G: 3; .375 INJECTION, POWDER, LYOPHILIZED, FOR SOLUTION INTRAVENOUS at 21:46

## 2022-01-01 RX ADMIN — DIVALPROEX SODIUM 500 MG: 500 TABLET, DELAYED RELEASE ORAL at 08:31

## 2022-01-01 RX ADMIN — WHITE PETROLATUM,ZINC OXIDE: 20; 75 PASTE TOPICAL at 09:00

## 2022-01-01 RX ADMIN — LEVOTHYROXINE SODIUM 75 MCG: 0.07 TABLET ORAL at 05:02

## 2022-01-01 RX ADMIN — GLYCOPYRROLATE 0.2 MG: 0.2 INJECTION INTRAMUSCULAR; INTRAVENOUS at 01:49

## 2022-01-01 RX ADMIN — LORAZEPAM 1 MG: 2 INJECTION INTRAMUSCULAR; INTRAVENOUS at 16:50

## 2022-01-01 RX ADMIN — INSULIN GLARGINE 40 UNITS: 100 INJECTION, SOLUTION SUBCUTANEOUS at 21:49

## 2022-01-01 RX ADMIN — DIVALPROEX SODIUM 500 MG: 500 TABLET, DELAYED RELEASE ORAL at 21:11

## 2022-01-01 RX ADMIN — SODIUM CHLORIDE, PRESERVATIVE FREE 10 ML: 5 INJECTION INTRAVENOUS at 14:24

## 2022-01-01 RX ADMIN — SODIUM CHLORIDE, PRESERVATIVE FREE 10 ML: 5 INJECTION INTRAVENOUS at 05:04

## 2022-01-01 RX ADMIN — DEXAMETHASONE SODIUM PHOSPHATE 4 MG: 4 INJECTION, SOLUTION INTRA-ARTICULAR; INTRALESIONAL; INTRAMUSCULAR; INTRAVENOUS; SOFT TISSUE at 21:26

## 2022-01-01 RX ADMIN — AZITHROMYCIN MONOHYDRATE 500 MG: 500 INJECTION, POWDER, LYOPHILIZED, FOR SOLUTION INTRAVENOUS at 15:53

## 2022-01-01 RX ADMIN — LEVOTHYROXINE SODIUM 75 MCG: 0.07 TABLET ORAL at 05:05

## 2022-01-01 RX ADMIN — BARICITINIB 1 MG: 2 TABLET, FILM COATED ORAL at 08:23

## 2022-01-01 RX ADMIN — DEXAMETHASONE SODIUM PHOSPHATE 4 MG: 4 INJECTION, SOLUTION INTRAMUSCULAR; INTRAVENOUS at 09:16

## 2022-01-01 RX ADMIN — LEVETIRACETAM 1500 MG: 500 SOLUTION ORAL at 23:31

## 2022-01-01 RX ADMIN — BARICITINIB 1 MG: 2 TABLET, FILM COATED ORAL at 21:11

## 2022-01-01 RX ADMIN — PROPOFOL 50 MCG/KG/MIN: 10 INJECTION, EMULSION INTRAVENOUS at 04:05

## 2022-01-01 RX ADMIN — AZITHROMYCIN MONOHYDRATE 500 MG: 500 INJECTION, POWDER, LYOPHILIZED, FOR SOLUTION INTRAVENOUS at 16:26

## 2022-01-01 RX ADMIN — LEVOTHYROXINE SODIUM 75 MCG: 0.07 TABLET ORAL at 06:20

## 2022-01-01 RX ADMIN — LEVETIRACETAM 1000 MG: 250 TABLET, FILM COATED ORAL at 08:14

## 2022-01-01 RX ADMIN — BARICITINIB 1 MG: 2 TABLET, FILM COATED ORAL at 08:55

## 2022-01-01 RX ADMIN — LACTULOSE 15 ML: 20 SOLUTION ORAL at 21:24

## 2022-01-01 RX ADMIN — DEXAMETHASONE SODIUM PHOSPHATE 4 MG: 4 INJECTION, SOLUTION INTRA-ARTICULAR; INTRALESIONAL; INTRAMUSCULAR; INTRAVENOUS; SOFT TISSUE at 23:55

## 2022-01-01 RX ADMIN — SODIUM CHLORIDE, PRESERVATIVE FREE 10 ML: 5 INJECTION INTRAVENOUS at 07:09

## 2022-01-01 RX ADMIN — PIPERACILLIN AND TAZOBACTAM 3.38 G: 3; .375 INJECTION, POWDER, LYOPHILIZED, FOR SOLUTION INTRAVENOUS at 05:03

## 2022-01-01 RX ADMIN — DEXAMETHASONE SODIUM PHOSPHATE 4 MG: 4 INJECTION, SOLUTION INTRA-ARTICULAR; INTRALESIONAL; INTRAMUSCULAR; INTRAVENOUS; SOFT TISSUE at 22:01

## 2022-01-01 RX ADMIN — LACOSAMIDE 200 MG: 200 TABLET, FILM COATED ORAL at 22:01

## 2022-01-01 RX ADMIN — LACTULOSE 15 ML: 20 SOLUTION ORAL at 18:47

## 2022-01-01 RX ADMIN — HEPARIN SODIUM 2200 UNITS: 1000 INJECTION, SOLUTION INTRAVENOUS; SUBCUTANEOUS at 19:07

## 2022-01-01 RX ADMIN — POTASSIUM CHLORIDE 10 MEQ: 7.46 INJECTION, SOLUTION INTRAVENOUS at 19:53

## 2022-01-01 RX ADMIN — INSULIN GLARGINE 20 UNITS: 100 INJECTION, SOLUTION SUBCUTANEOUS at 22:10

## 2022-01-01 RX ADMIN — HEPARIN SODIUM 2200 UNITS: 1000 INJECTION, SOLUTION INTRAVENOUS; SUBCUTANEOUS at 14:21

## 2022-01-01 RX ADMIN — LACOSAMIDE 200 MG: 200 TABLET, FILM COATED ORAL at 09:04

## 2022-01-01 RX ADMIN — WHITE PETROLATUM,ZINC OXIDE: 20; 75 PASTE TOPICAL at 22:00

## 2022-01-01 RX ADMIN — SODIUM CHLORIDE, PRESERVATIVE FREE 40 ML: 5 INJECTION INTRAVENOUS at 14:05

## 2022-01-01 RX ADMIN — SODIUM CHLORIDE: 234 INJECTION, SOLUTION, CONCENTRATE INTRAVENOUS at 13:17

## 2022-01-01 RX ADMIN — SODIUM CHLORIDE, PRESERVATIVE FREE 10 ML: 5 INJECTION INTRAVENOUS at 06:14

## 2022-01-01 RX ADMIN — LACOSAMIDE 200 MG: 200 TABLET, FILM COATED ORAL at 08:55

## 2022-01-01 RX ADMIN — SODIUM CHLORIDE, PRESERVATIVE FREE 10 ML: 5 INJECTION INTRAVENOUS at 23:56

## 2022-01-01 RX ADMIN — VALPROIC ACID 500 MG: 250 SOLUTION ORAL at 09:03

## 2022-01-01 RX ADMIN — GLYCOPYRROLATE 0.2 MG: 0.2 INJECTION INTRAMUSCULAR; INTRAVENOUS at 04:59

## 2022-01-01 RX ADMIN — WHITE PETROLATUM,ZINC OXIDE: 20; 75 PASTE TOPICAL at 16:00

## 2022-01-01 RX ADMIN — LACOSAMIDE 200 MG: 200 TABLET, FILM COATED ORAL at 10:10

## 2022-01-01 RX ADMIN — SODIUM CHLORIDE, PRESERVATIVE FREE 10 ML: 5 INJECTION INTRAVENOUS at 13:19

## 2022-01-01 RX ADMIN — AZITHROMYCIN MONOHYDRATE 500 MG: 500 INJECTION, POWDER, LYOPHILIZED, FOR SOLUTION INTRAVENOUS at 17:50

## 2022-01-01 RX ADMIN — INSULIN LISPRO 6 UNITS: 100 INJECTION, SOLUTION INTRAVENOUS; SUBCUTANEOUS at 21:00

## 2022-01-01 RX ADMIN — SODIUM CHLORIDE, PRESERVATIVE FREE 10 ML: 5 INJECTION INTRAVENOUS at 15:29

## 2022-01-01 RX ADMIN — INSULIN LISPRO 3 UNITS: 100 INJECTION, SOLUTION INTRAVENOUS; SUBCUTANEOUS at 16:35

## 2022-01-01 RX ADMIN — SODIUM BICARBONATE 100 MEQ: 84 INJECTION, SOLUTION INTRAVENOUS at 12:00

## 2022-01-01 RX ADMIN — DEXAMETHASONE SODIUM PHOSPHATE 6 MG: 4 INJECTION, SOLUTION INTRA-ARTICULAR; INTRALESIONAL; INTRAMUSCULAR; INTRAVENOUS; SOFT TISSUE at 21:39

## 2022-01-01 RX ADMIN — LEVETIRACETAM 1500 MG: 500 SOLUTION ORAL at 23:03

## 2022-01-01 RX ADMIN — LEVETIRACETAM 1500 MG: 500 SOLUTION ORAL at 20:44

## 2022-01-01 RX ADMIN — POTASSIUM CHLORIDE 10 MEQ: 7.46 INJECTION, SOLUTION INTRAVENOUS at 11:23

## 2022-01-01 RX ADMIN — DEXAMETHASONE SODIUM PHOSPHATE 4 MG: 4 INJECTION, SOLUTION INTRAMUSCULAR; INTRAVENOUS at 09:18

## 2022-01-01 RX ADMIN — POTASSIUM CHLORIDE 10 MEQ: 7.46 INJECTION, SOLUTION INTRAVENOUS at 10:06

## 2022-01-01 RX ADMIN — LACTULOSE 15 ML: 20 SOLUTION ORAL at 09:04

## 2022-01-01 RX ADMIN — INSULIN GLARGINE 40 UNITS: 100 INJECTION, SOLUTION SUBCUTANEOUS at 21:21

## 2022-01-01 RX ADMIN — GLYCOPYRROLATE 0.2 MG: 0.2 INJECTION INTRAMUSCULAR; INTRAVENOUS at 22:12

## 2022-01-01 RX ADMIN — DEXAMETHASONE SODIUM PHOSPHATE 6 MG: 4 INJECTION, SOLUTION INTRA-ARTICULAR; INTRALESIONAL; INTRAMUSCULAR; INTRAVENOUS; SOFT TISSUE at 21:45

## 2022-01-01 RX ADMIN — PIPERACILLIN AND TAZOBACTAM 3.38 G: 3; .375 INJECTION, POWDER, LYOPHILIZED, FOR SOLUTION INTRAVENOUS at 22:42

## 2022-01-01 RX ADMIN — LACTULOSE 15 ML: 20 SOLUTION ORAL at 09:07

## 2022-01-01 RX ADMIN — INSULIN LISPRO 3 UNITS: 100 INJECTION, SOLUTION INTRAVENOUS; SUBCUTANEOUS at 21:39

## 2022-01-01 RX ADMIN — Medication 2.5 MCG/MIN: at 10:00

## 2022-01-01 RX ADMIN — PROPOFOL 40 MCG/KG/MIN: 10 INJECTION, EMULSION INTRAVENOUS at 08:21

## 2022-01-01 RX ADMIN — INSULIN LISPRO 3 UNITS: 100 INJECTION, SOLUTION INTRAVENOUS; SUBCUTANEOUS at 13:00

## 2022-01-01 RX ADMIN — SODIUM CHLORIDE, PRESERVATIVE FREE 10 ML: 5 INJECTION INTRAVENOUS at 06:22

## 2022-01-01 RX ADMIN — ATORVASTATIN CALCIUM 80 MG: 40 TABLET, FILM COATED ORAL at 22:42

## 2022-01-01 RX ADMIN — POTASSIUM CHLORIDE 10 MEQ: 7.46 INJECTION, SOLUTION INTRAVENOUS at 15:54

## 2022-01-01 RX ADMIN — BARICITINIB 1 MG: 2 TABLET, FILM COATED ORAL at 08:47

## 2022-01-01 RX ADMIN — LEVETIRACETAM 1500 MG: 500 SOLUTION ORAL at 09:37

## 2022-01-01 RX ADMIN — VALPROIC ACID 500 MG: 250 SOLUTION ORAL at 09:18

## 2022-01-01 RX ADMIN — PROPOFOL 25 MCG/KG/MIN: 10 INJECTION, EMULSION INTRAVENOUS at 04:33

## 2022-01-01 RX ADMIN — MORPHINE SULFATE 2 MG: 2 INJECTION, SOLUTION INTRAMUSCULAR; INTRAVENOUS at 16:51

## 2022-01-01 RX ADMIN — SODIUM CHLORIDE, PRESERVATIVE FREE 10 ML: 5 INJECTION INTRAVENOUS at 22:02

## 2022-01-01 RX ADMIN — SODIUM CHLORIDE, PRESERVATIVE FREE 10 ML: 5 INJECTION INTRAVENOUS at 21:45

## 2022-01-01 RX ADMIN — VALPROIC ACID 500 MG: 250 SOLUTION ORAL at 09:37

## 2022-01-01 RX ADMIN — ATORVASTATIN CALCIUM 80 MG: 40 TABLET, FILM COATED ORAL at 21:23

## 2022-01-01 RX ADMIN — SODIUM CHLORIDE 40 MG: 9 INJECTION, SOLUTION INTRAMUSCULAR; INTRAVENOUS; SUBCUTANEOUS at 09:08

## 2022-01-01 RX ADMIN — ACETAMINOPHEN 650 MG: 650 SUPPOSITORY RECTAL at 14:14

## 2022-01-01 RX ADMIN — SODIUM CHLORIDE, PRESERVATIVE FREE 10 ML: 5 INJECTION INTRAVENOUS at 15:41

## 2022-01-01 RX ADMIN — VALPROIC ACID 500 MG: 250 SOLUTION ORAL at 09:32

## 2022-01-01 RX ADMIN — LACTULOSE 15 ML: 20 SOLUTION ORAL at 08:22

## 2022-01-01 RX ADMIN — LACTULOSE 15 ML: 20 SOLUTION ORAL at 23:54

## 2022-01-01 RX ADMIN — INSULIN LISPRO 4 UNITS: 100 INJECTION, SOLUTION INTRAVENOUS; SUBCUTANEOUS at 21:00

## 2022-01-01 RX ADMIN — LACTULOSE 15 ML: 20 SOLUTION ORAL at 21:22

## 2022-01-01 RX ADMIN — PROPOFOL 40 MCG/KG/MIN: 10 INJECTION, EMULSION INTRAVENOUS at 04:00

## 2022-01-01 RX ADMIN — INSULIN HUMAN 12 UNITS: 100 INJECTION, SOLUTION PARENTERAL at 19:50

## 2022-01-01 RX ADMIN — DEXAMETHASONE SODIUM PHOSPHATE 6 MG: 4 INJECTION, SOLUTION INTRA-ARTICULAR; INTRALESIONAL; INTRAMUSCULAR; INTRAVENOUS; SOFT TISSUE at 21:21

## 2022-01-01 RX ADMIN — ATORVASTATIN CALCIUM 80 MG: 40 TABLET, FILM COATED ORAL at 23:30

## 2022-01-01 RX ADMIN — INSULIN LISPRO 2 UNITS: 100 INJECTION, SOLUTION INTRAVENOUS; SUBCUTANEOUS at 21:25

## 2022-01-01 RX ADMIN — SODIUM CHLORIDE, PRESERVATIVE FREE 10 ML: 5 INJECTION INTRAVENOUS at 18:02

## 2022-01-01 RX ADMIN — INSULIN LISPRO 3 UNITS: 100 INJECTION, SOLUTION INTRAVENOUS; SUBCUTANEOUS at 21:55

## 2022-01-01 RX ADMIN — LEVETIRACETAM 1500 MG: 500 SOLUTION ORAL at 08:24

## 2022-01-01 RX ADMIN — POTASSIUM CHLORIDE 20 MEQ: 1.5 POWDER, FOR SOLUTION ORAL at 10:17

## 2022-01-01 RX ADMIN — POTASSIUM CHLORIDE 10 MEQ: 7.46 INJECTION, SOLUTION INTRAVENOUS at 18:58

## 2022-01-01 RX ADMIN — DEXAMETHASONE SODIUM PHOSPHATE 6 MG: 4 INJECTION, SOLUTION INTRA-ARTICULAR; INTRALESIONAL; INTRAMUSCULAR; INTRAVENOUS; SOFT TISSUE at 18:19

## 2022-01-01 RX ADMIN — SODIUM CHLORIDE, PRESERVATIVE FREE 10 ML: 5 INJECTION INTRAVENOUS at 21:23

## 2022-01-01 RX ADMIN — SODIUM CHLORIDE 1000 ML: 9 INJECTION, SOLUTION INTRAVENOUS at 15:23

## 2022-01-01 RX ADMIN — DEXAMETHASONE SODIUM PHOSPHATE 6 MG: 4 INJECTION, SOLUTION INTRA-ARTICULAR; INTRALESIONAL; INTRAMUSCULAR; INTRAVENOUS; SOFT TISSUE at 08:27

## 2022-01-01 RX ADMIN — ACETAMINOPHEN 650 MG: 325 TABLET ORAL at 21:22

## 2022-01-01 RX ADMIN — LEVETIRACETAM 1500 MG: 500 SOLUTION ORAL at 21:58

## 2022-01-01 RX ADMIN — PROPOFOL 25 MCG/KG/MIN: 10 INJECTION, EMULSION INTRAVENOUS at 16:44

## 2022-01-01 RX ADMIN — MORPHINE SULFATE 2 MG: 2 INJECTION, SOLUTION INTRAMUSCULAR; INTRAVENOUS at 12:42

## 2022-01-01 RX ADMIN — INSULIN GLARGINE 20 UNITS: 100 INJECTION, SOLUTION SUBCUTANEOUS at 22:00

## 2022-01-01 RX ADMIN — VALPROIC ACID 500 MG: 250 SOLUTION ORAL at 22:01

## 2022-01-01 RX ADMIN — LACOSAMIDE 200 MG: 200 TABLET, FILM COATED ORAL at 22:42

## 2022-01-01 RX ADMIN — PROPOFOL 50 MCG/KG/MIN: 10 INJECTION, EMULSION INTRAVENOUS at 03:50

## 2022-01-01 RX ADMIN — AZITHROMYCIN MONOHYDRATE 500 MG: 500 INJECTION, POWDER, LYOPHILIZED, FOR SOLUTION INTRAVENOUS at 16:45

## 2022-01-01 RX ADMIN — INSULIN LISPRO 3 UNITS: 100 INJECTION, SOLUTION INTRAVENOUS; SUBCUTANEOUS at 09:00

## 2022-01-01 RX ADMIN — LEVOTHYROXINE SODIUM 75 MCG: 0.07 TABLET ORAL at 06:00

## 2022-01-01 RX ADMIN — DEXTROSE MONOHYDRATE 250 ML: 100 INJECTION, SOLUTION INTRAVENOUS at 15:18

## 2022-01-01 RX ADMIN — ALTEPLASE 2 MG: 2.2 INJECTION, POWDER, LYOPHILIZED, FOR SOLUTION INTRAVENOUS at 12:20

## 2022-01-01 RX ADMIN — POTASSIUM CHLORIDE 10 MEQ: 7.46 INJECTION, SOLUTION INTRAVENOUS at 20:13

## 2022-01-01 RX ADMIN — LEVETIRACETAM 1500 MG: 500 SOLUTION ORAL at 21:24

## 2022-01-01 RX ADMIN — PIPERACILLIN AND TAZOBACTAM 3.38 G: 3; .375 INJECTION, POWDER, LYOPHILIZED, FOR SOLUTION INTRAVENOUS at 13:21

## 2022-01-01 RX ADMIN — VALPROIC ACID 500 MG: 250 SOLUTION ORAL at 23:30

## 2022-01-01 RX ADMIN — LEVETIRACETAM 1500 MG: 500 TABLET, FILM COATED ORAL at 09:15

## 2022-01-01 RX ADMIN — VALPROIC ACID 500 MG: 250 SOLUTION ORAL at 09:10

## 2022-01-01 RX ADMIN — BARICITINIB 1 MG: 2 TABLET, FILM COATED ORAL at 09:48

## 2022-01-01 RX ADMIN — LACTULOSE 15 ML: 20 SOLUTION ORAL at 15:21

## 2022-01-01 RX ADMIN — SODIUM CHLORIDE, PRESERVATIVE FREE 10 ML: 5 INJECTION INTRAVENOUS at 21:14

## 2022-01-01 RX ADMIN — PIPERACILLIN AND TAZOBACTAM 3.38 G: 3; .375 INJECTION, POWDER, LYOPHILIZED, FOR SOLUTION INTRAVENOUS at 13:28

## 2022-01-01 RX ADMIN — LACOSAMIDE 200 MG: 200 TABLET, FILM COATED ORAL at 21:59

## 2022-01-01 RX ADMIN — LACOSAMIDE 200 MG: 200 TABLET, FILM COATED ORAL at 08:31

## 2022-01-01 RX ADMIN — LEVOTHYROXINE SODIUM 75 MCG: 0.07 TABLET ORAL at 05:48

## 2022-01-01 RX ADMIN — INSULIN LISPRO 4 UNITS: 100 INJECTION, SOLUTION INTRAVENOUS; SUBCUTANEOUS at 18:30

## 2022-01-01 RX ADMIN — PIPERACILLIN AND TAZOBACTAM 3.38 G: 3; .375 INJECTION, POWDER, LYOPHILIZED, FOR SOLUTION INTRAVENOUS at 23:32

## 2022-01-01 RX ADMIN — PIPERACILLIN AND TAZOBACTAM 3.38 G: 3; .375 INJECTION, POWDER, LYOPHILIZED, FOR SOLUTION INTRAVENOUS at 14:27

## 2022-01-01 RX ADMIN — SODIUM CHLORIDE, PRESERVATIVE FREE 10 ML: 5 INJECTION INTRAVENOUS at 14:19

## 2022-01-01 RX ADMIN — INSULIN LISPRO 20 UNITS: 100 INJECTION, SOLUTION INTRAVENOUS; SUBCUTANEOUS at 10:06

## 2022-01-01 RX ADMIN — INSULIN LISPRO 3 UNITS: 100 INJECTION, SOLUTION INTRAVENOUS; SUBCUTANEOUS at 08:46

## 2022-01-01 RX ADMIN — ASPIRIN 81 MG CHEWABLE TABLET 81 MG: 81 TABLET CHEWABLE at 08:31

## 2022-01-01 RX ADMIN — PIPERACILLIN AND TAZOBACTAM 3.38 G: 3; .375 INJECTION, POWDER, LYOPHILIZED, FOR SOLUTION INTRAVENOUS at 05:04

## 2022-01-01 RX ADMIN — DEXAMETHASONE SODIUM PHOSPHATE 4 MG: 4 INJECTION, SOLUTION INTRA-ARTICULAR; INTRALESIONAL; INTRAMUSCULAR; INTRAVENOUS; SOFT TISSUE at 21:40

## 2022-01-01 RX ADMIN — DEXAMETHASONE SODIUM PHOSPHATE 4 MG: 4 INJECTION, SOLUTION INTRA-ARTICULAR; INTRALESIONAL; INTRAMUSCULAR; INTRAVENOUS; SOFT TISSUE at 22:28

## 2022-01-01 RX ADMIN — PIPERACILLIN AND TAZOBACTAM 3.38 G: 3; .375 INJECTION, POWDER, LYOPHILIZED, FOR SOLUTION INTRAVENOUS at 21:26

## 2022-01-01 RX ADMIN — LORAZEPAM 1 MG: 2 INJECTION INTRAMUSCULAR at 08:51

## 2022-01-01 RX ADMIN — SODIUM CHLORIDE, PRESERVATIVE FREE 10 ML: 5 INJECTION INTRAVENOUS at 05:14

## 2022-01-01 RX ADMIN — INSULIN LISPRO 4 UNITS: 100 INJECTION, SOLUTION INTRAVENOUS; SUBCUTANEOUS at 18:00

## 2022-01-01 RX ADMIN — ATORVASTATIN CALCIUM 80 MG: 40 TABLET, FILM COATED ORAL at 22:49

## 2022-01-01 RX ADMIN — LACTULOSE 15 ML: 20 SOLUTION ORAL at 16:09

## 2022-01-01 RX ADMIN — SODIUM CHLORIDE, PRESERVATIVE FREE 10 ML: 5 INJECTION INTRAVENOUS at 14:00

## 2022-01-01 RX ADMIN — ATORVASTATIN CALCIUM 80 MG: 40 TABLET, FILM COATED ORAL at 21:24

## 2022-01-01 RX ADMIN — LORAZEPAM 1 MG: 2 INJECTION INTRAMUSCULAR at 18:00

## 2022-01-01 RX ADMIN — PROPOFOL 50 MCG/KG/MIN: 10 INJECTION, EMULSION INTRAVENOUS at 03:05

## 2022-01-01 RX ADMIN — AZITHROMYCIN MONOHYDRATE 500 MG: 500 INJECTION, POWDER, LYOPHILIZED, FOR SOLUTION INTRAVENOUS at 16:49

## 2022-01-01 RX ADMIN — DEXAMETHASONE SODIUM PHOSPHATE 4 MG: 4 INJECTION, SOLUTION INTRA-ARTICULAR; INTRALESIONAL; INTRAMUSCULAR; INTRAVENOUS; SOFT TISSUE at 08:24

## 2022-01-01 RX ADMIN — LACTULOSE 15 ML: 20 SOLUTION ORAL at 17:50

## 2022-01-01 RX ADMIN — SODIUM CHLORIDE, PRESERVATIVE FREE 10 ML: 5 INJECTION INTRAVENOUS at 05:28

## 2022-01-01 RX ADMIN — VALPROIC ACID 500 MG: 250 SOLUTION ORAL at 22:49

## 2022-01-01 RX ADMIN — SODIUM CHLORIDE, PRESERVATIVE FREE 10 ML: 5 INJECTION INTRAVENOUS at 14:28

## 2022-01-01 RX ADMIN — PIPERACILLIN AND TAZOBACTAM 3.38 G: 3; .375 INJECTION, POWDER, LYOPHILIZED, FOR SOLUTION INTRAVENOUS at 05:26

## 2022-01-01 RX ADMIN — SODIUM CHLORIDE, PRESERVATIVE FREE 10 ML: 5 INJECTION INTRAVENOUS at 05:31

## 2022-01-01 RX ADMIN — PROPOFOL 20 MCG/KG/MIN: 10 INJECTION, EMULSION INTRAVENOUS at 19:53

## 2022-01-01 RX ADMIN — ATORVASTATIN CALCIUM 80 MG: 40 TABLET, FILM COATED ORAL at 21:45

## 2022-01-01 RX ADMIN — LACTULOSE 15 ML: 20 SOLUTION ORAL at 15:55

## 2022-01-01 RX ADMIN — PROPOFOL 50 MCG/KG/MIN: 10 INJECTION, EMULSION INTRAVENOUS at 00:51

## 2022-01-01 RX ADMIN — LACOSAMIDE 200 MG: 200 TABLET, FILM COATED ORAL at 23:02

## 2022-01-01 RX ADMIN — INSULIN LISPRO 3 UNITS: 100 INJECTION, SOLUTION INTRAVENOUS; SUBCUTANEOUS at 05:28

## 2022-01-01 RX ADMIN — MORPHINE SULFATE 2 MG: 2 INJECTION, SOLUTION INTRAMUSCULAR; INTRAVENOUS at 16:03

## 2022-01-01 RX ADMIN — LACTULOSE 15 ML: 20 SOLUTION ORAL at 09:33

## 2022-01-01 RX ADMIN — INSULIN LISPRO 3 UNITS: 100 INJECTION, SOLUTION INTRAVENOUS; SUBCUTANEOUS at 18:08

## 2022-01-01 RX ADMIN — SODIUM BICARBONATE 1300 MG: 650 TABLET ORAL at 21:45

## 2022-01-01 RX ADMIN — INSULIN GLARGINE 20 UNITS: 100 INJECTION, SOLUTION SUBCUTANEOUS at 20:46

## 2022-01-01 RX ADMIN — DEXAMETHASONE SODIUM PHOSPHATE 6 MG: 4 INJECTION, SOLUTION INTRA-ARTICULAR; INTRALESIONAL; INTRAMUSCULAR; INTRAVENOUS; SOFT TISSUE at 21:00

## 2022-01-01 RX ADMIN — LEVOTHYROXINE SODIUM 75 MCG: 0.07 TABLET ORAL at 05:32

## 2022-01-01 RX ADMIN — LACTULOSE 15 ML: 20 SOLUTION ORAL at 08:59

## 2022-01-01 RX ADMIN — VALPROIC ACID 500 MG: 250 SOLUTION ORAL at 08:24

## 2022-01-01 RX ADMIN — BARICITINIB 1 MG: 2 TABLET, FILM COATED ORAL at 09:33

## 2022-01-01 RX ADMIN — LORAZEPAM 2 MG: 2 INJECTION INTRAMUSCULAR; INTRAVENOUS at 20:10

## 2022-01-01 RX ADMIN — VALPROIC ACID 500 MG: 250 SOLUTION ORAL at 23:54

## 2022-01-01 RX ADMIN — SODIUM CHLORIDE, PRESERVATIVE FREE 10 ML: 5 INJECTION INTRAVENOUS at 06:39

## 2022-01-01 RX ADMIN — MORPHINE SULFATE 2 MG: 2 INJECTION, SOLUTION INTRAMUSCULAR; INTRAVENOUS at 22:12

## 2022-01-01 RX ADMIN — INSULIN LISPRO 7 UNITS: 100 INJECTION, SOLUTION INTRAVENOUS; SUBCUTANEOUS at 18:50

## 2022-01-01 RX ADMIN — DIVALPROEX SODIUM 500 MG: 500 TABLET, DELAYED RELEASE ORAL at 21:40

## 2022-01-01 RX ADMIN — PROPOFOL 50 MCG/KG/MIN: 10 INJECTION, EMULSION INTRAVENOUS at 07:17

## 2022-01-01 RX ADMIN — DEXAMETHASONE SODIUM PHOSPHATE 4 MG: 4 INJECTION, SOLUTION INTRA-ARTICULAR; INTRALESIONAL; INTRAMUSCULAR; INTRAVENOUS; SOFT TISSUE at 08:59

## 2022-01-01 RX ADMIN — LEVETIRACETAM 1500 MG: 500 SOLUTION ORAL at 21:40

## 2022-01-01 RX ADMIN — PROPOFOL 30 MCG/KG/MIN: 10 INJECTION, EMULSION INTRAVENOUS at 18:22

## 2022-01-01 RX ADMIN — LEVETIRACETAM 1500 MG: 500 TABLET, FILM COATED ORAL at 08:18

## 2022-01-01 RX ADMIN — LACOSAMIDE 200 MG: 200 TABLET, FILM COATED ORAL at 22:49

## 2022-01-01 RX ADMIN — ASPIRIN 81 MG CHEWABLE TABLET 81 MG: 81 TABLET CHEWABLE at 08:47

## 2022-01-01 RX ADMIN — SODIUM CHLORIDE, PRESERVATIVE FREE 10 ML: 5 INJECTION INTRAVENOUS at 06:00

## 2022-01-01 RX ADMIN — LACOSAMIDE 200 MG: 200 TABLET, FILM COATED ORAL at 09:07

## 2022-01-01 RX ADMIN — PROPOFOL 50 MCG/KG/MIN: 10 INJECTION, EMULSION INTRAVENOUS at 18:18

## 2022-01-01 RX ADMIN — LEVOTHYROXINE SODIUM 75 MCG: 0.07 TABLET ORAL at 08:33

## 2022-01-01 RX ADMIN — Medication 30 MCG/MIN: at 19:10

## 2022-01-01 RX ADMIN — SODIUM BICARBONATE 1300 MG: 650 TABLET ORAL at 10:17

## 2022-01-01 RX ADMIN — VALPROIC ACID 500 MG: 250 SOLUTION ORAL at 21:24

## 2022-01-01 RX ADMIN — PROPOFOL 50 MCG/KG/MIN: 10 INJECTION, EMULSION INTRAVENOUS at 09:47

## 2022-01-01 RX ADMIN — INSULIN GLARGINE 20 UNITS: 100 INJECTION, SOLUTION SUBCUTANEOUS at 21:38

## 2022-01-01 RX ADMIN — LEVETIRACETAM 1500 MG: 500 SOLUTION ORAL at 08:59

## 2022-01-01 RX ADMIN — BARICITINIB 1 MG: 2 TABLET, FILM COATED ORAL at 09:17

## 2022-01-01 RX ADMIN — SODIUM CHLORIDE 1000 ML: 9 INJECTION, SOLUTION INTRAVENOUS at 14:01

## 2022-01-01 RX ADMIN — PIPERACILLIN AND TAZOBACTAM 3.38 G: 3; .375 INJECTION, POWDER, LYOPHILIZED, FOR SOLUTION INTRAVENOUS at 15:37

## 2022-01-01 RX ADMIN — ACETAMINOPHEN 650 MG: 325 TABLET ORAL at 21:29

## 2022-01-01 RX ADMIN — SODIUM CHLORIDE, PRESERVATIVE FREE 10 ML: 5 INJECTION INTRAVENOUS at 21:28

## 2022-01-01 RX ADMIN — SODIUM CHLORIDE, PRESERVATIVE FREE 10 ML: 5 INJECTION INTRAVENOUS at 14:59

## 2022-01-01 RX ADMIN — PIPERACILLIN AND TAZOBACTAM 3.38 G: 3; .375 INJECTION, POWDER, LYOPHILIZED, FOR SOLUTION INTRAVENOUS at 06:38

## 2022-01-01 RX ADMIN — SODIUM CHLORIDE, PRESERVATIVE FREE 10 ML: 5 INJECTION INTRAVENOUS at 05:35

## 2022-01-01 RX ADMIN — INSULIN LISPRO 3 UNITS: 100 INJECTION, SOLUTION INTRAVENOUS; SUBCUTANEOUS at 00:48

## 2022-01-01 RX ADMIN — PIPERACILLIN AND TAZOBACTAM 3.38 G: 3; .375 INJECTION, POWDER, LYOPHILIZED, FOR SOLUTION INTRAVENOUS at 14:01

## 2022-01-01 RX ADMIN — LACTULOSE 15 ML: 20 SOLUTION ORAL at 20:46

## 2022-01-01 RX ADMIN — LACOSAMIDE 200 MG: 200 TABLET, FILM COATED ORAL at 08:21

## 2022-01-01 RX ADMIN — VALPROIC ACID 500 MG: 250 SOLUTION ORAL at 23:04

## 2022-01-01 RX ADMIN — LACTULOSE 15 ML: 20 SOLUTION ORAL at 23:32

## 2022-01-01 RX ADMIN — AZITHROMYCIN MONOHYDRATE 500 MG: 500 INJECTION, POWDER, LYOPHILIZED, FOR SOLUTION INTRAVENOUS at 17:18

## 2022-01-01 RX ADMIN — POTASSIUM CHLORIDE 10 MEQ: 7.46 INJECTION, SOLUTION INTRAVENOUS at 10:22

## 2022-01-01 RX ADMIN — Medication 1.5 MCG/MIN: at 07:42

## 2022-01-01 RX ADMIN — SODIUM CHLORIDE, PRESERVATIVE FREE 10 ML: 5 INJECTION INTRAVENOUS at 22:49

## 2022-01-01 RX ADMIN — LEVETIRACETAM 1500 MG: 500 SOLUTION ORAL at 09:48

## 2022-01-01 RX ADMIN — ALTEPLASE 4 MG: 2.2 INJECTION, POWDER, LYOPHILIZED, FOR SOLUTION INTRAVENOUS at 10:53

## 2022-01-01 RX ADMIN — VALPROIC ACID 500 MG: 250 SOLUTION ORAL at 08:46

## 2022-01-01 RX ADMIN — ATORVASTATIN CALCIUM 80 MG: 40 TABLET, FILM COATED ORAL at 21:46

## 2022-01-01 RX ADMIN — CALCIUM GLUCONATE 2 G: 20 INJECTION, SOLUTION INTRAVENOUS at 23:52

## 2022-01-01 RX ADMIN — LEVETIRACETAM 1500 MG: 500 SOLUTION ORAL at 09:49

## 2022-01-01 RX ADMIN — PROPOFOL 50 MCG/KG/MIN: 10 INJECTION, EMULSION INTRAVENOUS at 07:22

## 2022-01-01 RX ADMIN — PIPERACILLIN AND TAZOBACTAM 3.38 G: 3; .375 INJECTION, POWDER, LYOPHILIZED, FOR SOLUTION INTRAVENOUS at 14:18

## 2022-01-01 RX ADMIN — LACTULOSE 15 ML: 20 SOLUTION ORAL at 18:01

## 2022-01-01 RX ADMIN — LACOSAMIDE 200 MG: 200 TABLET, FILM COATED ORAL at 08:14

## 2022-01-01 RX ADMIN — DEXAMETHASONE SODIUM PHOSPHATE 4 MG: 4 INJECTION, SOLUTION INTRA-ARTICULAR; INTRALESIONAL; INTRAMUSCULAR; INTRAVENOUS; SOFT TISSUE at 09:38

## 2022-01-01 RX ADMIN — ATORVASTATIN CALCIUM 80 MG: 40 TABLET, FILM COATED ORAL at 21:41

## 2022-01-01 RX ADMIN — VALPROIC ACID 500 MG: 250 SOLUTION ORAL at 22:43

## 2022-01-01 RX ADMIN — SODIUM BICARBONATE 50 MEQ: 84 INJECTION, SOLUTION INTRAVENOUS at 11:46

## 2022-01-01 RX ADMIN — Medication 39 MCG/MIN: at 19:45

## 2022-01-01 RX ADMIN — Medication 10 MCG/MIN: at 18:04

## 2022-01-01 RX ADMIN — LEVETIRACETAM 1500 MG: 500 TABLET, FILM COATED ORAL at 09:34

## 2022-01-01 RX ADMIN — LACTULOSE 15 ML: 20 SOLUTION ORAL at 21:00

## 2022-01-01 RX ADMIN — LACTULOSE 15 ML: 20 SOLUTION ORAL at 15:11

## 2022-01-01 RX ADMIN — VALPROIC ACID 500 MG: 250 SOLUTION ORAL at 22:42

## 2022-01-01 RX ADMIN — AZITHROMYCIN MONOHYDRATE 500 MG: 500 INJECTION, POWDER, LYOPHILIZED, FOR SOLUTION INTRAVENOUS at 18:42

## 2022-01-01 RX ADMIN — LEVOTHYROXINE SODIUM 75 MCG: 0.07 TABLET ORAL at 05:33

## 2022-01-01 RX ADMIN — INSULIN LISPRO 3 UNITS: 100 INJECTION, SOLUTION INTRAVENOUS; SUBCUTANEOUS at 21:00

## 2022-01-01 RX ADMIN — Medication 3 MCG/MIN: at 14:14

## 2022-01-01 RX ADMIN — POTASSIUM CHLORIDE 10 MEQ: 7.46 INJECTION, SOLUTION INTRAVENOUS at 17:33

## 2022-01-01 RX ADMIN — Medication 20 MCG/MIN: at 02:38

## 2022-01-01 RX ADMIN — DEXTROSE MONOHYDRATE 250 ML: 100 INJECTION, SOLUTION INTRAVENOUS at 05:56

## 2022-01-01 RX ADMIN — SODIUM CHLORIDE: 234 INJECTION, SOLUTION, CONCENTRATE INTRAVENOUS at 09:02

## 2022-01-01 RX ADMIN — ACETAMINOPHEN 650 MG: 650 SUPPOSITORY RECTAL at 14:23

## 2022-01-01 RX ADMIN — PROPOFOL 50 MCG/KG/MIN: 10 INJECTION, EMULSION INTRAVENOUS at 05:33

## 2022-01-01 RX ADMIN — LEVETIRACETAM 1500 MG: 500 SOLUTION ORAL at 21:22

## 2022-01-01 RX ADMIN — PROPOFOL 25 MCG/KG/MIN: 10 INJECTION, EMULSION INTRAVENOUS at 22:20

## 2022-01-01 RX ADMIN — PIPERACILLIN AND TAZOBACTAM 3.38 G: 3; .375 INJECTION, POWDER, LYOPHILIZED, FOR SOLUTION INTRAVENOUS at 16:09

## 2022-01-01 RX ADMIN — INSULIN GLARGINE 25 UNITS: 100 INJECTION, SOLUTION SUBCUTANEOUS at 10:17

## 2022-01-01 RX ADMIN — SODIUM CHLORIDE, PRESERVATIVE FREE 10 ML: 5 INJECTION INTRAVENOUS at 21:42

## 2022-01-01 RX ADMIN — LACTULOSE 15 ML: 20 SOLUTION ORAL at 09:18

## 2022-01-01 RX ADMIN — PROPOFOL 45 MCG/KG/MIN: 10 INJECTION, EMULSION INTRAVENOUS at 04:30

## 2022-01-01 RX ADMIN — Medication 20 MCG/MIN: at 21:34

## 2022-01-01 RX ADMIN — PROPOFOL 50 MCG/KG/MIN: 10 INJECTION, EMULSION INTRAVENOUS at 08:54

## 2022-01-01 RX ADMIN — INSULIN LISPRO 3 UNITS: 100 INJECTION, SOLUTION INTRAVENOUS; SUBCUTANEOUS at 01:00

## 2022-01-01 RX ADMIN — LACTULOSE 15 ML: 20 SOLUTION ORAL at 23:03

## 2022-01-01 RX ADMIN — LACTULOSE 15 ML: 20 SOLUTION ORAL at 16:43

## 2022-01-01 RX ADMIN — INSULIN LISPRO 7 UNITS: 100 INJECTION, SOLUTION INTRAVENOUS; SUBCUTANEOUS at 11:30

## 2022-01-01 RX ADMIN — AZITHROMYCIN MONOHYDRATE 500 MG: 500 INJECTION, POWDER, LYOPHILIZED, FOR SOLUTION INTRAVENOUS at 16:48

## 2022-01-01 RX ADMIN — PIPERACILLIN AND TAZOBACTAM 3.38 G: 3; .375 INJECTION, POWDER, LYOPHILIZED, FOR SOLUTION INTRAVENOUS at 14:17

## 2022-01-01 RX ADMIN — SODIUM CHLORIDE 40 MG: 9 INJECTION, SOLUTION INTRAMUSCULAR; INTRAVENOUS; SUBCUTANEOUS at 21:47

## 2022-01-01 RX ADMIN — SODIUM CHLORIDE, PRESERVATIVE FREE 10 ML: 5 INJECTION INTRAVENOUS at 22:43

## 2022-01-01 RX ADMIN — LEVETIRACETAM 1500 MG: 500 TABLET, FILM COATED ORAL at 23:30

## 2022-01-01 RX ADMIN — ATORVASTATIN CALCIUM 80 MG: 40 TABLET, FILM COATED ORAL at 21:22

## 2022-01-01 RX ADMIN — VALPROIC ACID 500 MG: 250 SOLUTION ORAL at 08:26

## 2022-01-01 RX ADMIN — PROPOFOL 50 MCG/KG/MIN: 10 INJECTION, EMULSION INTRAVENOUS at 23:51

## 2022-01-01 RX ADMIN — INSULIN LISPRO 3 UNITS: 100 INJECTION, SOLUTION INTRAVENOUS; SUBCUTANEOUS at 15:30

## 2022-01-01 RX ADMIN — LEVOTHYROXINE SODIUM 75 MCG: 0.07 TABLET ORAL at 06:07

## 2022-01-01 RX ADMIN — DEXAMETHASONE SODIUM PHOSPHATE 4 MG: 4 INJECTION, SOLUTION INTRA-ARTICULAR; INTRALESIONAL; INTRAMUSCULAR; INTRAVENOUS; SOFT TISSUE at 22:49

## 2022-01-01 RX ADMIN — LACOSAMIDE 200 MG: 200 TABLET, FILM COATED ORAL at 08:24

## 2022-01-01 RX ADMIN — AZITHROMYCIN MONOHYDRATE 500 MG: 500 INJECTION, POWDER, LYOPHILIZED, FOR SOLUTION INTRAVENOUS at 16:46

## 2022-01-01 RX ADMIN — LACOSAMIDE 200 MG: 200 TABLET, FILM COATED ORAL at 23:43

## 2022-01-01 RX ADMIN — AZITHROMYCIN MONOHYDRATE 500 MG: 500 INJECTION, POWDER, LYOPHILIZED, FOR SOLUTION INTRAVENOUS at 17:40

## 2022-01-01 RX ADMIN — LACTULOSE 15 ML: 20 SOLUTION ORAL at 09:48

## 2022-01-01 RX ADMIN — SODIUM CHLORIDE, PRESERVATIVE FREE 10 ML: 5 INJECTION INTRAVENOUS at 14:10

## 2022-01-01 RX ADMIN — LEVETIRACETAM 1500 MG: 500 SOLUTION ORAL at 22:43

## 2022-01-01 RX ADMIN — Medication 7 MCG/MIN: at 16:49

## 2022-01-01 RX ADMIN — VALPROIC ACID 500 MG: 250 SOLUTION ORAL at 08:18

## 2022-01-01 RX ADMIN — POTASSIUM CHLORIDE 10 MEQ: 7.46 INJECTION, SOLUTION INTRAVENOUS at 14:05

## 2022-01-01 RX ADMIN — BARICITINIB 1 MG: 2 TABLET, FILM COATED ORAL at 08:19

## 2022-01-01 RX ADMIN — LEVOTHYROXINE SODIUM 75 MCG: 0.07 TABLET ORAL at 06:11

## 2022-01-01 RX ADMIN — ASPIRIN 81 MG CHEWABLE TABLET 81 MG: 81 TABLET CHEWABLE at 08:28

## 2022-01-01 RX ADMIN — SODIUM CHLORIDE 40 MG: 9 INJECTION, SOLUTION INTRAMUSCULAR; INTRAVENOUS; SUBCUTANEOUS at 21:07

## 2022-01-01 RX ADMIN — DEXAMETHASONE SODIUM PHOSPHATE 4 MG: 4 INJECTION, SOLUTION INTRA-ARTICULAR; INTRALESIONAL; INTRAMUSCULAR; INTRAVENOUS; SOFT TISSUE at 10:09

## 2022-01-01 RX ADMIN — PIPERACILLIN AND TAZOBACTAM 3.38 G: 3; .375 INJECTION, POWDER, LYOPHILIZED, FOR SOLUTION INTRAVENOUS at 14:14

## 2022-01-01 RX ADMIN — GLYCOPYRROLATE 0.2 MG: 0.2 INJECTION INTRAMUSCULAR; INTRAVENOUS at 12:39

## 2022-01-01 RX ADMIN — DEXAMETHASONE SODIUM PHOSPHATE 6 MG: 4 INJECTION, SOLUTION INTRA-ARTICULAR; INTRALESIONAL; INTRAMUSCULAR; INTRAVENOUS; SOFT TISSUE at 08:31

## 2022-01-01 RX ADMIN — LEVETIRACETAM 1500 MG: 500 TABLET, FILM COATED ORAL at 21:41

## 2022-01-01 RX ADMIN — GLYCOPYRROLATE 0.2 MG: 0.2 INJECTION INTRAMUSCULAR; INTRAVENOUS at 16:00

## 2022-01-01 RX ADMIN — LACOSAMIDE 200 MG: 200 TABLET, FILM COATED ORAL at 21:00

## 2022-01-01 RX ADMIN — INSULIN LISPRO 3 UNITS: 100 INJECTION, SOLUTION INTRAVENOUS; SUBCUTANEOUS at 06:11

## 2022-01-01 RX ADMIN — GLYCOPYRROLATE 0.2 MG: 0.2 INJECTION INTRAMUSCULAR; INTRAVENOUS at 08:52

## 2022-01-01 RX ADMIN — INSULIN LISPRO 3 UNITS: 100 INJECTION, SOLUTION INTRAVENOUS; SUBCUTANEOUS at 12:15

## 2022-01-01 RX ADMIN — LEVETIRACETAM 1500 MG: 500 TABLET, FILM COATED ORAL at 21:00

## 2022-01-01 RX ADMIN — LORAZEPAM 1 MG: 2 INJECTION INTRAMUSCULAR at 00:00

## 2022-01-01 RX ADMIN — SODIUM CHLORIDE, PRESERVATIVE FREE 10 ML: 5 INJECTION INTRAVENOUS at 05:34

## 2022-01-01 RX ADMIN — PIPERACILLIN AND TAZOBACTAM 3.38 G: 3; .375 INJECTION, POWDER, LYOPHILIZED, FOR SOLUTION INTRAVENOUS at 05:37

## 2022-01-01 RX ADMIN — PIPERACILLIN AND TAZOBACTAM 3.38 G: 3; .375 INJECTION, POWDER, LYOPHILIZED, FOR SOLUTION INTRAVENOUS at 21:00

## 2022-01-01 RX ADMIN — PIPERACILLIN AND TAZOBACTAM 3.38 G: 3; .375 INJECTION, POWDER, LYOPHILIZED, FOR SOLUTION INTRAVENOUS at 06:21

## 2022-01-01 RX ADMIN — PIPERACILLIN AND TAZOBACTAM 3.38 G: 3; .375 INJECTION, POWDER, LYOPHILIZED, FOR SOLUTION INTRAVENOUS at 05:01

## 2022-01-01 RX ADMIN — PIPERACILLIN AND TAZOBACTAM 3.38 G: 3; .375 INJECTION, POWDER, LYOPHILIZED, FOR SOLUTION INTRAVENOUS at 05:48

## 2022-01-01 RX ADMIN — VALPROIC ACID 500 MG: 250 SOLUTION ORAL at 08:27

## 2022-01-01 RX ADMIN — PROPOFOL 50 MCG/KG/MIN: 10 INJECTION, EMULSION INTRAVENOUS at 03:38

## 2022-01-01 RX ADMIN — PIPERACILLIN AND TAZOBACTAM 3.38 G: 3; .375 INJECTION, POWDER, LYOPHILIZED, FOR SOLUTION INTRAVENOUS at 05:35

## 2022-01-01 RX ADMIN — SODIUM CHLORIDE, PRESERVATIVE FREE 10 ML: 5 INJECTION INTRAVENOUS at 20:48

## 2022-01-01 RX ADMIN — INSULIN LISPRO 3 UNITS: 100 INJECTION, SOLUTION INTRAVENOUS; SUBCUTANEOUS at 05:09

## 2022-01-01 RX ADMIN — AZITHROMYCIN MONOHYDRATE 500 MG: 500 INJECTION, POWDER, LYOPHILIZED, FOR SOLUTION INTRAVENOUS at 15:19

## 2022-01-01 RX ADMIN — SODIUM CHLORIDE 40 MG: 9 INJECTION, SOLUTION INTRAMUSCULAR; INTRAVENOUS; SUBCUTANEOUS at 09:15

## 2022-01-01 RX ADMIN — Medication 1 MCG/MIN: at 17:25

## 2022-01-01 RX ADMIN — PIPERACILLIN AND TAZOBACTAM 3.38 G: 3; .375 INJECTION, POWDER, LYOPHILIZED, FOR SOLUTION INTRAVENOUS at 05:11

## 2022-01-01 RX ADMIN — LEVOTHYROXINE SODIUM 75 MCG: 0.07 TABLET ORAL at 08:14

## 2022-01-01 RX ADMIN — Medication 39 MCG/MIN: at 15:49

## 2022-01-01 RX ADMIN — LACTULOSE 15 ML: 20 SOLUTION ORAL at 22:01

## 2022-01-01 RX ADMIN — PROPOFOL 35 MCG/KG/MIN: 10 INJECTION, EMULSION INTRAVENOUS at 00:39

## 2022-01-01 RX ADMIN — PROPOFOL 30 MCG/KG/MIN: 10 INJECTION, EMULSION INTRAVENOUS at 02:05

## 2022-01-01 RX ADMIN — Medication 3 MCG/MIN: at 15:23

## 2022-01-01 RX ADMIN — LEVETIRACETAM 1000 MG: 250 TABLET, FILM COATED ORAL at 21:39

## 2022-01-01 RX ADMIN — MORPHINE SULFATE 2 MG: 2 INJECTION, SOLUTION INTRAMUSCULAR; INTRAVENOUS at 08:51

## 2022-01-01 RX ADMIN — LORAZEPAM 1 MG: 2 INJECTION INTRAMUSCULAR at 12:40

## 2022-01-01 RX ADMIN — VALPROIC ACID 500 MG: 250 SOLUTION ORAL at 21:40

## 2022-01-01 RX ADMIN — ATORVASTATIN CALCIUM 80 MG: 40 TABLET, FILM COATED ORAL at 21:38

## 2022-01-01 RX ADMIN — LACOSAMIDE 200 MG: 200 TABLET, FILM COATED ORAL at 09:48

## 2022-01-01 RX ADMIN — INSULIN GLARGINE 20 UNITS: 100 INJECTION, SOLUTION SUBCUTANEOUS at 23:31

## 2022-01-01 RX ADMIN — SODIUM CHLORIDE, PRESERVATIVE FREE 10 ML: 5 INJECTION INTRAVENOUS at 05:30

## 2022-01-01 RX ADMIN — LACOSAMIDE 200 MG: 200 TABLET, FILM COATED ORAL at 08:59

## 2022-01-01 RX ADMIN — LACOSAMIDE 200 MG: 200 TABLET, FILM COATED ORAL at 08:18

## 2022-01-01 RX ADMIN — INSULIN HUMAN 15 UNITS: 100 INJECTION, SOLUTION PARENTERAL at 17:44

## 2022-01-01 RX ADMIN — LEVETIRACETAM 1500 MG: 500 TABLET, FILM COATED ORAL at 08:28

## 2022-01-01 RX ADMIN — LEVETIRACETAM 1500 MG: 500 TABLET, FILM COATED ORAL at 21:21

## 2022-01-01 RX ADMIN — SODIUM CHLORIDE, PRESERVATIVE FREE 10 ML: 5 INJECTION INTRAVENOUS at 05:46

## 2022-01-01 RX ADMIN — BARICITINIB 1 MG: 2 TABLET, FILM COATED ORAL at 08:28

## 2022-01-01 RX ADMIN — VALPROIC ACID 500 MG: 250 SOLUTION ORAL at 08:54

## 2022-01-01 RX ADMIN — PIPERACILLIN AND TAZOBACTAM 3.38 G: 3; .375 INJECTION, POWDER, LYOPHILIZED, FOR SOLUTION INTRAVENOUS at 23:52

## 2022-01-01 RX ADMIN — LACTULOSE 15 ML: 20 SOLUTION ORAL at 15:19

## 2022-01-01 RX ADMIN — LEVOTHYROXINE SODIUM 75 MCG: 0.07 TABLET ORAL at 05:37

## 2022-01-01 RX ADMIN — LACTULOSE 15 ML: 20 SOLUTION ORAL at 09:10

## 2022-01-01 RX ADMIN — Medication 34 MCG/MIN: at 08:12

## 2022-01-01 RX ADMIN — LACTULOSE 15 ML: 20 SOLUTION ORAL at 08:24

## 2022-01-01 RX ADMIN — PIPERACILLIN AND TAZOBACTAM 3.38 G: 3; .375 INJECTION, POWDER, LYOPHILIZED, FOR SOLUTION INTRAVENOUS at 05:56

## 2022-01-01 RX ADMIN — LACTULOSE 15 ML: 20 SOLUTION ORAL at 09:37

## 2022-01-01 RX ADMIN — SODIUM CHLORIDE, PRESERVATIVE FREE 10 ML: 5 INJECTION INTRAVENOUS at 18:48

## 2022-01-01 RX ADMIN — LACOSAMIDE 200 MG: 200 TABLET, FILM COATED ORAL at 09:09

## 2022-01-01 RX ADMIN — SODIUM CHLORIDE 75 ML/HR: 4.5 INJECTION, SOLUTION INTRAVENOUS at 15:53

## 2022-01-01 RX ADMIN — LEVETIRACETAM 1500 MG: 500 TABLET, FILM COATED ORAL at 08:47

## 2022-01-01 RX ADMIN — LACOSAMIDE 200 MG: 200 TABLET, FILM COATED ORAL at 09:33

## 2022-01-01 RX ADMIN — INSULIN LISPRO 7 UNITS: 100 INJECTION, SOLUTION INTRAVENOUS; SUBCUTANEOUS at 01:00

## 2022-01-01 RX ADMIN — PROPOFOL 30 MCG/KG/MIN: 10 INJECTION, EMULSION INTRAVENOUS at 11:04

## 2022-01-01 RX ADMIN — INSULIN LISPRO 3 UNITS: 100 INJECTION, SOLUTION INTRAVENOUS; SUBCUTANEOUS at 05:00

## 2022-01-01 RX ADMIN — ATORVASTATIN CALCIUM 80 MG: 40 TABLET, FILM COATED ORAL at 20:45

## 2022-01-01 RX ADMIN — SODIUM CHLORIDE, PRESERVATIVE FREE 10 ML: 5 INJECTION INTRAVENOUS at 23:04

## 2022-01-01 RX ADMIN — DEXAMETHASONE SODIUM PHOSPHATE 4 MG: 4 INJECTION, SOLUTION INTRAMUSCULAR; INTRAVENOUS at 09:04

## 2022-01-01 RX ADMIN — PROPOFOL 50 MCG/KG/MIN: 10 INJECTION, EMULSION INTRAVENOUS at 18:22

## 2022-01-01 RX ADMIN — SODIUM CHLORIDE 10.6 UNITS/HR: 9 INJECTION, SOLUTION INTRAVENOUS at 22:42

## 2022-01-01 RX ADMIN — SODIUM CHLORIDE, PRESERVATIVE FREE 10 ML: 5 INJECTION INTRAVENOUS at 21:00

## 2022-01-01 RX ADMIN — INSULIN LISPRO 3 UNITS: 100 INJECTION, SOLUTION INTRAVENOUS; SUBCUTANEOUS at 12:18

## 2022-01-01 RX ADMIN — PIPERACILLIN AND TAZOBACTAM 3.38 G: 3; .375 INJECTION, POWDER, LYOPHILIZED, FOR SOLUTION INTRAVENOUS at 14:10

## 2022-01-01 RX ADMIN — INSULIN LISPRO 4 UNITS: 100 INJECTION, SOLUTION INTRAVENOUS; SUBCUTANEOUS at 14:00

## 2022-01-01 RX ADMIN — INSULIN LISPRO 3 UNITS: 100 INJECTION, SOLUTION INTRAVENOUS; SUBCUTANEOUS at 01:09

## 2022-01-01 RX ADMIN — DEXAMETHASONE SODIUM PHOSPHATE 6 MG: 4 INJECTION, SOLUTION INTRA-ARTICULAR; INTRALESIONAL; INTRAMUSCULAR; INTRAVENOUS; SOFT TISSUE at 08:46

## 2022-01-01 RX ADMIN — LEVETIRACETAM 1500 MG: 500 SOLUTION ORAL at 09:07

## 2022-01-01 RX ADMIN — PROPOFOL 50 MCG/KG/MIN: 10 INJECTION, EMULSION INTRAVENOUS at 21:25

## 2022-01-01 RX ADMIN — PIPERACILLIN AND TAZOBACTAM 3.38 G: 3; .375 INJECTION, POWDER, LYOPHILIZED, FOR SOLUTION INTRAVENOUS at 22:50

## 2022-01-01 RX ADMIN — WHITE PETROLATUM,ZINC OXIDE: 20; 75 PASTE TOPICAL at 21:00

## 2022-01-01 RX ADMIN — VALPROIC ACID 500 MG: 250 SOLUTION ORAL at 08:59

## 2022-01-01 RX ADMIN — LEVOTHYROXINE SODIUM 75 MCG: 0.07 TABLET ORAL at 08:32

## 2022-01-01 RX ADMIN — SODIUM CHLORIDE, PRESERVATIVE FREE 10 ML: 5 INJECTION INTRAVENOUS at 16:09

## 2022-01-01 RX ADMIN — VALPROIC ACID 500 MG: 250 SOLUTION ORAL at 21:44

## 2022-01-01 RX ADMIN — PROPOFOL 50 MCG/KG/MIN: 10 INJECTION, EMULSION INTRAVENOUS at 21:41

## 2022-01-01 RX ADMIN — LACTULOSE 15 ML: 20 SOLUTION ORAL at 10:10

## 2022-01-01 RX ADMIN — LEVETIRACETAM 1500 MG: 500 TABLET, FILM COATED ORAL at 22:24

## 2022-01-01 RX ADMIN — BARICITINIB 1 MG: 2 TABLET, FILM COATED ORAL at 10:42

## 2022-01-01 RX ADMIN — ATORVASTATIN CALCIUM 80 MG: 40 TABLET, FILM COATED ORAL at 23:02

## 2022-01-01 RX ADMIN — Medication 4 MCG/MIN: at 21:26

## 2022-01-01 RX ADMIN — PROPOFOL 50 MCG/KG/MIN: 10 INJECTION, EMULSION INTRAVENOUS at 06:33

## 2022-01-01 RX ADMIN — LEVOTHYROXINE SODIUM 75 MCG: 0.07 TABLET ORAL at 06:14

## 2022-01-01 RX ADMIN — LACOSAMIDE 200 MG: 200 TABLET, FILM COATED ORAL at 21:38

## 2022-01-01 RX ADMIN — PIPERACILLIN AND TAZOBACTAM 3.38 G: 3; .375 INJECTION, POWDER, LYOPHILIZED, FOR SOLUTION INTRAVENOUS at 14:04

## 2022-01-01 RX ADMIN — VALPROIC ACID 500 MG: 250 SOLUTION ORAL at 09:55

## 2022-01-01 RX ADMIN — VALPROIC ACID 500 MG: 250 SOLUTION ORAL at 21:45

## 2022-01-01 RX ADMIN — INSULIN GLARGINE 20 UNITS: 100 INJECTION, SOLUTION SUBCUTANEOUS at 21:46

## 2022-01-01 RX ADMIN — BARICITINIB 1 MG: 2 TABLET, FILM COATED ORAL at 08:18

## 2022-01-01 RX ADMIN — INSULIN GLARGINE 20 UNITS: 100 INJECTION, SOLUTION SUBCUTANEOUS at 13:12

## 2022-01-01 RX ADMIN — SODIUM CHLORIDE: 234 INJECTION, SOLUTION, CONCENTRATE INTRAVENOUS at 09:47

## 2022-01-01 RX ADMIN — PIPERACILLIN AND TAZOBACTAM 3.38 G: 3; .375 INJECTION, POWDER, LYOPHILIZED, FOR SOLUTION INTRAVENOUS at 06:20

## 2022-01-01 RX ADMIN — VALPROIC ACID 500 MG: 250 SOLUTION ORAL at 22:24

## 2022-01-01 RX ADMIN — INSULIN GLARGINE 20 UNITS: 100 INJECTION, SOLUTION SUBCUTANEOUS at 21:25

## 2022-01-01 RX ADMIN — SODIUM CHLORIDE, PRESERVATIVE FREE 10 ML: 5 INJECTION INTRAVENOUS at 05:02

## 2022-01-01 RX ADMIN — LACTULOSE 15 ML: 20 SOLUTION ORAL at 23:30

## 2022-01-01 RX ADMIN — Medication 5 MCG/MIN: at 09:29

## 2022-01-01 RX ADMIN — SODIUM CHLORIDE 1000 ML: 9 INJECTION, SOLUTION INTRAVENOUS at 16:37

## 2022-01-01 RX ADMIN — LACTULOSE 15 ML: 20 SOLUTION ORAL at 08:27

## 2022-01-01 RX ADMIN — POTASSIUM CHLORIDE 10 MEQ: 7.46 INJECTION, SOLUTION INTRAVENOUS at 17:44

## 2022-01-01 RX ADMIN — AZITHROMYCIN MONOHYDRATE 500 MG: 500 INJECTION, POWDER, LYOPHILIZED, FOR SOLUTION INTRAVENOUS at 15:21

## 2022-01-01 RX ADMIN — ACETAMINOPHEN 650 MG: 325 TABLET ORAL at 23:55

## 2022-01-01 RX ADMIN — VALPROIC ACID 500 MG: 250 SOLUTION ORAL at 20:44

## 2022-01-01 RX ADMIN — POTASSIUM CHLORIDE 10 MEQ: 7.46 INJECTION, SOLUTION INTRAVENOUS at 11:05

## 2022-01-01 RX ADMIN — GLYCOPYRROLATE 0.2 MG: 0.2 INJECTION INTRAMUSCULAR; INTRAVENOUS at 17:51

## 2022-01-01 RX ADMIN — LEVETIRACETAM 1500 MG: 500 SOLUTION ORAL at 22:49

## 2022-01-01 RX ADMIN — LACOSAMIDE 200 MG: 200 TABLET, FILM COATED ORAL at 08:28

## 2022-01-01 RX ADMIN — PIPERACILLIN AND TAZOBACTAM 3.38 G: 3; .375 INJECTION, POWDER, LYOPHILIZED, FOR SOLUTION INTRAVENOUS at 15:31

## 2022-01-01 RX ADMIN — PIPERACILLIN AND TAZOBACTAM 3.38 G: 3; .375 INJECTION, POWDER, LYOPHILIZED, FOR SOLUTION INTRAVENOUS at 13:18

## 2022-01-01 RX ADMIN — SODIUM CHLORIDE, PRESERVATIVE FREE 10 ML: 5 INJECTION INTRAVENOUS at 22:26

## 2022-01-01 RX ADMIN — HEPARIN SODIUM 5000 UNITS: 5000 INJECTION, SOLUTION INTRAVENOUS; SUBCUTANEOUS at 10:06

## 2022-01-01 RX ADMIN — SODIUM BICARBONATE 1300 MG: 650 TABLET ORAL at 08:46

## 2022-01-01 RX ADMIN — PROPOFOL 45 MCG/KG/MIN: 10 INJECTION, EMULSION INTRAVENOUS at 18:41

## 2022-01-01 RX ADMIN — PROPOFOL 45 MCG/KG/MIN: 10 INJECTION, EMULSION INTRAVENOUS at 11:49

## 2022-01-01 RX ADMIN — LEVOTHYROXINE SODIUM 75 MCG: 0.07 TABLET ORAL at 05:04

## 2022-01-01 RX ADMIN — LACOSAMIDE 200 MG: 200 TABLET, FILM COATED ORAL at 21:46

## 2022-01-01 RX ADMIN — LACOSAMIDE 200 MG: 200 TABLET, FILM COATED ORAL at 22:24

## 2022-01-01 RX ADMIN — LACOSAMIDE 200 MG: 200 TABLET, FILM COATED ORAL at 08:25

## 2022-01-01 RX ADMIN — LEVOTHYROXINE SODIUM 75 MCG: 0.07 TABLET ORAL at 07:08

## 2022-01-01 RX ADMIN — PIPERACILLIN AND TAZOBACTAM 3.38 G: 3; .375 INJECTION, POWDER, LYOPHILIZED, FOR SOLUTION INTRAVENOUS at 06:06

## 2022-01-01 RX ADMIN — SODIUM CHLORIDE, PRESERVATIVE FREE 10 ML: 5 INJECTION INTRAVENOUS at 16:58

## 2022-01-01 RX ADMIN — PROPOFOL 50 MCG/KG/MIN: 10 INJECTION, EMULSION INTRAVENOUS at 15:37

## 2022-01-01 RX ADMIN — LACTULOSE 15 ML: 20 SOLUTION ORAL at 22:42

## 2022-01-01 RX ADMIN — INSULIN LISPRO 4 UNITS: 100 INJECTION, SOLUTION INTRAVENOUS; SUBCUTANEOUS at 02:12

## 2022-01-01 RX ADMIN — DEXAMETHASONE SODIUM PHOSPHATE 4 MG: 4 INJECTION, SOLUTION INTRAMUSCULAR; INTRAVENOUS at 09:07

## 2022-01-01 RX ADMIN — MAGNESIUM SULFATE HEPTAHYDRATE 2 G: 40 INJECTION, SOLUTION INTRAVENOUS at 16:46

## 2022-01-01 RX ADMIN — SODIUM CHLORIDE 19.2 UNITS/HR: 9 INJECTION, SOLUTION INTRAVENOUS at 05:01

## 2022-01-01 RX ADMIN — VALPROIC ACID 500 MG: 250 SOLUTION ORAL at 10:06

## 2022-01-01 RX ADMIN — LACTULOSE 15 ML: 20 SOLUTION ORAL at 09:15

## 2022-01-01 RX ADMIN — ATORVASTATIN CALCIUM 80 MG: 40 TABLET, FILM COATED ORAL at 22:01

## 2022-01-01 RX ADMIN — SODIUM CHLORIDE, PRESERVATIVE FREE 10 ML: 5 INJECTION INTRAVENOUS at 14:21

## 2022-01-01 RX ADMIN — LEVETIRACETAM 1500 MG: 500 SOLUTION ORAL at 22:01

## 2022-01-01 RX ADMIN — ACETAMINOPHEN 650 MG: 325 TABLET ORAL at 11:11

## 2022-01-01 RX ADMIN — DEXAMETHASONE SODIUM PHOSPHATE 6 MG: 4 INJECTION, SOLUTION INTRA-ARTICULAR; INTRALESIONAL; INTRAMUSCULAR; INTRAVENOUS; SOFT TISSUE at 09:15

## 2022-01-01 RX ADMIN — DEXAMETHASONE SODIUM PHOSPHATE 4 MG: 4 INJECTION, SOLUTION INTRA-ARTICULAR; INTRALESIONAL; INTRAMUSCULAR; INTRAVENOUS; SOFT TISSUE at 08:55

## 2022-01-01 RX ADMIN — LACOSAMIDE 200 MG: 200 TABLET, FILM COATED ORAL at 21:23

## 2022-01-01 RX ADMIN — POTASSIUM CHLORIDE 10 MEQ: 7.46 INJECTION, SOLUTION INTRAVENOUS at 09:08

## 2022-01-01 RX ADMIN — LACOSAMIDE 200 MG: 200 TABLET, FILM COATED ORAL at 09:19

## 2022-01-01 RX ADMIN — LEVETIRACETAM 1000 MG: 250 TABLET, FILM COATED ORAL at 21:11

## 2022-01-01 RX ADMIN — DEXAMETHASONE SODIUM PHOSPHATE 4 MG: 4 INJECTION, SOLUTION INTRA-ARTICULAR; INTRALESIONAL; INTRAMUSCULAR; INTRAVENOUS; SOFT TISSUE at 23:30

## 2022-01-01 RX ADMIN — SODIUM CHLORIDE, PRESERVATIVE FREE 10 ML: 5 INJECTION INTRAVENOUS at 21:59

## 2022-01-01 RX ADMIN — LACOSAMIDE 200 MG: 200 TABLET, FILM COATED ORAL at 21:41

## 2022-01-01 RX ADMIN — INSULIN LISPRO 7 UNITS: 100 INJECTION, SOLUTION INTRAVENOUS; SUBCUTANEOUS at 05:00

## 2022-01-01 RX ADMIN — PROPOFOL 45 MCG/KG/MIN: 10 INJECTION, EMULSION INTRAVENOUS at 22:53

## 2022-01-01 RX ADMIN — LACTULOSE 15 ML: 20 SOLUTION ORAL at 15:37

## 2022-01-01 RX ADMIN — PIPERACILLIN AND TAZOBACTAM 3.38 G: 3; .375 INJECTION, POWDER, LYOPHILIZED, FOR SOLUTION INTRAVENOUS at 21:23

## 2022-01-01 RX ADMIN — LEVETIRACETAM 1500 MG: 500 SOLUTION ORAL at 23:54

## 2022-01-01 RX ADMIN — INSULIN LISPRO 3 UNITS: 100 INJECTION, SOLUTION INTRAVENOUS; SUBCUTANEOUS at 11:21

## 2022-01-01 RX ADMIN — Medication 30 MCG/MIN: at 22:27

## 2022-01-01 RX ADMIN — LEVETIRACETAM 1500 MG: 500 SOLUTION ORAL at 10:10

## 2022-01-01 RX ADMIN — PIPERACILLIN AND TAZOBACTAM 3.38 G: 3; .375 INJECTION, POWDER, LYOPHILIZED, FOR SOLUTION INTRAVENOUS at 16:46

## 2022-01-01 RX ADMIN — SODIUM CHLORIDE 1000 ML: 9 INJECTION, SOLUTION INTRAVENOUS at 17:47

## 2022-01-01 RX ADMIN — INSULIN GLARGINE 20 UNITS: 100 INJECTION, SOLUTION SUBCUTANEOUS at 22:42

## 2022-01-01 RX ADMIN — SODIUM CHLORIDE, PRESERVATIVE FREE 10 ML: 5 INJECTION INTRAVENOUS at 14:01

## 2022-01-01 RX ADMIN — LEVETIRACETAM 1000 MG: 250 TABLET, FILM COATED ORAL at 08:32

## 2022-01-01 RX ADMIN — PROPOFOL 50 MCG/KG/MIN: 10 INJECTION, EMULSION INTRAVENOUS at 13:48

## 2022-01-01 RX ADMIN — VALPROIC ACID 500 MG: 250 SOLUTION ORAL at 23:32

## 2022-01-01 RX ADMIN — SODIUM CHLORIDE, PRESERVATIVE FREE 10 ML: 5 INJECTION INTRAVENOUS at 23:46

## 2022-01-01 RX ADMIN — PROPOFOL 50 MCG/KG/MIN: 10 INJECTION, EMULSION INTRAVENOUS at 01:10

## 2022-01-01 RX ADMIN — SODIUM CHLORIDE, PRESERVATIVE FREE 10 ML: 5 INJECTION INTRAVENOUS at 06:06

## 2022-01-01 RX ADMIN — LEVOTHYROXINE SODIUM 75 MCG: 0.07 TABLET ORAL at 06:30

## 2022-01-01 RX ADMIN — LACTULOSE 15 ML: 20 SOLUTION ORAL at 08:54

## 2022-01-01 RX ADMIN — SODIUM CHLORIDE, PRESERVATIVE FREE 10 ML: 5 INJECTION INTRAVENOUS at 23:32

## 2022-01-01 RX ADMIN — SODIUM CHLORIDE, PRESERVATIVE FREE 10 ML: 5 INJECTION INTRAVENOUS at 05:57

## 2022-01-01 RX ADMIN — PIPERACILLIN AND TAZOBACTAM 3.38 G: 3; .375 INJECTION, POWDER, LYOPHILIZED, FOR SOLUTION INTRAVENOUS at 21:21

## 2022-01-01 RX ADMIN — LEVETIRACETAM 1500 MG: 500 SOLUTION ORAL at 08:23

## 2022-01-01 RX ADMIN — ASPIRIN 81 MG CHEWABLE TABLET 81 MG: 81 TABLET CHEWABLE at 09:15

## 2022-01-01 RX ADMIN — VALPROIC ACID 500 MG: 250 SOLUTION ORAL at 21:00

## 2022-01-01 RX ADMIN — PIPERACILLIN AND TAZOBACTAM 3.38 G: 3; .375 INJECTION, POWDER, LYOPHILIZED, FOR SOLUTION INTRAVENOUS at 22:02

## 2022-01-01 RX ADMIN — LACOSAMIDE 200 MG: 200 TABLET, FILM COATED ORAL at 21:25

## 2022-01-01 RX ADMIN — SODIUM CHLORIDE, PRESERVATIVE FREE 10 ML: 5 INJECTION INTRAVENOUS at 06:20

## 2022-01-01 RX ADMIN — PROPOFOL 50 MCG/KG/MIN: 10 INJECTION, EMULSION INTRAVENOUS at 14:27

## 2022-01-01 RX ADMIN — INSULIN LISPRO 2 UNITS: 100 INJECTION, SOLUTION INTRAVENOUS; SUBCUTANEOUS at 07:00

## 2022-01-01 RX ADMIN — Medication 7 MCG/MIN: at 11:47

## 2022-01-01 RX ADMIN — LORAZEPAM 1 MG: 2 INJECTION INTRAMUSCULAR at 22:12

## 2022-01-01 RX ADMIN — LEVOTHYROXINE SODIUM 75 MCG: 0.07 TABLET ORAL at 07:00

## 2022-01-01 RX ADMIN — SODIUM CHLORIDE: 234 INJECTION, SOLUTION, CONCENTRATE INTRAVENOUS at 05:56

## 2022-01-01 RX ADMIN — LEVOTHYROXINE SODIUM 75 MCG: 0.07 TABLET ORAL at 09:15

## 2022-01-01 RX ADMIN — LACOSAMIDE 200 MG: 200 TABLET, FILM COATED ORAL at 21:22

## 2022-01-01 RX ADMIN — LACOSAMIDE 200 MG: 200 TABLET, FILM COATED ORAL at 20:45

## 2022-01-01 RX ADMIN — PROPOFOL 50 MCG/KG/MIN: 10 INJECTION, EMULSION INTRAVENOUS at 21:42

## 2022-01-01 RX ADMIN — LORAZEPAM 1 MG: 2 INJECTION INTRAMUSCULAR at 03:00

## 2022-01-01 RX ADMIN — PIPERACILLIN AND TAZOBACTAM 3.38 G: 3; .375 INJECTION, POWDER, LYOPHILIZED, FOR SOLUTION INTRAVENOUS at 05:33

## 2022-01-01 RX ADMIN — HEPARIN SODIUM 2200 UNITS: 1000 INJECTION, SOLUTION INTRAVENOUS; SUBCUTANEOUS at 16:24

## 2022-01-01 RX ADMIN — LACTULOSE 15 ML: 20 SOLUTION ORAL at 22:49

## 2022-01-01 RX ADMIN — INSULIN LISPRO 3 UNITS: 100 INJECTION, SOLUTION INTRAVENOUS; SUBCUTANEOUS at 17:52

## 2022-01-01 RX ADMIN — Medication 25 MCG/MIN: at 08:35

## 2022-01-01 RX ADMIN — INSULIN LISPRO 3 UNITS: 100 INJECTION, SOLUTION INTRAVENOUS; SUBCUTANEOUS at 17:34

## 2022-01-01 RX ADMIN — INSULIN LISPRO 4 UNITS: 100 INJECTION, SOLUTION INTRAVENOUS; SUBCUTANEOUS at 16:46

## 2022-01-01 RX ADMIN — ASPIRIN 81 MG CHEWABLE TABLET 81 MG: 81 TABLET CHEWABLE at 08:14

## 2022-01-01 RX ADMIN — INSULIN LISPRO 3 UNITS: 100 INJECTION, SOLUTION INTRAVENOUS; SUBCUTANEOUS at 22:10

## 2022-01-01 RX ADMIN — ASPIRIN 81 MG CHEWABLE TABLET 81 MG: 81 TABLET CHEWABLE at 09:33

## 2022-01-01 RX ADMIN — PIPERACILLIN AND TAZOBACTAM 3.38 G: 3; .375 INJECTION, POWDER, LYOPHILIZED, FOR SOLUTION INTRAVENOUS at 14:59

## 2022-01-01 RX ADMIN — PIPERACILLIN AND TAZOBACTAM 3.38 G: 3; .375 INJECTION, POWDER, LYOPHILIZED, FOR SOLUTION INTRAVENOUS at 07:08

## 2022-01-01 RX ADMIN — LEVETIRACETAM 1500 MG: 500 SOLUTION ORAL at 09:03

## 2022-01-01 RX ADMIN — DEXAMETHASONE SODIUM PHOSPHATE 6 MG: 4 INJECTION, SOLUTION INTRA-ARTICULAR; INTRALESIONAL; INTRAMUSCULAR; INTRAVENOUS; SOFT TISSUE at 21:22

## 2022-01-01 RX ADMIN — INSULIN LISPRO 3 UNITS: 100 INJECTION, SOLUTION INTRAVENOUS; SUBCUTANEOUS at 08:55

## 2022-01-01 RX ADMIN — PIPERACILLIN AND TAZOBACTAM 3.38 G: 3; .375 INJECTION, POWDER, LYOPHILIZED, FOR SOLUTION INTRAVENOUS at 23:30

## 2022-01-01 RX ADMIN — ATORVASTATIN CALCIUM 80 MG: 40 TABLET, FILM COATED ORAL at 23:55

## 2022-01-01 RX ADMIN — PIPERACILLIN AND TAZOBACTAM 3.38 G: 3; .375 INJECTION, POWDER, LYOPHILIZED, FOR SOLUTION INTRAVENOUS at 15:11

## 2022-01-01 RX ADMIN — VALPROIC ACID 500 MG: 250 SOLUTION ORAL at 22:03

## 2022-01-01 RX ADMIN — LEVETIRACETAM 1500 MG: 500 SOLUTION ORAL at 08:54

## 2022-01-01 RX ADMIN — ATORVASTATIN CALCIUM 80 MG: 40 TABLET, FILM COATED ORAL at 22:24

## 2022-01-01 RX ADMIN — INSULIN LISPRO 4 UNITS: 100 INJECTION, SOLUTION INTRAVENOUS; SUBCUTANEOUS at 05:00

## 2022-01-01 RX ADMIN — MORPHINE SULFATE 2 MG: 2 INJECTION, SOLUTION INTRAMUSCULAR; INTRAVENOUS at 17:02

## 2022-01-01 RX ADMIN — SODIUM CHLORIDE, PRESERVATIVE FREE 10 ML: 5 INJECTION INTRAVENOUS at 22:42

## 2022-01-01 RX ADMIN — DEXAMETHASONE SODIUM PHOSPHATE 6 MG: 4 INJECTION, SOLUTION INTRA-ARTICULAR; INTRALESIONAL; INTRAMUSCULAR; INTRAVENOUS; SOFT TISSUE at 08:14

## 2022-01-01 RX ADMIN — LEVETIRACETAM 1500 MG: 500 SOLUTION ORAL at 09:10

## 2022-01-01 RX ADMIN — LACOSAMIDE 200 MG: 200 TABLET, FILM COATED ORAL at 23:32

## 2022-01-01 RX ADMIN — PROPOFOL 45 MCG/KG/MIN: 10 INJECTION, EMULSION INTRAVENOUS at 13:59

## 2022-01-01 RX ADMIN — LEVETIRACETAM 1000 MG: 10 INJECTION INTRAVENOUS at 18:45

## 2022-01-01 RX ADMIN — LACTULOSE 15 ML: 20 SOLUTION ORAL at 22:43

## 2022-01-01 RX ADMIN — INSULIN LISPRO 20 UNITS: 100 INJECTION, SOLUTION INTRAVENOUS; SUBCUTANEOUS at 13:12

## 2022-01-01 RX ADMIN — LACOSAMIDE 200 MG: 200 TABLET, FILM COATED ORAL at 21:39

## 2022-01-01 RX ADMIN — SODIUM CHLORIDE 40 MG: 9 INJECTION, SOLUTION INTRAMUSCULAR; INTRAVENOUS; SUBCUTANEOUS at 21:22

## 2022-01-01 RX ADMIN — INSULIN LISPRO 4 UNITS: 100 INJECTION, SOLUTION INTRAVENOUS; SUBCUTANEOUS at 18:10

## 2022-01-01 RX ADMIN — DEXAMETHASONE SODIUM PHOSPHATE 4 MG: 4 INJECTION, SOLUTION INTRAMUSCULAR; INTRAVENOUS at 08:24

## 2022-01-01 RX ADMIN — Medication 100 MG: at 13:28

## 2022-01-01 RX ADMIN — LACTULOSE 15 ML: 20 SOLUTION ORAL at 16:46

## 2022-01-01 RX ADMIN — PIPERACILLIN AND TAZOBACTAM 3.38 G: 3; .375 INJECTION, POWDER, LYOPHILIZED, FOR SOLUTION INTRAVENOUS at 21:41

## 2022-01-01 RX ADMIN — DEXAMETHASONE SODIUM PHOSPHATE 4 MG: 4 INJECTION, SOLUTION INTRA-ARTICULAR; INTRALESIONAL; INTRAMUSCULAR; INTRAVENOUS; SOFT TISSUE at 21:22

## 2022-01-01 RX ADMIN — DEXAMETHASONE SODIUM PHOSPHATE 6 MG: 4 INJECTION, SOLUTION INTRA-ARTICULAR; INTRALESIONAL; INTRAMUSCULAR; INTRAVENOUS; SOFT TISSUE at 08:19

## 2022-01-01 RX ADMIN — LACOSAMIDE 200 MG: 200 TABLET, FILM COATED ORAL at 21:11

## 2022-01-01 RX ADMIN — LACOSAMIDE 200 MG: 200 TABLET, FILM COATED ORAL at 09:15

## 2022-01-01 RX ADMIN — SODIUM CHLORIDE, PRESERVATIVE FREE 10 ML: 5 INJECTION INTRAVENOUS at 05:12

## 2022-01-01 RX ADMIN — ATORVASTATIN CALCIUM 80 MG: 40 TABLET, FILM COATED ORAL at 21:59

## 2022-01-01 RX ADMIN — ASPIRIN 81 MG CHEWABLE TABLET 81 MG: 81 TABLET CHEWABLE at 08:18

## 2022-01-01 RX ADMIN — PIPERACILLIN AND TAZOBACTAM 3.38 G: 3; .375 INJECTION, POWDER, LYOPHILIZED, FOR SOLUTION INTRAVENOUS at 21:22

## 2022-01-01 RX ADMIN — LEVETIRACETAM 1500 MG: 500 TABLET, FILM COATED ORAL at 21:45

## 2022-01-01 RX ADMIN — BARICITINIB 1 MG: 2 TABLET, FILM COATED ORAL at 08:31

## 2022-01-01 RX ADMIN — ATORVASTATIN CALCIUM 80 MG: 40 TABLET, FILM COATED ORAL at 21:00

## 2022-01-01 RX ADMIN — INSULIN LISPRO 4 UNITS: 100 INJECTION, SOLUTION INTRAVENOUS; SUBCUTANEOUS at 06:21

## 2022-01-01 RX ADMIN — LEVOTHYROXINE SODIUM 75 MCG: 0.07 TABLET ORAL at 06:38

## 2022-01-01 RX ADMIN — PROPOFOL 40 MCG/KG/MIN: 10 INJECTION, EMULSION INTRAVENOUS at 10:11

## 2022-01-01 RX ADMIN — SODIUM CHLORIDE, PRESERVATIVE FREE 10 ML: 5 INJECTION INTRAVENOUS at 13:36

## 2022-01-01 RX ADMIN — LACOSAMIDE 200 MG: 200 TABLET, FILM COATED ORAL at 08:47

## 2022-01-01 RX ADMIN — PIPERACILLIN AND TAZOBACTAM 3.38 G: 3; .375 INJECTION, POWDER, LYOPHILIZED, FOR SOLUTION INTRAVENOUS at 23:35

## 2022-01-01 RX ADMIN — Medication 19.5 MCG/MIN: at 07:31

## 2022-01-01 RX ADMIN — INSULIN GLARGINE 20 UNITS: 100 INJECTION, SOLUTION SUBCUTANEOUS at 22:50

## 2022-01-01 RX ADMIN — ETOMIDATE 20 MG: 2 INJECTION INTRAVENOUS at 13:28

## 2022-01-01 RX ADMIN — AZITHROMYCIN MONOHYDRATE 500 MG: 500 INJECTION, POWDER, LYOPHILIZED, FOR SOLUTION INTRAVENOUS at 17:56

## 2022-01-01 RX ADMIN — VALPROIC ACID 500 MG: 250 SOLUTION ORAL at 09:16

## 2022-01-01 RX ADMIN — MORPHINE SULFATE 2 MG: 2 INJECTION, SOLUTION INTRAMUSCULAR; INTRAVENOUS at 10:04

## 2022-01-01 RX ADMIN — Medication 8 MCG/MIN: at 13:12

## 2022-01-01 RX ADMIN — ACETAMINOPHEN 650 MG: 325 TABLET ORAL at 21:11

## 2022-01-01 RX ADMIN — LEVETIRACETAM 1500 MG: 500 SOLUTION ORAL at 09:18

## 2022-01-01 RX ADMIN — LACTULOSE 15 ML: 20 SOLUTION ORAL at 16:26

## 2022-01-01 RX ADMIN — PIPERACILLIN AND TAZOBACTAM 3.38 G: 3; .375 INJECTION, POWDER, LYOPHILIZED, FOR SOLUTION INTRAVENOUS at 21:45

## 2022-01-01 RX ADMIN — VALPROIC ACID 500 MG: 250 SOLUTION ORAL at 21:22

## 2022-01-01 RX ADMIN — PIPERACILLIN AND TAZOBACTAM 3.38 G: 3; .375 INJECTION, POWDER, LYOPHILIZED, FOR SOLUTION INTRAVENOUS at 22:26

## 2022-01-01 RX ADMIN — VALPROIC ACID 500 MG: 250 SOLUTION ORAL at 10:10

## 2022-01-01 RX ADMIN — SODIUM CHLORIDE 40 MG: 9 INJECTION, SOLUTION INTRAMUSCULAR; INTRAVENOUS; SUBCUTANEOUS at 08:27

## 2022-01-01 RX ADMIN — VALPROIC ACID 500 MG: 250 SOLUTION ORAL at 09:15

## 2022-01-01 RX ADMIN — Medication 4 MCG/MIN: at 17:51

## 2022-01-01 RX ADMIN — SODIUM CHLORIDE 75 ML/HR: 4.5 INJECTION, SOLUTION INTRAVENOUS at 04:33

## 2022-01-01 RX ADMIN — LORAZEPAM 2 MG: 2 INJECTION INTRAMUSCULAR; INTRAVENOUS at 17:49

## 2022-01-01 RX ADMIN — INSULIN LISPRO 4 UNITS: 100 INJECTION, SOLUTION INTRAVENOUS; SUBCUTANEOUS at 09:32

## 2022-01-01 RX ADMIN — ATORVASTATIN CALCIUM 80 MG: 40 TABLET, FILM COATED ORAL at 23:32

## 2022-01-01 RX ADMIN — LACOSAMIDE 200 MG: 200 TABLET, FILM COATED ORAL at 23:55

## 2022-01-01 RX ADMIN — INSULIN LISPRO 3 UNITS: 100 INJECTION, SOLUTION INTRAVENOUS; SUBCUTANEOUS at 05:31

## 2022-01-01 RX ADMIN — LACTULOSE 15 ML: 20 SOLUTION ORAL at 08:26

## 2022-01-01 RX ADMIN — PIPERACILLIN AND TAZOBACTAM 3.38 G: 3; .375 INJECTION, POWDER, LYOPHILIZED, FOR SOLUTION INTRAVENOUS at 05:32

## 2022-01-01 RX ADMIN — ACETAMINOPHEN 650 MG: 325 TABLET ORAL at 22:49

## 2022-01-01 RX ADMIN — DEXAMETHASONE SODIUM PHOSPHATE 4 MG: 4 INJECTION, SOLUTION INTRA-ARTICULAR; INTRALESIONAL; INTRAMUSCULAR; INTRAVENOUS; SOFT TISSUE at 09:33

## 2022-01-01 RX ADMIN — CALCIUM GLUCONATE 1000 MG: 20 INJECTION, SOLUTION INTRAVENOUS at 09:37

## 2022-01-01 RX ADMIN — HEPARIN SODIUM 2200 UNITS: 1000 INJECTION, SOLUTION INTRAVENOUS; SUBCUTANEOUS at 18:56

## 2022-01-01 RX ADMIN — PIPERACILLIN AND TAZOBACTAM 3.38 G: 3; .375 INJECTION, POWDER, LYOPHILIZED, FOR SOLUTION INTRAVENOUS at 22:28

## 2022-01-01 RX ADMIN — PIPERACILLIN AND TAZOBACTAM 3.38 G: 3; .375 INJECTION, POWDER, LYOPHILIZED, FOR SOLUTION INTRAVENOUS at 05:14

## 2022-01-01 RX ADMIN — DEXAMETHASONE SODIUM PHOSPHATE 6 MG: 4 INJECTION, SOLUTION INTRA-ARTICULAR; INTRALESIONAL; INTRAMUSCULAR; INTRAVENOUS; SOFT TISSUE at 21:42

## 2022-01-01 RX ADMIN — INSULIN LISPRO 20 UNITS: 100 INJECTION, SOLUTION INTRAVENOUS; SUBCUTANEOUS at 15:55

## 2022-01-01 RX ADMIN — LEVETIRACETAM 1500 MG: 500 SOLUTION ORAL at 22:42

## 2022-01-01 RX ADMIN — INSULIN GLARGINE 20 UNITS: 100 INJECTION, SOLUTION SUBCUTANEOUS at 21:23

## 2022-01-01 RX ADMIN — PIPERACILLIN AND TAZOBACTAM 3.38 G: 3; .375 INJECTION, POWDER, LYOPHILIZED, FOR SOLUTION INTRAVENOUS at 22:03

## 2022-01-01 RX ADMIN — PIPERACILLIN AND TAZOBACTAM 3.38 G: 3; .375 INJECTION, POWDER, LYOPHILIZED, FOR SOLUTION INTRAVENOUS at 13:49

## 2022-01-01 RX ADMIN — SODIUM CHLORIDE: 234 INJECTION, SOLUTION, CONCENTRATE INTRAVENOUS at 17:57

## 2022-01-01 RX ADMIN — PROPOFOL 50 MCG/KG/MIN: 10 INJECTION, EMULSION INTRAVENOUS at 09:37

## 2022-01-01 RX ADMIN — BARICITINIB 1 MG: 2 TABLET, FILM COATED ORAL at 08:14

## 2022-01-01 RX ADMIN — PROPOFOL 5 MCG/KG/MIN: 10 INJECTION, EMULSION INTRAVENOUS at 14:41

## 2022-01-01 RX ADMIN — SODIUM CHLORIDE, PRESERVATIVE FREE 10 ML: 5 INJECTION INTRAVENOUS at 21:40

## 2022-01-01 RX ADMIN — ASPIRIN 81 MG CHEWABLE TABLET 81 MG: 81 TABLET CHEWABLE at 15:41

## 2022-01-01 RX ADMIN — SODIUM CHLORIDE, PRESERVATIVE FREE 10 ML: 5 INJECTION INTRAVENOUS at 05:33

## 2022-01-01 RX ADMIN — DEXAMETHASONE SODIUM PHOSPHATE 4 MG: 4 INJECTION, SOLUTION INTRA-ARTICULAR; INTRALESIONAL; INTRAMUSCULAR; INTRAVENOUS; SOFT TISSUE at 09:48

## 2022-01-01 RX ADMIN — LEVOTHYROXINE SODIUM 75 MCG: 0.07 TABLET ORAL at 06:21

## 2022-01-01 RX ADMIN — DEXAMETHASONE SODIUM PHOSPHATE 4 MG: 4 INJECTION, SOLUTION INTRA-ARTICULAR; INTRALESIONAL; INTRAMUSCULAR; INTRAVENOUS; SOFT TISSUE at 08:19

## 2022-01-01 RX ADMIN — LACOSAMIDE 200 MG: 200 TABLET, FILM COATED ORAL at 09:38

## 2022-01-01 RX ADMIN — LACTULOSE 15 ML: 20 SOLUTION ORAL at 22:24

## 2022-01-01 RX ADMIN — LACTULOSE 15 ML: 20 SOLUTION ORAL at 21:58

## 2022-01-01 RX ADMIN — PROPOFOL 30 MCG/KG/MIN: 10 INJECTION, EMULSION INTRAVENOUS at 23:32

## 2022-01-01 RX ADMIN — INSULIN LISPRO 3 UNITS: 100 INJECTION, SOLUTION INTRAVENOUS; SUBCUTANEOUS at 00:26

## 2022-01-01 RX ADMIN — ALBUMIN (HUMAN) 25 G: 0.25 INJECTION, SOLUTION INTRAVENOUS at 09:53

## 2022-01-01 RX ADMIN — VALPROIC ACID 500 MG: 250 SOLUTION ORAL at 21:21

## 2022-01-01 RX ADMIN — BARICITINIB 1 MG: 2 TABLET, FILM COATED ORAL at 10:09

## 2022-01-01 RX ADMIN — ATORVASTATIN CALCIUM 80 MG: 40 TABLET, FILM COATED ORAL at 21:11

## 2022-01-01 RX ADMIN — MORPHINE SULFATE 2 MG: 2 INJECTION, SOLUTION INTRAMUSCULAR; INTRAVENOUS at 01:47

## 2022-01-01 RX ADMIN — LORAZEPAM 1 MG: 2 INJECTION INTRAMUSCULAR; INTRAVENOUS at 16:01

## 2022-01-20 NOTE — ED PROVIDER NOTES
EMERGENCY DEPARTMENT HISTORY AND PHYSICAL EXAM      Date: 1/20/2022  Patient Name: Sury Morrissey      History of Presenting Illness     Chief Complaint   Patient presents with    Seizure       History Provided By: EMS    HPI: Sury Morrissey, 78 y.o. male with a past medical history significant seizure presents to the ED with cc of being found unconscious and unresponsive. Vital signs are within normal limits according to EMS but patient did appear to be postictal was treated with 5 mg of Versed prior to arrival.  Given patient's postictal state he is unable to respond to questions at this point in time. There have been no other treatments prior to arrival.  EMS conveys there was little to no information from bystanders. There are no other complaints, changes, or physical findings at this time. 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 100 Strip 3    lancets misc Sig: To check sugar twice a day before breakfast and at bedtime 1 Each 11    amLODIPine (NORVASC) 10 mg tablet Take 1 Tablet by mouth daily. 90 Tablet 2    losartan (COZAAR) 50 mg tablet Take 1 Tablet by mouth daily. Please stop hydrochlorothiazide and amlodipine 90 Tablet 2    levothyroxine (SYNTHROID) 75 mcg tablet Take 1 Tablet by mouth every morning. Take in early morning. 90 Tablet 2    metFORMIN (GLUCOPHAGE) 500 mg tablet Take 1 Tablet by mouth two (2) times daily (with meals). 180 Tablet 2    pravastatin (PRAVACHOL) 20 mg tablet Take 1 Tablet by mouth nightly. 90 Tablet 2    lacosamide (VIMPAT) 200 mg tab tablet Take 200 mg by mouth two (2) times a day.       lacosamide (Vimpat) 200 mg tab tablet Take 1 Tablet by mouth two (2) times a day. Max Daily Amount: 400 mg. courtesy refill  Given on behalf of neurologist dr Macrina Antonio or dr Yvonne Jernigan 60 Tablet 1    OLANZapine (ZyPREXA) 2.5 mg tablet Take 1 Tablet by mouth two (2) times a day. 180 Tablet 0    Blood-Glucose Meter monitoring kit He is having a seizure he needs blood sugar to be checked twice a day before breakfast and at bedtime, 1 Kit 1    levETIRAcetam 1,000 mg tablet Take 1,000 mg by mouth two (2) times a day.  divalproex DR (DEPAKOTE) 500 mg tablet Take 500 mg by mouth two (2) times a day. Take two tablets twice daily         Past History     Past Medical History:  Past Medical History:   Diagnosis Date    Acute renal failure ([facility] [address]     Anemia     Arthritis     DKA (diabetic ketoacidoses)     GERD (gastroesophageal reflux disease)     HTN (hypertension)     Hypercholesterolemia     Schizophrenia ([facility] [address]     Schizophreniform disorder (HCC)     Seizure disorder (HCC)     Type II or unspecified type diabetes mellitus without mention of complication, uncontrolled        Past Surgical History:  No past surgical history on file. Family History:  Family History   Problem Relation Age of Onset    Stroke Father        Social History:  Social History     Tobacco Use    Smoking status: Never Smoker    Smokeless tobacco: Never Used   Substance Use Topics    Alcohol use: No    Drug use: No       Allergies:  No Known Allergies      Review of Systems     Review of Systems   Unable to perform ROS: Acuity of condition       Physical Exam     Physical Exam  Vitals and nursing note reviewed. Constitutional:       General: He is not in acute distress. Appearance: He is well-developed. HENT:      Head: Normocephalic and atraumatic. Nose: Nose normal.      Mouth/Throat:      Mouth: Mucous membranes are moist.      Pharynx: Oropharynx is clear. No oropharyngeal exudate. Eyes:      General:         Right eye: No discharge. Left eye: No discharge. Conjunctiva/sclera: Conjunctivae normal.      Pupils: Pupils are equal, round, and reactive to light. Cardiovascular:      Rate and Rhythm: Normal rate and regular rhythm. Chest Wall: PMI is not displaced. No thrill. Heart sounds: Normal heart sounds. No murmur heard. No friction rub. No gallop. Pulmonary:      Effort: Pulmonary effort is normal. No respiratory distress. Breath sounds: Normal breath sounds. No wheezing or rales. Chest:      Chest wall: No tenderness. Abdominal:      General: Bowel sounds are normal. There is no distension. Palpations: Abdomen is soft. There is no mass. Tenderness: There is no abdominal tenderness. There is no guarding or rebound. Musculoskeletal:         General: Normal range of motion. Cervical back: Normal range of motion and neck supple. Lymphadenopathy:      Cervical: No cervical adenopathy. Skin:     General: Skin is warm and dry. Capillary Refill: Capillary refill takes less than 2 seconds. Findings: No erythema or rash. Neurological:      Mental Status: He is alert. Cranial Nerves: No cranial nerve deficit.       Coordination: Coordination normal.      Comments: Appears postictal   Psychiatric:      Comments: Unable to assess         Lab and Diagnostic Study Results     Labs -     Recent Results (from the past 12 hour(s))   EKG, 12 LEAD, INITIAL    Collection Time: 01/20/22  4:05 PM   Result Value Ref Range    Ventricular Rate 87 BPM    Atrial Rate 87 BPM    P-R Interval 126 ms    QRS Duration 114 ms    Q-T Interval 380 ms    QTC Calculation (Bezet) 457 ms    Calculated P Axis 74 degrees    Calculated R Axis -43 degrees    Calculated T Axis -22 degrees    Diagnosis       Normal sinus rhythm  Left axis deviation  Nonspecific ST and T wave abnormality  Abnormal ECG  When compared with ECG of 06-OCT-2021 20:00,  No significant change was found  Confirmed by ABBY Burkett MD [id number]) on 1/20/2022 4:09:53 PM URINALYSIS W/MICROSCOPIC    Collection Time: 01/20/22  4:15 PM   Result Value Ref Range    Color Pink      Appearance Turbid (A) Clear      Specific gravity 1.009 1.003 - 1.030      pH (UA) 5.0 5.0 - 8.0      Protein 30 (A) Negative mg/dL    Glucose Negative Negative mg/dL    Ketone Negative Negative mg/dL    Bilirubin Negative Negative      Blood Moderate (A) Negative      Urobilinogen 0.1 0.1 - 1.0 EU/dL    Nitrites Negative Negative      Leukocyte Esterase Negative Negative      WBC >100 (H) 0 - 4 /hpf    RBC >100 (H) 0 - 5 /hpf    Bacteria Negative Negative /hpf   DRUG SCREEN, URINE    Collection Time: 01/20/22  4:15 PM   Result Value Ref Range    AMPHETAMINES Negative Negative      BARBITURATES Negative Negative      BENZODIAZEPINES Positive (A) Negative      COCAINE Negative Negative      METHADONE Negative Negative      OPIATES Negative Negative      PCP(PHENCYCLIDINE) Negative Negative      THC (TH-CANNABINOL) Negative Negative      Drug screen comment        This test is a screen for drugs of abuse in a medical setting only (i.e., they are unconfirmed results and as such must not be used for non-medical purposes, e.g.,employment testing, legal testing). Due to its inherent nature, false positive (FP) and false negative (FN) results may be obtained. Therefore, if necessary for medical care, recommend confirmation of positive findings by GC/MS. GLUCOSE, POC    Collection Time: 01/20/22  4:32 PM   Result Value Ref Range    Glucose (POC) 155 (H) 65 - 117 mg/dL    Performed by MIAH JAMES    LACTIC ACID    Collection Time: 01/20/22  5:25 PM   Result Value Ref Range    Lactic acid 1.5 0.4 - 2.0 mmol/L   VALPROIC ACID    Collection Time: 01/20/22  5:25 PM   Result Value Ref Range    Valproic acid 101 (H) 50 - 100 ug/mL   CBC WITH AUTOMATED DIFF    Collection Time: 01/20/22  5:25 PM   Result Value Ref Range    WBC 6.4 4.1 - 11.1 K/uL    RBC 4.23 4. 10 - 5.70 M/uL    HGB 13.8 12.1 - 17.0 g/dL    HCT 41.0 36.6 - 50.3 %    MCV 96.9 80.0 - 99.0 FL    MCH 32.6 26.0 - 34.0 PG    MCHC 33.7 30.0 - 36.5 g/dL    RDW 13.2 11.5 - 14.5 %    PLATELET 434 373 - 313 K/uL    MPV 9.9 8.9 - 12.9 FL    NRBC 0.0 0.0  WBC    ABSOLUTE NRBC 0.00 0.00 - 0.01 K/uL    NEUTROPHILS 83 (H) 32 - 75 %    LYMPHOCYTES 10 (L) 12 - 49 %    MONOCYTES 6 5 - 13 %    EOSINOPHILS 0 0 - 7 %    BASOPHILS 0 0 - 1 %    IMMATURE GRANULOCYTES 1 (H) 0 - 0.5 %    ABS. NEUTROPHILS 5.4 1.8 - 8.0 K/UL    ABS. LYMPHOCYTES 0.6 (L) 0.8 - 3.5 K/UL    ABS. MONOCYTES 0.4 0.0 - 1.0 K/UL    ABS. EOSINOPHILS 0.0 0.0 - 0.4 K/UL    ABS. BASOPHILS 0.0 0.0 - 0.1 K/UL    ABS. IMM. GRANS. 0.1 (H) 0.00 - 0.04 K/UL    DF AUTOMATED     METABOLIC PANEL, COMPREHENSIVE    Collection Time: 01/20/22  5:25 PM   Result Value Ref Range    Sodium 140 136 - 145 mmol/L    Potassium 3.4 (L) 3.5 - 5.1 mmol/L    Chloride 106 97 - 108 mmol/L    CO2 28 21 - 32 mmol/L    Anion gap 6 5 - 15 mmol/L    Glucose 146 (H) 65 - 100 mg/dL    BUN 18 6 - 20 mg/dL    Creatinine 1.37 (H) 0.70 - 1.30 mg/dL    BUN/Creatinine ratio 13 12 - 20      GFR est AA >60 >60 ml/min/1.73m2    GFR est non-AA 51 (L) >60 ml/min/1.73m2    Calcium 9.3 8.5 - 10.1 mg/dL    Bilirubin, total 0.2 0.2 - 1.0 mg/dL    AST (SGOT) 23 15 - 37 U/L    ALT (SGPT) 17 12 - 78 U/L    Alk. phosphatase 49 45 - 117 U/L    Protein, total 7.8 6.4 - 8.2 g/dL    Albumin 3.1 (L) 3.5 - 5.0 g/dL    Globulin 4.7 (H) 2.0 - 4.0 g/dL    A-G Ratio 0.7 (L) 1.1 - 2.2     TROPONIN-HIGH SENSITIVITY    Collection Time: 01/20/22  5:25 PM   Result Value Ref Range    Troponin-High Sensitivity 14 0 - 76 ng/L       Radiologic Studies -   [unfilled]  CT Results  (Last 48 hours)               01/20/22 1640  CT HEAD WO CONT Final result    Impression:  No intracranial hemorrhage. Sinus inflammation. No seizure source evident on this nonenhanced study. Narrative:  CT head. Comparison CT head October 6, 2021.        Axial images are reviewed along with reformatted sagittal/coronal images. Motion   limited study. Dose reduction: All CT scans at this facility are performed using dose reduction   optimization techniques as appropriate to a performed exam including the   following-   automated exposure control, adjustments of mA and/or Kv according to patient   size, or use of iterative reconstructive technique. Small volume dependent fluid within maxillary sinuses. Partial opacification   ethmoid air cells. Mastoid air cells are normally aerated. Review of intracranial content reveals similar significant degree   cerebral/cerebellar atrophy. Falx-based calcification. No hydrocephalus. No   intracranial hemorrhage. CXR Results  (Last 48 hours)               01/20/22 1653  XR CHEST PORT Final result    Narrative:  Chest single view. Comparison single view chest October 6, 2021. Reduced lung volume. Central vessel crowding. No interstitial or alveolar   pulmonary edema. Cardiac and mediastinal structures unchanged. No pneumothorax   or sizable pleural effusion. Medical Decision Making and ED Course   - I am the first and primary provider for this patient AND AM THE PRIMARY PROVIDER OF RECORD. - I reviewed the vital signs, available nursing notes, past medical history, past surgical history, family history and social history. - Initial assessment performed. The patients presenting problems have been discussed, and the staff are in agreement with the care plan formulated and outlined with them. I have encouraged them to ask questions as they arise throughout their visit. Vital Signs-Reviewed the patient's vital signs.     Patient Vitals for the past 12 hrs:   Temp Pulse Resp BP SpO2   01/20/22 1711  88 20 (!) 143/68 100 %   01/20/22 1625     100 %   01/20/22 1600 98.2 °F (36.8 °C) 89 20 124/64 100 %       EKG interpretation: (Preliminary): Performed at 1605, and read at 1607  Ventricular did some beats per minute,  ms, QRS duration 114 ms, . Interpretation: Normal sinus rhythm left axis deviation. Abnormal EKG. Records Reviewed: Nursing Notes, Old Medical Records and Ambulance Run Sheet    The patient presents with seizure-like activity with a differential diagnosis of  Abnormality, seizures, subtherapeutic AED    ED Course:          Provider Notes (Medical Decision Making):   53-year-old male with history of seizure disorder. Patient's labs are within normal limits I discussed the case with his brothers and agreement with the care plan as outlined and that he can follow-up with his neurologist as an outpatient. EMS conveys that the patient is on oxygen chronically. MDM           Consultations:       Consultations: - NONE        Procedures and Critical Care       Performed by: Myah Eugene MD  PROCEDURES:  Procedures       Disposition     Disposition: Condition stable        Remove if not discharged  DISCHARGE PLAN:  1. 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 Qty: 100 Strip, Refills: 3      lancets misc Sig: To check sugar twice a day before breakfast and at bedtime  Qty: 1 Each, Refills: 11      amLODIPine (NORVASC) 10 mg tablet Take 1 Tablet by mouth daily. Qty: 90 Tablet, Refills: 2      losartan (COZAAR) 50 mg tablet Take 1 Tablet by mouth daily. Please stop hydrochlorothiazide and amlodipine  Qty: 90 Tablet, Refills: 2      levothyroxine (SYNTHROID) 75 mcg tablet Take 1 Tablet by mouth every morning. Take in early morning.   Qty: 90 Tablet, Refills: 2      metFORMIN (GLUCOPHAGE) 500 mg tablet Take 1 Tablet by mouth two (2) times daily (with meals). Qty: 180 Tablet, Refills: 2      pravastatin (PRAVACHOL) 20 mg tablet Take 1 Tablet by mouth nightly. Qty: 90 Tablet, Refills: 2      !! lacosamide (VIMPAT) 200 mg tab tablet Take 200 mg by mouth two (2) times a day. !! lacosamide (Vimpat) 200 mg tab tablet Take 1 Tablet by mouth two (2) times a day. Max Daily Amount: 400 mg. courtesy refill  Given on behalf of neurologist dr Ortega Leon or dr Tania Head: 60 Tablet, Refills: 1    Associated Diagnoses: Seizure disorder ([address]      OLANZapine (ZyPREXA) 2.5 mg tablet Take 1 Tablet by mouth two (2) times a day. Qty: 180 Tablet, Refills: 0      Blood-Glucose Meter monitoring kit He is having a seizure he needs blood sugar to be checked twice a day before breakfast and at bedtime,  Qty: 1 Kit, Refills: 1      levETIRAcetam 1,000 mg tablet Take 1,000 mg by mouth two (2) times a day. divalproex DR (DEPAKOTE) 500 mg tablet Take 500 mg by mouth two (2) times a day. Take two tablets twice daily       ! ! - Potential duplicate medications found. Please discuss with provider. 2.   Follow-up Information     Follow up With Specialties Details Why Contact Info    Bebe Dietrich MD Internal Medicine Call in 2 days  [address]  [phone]          3. Return to ED if worse   4. Current Discharge Medication List          Diagnosis     Clinical Impression:   1. Seizure disorder [facility])        Attestations:    Jessica No MD    Please note that this dictation was completed with [employer], the [employer] voice recognition software. Quite often unanticipated grammatical, syntax, homophones, and other interpretive errors are inadvertently transcribed by the computer software. Please disregard these errors. Please excuse any errors that have escaped final proofreading. Thank you.

## 2022-01-20 NOTE — ED TRIAGE NOTES
Pt arrived to ems unresponsive, hx of seizure. Upon ems arrival they witnessed seizure like activity lasted about 1 minute. Ems gave versed seizure activity stopped.

## 2022-01-27 NOTE — ACP (ADVANCE CARE PLANNING)
Advance Care Planning   Healthcare Decision Maker:       Primary Decision Maker: Victoria Nancy Brother - [phone]    Secondary Decision Maker: Radu eKe - Sister - [phone]

## 2022-01-27 NOTE — CONSULTS
IMPRESSION:   1. Acute hypoxic respiratory failure  2. Malignant hyperthermia  3. COVID-19 pneumonia  4. NSTEMI elevated troponin  5. Shock cardiogenic versus septic vs Hypovolumic Hypotension  6. Symptomatic  7. Acute kidney injury  8. Renal azotemia  9. Additional workup outlined below  10. Pt is at high risk of sudden decline and decompensation with life threatening consequenses and continued end organ dysfunction and failure  11. Pt is critically ill. Time spent with pt and staff actively rendering care, managing pt and coordinating care as stated below; 55 minutes, exclusive of any procedures      RECOMMENDATIONS/PLAN:   1. ICU monitoring  1. Ventilator for mechanical life support and prevent respiratory arrest with protective lung strategies   2. We will start patient on Decadron Zithromax already on Zosyn I will add baricitinib as he is intubated on ventilator not a candidate for remdesivir  3. Troponin is elevated  4. Patient is severely dehydrated IV fluid will start patient on vasopressors  5. We will start patient on Zosyn  6. Agree with Empiric IV antibiotics pending culture results   7. Cooling blanket most likely patient has neuroleptic malignant syndrome secondary to antiseizure medication neurology consult  8. IV vasopressors for circulatory shock refractory to fluids to maintain SBP> 90  9. Transfuse prn to maintain Hgb > 7  10. Labs to follow electrolytes, renal function and and blood counts  11. Bronchial hygiene with respiratory therapy techniques, bronchodilators  12. Pt needs IV fluids with additives and Drug therapy requiring intensive monitoring for toxicity  13. Prescription drug management with home med reconciliation reviewed  14. DVT, SUP prophylaxis  15.  Will be available to assist in medical management while in the CCU pending disposition     [x] High complexity decision making was performed  [x] See my orders for details  HPI   61-year-old male came in because as he was found unresponsive and fever 107 past medical history of schizophrenia and diabetes gastroesophageal reflux disease and anemia he is COVID-19 positive initially patient was called as NSTEMI but it was canceled patient is intubated on ventilator hemodynamically unstable unable to get any history out of the patient he seems dehydrated    PMH:  has a past medical history of Acute renal failure ([facility] [address] Anemia, Arthritis, DKA (diabetic ketoacidoses), GERD (gastroesophageal reflux disease), HTN (hypertension), Hypercholesterolemia, Schizophrenia ([facility] [address] Schizophreniform disorder ([facility] [address] Seizure disorder ([facility] [address] and Type II or unspecified type diabetes mellitus without mention of complication, uncontrolled. PSH:   has no past surgical history on file. FHX: family history includes Stroke in his father. SHX:  reports that he has never smoked. He has never used smokeless tobacco. He reports that he does not drink alcohol and does not use drugs.     ALL: No Known Allergies     MEDS:   [x] Reviewed - As Below   [] Not reviewed    Current Facility-Administered Medications   Medication    propofol (DIPRIVAN) 10 mg/mL infusion    divalproex DR (DEPAKOTE) tablet 500 mg    lacosamide (VIMPAT) tablet 200 mg    levETIRAcetam (KEPPRA) tablet 1,000 mg    [START ON 1/28/2022] levothyroxine (SYNTHROID) tablet 75 mcg    aspirin chewable tablet 81 mg    sodium chloride (NS) flush 5-40 mL    sodium chloride (NS) flush 5-40 mL    acetaminophen (TYLENOL) tablet 650 mg    Or    acetaminophen (TYLENOL) suppository 650 mg    polyethylene glycol (MIRALAX) packet 17 g    ondansetron (ZOFRAN ODT) tablet 4 mg    Or    ondansetron (ZOFRAN) injection 4 mg    sodium chloride 0.9 % bolus infusion 1,000 mL    atorvastatin (LIPITOR) tablet 80 mg    piperacillin-tazobactam (ZOSYN) 3.375 g in 0.9% sodium chloride (MBP/ADV) 100 mL MBP     Current Outpatient Medications   Medication Sig    Shower Chair XX braeden 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  lancets misc Sig: To check sugar twice a day before breakfast and at bedtime    amLODIPine (NORVASC) 10 mg tablet Take 1 Tablet by mouth daily.  losartan (COZAAR) 50 mg tablet Take 1 Tablet by mouth daily. Please stop hydrochlorothiazide and amlodipine    levothyroxine (SYNTHROID) 75 mcg tablet Take 1 Tablet by mouth every morning. Take in early morning.  metFORMIN (GLUCOPHAGE) 500 mg tablet Take 1 Tablet by mouth two (2) times daily (with meals).  pravastatin (PRAVACHOL) 20 mg tablet Take 1 Tablet by mouth nightly.  lacosamide (VIMPAT) 200 mg tab tablet Take 200 mg by mouth two (2) times a day.  lacosamide (Vimpat) 200 mg tab tablet Take 1 Tablet by mouth two (2) times a day. Max Daily Amount: 400 mg. courtesy refill  Given on behalf of neurologist dr Kala Tse or dr Osei Sensor    OLANZapine (ZyPREXA) 2.5 mg tablet Take 1 Tablet by mouth two (2) times a day.  Blood-Glucose Meter monitoring kit He is having a seizure he needs blood sugar to be checked twice a day before breakfast and at bedtime,    levETIRAcetam 1,000 mg tablet Take 1,000 mg by mouth two (2) times a day.  divalproex DR (DEPAKOTE) 500 mg tablet Take 500 mg by mouth two (2) times a day.  Take two tablets twice daily      MAR reviewed and pertinent medications noted or modified as needed   Current Facility-Administered Medications   Medication    propofol (DIPRIVAN) 10 mg/mL infusion    divalproex DR (DEPAKOTE) tablet 500 mg    lacosamide (VIMPAT) tablet 200 mg    levETIRAcetam (KEPPRA) tablet 1,000 mg    [START ON 1/28/2022] levothyroxine (SYNTHROID) tablet 75 mcg    aspirin chewable tablet 81 mg    sodium chloride (NS) flush 5-40 mL    sodium chloride (NS) flush 5-40 mL    acetaminophen (TYLENOL) tablet 650 mg Or    acetaminophen (TYLENOL) suppository 650 mg    polyethylene glycol (MIRALAX) packet 17 g    ondansetron (ZOFRAN ODT) tablet 4 mg    Or    ondansetron (ZOFRAN) injection 4 mg    sodium chloride 0.9 % bolus infusion 1,000 mL    atorvastatin (LIPITOR) tablet 80 mg    piperacillin-tazobactam (ZOSYN) 3.375 g in 0.9% sodium chloride (MBP/ADV) 100 mL MBP     Current Outpatient Medications   Medication Sig    Shower Chair XX braeden 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  lancets misc Sig: To check sugar twice a day before breakfast and at bedtime    amLODIPine (NORVASC) 10 mg tablet Take 1 Tablet by mouth daily.  losartan (COZAAR) 50 mg tablet Take 1 Tablet by mouth daily. Please stop hydrochlorothiazide and amlodipine    levothyroxine (SYNTHROID) 75 mcg tablet Take 1 Tablet by mouth every morning. Take in early morning.  metFORMIN (GLUCOPHAGE) 500 mg tablet Take 1 Tablet by mouth two (2) times daily (with meals).  pravastatin (PRAVACHOL) 20 mg tablet Take 1 Tablet by mouth nightly.  lacosamide (VIMPAT) 200 mg tab tablet Take 200 mg by mouth two (2) times a day.  lacosamide (Vimpat) 200 mg tab tablet Take 1 Tablet by mouth two (2) times a day. Max Daily Amount: 400 mg. courtesy refill  Given on behalf of neurologist dr Kay Drake or dr Davi Carl    OLANZapine (ZyPREXA) 2.5 mg tablet Take 1 Tablet by mouth two (2) times a day.  Blood-Glucose Meter monitoring kit He is having a seizure he needs blood sugar to be checked twice a day before breakfast and at bedtime,    levETIRAcetam 1,000 mg tablet Take 1,000 mg by mouth two (2) times a day.  divalproex DR (DEPAKOTE) 500 mg tablet Take 500 mg by mouth two (2) times a day.  Take two tablets twice daily      PMH:  has a past medical history of Acute renal failure ([facility] [address] Anemia, Arthritis, DKA (diabetic ketoacidoses), GERD (gastroesophageal reflux disease), HTN (hypertension), Hypercholesterolemia, Schizophrenia ([facility] [address] Schizophreniform disorder ([facility] [address] Seizure disorder ([facility] and Type II or unspecified type diabetes mellitus without mention of complication, uncontrolled. PSH:   has no past surgical history on file. FHX: family history includes Stroke in his father. SHX:  reports that he has never smoked. He has never used smokeless tobacco. He reports that he does not drink alcohol and does not use drugs. ROS:  Unable to obtain intubated on ventilator and sedated    Hemodynamics:    CO:    CI:    CVP:    SVR:   PAP Systolic:    PAP Diastolic:    PVR:    TH75:        Ventilator Settings:      Mode Rate TV Press PEEP FiO2 PIP Min. Vent   Assist control,Volume control    450 ml    10 cm H20 100 %  42 cm H2O  13 l/min        Vital Signs: Telemetry:    normal sinus rhythm Intake/Output:   Visit Vitals  BP (!) 82/49   Pulse 94   Temp (!) 105.4 °F (40.8 °C)   Resp 27   Ht 6' (1.829 m)   Wt 99.8 kg (220 lb)   SpO2 99%   BMI 29.84 kg/m²       Temp (24hrs), Av.4 °F (40.8 °C), Min:104.5 °F (40.3 °C), Max:106.5 °F (41.4 °C)        O2 Device: Ventilator         Wt Readings from Last 4 Encounters:   22 99.8 kg (220 lb)   22 101.1 kg (222 lb 14.2 oz)   10/06/21 111.1 kg (245 lb)   21 110.7 kg (244 lb)          Intake/Output Summary (Last 24 hours) at 2022 1605  Last data filed at 2022 1521  Gross per 24 hour   Intake 1100 ml   Output    Net 1100 ml       Last shift:       0701 -  1900  In: 1100 [I.V.:1100]  Out: -   Last 3 shifts: No intake/output data recorded. Physical Exam:     General:  intubated on vent  HEENT: NCAT, poor dentition, lips and mucosa dry  Eyes: anicteric; conjunctiva clear  Neck: no nodes, no cuff leak, trach midline; no accessory MM use.   Chest: no deformity,   Cardiac: R regular; no murmur; Lungs: distant breath sounds; no wheezes  Abd: soft, NT, hypoactive BS  Ext: no edema; no joint swelling; No clubbing  : NO servin, clear urine  Neuro: sedated  Psych-  unable to assess  Skin: warm, dry, no cyanosis;   Pulses: 1-2+ Bilateral pedal, radial  Capillary: brisk; pale      DATA:    MAR reviewed and pertinent medications noted or modified as needed  MEDS:   Current Facility-Administered Medications   Medication    propofol (DIPRIVAN) 10 mg/mL infusion    divalproex DR (DEPAKOTE) tablet 500 mg    lacosamide (VIMPAT) tablet 200 mg    levETIRAcetam (KEPPRA) tablet 1,000 mg    [START ON 1/28/2022] levothyroxine (SYNTHROID) tablet 75 mcg    aspirin chewable tablet 81 mg    sodium chloride (NS) flush 5-40 mL    sodium chloride (NS) flush 5-40 mL    acetaminophen (TYLENOL) tablet 650 mg    Or    acetaminophen (TYLENOL) suppository 650 mg    polyethylene glycol (MIRALAX) packet 17 g    ondansetron (ZOFRAN ODT) tablet 4 mg    Or    ondansetron (ZOFRAN) injection 4 mg    sodium chloride 0.9 % bolus infusion 1,000 mL    atorvastatin (LIPITOR) tablet 80 mg    piperacillin-tazobactam (ZOSYN) 3.375 g in 0.9% sodium chloride (MBP/ADV) 100 mL MBP     Current Outpatient Medications   Medication Sig    Shower Chair XX braeden 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  lancets misc Sig: To check sugar twice a day before breakfast and at bedtime    amLODIPine (NORVASC) 10 mg tablet Take 1 Tablet by mouth daily.  losartan (COZAAR) 50 mg tablet Take 1 Tablet by mouth daily. Please stop hydrochlorothiazide and amlodipine    levothyroxine (SYNTHROID) 75 mcg tablet Take 1 Tablet by mouth every morning. Take in early morning.     metFORMIN (GLUCOPHAGE) 500 mg tablet Take 1 Tablet by mouth two (2) times daily (with meals).  pravastatin (PRAVACHOL) 20 mg tablet Take 1 Tablet by mouth nightly.  lacosamide (VIMPAT) 200 mg tab tablet Take 200 mg by mouth two (2) times a day.  lacosamide (Vimpat) 200 mg tab tablet Take 1 Tablet by mouth two (2) times a day. Max Daily Amount: 400 mg. courtesy refill  Given on behalf of neurologist dr Princess Sadler or dr Hermes Love    OLANZapine (ZyPREXA) 2.5 mg tablet Take 1 Tablet by mouth two (2) times a day.  Blood-Glucose Meter monitoring kit He is having a seizure he needs blood sugar to be checked twice a day before breakfast and at bedtime,    levETIRAcetam 1,000 mg tablet Take 1,000 mg by mouth two (2) times a day.  divalproex DR (DEPAKOTE) 500 mg tablet Take 500 mg by mouth two (2) times a day. Take two tablets twice daily        Labs:    Recent Labs     01/27/22  1330   WBC 5.4   HGB 15.2   PLT PLEASE SEE PLTNACIT FOR ACCURATE PLT COUNT DUE TO EDTA SENSITIVITY CAUSING PLT CLUMPING IN LAV TOP TUBE. Recent Labs     01/27/22  1459 01/27/22  1458 01/27/22  1330   NA  --  155*  --    K  --  2.8*  --    CL  --  122*  --    CO2  --  24  --    GLU  --  358*  --    BUN  --  61*  --    CREA  --  3.54*  --    CA  --  9.3  --    LAC 4.7*  --  6.6*   ALB  --  2.0*  --    ALT  --  282*  --      Recent Labs     01/27/22  1345   PH 7.32*   PCO2 47*   PO2 299*   HCO3 23   FIO2 100.0     No results for input(s): CPK, CKNDX, TROIQ in the last 72 hours. No lab exists for component: CPKMB  No results found for: BNPP, BNP   Lab Results   Component Value Date/Time    Culture result: No significant growth, <10,000 CFU/mL 06/05/2021 08:45 AM    Culture result: No growth 6 days 06/03/2021 02:00 PM    Culture result: No growth 6 days 06/03/2021 01:30 PM     Lab Results   Component Value Date/Time    TSH 4.47 (H) 05/21/2021 10:46 AM        Imaging:    Results from Hospital Encounter encounter on 01/27/22    XR CHEST PORT    Narrative  Chest single view.     Comparison single view chest January 20, 2022.    Newly placed ET tube 3 cm above the irma. Unchanged appearance for the lungs again noting central and medial basilar hazy  reticular markings. Cardiac and mediastinal structures unchanged. No pneumothorax or sizable pleural  effusion. Results from [facility] encounter on 01/20/22    CT HEAD WO CONT    Narrative  CT head. Comparison CT head October 6, 2021. Axial images are reviewed along with reformatted sagittal/coronal images. Motion  limited study. Dose reduction: All CT scans at this facility are performed using dose reduction  optimization techniques as appropriate to a performed exam including the  following-  automated exposure control, adjustments of mA and/or Kv according to patient  size, or use of iterative reconstructive technique. Small volume dependent fluid within maxillary sinuses. Partial opacification  ethmoid air cells. Mastoid air cells are normally aerated. Review of intracranial content reveals similar significant degree  cerebral/cerebellar atrophy. Falx-based calcification. No hydrocephalus. No  intracranial hemorrhage. Impression  No intracranial hemorrhage. Sinus inflammation. No seizure source evident on this nonenhanced study. This care involved high complexity decision making which includes independently reviewing the patient's past medical records, current laboratory results, medication profiles that were immediately available to me and actual Xray images at the bedside in order to assess, support vital system function, and to treat this degree of vital organ system failure, and to prevent further life threatening deterioration of the patients condition. I was in direct communication with the nursing staff throughout this time.     Medical Decision Making Today  · Reviewed the flowsheet and previous days notes  · Reviewed and summarized records or history from previous days note or discussions with staff, family  · Parenteral controlled substances - Reviewed/ Adjusted / Weaned / Started  · High Risk Drug therapy requiring intensive monitoring for toxicity: eg steroids, pressors, antibiotics  · Review and order of Clinical lab tests  · Review and Order of Radiology tests  · Review and Order of Medicine tests  · Independent visualization of radiologic Images  · Reviewed Ventilator / NiPPV  · I have personally reviewed the patients ECG / Telemetry  · Diagnostic endoscopies with identified risk factors      I have provided total of 55 minutes of critical care time rendering care exclusive of any procedures. During this entire length of time the patient's condition was unstable, unpredictable and critically ill in the CCU/ ICU. I was immediately available to the patient whose care required several interactions with nursing, multidisciplinary team members leading to multiple interventions with fluid resuscitation and medication adjustments to optimize respiratory support, hemodynamic treatment, medication changes based on repeat labs results, reviews, exams and assessments. The reason for providing this level of medical care was due to a critical illness that impaired one or more vital organ systems, such that there was a high probability of sudden or life threatening deterioration in  patient's condition.

## 2022-01-27 NOTE — CONSULTS
CONSULTATION    REASON FOR CONSULT:  NSTEMI    REQUESTING PROVIDER:  Dr. Leslie Diaz:    Chief Complaint   Patient presents with    Unresponsive         HISTORY OF PRESENT ILLNESS:  Licha Bernal is a 78y.o. year-old male with past medical history significant for anemia, GERD, schizophrenia, diabetes, and seizure disorder who presented to ED for evaluation of decreased responsiveness and shortness of breath. On examination, patient is intubated and sedated. He is COVID positive and febrile. Patient is noted to be hypotensive on the monitor. Initially STEMI was called but EKG on arrival showed no evidence of ST elevations and STEMI alert was cancelled. Records from hospital admission course thus far reviewed. Telemetry Review: No acute events noted. Sinus tachycardia: heart rate 100s. PAST MEDICAL HISTORY:    Past Medical History:   Diagnosis Date    Acute [facility] failure ([facility] [address]     Anemia     Arthritis     DKA (diabetic ketoacidoses)     GERD (gastroesophageal reflux disease)     HTN (hypertension)     Hypercholesterolemia     Schizophrenia ([facility] [address]     Schizophreniform disorder (HCC)     Seizure disorder (HCC)     Type II or unspecified type diabetes mellitus without mention of complication, uncontrolled        PAST SURGICAL HISTORY: No past surgical history on file. ALLERGIES:  No Known Allergies    FAMILY HISTORY:    Family History   Problem Relation Age of Onset    Stroke Father        SOCIAL HISTORY:    Tobacco: never smoker  Drugs: not documented  ETOH: not documented    HOME MEDICATIONS:    Prior to Admission Medications   Prescriptions Last Dose Informant Patient Reported? Taking? Blood-Glucose Meter monitoring kit   No No   Sig: He is having a seizure he needs blood sugar to be checked twice a day before breakfast and at bedtime,   OLANZapine (ZyPREXA) 2.5 mg tablet   No No   Sig: Take 1 Tablet by mouth two (2) times a day.    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 divalproex DR (DEPAKOTE) 500 mg tablet   Yes No   Sig: Take 500 mg by mouth two (2) times a day. Take two tablets twice daily   glucose blood VI test strips (blood glucose test) strip   No No   Sig: To check sugar twice a day before breakfast and at bedtime. lacosamide (VIMPAT) 200 mg tab tablet   Yes No   Sig: Take 200 mg by mouth two (2) times a day. lacosamide (Vimpat) 200 mg tab tablet   No No   Sig: Take 1 Tablet by mouth two (2) times a day. Max Daily Amount: 400 mg. courtesy refill  Given on behalf of neurologist dr Bob Ortega or dr Socorro Storm   lancets misc   No No   Sig: Sig: To check sugar twice a day before breakfast and at bedtime   levETIRAcetam 1,000 mg tablet   Yes No   Sig: Take 1,000 mg by mouth two (2) times a day. levothyroxine (SYNTHROID) 75 mcg tablet   No No   Sig: Take 1 Tablet by mouth every morning. Take in early morning. losartan (COZAAR) 50 mg tablet   No No   Sig: Take 1 Tablet by mouth daily. Please stop hydrochlorothiazide and amlodipine   metFORMIN (GLUCOPHAGE) 500 mg tablet   No No   Sig: Take 1 Tablet by mouth two (2) times daily (with meals). pravastatin (PRAVACHOL) 20 mg tablet   No No   Sig: Take 1 Tablet by mouth nightly. Facility-Administered Medications: None       REVIEW OF SYSTEMS:  Complete review of systems performed, pertinents noted above, all other systems are negative.     Patient Vitals for the past 24 hrs:   Temp Pulse Resp BP SpO2   01/27/22 1537 (!) 105.4 °F (40.8 °C) 94 27 (!) 82/49 99 %   01/27/22 1409 (!) 106.5 °F (41.4 °C)       01/27/22 1350 (!) 104.5 °F (40.3 °C) (!) 126 24 104/71 96 %   01/27/22 1335    (!) 99/54 (!) 84 %   01/27/22 1321 (!) 105 °F (40.6 °C) (!) 133 14 (!) 120/96 (!) 83 %       PHYSICAL EXAMINATION:    General: Sedated  HEENT: Normocephalic, PERRL, no drainage  Neck: Supple, Trachea midline, No JVD  RESP: Intubated and sedated, diminished breath sounds bilaterally  Cardiovascular: RRR no MRG  PVS: No rubor, cyanosis,   ABD:  soft, NT, Normoactive BS  Derm: Warm/Dry/Intact with no lesions  Neuro: sedated  PSYCH: No anxiety or agitation      Electrocardiogram performed earlier reviewed, it shows sinus tachycardia, no ischemia    Recent labs results and imaging reviewed. Recent Results (from the past 24 hour(s))   CBC WITH AUTOMATED DIFF    Collection Time: 01/27/22  1:30 PM   Result Value Ref Range    WBC 5.4 4.1 - 11.1 K/uL    RBC 4.82 4.10 - 5.70 M/uL    HGB 15.2 12.1 - 17.0 g/dL    HCT 49.7 36.6 - 50.3 %    .1 (H) 80.0 - 99.0 FL    MCH 31.5 26.0 - 34.0 PG    MCHC 30.6 30.0 - 36.5 g/dL    RDW 14.4 11.5 - 14.5 %    PLATELET  184 - 115 K/uL     PLEASE SEE PLTNACIT FOR ACCURATE PLT COUNT DUE TO EDTA SENSITIVITY CAUSING PLT CLUMPING IN LAV TOP TUBE. NRBC 3.4 (H) 0.0  WBC    ABSOLUTE NRBC 0.18 (H) 0.00 - 0.01 K/uL    NEUTROPHILS 80 (H) 32 - 75 %    LYMPHOCYTES 13 12 - 49 %    MONOCYTES 7 5 - 13 %    EOSINOPHILS 0 0 - 7 %    BASOPHILS 0 0 - 1 %    IMMATURE GRANULOCYTES 0 %    ABS. NEUTROPHILS 4.3 1.8 - 8.0 K/UL    ABS. LYMPHOCYTES 0.7 (L) 0.8 - 3.5 K/UL    ABS. MONOCYTES 0.4 0.0 - 1.0 K/UL    ABS. EOSINOPHILS 0.0 0.0 - 0.4 K/UL    ABS. BASOPHILS 0.0 0.0 - 0.1 K/UL    ABS. IMM.  GRANS. 0.0 K/UL    DF Smear Scanned      PLATELET COMMENTS Clumped Platelets      RBC COMMENTS Anisocytosis  1+       LACTIC ACID    Collection Time: 01/27/22  1:30 PM   Result Value Ref Range    Lactic acid 6.6 (HH) 0.4 - 2.0 mmol/L   TROPONIN-HIGH SENSITIVITY    Collection Time: 01/27/22  1:30 PM   Result Value Ref Range    Troponin-High Sensitivity 1,359 (HH) 0 - 76 ng/L   PLATELET COUNT ON CITRATED BLD    Collection Time: 01/27/22  1:30 PM   Result Value Ref Range    PLATELET COUNT (NA CITRATE) 228 150 - 400 K/uL   BLOOD GAS, ARTERIAL    Collection Time: 01/27/22  1:45 PM   Result Value Ref Range    pH 7.32 (L) 7.35 - 7.45      PCO2 47 (H) 35 - 45 mmHg    PO2 299 (H) 75 - 100 mmHg    O2  >95 %    BICARBONATE 23 22 - 26 mmol/L    BASE DEFICIT 1.4 0 - 2 mmol/L    O2 METHOD VENT      FIO2 100.0 %    MODE Assist Control/Volume Control      Tidal volume 450      SET RATE 22      EPAP/CPAP/PEEP 10      High PEEP 10.0      SITE Left Radial      JULIO'S TEST PASS     URINALYSIS W/ RFLX MICROSCOPIC    Collection Time: 01/27/22  2:00 PM   Result Value Ref Range    Color Rosie      Appearance Turbid (A) Clear      Specific gravity 1.024 1.003 - 1.030      pH (UA) 5.0 5.0 - 8.0      Protein 100 (A) Negative mg/dL    Glucose 50 (A) Negative mg/dL    Ketone 5 (A) Negative mg/dL    Bilirubin Negative Negative      Blood Negative Negative      Urobilinogen 4.0 (H) 0.1 - 1.0 EU/dL    Nitrites Negative Negative      Leukocyte Esterase Negative Negative      WBC 20-50 0 - 4 /hpf    RBC 5-10 0 - 5 /hpf    Bacteria Negative Negative /hpf    Hyaline cast >20 (H) 0 - 5 /lpf    Mucus 4+ /lpf   URINE MICROSCOPIC    Collection Time: 01/27/22  2:00 PM   Result Value Ref Range    WBC 20-50 0 - 4 /hpf    RBC 5-10 0 - 5 /hpf    Bacteria Negative Negative /hpf    Mucus 4+ (A) Negative /lpf    Hyaline cast >20 (H) 0 - 5 /lpf   SARS-COV-2    Collection Time: 01/27/22  2:36 PM   Result Value Ref Range    SARS-CoV-2 Please find results under separate order     COVID-19 RAPID TEST    Collection Time: 01/27/22  2:36 PM   Result Value Ref Range    Specimen source Nasopharyngeal      COVID-19 rapid test DETECTED (A) Not Detected     LACTIC ACID    Collection Time: 01/27/22  2:59 PM   Result Value Ref Range    Lactic acid 4.7 (HH) 0.4 - 2.0 mmol/L       XR Results (maximum last 3):   Results from [place] encounter on 01/27/22    XR CHEST PORT      Results from [place] encounter on 01/20/22    XR CHEST PORT      Results from [facility] encounter on 10/06/21    XR CHEST PORT    Impression  No demonstrated acute cardiopulmonary disease. CT Results (maximum last 3): Results from [place] encounter on 01/20/22    CT HEAD WO CONT    Impression  No intracranial hemorrhage. Sinus inflammation. No seizure source evident on this nonenhanced study. Results from [place] encounter on 10/06/21    CT HEAD WO CONT    Impression  1. No evidence of acute intracranial hemorrhage or mass effect. 2.  Moderate to severe parenchymal volume loss. Results from [place] encounter on 06/03/21    CT HEAD WO CONT    Impression  1. No acute findings. 2.  Stable appearance of advanced cortical and cerebellar volume loss. 3.  Minor right ethmoid sinus opacification.       Current Facility-Administered Medications:     propofol (DIPRIVAN) 10 mg/mL infusion, 0-50 mcg/kg/min, IntraVENous, TITRATE, Ashish Rayo MD, Last Rate: 3 mL/hr at 01/27/22 1441, 5 mcg/kg/min at 01/27/22 1441    divalproex DR (DEPAKOTE) tablet 500 mg, 500 mg, Oral, BID, Saturnino Nuñez MD    lacosamide (VIMPAT) tablet 200 mg, 200 mg, Oral, BID, Saturnino Nuñez MD    levETIRAcetam (KEPPRA) tablet 1,000 mg, 1,000 mg, Oral, BID, Low Martinez MD    Hope Mask ON 1/28/2022] levothyroxine (SYNTHROID) tablet 75 mcg, 75 mcg, Oral, 7am, Saturnino Nuñez MD    aspirin chewable tablet 81 mg, 81 mg, Oral, DAILY, Low Martinez MD, 81 mg at 01/27/22 1541    atorvastatin (LIPITOR) tablet 80 mg, 80 mg, Oral, DAILY, Saturnino Nuñez MD    sodium chloride (NS) flush 5-40 mL, 5-40 mL, IntraVENous, Q8H, Saturnino Nuñez MD, 10 mL at 01/27/22 1541    sodium chloride (NS) flush 5-40 mL, 5-40 mL, IntraVENous, PRN, Low Martinez MD    acetaminophen (TYLENOL) tablet 650 mg, 650 mg, Oral, Q6H PRN **OR** acetaminophen (TYLENOL) suppository 650 mg, 650 mg, Rectal, Q6H PRN, Low Martinez MD    polyethylene glycol (MIRALAX) packet 17 g, 17 g, Oral, DAILY PRN, Elliot Quintanilla MD    ondansetron (ZOFRAN ODT) tablet 4 mg, 4 mg, Oral, Q8H PRN **OR** ondansetron (ZOFRAN) injection 4 mg, 4 mg, IntraVENous, Q6H PRN, Jacqueline Turner MD    piperacillin-tazobactam (ZOSYN) 3.375 g in 0.9% sodium chloride (MBP/ADV) 100 mL MBP, 3.375 g, IntraVENous, Q8H, Christine Nuñez MD    sodium chloride 0.9 % bolus infusion 1,000 mL, 1,000 mL, IntraVENous, ONCE, Aziza Chawla MD, Last Rate: 1,000 mL/hr at 01/27/22 1523, 1,000 mL at 01/27/22 1523    Current Outpatient Medications:     Shower Chair XX braeden, 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 , Disp: 100 Strip, Rfl: 3    lancets misc, Sig: To check sugar twice a day before breakfast and at bedtime, Disp: 1 Each, Rfl: 11    amLODIPine (NORVASC) 10 mg tablet, Take 1 Tablet by mouth daily. , Disp: 90 Tablet, Rfl: 2    losartan (COZAAR) 50 mg tablet, Take 1 Tablet by mouth daily. Please stop hydrochlorothiazide and amlodipine, Disp: 90 Tablet, Rfl: 2    levothyroxine (SYNTHROID) 75 mcg tablet, Take 1 Tablet by mouth every morning. Take in early morning., Disp: 90 Tablet, Rfl: 2    metFORMIN (GLUCOPHAGE) 500 mg tablet, Take 1 Tablet by mouth two (2) times daily (with meals). , Disp: 180 Tablet, Rfl: 2    pravastatin (PRAVACHOL) 20 mg tablet, Take 1 Tablet by mouth nightly., Disp: 90 Tablet, Rfl: 2    lacosamide (VIMPAT) 200 mg tab tablet, Take 200 mg by mouth two (2) times a day., Disp: , Rfl:     lacosamide (Vimpat) 200 mg tab tablet, Take 1 Tablet by mouth two (2) times a day. Max Daily Amount: 400 mg. courtesy refill  Given on behalf of neurologist dr Delfino Frye or dr Lam Bello: 60 Tablet, Rfl: 1    OLANZapine (ZyPREXA) 2.5 mg tablet, Take 1 Tablet by mouth two (2) times a day. , Disp: 180 Tablet, Rfl: 0    Blood-Glucose Meter monitoring kit, He is having a seizure he needs blood sugar to be checked twice a day before breakfast and at bedtime,, Disp: 1 Kit, Rfl: 1    levETIRAcetam 1,000 mg tablet, Take 1,000 mg by mouth two (2) times a day., Disp: , Rfl:     divalproex DR (DEPAKOTE) 500 mg tablet, Take 500 mg by mouth two (2) times a day. Take two tablets twice daily, Disp: , Rfl:     Case discussed with collaborating physician Dr. Sada Daily and our impression and recommendations are as follows:     1. NSTEMI:   - HS troponin 1354, will continue to trend. Likely related to demand ischemia in the setting of COVID and sepsis  - continue asa  - will order stat echo    2. Hypotension:   - Blood pressure below goal  - may add levo if pressure does not improve with fluid rescuitation    3. COVID pna:   - COVID positive with temperature 105.4, lactic acid 4.7  -  Management per primary. - Will monitor telemetry and to evaluate for possible QTC prolongation.   - Recommend to keep a negative fluid balance to prevent volume overload. Thank you for involving us in the care of this patient. Please do not hesitate to call if additional questions arise. If after hours please call [phone].

## 2022-01-27 NOTE — ED TRIAGE NOTES
Family called ems patient was unresponsive, gurgling respirations, bs 448 temp 105 ekg showed stemi.

## 2022-01-27 NOTE — PROGRESS NOTES
1/27/22. Pt unable to be interviewed. Message left for brother Susanna Polk @ [phone]). Listed number for pts sister @ [phone]) disconnected.

## 2022-01-27 NOTE — H&P
History and Physical    Subjective:   Chief Complaint : unresponsive since AM  Source of information : charts, EMS    History of present illness:     76M, H/o Schizophenia, seizures, DMII on oral meds with unresponsive since AM  As per brother, she has been feeling worse and generally weak over the last 3-4 days and hasnt been able to leave the bed. Family noticed sweating and difficulty breathing, EMS was then called    ER: COVID positive, associated with fever 106, increased troponin, cardiology was consulted ansd recommended ECHO, there is no heparin gtt, was initially called for STEMI and was cancelled. Past Medical History:   Diagnosis Date    Acute renal failure ([facility] [address]     Anemia     Arthritis     DKA (diabetic ketoacidoses)     GERD (gastroesophageal reflux disease)     HTN (hypertension)     Hypercholesterolemia     Schizophrenia ([facility] [address]     Schizophreniform disorder (HCC)     Seizure disorder (HCC)     Type II or unspecified type diabetes mellitus without mention of complication, uncontrolled      No past surgical history on file. Family History   Problem Relation Age of Onset    Stroke Father       Social History     Tobacco Use    Smoking status: Never Smoker    Smokeless tobacco: Never Used   Substance Use Topics    Alcohol use: No       Prior to Admission medications    Medication Sig Start Date End Date Taking? 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 7/22/21   Haydee Enriquez MD   lancets misc Sig: To check sugar twice a day before breakfast and at bedtime 7/22/21   Haydee Enriquez MD   amLODIPine (NORVASC) 10 mg tablet Take 1 Tablet by mouth daily.  7/22/21   Haydee Enriquez MD   losartan (COZAAR) 50 mg tablet Take 1 Tablet by mouth daily. Please stop hydrochlorothiazide and amlodipine 7/22/21   Dario Billingsley MD   levothyroxine (SYNTHROID) 75 mcg tablet Take 1 Tablet by mouth every morning. Take in early morning. 7/22/21   Dario Billingsley MD   metFORMIN (GLUCOPHAGE) 500 mg tablet Take 1 Tablet by mouth two (2) times daily (with meals). 7/22/21   Dario Billingsley MD   pravastatin (PRAVACHOL) 20 mg tablet Take 1 Tablet by mouth nightly. 7/22/21   Dario Billingsley MD   lacosamide (VIMPAT) 200 mg tab tablet Take 200 mg by mouth two (2) times a day. Provider, Historical   lacosamide (Vimpat) 200 mg tab tablet Take 1 Tablet by mouth two (2) times a day. Max Daily Amount: 400 mg. courtesy refill  Given on behalf of neurologist dr Bob Ortega or dr Socorro Storm 7/22/21   Dario Billingsley MD   OLANZapine (ZyPREXA) 2.5 mg tablet Take 1 Tablet by mouth two (2) times a day. 7/22/21   Dario Billingsley MD   Blood-Glucose Meter monitoring kit He is having a seizure he needs blood sugar to be checked twice a day before breakfast and at bedtime, 6/17/21   Dario Billingsley MD   levETIRAcetam 1,000 mg tablet Take 1,000 mg by mouth two (2) times a day. Provider, Historical   divalproex DR (DEPAKOTE) 500 mg tablet Take 500 mg by mouth two (2) times a day. Take two tablets twice daily    Provider, Historical     No Known Allergies          Review of Systems:  Cannot be assessed due to intubation and sedation. Vitals:     Visit Vitals  BP (!) 81/41 (BP Patient Position: At rest)   Pulse 100   Temp (!) 104.7 °F (40.4 °C)   Resp 27   Ht 6' (1.829 m)   Wt 99.8 kg (220 lb)   SpO2 99%   BMI 29.84 kg/m²       Physical Exam:   Physical Exam  Vitals and nursing note reviewed. Constitutional:       General: intubates     Appearance: Normal appearance. He is normal weight. He is ill-appearing. HENT:      Head: Normocephalic and atraumatic.       Nose: Nose normal.      Mouth/Throat:      Mouth: Mucous membranes are moist.   Eyes: Extraocular Movements: Extraocular movements intact. Pupils: Pupils are equal, round, and reactive to light. Cardiovascular:      Rate and Rhythm: Regular rhythm. Tachycardia present. Pulses: Normal pulses. Heart sounds: Normal heart sounds. Pulmonary:      Effort: Respiratory distress present. Breath sounds: Rhonchi present. Abdominal:      General: Abdomen is flat. Bowel sounds are normal.      Palpations: Abdomen is soft. Musculoskeletal:         General: No swelling or tenderness. Normal range of motion. Cervical back: Normal range of motion and neck supple. Skin:     General: Skin is warm and dry. Capillary Refill: Capillary refill takes less than 2 seconds. Neurological:      Mental Status: He is unresponsive. GCS: GCS eye subscore is 4. GCS verbal subscore is 1. GCS motor subscore is 1. Psychiatric:         cannot be assessed      Data Review:   Recent Results (from the past 24 hour(s))   CBC WITH AUTOMATED DIFF    Collection Time: 01/27/22  1:30 PM   Result Value Ref Range    WBC 5.4 4.1 - 11.1 K/uL    RBC 4.82 4.10 - 5.70 M/uL    HGB 15.2 12.1 - 17.0 g/dL    HCT 49.7 36.6 - 50.3 %    .1 (H) 80.0 - 99.0 FL    MCH 31.5 26.0 - 34.0 PG    MCHC 30.6 30.0 - 36.5 g/dL    RDW 14.4 11.5 - 14.5 %    PLATELET  771 - 760 K/uL     PLEASE SEE PLTNACIT FOR ACCURATE PLT COUNT DUE TO EDTA SENSITIVITY CAUSING PLT CLUMPING IN LAV TOP TUBE. NRBC 3.4 (H) 0.0  WBC    ABSOLUTE NRBC 0.18 (H) 0.00 - 0.01 K/uL    NEUTROPHILS 80 (H) 32 - 75 %    LYMPHOCYTES 13 12 - 49 %    MONOCYTES 7 5 - 13 %    EOSINOPHILS 0 0 - 7 %    BASOPHILS 0 0 - 1 %    IMMATURE GRANULOCYTES 0 %    ABS. NEUTROPHILS 4.3 1.8 - 8.0 K/UL    ABS. LYMPHOCYTES 0.7 (L) 0.8 - 3.5 K/UL    ABS. MONOCYTES 0.4 0.0 - 1.0 K/UL    ABS. EOSINOPHILS 0.0 0.0 - 0.4 K/UL    ABS. BASOPHILS 0.0 0.0 - 0.1 K/UL    ABS. IMM.  GRANS. 0.0 K/UL    DF Smear Scanned      PLATELET COMMENTS Clumped Platelets      RBC COMMENTS Anisocytosis  1+       LACTIC ACID    Collection Time: 01/27/22  1:30 PM   Result Value Ref Range    Lactic acid 6.6 (HH) 0.4 - 2.0 mmol/L   TROPONIN-HIGH SENSITIVITY    Collection Time: 01/27/22  1:30 PM   Result Value Ref Range    Troponin-High Sensitivity 1,359 (HH) 0 - 76 ng/L   PLATELET COUNT ON CITRATED BLD    Collection Time: 01/27/22  1:30 PM   Result Value Ref Range    PLATELET COUNT (NA CITRATE) 228 150 - 400 K/uL   BLOOD GAS, ARTERIAL    Collection Time: 01/27/22  1:45 PM   Result Value Ref Range    pH 7.32 (L) 7.35 - 7.45      PCO2 47 (H) 35 - 45 mmHg    PO2 299 (H) 75 - 100 mmHg    O2  >95 %    BICARBONATE 23 22 - 26 mmol/L    BASE DEFICIT 1.4 0 - 2 mmol/L    O2 METHOD VENT      FIO2 100.0 %    MODE Assist Control/Volume Control      Tidal volume 450      SET RATE 22      EPAP/CPAP/PEEP 10      High PEEP 10.0      SITE Left Radial      JULIO'S TEST PASS     URINALYSIS W/ RFLX MICROSCOPIC    Collection Time: 01/27/22  2:00 PM   Result Value Ref Range    Color Rosie      Appearance Turbid (A) Clear      Specific gravity 1.024 1.003 - 1.030      pH (UA) 5.0 5.0 - 8.0      Protein 100 (A) Negative mg/dL    Glucose 50 (A) Negative mg/dL    Ketone 5 (A) Negative mg/dL    Bilirubin Negative Negative      Blood Negative Negative      Urobilinogen 4.0 (H) 0.1 - 1.0 EU/dL    Nitrites Negative Negative      Leukocyte Esterase Negative Negative      WBC 20-50 0 - 4 /hpf    RBC 5-10 0 - 5 /hpf    Bacteria Negative Negative /hpf    Hyaline cast >20 (H) 0 - 5 /lpf    Mucus 4+ /lpf   URINE MICROSCOPIC    Collection Time: 01/27/22  2:00 PM   Result Value Ref Range    WBC 20-50 0 - 4 /hpf    RBC 5-10 0 - 5 /hpf    Bacteria Negative Negative /hpf    Mucus 4+ (A) Negative /lpf    Hyaline cast >20 (H) 0 - 5 /lpf   SARS-COV-2    Collection Time: 01/27/22  2:36 PM   Result Value Ref Range    SARS-CoV-2 Please find results under separate order     COVID-19 RAPID TEST    Collection Time: 01/27/22  2:36 PM   Result Value Ref Range    Specimen source Nasopharyngeal      COVID-19 rapid test DETECTED (A) Not Detected     METABOLIC PANEL, COMPREHENSIVE    Collection Time: 01/27/22  2:58 PM   Result Value Ref Range    Sodium 155 (H) 136 - 145 mmol/L    Potassium 2.8 (L) 3.5 - 5.1 mmol/L    Chloride 122 (H) 97 - 108 mmol/L    CO2 24 21 - 32 mmol/L    Anion gap 9 5 - 15 mmol/L    Glucose 358 (H) 65 - 100 mg/dL    BUN 61 (H) 6 - 20 mg/dL    Creatinine 3.54 (H) 0.70 - 1.30 mg/dL    BUN/Creatinine ratio 17 12 - 20      GFR est AA 21 (L) >60 ml/min/1.73m2    GFR est non-AA 17 (L) >60 ml/min/1.73m2    Calcium 9.3 8.5 - 10.1 mg/dL    Bilirubin, total 0.6 0.2 - 1.0 mg/dL    AST (SGOT) 817 (H) 15 - 37 U/L    ALT (SGPT) 282 (H) 12 - 78 U/L    Alk. phosphatase 38 (L) 45 - 117 U/L    Protein, total 7.7 6.4 - 8.2 g/dL    Albumin 2.0 (L) 3.5 - 5.0 g/dL    Globulin 5.7 (H) 2.0 - 4.0 g/dL    A-G Ratio 0.4 (L) 1.1 - 2.2     PROCALCITONIN    Collection Time: 01/27/22  2:58 PM   Result Value Ref Range    Procalcitonin 9.91 (H) 0 ng/mL   LACTIC ACID    Collection Time: 01/27/22  2:59 PM   Result Value Ref Range    Lactic acid 4.7 (HH) 0.4 - 2.0 mmol/L   TROPONIN-HIGH SENSITIVITY    Collection Time: 01/27/22  3:30 PM   Result Value Ref Range    Troponin-High Sensitivity 1,510 (HH) 0 - 76 ng/L             Assessment and Plan :     (1) Acute encephalopathy : GCS 13. S/t sepsis    (2) Sepsis: 2L bolus, pan cultures    (2) Acute hypoxic respiratory failure : intubated and sedated    (3) COVID-19 pneumonia : decadron, azithromycin, zosyn     (4) possible aspiration: zosyn. (4) NSTEMI : ECHO, cardiology consulted.  Likely NSTEMI type II    (5) hypokalemia with hypernatremia: replace IV,.     (6) ANDRES: s/p 2 L bolus    (7) schizophrenia: olanzepine    (8) hypothyroidism : synthroid    (9) DMII: SSI      DVT ppx: heparin SubQ    DISPO: ICU, poor prognosis      Active Problems:    Acute encephalopathy (5/18/2021)          Signed By: Gurwinder Villaseñor MD     January 27, 2022

## 2022-01-27 NOTE — PROGRESS NOTES
Spiritual Care Assessment/Progress Note  [facility]      NAME: Aries Woodson      MRN: [id number]  AGE: 78 y.o. SEX: male  [place] Affiliation: No preference   Language: English     1/27/2022     Total Time (in minutes): 12     Spiritual Assessment begun in [address] [place] [address] DEPT through conversation with:         [x]Patient        [] Family    [] Friend(s)        Reason for Consult: Crisis, Stemi     Spiritual beliefs: (Please include comment if needed)     [] Identifies with a evaristo tradition:         [] Supported by a evaristo community:            [] Claims no spiritual orientation:           [] Seeking spiritual identity:                [] Adheres to an individual form of spirituality:           [x] Not able to assess:                           Identified resources for coping:      [] Prayer                               [] Music                  [] Guided Imagery     [] Family/friends                 [] Pet visits     [] Devotional reading                         [x] Unknown     [] Other:                                               Interventions offered during this visit: (See comments for more details)    Patient Interventions: Crisis, Stemi,Other (comment) (Silent support)           Plan of Care:     [] Support spiritual and/or cultural needs    [] Support AMD and/or advance care planning process      [] Support grieving process   [] Coordinate Rites and/or Rituals    [] Coordination with community clergy   [] No spiritual needs identified at this time   [] Detailed Plan of Care below (See Comments)  [] Make referral to Music Therapy  [] Make referral to Pet Therapy     [] Make referral to Addiction services  [] Make referral to [facility]  [] Make referral to Spiritual Care Partner  [] No future visits requested        [x] Contact Spiritual Care for further referrals     Visited patient in the ED unit for Code STEMI.   Medical staff were providing care for the patient during the visit. No visitors were present. Provided silent support. Contact chaplains for further spiritual care and support. Chaplain Mariama Cisneros M.Div.    can be reached by calling the  at [facility]  [phone]

## 2022-01-27 NOTE — ED PROVIDER NOTES
EMERGENCY DEPARTMENT HISTORY AND PHYSICAL EXAM      Date: 1/27/2022  Patient Name: Maryjane Jensen    History of Presenting Illness     Chief Complaint   Patient presents with    Unresponsive       History Provided By: Patient's Brother and EMS    HPI: Maryjane Jensen, 78 y.o. male with a past medical history significant Anemia, GERD, schizophrenia, diabetes, seizure disorder presents to the ED with cc of decreased responsiveness difficulty breathing. History obtained from EMS and from patient's brother. Patient nonverbal on presentation in acute respiratory distress. As per brother patient has been feeling worsening weakness over the last 3 to 4 days, brother had noticed decreased responsiveness, trouble breathing, sweating, unable to leave bed. Today patient felt very warm and was markedly unresponsive, brother called EMS on arrival EMS noted patient to be febrile hypoxic with audible gurgling patient bagged and brought to emergency department, additionally EMS were concerned about a possible ST elevation on  EKG read. On arrival patient's EKG does not show evidence of ST elevations, Dr. Rashaad Infante, on-call cardiologist was made aware, after reviewing EKG agrees to cancel STEMI alert. There are no other complaints, changes, or physical findings at this time.     PCP: Sajan Emmanuel MD    Current Facility-Administered Medications   Medication Dose Route Frequency Provider Last Rate Last Admin    piperacillin-tazobactam (ZOSYN) 3.375 g in 0.9% sodium chloride (MBP/ADV) 100 mL MBP  3.375 g IntraVENous NOW Christine Gusman MD 25 mL/hr at 01/27/22 1413 3.375 g at 01/27/22 1413    sodium chloride 0.9 % bolus infusion 1,000 mL  1,000 mL IntraVENous ONCE Christine Gusman MD 1,000 mL/hr at 01/27/22 1401 1,000 mL at 01/27/22 1401    propofol (DIPRIVAN) 10 mg/mL infusion  0-50 mcg/kg/min IntraVENous TITRATE Christine Gusman MD 3 mL/hr at 01/27/22 1441 5 mcg/kg/min at 01/27/22 1441     Current Outpatient Medications Medication Sig Dispense Refill    Shower Chair XX braeden 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 100 Strip 3    lancets misc Sig: To check sugar twice a day before breakfast and at bedtime 1 Each 11    amLODIPine (NORVASC) 10 mg tablet Take 1 Tablet by mouth daily. 90 Tablet 2    losartan (COZAAR) 50 mg tablet Take 1 Tablet by mouth daily. Please stop hydrochlorothiazide and amlodipine 90 Tablet 2    levothyroxine (SYNTHROID) 75 mcg tablet Take 1 Tablet by mouth every morning. Take in early morning. 90 Tablet 2    metFORMIN (GLUCOPHAGE) 500 mg tablet Take 1 Tablet by mouth two (2) times daily (with meals). 180 Tablet 2    pravastatin (PRAVACHOL) 20 mg tablet Take 1 Tablet by mouth nightly. 90 Tablet 2    lacosamide (VIMPAT) 200 mg tab tablet Take 200 mg by mouth two (2) times a day.  lacosamide (Vimpat) 200 mg tab tablet Take 1 Tablet by mouth two (2) times a day. Max Daily Amount: 400 mg. courtesy refill  Given on behalf of neurologist dr Mariluz Sterling or dr Inga Amador 60 Tablet 1    OLANZapine (ZyPREXA) 2.5 mg tablet Take 1 Tablet by mouth two (2) times a day. 180 Tablet 0    Blood-Glucose Meter monitoring kit He is having a seizure he needs blood sugar to be checked twice a day before breakfast and at bedtime, 1 Kit 1    levETIRAcetam 1,000 mg tablet Take 1,000 mg by mouth two (2) times a day.  divalproex DR (DEPAKOTE) 500 mg tablet Take 500 mg by mouth two (2) times a day.  Take two tablets twice daily         Past History     Past Medical History:  Past Medical History:   Diagnosis Date    Acute renal failure ([facility] [address]     Anemia     Arthritis     DKA (diabetic ketoacidoses)     GERD (gastroesophageal reflux disease)     HTN (hypertension)     Hypercholesterolemia     Schizophrenia ([facility] [address]  Schizophreniform disorder ([facility] [address]     [facility] disorder ([facility]     Type II or unspecified type diabetes mellitus without mention of complication, uncontrolled        Past Surgical History:  No past surgical history on file. Family History:  Family History   Problem Relation Age of Onset    Stroke Father        Social History:  Social History     Tobacco Use    Smoking status: Never Smoker    Smokeless tobacco: Never Used   Substance Use Topics    Alcohol use: No    Drug use: No       Allergies:  No Known Allergies      Review of Systems     Review of Systems   Unable to perform ROS: Acuity of condition       Physical Exam     Physical Exam  Vitals and nursing note reviewed. Constitutional:       General: He is in acute distress. Appearance: Normal appearance. He is normal weight. He is ill-appearing. HENT:      Head: Normocephalic and atraumatic. Nose: Nose normal.      Mouth/Throat:      Mouth: Mucous membranes are moist.   Eyes:      Extraocular Movements: Extraocular movements intact. Pupils: Pupils are equal, round, and reactive to light. Cardiovascular:      Rate and Rhythm: Regular rhythm. Tachycardia present. Pulses: Normal pulses. Heart sounds: Normal heart sounds. Pulmonary:      Effort: Respiratory distress present. Breath sounds: Rhonchi present. Abdominal:      General: Abdomen is flat. Bowel sounds are normal.      Palpations: Abdomen is soft. Musculoskeletal:         General: No swelling or tenderness. Normal range of motion. Cervical back: Normal range of motion and neck supple. Skin:     General: Skin is warm and dry. Capillary Refill: Capillary refill takes less than 2 seconds. Neurological:      Mental Status: He is unresponsive. GCS: GCS eye subscore is 4. GCS verbal subscore is 1. GCS motor subscore is 1.    Psychiatric:         Mood and Affect: Mood normal.         Behavior: Behavior normal.         Diagnostic Study Results Labs -     Recent Results (from the past 12 hour(s))   CBC WITH AUTOMATED DIFF    Collection Time: 01/27/22  1:30 PM   Result Value Ref Range    WBC 5.4 4.1 - 11.1 K/uL    RBC 4.82 4.10 - 5.70 M/uL    HGB 15.2 12.1 - 17.0 g/dL    HCT 49.7 36.6 - 50.3 %    .1 (H) 80.0 - 99.0 FL    MCH 31.5 26.0 - 34.0 PG    MCHC 30.6 30.0 - 36.5 g/dL    RDW 14.4 11.5 - 14.5 %    PLATELET  442 - 439 K/uL     PLEASE SEE PLTNACIT FOR ACCURATE PLT COUNT DUE TO EDTA SENSITIVITY CAUSING PLT CLUMPING IN LAV TOP TUBE. NRBC 3.4 (H) 0.0  WBC    ABSOLUTE NRBC 0.18 (H) 0.00 - 0.01 K/uL    NEUTROPHILS 80 (H) 32 - 75 %    LYMPHOCYTES 13 12 - 49 %    MONOCYTES 7 5 - 13 %    EOSINOPHILS 0 0 - 7 %    BASOPHILS 0 0 - 1 %    IMMATURE GRANULOCYTES 0 %    ABS. NEUTROPHILS 4.3 1.8 - 8.0 K/UL    ABS. LYMPHOCYTES 0.7 (L) 0.8 - 3.5 K/UL    ABS. MONOCYTES 0.4 0.0 - 1.0 K/UL    ABS. EOSINOPHILS 0.0 0.0 - 0.4 K/UL    ABS. BASOPHILS 0.0 0.0 - 0.1 K/UL    ABS. IMM.  GRANS. 0.0 K/UL    DF Smear Scanned      PLATELET COMMENTS Clumped Platelets      RBC COMMENTS Anisocytosis  1+       LACTIC ACID    Collection Time: 01/27/22  1:30 PM   Result Value Ref Range    Lactic acid 6.6 (HH) 0.4 - 2.0 mmol/L   TROPONIN-HIGH SENSITIVITY    Collection Time: 01/27/22  1:30 PM   Result Value Ref Range    Troponin-High Sensitivity 1,359 (HH) 0 - 76 ng/L   PLATELET COUNT ON CITRATED BLD    Collection Time: 01/27/22  1:30 PM   Result Value Ref Range    PLATELET COUNT (NA CITRATE) 228 150 - 400 K/uL   URINALYSIS W/ RFLX MICROSCOPIC    Collection Time: 01/27/22  2:00 PM   Result Value Ref Range    Color Rosie      Appearance Turbid (A) Clear      Specific gravity 1.024 1.003 - 1.030      pH (UA) 5.0 5.0 - 8.0      Protein 100 (A) Negative mg/dL    Glucose 50 (A) Negative mg/dL    Ketone 5 (A) Negative mg/dL    Bilirubin Negative Negative      Blood Negative Negative      Urobilinogen 4.0 (H) 0.1 - 1.0 EU/dL    Nitrites Negative Negative      Leukocyte Esterase Negative Negative      WBC 20-50 0 - 4 /hpf    RBC 5-10 0 - 5 /hpf    Bacteria Negative Negative /hpf    Hyaline cast >20 (H) 0 - 5 /lpf    Mucus 4+ /lpf   URINE MICROSCOPIC    Collection Time: 01/27/22  2:00 PM   Result Value Ref Range    WBC 20-50 0 - 4 /hpf    RBC 5-10 0 - 5 /hpf    Bacteria Negative Negative /hpf    Mucus 4+ (A) Negative /lpf    Hyaline cast >20 (H) 0 - 5 /lpf   SARS-COV-2    Collection Time: 01/27/22  2:36 PM   Result Value Ref Range    SARS-CoV-2 Please find results under separate order         Radiologic Studies -   [unfilled]  CT Results  (Last 48 hours)    None        CXR Results  (Last 48 hours)               01/27/22 1405  XR CHEST PORT Final result    Narrative:  Chest single view. Comparison single view chest January 20, 2022. Newly placed ET tube 3 cm above the irma. Unchanged appearance for the lungs again noting central and medial basilar hazy   reticular markings. Cardiac and mediastinal structures unchanged. No pneumothorax or sizable pleural   effusion. Medical Decision Making and ED Course   I am the first provider for this patient. I reviewed the vital signs, available nursing notes, past medical history, past surgical history, family history and social history. Vital Signs-Reviewed the patient's vital signs.   Patient Vitals for the past 12 hrs:   Temp Pulse Resp BP SpO2   01/27/22 1409 (!) 106.5 °F (41.4 °C)       01/27/22 1350 (!) 104.5 °F (40.3 °C) (!) 126 24 104/71 96 %   01/27/22 1335    (!) 99/54 (!) 84 %   01/27/22 1321 (!) 105 °F (40.6 °C) (!) 133 14 (!) 120/96 (!) 83 %       EKG interpretation: (Preliminary)  Sinus tach 130, LAD, non specific ST changes in anterior leads    Records Reviewed: Nursing Notes and Old Medical Records    The patient presents with respiratory distress, lethargy with a differential diagnosis of sepsis, pneumonia, aspiration, seizure, UTI, COVID-19      Provider Notes (Medical Decision Making):     MDM   63-year-old male history of schizophrenia diabetes hypertension, presents from home for increased lethargy trouble breathing for the last several days. Patient noted to be hypoxic in respiratory distress, with audible gurgling sounds by EMS. Bagged on route. On arrival patient markedly febrile 104, minimally responsive, hypoxic. Patient intubated for airway protection,     Patient mildly tachycardic 126, normotensive. Most likely suspecting infectious etiology, will empirically administer Zosyn, draw cultures. Continue to monitor. Patient's blood pressure maintaining, does not appear to be septic shock at this time. ED Course:   Initial assessment performed. The patients presenting problems have been discussed, and they are in agreement with the care plan formulated and outlined with them. I have encouraged them to ask questions as they arise throughout their visit. ED Course as of 01/27/22 1453   Thu Jan 27, 2022   1428 Patient's lab work resulting, significant derangement, lactic acid elevated 6.6 troponin markedly elevated at 1300 [PZ]   1444 Discussed with Dr. Kirstin Dumont, will evaluate patient requesting stat echo be drawn however feels most likely elevated troponin secondary to hypoxia and sepsis. [PZ]   1413 Discussed case with Dr. Sylvia Alexander, will admit patient to the ICU. [PZ]      ED Course User Index  [PZ] Colette Amaya MD         Procedures       Carrington Green MD  Intubation    Date/Time: 1/27/2022 2:18 PM  Performed by: Colette Amaya MD  Authorized by: Colette Amaya MD     Consent:     Consent obtained:  Emergent situation  Pre-procedure details:     Patient status:  Unresponsive    Mallampati score:  II  Procedure details:     Preoxygenation:  Bag valve mask    CPR in progress: no      Intubation method:  Oral    Oral intubation technique:  Video-assisted    Laryngoscope blade:   Mac 4    Tube size (mm):  7.5    Tube type:  Cuffed    Number of attempts:  1 Ventilation between attempts: no      Cricoid pressure: no      Tube visualized through cords: yes    Placement assessment:     ETT to teeth:  26    Tube secured with:  ETT bauman    Breath sounds:  Equal    Placement verification: chest rise, CXR verification, equal breath sounds and ETCO2 detector      CXR findings:  ETT in proper place  Post-procedure details:     Patient tolerance of procedure: Tolerated well, no immediate complications    Central Line    Date/Time: 1/27/2022 5:11 PM  Performed by: Rekha Valdes MD  Authorized by: Rekha Valdes MD     Consent:     Consent obtained:  Emergent situation    Alternatives discussed:  No treatment  Pre-procedure details:     Hand hygiene: Hand hygiene performed prior to insertion      Sterile barrier technique: All elements of maximal sterile technique followed      Skin preparation:  2% chlorhexidine    Skin preparation agent: Skin preparation agent completely dried prior to procedure    Sedation:     Sedation type: Moderate (conscious) sedation and deep  Anesthesia (see MAR for exact dosages): Anesthesia method:  None  Procedure details:     Location:  L internal jugular and R femoral    Patient position:  Flat    Procedural supplies:  Triple lumen    Catheter size:  7.5 Fr    Landmarks identified: yes      Ultrasound guidance: yes      Number of attempts:  3    Successful placement: yes    Post-procedure details:     Post-procedure:  Dressing applied    Assessment:  Blood return through all ports and free fluid flow    Patient tolerance of procedure: Tolerated well, no immediate complications  Comments:      Attempted left IJ ultrasound guidance, after 2 unsuccessful attempts to greco IJ decision made to convert to right femoral line. Right femoral line placed using landmarks, dark nonpulsatile blood aspirated, guidewire fed freely, triple-lumen placed over guidewire using Seldinger technique, guidewire removed intact,  all ports flushed easily. Line secured with adhesive. CXR ordered to evaluate r/o pneumo from IJ attempt. CRITICAL CARE NOTE :  2:11 PM  Amount of Critical Care Time: ___65_(minutes)__    IMPENDING DETERIORATION -Airway, Respiratory, Cardiovascular and Metabolic  ASSOCIATED RISK FACTORS - Shock, Hypoxia, Dysrhythmia and Metabolic changes  MANAGEMENT- Bedside Assessment and Supervision of Care  INTERPRETATION -  Xrays, ECG and Blood Pressure  INTERVENTIONS - hemodynamic mngmt, vent mngmt, vascular control and Metobolic interventions  CASE REVIEW - Hospitalist/Intensivist  TREATMENT RESPONSE -Stable  PERFORMED BY - Self    NOTES   :  I have spent critical care time involved in lab review, consultations with specialist, family decision- making, bedside attention and documentation. This time excludes time spent in any separate billed procedures. During this entire length of time I was immediately available to the patient . Carlos Olmstead MD        Disposition     Admit Patient        Diagnosis     Clinical Impression:   1. Sepsis, due to unspecified organism, unspecified whether acute organ dysfunction present ([facility] [address]    2. Hypoxia    3. NSTEMI (non-ST elevated myocardial infarction) [facility])        Attestations:    Carlos Olmstead MD    Please note that this dictation was completed with [employer], the computer voice recognition software. Quite often unanticipated grammatical, syntax, homophones, and other interpretive errors are inadvertently transcribed by the computer software. Please disregard these errors. Please excuse any errors that have escaped final proofreading. Thank you.

## 2022-01-28 NOTE — PROGRESS NOTES
Progress Note    Patient: Kike Diehl MRN: [id number]  SSN: [id number]    YOB: 1945  Age: 78 y.o. Sex: male      Admit Date: 1/27/2022    LOS: 1 day     Subjective:     76M, H/o Schizophenia, seizures, DMII on oral meds with unresponsive and acute hypoxic respiratory failure s/t COVID-19 pneumonitis complicated by ANDRES, hypokalemia and shock liver.          ER: COVID positive, associated with fever 106, increased troponin, cardiology was consulted ansd recommended ECHO, there is no heparin gtt, was initially called for STEMI and was cancelled. he was intubated for acute hypoxic respiratory failure. On levophed. Complicated by ANDRES, shock liver and hypokalemia. Was treated with azithromycin, barcitinib, and zosyn. Will give 1L bolus and continue IVF 0.45. repeat labs at 2200 and AM.          Review of Systems:  Unable to determine s.t mental status    Objective:     Vitals:    01/28/22 1100 01/28/22 1200 01/28/22 1400 01/28/22 1503   BP: (!) 88/56 (!) 70/47 101/72    Pulse: (!) 102 87 97 (!) 102   Resp: 20 22 18 19   Temp: (!) 101.6 °F (38.7 °C) (!) 100.8 °F (38.2 °C)     SpO2: 95% 95% 96% 95%   Weight:       Height:            Intake and Output:  Current Shift: 01/28 0701 - 01/28 1900  In: 543.5 [I.V.:543.5]  Out: -   Last three shifts: 01/26 1901 - 01/28 0700  In: 4475 [I.V.:4475]  Out: 150 [Urine:150]      Physical Exam:   Physical Exam  Vitals and nursing note reviewed. Constitutional:       General: intubates     Appearance: Normal appearance. He is normal weight. He is ill-appearing. HENT:      Head: Normocephalic and atraumatic.      Nose: Nose normal.      Mouth/Throat:      Mouth: Mucous membranes are moist.   Eyes:      Extraocular Movements: Extraocular movements intact.      Pupils: Pupils are equal, round, and reactive to light. Cardiovascular:      Rate and Rhythm: Regular rhythm. Tachycardia present.      Pulses: Normal pulses.      Heart sounds: Normal heart sounds. Pulmonary:      Effort: Respiratory distress present.      Breath sounds: Rhonchi present. Abdominal:      General: Abdomen is flat. Bowel sounds are normal.      Palpations: Abdomen is soft. Musculoskeletal:         General: No swelling or tenderness. Normal range of motion.      Cervical back: Normal range of motion and neck supple. Skin:     General: Skin is warm and dry.      Capillary Refill: Capillary refill takes less than 2 seconds. Neurological:      Mental Status: He is unresponsive.      GCS: GCS eye subscore is 4. GCS verbal subscore is 1. GCS motor subscore is 1.    Psychiatric:         cannot be assessed      Lab/Data Review:  Recent Results (from the past 24 hour(s))   ECHO ADULT FOLLOW-UP OR LIMITED    Collection Time: 01/27/22  5:32 PM   Result Value Ref Range    Aortic Root 3.0 cm    IVSd 1.6 (A) 0.6 - 1.0 cm    LVIDd 2.7 (A) 4.2 - 5.9 cm    LVIDs 1.7 cm    LVPWd 1.4 (A) 0.6 - 1.0 cm    LA Diameter 3.0 cm    Est. RA Pressure 3 mmHg    TR Max Velocity 2.78 m/s    TR Peak Gradient 31 mmHg    Fractional Shortening 2D 37 28 - 44 %    LVIDd Index 1.22 cm/m2    LVIDs Index 0.77 cm/m2    LV RWT Ratio 1.04     LV Mass 2D 138.3 88 - 224 g    LV Mass 2D Index 62.3 49 - 115 g/m2    LA Size Index 1.35 cm/m2    LA/AO Root Ratio 1.00     Ao Root Index 1.35 cm/m2    RVSP 34 mmHg    EF BP 68 55 - 100 %   LACTIC ACID    Collection Time: 01/27/22  6:02 PM   Result Value Ref Range    Lactic acid 5.0 (HH) 0.4 - 2.0 mmol/L   METABOLIC PANEL, COMPREHENSIVE    Collection Time: 01/28/22  4:52 AM   Result Value Ref Range    Sodium 154 (H) 136 - 145 mmol/L    Potassium 2.5 (LL) 3.5 - 5.1 mmol/L    Chloride 122 (H) 97 - 108 mmol/L    CO2 21 21 - 32 mmol/L    Anion gap 11 5 - 15 mmol/L    Glucose 375 (H) 65 - 100 mg/dL    BUN 68 (H) 6 - 20 mg/dL    Creatinine 4.85 (H) 0.70 - 1.30 mg/dL    BUN/Creatinine ratio 14 12 - 20      GFR est AA 14 (L) >60 ml/min/1.73m2    GFR est non-AA 12 (L) >60 ml/min/1.73m2    Calcium 8.8 8.5 - 10.1 mg/dL    Bilirubin, total 1.1 (H) 0.2 - 1.0 mg/dL    AST (SGOT) >2,000 (H) 15 - 37 U/L    ALT (SGPT) 1,382 (H) 12 - 78 U/L    Alk. phosphatase 46 45 - 117 U/L    Protein, total 7.4 6.4 - 8.2 g/dL    Albumin 2.0 (L) 3.5 - 5.0 g/dL    Globulin 5.4 (H) 2.0 - 4.0 g/dL    A-G Ratio 0.4 (L) 1.1 - 2.2     LACTIC ACID    Collection Time: 01/28/22  4:52 AM   Result Value Ref Range    Lactic acid 4.2 (HH) 0.4 - 2.0 mmol/L   BLOOD GAS, ARTERIAL    Collection Time: 01/28/22  5:16 AM   Result Value Ref Range    pH 7.25 (L) 7.35 - 7.45      PCO2 48 (H) 35 - 45 mmHg    PO2 87 75 - 100 mmHg    O2 SAT 95 (L) >95 %    BICARBONATE 19 (L) 22 - 26 mmol/L    BASE DEFICIT 6.4 (H) 0 - 2 mmol/L    O2 METHOD VENT      FIO2 55 %    MODE Assist Control/Volume Control      Tidal volume 450      SET RATE 18      EPAP/CPAP/PEEP 8      Sample source Arterial      SITE Right Radial      JULIO'S TEST Positive     GLUCOSE, POC    Collection Time: 01/28/22 11:17 AM   Result Value Ref Range    Glucose (POC) 397 (H) 65 - 117 mg/dL    Performed by Benita Waldron          Assessment and plan:      (1) Acute encephalopathy : GCS 13. S/t sepsis     (2) Sepsis: 2L bolus, pan cultures     (2) Acute hypoxic respiratory failure : intubated and sedated     (3) COVID-19 pneumonia : decadron, azithromycin, zosyn      (4) possible aspiration: zosyn.     (4) NSTEMI : ECHO, cardiology consulted. Likely NSTEMI type II     (5) hypokalemia with hypernatremia: replace IV,.      (6) ANDRES: s/p 2 L bolus     (7) schizophrenia: olanzepine     (8) hypothyroidism : synthroid     (9) DMII: SSI     (10) shock liver: s.t hypo perfusion     DVT ppx: heparin SubQ     DISPO: ICU, poor prognosis.  Spoke to brother wants full code and reassess after 48 hours    Signed By: Cameron Judd MD     January 28, 2022

## 2022-01-28 NOTE — ED NOTES
TRANSFER - OUT REPORT:    Verbal report given to [facility] RN on Leoncio Aparicio  being transferred to ICU  for routine progression of care       Report consisted of patients Situation, Background, Assessment and   Recommendations(SBAR). Information from the following report(s) SBAR, ED Summary, MAR and Dual Neuro Assessment was reviewed with the receiving nurse. Lines:   Triple Lumen 01/27/22 Proximal;Right Femoral (Active)       Peripheral IV 01/27/22 Anterior;Distal;Right Forearm (Active)        Opportunity for questions and clarification was provided.       Patient transported with:   Monitor  O2 @ vent liters  Registered Nurse

## 2022-01-28 NOTE — PROGRESS NOTES
was rounding in ICU, at this time patietn was intubated and no family was present.  offered deep breathing with patient to harmoniz our bodies and the room as a form of ministry of presence and support. Advised nurse to contact [facility] for any further referrals.     [address], [facility]    Please [facility]  in order to get in touch with  for any Spiritual Care Needs   [phone]   OR   Reach out to us on [address] [place] [address]

## 2022-01-28 NOTE — PROGRESS NOTES
Progress Note    Patient: Maryjane Jensen MRN: [id number]  SSN: [id number]    YOB: 1945  Age: 78 y.o. Sex: male      Admit Date: 1/27/2022    LOS: 1 day     Subjective:     Patient seen and examined. Patient is followed for NSTEMI in the setting of COVID. Patient has a past medical history of anemia, GERD, schizophrenia, diabetes, and seizure disorder. Patient remains intubated and sedated. Temperature has improved. Continues to require pressor support. Telemetry Review: No acute events noted in the past 24 hours. Sinus tachycardia: HR 100s. Review of Symptoms:   Review of Systems   Unable to perform ROS: acuity of condition         Objective:     Vitals:    01/28/22 0700 01/28/22 0708 01/28/22 0800 01/28/22 1037   BP:   (!) 109/47    Pulse:  (!) 107 (!) 102 100   Resp:  21 22 20   Temp: 98.9 °F (37.2 °C)      SpO2:  94% 93% 95%   Weight:       Height:            Intake and Output:  Current Shift: No intake/output data recorded. Last three shifts: 01/26 1901 - 01/28 0700  In: 3499.2 [I.V.:3499.2]  Out: 150 [Urine:150]    Physical Exam:   . Cloteal Savage Physical Exam  Constitutional:       General: He is not in acute distress. Appearance: He is ill-appearing, toxic-appearing and diaphoretic. Cardiovascular:      Rate and Rhythm: Regular rhythm. Tachycardia present. Pulses: Normal pulses. Heart sounds: Normal heart sounds. Pulmonary:      Effort: Respiratory distress present. Breath sounds: No wheezing, rhonchi or rales. Comments: Intubated  Abdominal:      General: Abdomen is flat. Bowel sounds are normal.      Palpations: Abdomen is soft. Skin:     General: Skin is warm. Neurological:      Comments: Sedated           Lab/Data Review: All lab results for the last 24 hours reviewed.          Current Facility-Administered Medications:     NOREPINephrine (LEVOPHED) 8 mg in 5% dextrose 250mL (32 mcg/mL) infusion, 0.5-30 mcg/min, IntraVENous, TITRATE, Christine Nuñez, MD, Last Rate: 50.6 mL/hr at 01/28/22 0906, 27 mcg/min at 01/28/22 0906    potassium chloride 10 mEq in 100 ml IVPB, 10 mEq, IntraVENous, Q2H, Ge Duran MD, Last Rate: 100 mL/hr at 01/28/22 0908, 10 mEq at 01/28/22 0908    propofol (DIPRIVAN) 10 mg/mL infusion, 0-50 mcg/kg/min, IntraVENous, TITRATE, Britni Higginbotham MD, Last Rate: 18 mL/hr at 01/28/22 0205, 30 mcg/kg/min at 01/28/22 0205    divalproex DR (DEPAKOTE) tablet 500 mg, 500 mg, Oral, BID, Felicita Jaimes MD, 500 mg at 01/28/22 0831    lacosamide (VIMPAT) tablet 200 mg, 200 mg, Oral, BID, Felicita Jaimes MD, 200 mg at 01/28/22 0831    levETIRAcetam (KEPPRA) tablet 1,000 mg, 1,000 mg, Oral, BID, Felicita Jaimes MD, 1,000 mg at 01/28/22 3950    levothyroxine (SYNTHROID) tablet 75 mcg, 75 mcg, Oral, 7am, Felicita Jaimes MD, 75 mcg at 01/28/22 0256    aspirin chewable tablet 81 mg, 81 mg, Oral, DAILY, Felicita Jaimes MD, 81 mg at 01/28/22 0831    sodium chloride (NS) flush 5-40 mL, 5-40 mL, IntraVENous, Q8H, Milagro Nuñez MD, 10 mL at 01/28/22 0530    sodium chloride (NS) flush 5-40 mL, 5-40 mL, IntraVENous, PRN, Felicita Jaimes MD    acetaminophen (TYLENOL) tablet 650 mg, 650 mg, Oral, Q6H PRN, 650 mg at 01/27/22 2111 **OR** acetaminophen (TYLENOL) suppository 650 mg, 650 mg, Rectal, Q6H PRN, Felicita Jaimes MD    polyethylene glycol (MIRALAX) packet 17 g, 17 g, Oral, DAILY PRN, Felicita Jaimes MD    ondansetron (ZOFRAN ODT) tablet 4 mg, 4 mg, Oral, Q8H PRN **OR** ondansetron (ZOFRAN) injection 4 mg, 4 mg, IntraVENous, Q6H PRN, Ian Tripp MD    atorvastatin (LIPITOR) tablet 80 mg, 80 mg, Oral, QHS, Britni Higginbotham MD, 80 mg at 01/27/22 2111    piperacillin-tazobactam (ZOSYN) 3.375 g in 0.9% sodium chloride (MBP/ADV) 100 mL MBP, 3.375 g, IntraVENous, Q8H, Milagro Nuñez MD, Last Rate: 25 mL/hr at 01/28/22 0526, 3.375 g at 01/28/22 0526    dexamethasone (DECADRON) 4 mg/mL injection 6 mg, 6 mg, IntraVENous, Q12H, Jesu Adame MD, 6 mg at 01/28/22 0831    azithromycin (ZITHROMAX) 500 mg in 0.9% sodium chloride 250 mL (VIAL-MATE), 500 mg, IntraVENous, Q24H, Rosales Nuñez MD, IV Completed at 01/27/22 1909    baricitinib (OLUMIANT) tablet 1 mg, 1 mg, Oral, DAILY, Ge Duran MD, 1 mg at 01/28/22 0831      Assessment:     Active Problems:    Acute encephalopathy (5/18/2021)        Plan:     Case discussed with Collaborating physician Dr. Emeterio Cabrera and our recommendations are as follows:     1. NSTEMI:   - HS troponin 1354 > 1510. Likely related to demand ischemia in the setting of COVID and sepsis  - continue asa  - Echo: EF 65% with no wall motion abnormalities. - Cr 4.85      2. Hypotension:   - Blood pressure below goal  - Continue levo and wean as tolerated.     3. COVID pna:   - COVID positive   -  Management per primary. - Will monitor telemetry and to evaluate for possible QTC prolongation.   - Recommend to keep a negative fluid balance to prevent volume overload. 4. Hypokalemia:   - K 2.5, will replete and repeat labs in the am.     Thank you for involving us in the care of this patient. Please do not hesitate to call if additional questions arise. If after hours please call [phone]. Signed By: Joy Tomas NP     January 28, 2022        Dr. Meghann Nascimento Cardiologist    [facility]  [address] [facility].    [address] [place] [address] [address], [address] [facility]  [phone]

## 2022-01-28 NOTE — PROGRESS NOTES
IMPRESSION:   1. Acute hypoxic respiratory failure  2. Malignant hyperthermia resolved  3. COVID-19 pneumonia  4. NSTEMI elevated troponin  5. Shock cardiogenic versus septic vs Hypovolumic Hypotension  6. Symptomatic  7. Acute kidney injury  8. Prerenal azotemia  9. Symptomatic hypokalemia  10. Shock liver with transaminitis  11. Additional workup outlined below  12. Pt is at high risk of sudden decline and decompensation with life threatening consequenses and continued end organ dysfunction and failure  13. Pt is critically ill. Time spent with pt and staff actively rendering care, managing pt and coordinating care as stated below; 30 minutes, exclusive of any procedures      RECOMMENDATIONS/PLAN:   1. ICU monitoring  1. Ventilator for mechanical life support and prevent respiratory arrest with protective lung strategies   2. Patient on Decadron Zithromax and baricitinib  3. Troponin is elevated 2D echo  4. Will replace potassium get magnesium level  5. Patient is severely dehydrated IV fluid started on Levophed yesterday  6. Worsening of the renal function continue IV fluid  7. Liver enzyme elevated shock liver  8. Agree with Empiric IV antibiotics pending culture results   9. Temperature much improved off cooling blanket  10. IV vasopressors for circulatory shock refractory to fluids to maintain SBP> 90  11. Transfuse prn to maintain Hgb > 7  12. Labs to follow electrolytes, renal function and and blood counts  13. Bronchial hygiene with respiratory therapy techniques, bronchodilators  14. Pt needs IV fluids with additives and Drug therapy requiring intensive monitoring for toxicity  15. Prescription drug management with home med reconciliation reviewed  16. DVT, SUP prophylaxis  17.  Will be available to assist in medical management while in the CCU pending disposition     [x] High complexity decision making was performed  [x] See my orders for details  HPI   77-year-old male came in because as he was found unresponsive and fever 107 past medical history of schizophrenia and diabetes gastroesophageal reflux disease and anemia he is COVID-19 positive initially patient was called as NSTEMI but it was canceled patient is intubated on ventilator hemodynamically unstable unable to get any history out of the patient he seems dehydrated    PMH:  has a past medical history of Acute renal failure ([facility] [address] Anemia, Arthritis, DKA (diabetic ketoacidoses), GERD (gastroesophageal reflux disease), HTN (hypertension), Hypercholesterolemia, Schizophrenia ([facility] [address] Schizophreniform disorder ([facility] [address] Seizure disorder ([facility] [address] and Type II or unspecified type diabetes mellitus without mention of complication, uncontrolled. PSH:   has no past surgical history on file. FHX: family history includes Stroke in his father. SHX:  reports that he has never smoked. He has never used smokeless tobacco. He reports that he does not drink alcohol and does not use drugs.     ALL: No Known Allergies     MEDS:   [x] Reviewed - As Below   [] Not reviewed    Current Facility-Administered Medications   Medication    NOREPINephrine (LEVOPHED) 8 mg in 5% dextrose 250mL (32 mcg/mL) infusion    propofol (DIPRIVAN) 10 mg/mL infusion    divalproex DR (DEPAKOTE) tablet 500 mg    lacosamide (VIMPAT) tablet 200 mg    levETIRAcetam (KEPPRA) tablet 1,000 mg    levothyroxine (SYNTHROID) tablet 75 mcg    aspirin chewable tablet 81 mg    sodium chloride (NS) flush 5-40 mL    sodium chloride (NS) flush 5-40 mL    acetaminophen (TYLENOL) tablet 650 mg    Or    acetaminophen (TYLENOL) suppository 650 mg    polyethylene glycol (MIRALAX) packet 17 g    ondansetron (ZOFRAN ODT) tablet 4 mg    Or    ondansetron (ZOFRAN) injection 4 mg    atorvastatin (LIPITOR) tablet 80 mg    piperacillin-tazobactam (ZOSYN) 3.375 g in 0.9% sodium chloride (MBP/ADV) 100 mL MBP    dexamethasone (DECADRON) 4 mg/mL injection 6 mg    azithromycin (ZITHROMAX) 500 mg in 0.9% sodium chloride 250 mL (VIAL-MATE)    baricitinib (OLUMIANT) tablet 1 mg      MAR reviewed and pertinent medications noted or modified as needed   Current Facility-Administered Medications   Medication    NOREPINephrine (LEVOPHED) 8 mg in 5% dextrose 250mL (32 mcg/mL) infusion    propofol (DIPRIVAN) 10 mg/mL infusion    divalproex DR (DEPAKOTE) tablet 500 mg    lacosamide (VIMPAT) tablet 200 mg    levETIRAcetam (KEPPRA) tablet 1,000 mg    levothyroxine (SYNTHROID) tablet 75 mcg    aspirin chewable tablet 81 mg    sodium chloride (NS) flush 5-40 mL    sodium chloride (NS) flush 5-40 mL    acetaminophen (TYLENOL) tablet 650 mg    Or    acetaminophen (TYLENOL) suppository 650 mg    polyethylene glycol (MIRALAX) packet 17 g    ondansetron (ZOFRAN ODT) tablet 4 mg    Or    ondansetron (ZOFRAN) injection 4 mg    atorvastatin (LIPITOR) tablet 80 mg    piperacillin-tazobactam (ZOSYN) 3.375 g in 0.9% sodium chloride (MBP/ADV) 100 mL MBP    dexamethasone (DECADRON) 4 mg/mL injection 6 mg    azithromycin (ZITHROMAX) 500 mg in 0.9% sodium chloride 250 mL (VIAL-MATE)    baricitinib (OLUMIANT) tablet 1 mg      PMH:  has a past medical history of Acute renal failure (HCC), Anemia, Arthritis, DKA (diabetic ketoacidoses), GERD (gastroesophageal reflux disease), HTN (hypertension), Hypercholesterolemia, Schizophrenia ([facility] [address] Schizophreniform disorder ([facility] [address] Seizure disorder ([facility] [address] and Type II or unspecified type diabetes mellitus without mention of complication, uncontrolled. PSH:   has no past surgical history on file. FHX: family history includes Stroke in his father. SHX:  reports that he has never smoked. He has never used smokeless tobacco. He reports that he does not drink alcohol and does not use drugs.      ROS:  Unable to obtain intubated on ventilator and sedated    Hemodynamics:    CO:    CI:    CVP:    SVR:   PAP Systolic:    PAP Diastolic:    PVR:    [id number]:        Ventilator Settings:      Mode Rate TV Press PEEP FiO2 PIP Min. Vent   Assist control,Volume control    500 ml    8 cm H20 55 %  21 cm H2O  12.4 l/min        Vital Signs: Telemetry:    normal sinus rhythm Intake/Output:   Visit Vitals  BP (!) 117/59 (BP 1 Location: Right upper arm, BP Patient Position: At rest)   Pulse (!) 107   Temp 98.9 °F (37.2 °C)   Resp 21   Ht 6' (1.829 m)   Wt 99.8 kg (220 lb)   SpO2 94%   BMI 29.84 kg/m²       Temp (24hrs), Av.8 °F (39.3 °C), Min:98.9 °F (37.2 °C), Max:106.5 °F (41.4 °C)        O2 Device: Ventilator         Wt Readings from Last 4 Encounters:   22 99.8 kg (220 lb)   22 101.1 kg (222 lb 14.2 oz)   10/06/21 111.1 kg (245 lb)   21 110.7 kg (244 lb)          Intake/Output Summary (Last 24 hours) at 2022 0838  Last data filed at 2022 0600  Gross per 24 hour   Intake 3499.16 ml   Output 150 ml   Net 3349.16 ml       Last shift:      No intake/output data recorded. Last 3 shifts:  1901 -  0700  In: 3499.2 [I.V.:3499.2]  Out: 150 [Urine:150]       Physical Exam:     General:  intubated on vent  HEENT: NCAT, poor dentition, lips and mucosa dry  Eyes: anicteric; conjunctiva clear  Neck: no nodes, no cuff leak, trach midline; no accessory MM use. Chest: no deformity,   Cardiac: R regular; no murmur;   Lungs: distant breath sounds; no wheezes  Abd: soft, NT, hypoactive BS  Ext: no edema; no joint swelling;  No clubbing  : NO servin, clear urine  Neuro: sedated  Psych-  unable to assess  Skin: warm, dry, no cyanosis;   Pulses: 1-2+ Bilateral pedal, radial  Capillary: brisk; pale      DATA:    MAR reviewed and pertinent medications noted or modified as needed  MEDS:   Current Facility-Administered Medications   Medication    NOREPINephrine (LEVOPHED) 8 mg in 5% dextrose 250mL (32 mcg/mL) infusion    propofol (DIPRIVAN) 10 mg/mL infusion    divalproex DR (DEPAKOTE) tablet 500 mg    lacosamide (VIMPAT) tablet 200 mg    levETIRAcetam (KEPPRA) tablet 1,000 mg    levothyroxine (SYNTHROID) tablet 75 mcg    aspirin chewable tablet 81 mg    sodium chloride (NS) flush 5-40 mL    sodium chloride (NS) flush 5-40 mL    acetaminophen (TYLENOL) tablet 650 mg    Or    acetaminophen (TYLENOL) suppository 650 mg    polyethylene glycol (MIRALAX) packet 17 g    ondansetron (ZOFRAN ODT) tablet 4 mg    Or    ondansetron (ZOFRAN) injection 4 mg    atorvastatin (LIPITOR) tablet 80 mg    piperacillin-tazobactam (ZOSYN) 3.375 g in 0.9% sodium chloride (MBP/ADV) 100 mL MBP    dexamethasone (DECADRON) 4 mg/mL injection 6 mg    azithromycin (ZITHROMAX) 500 mg in 0.9% sodium chloride 250 mL (VIAL-MATE)    baricitinib (OLUMIANT) tablet 1 mg        Labs:    Recent Labs     01/27/22  1330   WBC 5.4   HGB 15.2   PLT PLEASE SEE PLTNACIT FOR ACCURATE PLT COUNT DUE TO EDTA SENSITIVITY CAUSING PLT CLUMPING IN LAV TOP TUBE. Recent Labs     01/28/22  0452 01/27/22  1802 01/27/22  1459 01/27/22  1458 01/27/22  1330   *  --   --  155*  --    K 2.5*  --   --  2.8*  --    *  --   --  122*  --    CO2 21  --   --  24  --    *  --   --  358*  --    BUN 68*  --   --  61*  --    CREA 4.85*  --   --  3.54*  --    CA 8.8  --   --  9.3  --    LAC 4.2* 5.0* 4.7*  --    < >   ALB 2.0*  --   --  2.0*  --    ALT 1,382*  --   --  282*  --     < > = values in this interval not displayed. Recent Labs     01/28/22  0516 01/27/22  1345   PH 7.25* 7.32*   PCO2 48* 47*   PO2 87 299*   HCO3 19* 23   FIO2 55 100.0     No results for input(s): CPK, CKNDX, TROIQ in the last 72 hours.     No lab exists for component: CPKMB  No results found for: BNPP, BNP   Lab Results   Component Value Date/Time    Culture result: No significant growth, <10,000 CFU/mL 06/05/2021 08:45 AM    Culture result: No growth 6 days 06/03/2021 02:00 PM    Culture result: No growth 6 days 06/03/2021 01:30 PM     Lab Results   Component Value Date/Time    TSH 4.47 (H) 05/21/2021 10:46 AM        Imaging:    Results from [facility] Encounter encounter on 01/27/22    XR CHEST PORT    Narrative  Chest single view. Comparison single view chest January 27, 2022 at 13:56. Stable ET tube. NG tube extends to the left upper quadrant abdomen, expected position. Unchanged appearance for the lungs. Cardiac and mediastinal structures  unchanged. No pneumothorax or sizable pleural effusion. Results from Hospital Encounter encounter on 01/27/22    CT HEAD WO CONT    Narrative  Study: Head CT without contrast.    Clinical Indication: Altered mental status. Rule out intracranial hemorrhage. Comparison: Head CT dated 1/20/2022. Technique: Routine volume acquisition of the head was performed without contrast  using soft tissue and bone kernels. Coronal and sagittal reconstructions were  generated and reviewed. Dose reduction: All CT scans at this facility are  performed using dose reduction optimization techniques as appropriate to a  performed exam including the following-automated exposure control, adjustments  of mA and/or Kv according to patient size, or use of iterative reconstructive  technique. Findings:    Unchanged generalized cerebral and cerebellar volume loss with commensurate  prominence of ventricles and sulci. No midline shift. The basal cisterns are patent. No acute hemorrhage, abnormal  extra-axial fluid, or evidence of large territorial ischemia. Scattered  intracranial vascular calcifications. Unremarkable orbits. Bilateral maxillary sinus and scattered ethmoid mucosal  thickening. Cerumen in the left EAC. No evidence of an aggressive calvarial or  extracalvarial lesion. Impression  No acute intracranial abnormality.       This care involved high complexity decision making which includes independently reviewing the patient's past medical records, current laboratory results, medication profiles that were immediately available to me and actual Xray images at the bedside in order to assess, support vital system function, and to treat this degree of vital organ system failure, and to prevent further life threatening deterioration of the patients condition. I was in direct communication with the nursing staff throughout this time.     Medical Decision Making Today  · Reviewed the flowsheet and previous days notes  · Reviewed and summarized records or history from previous days note or discussions with staff, family  · Parenteral controlled substances - Reviewed/ Adjusted / Willam Mayor / Started  · High Risk Drug therapy requiring intensive monitoring for toxicity: eg steroids, pressors, antibiotics  · Review and order of Clinical lab tests  · Review and Order of Radiology tests  · Review and Order of Medicine tests  · Independent visualization of radiologic Images  · Reviewed Ventilator / NiPPV  · I have personally reviewed the patients ECG / Telemetry  · Diagnostic endoscopies with identified risk factors

## 2022-01-29 NOTE — PROGRESS NOTES
While bedside noted seizure like activity respiratory rate increased to 50-60 bpm, BP dropped to 51/33,48/36 O2 saturation decreased to 80% respiratory bedside. Scheduled seizure meds administered, Levophed increased to 45. Notified Oksana SORTO new orders received for Ativan and Phenyleprine. Will continue to monitor.

## 2022-01-29 NOTE — PROGRESS NOTES
Progress Note    Patient: Stevenson Perez MRN: [id number]  SSN: [id number]    YOB: 1945  Age: 78 y.o. Sex: male      Admit Date: 1/27/2022    LOS: 2 days     Subjective:     76M, H/o Schizophenia, seizures, DMII on oral meds with unresponsive and acute hypoxic respiratory failure s/t COVID-19 pneumonitis complicated by ANDRES, hypokalemia and shock liver.          HSOPITAL COURSE  COVID positive, associated with fever 106, increased troponin, cardiology was consulted ansd recommended ECHO, there is no heparin gtt, was initially called for STEMI and was cancelled. he was intubated for acute hypoxic respiratory failure. On levophed. Complicated by ANDRES, shock liver and hypokalemia. Was treated with azithromycin, barcitinib, and zosyn. Will give 1L bolus and continue IVF. Discussed with family he is very critical- his renal functions increased and now seemingly in ATN, his liver functions have worsened and had a seizure on 1/28 , he is off propofol now and neurology was consulted. Review of Systems:  Unable to determine s.t mental status    Objective:     Vitals:    01/29/22 1400 01/29/22 1422 01/29/22 1437 01/29/22 1536   BP: (!) 79/43 (!) 84/45 (!) 75/42    Pulse: 86 88  84   Resp: 18 19  19   Temp:       SpO2: 98% 98%  98%   Weight:       Height:            Intake and Output:  Current Shift: 01/29 0701 - 01/29 1900  In: 60   Out: 20 [Urine:20]  Last three shifts: 01/27 1901 - 01/29 0700  In: 3328.3 [I.V.:3328.3]  Out: 225 [Urine:225]      Physical Exam:   Physical Exam  Vitals and nursing note reviewed. Constitutional:       General: intubates     Appearance: Normal appearance. He is normal weight. He is ill-appearing. HENT:      Head: Normocephalic and atraumatic.      Nose: Nose normal.      Mouth/Throat:      Mouth: Mucous membranes are moist.   Eyes:      Extraocular Movements: Extraocular movements intact.      Pupils: Pupils are equal, round, and reactive to light. Cardiovascular:      Rate and Rhythm: Regular rhythm. Tachycardia present.      Pulses: Normal pulses.      Heart sounds: Normal heart sounds. Pulmonary:      Effort: Respiratory distress present.      Breath sounds: Rhonchi present. Abdominal:      General: Abdomen is flat. Bowel sounds are normal.      Palpations: Abdomen is soft. Musculoskeletal:         General: No swelling or tenderness. Normal range of motion.      Cervical back: Normal range of motion and neck supple. Skin:     General: Skin is warm and dry.      Capillary Refill: Capillary refill takes less than 2 seconds. Neurological:      Mental Status: He is unresponsive.      GCS: GCS eye subscore is 4. GCS verbal subscore is 1. GCS motor subscore is 1.    Psychiatric:         cannot be assessed      Lab/Data Review:  Recent Results (from the past 24 hour(s))   GLUCOSE, POC    Collection Time: 01/28/22  9:42 PM   Result Value Ref Range    Glucose (POC) 441 (H) 65 - 117 mg/dL    Performed by Fatou Collins    BLOOD GAS, ARTERIAL    Collection Time: 01/29/22  2:23 AM   Result Value Ref Range    pH 7.27 (L) 7.35 - 7.45      PCO2 43 35 - 45 mmHg    PO2 66 (L) 75 - 100 mmHg    O2 SAT 92 (L) >95 %    BICARBONATE 19 (L) 22 - 26 mmol/L    BASE DEFICIT 7.0 (H) 0 - 2 mmol/L    O2 METHOD VENT      FIO2 55.0 %    MODE Assist Control/Volume Control      Tidal volume 500      SET RATE 18      EPAP/CPAP/PEEP 8.0      SITE Right Radial      JULIO'S TEST PASS     MAGNESIUM    Collection Time: 01/29/22  2:41 AM   Result Value Ref Range    Magnesium 2.1 1.6 - 2.4 mg/dL   METABOLIC PANEL, COMPREHENSIVE    Collection Time: 01/29/22  2:41 AM   Result Value Ref Range    Sodium 149 (H) 136 - 145 mmol/L    Potassium 3.0 (L) 3.5 - 5.1 mmol/L    Chloride 117 (H) 97 - 108 mmol/L    CO2 21 21 - 32 mmol/L    Anion gap 11 5 - 15 mmol/L    Glucose 555 (H) 65 - 100 mg/dL    BUN 90 (H) 6 - 20 mg/dL    Creatinine 6.82 (H) 0.70 - 1.30 mg/dL    BUN/Creatinine ratio 13 12 - 20 GFR est AA 10 (L) >60 ml/min/1.73m2    GFR est non-AA 8 (L) >60 ml/min/1.73m2    Calcium 7.9 (L) 8.5 - 10.1 mg/dL    Bilirubin, total 1.0 0.2 - 1.0 mg/dL    AST (SGOT) >2,000 (H) 15 - 37 U/L    ALT (SGPT) 1,368 (H) 12 - 78 U/L    Alk. phosphatase 59 45 - 117 U/L    Protein, total 6.5 6.4 - 8.2 g/dL    Albumin 1.5 (L) 3.5 - 5.0 g/dL    Globulin 5.0 (H) 2.0 - 4.0 g/dL    A-G Ratio 0.3 (L) 1.1 - 2.2     NT-PRO BNP    Collection Time: 01/29/22  2:41 AM   Result Value Ref Range    NT pro-BNP 15,919 (H) <125 pg/mL   GLUCOSE, POC    Collection Time: 01/29/22  7:49 AM   Result Value Ref Range    Glucose (POC) 555 (H) 65 - 117 mg/dL    Performed by Savita Mcduffie    METABOLIC PANEL, COMPREHENSIVE    Collection Time: 01/29/22 10:20 AM   Result Value Ref Range    Sodium 144 136 - 145 mmol/L    Potassium 3.4 (L) 3.5 - 5.1 mmol/L    Chloride 111 (H) 97 - 108 mmol/L    CO2 19 (L) 21 - 32 mmol/L    Anion gap 14 5 - 15 mmol/L    Glucose 628 (HH) 65 - 100 mg/dL    BUN 95 (H) 6 - 20 mg/dL    Creatinine 7.50 (H) 0.70 - 1.30 mg/dL    BUN/Creatinine ratio 13 12 - 20      GFR est AA 9 (L) >60 ml/min/1.73m2    GFR est non-AA 7 (L) >60 ml/min/1.73m2    Calcium 7.8 (L) 8.5 - 10.1 mg/dL    Bilirubin, total 0.9 0.2 - 1.0 mg/dL    AST (SGOT) >2,000 (H) 15 - 37 U/L    ALT (SGPT) 1,550 (H) 12 - 78 U/L    Alk.  phosphatase 99 45 - 117 U/L    Protein, total 6.9 6.4 - 8.2 g/dL    Albumin 1.6 (L) 3.5 - 5.0 g/dL    Globulin 5.3 (H) 2.0 - 4.0 g/dL    A-G Ratio 0.3 (L) 1.1 - 2.2     D DIMER    Collection Time: 01/29/22 10:20 AM   Result Value Ref Range    D DIMER >20.00 (H) <0.50 ug/ml(FEU)   TROPONIN-HIGH SENSITIVITY    Collection Time: 01/29/22 10:20 AM   Result Value Ref Range    Troponin-High Sensitivity 895 (HH) 0 - 76 ng/L   PROTHROMBIN TIME + INR    Collection Time: 01/29/22 10:20 AM   Result Value Ref Range    Prothrombin time 18.8 (H) 11.9 - 14.7 sec    INR 1.6 (H) 0.9 - 1.1     PTT    Collection Time: 01/29/22 10:20 AM   Result Value Ref Range aPTT 60.3 (H) 21.2 - 34.1 sec    aPTT, therapeutic range   82 - 109 sec   GLUCOSE, POC    Collection Time: 01/29/22 11:13 AM   Result Value Ref Range    Glucose (POC) 514 (H) 65 - 117 mg/dL    Performed by Rinku Lopez    PLATELET COUNT    Collection Time: 01/29/22 12:40 PM   Result Value Ref Range    PLATELET 82 (L) 257 - 400 K/uL   CBC W/O DIFF    Collection Time: 01/29/22 12:40 PM   Result Value Ref Range    WBC 15.5 (H) 4.1 - 11.1 K/uL    RBC 3.57 (L) 4.10 - 5.70 M/uL    HGB 11.4 (L) 12.1 - 17.0 g/dL    HCT 36.7 36.6 - 50.3 %    .8 (H) 80.0 - 99.0 FL    MCH 31.9 26.0 - 34.0 PG    MCHC 31.1 30.0 - 36.5 g/dL    RDW 14.9 (H) 11.5 - 14.5 %    PLATELET 84 (L) 350 - 400 K/uL    NRBC 0.2 (H) 0.0  WBC    ABSOLUTE NRBC 0.03 (H) 0.00 - 0.01 K/uL   DIFFERENTIAL, AUTO    Collection Time: 01/29/22 12:40 PM   Result Value Ref Range    NEUTROPHILS 82 (H) 32 - 75 %    BAND NEUTROPHILS 17 (H) 0 - 6 %    LYMPHOCYTES 1 (L) 12 - 49 %    MONOCYTES 0 (L) 5 - 13 %    EOSINOPHILS 0 0 - 7 %    BASOPHILS 0 0 - 1 %    IMMATURE GRANULOCYTES 0 %    ABS. NEUTROPHILS 15.3 (H) 1.8 - 8.0 K/UL    ABS. LYMPHOCYTES 0.2 (L) 0.8 - 3.5 K/UL    ABS. MONOCYTES 0.0 0.0 - 1.0 K/UL    ABS. EOSINOPHILS 0.0 0.0 - 0.4 K/UL    ABS. BASOPHILS 0.0 0.0 - 0.1 K/UL    ABS. IMM.  GRANS. 0.0 K/UL    DF Smear Scanned      PLATELET COMMENTS Clumped Platelets      RBC COMMENTS Normocytic, Normochromic     METABOLIC PANEL, COMPREHENSIVE    Collection Time: 01/29/22  2:35 PM   Result Value Ref Range    Sodium 144 136 - 145 mmol/L    Potassium 2.8 (L) 3.5 - 5.1 mmol/L    Chloride 112 (H) 97 - 108 mmol/L    CO2 20 (L) 21 - 32 mmol/L    Anion gap 12 5 - 15 mmol/L    Glucose 605 (HH) 65 - 100 mg/dL     (H) 6 - 20 mg/dL    Creatinine 7.96 (H) 0.70 - 1.30 mg/dL    BUN/Creatinine ratio 13 12 - 20      GFR est AA 8 (L) >60 ml/min/1.73m2    GFR est non-AA 7 (L) >60 ml/min/1.73m2    Calcium 7.7 (L) 8.5 - 10.1 mg/dL    Bilirubin, total 0.7 0.2 - 1.0 mg/dL AST (SGOT) >2,000 (H) 15 - 37 U/L    ALT (SGPT) 1,458 (H) 12 - 78 U/L    Alk. phosphatase 62 45 - 117 U/L    Protein, total 6.4 6.4 - 8.2 g/dL    Albumin 1.6 (L) 3.5 - 5.0 g/dL    Globulin 4.8 (H) 2.0 - 4.0 g/dL    A-G Ratio 0.3 (L) 1.1 - 2.2     GLUCOSE, POC    Collection Time: 01/29/22  3:50 PM   Result Value Ref Range    Glucose (POC) 541 (H) 65 - 117 mg/dL    Performed by Torri John          Assessment and plan:      (1) Acute encephalopathy : GCS 12. S/t sepsis + anoxia. Off propofol, neurology consulted.     (2) Septic shock: 2L bolus, pan cultures, levophed     (2) Acute hypoxic respiratory failure : intubated and sedated     (3) COVID-19 pneumonia : decadron, azithromycin, zosyn      (4) possible aspiration: zosyn.     (4) NSTEMI : ECHO, cardiology consulted. Likely NSTEMI type II     (5) hypokalemia with hypernatremia: replace IV,.      (6) ANDRES: s/p 2 L bolus     (7) schizophrenia: olanzepine     (8) hypothyroidism : synthroid     (9) DMII: SSI lantus 40 units at bed time     (10) shock liver: s.t hypo perfusion, worseing      DVT ppx: heparin SubQ     DISPO: ICU, poor prognosis. Spoke to brother wants full code and reassess after 24 hours.      Signed By: Chas Elizabeth MD     January 29, 2022

## 2022-01-29 NOTE — PROGRESS NOTES
IMPRESSION:   1. Acute hypoxic respiratory failure  2. Malignant hyperthermia resolved  3. COVID-19 pneumonia  4. NSTEMI elevated troponin  5. Shock cardiogenic versus septic vs Hypovolumic Hypotension  6. Symptomatic  7. Acute kidney injury with worsening of the renal function  8. Prerenal azotemia  9. Symptomatic hypokalemia  10. Shock liver with transaminitis  11. Additional workup outlined below  12. Pt is at high risk of sudden decline and decompensation with life threatening consequenses and continued end organ dysfunction and failure  13. Pt is critically ill. Time spent with pt and staff actively rendering care, managing pt and coordinating care as stated below; 30 minutes, exclusive of any procedures      RECOMMENDATIONS/PLAN:   1. ICU monitoring  1. Ventilator for mechanical life support and prevent respiratory arrest with protective lung strategies ABG acceptable   2. On Assist control 55% FiO2 tidal volume 500 rate of 18 PEEP of 8  3. Patient on Decadron Zithromax and baricitinib  4. Troponin is elevated 2D echo shows ejection fraction of 65%  5. Worsening of the renal function possible need for dialysis will get nephrology consult   6. Patient is severely dehydrated IV fluid started on Levophed   7. Worsening of the renal function continue IV fluid  8. Liver enzyme elevated shock liver  9. Agree with Empiric IV antibiotics pending culture results   10. Temperature much improved off cooling blanket  11. IV vasopressors for circulatory shock refractory to fluids to maintain SBP> 90  12. Transfuse prn to maintain Hgb > 7  13. Labs to follow electrolytes, renal function and and blood counts  14. Bronchial hygiene with respiratory therapy techniques, bronchodilators  15. Pt needs IV fluids with additives and Drug therapy requiring intensive monitoring for toxicity  16. Prescription drug management with home med reconciliation reviewed  17. DVT, SUP prophylaxis  18.  Will be available to assist in medical management while in the CCU pending disposition     [x] High complexity decision making was performed  [x] See my orders for details  HPI   44-year-old male came in because as he was found unresponsive and fever 107 past medical history of schizophrenia and diabetes gastroesophageal reflux disease and anemia he is COVID-19 positive initially patient was called as NSTEMI but it was canceled patient is intubated on ventilator hemodynamically unstable unable to get any history out of the patient he seems dehydrated    PMH:  has a past medical history of Acute renal failure ([address] Anemia, Arthritis, DKA (diabetic ketoacidoses), GERD (gastroesophageal reflux disease), HTN (hypertension), Hypercholesterolemia, Schizophrenia ([address] Schizophreniform disorder ([address] Seizure disorder ([address] and Type II or unspecified type diabetes mellitus without mention of complication, uncontrolled. PSH:   has a past surgical history that includes ir insert non tunl cvc over 5 yrs (1/28/2022). FHX: family history includes Stroke in his father. SHX:  reports that he has never smoked. He has never used smokeless tobacco. He reports that he does not drink alcohol and does not use drugs.     ALL: No Known Allergies     MEDS:   [x] Reviewed - As Below   [] Not reviewed    Current Facility-Administered Medications   Medication    LORazepam (ATIVAN) injection 2 mg    potassium chloride 10 mEq in 100 ml IVPB    heparin (porcine) injection 5,000 Units    insulin glargine (LANTUS) injection 40 Units    glucose chewable tablet 16 g    dextrose (D50W) injection syrg 12.5-25 g    glucagon (GLUCAGEN) injection 1 mg    insulin lispro (HUMALOG) injection    levETIRAcetam (KEPPRA) tablet 1,500 mg    NOREPINephrine (LEVOPHED) 8 mg in 5% dextrose 250mL (32 mcg/mL) infusion    0.45% sodium chloride infusion    PHENYLephrine (ISRAEL-SYNEPHRINE) 30 mg in 0.9% sodium chloride 250 mL infusion    propofol (DIPRIVAN) 10 mg/mL infusion    divalproex DR (DEPAKOTE) tablet 500 mg    lacosamide (VIMPAT) tablet 200 mg    levothyroxine (SYNTHROID) tablet 75 mcg    aspirin chewable tablet 81 mg    sodium chloride (NS) flush 5-40 mL    sodium chloride (NS) flush 5-40 mL    acetaminophen (TYLENOL) tablet 650 mg    Or    acetaminophen (TYLENOL) suppository 650 mg    polyethylene glycol (MIRALAX) packet 17 g    ondansetron (ZOFRAN ODT) tablet 4 mg    Or    ondansetron (ZOFRAN) injection 4 mg    atorvastatin (LIPITOR) tablet 80 mg    piperacillin-tazobactam (ZOSYN) 3.375 g in 0.9% sodium chloride (MBP/ADV) 100 mL MBP    dexamethasone (DECADRON) 4 mg/mL injection 6 mg    azithromycin (ZITHROMAX) 500 mg in 0.9% sodium chloride 250 mL (VIAL-MATE)    baricitinib (OLUMIANT) tablet 1 mg      MAR reviewed and pertinent medications noted or modified as needed   Current Facility-Administered Medications   Medication    LORazepam (ATIVAN) injection 2 mg    potassium chloride 10 mEq in 100 ml IVPB    heparin (porcine) injection 5,000 Units    insulin glargine (LANTUS) injection 40 Units    glucose chewable tablet 16 g    dextrose (D50W) injection syrg 12.5-25 g    glucagon (GLUCAGEN) injection 1 mg    insulin lispro (HUMALOG) injection    levETIRAcetam (KEPPRA) tablet 1,500 mg    NOREPINephrine (LEVOPHED) 8 mg in 5% dextrose 250mL (32 mcg/mL) infusion    0.45% sodium chloride infusion    PHENYLephrine (ISRAEL-SYNEPHRINE) 30 mg in 0.9% sodium chloride 250 mL infusion    propofol (DIPRIVAN) 10 mg/mL infusion    divalproex DR (DEPAKOTE) tablet 500 mg    lacosamide (VIMPAT) tablet 200 mg    levothyroxine (SYNTHROID) tablet 75 mcg    aspirin chewable tablet 81 mg    sodium chloride (NS) flush 5-40 mL    sodium chloride (NS) flush 5-40 mL    acetaminophen (TYLENOL) tablet 650 mg    Or    acetaminophen (TYLENOL) suppository 650 mg    polyethylene glycol (MIRALAX) packet 17 g    ondansetron (ZOFRAN ODT) tablet 4 mg    Or    ondansetron (ZOFRAN) injection 4 mg    atorvastatin (LIPITOR) tablet 80 mg    piperacillin-tazobactam (ZOSYN) 3.375 g in 0.9% sodium chloride (MBP/ADV) 100 mL MBP    dexamethasone (DECADRON) 4 mg/mL injection 6 mg    azithromycin (ZITHROMAX) 500 mg in 0.9% sodium chloride 250 mL (VIAL-MATE)    baricitinib (OLUMIANT) tablet 1 mg      PMH:  has a past medical history of Acute renal failure ([facility] [address] Anemia, Arthritis, DKA (diabetic ketoacidoses), GERD (gastroesophageal reflux disease), HTN (hypertension), Hypercholesterolemia, Schizophrenia ([facility] [address] Schizophreniform disorder ([facility] [address] Seizure disorder ([facility] [address] and Type II or unspecified type diabetes mellitus without mention of complication, uncontrolled. PSH:   has a past surgical history that includes ir insert non tunl cvc over 5 yrs (2022). FHX: family history includes Stroke in his father. SHX:  reports that he has never smoked. He has never used smokeless tobacco. He reports that he does not drink alcohol and does not use drugs. ROS:  Unable to obtain intubated on ventilator and sedated    Hemodynamics:    CO:    CI:    CVP:    SVR:   PAP Systolic:    PAP Diastolic:    PVR:    QG19:        Ventilator Settings:      Mode Rate TV Press PEEP FiO2 PIP Min.  Vent   Assist control,Volume control    500 ml    8 cm H20 55 %  27 cm H2O  9.49 l/min        Vital Signs: Telemetry:    normal sinus rhythm Intake/Output:   Visit Vitals  BP (!) 159/60 Comment: levophed titrated   Pulse 87   Temp 97.6 °F (36.4 °C)   Resp 18   Ht 6' (1.829 m)   Wt 99.8 kg (220 lb)   SpO2 93%   BMI 29.84 kg/m²       Temp (24hrs), Av.5 °F (36.9 °C), Min:97.6 °F (36.4 °C), Max:101.6 °F (38.7 °C)        O2 Device: Ventilator         Wt Readings from Last 4 Encounters:   22 99.8 kg (220 lb)   22 101.1 kg (222 lb 14.2 oz)   10/06/21 111.1 kg (245 lb)   21 110.7 kg (244 lb)          Intake/Output Summary (Last 24 hours) at 2022 0923  Last data filed at 2022 0636  Gross per 24 hour Intake 1053.3 ml   Output 75 ml   Net 978.3 ml       Last shift:      No intake/output data recorded. Last 3 shifts: 01/27 1901 - 01/29 0700  In: 3328.3 [I.V.:3328.3]  Out: 225 [Urine:225]       Physical Exam:     General:  intubated on vent  HEENT: NCAT, poor dentition, lips and mucosa dry  Eyes: anicteric; conjunctiva clear  Neck: no nodes, no cuff leak, trach midline; no accessory MM use. Chest: no deformity,   Cardiac: R regular; no murmur;   Lungs: distant breath sounds; no wheezes  Abd: soft, NT, hypoactive BS  Ext: no edema; no joint swelling;  No clubbing  : NO servin, clear urine  Neuro: sedated  Psych-  unable to assess  Skin: warm, dry, no cyanosis;   Pulses: 1-2+ Bilateral pedal, radial  Capillary: brisk; pale      DATA:    MAR reviewed and pertinent medications noted or modified as needed  MEDS:   Current Facility-Administered Medications   Medication    LORazepam (ATIVAN) injection 2 mg    potassium chloride 10 mEq in 100 ml IVPB    heparin (porcine) injection 5,000 Units    insulin glargine (LANTUS) injection 40 Units    glucose chewable tablet 16 g    dextrose (D50W) injection syrg 12.5-25 g    glucagon (GLUCAGEN) injection 1 mg    insulin lispro (HUMALOG) injection    levETIRAcetam (KEPPRA) tablet 1,500 mg    NOREPINephrine (LEVOPHED) 8 mg in 5% dextrose 250mL (32 mcg/mL) infusion    0.45% sodium chloride infusion    PHENYLephrine (ISRAEL-SYNEPHRINE) 30 mg in 0.9% sodium chloride 250 mL infusion    propofol (DIPRIVAN) 10 mg/mL infusion    divalproex DR (DEPAKOTE) tablet 500 mg    lacosamide (VIMPAT) tablet 200 mg    levothyroxine (SYNTHROID) tablet 75 mcg    aspirin chewable tablet 81 mg    sodium chloride (NS) flush 5-40 mL    sodium chloride (NS) flush 5-40 mL    acetaminophen (TYLENOL) tablet 650 mg    Or    acetaminophen (TYLENOL) suppository 650 mg    polyethylene glycol (MIRALAX) packet 17 g    ondansetron (ZOFRAN ODT) tablet 4 mg    Or    ondansetron (ZOFRAN) injection 4 mg    atorvastatin (LIPITOR) tablet 80 mg    piperacillin-tazobactam (ZOSYN) 3.375 g in 0.9% sodium chloride (MBP/ADV) 100 mL MBP    dexamethasone (DECADRON) 4 mg/mL injection 6 mg    azithromycin (ZITHROMAX) 500 mg in 0.9% sodium chloride 250 mL (VIAL-MATE)    baricitinib (OLUMIANT) tablet 1 mg        Labs:    Recent Labs     01/27/22  1330   WBC 5.4   HGB 15.2   PLT PLEASE SEE PLTNACIT FOR ACCURATE PLT COUNT DUE TO EDTA SENSITIVITY CAUSING PLT CLUMPING IN LAV TOP TUBE. Recent Labs     01/29/22  0241 01/28/22  0452 01/27/22  1802 01/27/22  1459 01/27/22  1458 01/27/22  1330   * 154*  --   --  155*  --    K 3.0* 2.5*  --   --  2.8*  --    * 122*  --   --  122*  --    CO2 21 21  --   --  24  --    * 375*  --   --  358*  --    BUN 90* 68*  --   --  61*  --    CREA 6.82* 4.85*  --   --  3.54*  --    CA 7.9* 8.8  --   --  9.3  --    MG 2.1  --   --   --   --   --    LAC  --  4.2* 5.0* 4.7*  --    < >   ALB 1.5* 2.0*  --   --  2.0*  --    ALT 1,368* 1,382*  --   --  282*  --     < > = values in this interval not displayed. Recent Labs     01/29/22  0223 01/28/22  0516 01/27/22  1345   PH 7.27* 7.25* 7.32*   PCO2 43 48* 47*   PO2 66* 87 299*   HCO3 19* 19* 23   FIO2 55.0 55 100.0     No results for input(s): CPK, CKNDX, TROIQ in the last 72 hours.     No lab exists for component: CPKMB  No results found for: BNPP, BNP   Lab Results   Component Value Date/Time    Culture result: No Growth (<1000 cfu/mL) 01/27/2022 02:00 PM    Culture result: No significant growth, <10,000 CFU/mL 06/05/2021 08:45 AM    Culture result: No growth 6 days 06/03/2021 02:00 PM     Lab Results   Component Value Date/Time    TSH 4.47 (H) 05/21/2021 10:46 AM        Imaging:    Results from Hospital Encounter encounter on 01/27/22    XR CHEST PORT    Narrative  EXAM: XR CHEST PORT    INDICATION: line placement    COMPARISON: 11 hours prior    FINDINGS: Interval placement of right IJV central venous catheter with the tip  near the superior cavoatrial junction. No pneumothorax. Remaining support  hardware unchanged. Unchanged bilateral opacities, worse on the left. Unchanged cardiomediastinal  contours. No sizable pleural effusion. Impression  Interval placement of right IJV central venous catheter with the tip  near the superior cavoatrial junction. No pneumothorax. Otherwise no significant interval change. Results from Hospital Encounter encounter on 01/27/22    CT HEAD WO CONT    Narrative  Study: Head CT without contrast.    Clinical Indication: Altered mental status. Rule out intracranial hemorrhage. Comparison: Head CT dated 1/20/2022. Technique: Routine volume acquisition of the head was performed without contrast  using soft tissue and bone kernels. Coronal and sagittal reconstructions were  generated and reviewed. Dose reduction: All CT scans at this facility are  performed using dose reduction optimization techniques as appropriate to a  performed exam including the following-automated exposure control, adjustments  of mA and/or Kv according to patient size, or use of iterative reconstructive  technique. Findings:    Unchanged generalized cerebral and cerebellar volume loss with commensurate  prominence of ventricles and sulci. No midline shift. The basal cisterns are patent. No acute hemorrhage, abnormal  extra-axial fluid, or evidence of large territorial ischemia. Scattered  intracranial vascular calcifications. Unremarkable orbits. Bilateral maxillary sinus and scattered ethmoid mucosal  thickening. Cerumen in the left EAC. No evidence of an aggressive calvarial or  extracalvarial lesion. Impression  No acute intracranial abnormality.       This care involved high complexity decision making which includes independently reviewing the patient's past medical records, current laboratory results, medication profiles that were immediately available to me and actual Xray images at the bedside in order to assess, support vital system function, and to treat this degree of vital organ system failure, and to prevent further life threatening deterioration of the patients condition. I was in direct communication with the nursing staff throughout this time.     Medical Decision Making Today  · Reviewed the flowsheet and previous days notes  · Reviewed and summarized records or history from previous days note or discussions with staff, family  · Parenteral controlled substances - Reviewed/ Adjusted / Tamar Spells / Started  · High Risk Drug therapy requiring intensive monitoring for toxicity: eg steroids, pressors, antibiotics  · Review and order of Clinical lab tests  · Review and Order of Radiology tests  · Review and Order of Medicine tests  · Independent visualization of radiologic Images  · Reviewed Ventilator / NiPPV  · I have personally reviewed the patients ECG / Telemetry  · Diagnostic endoscopies with identified risk factors

## 2022-01-29 NOTE — CONSULTS
Renal Consultation Note    NAME:  Vin Acosta   :   1945   MRN:   [id number]     ATTENDING: Cameron Judd MD  PCP:  Ean Garduno MD    Date/Time:  2022 11:51 AM      Subjective:   REQUESTING PHYSICIAN:  REASON FOR CONSULT:      Patient is a 79-year-old  gentleman with history of schizophrenia epilepsy hypertension diabetes hyperlipidemia who presented to the hospital after being found unresponsive. According to history from records patient had been feeling unwell for the past few days with generalized weakness. On admission he was found to be Covid positive with a fever of 106 degrees. Initial troponins were elevated so he was started on heparin drip. CT of the head did not show any acute abnormalities. Creatinine was 3.5 on admission which increased to 4.8 yesterday and 6.8 today. Creatinine was 1.3 on 2022. Patient seen in the ICU. He is currently intubated. Off sedation but unresponsive. On Levophed since admission. Urine output has been decreasing. Only 75 mils in the past 24 hours  Currently on 55% FiO2  proBNP elevated at 15,000. His liver enzymes have been markedly elevated with ALT 1300 and AST more than 2000  Patient was noted to be on Cozaar prior to admission. Was also on Lasix she has now been held  Currently on hypotonic fluids 1/2 NS at 50 mils per hour  Potassium was low at 2.5 yesterday and 3.0 today.   Currently receiving potassium infusion  Corrected sodium 155      Past Medical History:   Diagnosis Date    Acute renal failure ([facility] [address]     Anemia     Arthritis     DKA (diabetic ketoacidoses)     GERD (gastroesophageal reflux disease)     HTN (hypertension)     Hypercholesterolemia     Schizophrenia ([facility] [address]     Schizophreniform disorder (HCC)     Seizure disorder (HCC)     Type II or unspecified type diabetes mellitus without mention of complication, uncontrolled       Past Surgical History:   Procedure Laterality Date    IR INSERT NON TUNL CVC OVER 5 YRS  1/28/2022     Social History     Tobacco Use    Smoking status: Never Smoker    Smokeless tobacco: Never Used   Substance Use Topics    Alcohol use: No      Family History   Problem Relation Age of Onset    Stroke Father        No Known Allergies   Prior to Admission medications    Medication Sig Start Date End Date Taking? 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 7/22/21   Delia Engel MD   lancets misc Sig: To check sugar twice a day before breakfast and at bedtime 7/22/21   Delia Engel MD   amLODIPine (NORVASC) 10 mg tablet Take 1 Tablet by mouth daily. 7/22/21   Delia Engel MD   losartan (COZAAR) 50 mg tablet Take 1 Tablet by mouth daily. Please stop hydrochlorothiazide and amlodipine 7/22/21   Delia Engel MD   levothyroxine (SYNTHROID) 75 mcg tablet Take 1 Tablet by mouth every morning. Take in early morning. 7/22/21   Delia Engel MD   metFORMIN (GLUCOPHAGE) 500 mg tablet Take 1 Tablet by mouth two (2) times daily (with meals). 7/22/21   Delia Engel MD   pravastatin (PRAVACHOL) 20 mg tablet Take 1 Tablet by mouth nightly. 7/22/21   Delia Engel MD   lacosamide (VIMPAT) 200 mg tab tablet Take 200 mg by mouth two (2) times a day. Provider, Historical   lacosamide (Vimpat) 200 mg tab tablet Take 1 Tablet by mouth two (2) times a day. Max Daily Amount: 400 mg. courtesy refill  Given on behalf of neurologist dr Kobi Caraballo or dr Ed Burgos 7/22/21   Delia Engel MD   OLANZapine (ZyPREXA) 2.5 mg tablet Take 1 Tablet by mouth two (2) times a day.  7/22/21   Delia Engel MD   Blood-Glucose Meter monitoring kit He is having a seizure he needs blood sugar to be checked twice a day before breakfast and at bedtime, 21   Stephanie Diana MD   levETIRAcetam 1,000 mg tablet Take 1,000 mg by mouth two (2) times a day. Provider, Historical   divalproex DR (DEPAKOTE) 500 mg tablet Take 500 mg by mouth two (2) times a day. Take two tablets twice daily    Provider, Historical       REVIEW OF SYSTEMS:       Unable to obtain patient is intubated and sedated    Objective:   VITALS:    Visit Vitals  /60 Comment: levophed titrated (see MAR)   Pulse 89   Temp 97.6 °F (36.4 °C)   Resp 18   Ht 6' (1.829 m)   Wt 99.8 kg (220 lb)   SpO2 95%   BMI 29.84 kg/m²     Temp (24hrs), Av.2 °F (36.8 °C), Min:97.6 °F (36.4 °C), Max:100.8 °F (38.2 °C)      PHYSICAL EXAM:     General: Patient unresponsive ,off sedation ,currently on mechanical ventilation  Eyes: sclera anicteric  Oral Cavity: ETT+  Neck: no JVD  Chest: Fair bilateral air entry. No Wheezing or Rhonchi. No rales. Heart: normal sounds  Abdomen: soft and non tender   : no servin  Lower Extremities: no edema.    Skin: no rash  Neuro: Unresponsive  Psychiatric: Patient is unresponsive unable to assess      LAB DATA REVIEWED:    Recent Results (from the past 24 hour(s))   GLUCOSE, POC    Collection Time: 22  9:42 PM   Result Value Ref Range    Glucose (POC) 441 (H) 65 - 117 mg/dL    Performed by Jutsin Sands    BLOOD GAS, ARTERIAL    Collection Time: 22  2:23 AM   Result Value Ref Range    pH 7.27 (L) 7.35 - 7.45      PCO2 43 35 - 45 mmHg    PO2 66 (L) 75 - 100 mmHg    O2 SAT 92 (L) >95 %    BICARBONATE 19 (L) 22 - 26 mmol/L    BASE DEFICIT 7.0 (H) 0 - 2 mmol/L    O2 METHOD VENT      FIO2 55.0 %    MODE Assist Control/Volume Control      Tidal volume 500      SET RATE 18      EPAP/CPAP/PEEP 8.0      SITE Right Radial      JULIO'S TEST PASS     MAGNESIUM    Collection Time: 22  2:41 AM   Result Value Ref Range    Magnesium 2.1 1.6 - 2.4 mg/dL   METABOLIC PANEL, COMPREHENSIVE    Collection Time: 22  2:41 AM   Result Value Ref Range    Sodium 149 (H) 136 - 145 mmol/L    Potassium 3.0 (L) 3.5 - 5.1 mmol/L    Chloride 117 (H) 97 - 108 mmol/L    CO2 21 21 - 32 mmol/L    Anion gap 11 5 - 15 mmol/L    Glucose 555 (H) 65 - 100 mg/dL    BUN 90 (H) 6 - 20 mg/dL    Creatinine 6.82 (H) 0.70 - 1.30 mg/dL    BUN/Creatinine ratio 13 12 - 20      GFR est AA 10 (L) >60 ml/min/1.73m2    GFR est non-AA 8 (L) >60 ml/min/1.73m2    Calcium 7.9 (L) 8.5 - 10.1 mg/dL    Bilirubin, total 1.0 0.2 - 1.0 mg/dL    AST (SGOT) >2,000 (H) 15 - 37 U/L    ALT (SGPT) 1,368 (H) 12 - 78 U/L    Alk. phosphatase 59 45 - 117 U/L    Protein, total 6.5 6.4 - 8.2 g/dL    Albumin 1.5 (L) 3.5 - 5.0 g/dL    Globulin 5.0 (H) 2.0 - 4.0 g/dL    A-G Ratio 0.3 (L) 1.1 - 2.2     NT-PRO BNP    Collection Time: 01/29/22  2:41 AM   Result Value Ref Range    NT pro-BNP 15,919 (H) <125 pg/mL   GLUCOSE, POC    Collection Time: 01/29/22  7:49 AM   Result Value Ref Range    Glucose (POC) 555 (H) 65 - 117 mg/dL    Performed by Ely Lesches    TROPONIN-HIGH SENSITIVITY    Collection Time: 01/29/22 10:20 AM   Result Value Ref Range    Troponin-High Sensitivity 895 (HH) 0 - 76 ng/L   GLUCOSE, POC    Collection Time: 01/29/22 11:13 AM   Result Value Ref Range    Glucose (POC) 514 (H) 65 - 117 mg/dL    Performed by Ely Lesches        Recommendations/Plan:     #1 acute kidney injury  -Patient presented with creatinine of 3.5 which increased to 4.8 yesterday. 6.8 today  -ANDRES likely secondary to ATN from septic shock /anoxic renal injury with ARB on board  -Unsure how long patient was down before he was intubated/resuscitated  -Patient currently oliguric with urine output decreased to 75 mils in the past 24 hours  -He is currently on 1/2 NS at 50 mils per hour  -No volume overload on physical exam even though proBNP is elevated at 15,000  -I will change IV fluids to 1/4 NS at 75 mils per hour.  -Discussed with the brother regarding possible need for CRRT. He wants everything to be done if needed.   I will give him a trial of IV fluids for the next 24 hours while his neurological status is assessed. If he neurologically improves/is responsive will consider CRRT in a.m. He has been off sedation and is unresponsive at this time  -No hyperkalemia or metabolic acidosis noted  -Will reassess in a.m. #2 severe hypokalemia  -Potassium was 2.8 on admission. 3.0 today  -Is currently being replaced. Magnesium is okay at 2.1    #3 hypernatremia  -Corrected sodium is 155. We will change IV fluids to hypotonic fluids. Avoid D5W because of severe hyperglycemia  -Will start water flushes via NG tube    #4  COVID-19 pneumonia  -Septic shock  -With multiorgan failure including renal failure/transaminitis/hemodynamics shock  -Patient currently on Decadron azithromycin and Zosyn  -Acute encephalopathy likely because of metabolic derangements  -Overall prognosis seems guarded    #5 diabetes  -Currently with severe hyperglycemia with blood sugars over 500  -Likely worse because of Decadron  -Insulin to be started today    #6 seizure disorder  -Patient had another seizure yesterday.   Has been evaluated by neurology  -Currently off sedation and unresponsive    Thank you for the consultation    ________________________________________________________________________  Signed: Lalo Dove MD

## 2022-01-29 NOTE — CONSULTS
Neurology Consult Note    HPI/Admission History/Pertinent Events  Stevenson Perez is a 78y.o. year old male who presented on 1/27/2022. Patient has a past medical history of Schizophrenia, Epilepsy, HTN, HL, DM2, Hypothyroidism, Osteoarthritis who presented with unresponsiveness. Per chart review, patient's brother reported patient had not been feeling well and been having generalized weakness for a few days prior to presentation. On morning patient presented, patient was found to be unresponsive altogether. In the ED, patient was found to be Covid and 19+ with a temperature as high as 106 °F along with elevated troponins with concern for NSTEMI for which patient was started on a heparin drip. Emergent CT head was negative for any acute findings and patient was not deemed a candidate for acute neurological intervention. During patient's stay, patient diagnosed with sepsis, respiratory failure requiring intubation, aspiration pneumonia, ANDRES and transaminitis thought to be possibly related to hypoperfusion. On evening of 1/28, patient reportedly had some seizure-like activity with increase in his respiratory rate and blood pressures dropping into the 50s. Home AEDs: Levetiracetam 1000 BID, Valproic Acid 500 BID, Lacosamide 200 BID    Workup Completed/Reviewed: [facility] WO 1/27      ASSESSMENT/PLAN      Impression  Reviewed patient's initial CT head which is reassuring. Patient with only brainstem reflexes intact on examination with no movements appreciated with noxious stimulation in the extremities. Reviewed patient's chart and patient reportedly had seizure-like activity with decrease in blood pressure which does not usually occur with seizure activity. Given patient's COVID-19 infection there is concern for possible mucous plugging causing seizure-like episode with drop in blood pressure and respiratory issues.   We will continue patient on his AEDs per below and monitor patient closely for any more seizure activity. Will increase patient's levetiracetam slightly per below. Patient will also benefit from an EEG and further neuroimaging if patient's encephalopathy not improving. Plan      Seizure-Like Activity  Encephalopathy, Likely Infectious/Metabolic  History of Epilepsy  Associated: COVID-19 Infection, Sepsis, NSTEMI, ANDRES, Transaminitis, Schizophrenia  -Q6Hr NeuroChecks, TELE  -Seizure Precautions  -Seizure Prophylaxis: Levetiracetam 1500 BID, Valproic Acid 500 BID, Lacosamide 200 BID  -STAT IV Lorazepam 2 mg with any clinical seizure activity lasting > 3 minutes and contact Neurology for further recommendations  -Management of metabolic/infectious derangements to referring teams  -Plan for EEG on 2/1 when available  -If patient's mental status is not improving in the next 48 hours, plan for MRI Brain WO table or CTH WO if not stable      SUBJECTIVE   Patient intubated. Only brainstem reflexes intact. Not much movement with noxious stimulation in the extremities.       Review of Systems  Unable to ascertain due to mental status      Physical/Neurological Exam  Patient intubated  Patient does not follow any central or peripheral commands  Does not attempt to speak  Pupils react to light bilaterally  Oculocephalics intact  Corneal reflex intact bilaterally  No BTT bilaterally  No facial droop  Tongue is midline under ETT  Motor   0/5 throughout to noxious pain stimulation  No abnormal movements  Normal tone throughout      Past Medical History:   Diagnosis Date    Acute renal failure (HCC)     Anemia     Arthritis     DKA (diabetic ketoacidoses)     GERD (gastroesophageal reflux disease)     HTN (hypertension)     Hypercholesterolemia     Schizophrenia ([facility] [address]     Schizophreniform disorder (HCC)     Seizure disorder (HCC)     Type II or unspecified type diabetes mellitus without mention of complication, uncontrolled       Past Surgical History:   Procedure Laterality Date    IR INSERT NON TUNL CVC OVER 5 YRS  1/28/2022     No Known Allergies  Family History   Problem Relation Age of Onset    Stroke Father         Social Connections:     Frequency of Communication with Friends and Family: Not on file    Frequency of Social Gatherings with Friends and Family: Not on file    Attends Jehovah's witness Services: Not on file    Active Member of Clubs or Organizations: Not on file    Attends Club or Organization Meetings: Not on file    Marital Status: Not on file         Medications  Current Outpatient Medications   Medication Instructions    amLODIPine (NORVASC) 10 mg, Oral, DAILY    Blood-Glucose Meter monitoring kit He is having a seizure he needs blood sugar to be checked twice a day before breakfast and at bedtime,    divalproex DR (DEPAKOTE) 500 mg, Oral, 2 TIMES DAILY, Take two tablets twice daily     glucose blood VI test strips (blood glucose test) strip To check sugar twice a day before breakfast and at bedtime.  lacosamide (VIMPAT) 200 mg, Oral, 2 TIMES DAILY    lacosamide (VIMPAT) 200 mg, Oral, 2 TIMES DAILY, courtesy refill  Given on behalf of neurologist dr Vicki Morgan or dr Isabelle Aguilar: To check sugar twice a day before breakfast and at bedtime    levETIRAcetam 1,000 mg, Oral, 2 TIMES DAILY    levothyroxine (SYNTHROID) 75 mcg, Oral, 7AM, Take in early morning.    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 and repeated fall with, unable to maintain gait       Current Facility-Administered Medications   Medication Dose Route Frequency    LORazepam (ATIVAN) injection 2 mg  2 mg IntraVENous Q2H PRN    potassium chloride 10 mEq in 100 ml IVPB  10 mEq IntraVENous ONCE    heparin (porcine) injection 5,000 Units  5,000 Units SubCUTAneous Q8H    insulin glargine (LANTUS) injection 40 Units  40 Units SubCUTAneous QHS    glucose chewable tablet 16 g  4 Tablet Oral PRN    dextrose (D50W) injection syrg 12.5-25 g  25-50 mL IntraVENous PRN    glucagon (GLUCAGEN) injection 1 mg  1 mg IntraMUSCular PRN    insulin lispro (HUMALOG) injection   SubCUTAneous Q4H    NOREPINephrine (LEVOPHED) 8 mg in 5% dextrose 250mL (32 mcg/mL) infusion  0.5-30 mcg/min IntraVENous TITRATE    0.45% sodium chloride infusion  50 mL/hr IntraVENous CONTINUOUS    PHENYLephrine (ISRALE-SYNEPHRINE) 30 mg in 0.9% sodium chloride 250 mL infusion   mcg/min IntraVENous TITRATE    propofol (DIPRIVAN) 10 mg/mL infusion  0-50 mcg/kg/min IntraVENous TITRATE    divalproex DR (DEPAKOTE) tablet 500 mg  500 mg Oral BID    lacosamide (VIMPAT) tablet 200 mg  200 mg Oral BID    levETIRAcetam (KEPPRA) tablet 1,000 mg  1,000 mg Oral BID    levothyroxine (SYNTHROID) tablet 75 mcg  75 mcg Oral 7am    aspirin chewable tablet 81 mg  81 mg Oral DAILY    sodium chloride (NS) flush 5-40 mL  5-40 mL IntraVENous Q8H    sodium chloride (NS) flush 5-40 mL  5-40 mL IntraVENous PRN    acetaminophen (TYLENOL) tablet 650 mg  650 mg Oral Q6H PRN    Or    acetaminophen (TYLENOL) suppository 650 mg  650 mg Rectal Q6H PRN    polyethylene glycol (MIRALAX) packet 17 g  17 g Oral DAILY PRN    ondansetron (ZOFRAN ODT) tablet 4 mg  4 mg Oral Q8H PRN    Or    ondansetron (ZOFRAN) injection 4 mg  4 mg IntraVENous Q6H PRN    atorvastatin (LIPITOR) tablet 80 mg  80 mg Oral QHS    piperacillin-tazobactam (ZOSYN) 3.375 g in 0.9% sodium chloride (MBP/ADV) 100 mL MBP  3.375 g IntraVENous Q8H    dexamethasone (DECADRON) 4 mg/mL injection 6 mg  6 mg IntraVENous Q12H    azithromycin (ZITHROMAX) 500 mg in 0.9% sodium chloride 250 mL (VIAL-MATE)  500 mg IntraVENous Q24H    baricitinib (OLUMIANT) tablet 1 mg  1 mg Oral DAILY Objective  Temp:  [97.6 °F (36.4 °C)-106.5 °F (41.4 °C)]   Pulse (Heart Rate):  []   BP: ()/(40-96)   Resp Rate:  [14-29]   O2 Sat (%):  [83 %-100 %]   Weight:  [99.8 kg (220 lb)]     Intake/Output Summary (Last 24 hours) at 1/29/2022 0908  Last data filed at 1/29/2022 0636  Gross per 24 hour   Intake 1053.3 ml   Output 75 ml   Net 978.3 ml     Wt Readings from Last 3 Encounters:   01/27/22 99.8 kg (220 lb)   01/20/22 101.1 kg (222 lb 14.2 oz)   10/06/21 111.1 kg (245 lb)        Labs  Recent Results (from the past 24 hour(s))   GLUCOSE, POC    Collection Time: 01/28/22 11:17 AM   Result Value Ref Range    Glucose (POC) 397 (H) 65 - 117 mg/dL    Performed by Andre Kim    GLUCOSE, POC    Collection Time: 01/28/22  9:42 PM   Result Value Ref Range    Glucose (POC) 441 (H) 65 - 117 mg/dL    Performed by Kim Austin    BLOOD GAS, ARTERIAL    Collection Time: 01/29/22  2:23 AM   Result Value Ref Range    pH 7.27 (L) 7.35 - 7.45      PCO2 43 35 - 45 mmHg    PO2 66 (L) 75 - 100 mmHg    O2 SAT 92 (L) >95 %    BICARBONATE 19 (L) 22 - 26 mmol/L    BASE DEFICIT 7.0 (H) 0 - 2 mmol/L    O2 METHOD VENT      FIO2 55.0 %    MODE Assist Control/Volume Control      Tidal volume 500      SET RATE 18      EPAP/CPAP/PEEP 8.0      SITE Right Radial      JULIO'S TEST PASS     MAGNESIUM    Collection Time: 01/29/22  2:41 AM   Result Value Ref Range    Magnesium 2.1 1.6 - 2.4 mg/dL   METABOLIC PANEL, COMPREHENSIVE    Collection Time: 01/29/22  2:41 AM   Result Value Ref Range    Sodium 149 (H) 136 - 145 mmol/L    Potassium 3.0 (L) 3.5 - 5.1 mmol/L    Chloride 117 (H) 97 - 108 mmol/L    CO2 21 21 - 32 mmol/L    Anion gap 11 5 - 15 mmol/L    Glucose 555 (H) 65 - 100 mg/dL    BUN 90 (H) 6 - 20 mg/dL    Creatinine 6.82 (H) 0.70 - 1.30 mg/dL    BUN/Creatinine ratio 13 12 - 20      GFR est AA 10 (L) >60 ml/min/1.73m2    GFR est non-AA 8 (L) >60 ml/min/1.73m2    Calcium 7.9 (L) 8.5 - 10.1 mg/dL    Bilirubin, total 1.0 0.2 - 1.0 mg/dL    AST (SGOT) >2,000 (H) 15 - 37 U/L    ALT (SGPT) 1,368 (H) 12 - 78 U/L    Alk. phosphatase 59 45 - 117 U/L    Protein, total 6.5 6.4 - 8.2 g/dL    Albumin 1.5 (L) 3.5 - 5.0 g/dL    Globulin 5.0 (H) 2.0 - 4.0 g/dL    A-G Ratio 0.3 (L) 1.1 - 2.2     NT-PRO BNP    Collection Time: 01/29/22  2:41 AM   Result Value Ref Range    NT pro-BNP 15,919 (H) <125 pg/mL   GLUCOSE, POC    Collection Time: 01/29/22  7:49 AM   Result Value Ref Range    Glucose (POC) 555 (H) 65 - 117 mg/dL    Performed by Shelbi Rodríguez           Significant Diagnostic Studies  All images independently visualized    XR CHEST PORT   Final Result   Worsening lung aeration. IR INSERT NON TUNL CVC OVER 5 YRS   Final Result      Technically successful insertion of non-tunneled triple-lumen catheter with US   guidance. Plan:       The catheter may be used after catheter placement confirmation by x-ray.   _______________________________________________________________      PROCEDURE SUMMARY:   - Venous access with ultrasound guidance   - Non-tunneled central venous catheter insertion      PROCEDURE DETAILS:      Pre-procedure   Relevant imaging review: None    Informed consent for the procedure was obtained and time-out was performed prior   to the procedure. Prophylactic antibiotic administered: Not administered   Preparation: The site was prepared and draped using all elements of maximal   sterile barrier technique including sterile gloves, sterile gown, cap, mask,   large sterile sheet, sterile ultrasound probe cover, sterile ultrasound gel,   hand hygiene and cutaneous antisepsis with 2% chlorhexidine. Medical reason for site preparation exception: Not applicable      Anesthesia/sedation   Level of anesthesia: No sedation   Anesthesia administered by: Not applicable   Duration of anesthesia/sedation: 0 minutes.       Access   The vessel was sonographically evaluated and judged to be patent and appropriate   for access and a permanent image was stored. 2 cc's of 1% lidocaine was   administered to the access site. Real time ultrasound was used to visualize   needle entry into the vessel. Vein accessed: Right internal jugular vein   Access technique: 4F micropuncture set      Catheter placement   The access site was dilated and the catheter was placed into the vein over a   wire and all guidewires were removed in their entirety. Catheter ports were   aspirated and flushed. Catheter tip location was verified by portable X-ray. Catheter size: 7 Arabic   Catheter length: 16 cm   Catheter tip position: Central. The tip is near the superior cavoatrial   junction. .    Catheter flush: Normal saline      Closure   The catheter was secured. A sterile dressing was applied. Catheter securement technique: Non-absorbable suture      Additional Details   Additional description of procedure: None   Additional findings: None   Additional equipment: None   Specimens removed: None   Estimated blood loss:  Less than 10 cc   Standardized report: [email or web address]               IR US GUIDED VASCULAR ACCESS   Final Result      Technically successful insertion of non-tunneled triple-lumen catheter with US   guidance. Plan:       The catheter may be used after catheter placement confirmation by x-ray.   _______________________________________________________________      PROCEDURE SUMMARY:   - Venous access with ultrasound guidance   - Non-tunneled central venous catheter insertion      PROCEDURE DETAILS:      Pre-procedure   Relevant imaging review: None    Informed consent for the procedure was obtained and time-out was performed prior   to the procedure.    Prophylactic antibiotic administered: Not administered   Preparation: The site was prepared and draped using all elements of maximal   sterile barrier technique including sterile gloves, sterile gown, cap, mask,   large sterile sheet, sterile ultrasound probe cover, sterile ultrasound gel,   hand hygiene and cutaneous antisepsis with 2% chlorhexidine. Medical reason for site preparation exception: Not applicable      Anesthesia/sedation   Level of anesthesia: No sedation   Anesthesia administered by: Not applicable   Duration of anesthesia/sedation: 0 minutes. Access   The vessel was sonographically evaluated and judged to be patent and appropriate   for access and a permanent image was stored. 2 cc's of 1% lidocaine was   administered to the access site. Real time ultrasound was used to visualize   needle entry into the vessel. Vein accessed: Right internal jugular vein   Access technique: 4F micropuncture set      Catheter placement   The access site was dilated and the catheter was placed into the vein over a   wire and all guidewires were removed in their entirety. Catheter ports were   aspirated and flushed. Catheter tip location was verified by portable X-ray. Catheter size: 7 Turkmen   Catheter length: 16 cm   Catheter tip position: Central. The tip is near the superior cavoatrial   junction. .    Catheter flush: Normal saline      Closure   The catheter was secured. A sterile dressing was applied. Catheter securement technique: Non-absorbable suture      Additional Details   Additional description of procedure: None   Additional findings: None   Additional equipment: None   Specimens removed: None   Estimated blood loss:  Less than 10 cc   Standardized report: [email or web address]_NonTunneledCatheter1.0               XR CHEST PORT   Final Result   Interval placement of right IJV central venous catheter with the tip   near the superior cavoatrial junction. No pneumothorax. Otherwise no significant interval change. XR CHEST PORT   Final Result   By history and appearance findings are compatible with Covid   pneumonia. Medical devices in place as described. CT HEAD WO CONT   Final Result   No acute intracranial abnormality.       XR CHEST PORT   Final Result XR CHEST PORT   Final Result      US RETROPERITONEUM COMP    (Results Pending)         This document has been prepared by the Dragon voice recognition system, typographical errors may have occurred.  Attempts have been made to correct errors, however inadvertent errors may persist.

## 2022-01-29 NOTE — PROGRESS NOTES
Updated Dr. Zuleta Place on pt condition including most recent lab results (CBC, Coags, D. Dimer, Chemistry, Glucose, ETC.). MD placed orders. Will continue to monitor.

## 2022-01-29 NOTE — PROGRESS NOTES
NEUROLOGY  PROGRESS NOTE    Admission History/Pertinent Events  Tyrell Doss is a 78y.o. year old male who presented on 1/27/2022. Patient has a past medical history of Schizophrenia, Epilepsy, HTN, HL, DM2, Hypothyroidism, Osteoarthritis who presented with unresponsiveness. Per chart review, patient's brother reported patient had not been feeling well and been having generalized weakness for a few days prior to presentation. On morning patient presented, patient was found to be unresponsive altogether. In the ED, patient was found to be Covid and 19+ with a temperature as high as 106 °F along with elevated troponins with concern for NSTEMI for which patient was started on a heparin drip. Emergent CT head was negative for any acute findings and patient was not deemed a candidate for acute neurological intervention. During patient's stay, patient diagnosed with sepsis, respiratory failure requiring intubation, aspiration pneumonia, ANDERS and transaminitis thought to be possibly related to hypoperfusion. On evening of 1/28, patient reportedly had some seizure-like activity with increase in his respiratory rate and blood pressures dropping into the 50s.     Home AEDs: Levetiracetam 1000 BID, Valproic Acid 500 BID, Lacosamide 200 BID     Workup Completed/Reviewed: [facility] WO 1/27      ASSESSMENT/PLAN      Impression  Patient with minor improvement on examination as he grimaces now to peripheral pain stimulation and when checking corneal reflexes. Still remains heavily encephalopathic even though patient has been off of sedation for quite a while now. Will obtain MRI of the brain to evaluate for any evidence of interval ischemia or hypoxic brain injury. We will continue patient on his AEDs per below.       Plan      Seizure-Like Activity  Encephalopathy, Likely Infectious/Metabolic  History of Epilepsy  Associated: COVID-19 Infection, Sepsis, NSTEMI, ANDRES, Transaminitis, Schizophrenia  -Q6Hr NeuroChecks, TELE  -Seizure Precautions  -Seizure Prophylaxis: Levetiracetam 1500 BID, Valproic Acid 500 BID, Lacosamide 200 BID  -STAT IV Lorazepam 2 mg with any clinical seizure activity lasting > 3 minutes and contact Neurology for further recommendations  -Management of metabolic/infectious derangements to referring teams  -Plan for EEG on 2/1 when available  -F/U MRI Brain WO   --> if patient is not stable enough for this we will plan on repeat CT head          SUBJECTIVE   Patient grimacing to pain stimulation today.   Otherwise no significant changes on examination      Physical/Neurological Exam  Patient intubated  Patient does not follow any central or peripheral commands  Does not attempt to speak  Pupils react to light bilaterally  Oculocephalics intact  Corneal reflex intact bilaterally  No BTT bilaterally  No facial droop  Tongue is midline under ETT  Motor              0/5 throughout to noxious pain stimulation  No abnormal movements  Normal tone throughout      OBJECTIVE  Vital Signs  Temp:  [97.6 °F (36.4 °C)-106.5 °F (41.4 °C)]   Pulse (Heart Rate):  []   BP: ()/(40-96)   Resp Rate:  [14-29]   O2 Sat (%):  [83 %-100 %]   Weight:  [99.8 kg (220 lb)]     MEDICATIONS    Current Facility-Administered Medications:     LORazepam (ATIVAN) injection 2 mg, 2 mg, IntraVENous, Q2H PRN, George Jorgensen PA-C, 2 mg at 01/29/22 0057    potassium chloride 10 mEq in 100 ml IVPB, 10 mEq, IntraVENous, ONCE, Summer Nuñez MD    heparin (porcine) injection 5,000 Units, 5,000 Units, SubCUTAneous, Q8H, Christine Nuñez MD    insulin glargine (LANTUS) injection 40 Units, 40 Units, SubCUTAneous, QHS, Christine Nuñez MD    glucose chewable tablet 16 g, 4 Tablet, Oral, PRN, Cheryl Cardenas MD    dextrose (D50W) injection syrg 12.5-25 g, 25-50 mL, IntraVENous, PRN, Cheryl Cardenas MD    glucagon (GLUCAGEN) injection 1 mg, 1 mg, IntraMUSCular, PRN, Cheryl Cardenas MD    insulin lispro (HUMALOG) injection, , SubCUTAneous, Q4H, Luisa Nuñez MD    levETIRAcetam (KEPPRA) tablet 1,500 mg, 1,500 mg, Oral, BID, Ariela Baron MD    NOREPINephrine (LEVOPHED) 8 mg in 5% dextrose 250mL (32 mcg/mL) infusion, 0.5-30 mcg/min, IntraVENous, TITRATE, Marian Vickers MD, Last Rate: 61.9 mL/hr at 01/29/22 0823, 33 mcg/min at 01/29/22 0823    0.45% sodium chloride infusion, 50 mL/hr, IntraVENous, CONTINUOUS, Marian Vickers MD, Last Rate: 75 mL/hr at 01/29/22 0433, 75 mL/hr at 01/29/22 0433    PHENYLephrine (ISRAEL-SYNEPHRINE) 30 mg in 0.9% sodium chloride 250 mL infusion,  mcg/min, IntraVENous, TITRATE, Corky Jorgensen PA-C, Held at 01/28/22 2300    propofol (DIPRIVAN) 10 mg/mL infusion, 0-50 mcg/kg/min, IntraVENous, TITRATE, Ab MD Amadeo, Stopped at 01/29/22 0824    divalproex DR (DEPAKOTE) tablet 500 mg, 500 mg, Oral, BID, Marian Vickers MD, 500 mg at 01/28/22 2140    lacosamide (VIMPAT) tablet 200 mg, 200 mg, Oral, BID, Marian Vickers MD, 200 mg at 01/29/22 8072    levothyroxine (SYNTHROID) tablet 75 mcg, 75 mcg, Oral, 7am, Marian Vickers MD, 75 mcg at 01/29/22 1702    aspirin chewable tablet 81 mg, 81 mg, Oral, DAILY, Marian Vickers MD, 81 mg at 01/29/22 0814    sodium chloride (NS) flush 5-40 mL, 5-40 mL, IntraVENous, Q8H, Luisa Nuñez MD, 10 mL at 01/29/22 0514    sodium chloride (NS) flush 5-40 mL, 5-40 mL, IntraVENous, PRN, Marian Vickers MD    acetaminophen (TYLENOL) tablet 650 mg, 650 mg, Oral, Q6H PRN, 650 mg at 01/28/22 1111 **OR** acetaminophen (TYLENOL) suppository 650 mg, 650 mg, Rectal, Q6H PRN, Marian Vickers MD    polyethylene glycol (MIRALAX) packet 17 g, 17 g, Oral, DAILY PRN, Marian Vickers MD    ondansetron (ZOFRAN ODT) tablet 4 mg, 4 mg, Oral, Q8H PRN **OR** ondansetron (ZOFRAN) injection 4 mg, 4 mg, IntraVENous, Q6H PRN, Marian Vickers MD    atorvastatin (LIPITOR) tablet 80 mg, 80 mg, Oral, QHS, Ab Childs MD, 80 mg at 01/28/22 2146    piperacillin-tazobactam (ZOSYN) 3.375 g in 0.9% sodium chloride (MBP/ADV) 100 mL MBP, 3.375 g, IntraVENous, Q8H, Poonam Nuñez MD, Last Rate: 25 mL/hr at 01/29/22 0514, 3.375 g at 01/29/22 0514    dexamethasone (DECADRON) 4 mg/mL injection 6 mg, 6 mg, IntraVENous, Q12H, Poonam Nuñez MD, 6 mg at 01/29/22 0814    azithromycin (ZITHROMAX) 500 mg in 0.9% sodium chloride 250 mL (VIAL-MATE), 500 mg, IntraVENous, Q24H, Gwendolyn Manzo MD, Last Rate: 250 mL/hr at 01/28/22 1645, 500 mg at 01/28/22 1645    baricitinib (OLUMIANT) tablet 1 mg, 1 mg, Oral, DAILY, Sloane Duran MD, 1 mg at 01/29/22 0312      Labs: I've reviewed the labs for today     This document has been prepared by the Dragon voice recognition system, typographical errors may have occurred.  Attempts have been made to correct errors, however inadvertent errors may persist.

## 2022-01-30 NOTE — PROGRESS NOTES
IMPRESSION:   1. Acute hypoxic respiratory failure oxygenation well-maintained on the ventilator  2. Malignant hyperthermia resolved  3. COVID-19 pneumonia  4. NSTEMI elevated troponin  5. Shock cardiogenic versus septic vs Hypovolumic Hypotension currently on norepinephrine 17 leisa  6. Acute kidney injury creatinine unchanged  7. Metabolic acidosis  8. Thrombocytopenia worsened  9. Hypernatremia  10. Symptomatic hypokalemia  11. Shock liver with transaminitis  12. Altered mental status unresponsive on no sedation likely anoxic encephalopathy      RECOMMENDATIONS/PLAN:   1. ICU monitoring  1. Ventilator for mechanical life support and prevent respiratory arrest with protective lung strategies ABG acceptable   2. On Assist control rate 18  PEEP 8 FiO2 45% will decrease PEEP to 5  3. Patient on Decadron Zithromax and baricitinib  4. Troponin is elevated 2D echo shows ejection fraction of 65%  5. Creatinine remains highly elevated but unchanged  6. Remains on quarter saline  7. Liver enzyme elevated shock liver  8. Agree with Empiric IV antibiotics blood and urine cultures no growth  9. Temperature T-max 101.3 last 24-hour   10. IV vasopressors for circulatory shock refractory to fluids to maintain SBP> 90 norepinephrine currently 17 mics  11.  We will give bicarb per NG tube       [x] High complexity decision making was performed  [x] See my orders for details  HPI   81-year-old male came in because as he was found unresponsive and fever 107 past medical history of schizophrenia and diabetes gastroesophageal reflux disease and anemia he is COVID-19 positive initially patient was called as NSTEMI but it was canceled patient is intubated on ventilator hemodynamically unstable unable to get any history out of the patient he seems dehydrated    PMH:  has a past medical history of Acute renal failure ([facility] [address] Anemia, Arthritis, DKA (diabetic ketoacidoses), GERD (gastroesophageal reflux disease), HTN (hypertension), Hypercholesterolemia, Schizophrenia ([facility] [address] Schizophreniform disorder ([facility] [address] Seizure disorder ([facility] [address] and Type II or unspecified type diabetes mellitus without mention of complication, uncontrolled. PSH:   has a past surgical history that includes ir insert non tunl cvc over 5 yrs (1/28/2022). FHX: family history includes Stroke in his father. SHX:  reports that he has never smoked. He has never used smokeless tobacco. He reports that he does not drink alcohol and does not use drugs.     ALL: No Known Allergies     MEDS:   [x] Reviewed - As Below   [] Not reviewed    Current Facility-Administered Medications   Medication    LORazepam (ATIVAN) injection 2 mg    glucose chewable tablet 16 g    glucagon (GLUCAGEN) injection 1 mg    levETIRAcetam (KEPPRA) tablet 1,500 mg    valproic acid (as sodium salt) (DEPAKENE) 250 mg/5 mL (5 mL) oral solution 500 mg    sodium chloride (23.4%) 0.225 % in sterile water 1,000 mL infusion    dextrose 10% infusion 125-250 mL    NOREPINephrine (LEVOPHED) 8 mg in 5% dextrose 250mL (32 mcg/mL) infusion    insulin regular (NOVOLIN R, HUMULIN R) 100 Units in 0.9% sodium chloride 100 mL infusion    [Held by provider] insulin lispro (HUMALOG) injection    glucose chewable tablet 16 g    glucagon (GLUCAGEN) injection 1 mg    PHENYLephrine (ISRAEL-SYNEPHRINE) 30 mg in 0.9% sodium chloride 250 mL infusion    propofol (DIPRIVAN) 10 mg/mL infusion    lacosamide (VIMPAT) tablet 200 mg    levothyroxine (SYNTHROID) tablet 75 mcg    aspirin chewable tablet 81 mg    sodium chloride (NS) flush 5-40 mL    sodium chloride (NS) flush 5-40 mL    acetaminophen (TYLENOL) tablet 650 mg    Or    acetaminophen (TYLENOL) suppository 650 mg    polyethylene glycol (MIRALAX) packet 17 g    ondansetron (ZOFRAN ODT) tablet 4 mg    Or    ondansetron (ZOFRAN) injection 4 mg    atorvastatin (LIPITOR) tablet 80 mg    piperacillin-tazobactam (ZOSYN) 3.375 g in 0.9% sodium chloride (MBP/ADV) 100 mL MBP    dexamethasone (DECADRON) 4 mg/mL injection 6 mg    azithromycin (ZITHROMAX) 500 mg in 0.9% sodium chloride 250 mL (VIAL-MATE)    baricitinib (OLUMIANT) tablet 1 mg      MAR reviewed and pertinent medications noted or modified as needed   Current Facility-Administered Medications   Medication    LORazepam (ATIVAN) injection 2 mg    glucose chewable tablet 16 g    glucagon (GLUCAGEN) injection 1 mg    levETIRAcetam (KEPPRA) tablet 1,500 mg    valproic acid (as sodium salt) (DEPAKENE) 250 mg/5 mL (5 mL) oral solution 500 mg    sodium chloride (23.4%) 0.225 % in sterile water 1,000 mL infusion    dextrose 10% infusion 125-250 mL    NOREPINephrine (LEVOPHED) 8 mg in 5% dextrose 250mL (32 mcg/mL) infusion    insulin regular (NOVOLIN R, HUMULIN R) 100 Units in 0.9% sodium chloride 100 mL infusion    [Held by provider] insulin lispro (HUMALOG) injection    glucose chewable tablet 16 g    glucagon (GLUCAGEN) injection 1 mg    PHENYLephrine (ISRAEL-SYNEPHRINE) 30 mg in 0.9% sodium chloride 250 mL infusion    propofol (DIPRIVAN) 10 mg/mL infusion    lacosamide (VIMPAT) tablet 200 mg    levothyroxine (SYNTHROID) tablet 75 mcg    aspirin chewable tablet 81 mg    sodium chloride (NS) flush 5-40 mL    sodium chloride (NS) flush 5-40 mL    acetaminophen (TYLENOL) tablet 650 mg    Or    acetaminophen (TYLENOL) suppository 650 mg    polyethylene glycol (MIRALAX) packet 17 g    ondansetron (ZOFRAN ODT) tablet 4 mg    Or    ondansetron (ZOFRAN) injection 4 mg    atorvastatin (LIPITOR) tablet 80 mg    piperacillin-tazobactam (ZOSYN) 3.375 g in 0.9% sodium chloride (MBP/ADV) 100 mL MBP    dexamethasone (DECADRON) 4 mg/mL injection 6 mg    azithromycin (ZITHROMAX) 500 mg in 0.9% sodium chloride 250 mL (VIAL-MATE)    baricitinib (OLUMIANT) tablet 1 mg      PMH:  has a past medical history of Acute renal failure (HCC), Anemia, Arthritis, DKA (diabetic ketoacidoses), GERD (gastroesophageal reflux disease), HTN (hypertension), Hypercholesterolemia, Schizophrenia ([facility] [address] Schizophreniform disorder ([facility] [address] Seizure disorder ([facility] [address] and Type II or unspecified type diabetes mellitus without mention of complication, uncontrolled. PSH:   has a past surgical history that includes ir insert non tunl cvc over 5 yrs (2022). FHX: family history includes Stroke in his father. SHX:  reports that he has never smoked. He has never used smokeless tobacco. He reports that he does not drink alcohol and does not use drugs. ROS:  Unable to obtain intubated on ventilator and sedated    Hemodynamics:    CO:    CI:    CVP:    SVR:   PAP Systolic:    PAP Diastolic:    PVR:    BY41:        Ventilator Settings:      Mode Rate TV Press PEEP FiO2 PIP Min. Vent   Assist control,Volume control    500 ml    8 cm H20 45 %  25 cm H2O  9.57 l/min        Vital Signs: Telemetry:    normal sinus rhythm Intake/Output:   Visit Vitals  /62 Comment: levophed titrated (see MAR)   Pulse 79   Temp (!) 101.3 °F (38.5 °C)   Resp 18   Ht 6' (1.829 m)   Wt 102 kg (224 lb 13.9 oz)   SpO2 91%   BMI 30.50 kg/m²       Temp (24hrs), Av.2 °F (37.3 °C), Min:98.4 °F (36.9 °C), Max:101.3 °F (38.5 °C)        O2 Device: Ventilator         Wt Readings from Last 4 Encounters:   22 102 kg (224 lb 13.9 oz)   22 101.1 kg (222 lb 14.2 oz)   10/06/21 111.1 kg (245 lb)   21 110.7 kg (244 lb)          Intake/Output Summary (Last 24 hours) at 2022 0852  Last data filed at 2022 0300  Gross per 24 hour   Intake 1861.73 ml   Output 35 ml   Net 1826.73 ml       Last shift:      No intake/output data recorded. Last 3 shifts:  1901 -  0700  In: 1921.7 [I.V.:1861.7]  Out: 110 [Urine:110]       Physical Exam:     General:  intubated on vent unresponsive on no sedation  HEENT: NCAT,   Eyes: anicteric; conjunctiva clear no doll's eye reflex  Neck: no nodes, no cuff leak, trach midline; no accessory MM use.   Chest: no deformity, Cardiac: R regular; no murmur;   Lungs: distant breath sounds; no wheezes a few rales in the base  Abd: soft, NT, hypoactive BS  Ext: Trace edema; no joint swelling;  No clubbing  : No urine  Neuro: Unresponsive on no sedation no doll's eye reflex flaccid extremity  Psych-  unable to assess  Skin: warm, dry, no cyanosis;   Pulses: Brachial and radial pulses intact  Capillary: Slow capillary refill      DATA:    MAR reviewed and pertinent medications noted or modified as needed  MEDS:   Current Facility-Administered Medications   Medication    LORazepam (ATIVAN) injection 2 mg    glucose chewable tablet 16 g    glucagon (GLUCAGEN) injection 1 mg    levETIRAcetam (KEPPRA) tablet 1,500 mg    valproic acid (as sodium salt) (DEPAKENE) 250 mg/5 mL (5 mL) oral solution 500 mg    sodium chloride (23.4%) 0.225 % in sterile water 1,000 mL infusion    dextrose 10% infusion 125-250 mL    NOREPINephrine (LEVOPHED) 8 mg in 5% dextrose 250mL (32 mcg/mL) infusion    insulin regular (NOVOLIN R, HUMULIN R) 100 Units in 0.9% sodium chloride 100 mL infusion    [Held by provider] insulin lispro (HUMALOG) injection    glucose chewable tablet 16 g    glucagon (GLUCAGEN) injection 1 mg    PHENYLephrine (ISRAEL-SYNEPHRINE) 30 mg in 0.9% sodium chloride 250 mL infusion    propofol (DIPRIVAN) 10 mg/mL infusion    lacosamide (VIMPAT) tablet 200 mg    levothyroxine (SYNTHROID) tablet 75 mcg    aspirin chewable tablet 81 mg    sodium chloride (NS) flush 5-40 mL    sodium chloride (NS) flush 5-40 mL    acetaminophen (TYLENOL) tablet 650 mg    Or    acetaminophen (TYLENOL) suppository 650 mg    polyethylene glycol (MIRALAX) packet 17 g    ondansetron (ZOFRAN ODT) tablet 4 mg    Or    ondansetron (ZOFRAN) injection 4 mg    atorvastatin (LIPITOR) tablet 80 mg    piperacillin-tazobactam (ZOSYN) 3.375 g in 0.9% sodium chloride (MBP/ADV) 100 mL MBP    dexamethasone (DECADRON) 4 mg/mL injection 6 mg    azithromycin (ZITHROMAX) 500 mg in 0.9% sodium chloride 250 mL (VIAL-MATE)    baricitinib (OLUMIANT) tablet 1 mg        Labs:    Recent Labs     01/30/22  0730 01/29/22  1240 01/29/22  1020 01/27/22  1330   WBC 17.9* 15.5*  --  5.4   HGB 11.8* 11.4*  --  15.2   PLT 41* 82*  84*  --  PLEASE SEE PLTNACIT FOR ACCURATE PLT COUNT DUE TO EDTA SENSITIVITY CAUSING PLT CLUMPING IN LAV TOP TUBE. INR  --   --  1.6*  --    APTT  --   --  60.3*  --      Recent Labs     01/30/22  0730 01/30/22  0400 01/29/22  1435 01/29/22  1020 01/29/22  1020 01/29/22  0241 01/29/22  0241 01/28/22  0452 01/28/22  0452 01/27/22  1802 01/27/22  1459 01/27/22  1458   * 145 144   < > 144   < > 149*   < > 154*  --   --    < >   K 3.3* 3.3* 2.8*   < > 3.4*   < > 3.0*   < > 2.5*  --   --    < >   * 112* 112*   < > 111*   < > 117*   < > 122*  --   --    < >   CO2 19* 20* 20*   < > 19*   < > 21   < > 21  --   --    < >   * 298* 605*   < > 628*   < > 555*   < > 375*  --   --    < >   * 106* 101*   < > 95*   < > 90*   < > 68*  --   --    < >   CREA 8.47* 8.61* 7.96*   < > 7.50*   < > 6.82*   < > 4.85*  --   --    < >   CA 8.0* 7.8* 7.7*   < > 7.8*   < > 7.9*   < > 8.8  --   --    < >   MG 2.2 2.2  --   --   --   --  2.1  --   --   --   --   --    LAC  --   --   --   --   --   --   --   --  4.2* 5.0* 4.7*  --    ALB  --  1.8* 1.6*  --  1.6*   < > 1.5*   < > 2.0*  --   --    < >   ALT  --  1,601* 1,458*  --  1,550*   < > 1,368*   < > 1,382*  --   --    < >    < > = values in this interval not displayed.      Recent Labs     01/30/22  0401 01/29/22 0223 01/28/22  0516   PH 7.25* 7.27* 7.25*   PCO2 41 43 48*   PO2 114* 66* 87   HCO3 18* 19* 19*   FIO2 55.0 55.0 55       Lab Results   Component Value Date/Time    Culture result: No Growth (<1000 cfu/mL) 01/27/2022 02:00 PM    Culture result: No growth after 22 hours 01/27/2022 01:30 PM    Culture result: No significant growth, <10,000 CFU/mL 06/05/2021 08:45 AM     Lab Results   Component Value Date/Time    TSH 4.47 (H) 05/21/2021 10:46 AM        Imaging:    Results from Hospital Encounter encounter on 01/27/22    XR CHEST PORT    Narrative  Chest one view. AP semiupright portable at 0146 dated 1/30/2022. Comparison 1/29/2022. An endotracheal tube, nasogastric tube and central line remain in place. The  cardiomediastinal silhouette is stable. Bilateral perihilar and lower lobe  opacities are redemonstrated as seen with CHF versus pneumonia, stable. There is  no pneumothorax. Impression  No interval change. Results from Hospital Encounter encounter on 01/27/22    CT HEAD WO CONT    Narrative  Study: Head CT without contrast.    Clinical Indication: Altered mental status. Rule out intracranial hemorrhage. Comparison: Head CT dated 1/20/2022. Technique: Routine volume acquisition of the head was performed without contrast  using soft tissue and bone kernels. Coronal and sagittal reconstructions were  generated and reviewed. Dose reduction: All CT scans at this facility are  performed using dose reduction optimization techniques as appropriate to a  performed exam including the following-automated exposure control, adjustments  of mA and/or Kv according to patient size, or use of iterative reconstructive  technique. Findings:    Unchanged generalized cerebral and cerebellar volume loss with commensurate  prominence of ventricles and sulci. No midline shift. The basal cisterns are patent. No acute hemorrhage, abnormal  extra-axial fluid, or evidence of large territorial ischemia. Scattered  intracranial vascular calcifications. Unremarkable orbits. Bilateral maxillary sinus and scattered ethmoid mucosal  thickening. Cerumen in the left EAC. No evidence of an aggressive calvarial or  extracalvarial lesion. Impression  No acute intracranial abnormality. · 1/30 remains on the ventilator has metabolic acidosis we will add bicarb per tube.   Maintain ventilator on current settings although will decrease PEEP slightly. Platelets continue to drop.   He is unresponsive on no sedation likely anoxic encephalopathy

## 2022-01-30 NOTE — PROGRESS NOTES
Pt noted to have 100mL out of coffee ground bile. Dr. Wanda Jaramillo notified. Orders for CBC and to hold tube feed.  Will continue to monitor

## 2022-01-30 NOTE — PROGRESS NOTES
Discussed pt assessment, condition, and labs (including critical labs)  with Dr. Steve Diego. Also, updated Dr. Linda Shelton and S/w Dr. Rosmery Shipman regarding pt condition.  Will continue to monitor

## 2022-01-30 NOTE — PROGRESS NOTES
Renal Note    NAME:  Arslan Hester   :   1945   MRN:   [id number]     ATTENDING: Annie Cortes MD  PCP:  Johnny Atwood MD    Date/Time:  2022 11:51 AM      Subjective:     Patient seen in the ICU. He is intubated. Off sedation for 24 hours. Does not respond to verbal or tactile stimulus apparently is breathing over the vent. Urine output only 35 mils in the past 24 hours. Renal function is worse with creatinine over 8. 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  21   Johnny Atwood MD   lancets misc Sig: To check sugar twice a day before breakfast and at bedtime 21   Johnny Atwood MD   amLODIPine (NORVASC) 10 mg tablet Take 1 Tablet by mouth daily. 21   Stephanie Diana MD   losartan (COZAAR) 50 mg tablet Take 1 Tablet by mouth daily. Please stop hydrochlorothiazide and amlodipine 21   Stephanie Diana MD   levothyroxine (SYNTHROID) 75 mcg tablet Take 1 Tablet by mouth every morning. Take in early morning. 21   Stephanie Diana MD   metFORMIN (GLUCOPHAGE) 500 mg tablet Take 1 Tablet by mouth two (2) times daily (with meals). 21   Stephanie Diana MD   pravastatin (PRAVACHOL) 20 mg tablet Take 1 Tablet by mouth nightly. 21   Stephanie Diana MD   lacosamide (VIMPAT) 200 mg tab tablet Take 200 mg by mouth two (2) times a day. Provider, Historical   lacosamide (Vimpat) 200 mg tab tablet Take 1 Tablet by mouth two (2) times a day. Max Daily Amount: 400 mg. courtesy refill  Given on behalf of neurologist dr Deshawn Conn or dr Jean Riding 21   Stephanie Diana MD   OLANZapine (ZyPREXA) 2.5 mg tablet Take 1 Tablet by mouth two (2) times a day. 21   Stephanie Diana MD   Blood-Glucose Meter monitoring kit He is having a seizure he needs blood sugar to be checked twice a day before breakfast and at bedtime, 21   Stephanie Diana MD   levETIRAcetam 1,000 mg tablet Take 1,000 mg by mouth two (2) times a day. Provider, Historical   divalproex  (DEPAKOTE) 500 mg tablet Take 500 mg by mouth two (2) times a day.  Take two tablets twice daily    Provider, Historical       REVIEW OF SYSTEMS:       Unable to obtain patient is intubated and unresponsive    Objective:   VITALS:    Visit Vitals  /62 Comment: levophed titrated (see MAR)   Pulse 79   Temp (!) 101.3 °F (38.5 °C)   Resp 18   Ht 6' (1.829 m)   Wt 102 kg (224 lb 13.9 oz)   SpO2 91%   BMI 30.50 kg/m²     Temp (24hrs), Av.2 °F (37.3 °C), Min:98.4 °F (36.9 °C), Max:101.3 °F (38.5 °C)      PHYSICAL EXAM:     General: Patient unresponsive ,off sedation ,currently on mechanical ventilation  Eyes: sclera anicteric  Oral Cavity: ETT+  Neck: no JVD  Chest: Fair bilateral air entry. No Wheezing or Rhonchi. No rales. Heart: normal sounds  Abdomen: soft and non tender   : no servin  Lower Extremities: no edema. Skin: no rash  Neuro: Unresponsive  Psychiatric: Patient is unresponsive unable to assess      LAB DATA REVIEWED:    Recent Results (from the past 24 hour(s))   GLUCOSE, POC    Collection Time: 01/29/22 11:13 AM   Result Value Ref Range    Glucose (POC) 514 (H) 65 - 117 mg/dL    Performed by Alexa English    PLATELET COUNT    Collection Time: 01/29/22 12:40 PM   Result Value Ref Range    PLATELET 82 (L) 889 - 400 K/uL   CBC W/O DIFF    Collection Time: 01/29/22 12:40 PM   Result Value Ref Range    WBC 15.5 (H) 4.1 - 11.1 K/uL    RBC 3.57 (L) 4.10 - 5.70 M/uL    HGB 11.4 (L) 12.1 - 17.0 g/dL    HCT 36.7 36.6 - 50.3 %    .8 (H) 80.0 - 99.0 FL    MCH 31.9 26.0 - 34.0 PG    MCHC 31.1 30.0 - 36.5 g/dL    RDW 14.9 (H) 11.5 - 14.5 %    PLATELET 84 (L) 089 - 400 K/uL    NRBC 0.2 (H) 0.0  WBC    ABSOLUTE NRBC 0.03 (H) 0.00 - 0.01 K/uL   DIFFERENTIAL, AUTO    Collection Time: 01/29/22 12:40 PM   Result Value Ref Range    NEUTROPHILS 82 (H) 32 - 75 %    BAND NEUTROPHILS 17 (H) 0 - 6 %    LYMPHOCYTES 1 (L) 12 - 49 %    MONOCYTES 0 (L) 5 - 13 %    EOSINOPHILS 0 0 - 7 %    BASOPHILS 0 0 - 1 %    IMMATURE GRANULOCYTES 0 %    ABS. NEUTROPHILS 15.3 (H) 1.8 - 8.0 K/UL    ABS. LYMPHOCYTES 0.2 (L) 0.8 - 3.5 K/UL    ABS. MONOCYTES 0.0 0.0 - 1.0 K/UL    ABS. EOSINOPHILS 0.0 0.0 - 0.4 K/UL    ABS. BASOPHILS 0.0 0.0 - 0.1 K/UL    ABS. IMM.  GRANS. 0.0 K/UL    DF Smear Scanned      PLATELET COMMENTS Clumped Platelets      RBC COMMENTS Normocytic, Normochromic     METABOLIC PANEL, COMPREHENSIVE    Collection Time: 01/29/22  2:35 PM   Result Value Ref Range    Sodium 144 136 - 145 mmol/L    Potassium 2.8 (L) 3.5 - 5.1 mmol/L    Chloride 112 (H) 97 - 108 mmol/L    CO2 20 (L) 21 - 32 mmol/L    Anion gap 12 5 - 15 mmol/L    Glucose 605 (HH) 65 - 100 mg/dL     (H) 6 - 20 mg/dL    Creatinine 7.96 (H) 0.70 - 1.30 mg/dL    BUN/Creatinine ratio 13 12 - 20      GFR est AA 8 (L) >60 ml/min/1.73m2    GFR est non-AA 7 (L) >60 ml/min/1.73m2    Calcium 7.7 (L) 8.5 - 10.1 mg/dL    Bilirubin, total 0.7 0.2 - 1.0 mg/dL    AST (SGOT) >2,000 (H) 15 - 37 U/L    ALT (SGPT) 1,458 (H) 12 - 78 U/L    Alk.  phosphatase 62 45 - 117 U/L    Protein, total 6.4 6.4 - 8.2 g/dL    Albumin 1.6 (L) 3.5 - 5.0 g/dL    Globulin 4.8 (H) 2.0 - 4.0 g/dL    A-G Ratio 0.3 (L) 1.1 - 2.2     TYPE & SCREEN    Collection Time: 01/29/22  2:35 PM   Result Value Ref Range    Crossmatch Expiration 02/01/2022,2359     [facility]     Antibody screen Negative    GLUCOSE, POC    Collection Time: 01/29/22  3:50 PM   Result Value Ref Range    Glucose (POC) 541 (H) 65 - 117 mg/dL    Performed by Seabron Core    GLUCOSE, POC    Collection Time: 01/29/22  5:00 PM   Result Value Ref Range    Glucose (POC) 502 (H) 65 - 117 mg/dL    Performed by QBuylon Arkami, POC    Collection Time: 01/29/22  6:38 PM   Result Value Ref Range    Glucose (POC) 443 (H) 65 - 117 mg/dL    Performed by QBuylon Sensing, POC    Collection Time: 01/29/22  8:17 PM   Result Value Ref Range    Glucose (POC) 450 (H) 65 - 117 mg/dL    Performed by Luisa De La Rosa    GLUCOSE, POC    Collection Time: 01/29/22 10:04 PM   Result Value Ref Range    Glucose (POC) 412 (H) 65 - 117 mg/dL    Performed by Marlen Barnhill    Collection Time: 01/29/22 10:41 PM   Result Value Ref Range    Glucose 412 mg/dL    Insulin order 10.6 units/hour    Insulin adminstered 10.6 units/hour    Multiplier 0.030     Low target 95 mg/dL    High target 130 mg/dL    D50 order 0.0 ml    D50 administered 0.00 ml    Minutes until next BG 60 min    Order initials kmd     Administered initials kmd     GLSCOM Comments     GLUCOSE, POC    Collection Time: 01/29/22 11:40 PM   Result Value Ref Range    Glucose (POC) 404 (H) 65 - 117 mg/dL    Performed by Kanwalbrook Small    Collection Time: 01/30/22 12:08 AM   Result Value Ref Range    Glucose 402 mg/dL    Insulin order 13.7 units/hour    Insulin adminstered 13.7 units/hour    Multiplier 0.040     Low target 95 mg/dL    High target 130 mg/dL    D50 order 0.0 ml    D50 administered 0.00 ml    Minutes until next BG 60 min    Order initials kmd     Administered initials kmd     AARON Comments     GLUCOSE, POC    Collection Time: 01/30/22  1:13 AM   Result Value Ref Range    Glucose (POC) 339 (H) 65 - 117 mg/dL    Performed by Kanwalbrook Small    Collection Time: 01/30/22  1:24 AM   Result Value Ref Range    Glucose 339 mg/dL    Insulin order 14.0 units/hour    Insulin adminstered 14.0 units/hour    Multiplier 0.050     Low target 95 mg/dL    High target 130 mg/dL    D50 order 0.0 ml    D50 administered 0.00 ml    Minutes until next BG 60 min    Order initials kmd     Administered initials ismael WALKER Comments     GLUCOSE, POC    Collection Time: 01/30/22  2:28 AM   Result Value Ref Range    Glucose (POC) 364 (H) 65 - 117 mg/dL    Performed by Kanwalbrook Small    Collection Time: 01/30/22  2:28 AM   Result Value Ref Range    Glucose 364 mg/dL    Insulin order 18.2 units/hour    Insulin adminstered 18.2 units/hour    Multiplier 0.060     Low target 95 mg/dL    High target 130 mg/dL    D50 order 0.0 ml    D50 administered 0.00 ml    Minutes until next BG 60 min    Order initials kmd     Administered initials ismael WALKER Comments     GLUCOSE, POC    Collection Time: 01/30/22  3:53 AM   Result Value Ref Range    Glucose (POC) 305 (H) 65 - 117 mg/dL    Performed by Asad Silverman    GLUCOSTABILIZER    Collection Time: 01/30/22  3:54 AM   Result Value Ref Range    Glucose 305 mg/dL    Insulin order 17.2 units/hour    Insulin adminstered 17.2 units/hour    Multiplier 0.070     Low target 95 mg/dL    High target 130 mg/dL    D50 order 0.0 ml    D50 administered 0.00 ml    Minutes until next BG 60 min    Order initials kmd     Administered initials kmd     GLSCOM Comments     METABOLIC PANEL, COMPREHENSIVE    Collection Time: 01/30/22  4:00 AM   Result Value Ref Range    Sodium 145 136 - 145 mmol/L    Potassium 3.3 (L) 3.5 - 5.1 mmol/L    Chloride 112 (H) 97 - 108 mmol/L    CO2 20 (L) 21 - 32 mmol/L    Anion gap 13 5 - 15 mmol/L    Glucose 298 (H) 65 - 100 mg/dL     (H) 6 - 20 mg/dL    Creatinine 8.61 (H) 0.70 - 1.30 mg/dL    BUN/Creatinine ratio 12 12 - 20      GFR est AA 7 (L) >60 ml/min/1.73m2    GFR est non-AA 6 (L) >60 ml/min/1.73m2    Calcium 7.8 (L) 8.5 - 10.1 mg/dL    Bilirubin, total 0.7 0.2 - 1.0 mg/dL    AST (SGOT) >2,000 (H) 15 - 37 U/L    ALT (SGPT) 1,601 (H) 12 - 78 U/L    Alk.  phosphatase 99 45 - 117 U/L    Protein, total 6.8 6.4 - 8.2 g/dL    Albumin 1.8 (L) 3.5 - 5.0 g/dL    Globulin 5.0 (H) 2.0 - 4.0 g/dL    A-G Ratio 0.4 (L) 1.1 - 2.2     MAGNESIUM    Collection Time: 01/30/22  4:00 AM   Result Value Ref Range    Magnesium 2.2 1.6 - 2.4 mg/dL   BLOOD GAS, ARTERIAL    Collection Time: 01/30/22  4:01 AM   Result Value Ref Range    pH 7.25 (L) 7.35 - 7.45      PCO2 41 35 - 45 mmHg    PO2 114 (H) 75 - 100 mmHg    O2 SAT 98 >95 %    BICARBONATE 18 (L) 22 - 26 mmol/L    BASE DEFICIT 8.5 (H) 0 - 2 mmol/L    O2 METHOD VENT      FIO2 55.0 %    MODE Assist Control/Volume Control      Tidal volume 500      SET RATE 18      EPAP/CPAP/PEEP 8.0      SITE Right Radial      JULIO'S TEST PASS     GLUCOSE, POC    Collection Time: 01/30/22  4:35 AM   Result Value Ref Range    Glucose (POC) 480 (H) 65 - 117 mg/dL    Performed by Bayron Mane    GLUCOSE, POC    Collection Time: 01/30/22  4:37 AM   Result Value Ref Range    Glucose (POC) 300 (H) 65 - 117 mg/dL    Performed by Bayron Mane    GLUCOSTABILIZER    Collection Time: 01/30/22  5:00 AM   Result Value Ref Range    Glucose 300 mg/dL    Insulin order 19.2 units/hour    Insulin adminstered 19.2 units/hour    Multiplier 0.080     Low target 95 mg/dL    High target 130 mg/dL    D50 order 0.0 ml    D50 administered 0.00 ml    Minutes until next BG 60 min    Order initials kmd     Administered initials kmd     GLSCOM Comments     GLUCOSE, POC    Collection Time: 01/30/22  6:12 AM   Result Value Ref Range    Glucose (POC) 243 (H) 65 - 117 mg/dL    Performed by Stephanie Fontanez    GLUCOSTABILIZER    Collection Time: 01/30/22  6:12 AM   Result Value Ref Range    Glucose 243 mg/dL    Insulin order 16.5 units/hour    Insulin adminstered 16.5 units/hour    Multiplier 0.090     Low target 95 mg/dL    High target 130 mg/dL    D50 order 0.0 ml    D50 administered 0.00 ml    Minutes until next BG 60 min    Order initials kmd     Administered initials kmd     GLSCOM Comments     GLUCOSE, POC    Collection Time: 01/30/22  7:10 AM   Result Value Ref Range    Glucose (POC) 203 (H) 65 - 117 mg/dL    Performed by Niru Head    GLUCOSTABILIZER    Collection Time: 01/30/22  7:18 AM   Result Value Ref Range    Glucose 203 mg/dL    Insulin order 14.3 units/hour    Insulin adminstered 14.3 units/hour    Multiplier 0.100     Low target 95 mg/dL    High target 130 mg/dL    D50 order 0.0 ml    D50 administered 0.00 ml    Minutes until next BG 60 min    Order initials CD     Administered initials CD     GLSCOM Comments     METABOLIC PANEL, BASIC    Collection Time: 01/30/22  7:30 AM   Result Value Ref Range    Sodium 147 (H) 136 - 145 mmol/L    Potassium 3.3 (L) 3.5 - 5.1 mmol/L    Chloride 114 (H) 97 - 108 mmol/L    CO2 19 (L) 21 - 32 mmol/L    Anion gap 14 5 - 15 mmol/L    Glucose 218 (H) 65 - 100 mg/dL     (H) 6 - 20 mg/dL    Creatinine 8.47 (H) 0.70 - 1.30 mg/dL    BUN/Creatinine ratio 12 12 - 20      GFR est AA 8 (L) >60 ml/min/1.73m2    GFR est non-AA 6 (L) >60 ml/min/1.73m2    Calcium 8.0 (L) 8.5 - 10.1 mg/dL   MAGNESIUM    Collection Time: 01/30/22  7:30 AM   Result Value Ref Range    Magnesium 2.2 1.6 - 2.4 mg/dL   CBC WITH AUTOMATED DIFF    Collection Time: 01/30/22  7:30 AM   Result Value Ref Range    WBC 17.9 (H) 4.1 - 11.1 K/uL    RBC 3.70 (L) 4.10 - 5.70 M/uL    HGB 11.8 (L) 12.1 - 17.0 g/dL    HCT 36.1 (L) 36.6 - 50.3 %    MCV 97.6 80.0 - 99.0 FL    MCH 31.9 26.0 - 34.0 PG    MCHC 32.7 30.0 - 36.5 g/dL    RDW 14.8 (H) 11.5 - 14.5 %    PLATELET 41 (LL) 553 - 400 K/uL    MPV 12.8 8.9 - 12.9 FL    NRBC 0.2 (H) 0.0  WBC    ABSOLUTE NRBC 0.03 (H) 0.00 - 0.01 K/uL    NEUTROPHILS 88 (H) 32 - 75 %    LYMPHOCYTES 3 (L) 12 - 49 %    MONOCYTES 1 (L) 5 - 13 %    EOSINOPHILS 0 0 - 7 %    BASOPHILS 0 0 - 1 %    IMMATURE GRANULOCYTES 8 (H) 0 - 0.5 %    ABS. NEUTROPHILS 15.7 (H) 1.8 - 8.0 K/UL    ABS. LYMPHOCYTES 0.5 (L) 0.8 - 3.5 K/UL    ABS. MONOCYTES 0.2 0.0 - 1.0 K/UL    ABS. EOSINOPHILS 0.0 0.0 - 0.4 K/UL    ABS. BASOPHILS 0.0 0.0 - 0.1 K/UL    ABS. IMM. GRANS.  1.5 (H) 0.00 - 0.04 K/UL    DF AUTOMATED     GLUCOSE, POC    Collection Time: 01/30/22  8:11 AM   Result Value Ref Range    Glucose (POC) 211 (H) 65 - 117 mg/dL    Performed by Colin Lozano    Collection Time: 01/30/22  8:16 AM   Result Value Ref Range    Glucose 211 mg/dL    Insulin order 16.6 units/hour    Insulin adminstered 16.6 units/hour    Multiplier 0.110     Low target 95 mg/dL    High target 130 mg/dL    D50 order 0.0 ml    D50 administered 0.00 ml    Minutes until next BG 60 min    Order initials CD     Administered initials CD     GLSCOM Comments     GLUCOSE, POC    Collection Time: 01/30/22  9:05 AM   Result Value Ref Range    Glucose (POC) 229 (H) 65 - 117 mg/dL    Performed by Colin Lozano    Collection Time: 01/30/22  9:17 AM   Result Value Ref Range    Glucose 229 mg/dL    Insulin order 20.3 units/hour    Insulin adminstered 20.3 units/hour    Multiplier 0.120     Low target 95 mg/dL    High target 130 mg/dL    D50 order 0.0 ml    D50 administered 0.00 ml    Minutes until next BG 60 min    Order initials CD Administered initials CD     GLSCOM Comments     GLUCOSE, POC    Collection Time: 01/30/22 10:21 AM   Result Value Ref Range    Glucose (POC) 150 (H) 65 - 117 mg/dL    Performed by Maureen Mckee    Collection Time: 01/30/22 10:23 AM   Result Value Ref Range    Glucose 150 mg/dL    Insulin order 11.7 units/hour    Insulin adminstered 11.7 units/hour    Multiplier 0.130     Low target 95 mg/dL    High target 130 mg/dL    D50 order 0.0 ml    D50 administered 0.00 ml    Minutes until next BG 60 min    Order initials CD     Administered initials CD     GLSCOM Comments         Recommendations/Plan:     #1 acute kidney injury  -Patient presented with creatinine of 3.5 which increased to 4.8-->6.8-->8.4 today  -ANDRES likely 2/2 ATN from septic shock /anoxic renal injury with ARB on board  -Unsure how long patient was down before he was intubated/resuscitated  -Patient currently anuric/oliguric .urine output only 35 mils in past 24 hours  -He is currently on 1/4 NS at 50 mils per hour I will continue  -No volume overload on physical exam even though proBNP is elevated at 15,000  -I will change IV fluids to 1/4 NS at 75 mils per hour.  -I Discussed with the brother regarding possible RRT. I will wait for the results of the CT scan to rule out anoxic brain injury. If he neurologically improves/anoxic damage seen, will consider CRRT in a.m. He has been off sedation and is unresponsive at this time  -On 17 mics of Levophed this morning  -He has no volume overload. No crackles on physical exam even though BNP is elevated  -No critical electrolytes. No Hyperkalemia or metabolic acidosis noted  -Will reassess in a.m. #2 severe hypokalemia  -Potassium was 2.8 on admission. 3.3 today  -Is currently being replaced. Magnesium is okay at 2.1    #3 hypernatremia  -Corrected sodium is 155-->149.    -on 1/4NS .  Continue Avoid D5W because of severe hyperglycemia  -Will start water flushes via NG tube    #4  COVID-19 pneumonia  -Septic shock  -With multiorgan failure including renal failure/transaminitis/hemodynamics shock  -Patient currently on Decadron azithromycin and Zosyn  -Acute encephalopathy likely because of metabolic derangements  -Overall prognosis seems guarded    #5 diabetes  -severe hyperglycemia with blood sugars over 500  -Likely worse because of Decadron  -On insulin    #6 seizure disorder  -Patient had another seizure .   Has been evaluated by neurology  -Currently off sedation and unresponsive        ________________________________________________________________________  Signed: Junaid Martinez MD

## 2022-01-30 NOTE — PROGRESS NOTES
Comprehensive Nutrition Assessment    Type and Reason for Visit: Initial,Consult    Nutrition Recommendations/Plan:     Continue NPO    If EN: initiate Promote @ 35cc/hr, advance Q6hrs to goal rate of 55cc/hr; 80cc flush Q6hrs providing 1428kcal, 82.5g pro, 1427ml free H2O    If EN: Initiate ProSource 30ml 5x/day tp provide 300kcal, 75g pro    Monitor EN initiation, TF tolerance, PO advancement, weight, labs, skin    Nutrition Assessment:  Admitted for encephalopathy, Covid. Currently NPO; RD to provide TF rec's; orogastric tube noted. Intubated. Pressors phenylnephrine, norepinephrine. Meds: propofol, atorvastatin, olumiant, decadron, SSI, lacosamide, levetiracetam, levothyroxine, lorazepam, zofran. Labs: Na:147H K:3.3L BUN:101H Cr:8.47H Ca:8.0L GFR:8L A1c:7.6H    Malnutrition Assessment:  Malnutrition Status: At risk for malnutrition (specify) (Potential for altered nutrition RT NPO status, BMI obesity class I, recent intubation)    Context:  Acute illness     Findings of the 6 clinical characteristics of malnutrition:   Energy Intake:  Mild decrease in energy intake (specify) (NPO x2 days)  Weight Loss:  No significant weight loss     Body Fat Loss:  No significant body fat loss,     Muscle Mass Loss:  No significant muscle mass loss,    Fluid Accumulation:  No significant fluid accumulation,     Strength:  Not performed         Estimated Daily Nutrient Needs:  Energy (kcal): 1428kcal/d (14kcal/kg); Weight Used for Energy Requirements: Current  Protein (g): 161g/d (2g/kg IBW80.9kg); Weight Used for Protein Requirements:    Fluid (ml/day): 1430ml/d; Method Used for Fluid Requirements: 1 ml/kcal      Nutrition Related Findings:  No NFE performed, appears nourished. No N/V/C/D. No BM doc'd. No edema noted.       Wounds:    Moisture associate skin damage       Current Nutrition Therapies:  No diet orders on file    Anthropometric Measures:  · Height:  6' (182.9 cm)  · Current Body Wt:  102 kg (224 lb 13.9 oz) · Admission Body Wt:  220 lb    · Ideal Body Wt:  178 lbs:  126.3 %   · BMI Category:  Obese class 1 (BMI 30.0-34. 9)       Wt Readings from Last 10 Encounters:   01/29/22 102 kg (224 lb 13.9 oz)   01/20/22 101.1 kg (222 lb 14.2 oz)   10/06/21 111.1 kg (245 lb)   07/22/21 110.7 kg (244 lb)   06/29/21 90.7 kg (200 lb)   06/03/21 97.8 kg (215 lb 9.8 oz)   05/19/21 100.9 kg (222 lb 7.1 oz)   03/12/21 120.2 kg (265 lb)   12/30/20 115.2 kg (254 lb)   03/10/20 109.3 kg (241 lb)       Nutrition Diagnosis:   · Inadequate oral intake related to cognitive or neurological impairment as evidenced by NPO or clear liquid status due to medical condition,intubation      Nutrition Interventions:   Food and/or Nutrient Delivery: Continue NPO,Start tube feeding  Nutrition Education and Counseling: No recommendations at this time  Coordination of Nutrition Care: Continue to monitor while inpatient    Goals:  Meet 75% EEN, Maintain CBW +/-0.5kg x1wk, Maintain skin integrity, Lytes WNL       Nutrition Monitoring and Evaluation:   Behavioral-Environmental Outcomes: None identified  Food/Nutrient Intake Outcomes: Diet advancement/tolerance,Enteral nutrition intake/tolerance  Physical Signs/Symptoms Outcomes: GI status,Weight,Nutrition focused physical findings    Discharge Planning:     Too soon to determine     Electronically signed by Nino Rios RD on 1/30/2022 at 2:11 PM    Contact: [phone]

## 2022-01-30 NOTE — PROGRESS NOTES
Progress Note    Patient: Stevenson Perez MRN: [id number]  SSN: [id number]    YOB: 1945  Age: 78 y.o. Sex: male      Admit Date: 1/27/2022    LOS: 3 days     Subjective:     76M, H/o Schizophenia, seizures, DMII on oral meds with unresponsive and acute hypoxic respiratory failure s/t COVID-19 pneumonitis complicated by ANDRES, hypokalemia and shock liver.          HSOPITAL COURSE  COVID positive, associated with fever 106, increased troponin, cardiology was consulted ansd recommended ECHO, there is no heparin gtt, was initially called for STEMI and was cancelled. he was intubated for acute hypoxic respiratory failure. On levophed. Complicated by ANDRES, shock liver and hypokalemia. Was treated with azithromycin, barcitinib, and zosyn. Will give 1L bolus and continue IVF. Discussed with family he is very critical- his renal functions increased and now seemingly in ATN, his liver functions have worsened and had a seizure on 1/28 , he is off propofol now and neurology was consulted. Repeat CT scan didn't show any stroke, plan for EEG on 2/1 to assess for neurological activity. The patients liver functions, renal are worsening. His electrolytes are stable, will plan on CRRT/HD based on EEG findings          Review of Systems:  Unable to determine s.t mental status    Objective:     Vitals:    01/30/22 1100 01/30/22 1120 01/30/22 1209 01/30/22 1217   BP: (!) 107/56 (!) 108/58 (!) 104/57 (!) 98/54   Pulse: 72 70 68 68   Resp: 18 18 18 18   Temp: 97.9 °F (36.6 °C)      SpO2: 97% 96% 97% 97%   Weight:       Height:            Intake and Output:  Current Shift: 01/30 0701 - 01/30 1900  In: 110   Out: 0   Last three shifts: 01/28 1901 - 01/30 0700  In: 1921.7 [I.V.:1861.7]  Out: 110 [Urine:110]      Physical Exam:   Physical Exam  Vitals and nursing note reviewed. Constitutional:       General: intubates     Appearance: Normal appearance. He is normal weight. He is ill-appearing.    HENT:      Head: Normocephalic and atraumatic.      Nose: Nose normal.      Mouth/Throat:      Mouth: Mucous membranes are moist.   Eyes:      Extraocular Movements: Extraocular movements intact.      Pupils: Pupils are equal, round, and reactive to light. Cardiovascular:      Rate and Rhythm: Regular rhythm. Tachycardia present.      Pulses: Normal pulses.      Heart sounds: Normal heart sounds. Pulmonary:      Effort: Respiratory distress present.      Breath sounds: Rhonchi present. Abdominal:      General: Abdomen is flat. Bowel sounds are normal.      Palpations: Abdomen is soft. Musculoskeletal:         General: No swelling or tenderness. Normal range of motion.      Cervical back: Normal range of motion and neck supple. Skin:     General: Skin is warm and dry.      Capillary Refill: Capillary refill takes less than 2 seconds. Neurological:      Mental Status: He is unresponsive.      GCS: GCS eye subscore is 4. GCS verbal subscore is 1. GCS motor subscore is 1. Psychiatric:         cannot be assessed      Lab/Data Review:  Recent Results (from the past 24 hour(s))   METABOLIC PANEL, COMPREHENSIVE    Collection Time: 01/29/22  2:35 PM   Result Value Ref Range    Sodium 144 136 - 145 mmol/L    Potassium 2.8 (L) 3.5 - 5.1 mmol/L    Chloride 112 (H) 97 - 108 mmol/L    CO2 20 (L) 21 - 32 mmol/L    Anion gap 12 5 - 15 mmol/L    Glucose 605 (HH) 65 - 100 mg/dL     (H) 6 - 20 mg/dL    Creatinine 7.96 (H) 0.70 - 1.30 mg/dL    BUN/Creatinine ratio 13 12 - 20      GFR est AA 8 (L) >60 ml/min/1.73m2    GFR est non-AA 7 (L) >60 ml/min/1.73m2    Calcium 7.7 (L) 8.5 - 10.1 mg/dL    Bilirubin, total 0.7 0.2 - 1.0 mg/dL    AST (SGOT) >2,000 (H) 15 - 37 U/L    ALT (SGPT) 1,458 (H) 12 - 78 U/L    Alk.  phosphatase 62 45 - 117 U/L    Protein, total 6.4 6.4 - 8.2 g/dL    Albumin 1.6 (L) 3.5 - 5.0 g/dL    Globulin 4.8 (H) 2.0 - 4.0 g/dL    A-G Ratio 0.3 (L) 1.1 - 2.2     TYPE & SCREEN    Collection Time: 01/29/22  2:35 PM Result Value Ref Range    Crossmatch Expiration 02/01/2022,2359     ABO/Rh(D) Tyson Caro Positive     Antibody screen Negative    GLUCOSE, POC    Collection Time: 01/29/22  3:50 PM   Result Value Ref Range    Glucose (POC) 541 (H) 65 - 117 mg/dL    Performed by Bhavana Zepeda    GLUCOSE, POC    Collection Time: 01/29/22  5:00 PM   Result Value Ref Range    Glucose (POC) 502 (H) 65 - 117 mg/dL    Performed by [address] [place] [address], POC    Collection Time: 01/29/22  6:38 PM   Result Value Ref Range    Glucose (POC) 443 (H) 65 - 117 mg/dL    Performed by [address] [place] [address], POC    Collection Time: 01/29/22  8:17 PM   Result Value Ref Range    Glucose (POC) 450 (H) 65 - 117 mg/dL    Performed by Hodan Mcconnell [address], POC    Collection Time: 01/29/22 10:04 PM   Result Value Ref Range    Glucose (POC) 412 (H) 65 - 117 mg/dL    Performed by Bonnie Muhammad    Collection Time: 01/29/22 10:41 PM   Result Value Ref Range    Glucose 412 mg/dL    Insulin order 10.6 units/hour    Insulin adminstered 10.6 units/hour    Multiplier 0.030     Low target 95 mg/dL    High target 130 mg/dL    D50 order 0.0 ml    D50 administered 0.00 ml    Minutes until next BG 60 min    Order initials kmd     Administered initials kmd     GLSCOM Comments     GLUCOSE, POC    Collection Time: 01/29/22 11:40 PM   Result Value Ref Range    Glucose (POC) 404 (H) 65 - 117 mg/dL    Performed by Bonnie Muhammad    Collection Time: 01/30/22 12:08 AM   Result Value Ref Range    Glucose 402 mg/dL    Insulin order 13.7 units/hour    Insulin adminstered 13.7 units/hour    Multiplier 0.040     Low target 95 mg/dL    High target 130 mg/dL    D50 order 0.0 ml    D50 administered 0.00 ml    Minutes until next BG 60 min    Order initials kmd     Administered initials kmd     GLSCOM Comments     GLUCOSE, POC    Collection Time: 01/30/22  1:13 AM   Result Value Ref Range    Glucose (POC) 339 (H) 65 - 117 mg/dL    Performed by Nguyen Dalal    Collection Time: 01/30/22  1:24 AM   Result Value Ref Range    Glucose 339 mg/dL    Insulin order 14.0 units/hour    Insulin adminstered 14.0 units/hour    Multiplier 0.050     Low target 95 mg/dL    High target 130 mg/dL    D50 order 0.0 ml    D50 administered 0.00 ml    Minutes until next BG 60 min    Order initials kmd     Administered initials kmd     GLSCOM Comments     GLUCOSE, POC    Collection Time: 01/30/22  2:28 AM   Result Value Ref Range    Glucose (POC) 364 (H) 65 - 117 mg/dL    Performed by Nguyen Dalal    Collection Time: 01/30/22  2:28 AM   Result Value Ref Range    Glucose 364 mg/dL    Insulin order 18.2 units/hour    Insulin adminstered 18.2 units/hour    Multiplier 0.060     Low target 95 mg/dL    High target 130 mg/dL    D50 order 0.0 ml    D50 administered 0.00 ml    Minutes until next BG 60 min    Order initials kmd     Administered initials kmd     YULIAOM Comments     GLUCOSE, POC    Collection Time: 01/30/22  3:53 AM   Result Value Ref Range    Glucose (POC) 305 (H) 65 - 117 mg/dL    Performed by Jolinda Bence    GLUCOSTABILIZER    Collection Time: 01/30/22  3:54 AM   Result Value Ref Range    Glucose 305 mg/dL    Insulin order 17.2 units/hour    Insulin adminstered 17.2 units/hour    Multiplier 0.070     Low target 95 mg/dL    High target 130 mg/dL    D50 order 0.0 ml    D50 administered 0.00 ml    Minutes until next BG 60 min    Order initials kmd     Administered initials kmd     GLSCOM Comments     METABOLIC PANEL, COMPREHENSIVE    Collection Time: 01/30/22  4:00 AM   Result Value Ref Range    Sodium 145 136 - 145 mmol/L    Potassium 3.3 (L) 3.5 - 5.1 mmol/L    Chloride 112 (H) 97 - 108 mmol/L    CO2 20 (L) 21 - 32 mmol/L    Anion gap 13 5 - 15 mmol/L    Glucose 298 (H) 65 - 100 mg/dL     (H) 6 - 20 mg/dL    Creatinine 8.61 (H) 0.70 - 1.30 mg/dL    BUN/Creatinine ratio 12 12 - 20      GFR est AA 7 (L) >60 ml/min/1.73m2 GFR est non-AA 6 (L) >60 ml/min/1.73m2    Calcium 7.8 (L) 8.5 - 10.1 mg/dL    Bilirubin, total 0.7 0.2 - 1.0 mg/dL    AST (SGOT) >2,000 (H) 15 - 37 U/L    ALT (SGPT) 1,601 (H) 12 - 78 U/L    Alk.  phosphatase 99 45 - 117 U/L    Protein, total 6.8 6.4 - 8.2 g/dL    Albumin 1.8 (L) 3.5 - 5.0 g/dL    Globulin 5.0 (H) 2.0 - 4.0 g/dL    A-G Ratio 0.4 (L) 1.1 - 2.2     MAGNESIUM    Collection Time: 01/30/22  4:00 AM   Result Value Ref Range    Magnesium 2.2 1.6 - 2.4 mg/dL   BLOOD GAS, ARTERIAL    Collection Time: 01/30/22  4:01 AM   Result Value Ref Range    pH 7.25 (L) 7.35 - 7.45      PCO2 41 35 - 45 mmHg    PO2 114 (H) 75 - 100 mmHg    O2 SAT 98 >95 %    BICARBONATE 18 (L) 22 - 26 mmol/L    BASE DEFICIT 8.5 (H) 0 - 2 mmol/L    O2 METHOD VENT      FIO2 55.0 %    MODE Assist Control/Volume Control      Tidal volume 500      SET RATE 18      EPAP/CPAP/PEEP 8.0      SITE Right Radial      JULIO'S TEST PASS     GLUCOSE, POC    Collection Time: 01/30/22  4:35 AM   Result Value Ref Range    Glucose (POC) 480 (H) 65 - 117 mg/dL    Performed by [facility]    GLUCOSE, POC    Collection Time: 01/30/22  4:37 AM   Result Value Ref Range    Glucose (POC) 300 (H) 65 - 117 mg/dL    Performed by [facility]    GLUCOSTABILIZER    Collection Time: 01/30/22  5:00 AM   Result Value Ref Range    Glucose 300 mg/dL    Insulin order 19.2 units/hour    Insulin adminstered 19.2 units/hour    Multiplier 0.080     Low target 95 mg/dL    High target 130 mg/dL    D50 order 0.0 ml    D50 administered 0.00 ml    Minutes until next BG 60 min    Order initials kmd     Administered initials kmd     GLSCOM Comments     GLUCOSE, POC    Collection Time: 01/30/22  6:12 AM   Result Value Ref Range    Glucose (POC) 243 (H) 65 - 117 mg/dL    Performed by Bryan Tucker    GLUCOSTABILIZER    Collection Time: 01/30/22  6:12 AM   Result Value Ref Range    Glucose 243 mg/dL    Insulin order 16.5 units/hour    Insulin adminstered 16.5 units/hour    Multiplier 0.090     Low target 95 mg/dL    High target 130 mg/dL    D50 order 0.0 ml    D50 administered 0.00 ml    Minutes until next BG 60 min    Order initials kmd     Administered initials kmd     GLSCOM Comments     GLUCOSE, POC    Collection Time: 01/30/22  7:10 AM   Result Value Ref Range    Glucose (POC) 203 (H) 65 - 117 mg/dL    Performed by Bhavana Zepeda    GLUCOSTABILIZER    Collection Time: 01/30/22  7:18 AM   Result Value Ref Range    Glucose 203 mg/dL    Insulin order 14.3 units/hour    Insulin adminstered 14.3 units/hour    Multiplier 0.100     Low target 95 mg/dL    High target 130 mg/dL    D50 order 0.0 ml    D50 administered 0.00 ml    Minutes until next BG 60 min    Order initials CD     Administered initials CD     GLSCOM Comments     METABOLIC PANEL, BASIC    Collection Time: 01/30/22  7:30 AM   Result Value Ref Range    Sodium 147 (H) 136 - 145 mmol/L    Potassium 3.3 (L) 3.5 - 5.1 mmol/L    Chloride 114 (H) 97 - 108 mmol/L    CO2 19 (L) 21 - 32 mmol/L    Anion gap 14 5 - 15 mmol/L    Glucose 218 (H) 65 - 100 mg/dL     (H) 6 - 20 mg/dL    Creatinine 8.47 (H) 0.70 - 1.30 mg/dL    BUN/Creatinine ratio 12 12 - 20      GFR est AA 8 (L) >60 ml/min/1.73m2    GFR est non-AA 6 (L) >60 ml/min/1.73m2    Calcium 8.0 (L) 8.5 - 10.1 mg/dL   MAGNESIUM    Collection Time: 01/30/22  7:30 AM   Result Value Ref Range    Magnesium 2.2 1.6 - 2.4 mg/dL   CBC WITH AUTOMATED DIFF    Collection Time: 01/30/22  7:30 AM   Result Value Ref Range    WBC 17.9 (H) 4.1 - 11.1 K/uL    RBC 3.70 (L) 4.10 - 5.70 M/uL    HGB 11.8 (L) 12.1 - 17.0 g/dL    HCT 36.1 (L) 36.6 - 50.3 %    MCV 97.6 80.0 - 99.0 FL    MCH 31.9 26.0 - 34.0 PG    MCHC 32.7 30.0 - 36.5 g/dL    RDW 14.8 (H) 11.5 - 14.5 %    PLATELET 41 (LL) 632 - 400 K/uL    MPV 12.8 8.9 - 12.9 FL    NRBC 0.2 (H) 0.0  WBC    ABSOLUTE NRBC 0.03 (H) 0.00 - 0.01 K/uL    NEUTROPHILS 93 (H) 32 - 75 %    LYMPHOCYTES 3 (L) 12 - 49 %    MONOCYTES 4 (L) 5 - 13 %    EOSINOPHILS 0 0 - 7 %    BASOPHILS 0 0 - 1 %    IMMATURE GRANULOCYTES 0 %    ABS. NEUTROPHILS 16.7 (H) 1.8 - 8.0 K/UL    ABS. LYMPHOCYTES 0.5 (L) 0.8 - 3.5 K/UL    ABS. MONOCYTES 0.7 0.0 - 1.0 K/UL    ABS. EOSINOPHILS 0.0 0.0 - 0.4 K/UL    ABS. BASOPHILS 0.0 0.0 - 0.1 K/UL    ABS. IMM.  GRANS. 0.0 K/UL    DF AUTOMATED      RBC COMMENTS Normocytic, Normochromic     GLUCOSE, POC    Collection Time: 01/30/22  8:11 AM   Result Value Ref Range    Glucose (POC) 211 (H) 65 - 117 mg/dL    Performed by Raymundo Freeze    Collection Time: 01/30/22  8:16 AM   Result Value Ref Range    Glucose 211 mg/dL    Insulin order 16.6 units/hour    Insulin adminstered 16.6 units/hour    Multiplier 0.110     Low target 95 mg/dL    High target 130 mg/dL    D50 order 0.0 ml    D50 administered 0.00 ml    Minutes until next BG 60 min    Order initials CD     Administered initials CD     GLSCOM Comments     GLUCOSE, POC    Collection Time: 01/30/22  9:05 AM   Result Value Ref Range    Glucose (POC) 229 (H) 65 - 117 mg/dL    Performed by Lashell Gonzales    GLUCOSTABILIZER    Collection Time: 01/30/22  9:17 AM   Result Value Ref Range    Glucose 229 mg/dL    Insulin order 20.3 units/hour    Insulin adminstered 20.3 units/hour    Multiplier 0.120     Low target 95 mg/dL    High target 130 mg/dL    D50 order 0.0 ml    D50 administered 0.00 ml    Minutes until next BG 60 min    Order initials CD     Administered initials CD     GLSCOM Comments     GLUCOSE, POC    Collection Time: 01/30/22 10:21 AM   Result Value Ref Range    Glucose (POC) 150 (H) 65 - 117 mg/dL    Performed by Raymundo Freeze    Collection Time: 01/30/22 10:23 AM   Result Value Ref Range    Glucose 150 mg/dL    Insulin order 11.7 units/hour    Insulin adminstered 11.7 units/hour    Multiplier 0.130     Low target 95 mg/dL    High target 130 mg/dL    D50 order 0.0 ml    D50 administered 0.00 ml    Minutes until next BG 60 min    Order initials CD     Administered initials CD GLSCOM Comments     GLUCOSE, POC    Collection Time: 01/30/22 11:03 AM   Result Value Ref Range    Glucose (POC) 152 (H) 65 - 117 mg/dL    Performed by Sameera Light    Collection Time: 01/30/22 11:21 AM   Result Value Ref Range    Glucose 152 mg/dL    Insulin order 12.9 units/hour    Insulin adminstered 12.9 units/hour    Multiplier 0.140     Low target 95 mg/dL    High target 130 mg/dL    D50 order 0.0 ml    D50 administered 0.00 ml    Minutes until next BG 60 min    Order initials CD     Administered initials CD     GLSCOM Comments     GLUCOSE, POC    Collection Time: 01/30/22 12:04 PM   Result Value Ref Range    Glucose (POC) 150 (H) 65 - 117 mg/dL    Performed by Joaquina WHITLOCK    GLUCOSE, POC    Collection Time: 01/30/22 12:37 PM   Result Value Ref Range    Glucose (POC) 129 (H) 65 - 117 mg/dL    Performed by Jon Batres          Assessment and plan:      (1) Acute encephalopathy : GCS 12. S/t sepsis + anoxia. Off propofol, neurology consulted.     (2) Septic shock: 2L bolus, pan cultures, levophed     (2) Acute hypoxic respiratory failure : intubated and sedated     (3) COVID-19 pneumonia : decadron, azithromycin, zosyn      (4) possible aspiration: zosyn.     (4) NSTEMI : ECHO, cardiology consulted. Likely NSTEMI type II     (5) hypokalemia with hypernatremia: replace IV,.      (6) ANDRES: s/p 2 L bolus     (7) schizophrenia: olanzepine     (8) hypothyroidism : synthroid     (9) DMII: SSI lantus 40 units at bed time     (10) shock liver: s.t hypo perfusion, worseing  Complicated with pancytopenia     DVT ppx: heparin SubQ     DISPO: ICU, very poor prognosis.     Signed By: Stacey Davila MD     January 30, 2022

## 2022-01-31 NOTE — PROGRESS NOTES
IMPRESSION:   1. Acute hypoxic respiratory failure oxygenation well-maintained on the ventilator  2. Malignant hyperthermia resolved  3. COVID-19 pneumonia  4. NSTEMI elevated troponin  5. Shock cardiogenic versus septic vs Hypovolumic Hypotension currently on norepinephrine 17 leisa  6. Acute kidney injury creatinine worsening  7. Metabolic acidosis  8. Thrombocytopenia worsened  9. Hypernatremia  10. Symptomatic hypokalemia  11. Shock liver with transaminitis  12. Altered mental status unresponsive on no sedation likely anoxic encephalopathy      RECOMMENDATIONS/PLAN:   1. ICU monitoring  1. Ventilator for mechanical life support and prevent respiratory arrest with protective lung strategies ABG acceptable he is still hemodynamically unstable we will continue with the current vent support off sedation  2. On Assist control rate 18  PEEP 8 FiO2 45% will decrease PEEP to 5  3. Patient on Decadron Zithromax and baricitinib  4. Troponin is elevated 2D echo shows ejection fraction of 65%  5. Creatinine remains highly elevated and now worsened  6. Remains on quarter saline  7. Liver enzyme elevated shock liver  8. Agree with Empiric IV antibiotics blood and urine cultures no growth  9. Temperature T-max 101.3 last 24-hour   10. IV vasopressors for circulatory shock refractory to fluids to maintain SBP> 90 norepinephrine currently 17 mics  11.  We will give bicarb per NG tube     [x] High complexity decision making was performed  [x] See my orders for details  HPI   82-year-old male came in because as he was found unresponsive and fever 107 past medical history of schizophrenia and diabetes gastroesophageal reflux disease and anemia he is COVID-19 positive initially patient was called as NSTEMI but it was canceled patient is intubated on ventilator hemodynamically unstable unable to get any history out of the patient he seems dehydrated    PMH:  has a past medical history of Acute renal failure ([address] Anemia, Arthritis, DKA (diabetic ketoacidoses), GERD (gastroesophageal reflux disease), HTN (hypertension), Hypercholesterolemia, Schizophrenia ([facility] [address] Schizophreniform disorder ([facility] [address] Seizure disorder ([facility] [address] and Type II or unspecified type diabetes mellitus without mention of complication, uncontrolled. PSH:   has a past surgical history that includes ir insert non tunl cvc over 5 yrs (1/28/2022). FHX: family history includes Stroke in his father. SHX:  reports that he has never smoked. He has never used smokeless tobacco. He reports that he does not drink alcohol and does not use drugs.     ALL: No Known Allergies     MEDS:   [x] Reviewed - As Below   [] Not reviewed    Current Facility-Administered Medications   Medication    glucose chewable tablet 16 g    glucagon (GLUCAGEN) injection 1 mg    insulin lispro (HUMALOG) injection    insulin glargine (LANTUS) injection 40 Units    sodium bicarbonate tablet 1,300 mg    dextrose 10% infusion 125-250 mL    pantoprazole (PROTONIX) 40 mg in 0.9% sodium chloride 10 mL injection    LORazepam (ATIVAN) injection 2 mg    glucose chewable tablet 16 g    glucagon (GLUCAGEN) injection 1 mg    levETIRAcetam (KEPPRA) tablet 1,500 mg    valproic acid (as sodium salt) (DEPAKENE) 250 mg/5 mL (5 mL) oral solution 500 mg    sodium chloride (23.4%) 0.225 % in sterile water 1,000 mL infusion    dextrose 10% infusion 125-250 mL    NOREPINephrine (LEVOPHED) 8 mg in 5% dextrose 250mL (32 mcg/mL) infusion    PHENYLephrine (ISRAEL-SYNEPHRINE) 30 mg in 0.9% sodium chloride 250 mL infusion    propofol (DIPRIVAN) 10 mg/mL infusion    lacosamide (VIMPAT) tablet 200 mg    levothyroxine (SYNTHROID) tablet 75 mcg    aspirin chewable tablet 81 mg    sodium chloride (NS) flush 5-40 mL    sodium chloride (NS) flush 5-40 mL    acetaminophen (TYLENOL) tablet 650 mg    Or    acetaminophen (TYLENOL) suppository 650 mg    polyethylene glycol (MIRALAX) packet 17 g    ondansetron (ZOFRAN ODT) tablet 4 mg    Or    ondansetron (ZOFRAN) injection 4 mg    atorvastatin (LIPITOR) tablet 80 mg    piperacillin-tazobactam (ZOSYN) 3.375 g in 0.9% sodium chloride (MBP/ADV) 100 mL MBP    dexamethasone (DECADRON) 4 mg/mL injection 6 mg    azithromycin (ZITHROMAX) 500 mg in 0.9% sodium chloride 250 mL (VIAL-MATE)    baricitinib (OLUMIANT) tablet 1 mg      MAR reviewed and pertinent medications noted or modified as needed   Current Facility-Administered Medications   Medication    glucose chewable tablet 16 g    glucagon (GLUCAGEN) injection 1 mg    insulin lispro (HUMALOG) injection    insulin glargine (LANTUS) injection 40 Units    sodium bicarbonate tablet 1,300 mg    dextrose 10% infusion 125-250 mL    pantoprazole (PROTONIX) 40 mg in 0.9% sodium chloride 10 mL injection    LORazepam (ATIVAN) injection 2 mg    glucose chewable tablet 16 g    glucagon (GLUCAGEN) injection 1 mg    levETIRAcetam (KEPPRA) tablet 1,500 mg    valproic acid (as sodium salt) (DEPAKENE) 250 mg/5 mL (5 mL) oral solution 500 mg    sodium chloride (23.4%) 0.225 % in sterile water 1,000 mL infusion    dextrose 10% infusion 125-250 mL    NOREPINephrine (LEVOPHED) 8 mg in 5% dextrose 250mL (32 mcg/mL) infusion    PHENYLephrine (ISRAEL-SYNEPHRINE) 30 mg in 0.9% sodium chloride 250 mL infusion    propofol (DIPRIVAN) 10 mg/mL infusion    lacosamide (VIMPAT) tablet 200 mg    levothyroxine (SYNTHROID) tablet 75 mcg    aspirin chewable tablet 81 mg    sodium chloride (NS) flush 5-40 mL    sodium chloride (NS) flush 5-40 mL    acetaminophen (TYLENOL) tablet 650 mg    Or    acetaminophen (TYLENOL) suppository 650 mg    polyethylene glycol (MIRALAX) packet 17 g    ondansetron (ZOFRAN ODT) tablet 4 mg    Or    ondansetron (ZOFRAN) injection 4 mg    atorvastatin (LIPITOR) tablet 80 mg    piperacillin-tazobactam (ZOSYN) 3.375 g in 0.9% sodium chloride (MBP/ADV) 100 mL MBP    dexamethasone (DECADRON) 4 mg/mL injection 6 mg    azithromycin (ZITHROMAX) 500 mg in 0.9% sodium chloride 250 mL (VIAL-MATE)    baricitinib (OLUMIANT) tablet 1 mg      PMH:  has a past medical history of Acute renal failure ([facility] [address] Anemia, Arthritis, DKA (diabetic ketoacidoses), GERD (gastroesophageal reflux disease), HTN (hypertension), Hypercholesterolemia, Schizophrenia ([facility] [address] Schizophreniform disorder ([facility] [address] Seizure disorder ([facility] [address] and Type II or unspecified type diabetes mellitus without mention of complication, uncontrolled. PSH:   has a past surgical history that includes ir insert non tunl cvc over 5 yrs (2022). FHX: family history includes Stroke in his father. SHX:  reports that he has never smoked. He has never used smokeless tobacco. He reports that he does not drink alcohol and does not use drugs. ROS:  Unable to obtain intubated on ventilator and sedated    Hemodynamics:    CO:    CI:    CVP:    SVR:   PAP Systolic:    PAP Diastolic:    PVR:    TW73:        Ventilator Settings:      Mode Rate TV Press PEEP FiO2 PIP Min. Vent   Assist control,Volume control    500 ml    5 cm H20 45 %  22 cm H2O  10.4 l/min        Vital Signs: Telemetry:    normal sinus rhythm Intake/Output:   Visit Vitals  BP (!) 97/52 (BP 1 Location: Left upper arm, BP Patient Position: At rest)   Pulse 67   Temp 98.2 °F (36.8 °C)   Resp 22   Ht 6' (1.829 m)   Wt 102 kg (224 lb 13.9 oz)   SpO2 94%   BMI 30.50 kg/m²       Temp (24hrs), Av.9 °F (36.6 °C), Min:97 °F (36.1 °C), Max:98.2 °F (36.8 °C)        O2 Device: Ventilator         Wt Readings from Last 4 Encounters:   22 102 kg (224 lb 13.9 oz)   22 101.1 kg (222 lb 14.2 oz)   10/06/21 111.1 kg (245 lb)   21 110.7 kg (244 lb)          Intake/Output Summary (Last 24 hours) at 2022 0804  Last data filed at 2022 0300  Gross per 24 hour   Intake 2164.61 ml   Output 70 ml   Net 2094.61 ml       Last shift:      No intake/output data recorded.   Last 3 shifts: 1901 - 01/31 0700  In: 2164.6 [I.V.:1899.6]  Out: 85 [Urine:35; Drains:50]       Physical Exam:     General:  intubated on vent unresponsive on no sedation  HEENT: NCAT,   Eyes: anicteric; conjunctiva clear no doll's eye reflex  Neck: no nodes, no cuff leak, trach midline; no accessory MM use. Chest: no deformity,   Cardiac: R regular; no murmur;   Lungs: distant breath sounds; no wheezes a few rales in the base  Abd: soft, NT, hypoactive BS  Ext: Trace edema; no joint swelling;  No clubbing  : No urine  Neuro: Unresponsive on no sedation no doll's eye reflex flaccid extremity  Psych-  unable to assess  Skin: warm, dry, no cyanosis;   Pulses: Brachial and radial pulses intact  Capillary: Slow capillary refill      DATA:    MAR reviewed and pertinent medications noted or modified as needed  MEDS:   Current Facility-Administered Medications   Medication    glucose chewable tablet 16 g    glucagon (GLUCAGEN) injection 1 mg    insulin lispro (HUMALOG) injection    insulin glargine (LANTUS) injection 40 Units    sodium bicarbonate tablet 1,300 mg    dextrose 10% infusion 125-250 mL    pantoprazole (PROTONIX) 40 mg in 0.9% sodium chloride 10 mL injection    LORazepam (ATIVAN) injection 2 mg    glucose chewable tablet 16 g    glucagon (GLUCAGEN) injection 1 mg    levETIRAcetam (KEPPRA) tablet 1,500 mg    valproic acid (as sodium salt) (DEPAKENE) 250 mg/5 mL (5 mL) oral solution 500 mg    sodium chloride (23.4%) 0.225 % in sterile water 1,000 mL infusion    dextrose 10% infusion 125-250 mL    NOREPINephrine (LEVOPHED) 8 mg in 5% dextrose 250mL (32 mcg/mL) infusion    PHENYLephrine (ISRAEL-SYNEPHRINE) 30 mg in 0.9% sodium chloride 250 mL infusion    propofol (DIPRIVAN) 10 mg/mL infusion    lacosamide (VIMPAT) tablet 200 mg    levothyroxine (SYNTHROID) tablet 75 mcg    aspirin chewable tablet 81 mg    sodium chloride (NS) flush 5-40 mL    sodium chloride (NS) flush 5-40 mL    acetaminophen (TYLENOL) tablet 650 mg    Or    acetaminophen (TYLENOL) suppository 650 mg    polyethylene glycol (MIRALAX) packet 17 g    ondansetron (ZOFRAN ODT) tablet 4 mg    Or    ondansetron (ZOFRAN) injection 4 mg    atorvastatin (LIPITOR) tablet 80 mg    piperacillin-tazobactam (ZOSYN) 3.375 g in 0.9% sodium chloride (MBP/ADV) 100 mL MBP    dexamethasone (DECADRON) 4 mg/mL injection 6 mg    azithromycin (ZITHROMAX) 500 mg in 0.9% sodium chloride 250 mL (VIAL-MATE)    baricitinib (OLUMIANT) tablet 1 mg        Labs:    Recent Labs     01/31/22  0330 01/30/22  1700 01/30/22  0730 01/29/22  1240 01/29/22  1020   WBC 19.1* 18.1* 17.9*   < >  --    HGB 11.6* 11.5* 11.8*   < >  --    PLT 44* 44* 41*   < >  --    INR 1.3*  --   --   --  1.6*   APTT 46.5*  --   --   --  60.3*    < > = values in this interval not displayed. Recent Labs     01/31/22  0330 01/30/22  0730 01/30/22  0400 01/29/22  1435 01/29/22  1435     143 147* 145   < > 144   K 4.7  4.1 3.3* 3.3*   < > 2.8*   *  112* 114* 112*   < > 112*   CO2 19*  19* 19* 20*   < > 20*   *  170* 218* 298*   < > 605*   *  125* 101* 106*   < > 101*   CREA 9.56*  9.48* 8.47* 8.61*   < > 7.96*   CA 7.5*  7.4* 8.0* 7.8*   < > 7.7*   MG 2.1 2.2 2.2  --   --    PHOS 5.6*  --   --   --   --    ALB 1.6*  1.6*  --  1.8*  --  1.6*   ALT 1,259*  --  1,601*  --  1,458*    < > = values in this interval not displayed.      Recent Labs     01/31/22  0342 01/30/22  0401 01/29/22  0223   PH 7.29* 7.25* 7.27*   PCO2 35 41 43   PO2 76 114* 66*   HCO3 18* 18* 19*   FIO2 45.0 55.0 55.0       Lab Results   Component Value Date/Time    Culture result: No Growth (<1000 cfu/mL) 01/27/2022 02:00 PM    Culture result: No growth 2 days 01/27/2022 01:30 PM    Culture result: No significant growth, <10,000 CFU/mL 06/05/2021 08:45 AM     Lab Results   Component Value Date/Time    TSH 4.47 (H) 05/21/2021 10:46 AM        Imaging:    Results from Hospital Encounter encounter on 01/27/22    XR CHEST PORT    Narrative  Semiupright portable radiograph of the chest at 2:11 a.m. compared to January 30, 2022. INDICATION: Covid pneumonitis. Patient is rotated to left. Endotracheal tube tip is 2.6 cm above the irma. Nasogastric tube projects over the stomach. Heart size is stable. Bilateral  airspace disease appears essentially unchanged. Right IJ central line tip  projects over the junction of the SVC and right atrium. No pneumothorax. Impression  No significant change. Results from [place] encounter on 01/27/22    CT HEAD WO CONT    Narrative  Exam: CT Head without contrast    TECHNIQUE: Multiple transaxial CT images of the head were obtained without  contrast. Coronal and sagittal reformatted images were provided. Dose reduction: All CT scans at this facility are performed using dose reduction  optimization techniques as appropriate to a performed exam including the  following: Automated exposure control, adjustments of the mA and/or kV according  to patient size, or use of iterative reconstruction technique. HISTORY: anoxia    COMPARISON: Head CT 10/6/2021, 1/27/2022    FINDINGS:    Global parenchymal volume loss appears similar to prior with proportional ex  vacuo dilatation of the ventricles and other extra-axial CSF spaces, slightly  more prominent on the left. No significant midline shift or mass effect. Gray-white matter differentiation appears preserved. No findings of acute  intracranial hemorrhage. Basilar cisterns appear preserved. Intracranial  atherosclerosis. There is bilateral opacification of the mastoid air cells, most likely  indicating nonspecific mastoid effusions. Debris within the external auditory  canals is most likely cerumen. There is mild mucosal thickening in the paranasal  sinuses, most pronounced in the maxillary sinuses. Globes and orbits appear  unremarkable.  Calvarium appears intact without findings of acute or aggressive  abnormality. Impression  Overall similar exam to prior without findings of acute intracranial  abnormality. · 1/30 remains on the ventilator has metabolic acidosis we will add bicarb per tube. Maintain ventilator on current settings although will decrease PEEP slightly. Platelets continue to drop.   He is unresponsive on no sedation likely anoxic encephalopathy  · 1/31 remain intubated on ventilator hemodynamically worsening of the renal function

## 2022-01-31 NOTE — PROGRESS NOTES
NEUROLOGY  PROGRESS NOTE    Admission History/Pertinent Events  Florian Walsh is a 78y.o. year old male seen in follow-up and he remains unchanged with no responsiveness to deep painful stimuli except he becomes tachypneic and slightly grimaces. He presented on 1/27/2022 with unresponsiveness. Per chart review, patient's brother reported patient had not been feeling well and been having generalized weakness for a few days prior to presentation. On morning patient presented, patient was found to be unresponsive altogether. In the ED, patient was found to be Covid19+ with a temperature as high as 106 °F along with elevated troponins with concern for NSTEMI for which patient was started on a heparin drip. Emergent CT head was negative for any acute findings and patient was not deemed a candidate for acute neurological intervention. During patient's stay, patient diagnosed with sepsis, respiratory failure requiring intubation, aspiration pneumonia, ANDRES and transaminitis thought to be possibly related to hypoperfusion. On evening of 1/28, patient reportedly had some seizure-like activity with increase in his respiratory rate and blood pressures dropping into the 50s.     Home AEDs: Levetiracetam 1000 BID, Valproic Acid 500 BID, Lacosamide 200 BID     Workup Completed/Reviewed: [facility] WO 1/27      ASSESSMENT/PLAN      Impression  Patient with minor improvement on examination as he grimaces and becomes tachypneic now to peripheral pain stimulation and when checking corneal reflexes. Still remains heavily encephalopathic even though patient has been off of sedation for 48 hours now. Etiology is multiple metabolic derangements including hepatorenal syndrome sepsis hypotension leading to some degree of hypoxia. Doubt any primary neurological etiology for his unresponsiveness. Continue with the management of acute medical conditions. We will continue patient on his AEDs per below.       Plan      Seizure-Like Activity  Encephalopathy, Likely Infectious/Metabolic  History of Epilepsy  Associated: COVID-19 Infection, Sepsis, NSTEMI, ANDRES, Transaminitis, Schizophrenia  -Q6Hr NeuroChecks, TELE  -Seizure Precautions  -Seizure Prophylaxis: Levetiracetam 1500 BID, Valproic Acid 500 BID, Lacosamide 200 BID  -STAT IV Lorazepam 2 mg with any clinical seizure activity lasting > 3 minutes and contact Neurology for further recommendations  -Management of metabolic/infectious derangements to referring teams  -Plan for EEG on 2/1 when available  -Follow-up CT head done yesterday did not reveal any acute findings    SUBJECTIVE   Patient grimacing to pain stimulation today.   Otherwise no significant changes on examination      Physical/Neurological Exam  Patient intubated  Patient does not follow any central or peripheral commands  Does not attempt to speak  Pupils react to light bilaterally  Oculocephalics sluggish  Corneal reflex intact bilaterally  No BTT bilaterally  No facial droop  Tongue is midline under ETT  Motor              0/5 throughout to noxious pain stimulation  No abnormal movements  Normal tone throughout      OBJECTIVE  Vital Signs  Temp:  [97 °F (36.1 °C)-101.6 °F (38.7 °C)]   Pulse (Heart Rate):  []   BP: ()/(40-72)   Resp Rate:  [14-22]   O2 Sat (%):  [91 %-99 %]   Weight:  [102 kg (224 lb 13.9 oz)]     MEDICATIONS    Current Facility-Administered Medications:     glucose chewable tablet 16 g, 4 Tablet, Oral, PRN, Rogelio Nuñez MD    glucagon (GLUCAGEN) injection 1 mg, 1 mg, IntraMUSCular, PRN, Keena Call MD    insulin lispro (HUMALOG) injection, , SubCUTAneous, Q4H, Keena Call MD, 3 Units at 01/31/22 0846    insulin glargine (LANTUS) injection 40 Units, 40 Units, SubCUTAneous, QHS, Keena Call MD, 40 Units at 01/30/22 2149    sodium bicarbonate tablet 1,300 mg, 1,300 mg, Per G Tube, TID, Fallon Soria MD, 1,300 mg at 01/31/22 0846    dextrose 10% infusion 125-250 mL, 125-250 mL, IntraVENous, PRN, Gwendolyn Manzo MD    pantoprazole (PROTONIX) 40 mg in 0.9% sodium chloride 10 mL injection, 40 mg, IntraVENous, Q12H, Poonam Nuñez MD, 40 mg at 01/31/22 0908    LORazepam (ATIVAN) injection 2 mg, 2 mg, IntraVENous, Q2H PRN, Nicole Jorgensen PA-C, 2 mg at 01/29/22 0057    glucose chewable tablet 16 g, 4 Tablet, Oral, PRN, Gwendolyn Manzo MD    glucagon (GLUCAGEN) injection 1 mg, 1 mg, IntraMUSCular, PRN, Gwendolyn Manzo MD    levETIRAcetam (KEPPRA) tablet 1,500 mg, 1,500 mg, Oral, BID, Yaron Moulton MD, 1,500 mg at 01/31/22 0847    valproic acid (as sodium salt) (DEPAKENE) 250 mg/5 mL (5 mL) oral solution 500 mg, 500 mg, Oral, BID, Yaron Moulton MD, 500 mg at 01/31/22 0846    sodium chloride (23.4%) 0.225 % in sterile water 1,000 mL infusion, , IntraVENous, CONTINUOUS, Kandice Steve MD, Last Rate: 75 mL/hr at 01/31/22 0902, New Bag at 01/31/22 0902    dextrose 10% infusion 125-250 mL, 125-250 mL, IntraVENous, PRN, Gwendolyn Manzo MD    NOREPINephrine (LEVOPHED) 8 mg in 5% dextrose 250mL (32 mcg/mL) infusion, 0.5-120 mcg/min, IntraVENous, TITRATE, Gwendolyn Manzo MD, Last Rate: 13.1 mL/hr at 01/30/22 1649, 7 mcg/min at 01/30/22 1649    PHENYLephrine (ISRAEL-SYNEPHRINE) 30 mg in 0.9% sodium chloride 250 mL infusion,  mcg/min, IntraVENous, TITRATE, Nicole Jorgensen PA-C, Held at 01/28/22 2300    propofol (DIPRIVAN) 10 mg/mL infusion, 0-50 mcg/kg/min, IntraVENous, TITRATE, Damaris Lewis MD, Stopped at 01/29/22 6162    lacosamide (VIMPAT) tablet 200 mg, 200 mg, Oral, BID, Gwendolyn Manzo MD, 200 mg at 01/31/22 0847    levothyroxine (SYNTHROID) tablet 75 mcg, 75 mcg, Oral, 7am, Gwendolyn Manzo MD, 75 mcg at 01/31/22 0505    aspirin chewable tablet 81 mg, 81 mg, Oral, DAILY, Gwendolyn Manzo MD, 81 mg at 01/31/22 0847    sodium chloride (NS) flush 5-40 mL, 5-40 mL, IntraVENous, Q8H, Gwendolyn Manzo MD, 10 mL at 01/31/22 0504   sodium chloride (NS) flush 5-40 mL, 5-40 mL, IntraVENous, PRN, Charmaine Rogers MD    acetaminophen (TYLENOL) tablet 650 mg, 650 mg, Oral, Q6H PRN, 650 mg at 01/28/22 1111 **OR** acetaminophen (TYLENOL) suppository 650 mg, 650 mg, Rectal, Q6H PRN, Charmaine Rogers MD    polyethylene glycol (MIRALAX) packet 17 g, 17 g, Oral, DAILY PRN, Charmaine Rogers MD    ondansetron (ZOFRAN ODT) tablet 4 mg, 4 mg, Oral, Q8H PRN **OR** ondansetron (ZOFRAN) injection 4 mg, 4 mg, IntraVENous, Q6H PRN, Charmaine Rogers MD    atorvastatin (LIPITOR) tablet 80 mg, 80 mg, Oral, QHS, Gayatri Garcia MD, 80 mg at 01/30/22 2145    piperacillin-tazobactam (ZOSYN) 3.375 g in 0.9% sodium chloride (MBP/ADV) 100 mL MBP, 3.375 g, IntraVENous, Q8H, Shane Nuñez MD, Last Rate: 25 mL/hr at 01/31/22 0503, 3.375 g at 01/31/22 0503    dexamethasone (DECADRON) 4 mg/mL injection 6 mg, 6 mg, IntraVENous, Q12H, Shane Nuñez MD, 6 mg at 01/31/22 0846    azithromycin (ZITHROMAX) 500 mg in 0.9% sodium chloride 250 mL (VIAL-MATE), 500 mg, IntraVENous, Q24H, Charmaine Rogers MD, Last Rate: 250 mL/hr at 01/30/22 1649, 500 mg at 01/30/22 1649    baricitinib (OLUMIANT) tablet 1 mg, 1 mg, Oral, DAILY, Justin Duran MD, 1 mg at 01/31/22 7630      Labs: I've reviewed the labs for today     This document has been prepared by the Dragon voice recognition system, typographical errors may have occurred.  Attempts have been made to correct errors, however inadvertent errors may persist.

## 2022-01-31 NOTE — PROGRESS NOTES
Renal Progress Note    Patient: Vadim Ornelas MRN: [id number]  SSN: [id number]    YOB: 1945  Age: 78 y.o. Sex: male      Admit Date: 1/27/2022    LOS: 4 days     Subjective:   Patient seen in intensive care unit, on ventilator, unresponsive  Worsening renal functions remains anuric, creat 9.5 and   .   Current Facility-Administered Medications   Medication Dose Route Frequency    glucose chewable tablet 16 g  4 Tablet Oral PRN    glucagon (GLUCAGEN) injection 1 mg  1 mg IntraMUSCular PRN    insulin lispro (HUMALOG) injection   SubCUTAneous Q4H    insulin glargine (LANTUS) injection 40 Units  40 Units SubCUTAneous QHS    sodium bicarbonate tablet 1,300 mg  1,300 mg Per G Tube TID    dextrose 10% infusion 125-250 mL  125-250 mL IntraVENous PRN    pantoprazole (PROTONIX) 40 mg in 0.9% sodium chloride 10 mL injection  40 mg IntraVENous Q12H    LORazepam (ATIVAN) injection 2 mg  2 mg IntraVENous Q2H PRN    glucose chewable tablet 16 g  4 Tablet Oral PRN    glucagon (GLUCAGEN) injection 1 mg  1 mg IntraMUSCular PRN    levETIRAcetam (KEPPRA) tablet 1,500 mg  1,500 mg Oral BID    valproic acid (as sodium salt) (DEPAKENE) 250 mg/5 mL (5 mL) oral solution 500 mg  500 mg Oral BID    sodium chloride (23.4%) 0.225 % in sterile water 1,000 mL infusion   IntraVENous CONTINUOUS    dextrose 10% infusion 125-250 mL  125-250 mL IntraVENous PRN    NOREPINephrine (LEVOPHED) 8 mg in 5% dextrose 250mL (32 mcg/mL) infusion  0.5-120 mcg/min IntraVENous TITRATE    PHENYLephrine (ISRAEL-SYNEPHRINE) 30 mg in 0.9% sodium chloride 250 mL infusion   mcg/min IntraVENous TITRATE    propofol (DIPRIVAN) 10 mg/mL infusion  0-50 mcg/kg/min IntraVENous TITRATE    lacosamide (VIMPAT) tablet 200 mg  200 mg Oral BID    levothyroxine (SYNTHROID) tablet 75 mcg  75 mcg Oral 7am    aspirin chewable tablet 81 mg  81 mg Oral DAILY    sodium chloride (NS) flush 5-40 mL  5-40 mL IntraVENous Q8H    sodium chloride (NS) flush 5-40 mL  5-40 mL IntraVENous PRN    acetaminophen (TYLENOL) tablet 650 mg  650 mg Oral Q6H PRN    Or    acetaminophen (TYLENOL) suppository 650 mg  650 mg Rectal Q6H PRN    polyethylene glycol (MIRALAX) packet 17 g  17 g Oral DAILY PRN    ondansetron (ZOFRAN ODT) tablet 4 mg  4 mg Oral Q8H PRN    Or    ondansetron (ZOFRAN) injection 4 mg  4 mg IntraVENous Q6H PRN    atorvastatin (LIPITOR) tablet 80 mg  80 mg Oral QHS    piperacillin-tazobactam (ZOSYN) 3.375 g in 0.9% sodium chloride (MBP/ADV) 100 mL MBP  3.375 g IntraVENous Q8H    dexamethasone (DECADRON) 4 mg/mL injection 6 mg  6 mg IntraVENous Q12H    azithromycin (ZITHROMAX) 500 mg in 0.9% sodium chloride 250 mL (VIAL-MATE)  500 mg IntraVENous Q24H    baricitinib (OLUMIANT) tablet 1 mg  1 mg Oral DAILY        Vitals:    01/31/22 0633 01/31/22 0700 01/31/22 0800 01/31/22 0900   BP: (!) 97/52 (!) 89/52 (!) 93/53 (!) 92/52   Pulse: 67 67 71 71   Resp: 19 22 21 21   Temp: 98.2 °F (36.8 °C) 98.2 °F (36.8 °C)     SpO2: 94% 94% 93% 94%   Weight:       Height:         Objective:   General: On ventilator, unresponsive   HEENT:  no Icterus, no Pallor, ET tube in place, mucosa dry   neck: Neck is supple, No JVD  Lungs: Decreased breath sounds at the bases,   CVS: heart sounds normal,  no murmurs, no rubs. GI: soft, nontender, no distention  Extremeties: no cyanosis, 1+ dependent edema    Neuro: Patient unresponsive, on vent, off sedation   skin: normal skin turgor, no skin rashes.         Intake and Output:  Current Shift: 01/31 0701 - 01/31 1900  In: 245   Out: -   Last three shifts: 01/29 1901 - 01/31 0700  In: 2289.6 [I.V.:1899.6]  Out: 105 [Urine:55; Drains:50]      Lab/Data Review:  Recent Labs     01/31/22  0330 01/30/22  1700 01/30/22  0730   WBC 19.1* 18.1* 17.9*   HGB 11.6* 11.5* 11.8*   HCT 35.2* 34.0* 36.1*   PLT 44* 44* 41*     Recent Labs     01/31/22  0330 01/30/22  0730 01/30/22  0400 01/29/22  1435 01/29/22  1435 01/29/22  1020 01/29/22  1020     143 147* 145   < > 144   < > 144   K 4.7  4.1 3.3* 3.3*   < > 2.8*   < > 3.4*   *  112* 114* 112*   < > 112*   < > 111*   CO2 19*  19* 19* 20*   < > 20*   < > 19*   *  170* 218* 298*   < > 605*   < > 628*   *  125* 101* 106*   < > 101*   < > 95*   CREA 9.56*  9.48* 8.47* 8.61*   < > 7.96*   < > 7.50*   CA 7.5*  7.4* 8.0* 7.8*   < > 7.7*   < > 7.8*   MG 2.1 2.2 2.2  --   --   --   --    PHOS 5.6*  --   --   --   --   --   --    ALB 1.6*  1.6*  --  1.8*  --  1.6*   < > 1.6*   TBILI 0.7  --  0.7  --  0.7   < > 0.9   ALT 1,259*  --  1,601*  --  1,458*   < > 1,550*   INR 1.3*  --   --   --   --   --  1.6*    < > = values in this interval not displayed.      Recent Labs     01/31/22  0342 01/30/22  0401 01/29/22  0223   PH 7.29* 7.25* 7.27*   PCO2 35 41 43   PO2 76 114* 66*   HCO3 18* 18* 19*   FIO2 45.0 55.0 55.0     Recent Results (from the past 24 hour(s))   GLUCOSE, POC    Collection Time: 01/30/22 11:03 AM   Result Value Ref Range    Glucose (POC) 152 (H) 65 - 117 mg/dL    Performed by Kim Fritz    GLUCOSTABILIZER    Collection Time: 01/30/22 11:21 AM   Result Value Ref Range    Glucose 152 mg/dL    Insulin order 12.9 units/hour    Insulin adminstered 12.9 units/hour    Multiplier 0.140     Low target 95 mg/dL    High target 130 mg/dL    D50 order 0.0 ml    D50 administered 0.00 ml    Minutes until next BG 60 min    Order initials CD     Administered initials CD     GLSCOM Comments     GLUCOSE, POC    Collection Time: 01/30/22 12:04 PM   Result Value Ref Range    Glucose (POC) 150 (H) 65 - 117 mg/dL    Performed by Eduardo Cody    GLUCOSE, POC    Collection Time: 01/30/22 12:37 PM   Result Value Ref Range    Glucose (POC) 129 (H) 65 - 117 mg/dL    Performed by Kim Fritz    CBC WITH AUTOMATED DIFF    Collection Time: 01/30/22  5:00 PM   Result Value Ref Range    WBC 18.1 (H) 4.1 - 11.1 K/uL    RBC 3.54 (L) 4.10 - 5.70 M/uL    HGB 11.5 (L) 12.1 - 17.0 g/dL    HCT 34.0 (L) 36.6 - 50.3 %    MCV 96.0 80.0 - 99.0 FL    MCH 32.5 26.0 - 34.0 PG    MCHC 33.8 30.0 - 36.5 g/dL    RDW 14.9 (H) 11.5 - 14.5 %    PLATELET 44 (LL) 343 - 400 K/uL    NRBC 0.1 (H) 0.0  WBC    ABSOLUTE NRBC 0.02 (H) 0.00 - 0.01 K/uL    NEUTROPHILS 93 (H) 32 - 75 %    LYMPHOCYTES 4 (L) 12 - 49 %    MONOCYTES 3 (L) 5 - 13 %    EOSINOPHILS 0 0 - 7 %    BASOPHILS 0 0 - 1 %    IMMATURE GRANULOCYTES 0 %    ABS. NEUTROPHILS 16.9 (H) 1.8 - 8.0 K/UL    ABS. LYMPHOCYTES 0.7 (L) 0.8 - 3.5 K/UL    ABS. MONOCYTES 0.5 0.0 - 1.0 K/UL    ABS. EOSINOPHILS 0.0 0.0 - 0.4 K/UL    ABS. BASOPHILS 0.0 0.0 - 0.1 K/UL    ABS. IMM. GRANS. 0.0 K/UL    DF Manual      RBC COMMENTS Anisocytosis  1+        RBC COMMENTS Macrocytosis  1+       GLUCOSE, POC    Collection Time: 01/30/22  5:12 PM   Result Value Ref Range    Glucose (POC) 169 (H) 65 - 117 mg/dL    Performed by Geraldo WHITLOCK    GLUCOSE, POC    Collection Time: 01/30/22  9:43 PM   Result Value Ref Range    Glucose (POC) 162 (H) 65 - 117 mg/dL    Performed by [facility]    METABOLIC PANEL, COMPREHENSIVE    Collection Time: 01/31/22  3:30 AM   Result Value Ref Range    Sodium 142 136 - 145 mmol/L    Potassium 4.7 3.5 - 5.1 mmol/L    Chloride 112 (H) 97 - 108 mmol/L    CO2 19 (L) 21 - 32 mmol/L    Anion gap 11 5 - 15 mmol/L    Glucose 167 (H) 65 - 100 mg/dL     (H) 6 - 20 mg/dL    Creatinine 9.56 (H) 0.70 - 1.30 mg/dL    BUN/Creatinine ratio 13 12 - 20      GFR est AA 7 (L) >60 ml/min/1.73m2    GFR est non-AA 5 (L) >60 ml/min/1.73m2    Calcium 7.5 (L) 8.5 - 10.1 mg/dL    Bilirubin, total 0.7 0.2 - 1.0 mg/dL    AST (SGOT) >2,000 (H) 15 - 37 U/L    ALT (SGPT) 1,259 (H) 12 - 78 U/L    Alk.  phosphatase 108 45 - 117 U/L    Protein, total 6.3 (L) 6.4 - 8.2 g/dL    Albumin 1.6 (L) 3.5 - 5.0 g/dL    Globulin 4.7 (H) 2.0 - 4.0 g/dL    A-G Ratio 0.3 (L) 1.1 - 2.2     CBC WITH AUTOMATED DIFF    Collection Time: 01/31/22  3:30 AM   Result Value Ref Range    WBC 19.1 (H) 4.1 - 11.1 K/uL    RBC 3.68 (L) 4.10 - 5.70 M/uL    HGB 11.6 (L) 12.1 - 17.0 g/dL    HCT 35.2 (L) 36.6 - 50.3 %    MCV 95.7 80.0 - 99.0 FL    MCH 31.5 26.0 - 34.0 PG    MCHC 33.0 30.0 - 36.5 g/dL    RDW 15.2 (H) 11.5 - 14.5 %    PLATELET 44 (LL) 067 - 400 K/uL    MPV 13.3 (H) 8.9 - 12.9 FL    NRBC 0.1 (H) 0.0  WBC    ABSOLUTE NRBC 0.02 (H) 0.00 - 0.01 K/uL    NEUTROPHILS 95 (H) 32 - 75 %    LYMPHOCYTES 2 (L) 12 - 49 %    MONOCYTES 1 (L) 5 - 13 %    EOSINOPHILS 0 0 - 7 %    BASOPHILS 0 0 - 1 %    IMMATURE GRANULOCYTES 2 (H) 0 - 0.5 %    ABS. NEUTROPHILS 17.9 (H) 1.8 - 8.0 K/UL    ABS. LYMPHOCYTES 0.4 (L) 0.8 - 3.5 K/UL    ABS. MONOCYTES 0.2 0.0 - 1.0 K/UL    ABS. EOSINOPHILS 0.0 0.0 - 0.4 K/UL    ABS. BASOPHILS 0.0 0.0 - 0.1 K/UL    ABS. IMM.  GRANS. 0.5 (H) 0.00 - 0.04 K/UL    DF AUTOMATED     MAGNESIUM    Collection Time: 01/31/22  3:30 AM   Result Value Ref Range    Magnesium 2.1 1.6 - 2.4 mg/dL   RENAL FUNCTION PANEL    Collection Time: 01/31/22  3:30 AM   Result Value Ref Range    Sodium 143 136 - 145 mmol/L    Potassium 4.1 3.5 - 5.1 mmol/L    Chloride 112 (H) 97 - 108 mmol/L    CO2 19 (L) 21 - 32 mmol/L    Anion gap 12 5 - 15 mmol/L    Glucose 170 (H) 65 - 100 mg/dL     (H) 6 - 20 mg/dL    Creatinine 9.48 (H) 0.70 - 1.30 mg/dL    BUN/Creatinine ratio 13 12 - 20      GFR est AA 7 (L) >60 ml/min/1.73m2    GFR est non-AA 5 (L) >60 ml/min/1.73m2    Calcium 7.4 (L) 8.5 - 10.1 mg/dL    Phosphorus 5.6 (H) 2.6 - 4.7 mg/dL    Albumin 1.6 (L) 3.5 - 5.0 g/dL   PROTHROMBIN TIME + INR    Collection Time: 01/31/22  3:30 AM   Result Value Ref Range    Prothrombin time 15.9 (H) 11.9 - 14.7 sec    INR 1.3 (H) 0.9 - 1.1     PTT    Collection Time: 01/31/22  3:30 AM   Result Value Ref Range    aPTT 46.5 (H) 21.2 - 34.1 sec    aPTT, therapeutic range   82 - 109 sec   GLUCOSE, POC    Collection Time: 01/31/22  3:35 AM   Result Value Ref Range    Glucose (POC) 162 (H) 65 - 117 mg/dL    Performed by [facility]    BLOOD GAS, ARTERIAL    Collection Time: 01/31/22  3:42 AM   Result Value Ref Range    pH 7.29 (L) 7.35 - 7.45      PCO2 35 35 - 45 mmHg    PO2 76 75 - 100 mmHg    O2 SAT 94 (L) >95 %    BICARBONATE 18 (L) 22 - 26 mmol/L    BASE DEFICIT 8.9 (H) 0 - 2 mmol/L    O2 METHOD VENT      FIO2 45.0 %    MODE Assist Control/Volume Control      Tidal volume 500      SET RATE 18      EPAP/CPAP/PEEP 8.0      SITE Right Radial      JULIO'S TEST PASS     GLUCOSE, POC    Collection Time: 01/31/22  8:42 AM   Result Value Ref Range    Glucose (POC) 150 (H) 65 - 117 mg/dL    Performed by Varun Riddle and Plan:     #1 acute kidney injury  --ANDRES likely 2/2 ATN from septic shock /anoxic renal injury with ARB on board  Patient remains anuric, worsening renal functions with creatinine increased to 9.5  Patient is hypotensive on Levophed for pressure support    Had a discussion with his brother Mr. Wade Negron, who wants aggressive medical care to continue and agreed to initiate hemodialysis/CRRT. Understands the risks with catheter placement and CRRT with hypotension.      Consulted interventional radiology for temporary hemodialysis catheter placement today and placed orders for CRRT to initiate after catheter placement  We will continue to monitor electrolytes closely and adjust management as needed     #2 severe hypokalemia: Improved potassium level with supplementation  -We will use a 4K bath with CRRT today and monitor potassium levels     #3 hypernatremia: Improved sodium level to 142  -Continue water flushes via NG tube, Will DC IV fluids  Continue to monitor sodium levels    #4  COVID-19 pneumonia:   -Septic shock  -With multiorgan failure including renal failure/transaminitis/hemodynamics shock  -Patient currently on Decadron azithromycin and Zosyn  -Acute encephalopathy likely because of metabolic derangements  Neurology following the patient for anoxic encephalopathy,   Shock liver with high liver enzymes, check a CPK level to rule out rhabdomyolysis  -Overall prognosis seems guarded     #5 diabetes  -severe hyperglycemia with blood sugars over 500  -Likely worse because of Decadron  -On insulin     #6 seizure disorder  -On antiseizure medications, neurology following the patient  -Currently off sedation and unresponsive    Signed By: Roselia Young MD     January 31, 2022

## 2022-01-31 NOTE — PROGRESS NOTES
15:20: KARIN attempted to contact patient's sister, Angie Ramirez ([phone]) to complete discharge planning assessment, but her voicemail box has not been set up yet. KARIN will continue to follow. ____________________________________________________________________________________________________________________________________________________________________________  11:00:KARIN left message for patient's brother, Woodard Angelucci ([phone]) in a effort to complete discharge planning assessment. KARIN will continue to follow.

## 2022-01-31 NOTE — PROGRESS NOTES
Progress Note    Patient: Xiomara Noyola MRN: [id number]  SSN: [id number]    YOB: 1945  Age: 78 y.o. Sex: male      Admit Date: 1/27/2022    LOS: 4 days     Subjective:     76M, H/o Schizophenia, seizures, DMII on oral meds with unresponsive and acute hypoxic respiratory failure s/t COVID-19 pneumonitis complicated by ANDRES, hypokalemia and shock liver.          HSOPITAL COURSE  COVID positive, associated with fever 106, increased troponin, cardiology was consulted ansd recommended ECHO, there is no heparin gtt, was initially called for STEMI and was cancelled. he was intubated for acute hypoxic respiratory failure. On levophed. Complicated by ANDRES, shock liver and hypokalemia. Was treated with azithromycin, barcitinib, and zosyn. Will give 1L bolus and continue IVF. Discussed with family he is very critical- his renal functions increased and now seemingly in ATN, his liver functions have worsened and had a seizure on 1/28 , he is off propofol now and neurology was consulted. Repeat CT scan didn't show any stroke, plan for EEG on 2/1 to assess for neurological activity. The patients liver functions, renal are worsening. Plan for CRRT today          Review of Systems:  Unable to determine s.t mental status    Objective:     Vitals:    01/31/22 0900 01/31/22 1110 01/31/22 1220 01/31/22 1421   BP: (!) 92/52  (!) 104/51    Pulse: 71 71 76    Resp: 21 23 23    Temp:       SpO2: 94% 94% 90%    Weight:    107.5 kg (236 lb 15.9 oz)   Height:            Intake and Output:  Current Shift: 01/31 0701 - 01/31 1900  In: 245   Out: -   Last three shifts: 01/29 1901 - 01/31 0700  In: 2289.6 [I.V.:1899.6]  Out: 105 [Urine:55; Drains:50]      Physical Exam:   Physical Exam  Vitals and nursing note reviewed. Constitutional:       General: intubates     Appearance: Normal appearance. He is normal weight. He is ill-appearing.    HENT:      Head: Normocephalic and atraumatic.      Nose: Nose normal.    Mouth/Throat:      Mouth: Mucous membranes are moist.   Eyes:      Extraocular Movements: Extraocular movements intact.      Pupils: Pupils are equal, round, and reactive to light. Cardiovascular:      Rate and Rhythm: Regular rhythm. Tachycardia present.      Pulses: Normal pulses.      Heart sounds: Normal heart sounds. Pulmonary:      Effort: Respiratory distress present.      Breath sounds: Rhonchi present. Abdominal:      General: Abdomen is flat. Bowel sounds are normal.      Palpations: Abdomen is soft. Musculoskeletal:         General: No swelling or tenderness. Normal range of motion.      Cervical back: Normal range of motion and neck supple. Skin:     General: Skin is warm and dry.      Capillary Refill: Capillary refill takes less than 2 seconds. Neurological:      Mental Status: He is unresponsive.      GCS: GCS eye subscore is 4. GCS verbal subscore is 1. GCS motor subscore is 1. Psychiatric:         cannot be assessed      Lab/Data Review:  Recent Results (from the past 24 hour(s))   CBC WITH AUTOMATED DIFF    Collection Time: 01/30/22  5:00 PM   Result Value Ref Range    WBC 18.1 (H) 4.1 - 11.1 K/uL    RBC 3.54 (L) 4.10 - 5.70 M/uL    HGB 11.5 (L) 12.1 - 17.0 g/dL    HCT 34.0 (L) 36.6 - 50.3 %    MCV 96.0 80.0 - 99.0 FL    MCH 32.5 26.0 - 34.0 PG    MCHC 33.8 30.0 - 36.5 g/dL    RDW 14.9 (H) 11.5 - 14.5 %    PLATELET 44 (LL) 165 - 400 K/uL    NRBC 0.1 (H) 0.0  WBC    ABSOLUTE NRBC 0.02 (H) 0.00 - 0.01 K/uL    NEUTROPHILS 93 (H) 32 - 75 %    LYMPHOCYTES 4 (L) 12 - 49 %    MONOCYTES 3 (L) 5 - 13 %    EOSINOPHILS 0 0 - 7 %    BASOPHILS 0 0 - 1 %    IMMATURE GRANULOCYTES 0 %    ABS. NEUTROPHILS 16.9 (H) 1.8 - 8.0 K/UL    ABS. LYMPHOCYTES 0.7 (L) 0.8 - 3.5 K/UL    ABS. MONOCYTES 0.5 0.0 - 1.0 K/UL    ABS. EOSINOPHILS 0.0 0.0 - 0.4 K/UL    ABS. BASOPHILS 0.0 0.0 - 0.1 K/UL    ABS. IMM.  GRANS. 0.0 K/UL    DF Manual      RBC COMMENTS Anisocytosis  1+        RBC COMMENTS Macrocytosis  1+       GLUCOSE, POC    Collection Time: 01/30/22  5:12 PM   Result Value Ref Range    Glucose (POC) 169 (H) 65 - 117 mg/dL    Performed by Carmen Golden    GLUCOSE, POC    Collection Time: 01/30/22  9:43 PM   Result Value Ref Range    Glucose (POC) 162 (H) 65 - 117 mg/dL    Performed by [employer]    METABOLIC PANEL, COMPREHENSIVE    Collection Time: 01/31/22  3:30 AM   Result Value Ref Range    Sodium 142 136 - 145 mmol/L    Potassium 4.7 3.5 - 5.1 mmol/L    Chloride 112 (H) 97 - 108 mmol/L    CO2 19 (L) 21 - 32 mmol/L    Anion gap 11 5 - 15 mmol/L    Glucose 167 (H) 65 - 100 mg/dL     (H) 6 - 20 mg/dL    Creatinine 9.56 (H) 0.70 - 1.30 mg/dL    BUN/Creatinine ratio 13 12 - 20      GFR est AA 7 (L) >60 ml/min/1.73m2    GFR est non-AA 5 (L) >60 ml/min/1.73m2    Calcium 7.5 (L) 8.5 - 10.1 mg/dL    Bilirubin, total 0.7 0.2 - 1.0 mg/dL    AST (SGOT) >2,000 (H) 15 - 37 U/L    ALT (SGPT) 1,259 (H) 12 - 78 U/L    Alk. phosphatase 108 45 - 117 U/L    Protein, total 6.3 (L) 6.4 - 8.2 g/dL    Albumin 1.6 (L) 3.5 - 5.0 g/dL    Globulin 4.7 (H) 2.0 - 4.0 g/dL    A-G Ratio 0.3 (L) 1.1 - 2.2     CBC WITH AUTOMATED DIFF    Collection Time: 01/31/22  3:30 AM   Result Value Ref Range    WBC 19.1 (H) 4.1 - 11.1 K/uL    RBC 3.68 (L) 4.10 - 5.70 M/uL    HGB 11.6 (L) 12.1 - 17.0 g/dL    HCT 35.2 (L) 36.6 - 50.3 %    MCV 95.7 80.0 - 99.0 FL    MCH 31.5 26.0 - 34.0 PG    MCHC 33.0 30.0 - 36.5 g/dL    RDW 15.2 (H) 11.5 - 14.5 %    PLATELET 44 (LL) 149 - 400 K/uL    MPV 13.3 (H) 8.9 - 12.9 FL    NRBC 0.1 (H) 0.0  WBC    ABSOLUTE NRBC 0.02 (H) 0.00 - 0.01 K/uL    NEUTROPHILS 95 (H) 32 - 75 %    LYMPHOCYTES 2 (L) 12 - 49 %    MONOCYTES 1 (L) 5 - 13 %    EOSINOPHILS 0 0 - 7 %    BASOPHILS 0 0 - 1 %    IMMATURE GRANULOCYTES 2 (H) 0 - 0.5 %    ABS. NEUTROPHILS 17.9 (H) 1.8 - 8.0 K/UL    ABS. LYMPHOCYTES 0.4 (L) 0.8 - 3.5 K/UL    ABS. MONOCYTES 0.2 0.0 - 1.0 K/UL    ABS. EOSINOPHILS 0.0 0.0 - 0.4 K/UL    ABS. BASOPHILS 0.0 0.0 - 0.1 K/UL    ABS. IMM.  GRANS. 0.5 (H) 0.00 - 0.04 K/UL    DF AUTOMATED     MAGNESIUM    Collection Time: 01/31/22  3:30 AM   Result Value Ref Range    Magnesium 2.1 1.6 - 2.4 mg/dL   RENAL FUNCTION PANEL    Collection Time: 01/31/22  3:30 AM   Result Value Ref Range    Sodium 143 136 - 145 mmol/L    Potassium 4.1 3.5 - 5.1 mmol/L    Chloride 112 (H) 97 - 108 mmol/L    CO2 19 (L) 21 - 32 mmol/L    Anion gap 12 5 - 15 mmol/L    Glucose 170 (H) 65 - 100 mg/dL     (H) 6 - 20 mg/dL    Creatinine 9.48 (H) 0.70 - 1.30 mg/dL    BUN/Creatinine ratio 13 12 - 20      GFR est AA 7 (L) >60 ml/min/1.73m2    GFR est non-AA 5 (L) >60 ml/min/1.73m2    Calcium 7.4 (L) 8.5 - 10.1 mg/dL    Phosphorus 5.6 (H) 2.6 - 4.7 mg/dL    Albumin 1.6 (L) 3.5 - 5.0 g/dL   PROTHROMBIN TIME + INR    Collection Time: 01/31/22  3:30 AM   Result Value Ref Range    Prothrombin time 15.9 (H) 11.9 - 14.7 sec    INR 1.3 (H) 0.9 - 1.1     PTT    Collection Time: 01/31/22  3:30 AM   Result Value Ref Range    aPTT 46.5 (H) 21.2 - 34.1 sec    aPTT, therapeutic range   82 - 109 sec   GLUCOSE, POC    Collection Time: 01/31/22  3:35 AM   Result Value Ref Range    Glucose (POC) 162 (H) 65 - 117 mg/dL    Performed by Rivas Vides    BLOOD GAS, ARTERIAL    Collection Time: 01/31/22  3:42 AM   Result Value Ref Range    pH 7.29 (L) 7.35 - 7.45      PCO2 35 35 - 45 mmHg    PO2 76 75 - 100 mmHg    O2 SAT 94 (L) >95 %    BICARBONATE 18 (L) 22 - 26 mmol/L    BASE DEFICIT 8.9 (H) 0 - 2 mmol/L    O2 METHOD VENT      FIO2 45.0 %    MODE Assist Control/Volume Control      Tidal volume 500      SET RATE 18      EPAP/CPAP/PEEP 8.0      SITE Right Radial      JULIO'S TEST PASS     GLUCOSE, POC    Collection Time: 01/31/22  8:42 AM   Result Value Ref Range    Glucose (POC) 150 (H) 65 - 117 mg/dL    Performed by [address] [place] [address], POC    Collection Time: 01/31/22 11:52 AM   Result Value Ref Range    Glucose (POC) 134 (H) 65 - 117 mg/dL    Performed by Keyon Bender    AMMONIA    Collection Time: 01/31/22 12:30 PM   Result Value Ref Range    Ammonia 54 (H) <32 umol/L         Assessment and plan:      (1) Acute encephalopathy : GCS 12. S/t sepsis + anoxia. Off propofol, neurology consulte, repeat CT negative, plan for EEG on 1/2     (2) Septic shock: 2L bolus, pan cultures, levophed     (2) Acute hypoxic respiratory failure : intubated and sedated     (3) COVID-19 pneumonia : decadron, azithromycin, zosyn      (4) possible aspiration: zosyn.     (4) NSTEMI : ECHO, cardiology consulted. Likely NSTEMI type II     (5) hypokalemia with hypernatremia: replace IV,.      (6) ANDRES: s/p 2 L bolus. Likely ATN CRRT     (7) schizophrenia: olanzepine     (8) hypothyroidism : synthroid     (9) DMII: SSI lantus 40 units at bed time     (10) shock liver: s.t hypo perfusion, worseing  Complicated with pancytopenia     DVT ppx: heparin SubQ     DISPO: ICU, very poor prognosis, discussed with brother Zia Rojo. Full code.      Signed By: Ernestine Garcia MD     January 31, 2022

## 2022-01-31 NOTE — PROGRESS NOTES
Progress Note    Patient: Ava Matias MRN: [id number]  SSN: [id number]    YOB: 1945  Age: 78 y.o. Sex: male      Admit Date: 1/27/2022    LOS: 4 days     Subjective:     Patient seen and examined. Patient is followed for NSTEMI in the setting of COVID. Patient has a past medical history of anemia, GERD, schizophrenia, diabetes, and seizure disorder. Patient remains intubated and sedated. Continues to require pressor support, but weaning as tolerated. Telemetry Review: No acute events noted in the past 24 hours. Sinus rhythm; heart rate 70s. Review of Symptoms:   Review of Systems   Unable to perform ROS: acuity of condition         Objective:     Vitals:    01/31/22 0633 01/31/22 0700 01/31/22 0800 01/31/22 0900   BP: (!) 97/52 (!) 89/52 (!) 93/53 (!) 92/52   Pulse: 67 67 71 71   Resp: 19 22 21 21   Temp: 98.2 °F (36.8 °C) 98.2 °F (36.8 °C)     SpO2: 94% 94% 93% 94%   Weight:       Height:            Intake and Output:  Current Shift: 01/31 0701 - 01/31 1900  In: 245   Out: -   Last three shifts: 01/29 1901 - 01/31 0700  In: 2289.6 [I.V.:1899.6]  Out: 105 [Urine:55; Drains:50]    Physical Exam:   . [facility] Physical Exam  Constitutional:       General: He is not in acute distress. Appearance: He is ill-appearing, toxic-appearing and diaphoretic. Cardiovascular:      Rate and Rhythm: Regular rhythm. Tachycardia present. Pulses: Normal pulses. Heart sounds: Normal heart sounds. Pulmonary:      Effort: Respiratory distress present. Breath sounds: No wheezing, rhonchi or rales. Comments: Intubated  Abdominal:      General: Abdomen is flat. Bowel sounds are normal.      Palpations: Abdomen is soft. Skin:     General: Skin is warm. Neurological:      Comments: Sedated           Lab/Data Review: All lab results for the last 24 hours reviewed.          Current Facility-Administered Medications:     glucose chewable tablet 16 g, 4 Tablet, Oral, PRN, Maddie Pipe, MD    glucagon (GLUCAGEN) injection 1 mg, 1 mg, IntraMUSCular, PRN, Mee Joseph MD    insulin lispro (HUMALOG) injection, , SubCUTAneous, Q4H, Jason Kasper MD, 3 Units at 01/31/22 0846    insulin glargine (LANTUS) injection 40 Units, 40 Units, SubCUTAneous, QHS, Jason Kasper MD, 40 Units at 01/30/22 2149    sodium bicarbonate tablet 1,300 mg, 1,300 mg, Per G Tube, TID, Jeronimo Fajardo MD, 1,300 mg at 01/31/22 0846    dextrose 10% infusion 125-250 mL, 125-250 mL, IntraVENous, PRN, Jason Kasper MD    pantoprazole (PROTONIX) 40 mg in 0.9% sodium chloride 10 mL injection, 40 mg, IntraVENous, Q12H, Jason Kasper MD, 40 mg at 01/31/22 0908    LORazepam (ATIVAN) injection 2 mg, 2 mg, IntraVENous, Q2H PRN, Ambika Jorgensen PA-C, 2 mg at 01/29/22 0057    glucose chewable tablet 16 g, 4 Tablet, Oral, PRN, Jason Kasper MD    glucagon (GLUCAGEN) injection 1 mg, 1 mg, IntraMUSCular, PRN, Jason Kasper MD    levETIRAcetam (KEPPRA) tablet 1,500 mg, 1,500 mg, Oral, BID, Rudi Talavera MD, 1,500 mg at 01/31/22 0847    valproic acid (as sodium salt) (DEPAKENE) 250 mg/5 mL (5 mL) oral solution 500 mg, 500 mg, Oral, BID, Rudi Talavera MD, 500 mg at 01/31/22 0846    sodium chloride (23.4%) 0.225 % in sterile water 1,000 mL infusion, , IntraVENous, CONTINUOUS, Laura Whitney MD, Last Rate: 75 mL/hr at 01/31/22 0902, New Bag at 01/31/22 0902    dextrose 10% infusion 125-250 mL, 125-250 mL, IntraVENous, PRN, Jason Kasper MD    NOREPINephrine (LEVOPHED) 8 mg in 5% dextrose 250mL (32 mcg/mL) infusion, 0.5-120 mcg/min, IntraVENous, TITRATE, Jason Kasper MD, Last Rate: 13.1 mL/hr at 01/30/22 1649, 7 mcg/min at 01/30/22 1649    PHENYLephrine (ISRAEL-SYNEPHRINE) 30 mg in 0.9% sodium chloride 250 mL infusion,  mcg/min, IntraVENous, TITRATE, Ambika Jorgensen PA-C, Held at 01/28/22 2300    propofol (DIPRIVAN) 10 mg/mL infusion, 0-50 mcg/kg/min, IntraVENous, TITRATE, Deniz Narayan MD, Stopped at 01/29/22 1460    lacosamide (VIMPAT) tablet 200 mg, 200 mg, Oral, BID, Wily Mejia MD, 200 mg at 01/31/22 0847    levothyroxine (SYNTHROID) tablet 75 mcg, 75 mcg, Oral, 7am, Wily Mejia MD, 75 mcg at 01/31/22 0505    aspirin chewable tablet 81 mg, 81 mg, Oral, DAILY, Wily Mejia MD, 81 mg at 01/31/22 0847    sodium chloride (NS) flush 5-40 mL, 5-40 mL, IntraVENous, Q8H, Kan Nuñez MD, 10 mL at 01/31/22 0504    sodium chloride (NS) flush 5-40 mL, 5-40 mL, IntraVENous, PRN, Wily Mejia MD    acetaminophen (TYLENOL) tablet 650 mg, 650 mg, Oral, Q6H PRN, 650 mg at 01/28/22 1111 **OR** acetaminophen (TYLENOL) suppository 650 mg, 650 mg, Rectal, Q6H PRN, Wily Mejia MD    polyethylene glycol (MIRALAX) packet 17 g, 17 g, Oral, DAILY PRN, Wily Mejia MD    ondansetron (ZOFRAN ODT) tablet 4 mg, 4 mg, Oral, Q8H PRN **OR** ondansetron (ZOFRAN) injection 4 mg, 4 mg, IntraVENous, Q6H PRN, Wily Mejia MD    atorvastatin (LIPITOR) tablet 80 mg, 80 mg, Oral, QHS, Deniz Narayan MD, 80 mg at 01/30/22 2145    piperacillin-tazobactam (ZOSYN) 3.375 g in 0.9% sodium chloride (MBP/ADV) 100 mL MBP, 3.375 g, IntraVENous, Q8H, Kan Nuñez MD, Last Rate: 25 mL/hr at 01/31/22 0503, 3.375 g at 01/31/22 0503    dexamethasone (DECADRON) 4 mg/mL injection 6 mg, 6 mg, IntraVENous, Q12H, Jordyn Nuñez MD, 6 mg at 01/31/22 0846    azithromycin (ZITHROMAX) 500 mg in 0.9% sodium chloride 250 mL (VIAL-MATE), 500 mg, IntraVENous, Q24H, Wily Mejia MD, Last Rate: 250 mL/hr at 01/30/22 1649, 500 mg at 01/30/22 1649    baricitinib (OLUMIANT) tablet 1 mg, 1 mg, Oral, DAILY, Ge Duran MD, 1 mg at 01/31/22 9366      Assessment:     Active Problems:    Acute encephalopathy (5/18/2021)        Plan:     Case discussed with Collaborating physician Dr. Adelaide Brittle and our recommendations are as follows:     1.  NSTEMI:   - HS troponin 1354 > 1510. Likely related to demand ischemia in the setting of COVID and sepsis  - continue asa  - Echo: EF 65% with no wall motion abnormalities. - Cr 9.56      2. Hypotension:   - Blood pressure below goal  - Continue levo and wean as tolerated.     3. COVID pna:   - COVID positive   -  Management per primary. - Will monitor telemetry and to evaluate for possible QTC prolongation. QTc 547 this am on tele. Will continue to monitor.  - Recommend to keep a negative fluid balance to prevent volume overload. 4. Hypokalemia:   - K 4.7, will replete and repeat labs in the am.     Thank you for involving us in the care of this patient. Please do not hesitate to call if additional questions arise. If after hours please call [phone]. Signed By: Raj Joel NP     January 31, 2022        Dr. Monica Ramirez Cardiologist    [facility]  [address] [facility].    [address] [facility] [facility]  [phone]

## 2022-02-01 NOTE — ACP (ADVANCE CARE PLANNING)
I spoke to patients brother Theodora Constantine [phone], plan for a conference call with Mr. Carbone Alexander and his brother Pat Christian [phone] at midday on 2/2/2022 for a detailed goals of care discussion, communicated with RN, Case Management.

## 2022-02-01 NOTE — PROGRESS NOTES
Reason for Admission:  Unresponsive                      RUR Score:          18%           Plan for utilizing home health:    [facility]       PCP: First and Last name:  Minesh Moody MD     Name of Practice:    Are you a current patient: Yes/No:    Approximate date of last visit:    Can you participate in a virtual visit with your PCP:                     Current Advanced Directive/Advance Care Plan: Full Code      Healthcare Decision Maker:   Click here to complete [employer] including selection of the Healthcare Decision Maker Relationship (ie \"Primary\")             Primary Decision MakerBelle Bonilla - Brother - [phone]    Secondary Decision Maker: Luz Deleon - Sister - [phone]                  Transition of Care Plan:                      CM spoke with patient's brother, Akua Enriquez ([phone]) to complete discharge planning assessment. Patient lives with his sister  in a single story home with steps to enter and egress. Unfortunately, his sister is not well and is unable to care for him at this time. Prior to hospitalization, patient was independent and used Medicaid for transportation to medical appointments. Patient's brother is unsure as to where he will discharge to. Patient has received home health services from [facility] in the past, and is open to receiving them again. CM discussed rehab options and skilled nursing facilities. Family would like to \"wait and see how he does. \"     Discharge disposition: pending    CM team will continue to follow.

## 2022-02-01 NOTE — PROGRESS NOTES
Renal Progress Note    Patient: Solis Ochoa MRN: [id number]  SSN: [id number]    YOB: 1945  Age: 78 y.o. Sex: male      Admit Date: 1/27/2022    LOS: 5 days     Subjective:   Patient seen in intensive care unit, on ventilator, unresponsive  remains anuric,   On CRRT, tolerating well, still hypotensive requring small dose of levo drip  .   Current Facility-Administered Medications   Medication Dose Route Frequency    NOREPINephrine (LEVOPHED) 8 mg in 0.9% NS 250ml infusion  0.5-120 mcg/min IntraVENous TITRATE    lactulose (CHRONULAC) 10 gram/15 mL solution 15 mL  10 g Per G Tube TID    glucose chewable tablet 16 g  4 Tablet Oral PRN    glucagon (GLUCAGEN) injection 1 mg  1 mg IntraMUSCular PRN    insulin lispro (HUMALOG) injection   SubCUTAneous Q4H    insulin glargine (LANTUS) injection 40 Units  40 Units SubCUTAneous QHS    dextrose 10% infusion 125-250 mL  125-250 mL IntraVENous PRN    pantoprazole (PROTONIX) 40 mg in 0.9% sodium chloride 10 mL injection  40 mg IntraVENous Q12H    LORazepam (ATIVAN) injection 2 mg  2 mg IntraVENous Q2H PRN    glucose chewable tablet 16 g  4 Tablet Oral PRN    glucagon (GLUCAGEN) injection 1 mg  1 mg IntraMUSCular PRN    levETIRAcetam (KEPPRA) tablet 1,500 mg  1,500 mg Oral BID    valproic acid (as sodium salt) (DEPAKENE) 250 mg/5 mL (5 mL) oral solution 500 mg  500 mg Oral BID    sodium chloride (23.4%) 0.225 % in sterile water 1,000 mL infusion   IntraVENous CONTINUOUS    dextrose 10% infusion 125-250 mL  125-250 mL IntraVENous PRN    PHENYLephrine (ISRAEL-SYNEPHRINE) 30 mg in 0.9% sodium chloride 250 mL infusion   mcg/min IntraVENous TITRATE    propofol (DIPRIVAN) 10 mg/mL infusion  0-50 mcg/kg/min IntraVENous TITRATE    lacosamide (VIMPAT) tablet 200 mg  200 mg Oral BID    levothyroxine (SYNTHROID) tablet 75 mcg  75 mcg Oral 7am    aspirin chewable tablet 81 mg  81 mg Oral DAILY    sodium chloride (NS) flush 5-40 mL  5-40 mL IntraVENous Q8H    sodium chloride (NS) flush 5-40 mL  5-40 mL IntraVENous PRN    acetaminophen (TYLENOL) tablet 650 mg  650 mg Oral Q6H PRN    Or    acetaminophen (TYLENOL) suppository 650 mg  650 mg Rectal Q6H PRN    polyethylene glycol (MIRALAX) packet 17 g  17 g Oral DAILY PRN    ondansetron (ZOFRAN ODT) tablet 4 mg  4 mg Oral Q8H PRN    Or    ondansetron (ZOFRAN) injection 4 mg  4 mg IntraVENous Q6H PRN    atorvastatin (LIPITOR) tablet 80 mg  80 mg Oral QHS    piperacillin-tazobactam (ZOSYN) 3.375 g in 0.9% sodium chloride (MBP/ADV) 100 mL MBP  3.375 g IntraVENous Q8H    dexamethasone (DECADRON) 4 mg/mL injection 6 mg  6 mg IntraVENous Q12H    azithromycin (ZITHROMAX) 500 mg in 0.9% sodium chloride 250 mL (VIAL-MATE)  500 mg IntraVENous Q24H    baricitinib (OLUMIANT) tablet 1 mg  1 mg Oral DAILY        Vitals:    02/01/22 1130 02/01/22 1200 02/01/22 1208 02/01/22 1300   BP:  (!) 105/58  101/69   Pulse:   83    Resp:  27 26 25   Temp: 98.2 °F (36.8 °C)      SpO2:  95% 95% 94%   Weight:       Height:         Objective:   General: On ventilator, unresponsive   HEENT:  no Icterus, no Pallor, ET tube in place, mucosa dry   neck: Neck is supple, No JVD  Lungs: Decreased breath sounds at the bases,   CVS: heart sounds normal,  no murmurs, no rubs. GI: soft, nontender, no distention  Extremeties: no cyanosis, 1+ dependent edema    Neuro: Patient unresponsive, on vent, off sedation   skin: normal skin turgor, no skin rashes.         Intake and Output:  Current Shift: 02/01 0701 - 02/01 1900  In: -   Out: 695   Last three shifts: 01/30 1901 - 02/01 0700  In: 2857.2 [I.V.:1507.2]  Out: 1685 [Urine:50; Drains:100]      Lab/Data Review:  Recent Labs     02/01/22  1145 01/31/22  0330 01/30/22  1700   WBC 16.5* 19.1* 18.1*   HGB 11.3* 11.6* 11.5*   HCT 33.2* 35.2* 34.0*   PLT PLT CLUMPS SEEN ON SMEAR REVIEW DUE TO EDTA SENSITIV 44* 44*     Recent Labs     02/01/22  1145 02/01/22  0555 01/31/22  2325 01/31/22  0330 01/30/22  0730 01/30/22  0730 01/30/22  0400 01/30/22  0400   NA  --  143 140 142  143   < > 147*   < > 145   K  --  4.0 3.9 4.7  4.1   < > 3.3*   < > 3.3*   CL  --  112* 111* 112*  112*   < > 114*   < > 112*   CO2  --  17* 19* 19*  19*   < > 19*   < > 20*   GLU  --  114* 149* 167*  170*   < > 218*   < > 298*   BUN  --  105* 107* 125*  125*   < > 101*   < > 106*   CREA  --  7.04* 7.51* 9.56*  9.48*   < > 8.47*   < > 8.61*   CA  --  6.8* 6.8* 7.5*  7.4*   < > 8.0*   < > 7.8*   MG  --  2.0  --  2.1  --  2.2   < > 2.2   PHOS  --  5.7*  --  5.6*  --   --   --   --    ALB  --  1.5*  --  1.6*  1.6*  --   --   --  1.8*   TBILI  --  0.6  --  0.7  --   --   --  0.7   ALT  --  875*  --  1,259*  --   --   --  1,601*   INR 1.2*  --   --  1.3*  --   --   --   --     < > = values in this interval not displayed.      Recent Labs     02/01/22  0325 01/31/22  0342 01/30/22  0401   PH 7.31* 7.29* 7.25*   PCO2 32* 35 41   PO2 71* 76 114*   HCO3 18* 18* 18*   FIO2 45.0 45.0 55.0     Recent Results (from the past 24 hour(s))   GLUCOSE, POC    Collection Time: 01/31/22  4:50 PM   Result Value Ref Range    Glucose (POC) 134 (H) 65 - 117 mg/dL    Performed by Cora Kan    GLUCOSE, POC    Collection Time: 01/31/22  8:22 PM   Result Value Ref Range    Glucose (POC) 158 (H) 65 - 117 mg/dL    Performed by Cony Beams    METABOLIC PANEL, BASIC    Collection Time: 01/31/22 11:25 PM   Result Value Ref Range    Sodium 140 136 - 145 mmol/L    Potassium 3.9 3.5 - 5.1 mmol/L    Chloride 111 (H) 97 - 108 mmol/L    CO2 19 (L) 21 - 32 mmol/L    Anion gap 10 5 - 15 mmol/L    Glucose 149 (H) 65 - 100 mg/dL     (H) 6 - 20 mg/dL    Creatinine 7.51 (H) 0.70 - 1.30 mg/dL    BUN/Creatinine ratio 14 12 - 20      GFR est AA 9 (L) >60 ml/min/1.73m2    GFR est non-AA 7 (L) >60 ml/min/1.73m2    Calcium 6.8 (L) 8.5 - 10.1 mg/dL   GLUCOSE, POC    Collection Time: 01/31/22 11:41 PM   Result Value Ref Range    Glucose (POC) 178 (H) 65 - 117 mg/dL Performed by Alicia Foreman    BLOOD GAS, ARTERIAL    Collection Time: 02/01/22  3:25 AM   Result Value Ref Range    pH 7.31 (L) 7.35 - 7.45      PCO2 32 (L) 35 - 45 mmHg    PO2 71 (L) 75 - 100 mmHg    O2 SAT 93 (L) >95 %    BICARBONATE 18 (L) 22 - 26 mmol/L    BASE DEFICIT 8.8 (H) 0 - 2 mmol/L    O2 METHOD VENT      FIO2 45.0 %    MODE Assist Control/Volume Control      Tidal volume 500      SET RATE 18      EPAP/CPAP/PEEP 5.0      SITE Right Radial      JULIO'S TEST PASS     GLUCOSE, POC    Collection Time: 02/01/22  4:49 AM   Result Value Ref Range    Glucose (POC) 108 65 - 117 mg/dL    Performed by [facility]    METABOLIC PANEL, COMPREHENSIVE    Collection Time: 02/01/22  5:55 AM   Result Value Ref Range    Sodium 143 136 - 145 mmol/L    Potassium 4.0 3.5 - 5.1 mmol/L    Chloride 112 (H) 97 - 108 mmol/L    CO2 17 (L) 21 - 32 mmol/L    Anion gap 14 5 - 15 mmol/L    Glucose 114 (H) 65 - 100 mg/dL     (H) 6 - 20 mg/dL    Creatinine 7.04 (H) 0.70 - 1.30 mg/dL    BUN/Creatinine ratio 15 12 - 20      GFR est AA 9 (L) >60 ml/min/1.73m2    GFR est non-AA 8 (L) >60 ml/min/1.73m2    Calcium 6.8 (L) 8.5 - 10.1 mg/dL    Bilirubin, total 0.6 0.2 - 1.0 mg/dL    AST (SGOT) 1,288 (H) 15 - 37 U/L    ALT (SGPT) 875 (H) 12 - 78 U/L    Alk.  phosphatase 185 (H) 45 - 117 U/L    Protein, total 6.1 (L) 6.4 - 8.2 g/dL    Albumin 1.5 (L) 3.5 - 5.0 g/dL    Globulin 4.6 (H) 2.0 - 4.0 g/dL    A-G Ratio 0.3 (L) 1.1 - 2.2     CK    Collection Time: 02/01/22  5:55 AM   Result Value Ref Range    CK 8,051 (H) 39 - 308 U/L   PHOSPHORUS    Collection Time: 02/01/22  5:55 AM   Result Value Ref Range    Phosphorus 5.7 (H) 2.6 - 4.7 mg/dL   MAGNESIUM    Collection Time: 02/01/22  5:55 AM   Result Value Ref Range    Magnesium 2.0 1.6 - 2.4 mg/dL   GLUCOSE, POC    Collection Time: 02/01/22  7:37 AM   Result Value Ref Range    Glucose (POC) 103 65 - 117 mg/dL    Performed by [address] [place] [address], POC    Collection Time: 02/01/22 11:17 AM   Result Value Ref Range    Glucose (POC) 97 65 - 117 mg/dL    Performed by [email or web address]    Collection Time: 02/01/22 11:45 AM   Result Value Ref Range    Reticulocyte count 0.8 0.7 - 2.1 %    Absolute Retic Cnt. 0.0266 0.0260 - 0.0950 M/ul   LD    Collection Time: 02/01/22 11:45 AM   Result Value Ref Range    LD 1,912 (H) 85 - 241 U/L   PROTHROMBIN TIME + INR    Collection Time: 02/01/22 11:45 AM   Result Value Ref Range    Prothrombin time 14.7 11.9 - 14.7 sec    INR 1.2 (H) 0.9 - 1.1     PTT    Collection Time: 02/01/22 11:45 AM   Result Value Ref Range    aPTT 42.3 (H) 21.2 - 34.1 sec    aPTT, therapeutic range   82 - 109 sec   FIBRINOGEN    Collection Time: 02/01/22 11:45 AM   Result Value Ref Range    Fibrinogen 652 (H) 220 - 535 mg/dL   D DIMER    Collection Time: 02/01/22 11:45 AM   Result Value Ref Range    D DIMER PENDING ug/ml(FEU)   CBC WITH AUTOMATED DIFF    Collection Time: 02/01/22 11:45 AM   Result Value Ref Range    WBC 16.5 (H) 4.1 - 11.1 K/uL    RBC 3.58 (L) 4.10 - 5.70 M/uL    HGB 11.3 (L) 12.1 - 17.0 g/dL    HCT 33.2 (L) 36.6 - 50.3 %    MCV 92.7 80.0 - 99.0 FL    MCH 31.6 26.0 - 34.0 PG    MCHC 34.0 30.0 - 36.5 g/dL    RDW 14.8 (H) 11.5 - 14.5 %    PLATELET  167 - 998 K/uL     PLT CLUMPS SEEN ON SMEAR REVIEW DUE TO EDTA SENSITIV    NRBC 0.3 (H) 0.0  WBC    ABSOLUTE NRBC 0.05 (H) 0.00 - 0.01 K/uL    NEUTROPHILS 94 (H) 32 - 75 %    LYMPHOCYTES 2 (L) 12 - 49 %    MONOCYTES 3 (L) 5 - 13 %    EOSINOPHILS 0 0 - 7 %    BASOPHILS 0 0 - 1 %    IMMATURE GRANULOCYTES 1 (H) 0 - 0.5 %    ABS. NEUTROPHILS 15.5 (H) 1.8 - 8.0 K/UL    ABS. LYMPHOCYTES 0.4 (L) 0.8 - 3.5 K/UL    ABS. MONOCYTES 0.4 0.0 - 1.0 K/UL    ABS. EOSINOPHILS 0.0 0.0 - 0.4 K/UL    ABS. BASOPHILS 0.0 0.0 - 0.1 K/UL    ABS. IMM.  GRANS. 0.2 (H) 0.00 - 0.04 K/UL    DF AUTOMATED          Assessment and Plan:     #1 acute kidney injury  --ANDRES likely 2/2 ATN from septic shock /anoxic renal injury with ARB on board  Patient remains anuric,   Rhabdomyolysis+, probably ischemic, high CPK 8051  Patient is hypotensive on Levophed for pressure support    Started ob CRRT yesterday, tolerating well, will continue for another 24 hrs  We will continue to monitor electrolytes closely and adjust management as needed     #2 severe hypokalemia: Improved potassium level with supplementation  -We will use a 4K bath with CRRT today and monitor potassium levels     #3 hypernatremia: Improved sodium level to 143  -Continue water flushes via NG tube,off IV fluids  Continue to monitor sodium levels    #4  COVID-19 pneumonia:   -Septic shock  -With multiorgan failure including renal failure/transaminitis/hemodynamics shock  -Patient currently on Decadron azithromycin and Zosyn  -Acute encephalopathy likely because of metabolic derangements  Neurology following the patient for anoxic encephalopathy,   Shock liver with high liver enzymes( improving),   Rhabdomyolysis+/ANDRES, on CRRT, will monitor   -Overall prognosis seems guarded     #5 diabetes  -severe hyperglycemia with blood sugars over 500  -Likely worse because of Decadron  -On insulin     #6 seizure disorder  -On antiseizure medications, neurology following the patient  -Currently off sedation and unresponsive    7. Metabolic acidosis: Secondary to acute kidney injury/hypotension  Continue CRRT and continue to monitor CO2 level    8. Patient unable to get tube feeds, because of CRRT where his catheter is positional and only working when he is flat. We will continue CRRT for now, if hemodynamically stable will discontinue CRRT tomorrow and switch to intermittent hemodialysis. Plan to resume feeding tomorrow, if patient remains hypotensive then will consider adding PPN or TPN for feeding. Plan discussed with the nursing staff.     Signed By: Luis E Casas MD     February 1, 2022

## 2022-02-01 NOTE — CONSULTS
Consult    Subjective:     Xiomara Noyola is a 78 y.o. male who is being seen for thrombocytopenia. Patient admitted with a Covid pneumonia with multiorgan failure. Patient platelets on 4/84/1645 was normal.  Patient platelets 68T on 9/85/7619. Patient platelets yesterday 20K, hemoglobin 11.6, white count 19 1K. No active bleeding clinically. Patient also has seizure disorder patient on Keppra, Vimpat, valproic acid.     Past Medical History:   Diagnosis Date    Acute renal failure ([address]     Anemia     Arthritis     DKA (diabetic ketoacidoses)     GERD (gastroesophageal reflux disease)     HTN (hypertension)     Hypercholesterolemia     [facility]     Schizophreniform disorder (HCC)     Seizure disorder ([facility]     Type II or unspecified type diabetes mellitus without mention of complication, uncontrolled       Past Surgical History:   Procedure Laterality Date    IR INSERT NON TUNL CVC OVER 5 YRS  1/28/2022    IR INSERT NON TUNL CVC OVER 5 YRS  1/31/2022     Family History   Problem Relation Age of Onset    Stroke Father       Social History     Tobacco Use    Smoking status: Never Smoker    Smokeless tobacco: Never Used   Substance Use Topics    Alcohol use: No       Current Facility-Administered Medications   Medication Dose Route Frequency Provider Last Rate Last Admin    NOREPINephrine (LEVOPHED) 8 mg in 0.9% NS 250ml infusion  0.5-120 mcg/min IntraVENous TITRATE Ge Duran MD 5.6 mL/hr at 02/01/22 1414 3 mcg/min at 02/01/22 1414    lactulose (CHRONULAC) 10 gram/15 mL solution 15 mL  10 g Per G Tube TID Lalo Reyes MD   15 mL at 02/01/22 0827    glucose chewable tablet 16 g  4 Tablet Oral PRN Lalo Reyes MD        glucagon (GLUCAGEN) injection 1 mg  1 mg IntraMUSCular PRN Lalo Reyes MD        insulin lispro (HUMALOG) injection   SubCUTAneous Q4H Lalo Reyes MD   3 Units at 01/31/22 0846    insulin glargine (LANTUS) injection 40 Units  40 Units SubCUTAneous QHS Low Martinez MD   40 Units at 01/31/22 2121    dextrose 10% infusion 125-250 mL  125-250 mL IntraVENous PRN Low Martinez MD        pantoprazole (PROTONIX) 40 mg in 0.9% sodium chloride 10 mL injection  40 mg IntraVENous Q12H Low Martinez MD   40 mg at 02/01/22 0827    LORazepam (ATIVAN) injection 2 mg  2 mg IntraVENous Q2H PRN Venkata Canchola PA-C   2 mg at 01/29/22 0057    glucose chewable tablet 16 g  4 Tablet Oral PRN Low Martinez MD        glucagon (GLUCAGEN) injection 1 mg  1 mg IntraMUSCular PRN Low Martinez MD        levETIRAcetam (KEPPRA) tablet 1,500 mg  1,500 mg Oral BID Kezia Hyde MD   1,500 mg at 02/01/22 3430    valproic acid (as sodium salt) (DEPAKENE) 250 mg/5 mL (5 mL) oral solution 500 mg  500 mg Oral BID Kezia Hyde MD   500 mg at 02/01/22 0827    sodium chloride (23.4%) 0.225 % in sterile water 1,000 mL infusion   IntraVENous CONTINUOUS Alyssa Mcneil MD 75 mL/hr at 01/31/22 0902 New Bag at 01/31/22 0902    dextrose 10% infusion 125-250 mL  125-250 mL IntraVENous PRN Low Martinez MD        PHENYLephrine (ISRAEL-SYNEPHRINE) 30 mg in 0.9% sodium chloride 250 mL infusion   mcg/min IntraVENous TITRATE Randy Jorgensen PA-C   Held at 01/28/22 2300    propofol (DIPRIVAN) 10 mg/mL infusion  0-50 mcg/kg/min IntraVENous TITRATE Nacho Nascimento MD   Stopped at 01/29/22 0824    lacosamide (VIMPAT) tablet 200 mg  200 mg Oral BID Low Martinez MD   200 mg at 02/01/22 4614    levothyroxine (SYNTHROID) tablet 75 mcg  75 mcg Oral 7am Low Martinez MD   75 mcg at 02/01/22 8225    aspirin chewable tablet 81 mg  81 mg Oral DAILY Low Martinez MD   81 mg at 02/01/22 1834    sodium chloride (NS) flush 5-40 mL  5-40 mL IntraVENous Q8H Low Martinez MD   10 mL at 02/01/22 1424    sodium chloride (NS) flush 5-40 mL  5-40 mL IntraVENous PRN Low Martinez MD        acetaminophen (TYLENOL) tablet 650 mg  650 mg Oral Q6H PRN Erasmo Daugherty MD   650 mg at 01/28/22 1111    Or    acetaminophen (TYLENOL) suppository 650 mg  650 mg Rectal Q6H PRN Erasmo Daugherty MD        polyethylene glycol (MIRALAX) packet 17 g  17 g Oral DAILY PRN Erasmo Daugherty MD        ondansetron (ZOFRAN ODT) tablet 4 mg  4 mg Oral Q8H PRN Erasmo Daugherty MD        Or    ondansetron [facility]) injection 4 mg  4 mg IntraVENous Q6H PRN Erasmo Daugherty MD        atorvastatin (LIPITOR) tablet 80 mg  80 mg Oral QHS Zaida Moy MD   80 mg at 01/31/22 2122    piperacillin-tazobactam (ZOSYN) 3.375 g in 0.9% sodium chloride (MBP/ADV) 100 mL MBP  3.375 g IntraVENous Q8H Jesus Aceves MD 25 mL/hr at 02/01/22 1414 3.375 g at 02/01/22 1414    dexamethasone (DECADRON) 4 mg/mL injection 6 mg  6 mg IntraVENous Q12H Erasmo Daugherty MD   6 mg at 02/01/22 0827    azithromycin (ZITHROMAX) 500 mg in 0.9% sodium chloride 250 mL (VIAL-MATE)  500 mg IntraVENous Q24H Erasmo Daugherty  mL/hr at 01/31/22 1648 500 mg at 01/31/22 1648    baricitinib (OLUMIANT) tablet 1 mg  1 mg Oral DAILY Ge Duran MD   1 mg at 02/01/22 2971        No Known Allergies     Review of Systems:    Other than mentioned in HPI 12 point review of systems negative    Objective: Intake and Output:    02/01 0701 - 02/01 1900  In: -   Out: 813   01/30 1901 - 02/01 0700  In: 2857.2 [I.V.:1507.2]  Out: 1685 [Urine:50; Drains:100]  Blood pressure 110/65, pulse 83, temperature 98.2 °F (36.8 °C), resp. rate 25, height 6' (1.829 m), weight 107.5 kg (236 lb 15.9 oz), SpO2 98 %. Physical Exam:     Patient unresponsive. Patient not in acute distress. Images:   XR CHEST PORT   Final Result      XR CHEST PORT   Final Result      IR INSERT NON TUNL CVC OVER 5 YRS   Final Result   Successful placement of a double-lumen non-tunneled hemodialysis catheter. The   catheter is ready for use.       IR US GUIDED VASCULAR ACCESS   Final Result   Successful placement of a double-lumen non-tunneled hemodialysis catheter. The   catheter is ready for use. US RETROPERITONEUM COMP   Final Result   No hydronephrosis. XR CHEST PORT   Final Result   No significant change. DUPLEX LOWER EXT VENOUS BILAT   Final Result      CT HEAD WO CONT   Final Result   Overall similar exam to prior without findings of acute intracranial   abnormality. XR CHEST PORT   Final Result   No interval change. XR CHEST PORT   Final Result   Worsening lung aeration. IR INSERT NON TUNL CVC OVER 5 YRS   Final Result      Technically successful insertion of non-tunneled triple-lumen catheter with US   guidance. Plan:       The catheter may be used after catheter placement confirmation by x-ray.   _______________________________________________________________      PROCEDURE SUMMARY:   - Venous access with ultrasound guidance   - Non-tunneled central venous catheter insertion      PROCEDURE DETAILS:      Pre-procedure   Relevant imaging review: None    Informed consent for the procedure was obtained and time-out was performed prior   to the procedure. Prophylactic antibiotic administered: Not administered   Preparation: The site was prepared and draped using all elements of maximal   sterile barrier technique including sterile gloves, sterile gown, cap, mask,   large sterile sheet, sterile ultrasound probe cover, sterile ultrasound gel,   hand hygiene and cutaneous antisepsis with 2% chlorhexidine. Medical reason for site preparation exception: Not applicable      Anesthesia/sedation   Level of anesthesia: No sedation   Anesthesia administered by: Not applicable   Duration of anesthesia/sedation: 0 minutes. Access   The vessel was sonographically evaluated and judged to be patent and appropriate   for access and a permanent image was stored. 2 cc's of 1% lidocaine was   administered to the access site.   Real time ultrasound was used to visualize   needle entry into the vessel. Vein accessed: Right internal jugular vein   Access technique: 4F micropuncture set      Catheter placement   The access site was dilated and the catheter was placed into the vein over a   wire and all guidewires were removed in their entirety. Catheter ports were   aspirated and flushed. Catheter tip location was verified by portable X-ray. Catheter size: 7 Croatian   Catheter length: 16 cm   Catheter tip position: Central. The tip is near the superior cavoatrial   junction. .    Catheter flush: Normal saline      Closure   The catheter was secured. A sterile dressing was applied. Catheter securement technique: Non-absorbable suture      Additional Details   Additional description of procedure: None   Additional findings: None   Additional equipment: None   Specimens removed: None   Estimated blood loss:  Less than 10 cc   Standardized report: [email or web address]_NonTunneledCatheter1.0               IR US GUIDED VASCULAR ACCESS   Final Result      Technically successful insertion of non-tunneled triple-lumen catheter with US   guidance. Plan:       The catheter may be used after catheter placement confirmation by x-ray.   _______________________________________________________________      PROCEDURE SUMMARY:   - Venous access with ultrasound guidance   - Non-tunneled central venous catheter insertion      PROCEDURE DETAILS:      Pre-procedure   Relevant imaging review: None    Informed consent for the procedure was obtained and time-out was performed prior   to the procedure. Prophylactic antibiotic administered: Not administered   Preparation: The site was prepared and draped using all elements of maximal   sterile barrier technique including sterile gloves, sterile gown, cap, mask,   large sterile sheet, sterile ultrasound probe cover, sterile ultrasound gel,   hand hygiene and cutaneous antisepsis with 2% chlorhexidine.     Medical reason for site preparation exception: Not applicable      Anesthesia/sedation   Level of anesthesia: No sedation   Anesthesia administered by: Not applicable   Duration of anesthesia/sedation: 0 minutes. Access   The vessel was sonographically evaluated and judged to be patent and appropriate   for access and a permanent image was stored. 2 cc's of 1% lidocaine was   administered to the access site. Real time ultrasound was used to visualize   needle entry into the vessel. Vein accessed: Right internal jugular vein   Access technique: 4F micropuncture set      Catheter placement   The access site was dilated and the catheter was placed into the vein over a   wire and all guidewires were removed in their entirety. Catheter ports were   aspirated and flushed. Catheter tip location was verified by portable X-ray. Catheter size: 7 Panamanian   Catheter length: 16 cm   Catheter tip position: Central. The tip is near the superior cavoatrial   junction. .    Catheter flush: Normal saline      Closure   The catheter was secured. A sterile dressing was applied. Catheter securement technique: Non-absorbable suture      Additional Details   Additional description of procedure: None   Additional findings: None   Additional equipment: None   Specimens removed: None   Estimated blood loss:  Less than 10 cc   Standardized report: [id number]_CVA_NonTunneledCatheter1.0               XR CHEST PORT   Final Result   Interval placement of right IJV central venous catheter with the tip   near the superior cavoatrial junction. No pneumothorax. Otherwise no significant interval change. XR CHEST PORT   Final Result   By history and appearance findings are compatible with Covid   pneumonia. Medical devices in place as described. CT HEAD WO CONT   Final Result   No acute intracranial abnormality.       XR CHEST PORT   Final Result      XR CHEST PORT   Final Result      MRI BRAIN WO CONT    (Results Pending)   XR CHEST PORT    (Results Pending)   XR CHEST PORT    (Results Pending)   XR CHEST PORT    (Results Pending)   XR CHEST PORT    (Results Pending)            Data Review:   Recent Results (from the past 24 hour(s))   GLUCOSE, POC    Collection Time: 01/31/22  4:50 PM   Result Value Ref Range    Glucose (POC) 134 (H) 65 - 117 mg/dL    Performed by Abdirizak Gómez    GLUCOSE, POC    Collection Time: 01/31/22  8:22 PM   Result Value Ref Range    Glucose (POC) 158 (H) 65 - 117 mg/dL    Performed by Jd Dietz, BASIC    Collection Time: 01/31/22 11:25 PM   Result Value Ref Range    Sodium 140 136 - 145 mmol/L    Potassium 3.9 3.5 - 5.1 mmol/L    Chloride 111 (H) 97 - 108 mmol/L    CO2 19 (L) 21 - 32 mmol/L    Anion gap 10 5 - 15 mmol/L    Glucose 149 (H) 65 - 100 mg/dL     (H) 6 - 20 mg/dL    Creatinine 7.51 (H) 0.70 - 1.30 mg/dL    BUN/Creatinine ratio 14 12 - 20      GFR est AA 9 (L) >60 ml/min/1.73m2    GFR est non-AA 7 (L) >60 ml/min/1.73m2    Calcium 6.8 (L) 8.5 - 10.1 mg/dL   GLUCOSE, POC    Collection Time: 01/31/22 11:41 PM   Result Value Ref Range    Glucose (POC) 178 (H) 65 - 117 mg/dL    Performed by [facility]    BLOOD GAS, ARTERIAL    Collection Time: 02/01/22  3:25 AM   Result Value Ref Range    pH 7.31 (L) 7.35 - 7.45      PCO2 32 (L) 35 - 45 mmHg    PO2 71 (L) 75 - 100 mmHg    O2 SAT 93 (L) >95 %    BICARBONATE 18 (L) 22 - 26 mmol/L    BASE DEFICIT 8.8 (H) 0 - 2 mmol/L    O2 METHOD VENT      FIO2 45.0 %    MODE Assist Control/Volume Control      Tidal volume 500      SET RATE 18      EPAP/CPAP/PEEP 5.0      SITE Right Radial      JULIO'S TEST PASS     GLUCOSE, POC    Collection Time: 02/01/22  4:49 AM   Result Value Ref Range    Glucose (POC) 108 65 - 117 mg/dL    Performed by Bryan Tucker    METABOLIC PANEL, COMPREHENSIVE    Collection Time: 02/01/22  5:55 AM   Result Value Ref Range    Sodium 143 136 - 145 mmol/L    Potassium 4.0 3.5 - 5.1 mmol/L    Chloride 112 (H) 97 - 108 mmol/L    CO2 17 (L) 21 - 32 mmol/L Anion gap 14 5 - 15 mmol/L    Glucose 114 (H) 65 - 100 mg/dL     (H) 6 - 20 mg/dL    Creatinine 7.04 (H) 0.70 - 1.30 mg/dL    BUN/Creatinine ratio 15 12 - 20      GFR est AA 9 (L) >60 ml/min/1.73m2    GFR est non-AA 8 (L) >60 ml/min/1.73m2    Calcium 6.8 (L) 8.5 - 10.1 mg/dL    Bilirubin, total 0.6 0.2 - 1.0 mg/dL    AST (SGOT) 1,288 (H) 15 - 37 U/L    ALT (SGPT) 875 (H) 12 - 78 U/L    Alk. phosphatase 185 (H) 45 - 117 U/L    Protein, total 6.1 (L) 6.4 - 8.2 g/dL    Albumin 1.5 (L) 3.5 - 5.0 g/dL    Globulin 4.6 (H) 2.0 - 4.0 g/dL    A-G Ratio 0.3 (L) 1.1 - 2.2     CK    Collection Time: 02/01/22  5:55 AM   Result Value Ref Range    CK 8,051 (H) 39 - 308 U/L   PHOSPHORUS    Collection Time: 02/01/22  5:55 AM   Result Value Ref Range    Phosphorus 5.7 (H) 2.6 - 4.7 mg/dL   MAGNESIUM    Collection Time: 02/01/22  5:55 AM   Result Value Ref Range    Magnesium 2.0 1.6 - 2.4 mg/dL   GLUCOSE, POC    Collection Time: 02/01/22  7:37 AM   Result Value Ref Range    Glucose (POC) 103 65 - 117 mg/dL    Performed by [address] [place] [address], POC    Collection Time: 02/01/22 11:17 AM   Result Value Ref Range    Glucose (POC) 97 65 - 117 mg/dL    Performed by Kana Miranda    RETICULOCYTE COUNT    Collection Time: 02/01/22 11:45 AM   Result Value Ref Range    Reticulocyte count 0.8 0.7 - 2.1 %    Absolute Retic Cnt. 0.0266 0.0260 - 0.0950 M/ul   LD    Collection Time: 02/01/22 11:45 AM   Result Value Ref Range    LD 1,912 (H) 85 - 241 U/L   MISC.  LAB TEST    Collection Time: 02/01/22 11:45 AM   Result Value Ref Range    Test Description: Peripheral      Reference Lab: Peripheral     PROTHROMBIN TIME + INR    Collection Time: 02/01/22 11:45 AM   Result Value Ref Range    Prothrombin time 14.7 11.9 - 14.7 sec    INR 1.2 (H) 0.9 - 1.1     PTT    Collection Time: 02/01/22 11:45 AM   Result Value Ref Range    aPTT 42.3 (H) 21.2 - 34.1 sec    aPTT, therapeutic range   82 - 109 sec   FIBRINOGEN    Collection Time: 02/01/22 11:45 AM   Result Value Ref Range    Fibrinogen 652 (H) 220 - 535 mg/dL   D DIMER    Collection Time: 02/01/22 11:45 AM   Result Value Ref Range    D DIMER >20.00 (H) <0.50 ug/ml(FEU)   CBC WITH AUTOMATED DIFF    Collection Time: 02/01/22 11:45 AM   Result Value Ref Range    WBC 16.5 (H) 4.1 - 11.1 K/uL    RBC 3.58 (L) 4.10 - 5.70 M/uL    HGB 11.3 (L) 12.1 - 17.0 g/dL    HCT 33.2 (L) 36.6 - 50.3 %    MCV 92.7 80.0 - 99.0 FL    MCH 31.6 26.0 - 34.0 PG    MCHC 34.0 30.0 - 36.5 g/dL    RDW 14.8 (H) 11.5 - 14.5 %    PLATELET  325 - 802 K/uL     PLT CLUMPS SEEN ON SMEAR REVIEW DUE TO EDTA SENSITIV    NRBC 0.3 (H) 0.0  WBC    ABSOLUTE NRBC 0.05 (H) 0.00 - 0.01 K/uL    NEUTROPHILS 94 (H) 32 - 75 %    LYMPHOCYTES 2 (L) 12 - 49 %    MONOCYTES 3 (L) 5 - 13 %    EOSINOPHILS 0 0 - 7 %    BASOPHILS 0 0 - 1 %    IMMATURE GRANULOCYTES 1 (H) 0 - 0.5 %    ABS. NEUTROPHILS 15.5 (H) 1.8 - 8.0 K/UL    ABS. LYMPHOCYTES 0.4 (L) 0.8 - 3.5 K/UL    ABS. MONOCYTES 0.4 0.0 - 1.0 K/UL    ABS. EOSINOPHILS 0.0 0.0 - 0.4 K/UL    ABS. BASOPHILS 0.0 0.0 - 0.1 K/UL    ABS. IMM. GRANS. 0.2 (H) 0.00 - 0.04 K/UL    DF AUTOMATED     PLATELET COUNT ON CITRATED BLD    Collection Time: 02/01/22 12:59 PM   Result Value Ref Range    PLATELET COUNT (NA CITRATE) 101 (L) 150 - 400 K/uL       A/P  1. Thrombocytopenia: Most likely multifactorial.  Patient has some clumping reported. R/o pseudothrombocytopenia. No active bleeding clinically. Check peripheral smear. Covid infection and medications also causing thrombocytopenia. Patient on Protonix and antiseizure medications. Rule out DIC. Monitor. 2.  Elevated D-dimer: Nonspecific. Venous Doppler of bilateral lower extremities negative for DVT. Monitor. 3.  Leukocytosis and granulocytosis: Due to Covid infection SUBJECTIVE:S/p steroids. Monitor. ID following. 4.  Anemia; mild. Check iron studies. 5.  Acute kidney injury: Nephrology following. Patient on CRRT.   6.  Shock liver: LFTs improving. NSTEMI:cardiology following. Encephalopathy    Thank you for the consult.

## 2022-02-01 NOTE — PROGRESS NOTES
Progress Note    Patient: Ava Matias MRN: [id number]  SSN: [id number]    YOB: 1945  Age: 78 y.o. Sex: male      Admit Date: 1/27/2022    LOS: 5 days     Subjective:     76M, H/o Schizophenia, seizures, DMII on oral meds with unresponsive and acute hypoxic respiratory failure s/t COVID-19 pneumonitis complicated by ANDRES, hypokalemia and shock liver.          HSOPITAL COURSE  COVID positive, associated with fever 106, increased troponin, cardiology was consulted ansd recommended ECHO, there is no heparin gtt, was initially called for STEMI and was cancelled. he was intubated for acute hypoxic respiratory failure. On levophed. Complicated by ANDRES, shock liver and hypokalemia. Was treated with azithromycin, barcitinib, and zosyn. Will give 1L bolus and continue IVF. Discussed with family he is very critical- his renal functions increased and now seemingly in ATN, his liver functions have worsened and had a seizure on 1/28 , he is off propofol now and neurology was consulted. Repeat CT scan didn't show any stroke, plan for EEG on 2/1 to assess for neurological activity. The patients liver functions, renal are worsening. Plan for CRRT today    Subjectivepatient seen and evaluated at bedside, currently remains intubated and sedated, discussed with RN      Review of Systems:  Unable to determine s.t mental status    Objective:     Vitals:    02/01/22 0513 02/01/22 0608 02/01/22 0700 02/01/22 0740   BP: (!) 105/58 116/68 121/66    Pulse: 74 76 81 84   Resp: 20 21 21 24   Temp:   (!) 96.4 °F (35.8 °C) (!) 96.4 °F (35.8 °C)   SpO2: 96% 95% 100% 100%   Weight:       Height:            Intake and Output:  Current Shift: No intake/output data recorded. Last three shifts: 01/30 1901 - 02/01 0700  In: 2857.2 [I.V.:1507.2]  Out: 1685 [Urine:50; Drains:100]      Physical Exam:   Physical Exam  Vitals and nursing note reviewed. Constitutional:       General: intubates     Appearance: Normal appearance.  He is normal weight. He is ill-appearing. HENT:      Head: Normocephalic and atraumatic.      Nose: Nose normal.      Mouth/Throat:      Mouth: Mucous membranes are moist.   Eyes:      Extraocular Movements: Extraocular movements intact.      Pupils: Pupils are equal, round, and reactive to light. Cardiovascular:      Rate and Rhythm: Regular rhythm. Tachycardia present.      Pulses: Normal pulses.      Heart sounds: Normal heart sounds. Pulmonary:      Effort: Respiratory distress present.      Breath sounds: Rhonchi present. Abdominal:      General: Abdomen is flat. Bowel sounds are normal.      Palpations: Abdomen is soft. Musculoskeletal:         General: No swelling or tenderness. Normal range of motion.      Cervical back: Normal range of motion and neck supple. Skin:     General: Skin is warm and dry.      Capillary Refill: Capillary refill takes less than 2 seconds. Neurological:      Mental Status: He is unresponsive.      GCS: GCS eye subscore is 4. GCS verbal subscore is 1. GCS motor subscore is 1.    Psychiatric:         cannot be assessed      Lab/Data Review:  Recent Results (from the past 24 hour(s))   GLUCOSE, POC    Collection Time: 01/31/22  8:42 AM   Result Value Ref Range    Glucose (POC) 150 (H) 65 - 117 mg/dL    Performed by Cira Neumann, POC    Collection Time: 01/31/22 11:52 AM   Result Value Ref Range    Glucose (POC) 134 (H) 65 - 117 mg/dL    Performed by Teri Guzman Nw    Collection Time: 01/31/22 12:30 PM   Result Value Ref Range    Ammonia 54 (H) <32 umol/L   GLUCOSE, POC    Collection Time: 01/31/22  4:50 PM   Result Value Ref Range    Glucose (POC) 134 (H) 65 - 117 mg/dL    Performed by Maxwell Peter    GLUCOSE, POC    Collection Time: 01/31/22  8:22 PM   Result Value Ref Range    Glucose (POC) 158 (H) 65 - 117 mg/dL    Performed by Jd Roberts [id number], BASIC    Collection Time: 01/31/22 11:25 PM   Result Value Ref Range    Sodium 140 136 - 145 mmol/L    Potassium 3.9 3.5 - 5.1 mmol/L    Chloride 111 (H) 97 - 108 mmol/L    CO2 19 (L) 21 - 32 mmol/L    Anion gap 10 5 - 15 mmol/L    Glucose 149 (H) 65 - 100 mg/dL     (H) 6 - 20 mg/dL    Creatinine 7.51 (H) 0.70 - 1.30 mg/dL    BUN/Creatinine ratio 14 12 - 20      GFR est AA 9 (L) >60 ml/min/1.73m2    GFR est non-AA 7 (L) >60 ml/min/1.73m2    Calcium 6.8 (L) 8.5 - 10.1 mg/dL   GLUCOSE, POC    Collection Time: 01/31/22 11:41 PM   Result Value Ref Range    Glucose (POC) 178 (H) 65 - 117 mg/dL    Performed by Nick Henning    BLOOD GAS, ARTERIAL    Collection Time: 02/01/22  3:25 AM   Result Value Ref Range    pH 7.31 (L) 7.35 - 7.45      PCO2 32 (L) 35 - 45 mmHg    PO2 71 (L) 75 - 100 mmHg    O2 SAT 93 (L) >95 %    BICARBONATE 18 (L) 22 - 26 mmol/L    BASE DEFICIT 8.8 (H) 0 - 2 mmol/L    O2 METHOD VENT      FIO2 45.0 %    MODE Assist Control/Volume Control      Tidal volume 500      SET RATE 18      EPAP/CPAP/PEEP 5.0      SITE Right Radial      JULIO'S TEST PASS     GLUCOSE, POC    Collection Time: 02/01/22  4:49 AM   Result Value Ref Range    Glucose (POC) 108 65 - 117 mg/dL    Performed by [facility]    METABOLIC PANEL, COMPREHENSIVE    Collection Time: 02/01/22  5:55 AM   Result Value Ref Range    Sodium 143 136 - 145 mmol/L    Potassium 4.0 3.5 - 5.1 mmol/L    Chloride 112 (H) 97 - 108 mmol/L    CO2 17 (L) 21 - 32 mmol/L    Anion gap 14 5 - 15 mmol/L    Glucose 114 (H) 65 - 100 mg/dL     (H) 6 - 20 mg/dL    Creatinine 7.04 (H) 0.70 - 1.30 mg/dL    BUN/Creatinine ratio 15 12 - 20      GFR est AA 9 (L) >60 ml/min/1.73m2    GFR est non-AA 8 (L) >60 ml/min/1.73m2    Calcium 6.8 (L) 8.5 - 10.1 mg/dL    Bilirubin, total 0.6 0.2 - 1.0 mg/dL    AST (SGOT) 1,288 (H) 15 - 37 U/L    ALT (SGPT) 875 (H) 12 - 78 U/L    Alk.  phosphatase 185 (H) 45 - 117 U/L    Protein, total 6.1 (L) 6.4 - 8.2 g/dL    Albumin 1.5 (L) 3.5 - 5.0 g/dL    Globulin 4.6 (H) 2.0 - 4.0 g/dL    A-G Ratio 0.3 (L) 1.1 - 2.2 PHOSPHORUS    Collection Time: 02/01/22  5:55 AM   Result Value Ref Range    Phosphorus 5.7 (H) 2.6 - 4.7 mg/dL   MAGNESIUM    Collection Time: 02/01/22  5:55 AM   Result Value Ref Range    Magnesium 2.0 1.6 - 2.4 mg/dL   GLUCOSE, POC    Collection Time: 02/01/22  7:37 AM   Result Value Ref Range    Glucose (POC) 103 65 - 117 mg/dL    Performed by Coni Wills and plan:        Acute respiratory failure with hypoxia secondary to COVID-19 pneumoniaof note patient currently remains intubated and sedated at this time, remains in septic shock, secondary to COVID-19 pneumonia  Follow blood cultures  Follow sputum cultures  Continue Decadron 6 mg IV every 12  Continue Zosyn azithromycin for antibiotic coverage  Continue baricitinib once daily for total 14-day course  Attempt to wean oxygen  Continue to follow pulmonology recommendations    Septic shocksecondary to problem #1, status post fluid resuscitationsepsis protocol  Follow-up blood cultures  Continue trend inflammatory markers  Continue antibiotics as documented above  Continue Levophed for pressor support, titrate to mean arterial pressure of over 65  Obtain infectious disease consult for evaluation    Severe encephalopathylikely secondary to septic shock/problem #1 however concerns for anoxic brain injury as patient does not have purposeful movements while off of sedation  Neurology consult appreciated  Management as documented above  Follow-up MRI brain further evaluation  Continue to follow neurology recommendations    Hyperglycemia type 2 diabetescurrently under much better control  Continue current insulin regimen  Continue insulin sliding scale for additional coverage  Recalculate insulin regimen according 24-hour coverage    Type II non-ST elevation MIlikely secondary to demand  Transthoracic echo reviewed  Continue current medications  Continue to follow cardiology recommendations    Hypokalemiacurrently resolved    Acute kidney injurylikely secondary to acute tubular necrosis, status post initiation of dialysis  Continue to trend serum creatinine  Dialysis as per nephrology recommendations  Nephrology consult appreciated, continue to follow recommendations    Shock liversecondary to problems #1 and 2, currently LFTs downtrending  Management as documented above  Continue trend LFTs    ProphylaxisSCDs  FENtube feeding, replete potassium and magnesium  Full code, will clarify about surrogate decision maker  Dispositioncontinued ICU care pending clinical improvement    Critical care time spent 35 minutes involving direct patient care as well as reviewing patient's labs and coordination of care with nursing staff      Signed By: Naa Bagley MD     February 1, 2022

## 2022-02-01 NOTE — PROGRESS NOTES
Progress Note    Patient: Solis Ochoa MRN: [id number]  SSN: [id number]    YOB: 1945  Age: 78 y.o. Sex: male      Admit Date: 1/27/2022    LOS: 5 days     Subjective:     Patient seen and examined. Patient is followed for NSTEMI in the setting of COVID. Patient has a past medical history of anemia, GERD, schizophrenia, diabetes, and seizure disorder. Patient remains intubated and sedated. Continues to require pressor support. Condition remains critical.    Telemetry Review: No acute events noted in the past 24 hours. Sinus rhythm; heart rate 70s. Review of Symptoms:   Review of Systems   Unable to perform ROS: acuity of condition         Objective:     Vitals:    02/01/22 0700 02/01/22 0730 02/01/22 0740 02/01/22 0807   BP: 121/66  124/75 110/64   Pulse: 81  84    Resp: 21  24 24   Temp: (!) 96.4 °F (35.8 °C) (!) 96.4 °F (35.8 °C) (!) 96.4 °F (35.8 °C)    SpO2: 100%  100% 100%   Weight:       Height:            Intake and Output:  Current Shift: No intake/output data recorded. Last three shifts: 01/30 1901 - 02/01 0700  In: 2857.2 [I.V.:1507.2]  Out: 1685 [Urine:50; Drains:100]    Physical Exam:   . [facility] Physical Exam  Constitutional:       General: He is not in acute distress. Appearance: He is ill-appearing, toxic-appearing and diaphoretic. Cardiovascular:      Rate and Rhythm: Regular rhythm. Tachycardia present. Pulses: Normal pulses. Heart sounds: Normal heart sounds. Pulmonary:      Effort: Respiratory distress present. Breath sounds: No wheezing, rhonchi or rales. Comments: Intubated  Abdominal:      General: Abdomen is flat. Bowel sounds are normal.      Palpations: Abdomen is soft. Skin:     General: Skin is warm. Neurological:      Comments: Sedated           Lab/Data Review: All lab results for the last 24 hours reviewed.          Current Facility-Administered Medications:     lactulose (CHRONULAC) 10 gram/15 mL solution 15 mL, 10 g, Per G Tube, TID, Camelia Dominguez MD, 15 mL at 02/01/22 0827    glucose chewable tablet 16 g, 4 Tablet, Oral, PRN, Camelia Dominguez MD    glucagon (GLUCAGEN) injection 1 mg, 1 mg, IntraMUSCular, PRN, Telma Cerrato MD    insulin lispro (HUMALOG) injection, , SubCUTAneous, Q4H, Camelia Dominguez MD, 3 Units at 01/31/22 0846    insulin glargine (LANTUS) injection 40 Units, 40 Units, SubCUTAneous, QHS, Camelia Dominguez MD, 40 Units at 01/31/22 2121    dextrose 10% infusion 125-250 mL, 125-250 mL, IntraVENous, PRN, Camelia Dominguez MD    pantoprazole (PROTONIX) 40 mg in 0.9% sodium chloride 10 mL injection, 40 mg, IntraVENous, Q12H, Camelia Dominguez MD, 40 mg at 02/01/22 0827    LORazepam (ATIVAN) injection 2 mg, 2 mg, IntraVENous, Q2H PRN, Corry Jorgensen PA-C, 2 mg at 01/29/22 0057    glucose chewable tablet 16 g, 4 Tablet, Oral, PRN, Camelia Dominguez MD    glucagon (GLUCAGEN) injection 1 mg, 1 mg, IntraMUSCular, PRN, Camelia Dominguez MD    levETIRAcetam (KEPPRA) tablet 1,500 mg, 1,500 mg, Oral, BID, Kwabena Liao MD, 1,500 mg at 02/01/22 2098    valproic acid (as sodium salt) (DEPAKENE) 250 mg/5 mL (5 mL) oral solution 500 mg, 500 mg, Oral, BID, Burt Kahn MD, 500 mg at 02/01/22 0827    sodium chloride (23.4%) 0.225 % in sterile water 1,000 mL infusion, , IntraVENous, CONTINUOUS, Brea Andrade MD, Last Rate: 75 mL/hr at 01/31/22 0902, New Bag at 01/31/22 0902    dextrose 10% infusion 125-250 mL, 125-250 mL, IntraVENous, PRN, Camelia Dominguez MD    NOREPINephrine (LEVOPHED) 8 mg in 5% dextrose 250mL (32 mcg/mL) infusion, 0.5-120 mcg/min, IntraVENous, TITRATE, Camelia Dominguez MD, Last Rate: 13.1 mL/hr at 01/31/22 1147, 7 mcg/min at 01/31/22 1147    PHENYLephrine (ISRAEL-SYNEPHRINE) 30 mg in 0.9% sodium chloride 250 mL infusion,  mcg/min, IntraVENous, TITRATE, Corry Jorgensen PA-C, Held at 01/28/22 2300    propofol (DIPRIVAN) 10 mg/mL infusion, 0-50 mcg/kg/min, IntraVENous, TITRATE, Dinah Call MD, Stopped at 01/29/22 8434    lacosamide (VIMPAT) tablet 200 mg, 200 mg, Oral, BID, Lida Miller MD, 200 mg at 02/01/22 8318    levothyroxine (SYNTHROID) tablet 75 mcg, 75 mcg, Oral, 7am, Lida Miller MD, 75 mcg at 02/01/22 8089    aspirin chewable tablet 81 mg, 81 mg, Oral, DAILY, Lida Miller MD, 81 mg at 02/01/22 3731    sodium chloride (NS) flush 5-40 mL, 5-40 mL, IntraVENous, Q8H, Lady Jodee Nuñez MD, 10 mL at 02/01/22 0512    sodium chloride (NS) flush 5-40 mL, 5-40 mL, IntraVENous, PRN, Lida Miller MD    acetaminophen (TYLENOL) tablet 650 mg, 650 mg, Oral, Q6H PRN, 650 mg at 01/28/22 1111 **OR** acetaminophen (TYLENOL) suppository 650 mg, 650 mg, Rectal, Q6H PRN, Lida Miller MD    polyethylene glycol (MIRALAX) packet 17 g, 17 g, Oral, DAILY PRN, Lida Miller MD    ondansetron (ZOFRAN ODT) tablet 4 mg, 4 mg, Oral, Q8H PRN **OR** ondansetron (ZOFRAN) injection 4 mg, 4 mg, IntraVENous, Q6H PRN, Lida Miller MD    atorvastatin (LIPITOR) tablet 80 mg, 80 mg, Oral, QHS, Dinah Call MD, 80 mg at 01/31/22 2122    piperacillin-tazobactam (ZOSYN) 3.375 g in 0.9% sodium chloride (MBP/ADV) 100 mL MBP, 3.375 g, IntraVENous, Q8H, Dimitry Aceves MD, Last Rate: 25 mL/hr at 02/01/22 0511, 3.375 g at 02/01/22 0511    dexamethasone (DECADRON) 4 mg/mL injection 6 mg, 6 mg, IntraVENous, Q12H, Lady Jodee Nuñez MD, 6 mg at 02/01/22 0827    azithromycin (ZITHROMAX) 500 mg in 0.9% sodium chloride 250 mL (VIAL-MATE), 500 mg, IntraVENous, Q24H, Lida Miller MD, Last Rate: 250 mL/hr at 01/31/22 1648, 500 mg at 01/31/22 1648    baricitinib (OLUMIANT) tablet 1 mg, 1 mg, Oral, DAILY, Ge Duran MD, 1 mg at 02/01/22 3914      Assessment:     Active Problems:    Acute encephalopathy (5/18/2021)        Plan:     Case discussed with Collaborating physician Dr. Senia Sarabia and our recommendations are as follows:     1.  NSTEMI:   - HS troponin 1354 > 1510. Likely related to demand ischemia in the setting of COVID and sepsis  - continue asa  - Echo: EF 65% with no wall motion abnormalities. - Cr 7.04     2. Hypotension:   - Blood pressure below goal  - Continue levo and wean as tolerated.     3. COVID pna:   - COVID positive   -  Management per primary. - Will monitor telemetry and to evaluate for possible QTC prolongation. QTc 547 this am on tele. Will continue to monitor.  - Recommend to keep a negative fluid balance to prevent volume overload. 4. Hypokalemia:   - K 4.0, will replete and repeat labs in the am.     Thank you for involving us in the care of this patient. Please do not hesitate to call if additional questions arise. If after hours please call [phone]. Signed By: Vikash Gamez NP     February 1, 2022        Dr. Josse James Cardiologist    [facility]  [address] [place], 1953 [facility]  [phone]

## 2022-02-01 NOTE — CONSULTS
Consult Date: 2/1/2022    IP CONSULT TO INFECTIOUS DISEASES  Consult performed by: Waleska Griggs MD  Consult ordered by: Maxine Nichols MD      35N with schizophrenia, seizures, diabetes, chronic renal failure. 1/27/22 presents to hospital unresponsive. Reportedly had been increasingly weak and unable to leave the bed for days. Associated with fevers. During the ED course found positive for Covid 19, with increased troponin. Intubated ventilated for acute hypoxic respiratory failure. During the hospital course, declining renal function and initiated on hemodialysis. I have been asked to see the patient in consultation for prolonged respiratory failure.     Subjective     Past Medical History:   Diagnosis Date    Acute renal failure ([address]     Anemia     Arthritis     DKA (diabetic ketoacidoses)     GERD (gastroesophageal reflux disease)     HTN (hypertension)     Hypercholesterolemia     [facility]     Schizophreniform disorder (HCC)     Seizure disorder ([facility]     Type II or unspecified type diabetes mellitus without mention of complication, uncontrolled       Past Surgical History:   Procedure Laterality Date    IR INSERT NON TUNL CVC OVER 5 YRS  1/28/2022    IR INSERT NON TUNL CVC OVER 5 YRS  1/31/2022     Family History   Problem Relation Age of Onset    Stroke Father       Social History     Tobacco Use    Smoking status: Never Smoker    Smokeless tobacco: Never Used   Substance Use Topics    Alcohol use: No       Current Facility-Administered Medications   Medication Dose Route Frequency Provider Last Rate Last Admin    NOREPINephrine (LEVOPHED) 8 mg in 0.9% NS 250ml infusion  0.5-120 mcg/min IntraVENous TITRATE Charlie Duran MD 9.4 mL/hr at 02/01/22 0929 5 mcg/min at 02/01/22 0929    lactulose (CHRONULAC) 10 gram/15 mL solution 15 mL  10 g Per G Tube TID Jason Kasper MD   15 mL at 02/01/22 0827    glucose chewable tablet 16 g  4 Tablet Oral PRN Savannah Rogelio Henriquez MD        glucagon [facility]) injection 1 mg  1 mg IntraMUSCular PRN Keena Call MD        insulin lispro (HUMALOG) injection   SubCUTAneous Q4H Keena Call MD   3 Units at 01/31/22 0846    insulin glargine (LANTUS) injection 40 Units  40 Units SubCUTAneous QHS Keena Call MD   40 Units at 01/31/22 2121    dextrose 10% infusion 125-250 mL  125-250 mL IntraVENous PRN Keena Call MD        pantoprazole (PROTONIX) 40 mg in 0.9% sodium chloride 10 mL injection  40 mg IntraVENous Q12H Keena Call MD   40 mg at 02/01/22 0827    LORazepam (ATIVAN) injection 2 mg  2 mg IntraVENous Q2H PRN Ray Rosenthal PA-C   2 mg at 01/29/22 0057    glucose chewable tablet 16 g  4 Tablet Oral PRN Keena Call MD        glucagon (GLUCAGEN) injection 1 mg  1 mg IntraMUSCular PRN Keena Call MD        levETIRAcetam (KEPPRA) tablet 1,500 mg  1,500 mg Oral BID Abigail Celis MD   1,500 mg at 02/01/22 5321    valproic acid (as sodium salt) (DEPAKENE) 250 mg/5 mL (5 mL) oral solution 500 mg  500 mg Oral BID Abigail Celis MD   500 mg at 02/01/22 0827    sodium chloride (23.4%) 0.225 % in sterile water 1,000 mL infusion   IntraVENous CONTINUOUS Deandre Matias MD 75 mL/hr at 01/31/22 0902 New Bag at 01/31/22 0902    dextrose 10% infusion 125-250 mL  125-250 mL IntraVENous PRN Keena Call MD        PHENYLephrine (ISRAEL-SYNEPHRINE) 30 mg in 0.9% sodium chloride 250 mL infusion   mcg/min IntraVENous TITRATE Sanju Jorgensen PA-C   Held at 01/28/22 2300    propofol (DIPRIVAN) 10 mg/mL infusion  0-50 mcg/kg/min IntraVENous TITRATE Jessica Sharma MD   Stopped at 01/29/22 0824    lacosamide (VIMPAT) tablet 200 mg  200 mg Oral BID Keena Call MD   200 mg at 02/01/22 0807    levothyroxine (SYNTHROID) tablet 75 mcg  75 mcg Oral 7am Keena Call MD   75 mcg at 02/01/22 5234    aspirin chewable tablet 81 mg  81 mg Oral DAILY Keena Call MD   60 mg at 02/01/22 0828    sodium chloride (NS) flush 5-40 mL  5-40 mL IntraVENous Q8H Apolonia Bowie MD   10 mL at 02/01/22 0512    sodium chloride (NS) flush 5-40 mL  5-40 mL IntraVENous PRN Apolonia Bowie MD        acetaminophen (TYLENOL) tablet 650 mg  650 mg Oral Q6H PRN Apolonia Bowie MD   650 mg at 01/28/22 1111    Or    acetaminophen (TYLENOL) suppository 650 mg  650 mg Rectal Q6H PRN Apolonia Bowie MD        polyethylene glycol (MIRALAX) packet 17 g  17 g Oral DAILY PRN Apolonia Bowie MD        ondansetron (ZOFRAN ODT) tablet 4 mg  4 mg Oral Q8H PRN Apolonia Bowie MD        Or    ondansetron [facility]) injection 4 mg  4 mg IntraVENous Q6H PRN Apolonia Bowie MD        atorvastatin (LIPITOR) tablet 80 mg  80 mg Oral QHS Vitor Mclean MD   80 mg at 01/31/22 2122    piperacillin-tazobactam (ZOSYN) 3.375 g in 0.9% sodium chloride (MBP/ADV) 100 mL MBP  3.375 g IntraVENous Q8H Ilene Aceves MD 25 mL/hr at 02/01/22 0511 3.375 g at 02/01/22 0511    dexamethasone (DECADRON) 4 mg/mL injection 6 mg  6 mg IntraVENous Q12H Apolonia Bowie MD   6 mg at 02/01/22 0827    azithromycin (ZITHROMAX) 500 mg in 0.9% sodium chloride 250 mL (VIAL-MATE)  500 mg IntraVENous Q24H Apolonia Bowie  mL/hr at 01/31/22 1648 500 mg at 01/31/22 1648    baricitinib (OLUMIANT) tablet 1 mg  1 mg Oral DAILY Ge Duran MD   1 mg at 02/01/22 0953        Review of Systems   Unable to perform ROS: Intubated   All other systems reviewed and are negative.       Objective     Vital signs for last 24 hours:  Visit Vitals  BP (!) 105/58 (BP 1 Location: Left upper arm, BP Patient Position: At rest)   Pulse 83   Temp 98.1 °F (36.7 °C)   Resp 28   Ht 6' (1.829 m)   Wt 107.5 kg (236 lb 15.9 oz)   SpO2 93%   BMI 32.14 kg/m²       Intake/Output this shift:  Current Shift: 02/01 0701 - 02/01 1900  In: -   Out: 465   Last 3 Shifts: 01/30 1901 - 02/01 0700  In: 2857.2 [I.V.:1507.2]  Out: 2773 [Urine:50; Drains:100]    Data Review:   Recent Results (from the past 24 hour(s))   GLUCOSE, POC    Collection Time: 01/31/22 11:52 AM   Result Value Ref Range    Glucose (POC) 134 (H) 65 - 117 mg/dL    Performed by Teri Maynard [facility]    Collection Time: 01/31/22 12:30 PM   Result Value Ref Range    Ammonia 54 (H) <32 umol/L   GLUCOSE, POC    Collection Time: 01/31/22  4:50 PM   Result Value Ref Range    Glucose (POC) 134 (H) 65 - 117 mg/dL    Performed by [employer]    GLUCOSE, POC    Collection Time: 01/31/22  8:22 PM   Result Value Ref Range    Glucose (POC) 158 (H) 65 - 117 mg/dL    Performed by Jd Roberts [id number], BASIC    Collection Time: 01/31/22 11:25 PM   Result Value Ref Range    Sodium 140 136 - 145 mmol/L    Potassium 3.9 3.5 - 5.1 mmol/L    Chloride 111 (H) 97 - 108 mmol/L    CO2 19 (L) 21 - 32 mmol/L    Anion gap 10 5 - 15 mmol/L    Glucose 149 (H) 65 - 100 mg/dL     (H) 6 - 20 mg/dL    Creatinine 7.51 (H) 0.70 - 1.30 mg/dL    BUN/Creatinine ratio 14 12 - 20      GFR est AA 9 (L) >60 ml/min/1.73m2    GFR est non-AA 7 (L) >60 ml/min/1.73m2    Calcium 6.8 (L) 8.5 - 10.1 mg/dL   GLUCOSE, POC    Collection Time: 01/31/22 11:41 PM   Result Value Ref Range    Glucose (POC) 178 (H) 65 - 117 mg/dL    Performed by Selena Monson    BLOOD GAS, ARTERIAL    Collection Time: 02/01/22  3:25 AM   Result Value Ref Range    pH 7.31 (L) 7.35 - 7.45      PCO2 32 (L) 35 - 45 mmHg    PO2 71 (L) 75 - 100 mmHg    O2 SAT 93 (L) >95 %    BICARBONATE 18 (L) 22 - 26 mmol/L    BASE DEFICIT 8.8 (H) 0 - 2 mmol/L    O2 METHOD VENT      FIO2 45.0 %    MODE Assist Control/Volume Control      Tidal volume 500      SET RATE 18      EPAP/CPAP/PEEP 5.0      SITE Right Radial      JULIO'S TEST PASS     GLUCOSE, POC    Collection Time: 02/01/22  4:49 AM   Result Value Ref Range    Glucose (POC) 108 65 - 117 mg/dL    Performed by Bob Schmitt [id number], COMPREHENSIVE    Collection Time: 02/01/22 5:55 AM   Result Value Ref Range    Sodium 143 136 - 145 mmol/L    Potassium 4.0 3.5 - 5.1 mmol/L    Chloride 112 (H) 97 - 108 mmol/L    CO2 17 (L) 21 - 32 mmol/L    Anion gap 14 5 - 15 mmol/L    Glucose 114 (H) 65 - 100 mg/dL     (H) 6 - 20 mg/dL    Creatinine 7.04 (H) 0.70 - 1.30 mg/dL    BUN/Creatinine ratio 15 12 - 20      GFR est AA 9 (L) >60 ml/min/1.73m2    GFR est non-AA 8 (L) >60 ml/min/1.73m2    Calcium 6.8 (L) 8.5 - 10.1 mg/dL    Bilirubin, total 0.6 0.2 - 1.0 mg/dL    AST (SGOT) 1,288 (H) 15 - 37 U/L    ALT (SGPT) 875 (H) 12 - 78 U/L    Alk. phosphatase 185 (H) 45 - 117 U/L    Protein, total 6.1 (L) 6.4 - 8.2 g/dL    Albumin 1.5 (L) 3.5 - 5.0 g/dL    Globulin 4.6 (H) 2.0 - 4.0 g/dL    A-G Ratio 0.3 (L) 1.1 - 2.2     PHOSPHORUS    Collection Time: 02/01/22  5:55 AM   Result Value Ref Range    Phosphorus 5.7 (H) 2.6 - 4.7 mg/dL   MAGNESIUM    Collection Time: 02/01/22  5:55 AM   Result Value Ref Range    Magnesium 2.0 1.6 - 2.4 mg/dL   GLUCOSE, POC    Collection Time: 02/01/22  7:37 AM   Result Value Ref Range    Glucose (POC) 103 65 - 117 mg/dL    Performed by [address] [place] [id number], POC    Collection Time: 02/01/22 11:17 AM   Result Value Ref Range    Glucose (POC) 97 65 - 117 mg/dL    Performed by Jack Schwarz        Physical Exam  General:  intubated on vent unresponsive on no sedation  HEENT: NCAT,   Eyes: anicteric; conjunctiva clear no doll's eye reflex  Neck: no nodes, no cuff leak, trach midline; no accessory MM use. Chest: no deformity,   Cardiac: R regular; no murmur;   Lungs: distant breath sounds; no wheezes a few rales in the base  Abd: soft, NT, hypoactive BS  Ext: Trace edema; no joint swelling; No clubbing  Psych-  unable to assess  Skin: warm, dry    +++++    76M with schizophrenia, seizures, diabetes, chronic renal failure. 1/27/22 presents to hospital unresponsive. Reportedly had been increasingly weak and unable to leave the bed for days. Associated with fevers. During the ED course found positive for Covid 19, with increased troponin. Intubated ventilated for acute hypoxic respiratory failure. During the hospital course, declining renal function and initiated on hemodialysis. I have been asked to see the patient in consultation for prolonged respiratory failure. MICROBIOLOGY    1/27/22 Covid 19 Positive  1/27/22 Blood  Negative  1/27/22 Urine  Negative    2/1/22  Endotrach Pending    ASSESSMENT AND RECOMMENDATIONS    1) Prolonged acute hypoxic respiratory failure in the setting of multifactorial shock, shock liver, Covid 19 pneumonia, NSTEMI, acute on chronic renal failure, anoxic brain injury.      Supportive care with respiratory support as needed   Dexamethasone   Olumiant   Zosyn and azithromycin     Overall prognosis very poor   Remains unresponsive on no sedation

## 2022-02-01 NOTE — PROGRESS NOTES
IMPRESSION:   1. Acute hypoxic respiratory failure oxygenation well-maintained on the ventilator  2. Malignant hyperthermia resolved now he is hypothermic  3. COVID-19 pneumonia  4. NSTEMI elevated troponin  5. Shock cardiogenic versus septic vs Hypovolumic Hypotension currently on norepinephrine 17 leisa  6. Acute kidney injury now on hemodialysis  7. Metabolic acidosis  8. Thrombocytopenia worsened  9. Hypernatremia  10. Symptomatic hypokalemia  11. Shock liver with transaminitis  12. Altered mental status unresponsive on no sedation likely anoxic encephalopathy      RECOMMENDATIONS/PLAN:   1. ICU monitoring  1. Ventilator for mechanical life support and prevent respiratory arrest with protective lung strategies ABG acceptable he is still hemodynamically unstable will continue with the current vent support off sedation  2. On Assist control rate 18  PEEP 8 FiO2 45% will decrease PEEP to 5  3. Patient on Decadron Zithromax and baricitinib  4. Troponin is elevated 2D echo shows ejection fraction of 65%  5. Now getting dialysis  6. Remains on quarter saline  7. Liver enzyme elevated shock liver  8. Agree with Empiric IV antibiotics blood and urine cultures no growth  9. Temperature T-max 101.3 last 24-hour   10.  IV vasopressors for circulatory shock refractory to fluids to maintain SBP> 90 norepinephrine currently 17 mics     [x] High complexity decision making was performed  [x] See my orders for details  HPI   79-year-old male came in because as he was found unresponsive and fever 107 past medical history of schizophrenia and diabetes gastroesophageal reflux disease and anemia he is COVID-19 positive initially patient was called as NSTEMI but it was canceled patient is intubated on ventilator hemodynamically unstable unable to get any history out of the patient he seems dehydrated    PMH:  has a past medical history of Acute renal failure ([address] Anemia, Arthritis, DKA (diabetic ketoacidoses), GERD (gastroesophageal reflux disease), HTN (hypertension), Hypercholesterolemia, Schizophrenia ([facility] [address] Schizophreniform disorder ([facility] [address] Seizure disorder ([facility] [address] and Type II or unspecified type diabetes mellitus without mention of complication, uncontrolled. PSH:   has a past surgical history that includes ir insert non tunl cvc over 5 yrs (1/28/2022) and ir insert non tunl cvc over 5 yrs (1/31/2022). FHX: family history includes Stroke in his father. SHX:  reports that he has never smoked. He has never used smokeless tobacco. He reports that he does not drink alcohol and does not use drugs.     ALL: No Known Allergies     MEDS:   [x] Reviewed - As Below   [] Not reviewed    Current Facility-Administered Medications   Medication    NOREPINephrine (LEVOPHED) 8 mg in 0.9% NS 250ml infusion    lactulose (CHRONULAC) 10 gram/15 mL solution 15 mL    glucose chewable tablet 16 g    glucagon (GLUCAGEN) injection 1 mg    insulin lispro (HUMALOG) injection    insulin glargine (LANTUS) injection 40 Units    dextrose 10% infusion 125-250 mL    pantoprazole (PROTONIX) 40 mg in 0.9% sodium chloride 10 mL injection    LORazepam (ATIVAN) injection 2 mg    glucose chewable tablet 16 g    glucagon (GLUCAGEN) injection 1 mg    levETIRAcetam (KEPPRA) tablet 1,500 mg    valproic acid (as sodium salt) (DEPAKENE) 250 mg/5 mL (5 mL) oral solution 500 mg    sodium chloride (23.4%) 0.225 % in sterile water 1,000 mL infusion    dextrose 10% infusion 125-250 mL    PHENYLephrine (ISRAEL-SYNEPHRINE) 30 mg in 0.9% sodium chloride 250 mL infusion    propofol (DIPRIVAN) 10 mg/mL infusion    lacosamide (VIMPAT) tablet 200 mg    levothyroxine (SYNTHROID) tablet 75 mcg    aspirin chewable tablet 81 mg    sodium chloride (NS) flush 5-40 mL    sodium chloride (NS) flush 5-40 mL    acetaminophen (TYLENOL) tablet 650 mg    Or    acetaminophen (TYLENOL) suppository 650 mg    polyethylene glycol (MIRALAX) packet 17 g    ondansetron (ZOFRAN ODT) tablet 4 mg    Or    ondansetron (ZOFRAN) injection 4 mg    atorvastatin (LIPITOR) tablet 80 mg    piperacillin-tazobactam (ZOSYN) 3.375 g in 0.9% sodium chloride (MBP/ADV) 100 mL MBP    dexamethasone (DECADRON) 4 mg/mL injection 6 mg    azithromycin (ZITHROMAX) 500 mg in 0.9% sodium chloride 250 mL (VIAL-MATE)    baricitinib (OLUMIANT) tablet 1 mg      MAR reviewed and pertinent medications noted or modified as needed   Current Facility-Administered Medications   Medication    NOREPINephrine (LEVOPHED) 8 mg in 0.9% NS 250ml infusion    lactulose (CHRONULAC) 10 gram/15 mL solution 15 mL    glucose chewable tablet 16 g    glucagon (GLUCAGEN) injection 1 mg    insulin lispro (HUMALOG) injection    insulin glargine (LANTUS) injection 40 Units    dextrose 10% infusion 125-250 mL    pantoprazole (PROTONIX) 40 mg in 0.9% sodium chloride 10 mL injection    LORazepam (ATIVAN) injection 2 mg    glucose chewable tablet 16 g    glucagon (GLUCAGEN) injection 1 mg    levETIRAcetam (KEPPRA) tablet 1,500 mg    valproic acid (as sodium salt) (DEPAKENE) 250 mg/5 mL (5 mL) oral solution 500 mg    sodium chloride (23.4%) 0.225 % in sterile water 1,000 mL infusion    dextrose 10% infusion 125-250 mL    PHENYLephrine (ISRAEL-SYNEPHRINE) 30 mg in 0.9% sodium chloride 250 mL infusion    propofol (DIPRIVAN) 10 mg/mL infusion    lacosamide (VIMPAT) tablet 200 mg    levothyroxine (SYNTHROID) tablet 75 mcg    aspirin chewable tablet 81 mg    sodium chloride (NS) flush 5-40 mL    sodium chloride (NS) flush 5-40 mL    acetaminophen (TYLENOL) tablet 650 mg    Or    acetaminophen (TYLENOL) suppository 650 mg    polyethylene glycol (MIRALAX) packet 17 g    ondansetron (ZOFRAN ODT) tablet 4 mg    Or    ondansetron (ZOFRAN) injection 4 mg    atorvastatin (LIPITOR) tablet 80 mg    piperacillin-tazobactam (ZOSYN) 3.375 g in 0.9% sodium chloride (MBP/ADV) 100 mL MBP    dexamethasone (DECADRON) 4 mg/mL injection 6 mg    azithromycin (ZITHROMAX) 500 mg in 0.9% sodium chloride 250 mL (VIAL-MATE)    baricitinib (OLUMIANT) tablet 1 mg      PMH:  has a past medical history of Acute renal failure ([facility] [address] Anemia, Arthritis, DKA (diabetic ketoacidoses), GERD (gastroesophageal reflux disease), HTN (hypertension), Hypercholesterolemia, Schizophrenia ([facility] [address] Schizophreniform disorder ([facility] [address] Seizure disorder ([facility] [address] and Type II or unspecified type diabetes mellitus without mention of complication, uncontrolled. PSH:   has a past surgical history that includes ir insert non tunl cvc over 5 yrs (2022) and ir insert non tunl cvc over 5 yrs (2022). FHX: family history includes Stroke in his father. SHX:  reports that he has never smoked. He has never used smokeless tobacco. He reports that he does not drink alcohol and does not use drugs. ROS:  Unable to obtain intubated on ventilator and sedated    Hemodynamics:    CO:    CI:    CVP:    SVR:   PAP Systolic:    PAP Diastolic:    PVR:    YL36:        Ventilator Settings:      Mode Rate TV Press PEEP FiO2 PIP Min.  Vent   Assist control,Volume control    500 ml    5 cm H20 45 %  25 cm H2O  14.4 l/min        Vital Signs: Telemetry:    normal sinus rhythm Intake/Output:   Visit Vitals  /64 (BP 1 Location: Left upper arm, BP Patient Position: At rest)   Pulse 84   Temp (!) 96.4 °F (35.8 °C)   Resp 24   Ht 6' (1.829 m)   Wt 107.5 kg (236 lb 15.9 oz)   SpO2 100%   BMI 32.14 kg/m²       Temp (24hrs), Av.8 °F (36.6 °C), Min:96.4 °F (35.8 °C), Max:99.3 °F (37.4 °C)        O2 Device: Ventilator         Wt Readings from Last 4 Encounters:   22 107.5 kg (236 lb 15.9 oz)   22 101.1 kg (222 lb 14.2 oz)   10/06/21 111.1 kg (245 lb)   21 110.7 kg (244 lb)          Intake/Output Summary (Last 24 hours) at 2022 0909  Last data filed at 2022 0800  Gross per 24 hour   Intake 2407.2 ml   Output 1751 ml   Net 656.2 ml       Last shift:       0701 - 02/01 1900  In: -   Out: 136   Last 3 shifts: 01/30 1901 - 02/01 0700  In: 2857.2 [I.V.:1507.2]  Out: 4696 [Urine:50; Drains:100]       Physical Exam:     General:  intubated on vent unresponsive on no sedation  HEENT: NCAT,   Eyes: anicteric; conjunctiva clear no doll's eye reflex  Neck: no nodes, no cuff leak, trach midline; no accessory MM use. Chest: no deformity,   Cardiac: R regular; no murmur;   Lungs: distant breath sounds; no wheezes a few rales in the base  Abd: soft, NT, hypoactive BS  Ext: Trace edema; no joint swelling;  No clubbing  : No urine  Neuro: Unresponsive on no sedation no doll's eye reflex flaccid extremity  Psych-  unable to assess  Skin: warm, dry, no cyanosis;   Pulses: Brachial and radial pulses intact  Capillary: Slow capillary refill      DATA:    MAR reviewed and pertinent medications noted or modified as needed  MEDS:   Current Facility-Administered Medications   Medication    NOREPINephrine (LEVOPHED) 8 mg in 0.9% NS 250ml infusion    lactulose (CHRONULAC) 10 gram/15 mL solution 15 mL    glucose chewable tablet 16 g    glucagon (GLUCAGEN) injection 1 mg    insulin lispro (HUMALOG) injection    insulin glargine (LANTUS) injection 40 Units    dextrose 10% infusion 125-250 mL    pantoprazole (PROTONIX) 40 mg in 0.9% sodium chloride 10 mL injection    LORazepam (ATIVAN) injection 2 mg    glucose chewable tablet 16 g    glucagon (GLUCAGEN) injection 1 mg    levETIRAcetam (KEPPRA) tablet 1,500 mg    valproic acid (as sodium salt) (DEPAKENE) 250 mg/5 mL (5 mL) oral solution 500 mg    sodium chloride (23.4%) 0.225 % in sterile water 1,000 mL infusion    dextrose 10% infusion 125-250 mL    PHENYLephrine (ISRAEL-SYNEPHRINE) 30 mg in 0.9% sodium chloride 250 mL infusion    propofol (DIPRIVAN) 10 mg/mL infusion    lacosamide (VIMPAT) tablet 200 mg    levothyroxine (SYNTHROID) tablet 75 mcg    aspirin chewable tablet 81 mg    sodium chloride (NS) flush 5-40 mL    sodium chloride (NS) flush 5-40 mL    acetaminophen (TYLENOL) tablet 650 mg    Or    acetaminophen (TYLENOL) suppository 650 mg    polyethylene glycol (MIRALAX) packet 17 g    ondansetron (ZOFRAN ODT) tablet 4 mg    Or    ondansetron (ZOFRAN) injection 4 mg    atorvastatin (LIPITOR) tablet 80 mg    piperacillin-tazobactam (ZOSYN) 3.375 g in 0.9% sodium chloride (MBP/ADV) 100 mL MBP    dexamethasone (DECADRON) 4 mg/mL injection 6 mg    azithromycin (ZITHROMAX) 500 mg in 0.9% sodium chloride 250 mL (VIAL-MATE)    baricitinib (OLUMIANT) tablet 1 mg        Labs:    Recent Labs     01/31/22  0330 01/30/22  1700 01/30/22  0730 01/29/22  1240 01/29/22  1020   WBC 19.1* 18.1* 17.9*   < >  --    HGB 11.6* 11.5* 11.8*   < >  --    PLT 44* 44* 41*   < >  --    INR 1.3*  --   --   --  1.6*   APTT 46.5*  --   --   --  60.3*    < > = values in this interval not displayed. Recent Labs     02/01/22  0555 01/31/22  2325 01/31/22  0330 01/30/22  0730 01/30/22  0730 01/30/22  0400 01/30/22  0400    140 142  143   < > 147*   < > 145   K 4.0 3.9 4.7  4.1   < > 3.3*   < > 3.3*   * 111* 112*  112*   < > 114*   < > 112*   CO2 17* 19* 19*  19*   < > 19*   < > 20*   * 149* 167*  170*   < > 218*   < > 298*   * 107* 125*  125*   < > 101*   < > 106*   CREA 7.04* 7.51* 9.56*  9.48*   < > 8.47*   < > 8.61*   CA 6.8* 6.8* 7.5*  7.4*   < > 8.0*   < > 7.8*   MG 2.0  --  2.1  --  2.2   < > 2.2   PHOS 5.7*  --  5.6*  --   --   --   --    ALB 1.5*  --  1.6*  1.6*  --   --   --  1.8*   *  --  1,259*  --   --   --  1,601*    < > = values in this interval not displayed.      Recent Labs     02/01/22  0325 01/31/22  0342 01/30/22  0401   PH 7.31* 7.29* 7.25*   PCO2 32* 35 41   PO2 71* 76 114*   HCO3 18* 18* 18*   FIO2 45.0 45.0 55.0       Lab Results   Component Value Date/Time    Culture result: No Growth (<1000 cfu/mL) 01/27/2022 02:00 PM    Culture result: No growth 3 days 01/27/2022 01:30 PM Culture result: No significant growth, <10,000 CFU/mL 06/05/2021 08:45 AM     Lab Results   Component Value Date/Time    TSH 4.47 (H) 05/21/2021 10:46 AM        Imaging:    Results from Hospital Encounter encounter on 01/27/22    XR CHEST PORT    Narrative  Chest single view. Comparison single view chest January 31, 2022 at 2:11. Right neck central vascular catheters x2. ET tube and NG tube stable position. Patchy basilar reticular markings, left more so than right persist. Cardiac and  mediastinal structures unchanged. No pneumothorax or sizable pleural effusion. Results from [place] encounter on 01/27/22    CT HEAD WO CONT    Narrative  Exam: CT Head without contrast    TECHNIQUE: Multiple transaxial CT images of the head were obtained without  contrast. Coronal and sagittal reformatted images were provided. Dose reduction: All CT scans at this facility are performed using dose reduction  optimization techniques as appropriate to a performed exam including the  following: Automated exposure control, adjustments of the mA and/or kV according  to patient size, or use of iterative reconstruction technique. HISTORY: anoxia    COMPARISON: Head CT 10/6/2021, 1/27/2022    FINDINGS:    Global parenchymal volume loss appears similar to prior with proportional ex  vacuo dilatation of the ventricles and other extra-axial CSF spaces, slightly  more prominent on the left. No significant midline shift or mass effect. Gray-white matter differentiation appears preserved. No findings of acute  intracranial hemorrhage. Basilar cisterns appear preserved. Intracranial  atherosclerosis. There is bilateral opacification of the mastoid air cells, most likely  indicating nonspecific mastoid effusions. Debris within the external auditory  canals is most likely cerumen. There is mild mucosal thickening in the paranasal  sinuses, most pronounced in the maxillary sinuses.  Globes and orbits appear  unremarkable. Calvarium appears intact without findings of acute or aggressive  abnormality. Impression  Overall similar exam to prior without findings of acute intracranial  abnormality. · 1/30 remains on the ventilator has metabolic acidosis we will add bicarb per tube. Maintain ventilator on current settings although will decrease PEEP slightly. Platelets continue to drop.   He is unresponsive on no sedation likely anoxic encephalopathy  · 1/31 remain intubated on ventilator hemodynamically worsening of the renal function  · 2/1 remained on ventilator hemodynamically unstable on levo

## 2022-02-01 NOTE — PROGRESS NOTES
CM left message for patient's other relative, Danni Alvarez ([phone]) and his brother Itz Israel ([phone]) in an attempt to complete DCP assessment. CM will continue to follow.

## 2022-02-02 NOTE — PROGRESS NOTES
Progress Note    Patient: Vin Acosta MRN: [id number]  SSN: [id number]    YOB: 1945  Age: 78 y.o. Sex: male      Admit Date: 1/27/2022    LOS: 6 days     Subjective:     Patient seen and examined. Patient is followed for NSTEMI in the setting of COVID. Patient has a past medical history of anemia, GERD, schizophrenia, diabetes, and seizure disorder. Patient remains intubated and sedated. Continues to require pressor support. On warming blanket. Condition remains critical.    Telemetry Review: No acute events noted in the past 24 hours. Sinus rhythm; heart rate 70s. Review of Symptoms:   Review of Systems   Unable to perform ROS: acuity of condition         Objective:     Vitals:    02/02/22 0600 02/02/22 0700 02/02/22 0800 02/02/22 0840   BP: 129/78 113/70 128/74    Pulse: 79 78 78 73   Resp: 28 24 28 24   Temp: 97.2 °F (36.2 °C) 96.8 °F (36 °C) (!) 96.6 °F (35.9 °C)    SpO2: 97% 100% 100% 99%   Weight:       Height:            Intake and Output:  Current Shift: 02/02 0701 - 02/02 1900  In: -   Out: 155   Last three shifts: 01/31 1901 - 02/02 0700  In: 3764.5 [I.V.:3384.5]  Out: 4926 [Urine:30; Drains:200]    Physical Exam:   . Julee Red Physical Exam  Constitutional:       General: He is not in acute distress. Appearance: He is ill-appearing, toxic-appearing and diaphoretic. Cardiovascular:      Rate and Rhythm: Regular rhythm. Tachycardia present. Pulses: Normal pulses. Heart sounds: Normal heart sounds. Pulmonary:      Effort: Respiratory distress present. Breath sounds: No wheezing, rhonchi or rales. Comments: Intubated  Abdominal:      General: Abdomen is flat. Bowel sounds are normal.      Palpations: Abdomen is soft. Skin:     General: Skin is warm. Neurological:      Comments: Sedated           Lab/Data Review: All lab results for the last 24 hours reviewed.          Current Facility-Administered Medications:     NOREPINephrine (LEVOPHED) 8 mg in 0.9% NS 250ml infusion, 0.5-120 mcg/min, IntraVENous, TITRATE, Ge Duran MD, Stopped at 02/01/22 1827    lactulose (CHRONULAC) 10 gram/15 mL solution 15 mL, 10 g, Per G Tube, TID, Marian Vickers MD, 15 mL at 02/01/22 2100    glucose chewable tablet 16 g, 4 Tablet, Oral, PRN, Marian Vickers MD    glucagon (GLUCAGEN) injection 1 mg, 1 mg, IntraMUSCular, PRN, Marian Vickers MD    insulin lispro (HUMALOG) injection, , SubCUTAneous, Q4H, Marian Vickers MD, 3 Units at 01/31/22 8217    [Held by provider] insulin glargine (LANTUS) injection 40 Units, 40 Units, SubCUTAneous, QHS, Marian Vickers MD, 40 Units at 01/31/22 2121    dextrose 10% infusion 125-250 mL, 125-250 mL, IntraVENous, PRN, Marian Vickers MD, 250 mL at 02/01/22 1518    pantoprazole (PROTONIX) 40 mg in 0.9% sodium chloride 10 mL injection, 40 mg, IntraVENous, Q12H, Marian Vickers MD, 40 mg at 02/01/22 2107    LORazepam (ATIVAN) injection 2 mg, 2 mg, IntraVENous, Q2H PRN, Corky Jorgensen PA-C, 2 mg at 01/29/22 0057    glucose chewable tablet 16 g, 4 Tablet, Oral, PRN, Marian Vickers MD    glucagon (GLUCAGEN) injection 1 mg, 1 mg, IntraMUSCular, PRN, Marian Vickers MD    levETIRAcetam (KEPPRA) tablet 1,500 mg, 1,500 mg, Oral, BID, Christopher Sanchez MD, 1,500 mg at 02/01/22 2100    valproic acid (as sodium salt) (DEPAKENE) 250 mg/5 mL (5 mL) oral solution 500 mg, 500 mg, Oral, BID, Burt Baron MD, 500 mg at 02/01/22 2100    sodium chloride (23.4%) 0.225 % in sterile water 1,000 mL infusion, , IntraVENous, CONTINUOUS, Payton Villavicencio MD, Last Rate: 75 mL/hr at 02/02/22 0556, New Bag at 02/02/22 0556    dextrose 10% infusion 125-250 mL, 125-250 mL, IntraVENous, PRN, Marian Vickers MD, 250 mL at 02/02/22 0556    PHENYLephrine (ISRAEL-SYNEPHRINE) 30 mg in 0.9% sodium chloride 250 mL infusion,  mcg/min, IntraVENous, TITRATE, Corky Jorgensen PA-C, Held at 01/28/22 2300    propofol (DIPRIVAN) 10 mg/mL infusion, 0-50 mcg/kg/min, IntraVENous, TITRATE, Skyler Hutson MD, Stopped at 01/29/22 2055    lacosamide (VIMPAT) tablet 200 mg, 200 mg, Oral, BID, Lalo Reyes MD, 200 mg at 02/01/22 2100    levothyroxine (SYNTHROID) tablet 75 mcg, 75 mcg, Oral, 7am, Lalo Reyes MD, 75 mcg at 02/01/22 0265    aspirin chewable tablet 81 mg, 81 mg, Oral, DAILY, Lalo Reyes MD, 81 mg at 02/01/22 5417    sodium chloride (NS) flush 5-40 mL, 5-40 mL, IntraVENous, Q8H, Christopher Nuñez MD, 10 mL at 02/02/22 0557    sodium chloride (NS) flush 5-40 mL, 5-40 mL, IntraVENous, PRN, Lalo Reyes MD    acetaminophen (TYLENOL) tablet 650 mg, 650 mg, Oral, Q6H PRN, 650 mg at 01/28/22 1111 **OR** acetaminophen (TYLENOL) suppository 650 mg, 650 mg, Rectal, Q6H PRN, Lalo Reyes MD    polyethylene glycol (MIRALAX) packet 17 g, 17 g, Oral, DAILY PRN, Lalo Reyes MD    ondansetron (ZOFRAN ODT) tablet 4 mg, 4 mg, Oral, Q8H PRN **OR** ondansetron (ZOFRAN) injection 4 mg, 4 mg, IntraVENous, Q6H PRN, Lalo Reyes MD    atorvastatin (LIPITOR) tablet 80 mg, 80 mg, Oral, QHS, Skyler Hutson MD, 80 mg at 02/01/22 2100    piperacillin-tazobactam (ZOSYN) 3.375 g in 0.9% sodium chloride (MBP/ADV) 100 mL MBP, 3.375 g, IntraVENous, Q8H, Pierre Aceves MD, Last Rate: 25 mL/hr at 02/02/22 0556, 3.375 g at 02/02/22 0556    dexamethasone (DECADRON) 4 mg/mL injection 6 mg, 6 mg, IntraVENous, Q12H, Christopher Nuñez MD, 6 mg at 02/01/22 2100    azithromycin (ZITHROMAX) 500 mg in 0.9% sodium chloride 250 mL (VIAL-MATE), 500 mg, IntraVENous, Q24H, Lalo Reyes MD, Last Rate: 250 mL/hr at 02/01/22 1519, 500 mg at 02/01/22 1519    baricitinib (OLUMIANT) tablet 1 mg, 1 mg, Oral, DAILY, Ge Duran MD, 1 mg at 02/01/22 6494      Assessment:     Active Problems:    Acute encephalopathy (5/18/2021)        Plan:     Case discussed with Collaborating physician Dr. Tammi Allred and our recommendations are as follows:     1. NSTEMI:   - HS troponin 1354 > 1510. Likely related to demand ischemia in the setting of COVID and sepsis  - continue asa  - Echo: EF 65% with no wall motion abnormalities. - Cr 7.04 for CRRT today     2. Hypotension:   - Blood pressure below goal  - Continue levo and wean as tolerated.     3. COVID pna:   - COVID positive   -  Management per primary. - Will monitor telemetry and to evaluate for possible QTC prolongation. Will continue to monitor.  - Recommend to keep a negative fluid balance to prevent volume overload. 4. Hypokalemia:   - K 4.0, will replete and repeat labs in the am.     Thank you for involving us in the care of this patient. Please do not hesitate to call if additional questions arise. If after hours please call [phone]. Signed By: Macarena Thurman NP     February 2, 2022        Dr. Raghav Land Cardiologist    [facility]  [address] [place], [address] [facility]  [phone]

## 2022-02-02 NOTE — PROGRESS NOTES
NEUROLOGY  PROGRESS NOTE    Admission History/Pertinent Events  Fiona Estrada is a 78y.o. year old male seen in follow-up and he remains unchanged with no responsiveness to deep painful stimuli except he becomes tachypneic and slightly grimaces. He presented on 1/27/2022 with unresponsiveness. Per chart review, patient's brother reported patient had not been feeling well and been having generalized weakness for a few days prior to presentation. On morning patient presented, patient was found to be unresponsive altogether. In the ED, patient was found to be Covid19+ with a temperature as high as 106 °F along with elevated troponins with concern for NSTEMI for which patient was started on a heparin drip. Emergent CT head was negative for any acute findings and patient was not deemed a candidate for acute neurological intervention. During patient's stay, patient diagnosed with sepsis, respiratory failure requiring intubation, aspiration pneumonia, ANDRES and transaminitis thought to be possibly related to hypoperfusion. On evening of 1/28, patient reportedly had some seizure-like activity with increase in his respiratory rate and blood pressures dropping into the 50s.     Home AEDs: Levetiracetam 1000 BID, Valproic Acid 500 BID, Lacosamide 200 BID     Workup Completed/Reviewed: CT WO 1/27; EEG showed moderate to severe generalized slowing      ASSESSMENT/PLAN      Impression  Patient about the same on examination as he grimaces and becomes tachypneic now to peripheral pain stimulation . Still remains heavily encephalopathic even though patient has been off of sedation for long time. Etiology is multiple metabolic derangements including hepatorenal syndrome sepsis hypotension leading to some degree of hypoxia. Doubt any primary neurological etiology for his unresponsiveness. Continue with the management of acute medical conditions. We will continue patient on his AEDs per below.   On the basis of no significant improvement in neurological status the prognosis seems to be poor    Plan    Seizure-Like Activity  Encephalopathy, Likely Infectious/Metabolic  History of Epilepsy  Associated: COVID-19 Infection, Sepsis, NSTEMI, ANDRES, Transaminitis, Schizophrenia  -Q6Hr NeuroChecks, TELE  -Seizure Precautions  -Seizure Prophylaxis: Levetiracetam 1500 BID, Valproic Acid 500 BID, Lacosamide 200 BID  -STAT IV Lorazepam 2 mg with any clinical seizure activity lasting > 3 minutes and contact Neurology for further recommendations  -Management of metabolic/infectious derangements to referring teams  -Plan for EEG on 2/1 when available  -Follow-up CT head done yesterday did not reveal any acute findings    SUBJECTIVE   Patient grimacing to pain stimulation today.   Otherwise no significant changes on examination      Physical/Neurological Exam  Patient intubated  Patient does not follow any central or peripheral commands  Does not attempt to speak  Pupils react to light bilaterally  Oculocephalics sluggish  Corneal reflex intact bilaterally  No BTT bilaterally  No facial droop  Tongue is midline under ETT  Motor              0/5 throughout to noxious pain stimulation  No abnormal movements  Normal tone throughout      OBJECTIVE  Vital Signs  Temp:  [96.4 °F (35.8 °C)-99.3 °F (37.4 °C)]   Pulse (Heart Rate):  [64-88]   BP: ()/(48-89)   Resp Rate:  [16-31]   O2 Sat (%):  [90 %-100 %]   Weight:  [107.5 kg (236 lb 15.9 oz)]     MEDICATIONS    Current Facility-Administered Medications:     dexamethasone (DECADRON) 4 mg/mL injection 4 mg, 4 mg, IntraVENous, Q12H, Ge Duran MD    NOREPINephrine (LEVOPHED) 8 mg in 0.9% NS 250ml infusion, 0.5-120 mcg/min, IntraVENous, TITRATE, Ge Duran MD, Stopped at 02/01/22 1827    lactulose (CHRONULAC) 10 gram/15 mL solution 15 mL, 10 g, Per G Tube, TID, Marianne Licona MD, 15 mL at 02/02/22 0915    glucose chewable tablet 16 g, 4 Tablet, Oral, PRN, Marianne Licona, MD    glucagon (GLUCAGEN) injection 1 mg, 1 mg, IntraMUSCular, PRN, Corey Matute MD    insulin lispro (HUMALOG) injection, , SubCUTAneous, Q4H, Jaz Knox MD, 3 Units at 01/31/22 0846    [Held by provider] insulin glargine (LANTUS) injection 40 Units, 40 Units, SubCUTAneous, QHS, Jaz Knox MD, 40 Units at 01/31/22 2121    dextrose 10% infusion 125-250 mL, 125-250 mL, IntraVENous, PRN, Jaz Knox MD, 250 mL at 02/01/22 1518    LORazepam (ATIVAN) injection 2 mg, 2 mg, IntraVENous, Q2H PRN, Francis Jorgensen PA-C, 2 mg at 01/29/22 0057    glucose chewable tablet 16 g, 4 Tablet, Oral, PRN, Jaz Knox MD    glucagon (GLUCAGEN) injection 1 mg, 1 mg, IntraMUSCular, PRN, Jaz Knox MD    levETIRAcetam (KEPPRA) tablet 1,500 mg, 1,500 mg, Oral, BID, Tk Pradhan MD, 1,500 mg at 02/02/22 0915    valproic acid (as sodium salt) (DEPAKENE) 250 mg/5 mL (5 mL) oral solution 500 mg, 500 mg, Oral, BID, Burt Ramsay MD, 500 mg at 02/02/22 0915    sodium chloride (23.4%) 0.225 % in sterile water 1,000 mL infusion, , IntraVENous, CONTINUOUS, Miguelangel Medina MD, Last Rate: 75 mL/hr at 02/02/22 0556, New Bag at 02/02/22 0556    dextrose 10% infusion 125-250 mL, 125-250 mL, IntraVENous, PRN, Jaz Knox MD, 250 mL at 02/02/22 0556    PHENYLephrine (ISRAEL-SYNEPHRINE) 30 mg in 0.9% sodium chloride 250 mL infusion,  mcg/min, IntraVENous, TITRATE, Francis Jorgensen PA-C, Held at 01/28/22 2300    propofol (DIPRIVAN) 10 mg/mL infusion, 0-50 mcg/kg/min, IntraVENous, TITRATE, Naa Lafleur MD, Stopped at 01/29/22 3223    lacosamide (VIMPAT) tablet 200 mg, 200 mg, Oral, BID, Jaz Knox MD, 200 mg at 02/02/22 0915    levothyroxine (SYNTHROID) tablet 75 mcg, 75 mcg, Oral, 7am, Jaz Knox MD, 75 mcg at 02/02/22 0915    aspirin chewable tablet 81 mg, 81 mg, Oral, DAILY, Jaz Knox MD, 81 mg at 02/02/22 0915    sodium chloride (NS) flush 5-40 mL, 5-40 mL, IntraVENous, Q8H, Corey Nuñez MD, 10 mL at 02/02/22 0557    sodium chloride (NS) flush 5-40 mL, 5-40 mL, IntraVENous, PRN, Jaz Knox MD    acetaminophen (TYLENOL) tablet 650 mg, 650 mg, Oral, Q6H PRN, 650 mg at 01/28/22 1111 **OR** acetaminophen (TYLENOL) suppository 650 mg, 650 mg, Rectal, Q6H PRN, Jaz Knox MD    polyethylene glycol (MIRALAX) packet 17 g, 17 g, Oral, DAILY PRN, Jaz Knox MD    ondansetron (ZOFRAN ODT) tablet 4 mg, 4 mg, Oral, Q8H PRN **OR** ondansetron (ZOFRAN) injection 4 mg, 4 mg, IntraVENous, Q6H PRN, Jaz Knox MD    atorvastatin (LIPITOR) tablet 80 mg, 80 mg, Oral, QHS, Naa Lafleur MD, 80 mg at 02/01/22 2100    piperacillin-tazobactam (ZOSYN) 3.375 g in 0.9% sodium chloride (MBP/ADV) 100 mL MBP, 3.375 g, IntraVENous, Q8H, Freda cAeves MD, Last Rate: 25 mL/hr at 02/02/22 0556, 3.375 g at 02/02/22 0556    azithromycin (ZITHROMAX) 500 mg in 0.9% sodium chloride 250 mL (VIAL-MATE), 500 mg, IntraVENous, Q24H, Jaz Knox MD, Last Rate: 250 mL/hr at 02/01/22 1519, 500 mg at 02/01/22 1519    baricitinib (OLUMIANT) tablet 1 mg, 1 mg, Oral, DAILY, Markos Duran MD, 1 mg at 02/02/22 7382    Labs: I've reviewed the labs for today     This document has been prepared by the Dragon voice recognition system, typographical errors may have occurred.  Attempts have been made to correct errors, however inadvertent errors may persist.

## 2022-02-02 NOTE — PROGRESS NOTES
IMPRESSION:   1. Acute hypoxic respiratory failure oxygenation well-maintained on the ventilator  2. Malignant hyperthermia resolved now he is hypothermic  3. COVID-19 pneumonia  4. NSTEMI elevated troponin  5. Shock cardiogenic versus septic vs Hypovolumic Hypotension currently on norepinephrine 17 leisa  6. Acute kidney injury now on hemodialysis  7. Metabolic acidosis  8. Thrombocytopenia worsened  9. Hypernatremia  10. Symptomatic hypokalemia  11. Shock liver with transaminitis  12. Altered mental status unresponsive on no sedation likely anoxic encephalopathy      RECOMMENDATIONS/PLAN:   1. ICU monitoring  1. Ventilator for mechanical life support and prevent respiratory arrest with protective lung strategies ABG acceptable he is still hemodynamically unstable will continue with the current vent support off sedation  2. On Assist control rate 18  PEEP 5 FiO2 45%   3. Blood pressure improved off vasopressors  4. Patient on Decadron Zithromax and baricitinib WBC elevated will decrease Decadron  5. Troponin is elevated 2D echo shows ejection fraction of 65%  6. Now getting dialysis  7. Remains on quarter saline  8. Liver enzyme elevated shock liver  9. Agree with Empiric IV antibiotics blood and urine cultures no growth on Zosyn and Zithromax   10. Temperature 96.6 hypothermia  11.  IV vasopressors for circulatory shock refractory to fluids to maintain SBP> 90      [x] High complexity decision making was performed  [x] See my orders for details  HPI   74-year-old male came in because as he was found unresponsive and fever 107 past medical history of schizophrenia and diabetes gastroesophageal reflux disease and anemia he is COVID-19 positive initially patient was called as NSTEMI but it was canceled patient is intubated on ventilator hemodynamically unstable unable to get any history out of the patient he seems dehydrated    PMH:  has a past medical history of Acute renal failure ([address] Anemia, Arthritis, DKA (diabetic ketoacidoses), GERD (gastroesophageal reflux disease), HTN (hypertension), Hypercholesterolemia, Schizophrenia ([address] Schizophreniform disorder ([address] Seizure disorder ([address] and Type II or unspecified type diabetes mellitus without mention of complication, uncontrolled. PSH:   has a past surgical history that includes ir insert non tunl cvc over 5 yrs (1/28/2022) and ir insert non tunl cvc over 5 yrs (1/31/2022). FHX: family history includes Stroke in his father. SHX:  reports that he has never smoked. He has never used smokeless tobacco. He reports that he does not drink alcohol and does not use drugs.     ALL: No Known Allergies     MEDS:   [x] Reviewed - As Below   [] Not reviewed    Current Facility-Administered Medications   Medication    NOREPINephrine (LEVOPHED) 8 mg in 0.9% NS 250ml infusion    lactulose (CHRONULAC) 10 gram/15 mL solution 15 mL    glucose chewable tablet 16 g    glucagon (GLUCAGEN) injection 1 mg    insulin lispro (HUMALOG) injection    [Held by provider] insulin glargine (LANTUS) injection 40 Units    dextrose 10% infusion 125-250 mL    pantoprazole (PROTONIX) 40 mg in 0.9% sodium chloride 10 mL injection    LORazepam (ATIVAN) injection 2 mg    glucose chewable tablet 16 g    glucagon (GLUCAGEN) injection 1 mg    levETIRAcetam (KEPPRA) tablet 1,500 mg    valproic acid (as sodium salt) (DEPAKENE) 250 mg/5 mL (5 mL) oral solution 500 mg    sodium chloride (23.4%) 0.225 % in sterile water 1,000 mL infusion    dextrose 10% infusion 125-250 mL    PHENYLephrine (ISRAEL-SYNEPHRINE) 30 mg in 0.9% sodium chloride 250 mL infusion    propofol (DIPRIVAN) 10 mg/mL infusion    lacosamide (VIMPAT) tablet 200 mg    levothyroxine (SYNTHROID) tablet 75 mcg    aspirin chewable tablet 81 mg    sodium chloride (NS) flush 5-40 mL    sodium chloride (NS) flush 5-40 mL    acetaminophen (TYLENOL) tablet 650 mg    Or    acetaminophen (TYLENOL) suppository 650 mg    polyethylene glycol (MIRALAX) packet 17 g    ondansetron (ZOFRAN ODT) tablet 4 mg    Or    ondansetron (ZOFRAN) injection 4 mg    atorvastatin (LIPITOR) tablet 80 mg    piperacillin-tazobactam (ZOSYN) 3.375 g in 0.9% sodium chloride (MBP/ADV) 100 mL MBP    dexamethasone (DECADRON) 4 mg/mL injection 6 mg    azithromycin (ZITHROMAX) 500 mg in 0.9% sodium chloride 250 mL (VIAL-MATE)    baricitinib (OLUMIANT) tablet 1 mg      MAR reviewed and pertinent medications noted or modified as needed   Current Facility-Administered Medications   Medication    NOREPINephrine (LEVOPHED) 8 mg in 0.9% NS 250ml infusion    lactulose (CHRONULAC) 10 gram/15 mL solution 15 mL    glucose chewable tablet 16 g    glucagon (GLUCAGEN) injection 1 mg    insulin lispro (HUMALOG) injection    [Held by provider] insulin glargine (LANTUS) injection 40 Units    dextrose 10% infusion 125-250 mL    pantoprazole (PROTONIX) 40 mg in 0.9% sodium chloride 10 mL injection    LORazepam (ATIVAN) injection 2 mg    glucose chewable tablet 16 g    glucagon (GLUCAGEN) injection 1 mg    levETIRAcetam (KEPPRA) tablet 1,500 mg    valproic acid (as sodium salt) (DEPAKENE) 250 mg/5 mL (5 mL) oral solution 500 mg    sodium chloride (23.4%) 0.225 % in sterile water 1,000 mL infusion    dextrose 10% infusion 125-250 mL    PHENYLephrine (ISRAEL-SYNEPHRINE) 30 mg in 0.9% sodium chloride 250 mL infusion    propofol (DIPRIVAN) 10 mg/mL infusion    lacosamide (VIMPAT) tablet 200 mg    levothyroxine (SYNTHROID) tablet 75 mcg    aspirin chewable tablet 81 mg    sodium chloride (NS) flush 5-40 mL    sodium chloride (NS) flush 5-40 mL    acetaminophen (TYLENOL) tablet 650 mg    Or    acetaminophen (TYLENOL) suppository 650 mg    polyethylene glycol (MIRALAX) packet 17 g    ondansetron (ZOFRAN ODT) tablet 4 mg    Or    ondansetron (ZOFRAN) injection 4 mg    atorvastatin (LIPITOR) tablet 80 mg    piperacillin-tazobactam (ZOSYN) 3.375 g in 0.9% sodium chloride (MBP/ADV) 100 mL MBP    dexamethasone (DECADRON) 4 mg/mL injection 6 mg    azithromycin (ZITHROMAX) 500 mg in 0.9% sodium chloride 250 mL (VIAL-MATE)    baricitinib (OLUMIANT) tablet 1 mg      PMH:  has a past medical history of Acute renal failure ([facility] Anemia, Arthritis, DKA (diabetic ketoacidoses), GERD (gastroesophageal reflux disease), HTN (hypertension), Hypercholesterolemia, Schizophrenia ([facility] [facility] Schizophreniform disorder ([facility] [address] Seizure disorder ([facility] and Type II or unspecified type diabetes mellitus without mention of complication, uncontrolled. PSH:   has a past surgical history that includes ir insert non tunl cvc over 5 yrs (2022) and ir insert non tunl cvc over 5 yrs (2022). FHX: family history includes Stroke in his father. SHX:  reports that he has never smoked. He has never used smokeless tobacco. He reports that he does not drink alcohol and does not use drugs. ROS:  Unable to obtain intubated on ventilator and sedated    Hemodynamics:    CO:    CI:    CVP:    SVR:   PAP Systolic:    PAP Diastolic:    PVR:    FW52:        Ventilator Settings:      Mode Rate TV Press PEEP FiO2 PIP Min.  Vent   Assist control,Volume control    500 ml    5 cm H20 50 %  31 cm H2O  12.5 l/min        Vital Signs: Telemetry:    normal sinus rhythm Intake/Output:   Visit Vitals  /74 (BP 1 Location: Left upper arm, BP Patient Position: At rest)   Pulse 73   Temp (!) 96.6 °F (35.9 °C)   Resp 24   Ht 6' (1.829 m)   Wt 107.5 kg (236 lb 15.9 oz)   SpO2 99%   BMI 32.14 kg/m²       Temp (24hrs), Av.7 °F (36.5 °C), Min:96.6 °F (35.9 °C), Max:98.4 °F (36.9 °C)        O2 Device: Ventilator         Wt Readings from Last 4 Encounters:   22 107.5 kg (236 lb 15.9 oz)   22 101.1 kg (222 lb 14.2 oz)   10/06/21 111.1 kg (245 lb)   21 110.7 kg (244 lb)          Intake/Output Summary (Last 24 hours) at 2022 0914  Last data filed at 2022 0800  Gross per 24 hour   Intake 3524.45 ml   Output 3416 ml   Net 108.45 ml       Last shift:      02/02 0701 - 02/02 1900  In: -   Out: 155   Last 3 shifts: 01/31 1901 - 02/02 0700  In: 3764.5 [I.V.:3384.5]  Out: 4722 [Urine:30; Drains:200]       Physical Exam:     General:  intubated on vent unresponsive on no sedation  HEENT: NCAT,   Eyes: anicteric; conjunctiva clear no doll's eye reflex  Neck: no nodes, no cuff leak, trach midline; no accessory MM use. Chest: no deformity,   Cardiac: R regular; no murmur;   Lungs: distant breath sounds; no wheezes a few rales in the base  Abd: soft, NT, hypoactive BS  Ext: Trace edema; no joint swelling;  No clubbing  : No urine  Neuro: Unresponsive on no sedation no doll's eye reflex flaccid extremity  Psych-  unable to assess  Skin: warm, dry, no cyanosis;   Pulses: Brachial and radial pulses intact  Capillary: Slow capillary refill      DATA:    MAR reviewed and pertinent medications noted or modified as needed  MEDS:   Current Facility-Administered Medications   Medication    NOREPINephrine (LEVOPHED) 8 mg in 0.9% NS 250ml infusion    lactulose (CHRONULAC) 10 gram/15 mL solution 15 mL    glucose chewable tablet 16 g    glucagon (GLUCAGEN) injection 1 mg    insulin lispro (HUMALOG) injection    [Held by provider] insulin glargine (LANTUS) injection 40 Units    dextrose 10% infusion 125-250 mL    pantoprazole (PROTONIX) 40 mg in 0.9% sodium chloride 10 mL injection    LORazepam (ATIVAN) injection 2 mg    glucose chewable tablet 16 g    glucagon (GLUCAGEN) injection 1 mg    levETIRAcetam (KEPPRA) tablet 1,500 mg    valproic acid (as sodium salt) (DEPAKENE) 250 mg/5 mL (5 mL) oral solution 500 mg    sodium chloride (23.4%) 0.225 % in sterile water 1,000 mL infusion    dextrose 10% infusion 125-250 mL    PHENYLephrine (ISRAEL-SYNEPHRINE) 30 mg in 0.9% sodium chloride 250 mL infusion    propofol (DIPRIVAN) 10 mg/mL infusion    lacosamide (VIMPAT) tablet 200 mg    levothyroxine (SYNTHROID) tablet 75 mcg    aspirin chewable tablet 81 mg    sodium chloride (NS) flush 5-40 mL    sodium chloride (NS) flush 5-40 mL    acetaminophen (TYLENOL) tablet 650 mg    Or    acetaminophen (TYLENOL) suppository 650 mg    polyethylene glycol (MIRALAX) packet 17 g    ondansetron (ZOFRAN ODT) tablet 4 mg    Or    ondansetron (ZOFRAN) injection 4 mg    atorvastatin (LIPITOR) tablet 80 mg    piperacillin-tazobactam (ZOSYN) 3.375 g in 0.9% sodium chloride (MBP/ADV) 100 mL MBP    dexamethasone (DECADRON) 4 mg/mL injection 6 mg    azithromycin (ZITHROMAX) 500 mg in 0.9% sodium chloride 250 mL (VIAL-MATE)    baricitinib (OLUMIANT) tablet 1 mg        Labs:    Recent Labs     02/02/22  0440 02/01/22  1145 01/31/22  0330   WBC 19.4* 16.5* 19.1*   HGB 11.8* 11.3* 11.6*   PLT PLATELET CLUMPS SEEN ON SMEAR, NA CITRATE ORDERED FOR ACCURATE PLT COUNT. PLT CLUMPS SEEN ON SMEAR REVIEW DUE TO EDTA SENSITIV 44*   INR 1.2* 1.2* 1.3*   APTT  --  42.3* 46.5*     Recent Labs     02/02/22  0440 02/01/22  2130 02/01/22  0555 01/31/22  2325 01/31/22  0330    139 143   < > 142  143   K 4.1 3.7 4.0   < > 4.7  4.1   * 112* 112*   < > 112*  112*   CO2 16* 20* 17*   < > 19*  19*   GLU 66 100 114*   < > 167*  170*   BUN 63* 54* 105*   < > 125*  125*   CREA 4.06* 3.47* 7.04*   < > 9.56*  9.48*   CA 6.9* 5.9* 6.8*   < > 7.5*  7.4*   MG 2.1  --  2.0  --  2.1   PHOS 3.0  --  5.7*  --  5.6*   ALB 1.5* 1.5* 1.5*  --  1.6*  1.6*   * 718* 875*  --  1,259*    < > = values in this interval not displayed.      Recent Labs     02/02/22  0450 02/01/22  0325 01/31/22  0342   PH 7.30* 7.31* 7.29*   PCO2 31* 32* 35   PO2 78 71* 76   HCO3 16* 18* 18*   FIO2 50.0 45.0 45.0       Lab Results   Component Value Date/Time    Culture result: No Growth (<1000 cfu/mL) 01/27/2022 02:00 PM    Culture result: No growth 5 days 01/27/2022 01:30 PM    Culture result: No significant growth, <10,000 CFU/mL 06/05/2021 08:45 AM     Lab Results Component Value Date/Time    TSH 4.47 (H) 05/21/2021 10:46 AM        Imaging:    Results from Hospital Encounter encounter on 01/27/22    XR CHEST PORT    Narrative  Chest single view. Comparison single view chest January 31, 2022 at 2:11. Right neck central vascular catheters x2. ET tube and NG tube stable position. Patchy basilar reticular markings, left more so than right persist. Cardiac and  mediastinal structures unchanged. No pneumothorax or sizable pleural effusion. Results from [place] encounter on 01/27/22    CT HEAD WO CONT    Narrative  Exam: CT Head without contrast    TECHNIQUE: Multiple transaxial CT images of the head were obtained without  contrast. Coronal and sagittal reformatted images were provided. Dose reduction: All CT scans at this facility are performed using dose reduction  optimization techniques as appropriate to a performed exam including the  following: Automated exposure control, adjustments of the mA and/or kV according  to patient size, or use of iterative reconstruction technique. HISTORY: anoxia    COMPARISON: Head CT 10/6/2021, 1/27/2022    FINDINGS:    Global parenchymal volume loss appears similar to prior with proportional ex  vacuo dilatation of the ventricles and other extra-axial CSF spaces, slightly  more prominent on the left. No significant midline shift or mass effect. Gray-white matter differentiation appears preserved. No findings of acute  intracranial hemorrhage. Basilar cisterns appear preserved. Intracranial  atherosclerosis. There is bilateral opacification of the mastoid air cells, most likely  indicating nonspecific mastoid effusions. Debris within the external auditory  canals is most likely cerumen. There is mild mucosal thickening in the paranasal  sinuses, most pronounced in the maxillary sinuses. Globes and orbits appear  unremarkable.  Calvarium appears intact without findings of acute or aggressive  abnormality. Impression  Overall similar exam to prior without findings of acute intracranial  abnormality. · 1/30 remains on the ventilator has metabolic acidosis we will add bicarb per tube. Maintain ventilator on current settings although will decrease PEEP slightly. Platelets continue to drop.   He is unresponsive on no sedation likely anoxic encephalopathy  · 1/31 remain intubated on ventilator hemodynamically worsening of the renal function  · 2/1 remained on ventilator hemodynamically unstable on levofed  · 2/2 on ventilator hypothermic electrolyte imbalance will continue with the current vent settings

## 2022-02-02 NOTE — PROCEDURES
[facility]  EEG    Name:  Larry Devries  MR#:  [id number]  :  1945  ACCOUNT #:  [de-identified]  DATE OF SERVICE:  2022      A patient of Dr. Denton Stands. EEG was ordered to evaluate for decreased responsiveness and seizures. MEDICATIONS:  Please review the chart. DESCRIPTION:  This is a 19-channel digital recording done in bipolar and referential montages with one channel each assigned to the EKG and the 2 reference electrodes at the Cz, using the 10-20 international system of electrodes. The background rhythm consisted of mixed delta and theta frequencies, predominantly delta 2-3 Hz and less commonly theta 4 and occasionally 5 Hz activity. Photic stimulation did not produce any significant driving. Hyperventilation could not be performed due to the patient's condition. EKG artifact was noted especially during the ear montage. No focal asymmetries or epileptiform activity or discharges were noted during this recording. INTERPRETATION:  This is an abnormal EEG due to moderate-to-severe generalized slowing, which is a nonspecific finding and is indicative of diffuse cerebral dysfunction as can be seen in metabolic, toxic, hypoxic, degenerative or vascular etiologies or in the postictal state. Clinical correlation is suggested. No epileptiform activity or discharges were noted during this EEG.       MD JOSE MANUEL Weems/ANA [email or web address]  D:  2022 7:48  T:  2022 9:59  JOB #:  [id number]

## 2022-02-02 NOTE — PROGRESS NOTES
Renal Progress Note    Patient: Topher Best MRN: [id number]  SSN: [id number]    YOB: 1945  Age: 78 y.o. Sex: male      Admit Date: 1/27/2022    LOS: 6 days     Subjective:   Patient seen in intensive care unit, on ventilator, unresponsive  remains anuric,   On CRRT, tolerating well,   Patient currently off pressors.     Current Facility-Administered Medications   Medication Dose Route Frequency    dexamethasone (DECADRON) 4 mg/mL injection 4 mg  4 mg IntraVENous Q12H    NOREPINephrine (LEVOPHED) 8 mg in 0.9% NS 250ml infusion  0.5-120 mcg/min IntraVENous TITRATE    lactulose (CHRONULAC) 10 gram/15 mL solution 15 mL  10 g Per G Tube TID    glucose chewable tablet 16 g  4 Tablet Oral PRN    glucagon (GLUCAGEN) injection 1 mg  1 mg IntraMUSCular PRN    insulin lispro (HUMALOG) injection   SubCUTAneous Q4H    [Held by provider] insulin glargine (LANTUS) injection 40 Units  40 Units SubCUTAneous QHS    dextrose 10% infusion 125-250 mL  125-250 mL IntraVENous PRN    LORazepam (ATIVAN) injection 2 mg  2 mg IntraVENous Q2H PRN    glucose chewable tablet 16 g  4 Tablet Oral PRN    glucagon (GLUCAGEN) injection 1 mg  1 mg IntraMUSCular PRN    levETIRAcetam (KEPPRA) tablet 1,500 mg  1,500 mg Oral BID    valproic acid (as sodium salt) (DEPAKENE) 250 mg/5 mL (5 mL) oral solution 500 mg  500 mg Oral BID    sodium chloride (23.4%) 0.225 % in sterile water 1,000 mL infusion   IntraVENous CONTINUOUS    dextrose 10% infusion 125-250 mL  125-250 mL IntraVENous PRN    PHENYLephrine (ISRAEL-SYNEPHRINE) 30 mg in 0.9% sodium chloride 250 mL infusion   mcg/min IntraVENous TITRATE    propofol (DIPRIVAN) 10 mg/mL infusion  0-50 mcg/kg/min IntraVENous TITRATE    lacosamide (VIMPAT) tablet 200 mg  200 mg Oral BID    levothyroxine (SYNTHROID) tablet 75 mcg  75 mcg Oral 7am    aspirin chewable tablet 81 mg  81 mg Oral DAILY    sodium chloride (NS) flush 5-40 mL  5-40 mL IntraVENous Q8H    sodium chloride (NS) flush 5-40 mL  5-40 mL IntraVENous PRN    acetaminophen (TYLENOL) tablet 650 mg  650 mg Oral Q6H PRN    Or    acetaminophen (TYLENOL) suppository 650 mg  650 mg Rectal Q6H PRN    polyethylene glycol (MIRALAX) packet 17 g  17 g Oral DAILY PRN    ondansetron (ZOFRAN ODT) tablet 4 mg  4 mg Oral Q8H PRN    Or    ondansetron (ZOFRAN) injection 4 mg  4 mg IntraVENous Q6H PRN    atorvastatin (LIPITOR) tablet 80 mg  80 mg Oral QHS    piperacillin-tazobactam (ZOSYN) 3.375 g in 0.9% sodium chloride (MBP/ADV) 100 mL MBP  3.375 g IntraVENous Q8H    azithromycin (ZITHROMAX) 500 mg in 0.9% sodium chloride 250 mL (VIAL-MATE)  500 mg IntraVENous Q24H    baricitinib (OLUMIANT) tablet 1 mg  1 mg Oral DAILY        Vitals:    02/02/22 0900 02/02/22 1000 02/02/22 1100 02/02/22 1133   BP: 113/67 108/61 122/72    Pulse:   75 78   Resp: 28 24 26 21   Temp:   97 °F (36.1 °C)    SpO2: 100% 100% 98% 99%   Weight:       Height:         Objective:   General: On ventilator, unresponsive   HEENT:  no Icterus, no Pallor, ET tube in place, mucosa dry   neck: Neck is supple, No JVD  Lungs: Decreased breath sounds at the bases,   CVS: heart sounds normal,  no murmurs, no rubs. GI: soft, nontender, no distention  Extremeties: no cyanosis, no edema    Neuro: Patient unresponsive, on vent, off sedation   skin: normal skin turgor, no skin rashes. Intake and Output:  Current Shift: 02/02 0701 - 02/02 1900  In: -   Out: 538   Last three shifts: 01/31 1901 - 02/02 0700  In: 3764.5 [I.V.:3384.5]  Out: 4848 [Urine:30; Drains:200]      Lab/Data Review:  Recent Labs     02/02/22  0440 02/01/22  1145 01/31/22  0330   WBC 19.4* 16.5* 19.1*   HGB 11.8* 11.3* 11.6*   HCT 34.4* 33.2* 35.2*   PLT PLATELET CLUMPS SEEN ON SMEAR, NA CITRATE ORDERED FOR ACCURATE PLT COUNT.  PLT CLUMPS SEEN ON SMEAR REVIEW DUE TO EDTA SENSITIV 44*     Recent Labs     02/02/22  0440 02/01/22  2130 02/01/22  1145 02/01/22  0555 01/31/22  2325 01/31/22  0330  139  --  143   < > 142  143   K 4.1 3.7  --  4.0   < > 4.7  4.1   * 112*  --  112*   < > 112*  112*   CO2 16* 20*  --  17*   < > 19*  19*   GLU 66 100  --  114*   < > 167*  170*   BUN 63* 54*  --  105*   < > 125*  125*   CREA 4.06* 3.47*  --  7.04*   < > 9.56*  9.48*   CA 6.9* 5.9*  --  6.8*   < > 7.5*  7.4*   MG 2.1  --   --  2.0  --  2.1   PHOS 3.0  --   --  5.7*  --  5.6*   ALB 1.5* 1.5*  --  1.5*  --  1.6*  1.6*   TBILI 0.6 0.6  --  0.6  --  0.7   * 718*  --  875*  --  1,259*   INR 1.2*  --  1.2*  --   --  1.3*    < > = values in this interval not displayed. Recent Labs     02/02/22  0450 02/01/22  0325 01/31/22  0342   PH 7.30* 7.31* 7.29*   PCO2 31* 32* 35   PO2 78 71* 76   HCO3 16* 18* 18*   FIO2 50.0 45.0 45.0     Recent Results (from the past 24 hour(s))   RETICULOCYTE COUNT    Collection Time: 02/01/22 11:45 AM   Result Value Ref Range    Reticulocyte count 0.8 0.7 - 2.1 %    Absolute Retic Cnt. 0.0266 0.0260 - 0.0950 M/ul   LD    Collection Time: 02/01/22 11:45 AM   Result Value Ref Range    LD 1,912 (H) 85 - 241 U/L   MISC.  LAB TEST    Collection Time: 02/01/22 11:45 AM   Result Value Ref Range    Test Description: Peripheral      Reference Lab: Peripheral     VITAMIN B12 & FOLATE    Collection Time: 02/01/22 11:45 AM   Result Value Ref Range    Vitamin B12 >2,000 (H) 193 - 986 pg/mL    Folate 9.0 5.0 - 21.0 ng/mL   PROTHROMBIN TIME + INR    Collection Time: 02/01/22 11:45 AM   Result Value Ref Range    Prothrombin time 14.7 11.9 - 14.7 sec    INR 1.2 (H) 0.9 - 1.1     PTT    Collection Time: 02/01/22 11:45 AM   Result Value Ref Range    aPTT 42.3 (H) 21.2 - 34.1 sec    aPTT, therapeutic range   82 - 109 sec   FIBRINOGEN    Collection Time: 02/01/22 11:45 AM   Result Value Ref Range    Fibrinogen 652 (H) 220 - 535 mg/dL   D DIMER    Collection Time: 02/01/22 11:45 AM   Result Value Ref Range    D DIMER >20.00 (H) <0.50 ug/ml(FEU)   CBC WITH AUTOMATED DIFF    Collection Time: 02/01/22 11:45 AM   Result Value Ref Range    WBC 16.5 (H) 4.1 - 11.1 K/uL    RBC 3.58 (L) 4.10 - 5.70 M/uL    HGB 11.3 (L) 12.1 - 17.0 g/dL    HCT 33.2 (L) 36.6 - 50.3 %    MCV 92.7 80.0 - 99.0 FL    MCH 31.6 26.0 - 34.0 PG    MCHC 34.0 30.0 - 36.5 g/dL    RDW 14.8 (H) 11.5 - 14.5 %    PLATELET  475 - 126 K/uL     PLT CLUMPS SEEN ON SMEAR REVIEW DUE TO EDTA SENSITIV    NRBC 0.3 (H) 0.0  WBC    ABSOLUTE NRBC 0.05 (H) 0.00 - 0.01 K/uL    NEUTROPHILS 94 (H) 32 - 75 %    LYMPHOCYTES 2 (L) 12 - 49 %    MONOCYTES 3 (L) 5 - 13 %    EOSINOPHILS 0 0 - 7 %    BASOPHILS 0 0 - 1 %    IMMATURE GRANULOCYTES 1 (H) 0 - 0.5 %    ABS. NEUTROPHILS 15.5 (H) 1.8 - 8.0 K/UL    ABS. LYMPHOCYTES 0.4 (L) 0.8 - 3.5 K/UL    ABS. MONOCYTES 0.4 0.0 - 1.0 K/UL    ABS. EOSINOPHILS 0.0 0.0 - 0.4 K/UL    ABS. BASOPHILS 0.0 0.0 - 0.1 K/UL    ABS. IMM.  GRANS. 0.2 (H) 0.00 - 0.04 K/UL    DF AUTOMATED     PLATELET COUNT ON CITRATED BLD    Collection Time: 02/01/22 12:59 PM   Result Value Ref Range    PLATELET COUNT (NA CITRATE) 101 (L) 150 - 400 K/uL   GLUCOSE, POC    Collection Time: 02/01/22  2:58 PM   Result Value Ref Range    Glucose (POC) 65 65 - 117 mg/dL    Performed by Corky Ivanoff    GLUCOSE, POC    Collection Time: 02/01/22  3:39 PM   Result Value Ref Range    Glucose (POC) 107 65 - 117 mg/dL    Performed by Corky Ivanoff    GLUCOSE, POC    Collection Time: 02/01/22  8:50 PM   Result Value Ref Range    Glucose (POC) 57 (L) 65 - 117 mg/dL    Performed by [facility]    METABOLIC PANEL, COMPREHENSIVE    Collection Time: 02/01/22  9:30 PM   Result Value Ref Range    Sodium 139 136 - 145 mmol/L    Potassium 3.7 3.5 - 5.1 mmol/L    Chloride 112 (H) 97 - 108 mmol/L    CO2 20 (L) 21 - 32 mmol/L    Anion gap 7 5 - 15 mmol/L    Glucose 100 65 - 100 mg/dL    BUN 54 (H) 6 - 20 mg/dL    Creatinine 3.47 (H) 0.70 - 1.30 mg/dL    BUN/Creatinine ratio 16 12 - 20      GFR est AA 21 (L) >60 ml/min/1.73m2    GFR est non-AA 17 (L) >60 ml/min/1.73m2    Calcium 5.9 (LL) 8.5 - 10.1 mg/dL    Bilirubin, total 0.6 0.2 - 1.0 mg/dL    AST (SGOT) 1,024 (H) 15 - 37 U/L    ALT (SGPT) 718 (H) 12 - 78 U/L    Alk. phosphatase 111 45 - 117 U/L    Protein, total 6.1 (L) 6.4 - 8.2 g/dL    Albumin 1.5 (L) 3.5 - 5.0 g/dL    Globulin 4.6 (H) 2.0 - 4.0 g/dL    A-G Ratio 0.3 (L) 1.1 - 2.2     GLUCOSE, POC    Collection Time: 02/01/22 11:57 PM   Result Value Ref Range    Glucose (POC) 97 65 - 117 mg/dL    Performed by Weston Phipps    GLUCOSE, POC    Collection Time: 02/02/22  4:25 AM   Result Value Ref Range    Glucose (POC) 70 65 - 117 mg/dL    Performed by Weston Phipps    CBC W/O DIFF    Collection Time: 02/02/22  4:40 AM   Result Value Ref Range    WBC 19.4 (H) 4.1 - 11.1 K/uL    RBC 3.66 (L) 4.10 - 5.70 M/uL    HGB 11.8 (L) 12.1 - 17.0 g/dL    HCT 34.4 (L) 36.6 - 50.3 %    MCV 94.0 80.0 - 99.0 FL    MCH 32.2 26.0 - 34.0 PG    MCHC 34.3 30.0 - 36.5 g/dL    RDW 15.0 (H) 11.5 - 14.5 %    PLATELET  979 - 210 K/uL     PLATELET CLUMPS SEEN ON SMEAR, NA CITRATE ORDERED FOR ACCURATE PLT COUNT. NRBC 0.5 (H) 0.0  WBC    ABSOLUTE NRBC 0.09 (H) 0.00 - 8.21 K/uL   METABOLIC PANEL, COMPREHENSIVE    Collection Time: 02/02/22  4:40 AM   Result Value Ref Range    Sodium 140 136 - 145 mmol/L    Potassium 4.1 3.5 - 5.1 mmol/L    Chloride 110 (H) 97 - 108 mmol/L    CO2 16 (L) 21 - 32 mmol/L    Anion gap 14 5 - 15 mmol/L    Glucose 66 65 - 100 mg/dL    BUN 63 (H) 6 - 20 mg/dL    Creatinine 4.06 (H) 0.70 - 1.30 mg/dL    BUN/Creatinine ratio 16 12 - 20      GFR est AA 18 (L) >60 ml/min/1.73m2    GFR est non-AA 15 (L) >60 ml/min/1.73m2    Calcium 6.9 (L) 8.5 - 10.1 mg/dL    Bilirubin, total 0.6 0.2 - 1.0 mg/dL    AST (SGOT) >1,000 (H) 15 - 37 U/L    ALT (SGPT) 687 (H) 12 - 78 U/L    Alk.  phosphatase 116 45 - 117 U/L    Protein, total 6.3 (L) 6.4 - 8.2 g/dL    Albumin 1.5 (L) 3.5 - 5.0 g/dL    Globulin 4.8 (H) 2.0 - 4.0 g/dL    A-G Ratio 0.3 (L) 1.1 - 2.2     PROTHROMBIN TIME + INR    Collection Time: 02/02/22  4:40 AM   Result Value Ref Range    Prothrombin time 14.7 11.9 - 14.7 sec    INR 1.2 (H) 0.9 - 1.1     LD    Collection Time: 02/02/22  4:40 AM   Result Value Ref Range    LD >4,000 (H) 85 - 241 U/L   D DIMER    Collection Time: 02/02/22  4:40 AM   Result Value Ref Range    D DIMER >20.00 (H) <0.50 ug/ml(FEU)   PROCALCITONIN    Collection Time: 02/02/22  4:40 AM   Result Value Ref Range    Procalcitonin 11.51 (H) 0 ng/mL   SED RATE (ESR)    Collection Time: 02/02/22  4:40 AM   Result Value Ref Range    Sed rate, automated 82 mm/hr   PHOSPHORUS    Collection Time: 02/02/22  4:40 AM   Result Value Ref Range    Phosphorus 3.0 2.6 - 4.7 mg/dL   MAGNESIUM    Collection Time: 02/02/22  4:40 AM   Result Value Ref Range    Magnesium 2.1 1.6 - 2.4 mg/dL   BLOOD GAS, ARTERIAL    Collection Time: 02/02/22  4:50 AM   Result Value Ref Range    pH 7.30 (L) 7.35 - 7.45      PCO2 31 (L) 35 - 45 mmHg    PO2 78 75 - 100 mmHg    O2 SAT 95 (L) >95 %    BICARBONATE 16 (L) 22 - 26 mmol/L    BASE DEFICIT 10.3 (H) 0 - 2 mmol/L    O2 METHOD VENT      FIO2 50.0 %    MODE Assist Control/Volume Control      Tidal volume 500      SET RATE 18      EPAP/CPAP/PEEP 5.0      SITE Right Radial      JULIO'S TEST PASS     GLUCOSE, POC    Collection Time: 02/02/22  9:51 AM   Result Value Ref Range    Glucose (POC) 101 65 - 117 mg/dL    Performed by Alexandru Splinter    PLATELET COUNT ON CITRATED BLD    Collection Time: 02/02/22 10:10 AM   Result Value Ref Range    PLATELET COUNT (NA CITRATE) 90 (L) 150 - 400 K/uL        Assessment and Plan:     #1 acute kidney injury  --ANDRES likely 2/2 ATN from septic shock /anoxic renal injury with ARB on board  Patient remains anuric,   Rhabdomyolysis+, probably ischemic, high CPK 8051  Patient is hypotensive on Levophed for pressure support    Started ob CRRT 1/31, tolerating well, will discontinue today after the current bags run out (CRRT and was needed for another patient )  We will plan to discontinue CRRT today and will try intermittent hemodialysis tomorrow patient remains hemodynamically stable  we will continue to monitor electrolytes closely and adjust management as needed     #2 severe hypokalemia: Improved potassium level   on 4K bath with CRRT today and monitor potassium levels     #3 hypernatremia: Improved sodium level to 140  -Continue water flushes via NG tube,off IV fluids  Continue to monitor sodium levels    #4  COVID-19 pneumonia:   -Septic shock  -With multiorgan failure including renal failure/transaminitis/hemodynamics shock  -Patient currently on Decadron azithromycin and Zosyn  Neurology following the patient for anoxic encephalopathy, remains unresponsive  Shock liver with high liver enzymes( improving),   Rhabdomyolysis+/ANDRES, on CRRT, will monitor   -Overall prognosis seems guarded     #5 diabetes  -severe hyperglycemia with blood sugars over 500  -Likely worse because of Decadron  -On insulin     #6 seizure disorder  -On antiseizure medications, neurology following the patient  -Currently off sedation and unresponsive    7. Metabolic acidosis: Secondary to acute kidney injury/hypotension  on CRRT , will give 2 A of sodium bicarbonate IV now and continue to monitor CO2 level    8.   May resume tube feeding after discontinuation of CRRT    Signed By: Errol Rodríguez MD     February 2, 2022

## 2022-02-02 NOTE — PROGRESS NOTES
Clinical chart reviewed by CM. Discharge plan is home with home health services. CM will continue to follow.

## 2022-02-02 NOTE — PROGRESS NOTES
Progress Note    Patient: Tyrell Doss MRN: [id number]  SSN: [id number]    YOB: 1945  Age: 78 y.o. Sex: male      Admit Date: 1/27/2022    LOS: 6 days     Subjective:     76M, H/o Schizophenia, seizures, DMII on oral meds with unresponsive and acute hypoxic respiratory failure s/t COVID-19 pneumonitis complicated by ANDRES, hypokalemia and shock liver.          HSOPITAL COURSE  COVID positive, associated with fever 106, increased troponin, cardiology was consulted ansd recommended ECHO, there is no heparin gtt, was initially called for STEMI and was cancelled. he was intubated for acute hypoxic respiratory failure. On levophed. Complicated by ANDRES, shock liver and hypokalemia. Was treated with azithromycin, barcitinib, and zosyn. Will give 1L bolus and continue IVF. Discussed with family he is very critical- his renal functions increased and now seemingly in ATN, his liver functions have worsened and had a seizure on 1/28 , he is off propofol now and neurology was consulted. Repeat CT scan didn't show any stroke, plan for EEG on 2/1 to assess for neurological activity. The patients liver functions, renal are worsening. Plan for CRRT today    Subjectivepatient seen and evaluated at bedside, currently remains intubated and sedated, discussed with RN      Review of Systems:  Unable to determine s.t mental status    Objective:     Vitals:    02/02/22 0400 02/02/22 0500 02/02/22 0600 02/02/22 0700   BP: (!) 93/58 129/79 129/78 113/70   Pulse: 75 82 79 78   Resp: 27 (!) 31 28 24   Temp: 97.5 °F (36.4 °C) 97.2 °F (36.2 °C) 97.2 °F (36.2 °C) 96.8 °F (36 °C)   SpO2: 98% 98% 97% 100%   Weight:       Height:            Intake and Output:  Current Shift: No intake/output data recorded. Last three shifts: 01/31 1901 - 02/02 0700  In: 3764.5 [I.V.:3384.5]  Out: 4926 [Urine:30; Drains:200]      Physical Exam:   Physical Exam  Vitals and nursing note reviewed.    Constitutional:       General: intubates     Appearance: Normal appearance. He is normal weight. He is ill-appearing. HENT:      Head: Normocephalic and atraumatic.      Nose: Nose normal.      Mouth/Throat:      Mouth: Mucous membranes are moist.   Eyes:      Extraocular Movements: Extraocular movements intact.      Pupils: Pupils are equal, round, and reactive to light. Cardiovascular:      Rate and Rhythm: Regular rhythm. Tachycardia present.      Pulses: Normal pulses.      Heart sounds: Normal heart sounds. Pulmonary:      Effort: Respiratory distress present.      Breath sounds: Rhonchi present. Abdominal:      General: Abdomen is flat. Bowel sounds are normal.      Palpations: Abdomen is soft. Musculoskeletal:         General: No swelling or tenderness. Normal range of motion.      Cervical back: Normal range of motion and neck supple. Skin:     General: Skin is warm and dry.      Capillary Refill: Capillary refill takes less than 2 seconds. Neurological:      Mental Status: He is unresponsive.      GCS: GCS eye subscore is 4. GCS verbal subscore is 1. GCS motor subscore is 1. Psychiatric:         cannot be assessed      Lab/Data Review:  Recent Results (from the past 24 hour(s))   GLUCOSE, POC    Collection Time: 02/01/22 11:17 AM   Result Value Ref Range    Glucose (POC) 97 65 - 117 mg/dL    Performed by Mario [id number]    Collection Time: 02/01/22 11:45 AM   Result Value Ref Range    Reticulocyte count 0.8 0.7 - 2.1 %    Absolute Retic Cnt. 0.0266 0.0260 - 0.0950 M/ul   LD    Collection Time: 02/01/22 11:45 AM   Result Value Ref Range    LD 1,912 (H) 85 - 241 U/L   MISC.  LAB TEST    Collection Time: 02/01/22 11:45 AM   Result Value Ref Range    Test Description: Peripheral      Reference Lab: Peripheral     VITAMIN B12 & FOLATE    Collection Time: 02/01/22 11:45 AM   Result Value Ref Range    Vitamin B12 >2,000 (H) 193 - 986 pg/mL    Folate 9.0 5.0 - 21.0 ng/mL   PROTHROMBIN TIME + INR Collection Time: 02/01/22 11:45 AM   Result Value Ref Range    Prothrombin time 14.7 11.9 - 14.7 sec    INR 1.2 (H) 0.9 - 1.1     PTT    Collection Time: 02/01/22 11:45 AM   Result Value Ref Range    aPTT 42.3 (H) 21.2 - 34.1 sec    aPTT, therapeutic range   82 - 109 sec   FIBRINOGEN    Collection Time: 02/01/22 11:45 AM   Result Value Ref Range    Fibrinogen 652 (H) 220 - 535 mg/dL   D DIMER    Collection Time: 02/01/22 11:45 AM   Result Value Ref Range    D DIMER >20.00 (H) <0.50 ug/ml(FEU)   CBC WITH AUTOMATED DIFF    Collection Time: 02/01/22 11:45 AM   Result Value Ref Range    WBC 16.5 (H) 4.1 - 11.1 K/uL    RBC 3.58 (L) 4.10 - 5.70 M/uL    HGB 11.3 (L) 12.1 - 17.0 g/dL    HCT 33.2 (L) 36.6 - 50.3 %    MCV 92.7 80.0 - 99.0 FL    MCH 31.6 26.0 - 34.0 PG    MCHC 34.0 30.0 - 36.5 g/dL    RDW 14.8 (H) 11.5 - 14.5 %    PLATELET  308 - 530 K/uL     PLT CLUMPS SEEN ON SMEAR REVIEW DUE TO EDTA SENSITIV    NRBC 0.3 (H) 0.0  WBC    ABSOLUTE NRBC 0.05 (H) 0.00 - 0.01 K/uL    NEUTROPHILS 94 (H) 32 - 75 %    LYMPHOCYTES 2 (L) 12 - 49 %    MONOCYTES 3 (L) 5 - 13 %    EOSINOPHILS 0 0 - 7 %    BASOPHILS 0 0 - 1 %    IMMATURE GRANULOCYTES 1 (H) 0 - 0.5 %    ABS. NEUTROPHILS 15.5 (H) 1.8 - 8.0 K/UL    ABS. LYMPHOCYTES 0.4 (L) 0.8 - 3.5 K/UL    ABS. MONOCYTES 0.4 0.0 - 1.0 K/UL    ABS. EOSINOPHILS 0.0 0.0 - 0.4 K/UL    ABS. BASOPHILS 0.0 0.0 - 0.1 K/UL    ABS. IMM.  GRANS. 0.2 (H) 0.00 - 0.04 K/UL    DF AUTOMATED     PLATELET COUNT ON CITRATED BLD    Collection Time: 02/01/22 12:59 PM   Result Value Ref Range    PLATELET COUNT (NA CITRATE) 101 (L) 150 - 400 K/uL   GLUCOSE, POC    Collection Time: 02/01/22  2:58 PM   Result Value Ref Range    Glucose (POC) 65 65 - 117 mg/dL    Performed by Corky Nava    GLUCOSE, POC    Collection Time: 02/01/22  3:39 PM   Result Value Ref Range    Glucose (POC) 107 65 - 117 mg/dL    Performed by Nataliya [facility] [place], POC    Collection Time: 02/01/22  8:50 PM   Result Value Ref Range    Glucose (POC) 57 (L) 65 - 117 mg/dL    Performed by Bertrand Petersen    METABOLIC PANEL, COMPREHENSIVE    Collection Time: 02/01/22  9:30 PM   Result Value Ref Range    Sodium 139 136 - 145 mmol/L    Potassium 3.7 3.5 - 5.1 mmol/L    Chloride 112 (H) 97 - 108 mmol/L    CO2 20 (L) 21 - 32 mmol/L    Anion gap 7 5 - 15 mmol/L    Glucose 100 65 - 100 mg/dL    BUN 54 (H) 6 - 20 mg/dL    Creatinine 3.47 (H) 0.70 - 1.30 mg/dL    BUN/Creatinine ratio 16 12 - 20      GFR est AA 21 (L) >60 ml/min/1.73m2    GFR est non-AA 17 (L) >60 ml/min/1.73m2    Calcium 5.9 (LL) 8.5 - 10.1 mg/dL    Bilirubin, total 0.6 0.2 - 1.0 mg/dL    AST (SGOT) 1,024 (H) 15 - 37 U/L    ALT (SGPT) 718 (H) 12 - 78 U/L    Alk.  phosphatase 111 45 - 117 U/L    Protein, total 6.1 (L) 6.4 - 8.2 g/dL    Albumin 1.5 (L) 3.5 - 5.0 g/dL    Globulin 4.6 (H) 2.0 - 4.0 g/dL    A-G Ratio 0.3 (L) 1.1 - 2.2     GLUCOSE, POC    Collection Time: 02/01/22 11:57 PM   Result Value Ref Range    Glucose (POC) 97 65 - 117 mg/dL    Performed by Bertrand Petersen    GLUCOSE, POC    Collection Time: 02/02/22  4:25 AM   Result Value Ref Range    Glucose (POC) 70 65 - 117 mg/dL    Performed by Bertrand Petersen    CBC W/O DIFF    Collection Time: 02/02/22  4:40 AM   Result Value Ref Range    WBC 19.4 (H) 4.1 - 11.1 K/uL    RBC 3.66 (L) 4.10 - 5.70 M/uL    HGB 11.8 (L) 12.1 - 17.0 g/dL    HCT 34.4 (L) 36.6 - 50.3 %    MCV 94.0 80.0 - 99.0 FL    MCH 32.2 26.0 - 34.0 PG    MCHC 34.3 30.0 - 36.5 g/dL    RDW 15.0 (H) 11.5 - 14.5 %    PLATELET 39 (LL) 462 - 400 K/uL    NRBC 0.5 (H) 0.0  WBC    ABSOLUTE NRBC 0.09 (H) 0.00 - 7.43 K/uL   METABOLIC PANEL, COMPREHENSIVE    Collection Time: 02/02/22  4:40 AM   Result Value Ref Range    Sodium 140 136 - 145 mmol/L    Potassium 4.1 3.5 - 5.1 mmol/L    Chloride 110 (H) 97 - 108 mmol/L    CO2 16 (L) 21 - 32 mmol/L    Anion gap 14 5 - 15 mmol/L    Glucose 66 65 - 100 mg/dL    BUN 63 (H) 6 - 20 mg/dL    Creatinine 4.06 (H) 0.70 - 1.30 mg/dL BUN/Creatinine ratio 16 12 - 20      GFR est AA 18 (L) >60 ml/min/1.73m2    GFR est non-AA 15 (L) >60 ml/min/1.73m2    Calcium 6.9 (L) 8.5 - 10.1 mg/dL    Bilirubin, total 0.6 0.2 - 1.0 mg/dL    AST (SGOT) >1,000 (H) 15 - 37 U/L    ALT (SGPT) 687 (H) 12 - 78 U/L    Alk.  phosphatase 116 45 - 117 U/L    Protein, total 6.3 (L) 6.4 - 8.2 g/dL    Albumin 1.5 (L) 3.5 - 5.0 g/dL    Globulin 4.8 (H) 2.0 - 4.0 g/dL    A-G Ratio 0.3 (L) 1.1 - 2.2     PROTHROMBIN TIME + INR    Collection Time: 02/02/22  4:40 AM   Result Value Ref Range    Prothrombin time 14.7 11.9 - 14.7 sec    INR 1.2 (H) 0.9 - 1.1     LD    Collection Time: 02/02/22  4:40 AM   Result Value Ref Range    LD >4,000 (H) 85 - 241 U/L   D DIMER    Collection Time: 02/02/22  4:40 AM   Result Value Ref Range    D DIMER >20.00 (H) <0.50 ug/ml(FEU)   PHOSPHORUS    Collection Time: 02/02/22  4:40 AM   Result Value Ref Range    Phosphorus 3.0 2.6 - 4.7 mg/dL   BLOOD GAS, ARTERIAL    Collection Time: 02/02/22  4:50 AM   Result Value Ref Range    pH 7.30 (L) 7.35 - 7.45      PCO2 31 (L) 35 - 45 mmHg    PO2 78 75 - 100 mmHg    O2 SAT 95 (L) >95 %    BICARBONATE 16 (L) 22 - 26 mmol/L    BASE DEFICIT 10.3 (H) 0 - 2 mmol/L    O2 METHOD VENT      FIO2 50.0 %    MODE Assist Control/Volume Control      Tidal volume 500      SET RATE 18      EPAP/CPAP/PEEP 5.0      SITE Right Radial      JULIO'S TEST PASS           Assessment and plan:        Acute respiratory failure with hypoxia secondary to COVID-19 pneumoniaof note patient currently remains intubated and sedated at this time, remains in septic shock, secondary to COVID-19 pneumonia  Follow blood cultures  Follow sputum cultures  Continue Decadron 6 mg IV every 12  Continue Zosyn azithromycin for antibiotic coverage  Continue baricitinib once daily for total 14-day course  Attempt to wean oxygen  Continue to follow pulmonology recommendations    Septic shocksecondary to problem #1, status post fluid resuscitationsepsis protocol  Follow-up blood cultures  Continue trend inflammatory markers  Continue antibiotics as documented above  Continue Levophed for pressor support, titrate to mean arterial pressure of over 65  Obtain infectious disease consult for evaluation    Severe encephalopathylikely secondary to septic shock/problem #1 however concerns for anoxic brain injury as patient does not have purposeful movements while off of sedation  Neurology consult appreciated  Management as documented above  Follow-up MRI brain further evaluation  Continue to follow neurology recommendations    Hyperglycemia type 2 diabetescurrently hypoglycemia this AM  Discontinue lantus  Continue insulin sliding scale    Type II non-ST elevation MIlikely secondary to demand  Transthoracic echo reviewed  Continue current medications  Continue to follow cardiology recommendations    Hypokalemiacurrently resolved    Acute kidney injurylikely secondary to acute tubular necrosis, status post initiation of dialysis  Continue to trend serum creatinine  Dialysis as per nephrology recommendations  Nephrology consult appreciated, continue to follow recommendations    Shock liversecondary to problems #1 and 2, currently LFTs downtrending  Management as documented above  Continue trend LFTs    ProphylaxisSCDs  FENtube feeding, replete potassium and magnesium  Full code, Plan for goals of care discussion with patients brothers later today  Dispositioncontinued ICU care pending clinical improvement    Critical care time spent 35 minutes involving direct patient care as well as reviewing patient's labs and coordination of care with nursing staff      Signed By: Tory Eid MD     February 2, 2022

## 2022-02-02 NOTE — PROGRESS NOTES
Infectious Diseases Progress Note  Omid Farris MD  [phone]  Date:2022       Room:[facility]  Patient Luis Fernando Schultz     YOB: 1945     Age:76 y.o. 76M with schizophrenia, seizures, diabetes, chronic renal failure.     24 presents to hospital unresponsive. Reportedly had been increasingly weak and unable to leave the bed for days. Associated with fevers. During the ED course found positive for Covid 19, with increased troponin. Intubated ventilated for acute hypoxic respiratory failure. During the hospital course, declining renal function and initiated on hemodialysis. I have been asked to see the patient in consultation for prolonged respiratory failure. Subjective    Subjective:  Symptoms:  Stable. Review of Systems   Unable to perform ROS: Intubated     Objective         Vitals Last 24 Hours:  TEMPERATURE:  Temp  Av.6 °F (36.4 °C)  Min: 96.6 °F (35.9 °C)  Max: 98.4 °F (36.9 °C)  RESPIRATIONS RANGE: Resp  Av  Min: 21  Max: 31  PULSE OXIMETRY RANGE: SpO2  Av.7 %  Min: 91 %  Max: 100 %  PULSE RANGE: Pulse  Av.9  Min: 71  Max: 88  BLOOD PRESSURE RANGE: Systolic (79GGJ), K , Min:86 , VLZ:006   ; Diastolic (37RIQ), ECX:77, Min:50, Max:89    I/O (24Hr): Intake/Output Summary (Last 24 hours) at 2022 1238  Last data filed at 2022 1100  Gross per 24 hour   Intake 3524.45 ml   Output 3464 ml   Net 60.45 ml     Objective   General:  intubated on vent unresponsive on no sedation  HEENT: NCAT,   Eyes: anicteric; conjunctiva clear no doll's eye reflex  Neck: no nodes, no cuff leak, trach midline; no accessory MM use. Chest: no deformity,   Cardiac: R regular; no murmur;   Lungs: distant breath sounds; no wheezes a few rales in the base  Abd: soft, NT, hypoactive BS  Ext: Trace edema; no joint swelling;  No clubbing  : No urine  Neuro: Unresponsive on no sedation no doll's eye reflex flaccid extremity  Psych-  unable to assess  Skin: warm, dry, no cyanosis;   Pulses: Brachial and radial pulses intact  Capillary: Slow capillary refill    Labs/Imaging/Diagnostics    Labs:  CBC:  Recent Labs     02/02/22 0440 02/01/22 1145 01/31/22  0330   WBC 19.4* 16.5* 19.1*   RBC 3.66* 3.58* 3.68*   HGB 11.8* 11.3* 11.6*   HCT 34.4* 33.2* 35.2*   MCV 94.0 92.7 95.7   RDW 15.0* 14.8* 15.2*   PLT PLATELET CLUMPS SEEN ON SMEAR, NA CITRATE ORDERED FOR ACCURATE PLT COUNT. PLT CLUMPS SEEN ON SMEAR REVIEW DUE TO EDTA SENSITIV 44*     CHEMISTRIES:  Recent Labs     02/02/22 0440 02/01/22 2130 02/01/22  0555 01/31/22 2325 01/31/22  0330    139 143   < > 142  143   K 4.1 3.7 4.0   < > 4.7  4.1   * 112* 112*   < > 112*  112*   CO2 16* 20* 17*   < > 19*  19*   BUN 63* 54* 105*   < > 125*  125*   CA 6.9* 5.9* 6.8*   < > 7.5*  7.4*   PHOS 3.0  --  5.7*  --  5.6*   MG 2.1  --  2.0  --  2.1    < > = values in this interval not displayed. PT/INR:  Recent Labs     02/02/22 0440 02/01/22 1145 01/31/22  0330   INR 1.2* 1.2* 1.3*     APTT:  Recent Labs     02/01/22  1145 01/31/22  0330   APTT 42.3* 46.5*     LIVER PROFILE:  Recent Labs     02/02/22 0440 02/01/22 2130 02/01/22  0555   AST >1,000* 1,024* 1,288*   * 718* 875*     Lab Results   Component Value Date/Time    ALT (SGPT) 687 (H) 02/02/2022 04:40 AM    AST (SGOT) >1,000 (H) 02/02/2022 04:40 AM    Alk. phosphatase 116 02/02/2022 04:40 AM    Bilirubin, total 0.6 02/02/2022 04:40 AM       Imaging Last 24 Hours:  XR CHEST PORT    Result Date: 2/2/2022  Chest single view. Comparison single view chest February 1, 2022. Stable ET tube, NG tube, and right neck central venous catheters. Unchanged appearance for the lungs; no gross interstitial or alveolar pulmonary edema. Cardiac and mediastinal structures unchanged. No pneumothorax or sizable pleural effusion.     Assessment//Plan   Active Problems:    Acute encephalopathy (5/18/2021)      Assessment & Plan   76M with schizophrenia, seizures, diabetes, chronic renal failure.     1/27/22 presents to hospital unresponsive. Reportedly had been increasingly weak and unable to leave the bed for days. Associated with fevers. During the ED course found positive for Covid 19, with increased troponin. Intubated ventilated for acute hypoxic respiratory failure. During the hospital course, declining renal function and initiated on hemodialysis.  I have been asked to see the patient in consultation for prolonged respiratory failure.     MICROBIOLOGY     1/27/22            Covid 19          Positive  1/27/22            Blood               Negative  1/27/22            Urine                Negative     2/1/22              Endotrach        Pending     ASSESSMENT AND RECOMMENDATIONS     1) Prolonged acute hypoxic respiratory failure in the setting of multifactorial shock, shock liver, Covid 19 pneumonia, NSTEMI, acute on chronic renal failure, anoxic brain injury.                 Supportive care with respiratory support as needed              Dexamethasone              Olumiant              Zosyn and azithromycin                 Overall prognosis very poor              Remains unresponsive on no sedation    Electronically signed by Jaylene Noland MD on 2/2/2022 at 12:38 PM

## 2022-02-03 NOTE — PROGRESS NOTES
Progress Note    Patient: Hector Mckeon MRN: [id number]  SSN: [id number]    YOB: 1945  Age: 78 y.o. Sex: male      Admit Date: 1/27/2022    LOS: 7 days     Subjective:     76M, H/o Schizophenia, seizures, DMII on oral meds with unresponsive and acute hypoxic respiratory failure s/t COVID-19 pneumonitis complicated by ANDRES, hypokalemia and shock liver.          HSOPITAL COURSE  COVID positive, associated with fever 106, increased troponin, cardiology was consulted ansd recommended ECHO, there is no heparin gtt, was initially called for STEMI and was cancelled. he was intubated for acute hypoxic respiratory failure. On levophed. Complicated by ANDRES, shock liver and hypokalemia. Was treated with azithromycin, barcitinib, and zosyn. Will give 1L bolus and continue IVF. Discussed with family he is very critical- his renal functions increased and now seemingly in ATN, his liver functions have worsened and had a seizure on 1/28 , he is off propofol now and neurology was consulted. Repeat CT scan didn't show any stroke, plan for EEG on 2/1 to assess for neurological activity. The patients liver functions, renal are worsening. Plan for CRRT today    Subjectivepatient seen and evaluated at bedside, currently remains intubated and sedated, discussed with RN      Review of Systems:  Unable to determine s.t mental status    Objective:     Vitals:    02/03/22 0426 02/03/22 0500 02/03/22 0600 02/03/22 0700   BP: (!) 114/59 120/64 (!) 114/56 131/68   Pulse: 78 77 79 81   Resp: 24 23 23 24   Temp:  98.8 °F (37.1 °C) 98.8 °F (37.1 °C) 98.8 °F (37.1 °C)   SpO2: 96% 97% 97% 98%   Weight:  105.3 kg (232 lb 2.3 oz)     Height:            Intake and Output:  Current Shift: No intake/output data recorded. Last three shifts: 02/01 1901 - 02/03 0700  In: 5360.3 [I.V.:4110.3]  Out: 3048 [Urine:5; Drains:450]      Physical Exam:   Physical Exam  Vitals and nursing note reviewed.    Constitutional:       General: intubates     Appearance: Normal appearance. He is normal weight. He is ill-appearing. HENT:      Head: Normocephalic and atraumatic.      Nose: Nose normal.      Mouth/Throat:      Mouth: Mucous membranes are moist.   Eyes:      Extraocular Movements: Extraocular movements intact.      Pupils: Pupils are equal, round, and reactive to light. Cardiovascular:      Rate and Rhythm: Regular rhythm. Tachycardia present.      Pulses: Normal pulses.      Heart sounds: Normal heart sounds. Pulmonary:      Effort: Respiratory distress present.      Breath sounds: Rhonchi present. Abdominal:      General: Abdomen is flat. Bowel sounds are normal.      Palpations: Abdomen is soft. Musculoskeletal:         General: No swelling or tenderness. Normal range of motion.      Cervical back: Normal range of motion and neck supple. Skin:     General: Skin is warm and dry.      Capillary Refill: Capillary refill takes less than 2 seconds. Neurological:      Mental Status: He is unresponsive.      GCS: GCS eye subscore is 4. GCS verbal subscore is 1. GCS motor subscore is 1.    Psychiatric:         cannot be assessed      Lab/Data Review:  Recent Results (from the past 24 hour(s))   GLUCOSE, POC    Collection Time: 02/02/22  9:51 AM   Result Value Ref Range    Glucose (POC) 101 65 - 117 mg/dL    Performed by Axxana Nip    PLATELET COUNT ON CITRATED BLD    Collection Time: 02/02/22 10:10 AM   Result Value Ref Range    PLATELET COUNT (NA CITRATE) 90 (L) 150 - 400 K/uL   GLUCOSE, POC    Collection Time: 02/02/22 12:37 PM   Result Value Ref Range    Glucose (POC) 90 65 - 117 mg/dL    Performed by Axxana Nip    GLUCOSE, POC    Collection Time: 02/02/22  4:07 PM   Result Value Ref Range    Glucose (POC) 80 65 - 117 mg/dL    Performed by Axxana Nip    GLUCOSE, POC    Collection Time: 02/02/22 11:00 PM   Result Value Ref Range    Glucose (POC) 131 (H) 65 - 117 mg/dL    Performed by Nanda Dunne RN ([employer]er)    BLOOD GAS, ARTERIAL    Collection Time: 02/03/22  4:31 AM   Result Value Ref Range    pH 7.31 (L) 7.35 - 7.45      PCO2 32 (L) 35 - 45 mmHg    PO2 92 75 - 100 mmHg    O2 SAT 96 >95 %    BICARBONATE 17 (L) 22 - 26 mmol/L    BASE DEFICIT 9.0 (H) 0 - 2 mmol/L    O2 METHOD VENT      FIO2 50.0 %    MODE Assist Control/Volume Control      Tidal volume 500      SET RATE 18      EPAP/CPAP/PEEP 5.0      SITE Right Radial      JULIO'S TEST PASS     GLUCOSE, POC    Collection Time: 02/03/22  5:28 AM   Result Value Ref Range    Glucose (POC) 258 (H) 65 - 117 mg/dL    Performed by [facility]    METABOLIC PANEL, COMPREHENSIVE    Collection Time: 02/03/22  5:30 AM   Result Value Ref Range    Sodium 133 (L) 136 - 145 mmol/L    Potassium 4.6 3.5 - 5.1 mmol/L    Chloride 105 97 - 108 mmol/L    CO2 16 (L) 21 - 32 mmol/L    Anion gap 12 5 - 15 mmol/L    Glucose 229 (H) 65 - 100 mg/dL    BUN 71 (H) 6 - 20 mg/dL    Creatinine 4.23 (H) 0.70 - 1.30 mg/dL    BUN/Creatinine ratio 17 12 - 20      GFR est AA 17 (L) >60 ml/min/1.73m2    GFR est non-AA 14 (L) >60 ml/min/1.73m2    Calcium 7.3 (L) 8.5 - 10.1 mg/dL    Bilirubin, total 0.6 0.2 - 1.0 mg/dL    AST (SGOT) 680 (H) 15 - 37 U/L    ALT (SGPT) 442 (H) 12 - 78 U/L    Alk.  phosphatase 138 (H) 45 - 117 U/L    Protein, total 6.0 (L) 6.4 - 8.2 g/dL    Albumin 1.4 (L) 3.5 - 5.0 g/dL    Globulin 4.6 (H) 2.0 - 4.0 g/dL    A-G Ratio 0.3 (L) 1.1 - 2.2     PROTHROMBIN TIME + INR    Collection Time: 02/03/22  5:30 AM   Result Value Ref Range    Prothrombin time 15.8 (H) 11.9 - 14.7 sec    INR 1.3 (H) 0.9 - 1.1     LD    Collection Time: 02/03/22  5:30 AM   Result Value Ref Range    LD 1,261 (H) 85 - 241 U/L   D DIMER    Collection Time: 02/03/22  5:30 AM   Result Value Ref Range    D DIMER 19.34 (H) <0.50 ug/ml(FEU)   PROCALCITONIN    Collection Time: 02/03/22  5:30 AM   Result Value Ref Range    Procalcitonin 8.29 (H) 0 ng/mL   SED RATE (ESR)    Collection Time: 02/03/22  5:30 AM   Result Value Ref Range Sed rate, automated PENDING mm/hr   RENAL FUNCTION PANEL    Collection Time: 02/03/22  5:30 AM   Result Value Ref Range    Sodium 135 (L) 136 - 145 mmol/L    Potassium 4.6 3.5 - 5.1 mmol/L    Chloride 105 97 - 108 mmol/L    CO2 17 (L) 21 - 32 mmol/L    Anion gap 13 5 - 15 mmol/L    Glucose 230 (H) 65 - 100 mg/dL    BUN 70 (H) 6 - 20 mg/dL    Creatinine 4.28 (H) 0.70 - 1.30 mg/dL    BUN/Creatinine ratio 16 12 - 20      GFR est AA 17 (L) >60 ml/min/1.73m2    GFR est non-AA 14 (L) >60 ml/min/1.73m2    Calcium 7.5 (L) 8.5 - 10.1 mg/dL    Phosphorus 7.4 (H) 2.6 - 4.7 mg/dL    Albumin 1.3 (L) 3.5 - 5.0 g/dL         Assessment and plan:        Acute respiratory failure with hypoxia secondary to COVID-19 pneumoniaof note patient currently remains intubated and sedated at this time, remains in septic shock, secondary to COVID-19 pneumonia  Follow blood cultures  Follow sputum cultures  Continue Decadron 6 mg IV every 12  Continue Zosyn azithromycin for antibiotic coverage  Continue baricitinib once daily for total 14-day course  Attempt to wean oxygen  Continue to follow pulmonology recommendations    Septic shocksecondary to problem #1, status post fluid resuscitationsepsis protocol  Follow-up blood cultures  Continue trend inflammatory markers  Continue antibiotics as documented above  Continue Levophed for pressor support, titrate to mean arterial pressure of over 65  Continue to follow infectious Disease recommendations    Severe encephalopathylikely secondary to septic shock/problem #1 however concerns for anoxic brain injury as patient does not have purposeful movements while off of sedation  Neurology consult appreciated  Management as documented above  Follow-up MRI brain further evaluation  Continue to follow neurology recommendations    Hyperglycemia type 2 diabetesas documented below  Restart lantus at 20 units daily  Continue insulin sliding scale    Type II non-ST elevation MIlikely secondary to demand  Transthoracic echo reviewed  Continue current medications  Continue to follow cardiology recommendations    Hypokalemiacurrently resolved    Acute kidney injurylikely secondary to acute tubular necrosis, status post initiation of dialysis  Continue to trend serum creatinine  Dialysis as per nephrology recommendations  Nephrology consult appreciated, continue to follow recommendations    Shock liversecondary to problems #1 and 2, currently LFTs downtrending  Management as documented above  Continue trend LFTs    ProphylaxisSCDs  FENtube feeding, replete potassium and magnesium  Full code, Plan for goals of care discussion with patients brothers later today (unable to have meeting yesterday 2/2 family availability)  Dispositioncontinued ICU care pending clinical improvement    Critical care time spent 35 minutes involving direct patient care as well as reviewing patient's labs and coordination of care with nursing staff      Signed By: Tory Eid MD     February 3, 2022

## 2022-02-03 NOTE — ACP (ADVANCE CARE PLANNING)
I had an extensive goals of care discussion with patients brother Ermias Zafar, I was assisted in the discussion by  Jyoti Marshall. Patient's brother given a detailed update with regards to patients clinical course, as well as overall poor prognosis. Patients brother voiced understanding, stated he's discussed patient's clinical course with his other brother, and they have agreed that the patient will be DNR, however to continue all other treatment modalities at this time.     Order placed in chart, notified JORDAN Elkins 92 planning time spent 25 mins

## 2022-02-03 NOTE — PROGRESS NOTES
Visited patient in ICU for family support. Provided silent support for patient who appeared to be non-engaging. Patient and medical staff were in the room. Per SW, family meeting took place quickly and family is no longer here. Contact chaplains for further referrals. Chaplain Danna Chinchilla M.Div.    can be reached by calling the  at [facility]  [phone]

## 2022-02-03 NOTE — PROGRESS NOTES
Renal Progress Note    Patient: Stevenson Perez MRN: [id number]  SSN: [id number]    YOB: 1945  Age: 78 y.o. Sex: male      Admit Date: 1/27/2022    LOS: 7 days     Subjective:   Patient seen in intensive care unit, on ventilator, unresponsive  remains anuric, seen on HD   Patient currently off pressors.     Current Facility-Administered Medications   Medication Dose Route Frequency    insulin glargine (LANTUS) injection 20 Units  20 Units SubCUTAneous QHS    alteplase (CATHFLO) injection 2 mg  2 mg InterCATHeter ONCE    dexamethasone (DECADRON) 4 mg/mL injection 4 mg  4 mg IntraVENous Q12H    NOREPINephrine (LEVOPHED) 8 mg in 0.9% NS 250ml infusion  0.5-120 mcg/min IntraVENous TITRATE    lactulose (CHRONULAC) 10 gram/15 mL solution 15 mL  10 g Per G Tube TID    glucose chewable tablet 16 g  4 Tablet Oral PRN    glucagon (GLUCAGEN) injection 1 mg  1 mg IntraMUSCular PRN    insulin lispro (HUMALOG) injection   SubCUTAneous Q4H    dextrose 10% infusion 125-250 mL  125-250 mL IntraVENous PRN    LORazepam (ATIVAN) injection 2 mg  2 mg IntraVENous Q2H PRN    glucose chewable tablet 16 g  4 Tablet Oral PRN    glucagon (GLUCAGEN) injection 1 mg  1 mg IntraMUSCular PRN    levETIRAcetam (KEPPRA) tablet 1,500 mg  1,500 mg Oral BID    valproic acid (as sodium salt) (DEPAKENE) 250 mg/5 mL (5 mL) oral solution 500 mg  500 mg Oral BID    sodium chloride (23.4%) 0.225 % in sterile water 1,000 mL infusion   IntraVENous CONTINUOUS    dextrose 10% infusion 125-250 mL  125-250 mL IntraVENous PRN    PHENYLephrine (ISRAEL-SYNEPHRINE) 30 mg in 0.9% sodium chloride 250 mL infusion   mcg/min IntraVENous TITRATE    propofol (DIPRIVAN) 10 mg/mL infusion  0-50 mcg/kg/min IntraVENous TITRATE    lacosamide (VIMPAT) tablet 200 mg  200 mg Oral BID    levothyroxine (SYNTHROID) tablet 75 mcg  75 mcg Oral 7am    aspirin chewable tablet 81 mg  81 mg Oral DAILY    sodium chloride (NS) flush 5-40 mL  5-40 mL IntraVENous Q8H    sodium chloride (NS) flush 5-40 mL  5-40 mL IntraVENous PRN    acetaminophen (TYLENOL) tablet 650 mg  650 mg Oral Q6H PRN    Or    acetaminophen (TYLENOL) suppository 650 mg  650 mg Rectal Q6H PRN    polyethylene glycol (MIRALAX) packet 17 g  17 g Oral DAILY PRN    ondansetron (ZOFRAN ODT) tablet 4 mg  4 mg Oral Q8H PRN    Or    ondansetron (ZOFRAN) injection 4 mg  4 mg IntraVENous Q6H PRN    atorvastatin (LIPITOR) tablet 80 mg  80 mg Oral QHS    piperacillin-tazobactam (ZOSYN) 3.375 g in 0.9% sodium chloride (MBP/ADV) 100 mL MBP  3.375 g IntraVENous Q8H    azithromycin (ZITHROMAX) 500 mg in 0.9% sodium chloride 250 mL (VIAL-MATE)  500 mg IntraVENous Q24H    baricitinib (OLUMIANT) tablet 1 mg  1 mg Oral DAILY        Vitals:    02/03/22 1212 02/03/22 1255 02/03/22 1310 02/03/22 1325   BP:  130/72 131/67 129/66   Pulse: 81 80 80 80   Resp: 21 22 24 24   Temp:  98.8 °F (37.1 °C)     TempSrc:  Core     SpO2: 98% 98% 98% 97%   Weight:       Height:         Objective:   General: On ventilator, unresponsive   HEENT:  no Icterus, no Pallor, ET tube in place, mucosa dry   neck: Neck is supple, No JVD  Lungs: Decreased breath sounds at the bases,   CVS: heart sounds normal,  no murmurs, no rubs. GI: soft, nontender, no distention  Extremeties: no cyanosis, no edema    Neuro: Patient unresponsive, on vent, off sedation   skin: normal skin turgor, no skin rashes. Intake and Output:  Current Shift: 02/03 0701 - 02/03 1900  In: 100   Out: -   Last three shifts: 02/01 1901 - 02/03 0700  In: 5360.3 [I.V.:4110.3]  Out: 3048 [Urine:5; Drains:450]      Lab/Data Review:  Recent Labs     02/03/22  0530 02/02/22  0440 02/01/22  1145   WBC 19.2* 19.4* 16.5*   HGB 10.3* 11.8* 11.3*   HCT 30.0* 34.4* 33.2*   PLT PLEASE SEE PLT NACITRATE FOR PLT RESULTS DUE TO EDTA SENSISTIVITY CAUSING PLT CLUMPING IN LAV TOP TUBE. PLATELET CLUMPS SEEN ON SMEAR, NA CITRATE ORDERED FOR ACCURATE PLT COUNT.  PLT CLUMPS SEEN ON SMEAR REVIEW DUE TO EDTA SENSITIV     Recent Labs     02/03/22  0530 02/02/22  0440 02/01/22  2130 02/01/22  1145 02/01/22  0555 02/01/22  0555   *  133* 140 139  --    < > 143   K 4.6  4.6 4.1 3.7  --    < > 4.0     105 110* 112*  --    < > 112*   CO2 17*  16* 16* 20*  --    < > 17*   *  229* 66 100  --    < > 114*   BUN 70*  71* 63* 54*  --    < > 105*   CREA 4.28*  4.23* 4.06* 3.47*  --    < > 7.04*   CA 7.5*  7.3* 6.9* 5.9*  --    < > 6.8*   MG  --  2.1  --   --   --  2.0   PHOS 7.4* 3.0  --   --   --  5.7*   ALB 1.3*  1.4* 1.5* 1.5*  --    < > 1.5*   TBILI 0.6 0.6 0.6  --    < > 0.6   * 687* 718*  --    < > 875*   INR 1.3* 1.2*  --  1.2*  --   --     < > = values in this interval not displayed.      Recent Labs     02/03/22  0431 02/02/22  0450 02/01/22  0325   PH 7.31* 7.30* 7.31*   PCO2 32* 31* 32*   PO2 92 78 71*   HCO3 17* 16* 18*   FIO2 50.0 50.0 45.0     Recent Results (from the past 24 hour(s))   GLUCOSE, POC    Collection Time: 02/02/22  4:07 PM   Result Value Ref Range    Glucose (POC) 80 65 - 117 mg/dL    Performed by Hugo Edgar    GLUCOSE, POC    Collection Time: 02/02/22 11:00 PM   Result Value Ref Range    Glucose (POC) 131 (H) 65 - 117 mg/dL    Performed by Tri Gonzales RN ([employer]er)    BLOOD GAS, ARTERIAL    Collection Time: 02/03/22  4:31 AM   Result Value Ref Range    pH 7.31 (L) 7.35 - 7.45      PCO2 32 (L) 35 - 45 mmHg    PO2 92 75 - 100 mmHg    O2 SAT 96 >95 %    BICARBONATE 17 (L) 22 - 26 mmol/L    BASE DEFICIT 9.0 (H) 0 - 2 mmol/L    O2 METHOD VENT      FIO2 50.0 %    MODE Assist Control/Volume Control      Tidal volume 500      SET RATE 18      EPAP/CPAP/PEEP 5.0      SITE Right Radial      JULIO'S TEST PASS     GLUCOSE, POC    Collection Time: 02/03/22  5:28 AM   Result Value Ref Range    Glucose (POC) 258 (H) 65 - 117 mg/dL    Performed by Damaris Ballard    CBC W/O DIFF    Collection Time: 02/03/22  5:30 AM   Result Value Ref Range    WBC 19.2 (H) 4.1 - 11.1 K/uL    RBC 3.18 (L) 4.10 - 5.70 M/uL    HGB 10.3 (L) 12.1 - 17.0 g/dL    HCT 30.0 (L) 36.6 - 50.3 %    MCV 94.3 80.0 - 99.0 FL    MCH 32.4 26.0 - 34.0 PG    MCHC 34.3 30.0 - 36.5 g/dL    RDW 14.7 (H) 11.5 - 14.5 %    PLATELET  927 - 827 K/uL     PLEASE SEE PLT NACITRATE FOR PLT RESULTS DUE TO EDTA SENSISTIVITY CAUSING PLT CLUMPING IN LAV TOP TUBE. NRBC 0.4 (H) 0.0  WBC    ABSOLUTE NRBC 0.07 (H) 0.00 - 7.60 K/uL   METABOLIC PANEL, COMPREHENSIVE    Collection Time: 02/03/22  5:30 AM   Result Value Ref Range    Sodium 133 (L) 136 - 145 mmol/L    Potassium 4.6 3.5 - 5.1 mmol/L    Chloride 105 97 - 108 mmol/L    CO2 16 (L) 21 - 32 mmol/L    Anion gap 12 5 - 15 mmol/L    Glucose 229 (H) 65 - 100 mg/dL    BUN 71 (H) 6 - 20 mg/dL    Creatinine 4.23 (H) 0.70 - 1.30 mg/dL    BUN/Creatinine ratio 17 12 - 20      GFR est AA 17 (L) >60 ml/min/1.73m2    GFR est non-AA 14 (L) >60 ml/min/1.73m2    Calcium 7.3 (L) 8.5 - 10.1 mg/dL    Bilirubin, total 0.6 0.2 - 1.0 mg/dL    AST (SGOT) 680 (H) 15 - 37 U/L    ALT (SGPT) 442 (H) 12 - 78 U/L    Alk.  phosphatase 138 (H) 45 - 117 U/L    Protein, total 6.0 (L) 6.4 - 8.2 g/dL    Albumin 1.4 (L) 3.5 - 5.0 g/dL    Globulin 4.6 (H) 2.0 - 4.0 g/dL    A-G Ratio 0.3 (L) 1.1 - 2.2     PROTHROMBIN TIME + INR    Collection Time: 02/03/22  5:30 AM   Result Value Ref Range    Prothrombin time 15.8 (H) 11.9 - 14.7 sec    INR 1.3 (H) 0.9 - 1.1     LD    Collection Time: 02/03/22  5:30 AM   Result Value Ref Range    LD 1,261 (H) 85 - 241 U/L   D DIMER    Collection Time: 02/03/22  5:30 AM   Result Value Ref Range    D DIMER 19.34 (H) <0.50 ug/ml(FEU)   PROCALCITONIN    Collection Time: 02/03/22  5:30 AM   Result Value Ref Range    Procalcitonin 8.29 (H) 0 ng/mL   SED RATE (ESR)    Collection Time: 02/03/22  5:30 AM   Result Value Ref Range    Sed rate, automated 106 mm/hr   RENAL FUNCTION PANEL    Collection Time: 02/03/22  5:30 AM   Result Value Ref Range    Sodium 135 (L) 136 - 145 mmol/L    Potassium 4.6 3.5 - 5.1 mmol/L    Chloride 105 97 - 108 mmol/L    CO2 17 (L) 21 - 32 mmol/L    Anion gap 13 5 - 15 mmol/L    Glucose 230 (H) 65 - 100 mg/dL    BUN 70 (H) 6 - 20 mg/dL    Creatinine 4.28 (H) 0.70 - 1.30 mg/dL    BUN/Creatinine ratio 16 12 - 20      GFR est AA 17 (L) >60 ml/min/1.73m2    GFR est non-AA 14 (L) >60 ml/min/1.73m2    Calcium 7.5 (L) 8.5 - 10.1 mg/dL    Phosphorus 7.4 (H) 2.6 - 4.7 mg/dL    Albumin 1.3 (L) 3.5 - 5.0 g/dL   GLUCOSE, POC    Collection Time: 02/03/22  7:53 AM   Result Value Ref Range    Glucose (POC) 239 (H) 65 - 117 mg/dL    Performed by Phoebe Becky    PLATELET COUNT ON CITRATED BLD    Collection Time: 02/03/22 10:00 AM   Result Value Ref Range    PLATELET COUNT (NA CITRATE) 151 150 - 400 K/uL   GLUCOSE, POC    Collection Time: 02/03/22 11:22 AM   Result Value Ref Range    Glucose (POC) 214 (H) 65 - 117 mg/dL    Performed by Phoebe Becky         Assessment and Plan:     #1 acute kidney injury  --ANDRES likely 2/2 ATN from septic shock /anoxic renal injury with ARB on board  Patient remains anuric,   Rhabdomyolysis+, probably ischemic, high CPK 8051, repeat pending    Started ob CRRT 1/31, had for 48 hrs  Switched to intermittent hemodialysis today  we will continue to monitor electrolytes closely and adjust management as needed     #2 severe hypokalemia: Improved potassium level    monitor potassium levels     #3 hypernatremia: Improved sodium level to 135  -Continue water flushes via NG tube,off IV fluids  Continue to monitor sodium levels    #4  COVID-19 pneumonia:   -Septic shock  -With multiorgan failure including renal failure/transaminitis/hemodynamics shock  -Patient currently on Decadron azithromycin and Zosyn  Neurology following the patient for anoxic encephalopathy, remains unresponsive  Shock liver with high liver enzymes( improving),   Rhabdomyolysis+/ANDRES, on HD, will monitor   -Overall prognosis seems guarded     #5 diabetes: hyperglycemia+  Monitor accuchecks and adjust management as needed     #6 seizure disorder  -On antiseizure medications, neurology following the patient  -Currently off sedation and unresponsive    7.   Metabolic acidosis: Secondary to acute kidney injury/hypotension  Expect to improve with HD today        Signed By: Jenna Charles MD     February 3, 2022

## 2022-02-03 NOTE — PROGRESS NOTES
Progress Note    Patient: Ava Matias MRN: [id number]  SSN: [id number]    YOB: 1945  Age: 78 y.o. Sex: male      Admit Date: 1/27/2022    LOS: 7 days     Subjective:     Patient seen and examined. Patient is followed for NSTEMI in the setting of COVID. Patient has a past medical history of anemia, GERD, schizophrenia, diabetes, and seizure disorder. Patient remains intubated and sedated. Family meeting today to determine further management or hospice care    Telemetry Review: No acute events noted in the past 24 hours. Sinus rhythm; heart rate 70s. Review of Symptoms:   Review of Systems   Unable to perform ROS: acuity of condition         Objective:     Vitals:    02/03/22 1000 02/03/22 1030 02/03/22 1045 02/03/22 1100   BP: 120/60 116/67 (!) 122/57 126/69   Pulse:  77 81 81   Resp: 23 14 24 23   Temp:  98.6 °F (37 °C)  98.8 °F (37.1 °C)   TempSrc:  Core     SpO2: 99% 98% 96% 98%   Weight:       Height:            Intake and Output:  Current Shift: 02/03 0701 - 02/03 1900  In: 100   Out: -   Last three shifts: 02/01 1901 - 02/03 0700  In: 5360.3 [I.V.:4110.3]  Out: 3048 [Urine:5; Drains:450]    Physical Exam:   . [facility] Physical Exam  Constitutional:       General: He is not in acute distress. Appearance: He is ill-appearing, toxic-appearing and diaphoretic. Cardiovascular:      Rate and Rhythm: Regular rhythm. Tachycardia present. Pulses: Normal pulses. Heart sounds: Normal heart sounds. Pulmonary:      Effort: Respiratory distress present. Breath sounds: No wheezing, rhonchi or rales. Comments: Intubated  Abdominal:      General: Abdomen is flat. Bowel sounds are normal.      Palpations: Abdomen is soft. Skin:     General: Skin is warm. Neurological:      Comments: Sedated           Lab/Data Review: All lab results for the last 24 hours reviewed.          Current Facility-Administered Medications:     insulin glargine (LANTUS) injection 20 Units, 20 Units, SubCUTAneous, QHS, Jesus Aceves MD    alteplase (CATHFLO) injection 2 mg, 2 mg, InterCATHeter, ONCE, Clyde Magdaleno MD    alteplase (CATHFLO) 2 mg in sterile water (preservative free) 2 mL injection, 2 mg, InterCATHeter, ONCE, Clyde Magdaleno MD    dexamethasone (DECADRON) 4 mg/mL injection 4 mg, 4 mg, IntraVENous, Q12H, Ge Duran MD, 4 mg at 02/03/22 0933    NOREPINephrine (LEVOPHED) 8 mg in 0.9% NS 250ml infusion, 0.5-120 mcg/min, IntraVENous, TITRATE, Ge Duran MD, Stopped at 02/01/22 1827    lactulose (CHRONULAC) 10 gram/15 mL solution 15 mL, 10 g, Per G Tube, TID, Jesu Adame MD, 15 mL at 02/03/22 0933    glucose chewable tablet 16 g, 4 Tablet, Oral, PRN, Jesu Adame MD    glucagon (GLUCAGEN) injection 1 mg, 1 mg, IntraMUSCular, PRN, Jesu Adame MD    insulin lispro (HUMALOG) injection, , SubCUTAneous, Q4H, Jesu Adame MD, 4 Units at 02/03/22 0932    dextrose 10% infusion 125-250 mL, 125-250 mL, IntraVENous, PRN, Jesu Adame MD, 250 mL at 02/01/22 1518    LORazepam (ATIVAN) injection 2 mg, 2 mg, IntraVENous, Q2H PRN, Rylan Jorgensen PA-C, 2 mg at 01/29/22 0057    glucose chewable tablet 16 g, 4 Tablet, Oral, PRN, Jesu Adame MD    glucagon (GLUCAGEN) injection 1 mg, 1 mg, IntraMUSCular, PRN, Jesu Adame MD    levETIRAcetam (KEPPRA) tablet 1,500 mg, 1,500 mg, Oral, BID, Taj Arita MD, 1,500 mg at 02/03/22 6506    valproic acid (as sodium salt) (DEPAKENE) 250 mg/5 mL (5 mL) oral solution 500 mg, 500 mg, Oral, BID, Burt Naranjo MD, 500 mg at 02/03/22 0932    sodium chloride (23.4%) 0.225 % in sterile water 1,000 mL infusion, , IntraVENous, CONTINUOUS, Lucille Godoy MD, Last Rate: 75 mL/hr at 02/03/22 0947, New Bag at 02/03/22 0947    dextrose 10% infusion 125-250 mL, 125-250 mL, IntraVENous, PRN, Jesu Adame MD, 250 mL at 02/02/22 0556    PHENYLephrine (ISRAEL-SYNEPHRINE) 30 mg in 0.9% sodium chloride 250 mL infusion,  mcg/min, IntraVENous, TITRATE, Cristian Jorgensen PA-C, Held at 01/28/22 2300    propofol (DIPRIVAN) 10 mg/mL infusion, 0-50 mcg/kg/min, IntraVENous, TITRATE, Luis Worley MD, Stopped at 01/29/22 0616    lacosamide (VIMPAT) tablet 200 mg, 200 mg, Oral, BID, Oscar Alexandre MD, 200 mg at 02/03/22 0933    levothyroxine (SYNTHROID) tablet 75 mcg, 75 mcg, Oral, 7am, Oscar Alexandre MD, 75 mcg at 02/03/22 0700    aspirin chewable tablet 81 mg, 81 mg, Oral, DAILY, Oscar Alexandre MD, 81 mg at 02/03/22 0933    sodium chloride (NS) flush 5-40 mL, 5-40 mL, IntraVENous, Q8H, Mandy Nuñez MD, 10 mL at 02/03/22 0600    sodium chloride (NS) flush 5-40 mL, 5-40 mL, IntraVENous, PRN, Oscar Alexandre MD    acetaminophen (TYLENOL) tablet 650 mg, 650 mg, Oral, Q6H PRN, 650 mg at 01/28/22 1111 **OR** acetaminophen (TYLENOL) suppository 650 mg, 650 mg, Rectal, Q6H PRN, Oscar Alexandre MD    polyethylene glycol (MIRALAX) packet 17 g, 17 g, Oral, DAILY PRN, Oscar Alexandre MD    ondansetron (ZOFRAN ODT) tablet 4 mg, 4 mg, Oral, Q8H PRN **OR** ondansetron (ZOFRAN) injection 4 mg, 4 mg, IntraVENous, Q6H PRN, Oscar Alexandre MD    atorvastatin (LIPITOR) tablet 80 mg, 80 mg, Oral, QHS, Luis Worley MD, 80 mg at 02/02/22 2330    piperacillin-tazobactam (ZOSYN) 3.375 g in 0.9% sodium chloride (MBP/ADV) 100 mL MBP, 3.375 g, IntraVENous, Q8H, Maria Eugenia Aceves MD, Last Rate: 25 mL/hr at 02/03/22 0537, 3.375 g at 02/03/22 0537    azithromycin (ZITHROMAX) 500 mg in 0.9% sodium chloride 250 mL (VIAL-MATE), 500 mg, IntraVENous, Q24H, Oscar Alexandre MD, Last Rate: 250 mL/hr at 02/02/22 1521, 500 mg at 02/02/22 1521    baricitinib (OLUMIANT) tablet 1 mg, 1 mg, Oral, DAILY, Ge Duran MD, 1 mg at 02/03/22 2496      Assessment:     Active Problems:    Acute encephalopathy (5/18/2021)        Plan:     Case discussed with Collaborating physician Dr. Carla Sprague and our recommendations are as follows:     1. NSTEMI:   - HS troponin 1354 > 1510. Likely related to demand ischemia in the setting of COVID and sepsis  - continue asa  - Echo: EF 65% with no wall motion abnormalities. - Cr 4.28 for CRRT today     2. Hypotension:   - Blood pressure below goal  - Continue levo and wean as tolerated.     3. COVID pna:   - COVID positive   -  Management per primary. - Will monitor telemetry and to evaluate for possible QTC prolongation. Will continue to monitor.  - Recommend to keep a negative fluid balance to prevent volume overload. 4. Hypokalemia:   - K 4.0, will replete and repeat labs in the am.     Thank you for involving us in the care of this patient. Please do not hesitate to call if additional questions arise. If after hours please call [phone]. Signed By: Gretchen Urias NP     February 3, 2022      [facility]  [address] [place], [address] [facility]  [phone]

## 2022-02-03 NOTE — PROGRESS NOTES
Pt CVC site was grossly soiled before tx due to leaking around the site and pt's poor coagulation results. CVC site was cleaned and changed after tx.

## 2022-02-03 NOTE — PROGRESS NOTES
Infectious Diseases Progress Note  Omid Nevarez MD  [phone]  Date:2/3/2022       Room:[facility] [place]  Patient Merlyn Willis     YOB: 1945     Age:76 y.o. 76M with schizophrenia, seizures, diabetes, chronic renal failure.     24 presents to hospital unresponsive. Reportedly had been increasingly weak and unable to leave the bed for days. Associated with fevers. During the ED course found positive for Covid 19, with increased troponin. Intubated ventilated for acute hypoxic respiratory failure. During the hospital course, declining renal function and initiated on hemodialysis. I have been asked to see the patient in consultation for prolonged respiratory failure. Subjective    Subjective:  Symptoms:  Stable. Review of Systems   Unable to perform ROS: Intubated     Objective         Vitals Last 24 Hours:  TEMPERATURE:  Temp  Av.2 °F (37.3 °C)  Min: 97.7 °F (36.5 °C)  Max: 99.9 °F (37.7 °C)  RESPIRATIONS RANGE: Resp  Av.1  Min: 14  Max: 26  PULSE OXIMETRY RANGE: SpO2  Av.3 %  Min: 94 %  Max: 99 %  PULSE RANGE: Pulse  Av.3  Min: 71  Max: 81  BLOOD PRESSURE RANGE: Systolic (46TGO), PGJ:339 , Min:99 , TXI:364   ; Diastolic (97FOX), XMV:30, Min:54, Max:69    I/O (24Hr): Intake/Output Summary (Last 24 hours) at 2/3/2022 1236  Last data filed at 2/3/2022 1138  Gross per 24 hour   Intake 3726.5 ml   Output 919 ml   Net 2807.5 ml     Objective:  Vital signs: (most recent): Blood pressure 126/62, pulse 81, temperature 98.8 °F (37.1 °C), resp. rate 21, height 6' (1.829 m), weight 105.3 kg (232 lb 2.3 oz), SpO2 98 %. General:  intubated on vent unresponsive on no sedation  HEENT: NCAT,   Eyes: anicteric; conjunctiva clear no doll's eye reflex  Neck: no nodes, no cuff leak, trach midline; no accessory MM use.   Chest: no deformity,   Cardiac: R regular; no murmur;   Lungs: distant breath sounds; no wheezes a few rales in the base  Abd: soft, NT, hypoactive BS  Ext: Trace edema; no joint swelling; No clubbing  : No urine  Neuro: Unresponsive on no sedation no doll's eye reflex flaccid extremity  Psych-  unable to assess  Skin: warm, dry, no cyanosis;   Pulses: Brachial and radial pulses intact  Capillary: Slow capillary refill    Labs/Imaging/Diagnostics    Labs:  CBC:  Recent Labs     02/03/22  0530 02/02/22  0440 02/01/22  1145   WBC 19.2* 19.4* 16.5*   RBC 3.18* 3.66* 3.58*   HGB 10.3* 11.8* 11.3*   HCT 30.0* 34.4* 33.2*   MCV 94.3 94.0 92.7   RDW 14.7* 15.0* 14.8*   PLT PLEASE SEE PLT NACITRATE FOR PLT RESULTS DUE TO EDTA SENSISTIVITY CAUSING PLT CLUMPING IN LAV TOP TUBE. PLATELET CLUMPS SEEN ON SMEAR, NA CITRATE ORDERED FOR ACCURATE PLT COUNT. PLT CLUMPS SEEN ON SMEAR REVIEW DUE TO EDTA SENSITIV     CHEMISTRIES:  Recent Labs     02/03/22  0530 02/02/22 0440 02/01/22 2130 02/01/22  0555 02/01/22  0555   *  133* 140 139   < > 143   K 4.6  4.6 4.1 3.7   < > 4.0     105 110* 112*   < > 112*   CO2 17*  16* 16* 20*   < > 17*   BUN 70*  71* 63* 54*   < > 105*   CA 7.5*  7.3* 6.9* 5.9*   < > 6.8*   PHOS 7.4* 3.0  --   --  5.7*   MG  --  2.1  --   --  2.0    < > = values in this interval not displayed. PT/INR:  Recent Labs     02/03/22  0530 02/02/22  0440 02/01/22  1145   INR 1.3* 1.2* 1.2*     APTT:  Recent Labs     02/01/22  1145   APTT 42.3*     LIVER PROFILE:  Recent Labs     02/03/22  0530 02/02/22 0440 02/01/22  2130   * >1,000* 1,024*   * 687* 718*     Lab Results   Component Value Date/Time    ALT (SGPT) 442 (H) 02/03/2022 05:30 AM    AST (SGOT) 680 (H) 02/03/2022 05:30 AM    Alk. phosphatase 138 (H) 02/03/2022 05:30 AM    Bilirubin, total 0.6 02/03/2022 05:30 AM       Imaging Last 24 Hours:  XR CHEST PORT    Result Date: 2/3/2022  Chest single view. Comparison single view chest February 2, 2022. Stable ET tube, NG tube, and right neck central venous catheters.  Left greater than right, central and medial bibasilar patchy reticular markings persist. Cardiac and mediastinal structures unchanged. No pneumothorax or sizable pleural effusion. Assessment//Plan   Active Problems:    Acute encephalopathy (5/18/2021)      Assessment & Plan   76M with schizophrenia, seizures, diabetes, chronic renal failure.     1/27/22 presents to hospital unresponsive. Reportedly had been increasingly weak and unable to leave the bed for days. Associated with fevers. During the ED course found positive for Covid 19, with increased troponin. Intubated ventilated for acute hypoxic respiratory failure. During the hospital course, declining renal function and initiated on hemodialysis.  I have been asked to see the patient in consultation for prolonged respiratory failure.     MICROBIOLOGY     1/27/22            Covid 19          Positive  1/27/22            Blood               Negative  1/27/22            Urine                Negative     2/3/22              Endotrach        Pending     ASSESSMENT AND RECOMMENDATIONS     1) Prolonged acute hypoxic respiratory failure in the setting of multifactorial shock, shock liver, Covid 19 pneumonia, NSTEMI, acute on chronic renal failure, anoxic brain injury.                 Supportive care with respiratory support as needed              Dexamethasone              Olumiant              Zosyn and azithromycin                 Overall prognosis very poor              Remains unresponsive on no sedation    Electronically signed by Perla Keita MD on 2/3/2022 at 12:38 PM

## 2022-02-03 NOTE — PROGRESS NOTES
IMPRESSION:   1. Acute hypoxic respiratory failure oxygenation well-maintained on the ventilator  2. Malignant hyperthermia resolved   3. COVID-19 pneumonia  4. NSTEMI elevated troponin  5. Shock cardiogenic versus septic vs Hypovolumic Hypotension currently on norepinephrine  will continue to wean  6. Acute kidney injury now on hemodialysis  7. Metabolic acidosis  8. Thrombocytopenia worsened  9. Hypernatremia  10. Symptomatic hypokalemia  11. Shock liver with transaminitis  12. Altered mental status unresponsive on no sedation likely anoxic encephalopathy      RECOMMENDATIONS/PLAN:   1. ICU monitoring  1. Ventilator for mechanical life support and prevent respiratory arrest with protective lung strategies ABG acceptable he is still hemodynamically unstable will continue with the current vent support off sedation  2. On Assist control rate 18  PEEP 5 FiO2 50%   3. Blood pressure improved off vasopressors  4. Patient on Decadron Zithromax and baricitinib WBC elevated, decrease Decadron  5. Troponin is elevated 2D echo shows ejection fraction of 65%  6. Now getting dialysis  7. LDH procalcitonin trending down  8. Remains on quarter saline  9. Liver enzyme elevated shock liver  10. Agree with [facility] IV antibiotics blood and urine cultures no growth on Zosyn and Zithromax   11. Temperature back to normal  12.  IV vasopressors for circulatory shock refractory to fluids to maintain SBP> 90      [x] High complexity decision making was performed  [x] See my orders for details  HPI   77-year-old male came in because as he was found unresponsive and fever 107 past medical history of schizophrenia and diabetes gastroesophageal reflux disease and anemia he is COVID-19 positive initially patient was called as NSTEMI but it was canceled patient is intubated on ventilator hemodynamically unstable unable to get any history out of the patient he seems dehydrated    PMH:  has a past medical history of Acute renal failure ([facility] [address] Anemia, Arthritis, DKA (diabetic ketoacidoses), GERD (gastroesophageal reflux disease), HTN (hypertension), Hypercholesterolemia, Schizophrenia ([facility] [address] Schizophreniform disorder ([facility] [address] Seizure disorder ([facility] [address] and Type II or unspecified type diabetes mellitus without mention of complication, uncontrolled. PSH:   has a past surgical history that includes ir insert non tunl cvc over 5 yrs (1/28/2022) and ir insert non tunl cvc over 5 yrs (1/31/2022). FHX: family history includes Stroke in his father. SHX:  reports that he has never smoked. He has never used smokeless tobacco. He reports that he does not drink alcohol and does not use drugs.     ALL: No Known Allergies     MEDS:   [x] Reviewed - As Below   [] Not reviewed    Current Facility-Administered Medications   Medication    insulin glargine (LANTUS) injection 20 Units    dexamethasone (DECADRON) 4 mg/mL injection 4 mg    NOREPINephrine (LEVOPHED) 8 mg in 0.9% NS 250ml infusion    lactulose (CHRONULAC) 10 gram/15 mL solution 15 mL    glucose chewable tablet 16 g    glucagon (GLUCAGEN) injection 1 mg    insulin lispro (HUMALOG) injection    dextrose 10% infusion 125-250 mL    LORazepam (ATIVAN) injection 2 mg    glucose chewable tablet 16 g    glucagon (GLUCAGEN) injection 1 mg    levETIRAcetam (KEPPRA) tablet 1,500 mg    valproic acid (as sodium salt) (DEPAKENE) 250 mg/5 mL (5 mL) oral solution 500 mg    sodium chloride (23.4%) 0.225 % in sterile water 1,000 mL infusion    dextrose 10% infusion 125-250 mL    PHENYLephrine (ISRAEL-SYNEPHRINE) 30 mg in 0.9% sodium chloride 250 mL infusion    propofol (DIPRIVAN) 10 mg/mL infusion    lacosamide (VIMPAT) tablet 200 mg    levothyroxine (SYNTHROID) tablet 75 mcg    aspirin chewable tablet 81 mg    sodium chloride (NS) flush 5-40 mL    sodium chloride (NS) flush 5-40 mL    acetaminophen (TYLENOL) tablet 650 mg    Or    acetaminophen (TYLENOL) suppository 650 mg    polyethylene glycol (MIRALAX) packet 17 g    ondansetron (ZOFRAN ODT) tablet 4 mg    Or    ondansetron (ZOFRAN) injection 4 mg    atorvastatin (LIPITOR) tablet 80 mg    piperacillin-tazobactam (ZOSYN) 3.375 g in 0.9% sodium chloride (MBP/ADV) 100 mL MBP    azithromycin (ZITHROMAX) 500 mg in 0.9% sodium chloride 250 mL (VIAL-MATE)    baricitinib (OLUMIANT) tablet 1 mg      MAR reviewed and pertinent medications noted or modified as needed   Current Facility-Administered Medications   Medication    insulin glargine (LANTUS) injection 20 Units    dexamethasone (DECADRON) 4 mg/mL injection 4 mg    NOREPINephrine (LEVOPHED) 8 mg in 0.9% NS 250ml infusion    lactulose (CHRONULAC) 10 gram/15 mL solution 15 mL    glucose chewable tablet 16 g    glucagon (GLUCAGEN) injection 1 mg    insulin lispro (HUMALOG) injection    dextrose 10% infusion 125-250 mL    LORazepam (ATIVAN) injection 2 mg    glucose chewable tablet 16 g    glucagon (GLUCAGEN) injection 1 mg    levETIRAcetam (KEPPRA) tablet 1,500 mg    valproic acid (as sodium salt) (DEPAKENE) 250 mg/5 mL (5 mL) oral solution 500 mg    sodium chloride (23.4%) 0.225 % in sterile water 1,000 mL infusion    dextrose 10% infusion 125-250 mL    PHENYLephrine (ISRAEL-SYNEPHRINE) 30 mg in 0.9% sodium chloride 250 mL infusion    propofol (DIPRIVAN) 10 mg/mL infusion    lacosamide (VIMPAT) tablet 200 mg    levothyroxine (SYNTHROID) tablet 75 mcg    aspirin chewable tablet 81 mg    sodium chloride (NS) flush 5-40 mL    sodium chloride (NS) flush 5-40 mL    acetaminophen (TYLENOL) tablet 650 mg    Or    acetaminophen (TYLENOL) suppository 650 mg    polyethylene glycol (MIRALAX) packet 17 g    ondansetron (ZOFRAN ODT) tablet 4 mg    Or    ondansetron (ZOFRAN) injection 4 mg    atorvastatin (LIPITOR) tablet 80 mg    piperacillin-tazobactam (ZOSYN) 3.375 g in 0.9% sodium chloride (MBP/ADV) 100 mL MBP    azithromycin (ZITHROMAX) 500 mg in 0.9% sodium chloride 250 mL (VIAL-MATE)  baricitinib (OLUMIANT) tablet 1 mg      PMH:  has a past medical history of Acute renal failure ([facility] [address] Anemia, Arthritis, DKA (diabetic ketoacidoses), GERD (gastroesophageal reflux disease), HTN (hypertension), Hypercholesterolemia, Schizophrenia ([facility] [address] Schizophreniform disorder ([facility] [address] Seizure disorder ([facility] [address] and Type II or unspecified type diabetes mellitus without mention of complication, uncontrolled. PSH:   has a past surgical history that includes ir insert non tunl cvc over 5 yrs (2022) and ir insert non tunl cvc over 5 yrs (2022). FHX: family history includes Stroke in his father. SHX:  reports that he has never smoked. He has never used smokeless tobacco. He reports that he does not drink alcohol and does not use drugs. ROS:  Unable to obtain intubated on ventilator and sedated    Hemodynamics:    CO:    CI:    CVP:    SVR:   PAP Systolic:    PAP Diastolic:    PVR:    DD66:        Ventilator Settings:      Mode Rate TV Press PEEP FiO2 PIP Min. Vent   Assist control,Volume control    500 ml    5 cm H20 50 %  26 cm H2O  11.9 l/min        Vital Signs: Telemetry:    normal sinus rhythm Intake/Output:   Visit Vitals  /68 (BP 1 Location: Left upper arm, BP Patient Position: At rest)   Pulse 78   Temp 98.8 °F (37.1 °C)   Resp 22   Ht 6' (1.829 m)   Wt 105.3 kg (232 lb 2.3 oz)   SpO2 99%   BMI 31.48 kg/m²       Temp (24hrs), Av.1 °F (37.3 °C), Min:97 °F (36.1 °C), Max:99.9 °F (37.7 °C)        O2 Device: Ventilator         Wt Readings from Last 4 Encounters:   22 105.3 kg (232 lb 2.3 oz)   22 101.1 kg (222 lb 14.2 oz)   10/06/21 111.1 kg (245 lb)   21 110.7 kg (244 lb)          Intake/Output Summary (Last 24 hours) at 2/3/2022 0901  Last data filed at 2/3/2022 0600  Gross per 24 hour   Intake 3626.5 ml   Output 1269 ml   Net 2357.5 ml       Last shift:      No intake/output data recorded.   Last 3 shifts: 1901 -  0700  In: 5360.3 [I.V.:4110.3]  Out: 3048 [Urine:5; Drains:450]       Physical Exam:     General:  intubated on vent unresponsive on no sedation  HEENT: NCAT,   Eyes: anicteric; conjunctiva clear no doll's eye reflex  Neck: no nodes, no cuff leak, trach midline; no accessory MM use. Chest: no deformity,   Cardiac: R regular; no murmur;   Lungs: distant breath sounds; no wheezes a few rales in the base  Abd: soft, NT, hypoactive BS  Ext: Trace edema; no joint swelling;  No clubbing  : No urine  Neuro: Unresponsive on no sedation no doll's eye reflex flaccid extremity  Psych-  unable to assess  Skin: warm, dry, no cyanosis;   Pulses: Brachial and radial pulses intact  Capillary: Slow capillary refill      DATA:    MAR reviewed and pertinent medications noted or modified as needed  MEDS:   Current Facility-Administered Medications   Medication    insulin glargine (LANTUS) injection 20 Units    dexamethasone (DECADRON) 4 mg/mL injection 4 mg    NOREPINephrine (LEVOPHED) 8 mg in 0.9% NS 250ml infusion    lactulose (CHRONULAC) 10 gram/15 mL solution 15 mL    glucose chewable tablet 16 g    glucagon (GLUCAGEN) injection 1 mg    insulin lispro (HUMALOG) injection    dextrose 10% infusion 125-250 mL    LORazepam (ATIVAN) injection 2 mg    glucose chewable tablet 16 g    glucagon (GLUCAGEN) injection 1 mg    levETIRAcetam (KEPPRA) tablet 1,500 mg    valproic acid (as sodium salt) (DEPAKENE) 250 mg/5 mL (5 mL) oral solution 500 mg    sodium chloride (23.4%) 0.225 % in sterile water 1,000 mL infusion    dextrose 10% infusion 125-250 mL    PHENYLephrine (ISRAEL-SYNEPHRINE) 30 mg in 0.9% sodium chloride 250 mL infusion    propofol (DIPRIVAN) 10 mg/mL infusion    lacosamide (VIMPAT) tablet 200 mg    levothyroxine (SYNTHROID) tablet 75 mcg    aspirin chewable tablet 81 mg    sodium chloride (NS) flush 5-40 mL    sodium chloride (NS) flush 5-40 mL    acetaminophen (TYLENOL) tablet 650 mg    Or    acetaminophen (TYLENOL) suppository 650 mg    polyethylene glycol (MIRALAX) packet 17 g    ondansetron (ZOFRAN ODT) tablet 4 mg    Or    ondansetron (ZOFRAN) injection 4 mg    atorvastatin (LIPITOR) tablet 80 mg    piperacillin-tazobactam (ZOSYN) 3.375 g in 0.9% sodium chloride (MBP/ADV) 100 mL MBP    azithromycin (ZITHROMAX) 500 mg in 0.9% sodium chloride 250 mL (VIAL-MATE)    baricitinib (OLUMIANT) tablet 1 mg        Labs:    Recent Labs     02/03/22  0530 02/02/22  0440 02/01/22  1145   WBC 19.2* 19.4* 16.5*   HGB 10.3* 11.8* 11.3*   PLT PLEASE SEE PLT NACITRATE FOR PLT RESULTS DUE TO EDTA SENSISTIVITY CAUSING PLT CLUMPING IN LAV TOP TUBE. PLATELET CLUMPS SEEN ON SMEAR, NA CITRATE ORDERED FOR ACCURATE PLT COUNT. PLT CLUMPS SEEN ON SMEAR REVIEW DUE TO EDTA SENSITIV   INR 1.3* 1.2* 1.2*   APTT  --   --  42.3*     Recent Labs     02/03/22  0530 02/02/22  0440 02/01/22  2130 02/01/22  0555 02/01/22  0555   *  133* 140 139   < > 143   K 4.6  4.6 4.1 3.7   < > 4.0     105 110* 112*   < > 112*   CO2 17*  16* 16* 20*   < > 17*   *  229* 66 100   < > 114*   BUN 70*  71* 63* 54*   < > 105*   CREA 4.28*  4.23* 4.06* 3.47*   < > 7.04*   CA 7.5*  7.3* 6.9* 5.9*   < > 6.8*   MG  --  2.1  --   --  2.0   PHOS 7.4* 3.0  --   --  5.7*   ALB 1.3*  1.4* 1.5* 1.5*   < > 1.5*   * 687* 718*   < > 875*    < > = values in this interval not displayed.      Recent Labs     02/03/22  0431 02/02/22  0450 02/01/22  0325   PH 7.31* 7.30* 7.31*   PCO2 32* 31* 32*   PO2 92 78 71*   HCO3 17* 16* 18*   FIO2 50.0 50.0 45.0       Lab Results   Component Value Date/Time    Culture result: No Growth (<1000 cfu/mL) 01/27/2022 02:00 PM    Culture result: No growth 5 days 01/27/2022 01:30 PM    Culture result: No significant growth, <10,000 CFU/mL 06/05/2021 08:45 AM     Lab Results   Component Value Date/Time    TSH 4.47 (H) 05/21/2021 10:46 AM        Imaging:    Results from Hospital Encounter encounter on 01/27/22    XR CHEST PORT    Narrative  Chest single view. Comparison single view chest February 1, 2022. Stable ET tube, NG tube, and right neck central venous catheters. Unchanged appearance for the lungs; no gross interstitial or alveolar pulmonary  edema. Cardiac and mediastinal structures unchanged. No pneumothorax or sizable  pleural effusion. Results from [place] encounter on 01/27/22    CT HEAD WO CONT    Narrative  Exam: CT Head without contrast    TECHNIQUE: Multiple transaxial CT images of the head were obtained without  contrast. Coronal and sagittal reformatted images were provided. Dose reduction: All CT scans at this facility are performed using dose reduction  optimization techniques as appropriate to a performed exam including the  following: Automated exposure control, adjustments of the mA and/or kV according  to patient size, or use of iterative reconstruction technique. HISTORY: anoxia    COMPARISON: Head CT 10/6/2021, 1/27/2022    FINDINGS:    Global parenchymal volume loss appears similar to prior with proportional ex  vacuo dilatation of the ventricles and other extra-axial CSF spaces, slightly  more prominent on the left. No significant midline shift or mass effect. Gray-white matter differentiation appears preserved. No findings of acute  intracranial hemorrhage. Basilar cisterns appear preserved. Intracranial  atherosclerosis. There is bilateral opacification of the mastoid air cells, most likely  indicating nonspecific mastoid effusions. Debris within the external auditory  canals is most likely cerumen. There is mild mucosal thickening in the paranasal  sinuses, most pronounced in the maxillary sinuses. Globes and orbits appear  unremarkable. Calvarium appears intact without findings of acute or aggressive  abnormality. Impression  Overall similar exam to prior without findings of acute intracranial  abnormality.       · 1/30 remains on the ventilator has metabolic acidosis we will add bicarb per tube.  Maintain ventilator on current settings although will decrease PEEP slightly. Platelets continue to drop.   He is unresponsive on no sedation likely anoxic encephalopathy  · 1/31 remain intubated on ventilator hemodynamically worsening of the renal function  · 2/1 remained on ventilator hemodynamically unstable on levofed  · 2/2 on ventilator hypothermic electrolyte imbalance will continue with the current vent settings

## 2022-02-03 NOTE — PROGRESS NOTES
Goals of care meeting held with patient's brother and attending physician. Family has made patient a DNR, and would like for all medical treatments to continue. His discharge plan is uncertain at this time. CM will continue to follow.

## 2022-02-03 NOTE — PROGRESS NOTES
Comprehensive Nutrition Assessment    Type and Reason for Visit: Reassess (goal)    Nutrition Recommendations/Plan:   Continue NPO     Adjust TF of Nepro to Goal of 25ml/hr  Flush 250ml q6hrs  Provide 2 pkts prosource q6hrs (8pkt/d; 480kcals, 120g protein)  Provides 1560kcals (109%), 169g pro (105%), 1436kcals (100%)  Monitor EN initiation, TF tolerance, PO advancement, weight, labs, skin    Nutrition Assessment:  Admitted for encephalopathy, +COVID-19. (1/27) intubated in ED, started on pressors that are now d/c'd. Remains intubated today, sedation off and pt still unresponsive. (1/31) CRRT started, (2/3) changed to intermittent HD. Pressors and sedation d/c'd. (2/2) TF of Nepro at 50ml/hr started by nephrology, currently providing  2160kcals (151%EENs), 97g protein (60%), 1472ml fluid (103%)-- overall inadequate. RD discussed with attending, will adjust rate to best met needs. Meds: Azithromycin, baricitinib, SSI, lactulose, keppra, levothyroxine,  Zosyn. Labs: Na: H/H 10.3/30.0, , BUN 71, CR 4.23, BG , LFT elevated, Phos 7.4, Alb 1.4, A1c% 7.6       Malnutrition Assessment:  Malnutrition Status:  Mild malnutrition    Context:  Acute illness     Findings of the 6 clinical characteristics of malnutrition:   Energy Intake:  1 - 75% or less of est energy req for 7 or more days (NPO w/o TF)  Weight Loss:  No significant weight loss     Body Fat Loss:  Unable to assess,     Muscle Mass Loss:  Unable to assess,    Fluid Accumulation:  No significant fluid accumulation,      Estimated Daily Nutrient Needs:  Energy (kcal): 1428kcal/d (14kcal/kg); Weight Used for Energy Requirements: Current  Protein (g): 161g/d (2g/kg IBW80.9kg); Weight Used for Protein Requirements:    Fluid (ml/day): 1430ml/d; Method Used for Fluid Requirements: 1 ml/kcal      Nutrition Related Findings:  No NFPE performed d/t COVID-19, appears nourished. No N/V/C/D. FMS in place, generally <500ml OP/day.  Non-pitting generalized and x4 extremity edema. Wounds:    Deep tissue injury (ankle,)       Current Nutrition Therapies:  DIET NPO  ADULT TUBE FEEDING Orogastric; Renal; Delivery Method: Continuous; Continuous Initial Rate (mL/hr): 25; Continuous Advance Tube Feeding: No; Water Flush Volume (mL): 250; Water Flush Frequency: Q 6 hours; Modulars/Additives: Protein; Specify How t... Anthropometric Measures:  · Height:  6' (182.9 cm)  · Current Body Wt:  102 kg (224 lb 13.9 oz)   · Admission Body Wt:  220 lb    · Usual Body Wt:        · Ideal Body Wt:  178 lbs:  126.3 %   · BMI Category:  Obese class 1 (BMI 30.0-34. 9)       Nutrition Diagnosis:   · Inadequate oral intake related to cognitive or neurological impairment as evidenced by NPO or clear liquid status due to medical condition,intubation      Nutrition Interventions:   Food and/or Nutrient Delivery: Continue NPO,Modify tube feeding  Nutrition Education and Counseling: No recommendations at this time  Coordination of Nutrition Care: Continue to monitor while inpatient    Goals:  Meet 75% EEN, Maintain CBW +/-0.5kg x1wk, Maintain skin integrity, Lytes WNL       Nutrition Monitoring and Evaluation:   Behavioral-Environmental Outcomes: None identified  Food/Nutrient Intake Outcomes: Enteral nutrition intake/tolerance  Physical Signs/Symptoms Outcomes: Weight,Skin,Diarrhea,Biochemical data,Hemodynamic status,Fluid status or edema    Discharge Planning:     Too soon to determine     Electronically signed by [employer] on 2/3/2022 at 6:20 PM    Contact: Ext [phone], or via [employer]

## 2022-02-03 NOTE — DIALYSIS
Pt had on 3 hours of dialysis tolerated fairly well, due to clotting of the machine and pts CVC lines becoming unable to pull and push. Blood was not able to be returned to pt. Nephrologist was notified and ordered Cathflo to open up lines. Pt was put back on dialysis after cathflo was administered and dwelled for 30 minutes. Pt had 1000ml removed with 300ml rinseback. CVC site was changed and redressed after tx. CVC site was now clean, dry, intact, and rewrapped. Pt lines was leaking after dressing change and ICU was made aware.  Pt handed off back to ICU nurse

## 2022-02-03 NOTE — PROGRESS NOTES
Right HD and RIJ insertion site noted to be oozing blood after hemodialysis. Applied quick clot on insertion site and bulked area with 4X4s. Will monitor site for any continued bleeding.

## 2022-02-03 NOTE — PROGRESS NOTES
LD and procalcitonin elevated. Doctor Shellie Manning notified of results but states numbers are less then previous numbers.

## 2022-02-04 NOTE — PROGRESS NOTES
Progress Note    Patient: Toby Ch MRN: [id number]  SSN: [id number]    YOB: 1945  Age: 78 y.o. Sex: male      Admit Date: 1/27/2022    LOS: 8 days     Subjective:     76M, H/o Schizophenia, seizures, DMII on oral meds with unresponsive and acute hypoxic respiratory failure s/t COVID-19 pneumonitis complicated by ANDRES, hypokalemia and shock liver.          HSOPITAL COURSE  COVID positive, associated with fever 106, increased troponin, cardiology was consulted ansd recommended ECHO, there is no heparin gtt, was initially called for STEMI and was cancelled. he was intubated for acute hypoxic respiratory failure. On levophed. Complicated by ANDRES, shock liver and hypokalemia. Was treated with azithromycin, barcitinib, and zosyn. Will give 1L bolus and continue IVF. Discussed with family he is very critical- his renal functions increased and now seemingly in ATN, his liver functions have worsened and had a seizure on 1/28 , he is off propofol now and neurology was consulted. Repeat CT scan didn't show any stroke, plan for EEG on 2/1 to assess for neurological activity. The patients liver functions, renal are worsening. Plan for CRRT today    Subjectivepatient seen and evaluated at bedside, currently remains intubated and sedated, overnight patient had significant bleeding from dialysis catheter site discussed with RN      Review of Systems:  Unable to determine s.t mental status    Objective:     Vitals:    02/04/22 0400 02/04/22 0500 02/04/22 0600 02/04/22 0700   BP: (!) 104/55 (!) 110/55 110/83    Pulse: 82 83 85    Resp: 21 21 20    Temp: 99.9 °F (37.7 °C) 99.9 °F (37.7 °C) 99.9 °F (37.7 °C) 99.3 °F (37.4 °C)   TempSrc:       SpO2: 100% 99% 99%    Weight:       Height:            Intake and Output:  Current Shift: No intake/output data recorded.   Last three shifts: 02/02 1901 - 02/04 0700  In: 4626.5 [I.V.:2226.5]  Out: 7759 [Urine:5; Drains:150]      Physical Exam:   Physical Exam  Vitals and nursing note reviewed. Constitutional:       General: intubates     Appearance: Normal appearance. He is normal weight. He is ill-appearing. HENT:      Head: Normocephalic and atraumatic.      Nose: Nose normal.      Mouth/Throat:      Mouth: Mucous membranes are moist.   Eyes:      Extraocular Movements: Extraocular movements intact.      Pupils: Pupils are equal, round, and reactive to light. Cardiovascular:      Rate and Rhythm: Regular rhythm. Tachycardia present.      Pulses: Normal pulses.      Heart sounds: Normal heart sounds. Pulmonary:      Effort: Respiratory distress present.      Breath sounds: Rhonchi present. Abdominal:      General: Abdomen is flat. Bowel sounds are normal.      Palpations: Abdomen is soft. Musculoskeletal:         General: No swelling or tenderness. Normal range of motion.      Cervical back: Normal range of motion and neck supple. Skin:     General: Skin is warm and dry.      Capillary Refill: Capillary refill takes less than 2 seconds. Neurological:      Mental Status: He is unresponsive.      GCS: GCS eye subscore is 4. GCS verbal subscore is 1. GCS motor subscore is 1.    Psychiatric:         cannot be assessed      Lab/Data Review:  Recent Results (from the past 24 hour(s))   GLUCOSE, POC    Collection Time: 02/03/22  7:53 AM   Result Value Ref Range    Glucose (POC) 239 (H) 65 - 117 mg/dL    Performed by Anna Snowden    PLATELET COUNT ON CITRATED BLD    Collection Time: 02/03/22 10:00 AM   Result Value Ref Range    PLATELET COUNT (NA CITRATE) 151 150 - 400 K/uL   GLUCOSE, POC    Collection Time: 02/03/22 11:22 AM   Result Value Ref Range    Glucose (POC) 214 (H) 65 - 117 mg/dL    Performed by Anna Snowden    GLUCOSE, POC    Collection Time: 02/03/22  5:58 PM   Result Value Ref Range    Glucose (POC) 199 (H) 65 - 117 mg/dL    Performed by Anna Snowden    GLUCOSE, POC    Collection Time: 02/03/22  8:37 PM   Result Value Ref Range    Glucose (POC) 244 (H) 65 - 117 mg/dL    Performed by Lashell Gonzales    HEMOGLOBIN    Collection Time: 02/03/22 10:45 PM   Result Value Ref Range    HGB 8.8 (L) 12.1 - 17.0 g/dL   HEMATOCRIT    Collection Time: 02/03/22 10:45 PM   Result Value Ref Range    HCT 25.4 (L) 36.6 - 50.3 %   GLUCOSE, POC    Collection Time: 02/04/22  2:52 AM   Result Value Ref Range    Glucose (POC) 271 (H) 65 - 117 mg/dL    Performed by Jerrica Yusuf + INR    Collection Time: 02/04/22  4:45 AM   Result Value Ref Range    Prothrombin time 16.8 (H) 11.9 - 14.7 sec    INR 1.4 (H) 0.9 - 1.1     LD    Collection Time: 02/04/22  4:45 AM   Result Value Ref Range     (H) 85 - 241 U/L   D DIMER    Collection Time: 02/04/22  4:45 AM   Result Value Ref Range    D DIMER 19.37 (H) <0.50 ug/ml(FEU)   PROCALCITONIN    Collection Time: 02/04/22  4:45 AM   Result Value Ref Range    Procalcitonin 7.15 (H) 0 ng/mL   SED RATE (ESR)    Collection Time: 02/04/22  4:45 AM   Result Value Ref Range    Sed rate, automated PENDING mm/hr   CBC W/O DIFF    Collection Time: 02/04/22  4:45 AM   Result Value Ref Range    WBC 25.7 (H) 4.1 - 11.1 K/uL    RBC 1.25 (L) 4.10 - 5.70 M/uL    HGB 4.0 (LL) 12.1 - 17.0 g/dL    HCT 11.9 (LL) 36.6 - 50.3 %    MCV 95.2 80.0 - 99.0 FL    MCH 32.0 26.0 - 34.0 PG    MCHC 33.6 30.0 - 36.5 g/dL    RDW 14.6 (H) 11.5 - 14.5 %    PLATELET 896 365 - 219 K/uL    MPV 12.6 8.9 - 12.9 FL    NRBC 0.2 (H) 0.0  WBC    ABSOLUTE NRBC 0.05 (H) 0.00 - 5.67 K/uL   METABOLIC PANEL, COMPREHENSIVE    Collection Time: 02/04/22  4:45 AM   Result Value Ref Range    Sodium 132 (L) 136 - 145 mmol/L    Potassium 4.5 3.5 - 5.1 mmol/L    Chloride 101 97 - 108 mmol/L    CO2 19 (L) 21 - 32 mmol/L    Anion gap 12 5 - 15 mmol/L    Glucose 262 (H) 65 - 100 mg/dL    BUN 72 (H) 6 - 20 mg/dL    Creatinine 4.48 (H) 0.70 - 1.30 mg/dL    BUN/Creatinine ratio 16 12 - 20      GFR est AA 16 (L) >60 ml/min/1.73m2    GFR est non-AA 13 (L) >60 ml/min/1.73m2 Calcium 7.8 (L) 8.5 - 10.1 mg/dL    Bilirubin, total 0.5 0.2 - 1.0 mg/dL    AST (SGOT) 470 (H) 15 - 37 U/L    ALT (SGPT) 288 (H) 12 - 78 U/L    Alk.  phosphatase 144 (H) 45 - 117 U/L    Protein, total 5.6 (L) 6.4 - 8.2 g/dL    Albumin 1.4 (L) 3.5 - 5.0 g/dL    Globulin 4.2 (H) 2.0 - 4.0 g/dL    A-G Ratio 0.3 (L) 1.1 - 2.2     RENAL FUNCTION PANEL    Collection Time: 02/04/22  4:45 AM   Result Value Ref Range    Sodium 130 (L) 136 - 145 mmol/L    Potassium 4.5 3.5 - 5.1 mmol/L    Chloride 101 97 - 108 mmol/L    CO2 18 (L) 21 - 32 mmol/L    Anion gap 11 5 - 15 mmol/L    Glucose 235 (H) 65 - 100 mg/dL    BUN 71 (H) 6 - 20 mg/dL    Creatinine 4.40 (H) 0.70 - 1.30 mg/dL    BUN/Creatinine ratio 16 12 - 20      GFR est AA 16 (L) >60 ml/min/1.73m2    GFR est non-AA 13 (L) >60 ml/min/1.73m2    Calcium 7.6 (L) 8.5 - 10.1 mg/dL    Phosphorus 5.6 (H) 2.6 - 4.7 mg/dL    Albumin 1.4 (L) 3.5 - 5.0 g/dL   BLOOD GAS, ARTERIAL    Collection Time: 02/04/22  5:00 AM   Result Value Ref Range    pH 7.38 7.35 - 7.45      PCO2 32 (L) 35 - 45 mmHg    PO2 108 (H) 75 - 100 mmHg    O2 SAT 99 >95 %    BICARBONATE 20 (L) 22 - 26 mmol/L    BASE DEFICIT 5.5 (H) 0 - 2 mmol/L    O2 METHOD VENT      FIO2 50.0 %    MODE Assist Control/Volume Control      Tidal volume 500      SET RATE 18      EPAP/CPAP/PEEP 5.0      SITE Right Radial      JULIO'S TEST PASS     GLUCOSE, POC    Collection Time: 02/04/22  5:01 AM   Result Value Ref Range    Glucose (POC) 285 (H) 65 - 117 mg/dL    Performed by [address]ien 207    Collection Time: 02/04/22  5:30 AM   Result Value Ref Range    Crossmatch Expiration 02/07/2022,2359     ABO/Rh(D) Yordy Potterge Positive     Antibody screen Negative          Assessment and plan:        Acute respiratory failure with hypoxia secondary to COVID-19 pneumoniaof note patient currently remains intubated and sedated at this time, remains in septic shock, secondary to COVID-19 pneumonia  Follow blood cultures  Follow sputum cultures  Continue Decadron 6 mg IV every 12  Continue Zosyn azithromycin for antibiotic coverage  Continue baricitinib once daily for total 14-day course  Attempt to wean oxygen  Continue to follow pulmonology recommendations    Septic shocksecondary to problem #1, status post fluid resuscitationsepsis protocol  Follow-up blood cultures  Continue trend inflammatory markers  Continue antibiotics as documented above  Continue Levophed for pressor support, titrate to mean arterial pressure of over 65  Continue to follow infectious Disease recommendations    Severe encephalopathylikely secondary to septic shock/problem #1 however concerns for anoxic brain injury as patient does not have purposeful movements while off of sedation  Neurology consult appreciated  Management as documented above  Follow-up MRI brain further evaluation  Continue to follow neurology recommendations    Hyperglycemia type 2 diabetesas documented below  Restart lantus at 20 units daily  Continue insulin sliding scale    Type II non-ST elevation MIlikely secondary to demand  Transthoracic echo reviewed  Continue current medications  Continue to follow cardiology recommendations    Hypokalemiacurrently resolved    Acute kidney injurylikely secondary to acute tubular necrosis, status post initiation of dialysis  Continue to trend serum creatinine  Dialysis as per nephrology recommendations  Nephrology consult appreciated, continue to follow recommendations    Shock liversecondary to problems #1 and 2, currently LFTs downtrending  Management as documented above  Continue trend LFTs    Anemia/bleeding from dialysis catheter siteof note patient had significant bleeding from dialysis catheter site yesterday, found to have significant anemia this morning with a hemoglobin of 4  Hold aspirin  Applied quick clot to dialysis catheter site with no improvement,  Transfuse 4 units packed RBCs  Maintain active type and screen  Obtain posttransfusion CBC to assess for appropriate response  Interventional radiology consult for evaluation of dialysis catheter    ProphylaxisSCDs  FENtube feeding, replete potassium and magnesium  Full code, Plan for goals of care discussion with patients brothers later today (unable to have meeting yesterday 2/2 family availability)  Dispositioncontinued ICU care pending clinical improvement    Critical care time spent 35 minutes involving direct patient care as well as reviewing patient's labs and coordination of care with nursing staff      Signed By: Damaris Montez MD     February 4, 2022

## 2022-02-04 NOTE — PROGRESS NOTES
Subjective   Subjective:      HPI:pt on ventilator and sedated. No active bleeding clinically.     Objective     Current Facility-Administered Medications:     insulin glargine (LANTUS) injection 20 Units, 20 Units, SubCUTAneous, QHS, Jesus Aceves MD, 20 Units at 02/03/22 2200    alteplase (CATHFLO) injection 2 mg, 2 mg, InterCATHeter, ONCE, Clyde Magdaleno MD, Transfusion Completed at 02/03/22 1220    dexamethasone (DECADRON) 4 mg/mL injection 4 mg, 4 mg, IntraVENous, Q12H, Ge Duran MD, 4 mg at 02/03/22 2228    NOREPINephrine (LEVOPHED) 8 mg in 0.9% NS 250ml infusion, 0.5-120 mcg/min, IntraVENous, TITRATE, Ge Duran MD, Stopped at 02/01/22 1827    lactulose (CHRONULAC) 10 gram/15 mL solution 15 mL, 10 g, Per G Tube, TID, Valdemar Morse MD, 15 mL at 02/03/22 2224    glucose chewable tablet 16 g, 4 Tablet, Oral, PRN, Valdemar Morse MD    glucagon (GLUCAGEN) injection 1 mg, 1 mg, IntraMUSCular, PRN, Valdemar Morse MD    insulin lispro (HUMALOG) injection, , SubCUTAneous, Q4H, Valdemar Morse MD, 4 Units at 02/03/22 2100    dextrose 10% infusion 125-250 mL, 125-250 mL, IntraVENous, PRN, Valdemar Morse MD, 250 mL at 02/01/22 1518    LORazepam (ATIVAN) injection 2 mg, 2 mg, IntraVENous, Q2H PRN, Jen Jorgensen PA-C, 2 mg at 01/29/22 0057    glucose chewable tablet 16 g, 4 Tablet, Oral, PRN, Valdemar Morse MD    glucagon (GLUCAGEN) injection 1 mg, 1 mg, IntraMUSCular, PRN, Valdemar Morse MD    levETIRAcetam (KEPPRA) tablet 1,500 mg, 1,500 mg, Oral, BID, Brooke Lowery MD, 1,500 mg at 02/03/22 2224    valproic acid (as sodium salt) (DEPAKENE) 250 mg/5 mL (5 mL) oral solution 500 mg, 500 mg, Oral, BID, Burt Brower MD, 500 mg at 02/03/22 2224    sodium chloride (23.4%) 0.225 % in sterile water 1,000 mL infusion, , IntraVENous, CONTINUOUS, Chinmay Durham MD, Last Rate: 75 mL/hr at 02/03/22 0947, New Bag at 02/03/22 0947    dextrose 10% infusion 125-250 mL, 125-250 mL, IntraVENous, PRN, Jaz Knox MD, 250 mL at 02/02/22 0556    PHENYLephrine (ISRAEL-SYNEPHRINE) 30 mg in 0.9% sodium chloride 250 mL infusion,  mcg/min, IntraVENous, TITRATE, Francis Jorgensen PA-C, Held at 01/28/22 2300    propofol (DIPRIVAN) 10 mg/mL infusion, 0-50 mcg/kg/min, IntraVENous, TITRATE, Naa Lafleur MD, Stopped at 01/29/22 7369    lacosamide (VIMPAT) tablet 200 mg, 200 mg, Oral, BID, Jaz Knox MD, 200 mg at 02/03/22 2224    levothyroxine (SYNTHROID) tablet 75 mcg, 75 mcg, Oral, 7am, Jaz Knox MD, 75 mcg at 02/03/22 0700    aspirin chewable tablet 81 mg, 81 mg, Oral, DAILY, Jaz Knox MD, 81 mg at 02/03/22 0933    sodium chloride (NS) flush 5-40 mL, 5-40 mL, IntraVENous, Q8H, BhatyCorey MD, 10 mL at 02/03/22 2226    sodium chloride (NS) flush 5-40 mL, 5-40 mL, IntraVENous, PRN, Jaz Knox MD    acetaminophen (TYLENOL) tablet 650 mg, 650 mg, Oral, Q6H PRN, 650 mg at 01/28/22 1111 **OR** acetaminophen (TYLENOL) suppository 650 mg, 650 mg, Rectal, Q6H PRN, Jaz Knox MD    polyethylene glycol (MIRALAX) packet 17 g, 17 g, Oral, DAILY PRN, Jaz Knox MD    ondansetron (ZOFRAN ODT) tablet 4 mg, 4 mg, Oral, Q8H PRN **OR** ondansetron (ZOFRAN) injection 4 mg, 4 mg, IntraVENous, Q6H PRN, Jaz Knox MD    atorvastatin (LIPITOR) tablet 80 mg, 80 mg, Oral, QHS, Naa Lafleur MD, 80 mg at 02/03/22 2224    piperacillin-tazobactam (ZOSYN) 3.375 g in 0.9% sodium chloride (MBP/ADV) 100 mL MBP, 3.375 g, IntraVENous, Q8H, Freda Aceves MD, Last Rate: 25 mL/hr at 02/03/22 2226, 3.375 g at 02/03/22 2226    azithromycin (ZITHROMAX) 500 mg in 0.9% sodium chloride 250 mL (VIAL-MATE), 500 mg, IntraVENous, Q24H, Corey Nueñz MD, Last Rate: 250 mL/hr at 02/03/22 1750, 500 mg at 02/03/22 1750    baricitinib (OLUMIANT) tablet 1 mg, 1 mg, Oral, DAILY, Ge Duran MD, 1 mg at 02/03/22 5297    Objective:     Visit Vitals  /65 (BP 1 Location: Left upper arm, BP Patient Position: At rest)   Pulse 85   Temp 100.2 °F (37.9 °C)   Resp 23   Ht 6' (1.829 m)   Wt 105.3 kg (232 lb 2.3 oz)   SpO2 98%   BMI 31.48 kg/m²      Physical Exam   Pt on venilator,sedated  @Objective    Orin@"[employer].Best Five Reviewed     Data Review:   Recent Results (from the past 24 hour(s))   BLOOD GAS, ARTERIAL    Collection Time: 02/03/22  4:31 AM   Result Value Ref Range    pH 7.31 (L) 7.35 - 7.45      PCO2 32 (L) 35 - 45 mmHg    PO2 92 75 - 100 mmHg    O2 SAT 96 >95 %    BICARBONATE 17 (L) 22 - 26 mmol/L    BASE DEFICIT 9.0 (H) 0 - 2 mmol/L    O2 METHOD VENT      FIO2 50.0 %    MODE Assist Control/Volume Control      Tidal volume 500      SET RATE 18      EPAP/CPAP/PEEP 5.0      SITE Right Radial      JULIO'S TEST PASS     GLUCOSE, POC    Collection Time: 02/03/22  5:28 AM   Result Value Ref Range    Glucose (POC) 258 (H) 65 - 117 mg/dL    Performed by James Sorto    CBC W/O DIFF    Collection Time: 02/03/22  5:30 AM   Result Value Ref Range    WBC 19.2 (H) 4.1 - 11.1 K/uL    RBC 3.18 (L) 4.10 - 5.70 M/uL    HGB 10.3 (L) 12.1 - 17.0 g/dL    HCT 30.0 (L) 36.6 - 50.3 %    MCV 94.3 80.0 - 99.0 FL    MCH 32.4 26.0 - 34.0 PG    MCHC 34.3 30.0 - 36.5 g/dL    RDW 14.7 (H) 11.5 - 14.5 %    PLATELET  052 - 401 K/uL     PLEASE SEE PLT NACITRATE FOR PLT RESULTS DUE TO EDTA SENSISTIVITY CAUSING PLT CLUMPING IN LAV TOP TUBE.      NRBC 0.4 (H) 0.0  WBC    ABSOLUTE NRBC 0.07 (H) 0.00 - 7.43 K/uL   METABOLIC PANEL, COMPREHENSIVE    Collection Time: 02/03/22  5:30 AM   Result Value Ref Range    Sodium 133 (L) 136 - 145 mmol/L    Potassium 4.6 3.5 - 5.1 mmol/L    Chloride 105 97 - 108 mmol/L    CO2 16 (L) 21 - 32 mmol/L    Anion gap 12 5 - 15 mmol/L    Glucose 229 (H) 65 - 100 mg/dL    BUN 71 (H) 6 - 20 mg/dL    Creatinine 4.23 (H) 0.70 - 1.30 mg/dL    BUN/Creatinine ratio 17 12 - 20      GFR est AA 17 (L) >60 ml/min/1.73m2    GFR est non-AA 14 (L) >60 ml/min/1.73m2    Calcium 7.3 (L) 8.5 - 10.1 mg/dL    Bilirubin, total 0.6 0.2 - 1.0 mg/dL    AST (SGOT) 680 (H) 15 - 37 U/L    ALT (SGPT) 442 (H) 12 - 78 U/L    Alk.  phosphatase 138 (H) 45 - 117 U/L    Protein, total 6.0 (L) 6.4 - 8.2 g/dL    Albumin 1.4 (L) 3.5 - 5.0 g/dL    Globulin 4.6 (H) 2.0 - 4.0 g/dL    A-G Ratio 0.3 (L) 1.1 - 2.2     PROTHROMBIN TIME + INR    Collection Time: 02/03/22  5:30 AM   Result Value Ref Range    Prothrombin time 15.8 (H) 11.9 - 14.7 sec    INR 1.3 (H) 0.9 - 1.1     LD    Collection Time: 02/03/22  5:30 AM   Result Value Ref Range    LD 1,261 (H) 85 - 241 U/L   D DIMER    Collection Time: 02/03/22  5:30 AM   Result Value Ref Range    D DIMER 19.34 (H) <0.50 ug/ml(FEU)   PROCALCITONIN    Collection Time: 02/03/22  5:30 AM   Result Value Ref Range    Procalcitonin 8.29 (H) 0 ng/mL   SED RATE (ESR)    Collection Time: 02/03/22  5:30 AM   Result Value Ref Range    Sed rate, automated 106 mm/hr   RENAL FUNCTION PANEL    Collection Time: 02/03/22  5:30 AM   Result Value Ref Range    Sodium 135 (L) 136 - 145 mmol/L    Potassium 4.6 3.5 - 5.1 mmol/L    Chloride 105 97 - 108 mmol/L    CO2 17 (L) 21 - 32 mmol/L    Anion gap 13 5 - 15 mmol/L    Glucose 230 (H) 65 - 100 mg/dL    BUN 70 (H) 6 - 20 mg/dL    Creatinine 4.28 (H) 0.70 - 1.30 mg/dL    BUN/Creatinine ratio 16 12 - 20      GFR est AA 17 (L) >60 ml/min/1.73m2    GFR est non-AA 14 (L) >60 ml/min/1.73m2    Calcium 7.5 (L) 8.5 - 10.1 mg/dL    Phosphorus 7.4 (H) 2.6 - 4.7 mg/dL    Albumin 1.3 (L) 3.5 - 5.0 g/dL   CK    Collection Time: 02/03/22  5:30 AM   Result Value Ref Range    CK 57,540 (HH) 39 - 308 U/L   GLUCOSE, POC    Collection Time: 02/03/22  7:53 AM   Result Value Ref Range    Glucose (POC) 239 (H) 65 - 117 mg/dL    Performed by Marcella Schilling    PLATELET COUNT ON CITRATED BLD    Collection Time: 02/03/22 10:00 AM   Result Value Ref Range    PLATELET COUNT (NA CITRATE) 151 150 - 400 K/uL   GLUCOSE, POC    Collection Time: 02/03/22 11:22 AM   Result Value Ref Range    Glucose (POC) 214 (H) 65 - 117 mg/dL    Performed by Rajat Paragon 28    GLUCOSE, POC    Collection Time: 02/03/22  5:58 PM   Result Value Ref Range    Glucose (POC) 199 (H) 65 - 117 mg/dL    Performed by Yordysaskia [employer]    GLUCOSE, POC    Collection Time: 02/03/22  8:37 PM   Result Value Ref Range    Glucose (POC) 244 (H) 65 - 117 mg/dL    Performed by Hugo Edgar    HEMOGLOBIN    Collection Time: 02/03/22 10:45 PM   Result Value Ref Range    HGB 8.8 (L) 12.1 - 17.0 g/dL       Assessment   Assessment/Plan:      1. Thrombocytopenia: Most likely multifactorial.  Patient has clumping reported. Pt has pseudothrombocytopenia. Citrated platelets 85L. D/w . Platelet clumping. No increased schistocytes. LDH high but improving . retic count low. High LDH due to shock liver and COVID. No active hemolysis or TTP. Monitor. Pt actual platelets slighly low due to Covid infection and medications also. Patient on Protonix and antiseizure medications. Monitor. Check platelet count with citrate tube if needed.     2.  Elevated D-dimer: Nonspecific. Venous Doppler of bilateral lower extremities negative for DVT. Monitor.     3.  Leukocytosis and granulocytosis: Due to Covid infection SUBJECTIVE:S/p steroids. Monitor. ID following. 4.  Anemia: mild. Check iron studies. 5.  Acute kidney injury: Nephrology following. Patient on CRRT. 6.  Shock liver: LFTs improving.     NSTEMI:cardiology following.     Encephalopathy. Will sign off. If any questions or bleeding issues please call.

## 2022-02-04 NOTE — PROGRESS NOTES
Progress Note    Patient: Vickey Seo MRN: [id number]  SSN: [id number]    YOB: 1945  Age: 78 y.o. Sex: male      Admit Date: 1/27/2022    LOS: 8 days     Subjective:     Patient seen and examined. Patient is followed for NSTEMI in the setting of COVID. Patient has a past medical history of anemia, GERD, schizophrenia, diabetes, and seizure disorder. Patient remains intubated and sedated. Condition is critical with poor prognosis overall. Hgb 4.0 this am bleeding from central line site, despite manual pressure and quickclot application. Telemetry Review: No acute events noted in the past 24 hours. Sinus rhythm; heart rate 70s. Review of Symptoms:   Review of Systems   Unable to perform ROS: acuity of condition         Objective:     Vitals:    02/04/22 0830 02/04/22 0845 02/04/22 0900 02/04/22 0930   BP: (!) 120/58 117/60 116/61 116/62   Pulse: 79 80 80 77   Resp: 20 21 21 21   Temp: 99.1 °F (37.3 °C) 99.1 °F (37.3 °C) 99.1 °F (37.3 °C) 99.1 °F (37.3 °C)   TempSrc:       SpO2: 99%   98%   Weight:       Height:            Intake and Output:  Current Shift: No intake/output data recorded. Last three shifts: 02/02 1901 - 02/04 0700  In: 4626.5 [I.V.:2226.5]  Out: 8273 [Urine:5; Drains:150]    Physical Exam:   . [facility] Physical Exam  Constitutional:       General: He is not in acute distress. Appearance: He is ill-appearing, toxic-appearing and diaphoretic. Cardiovascular:      Rate and Rhythm: Regular rhythm. Tachycardia present. Pulses: Normal pulses. Heart sounds: Normal heart sounds. Pulmonary:      Effort: Respiratory distress present. Breath sounds: No wheezing, rhonchi or rales. Comments: Intubated  Abdominal:      General: Abdomen is flat. Bowel sounds are normal.      Palpations: Abdomen is soft. Skin:     General: Skin is warm. Neurological:      Comments: Sedated           Lab/Data Review: All lab results for the last 24 hours reviewed. Current Facility-Administered Medications:     0.9% sodium chloride infusion 250 mL, 250 mL, IntraVENous, PRN, Jesus Aceves MD    levETIRAcetam (KEPPRA) oral solution 1,500 mg, 1,500 mg, Oral, BID, Zackary Joel MD, 1,500 mg at 02/04/22 0949    insulin glargine (LANTUS) injection 20 Units, 20 Units, SubCUTAneous, QHS, Jesus Aceves MD, 20 Units at 02/03/22 2200    dexamethasone (DECADRON) 4 mg/mL injection 4 mg, 4 mg, IntraVENous, Q12H, Ge Duran MD, 4 mg at 02/04/22 0824    NOREPINephrine (LEVOPHED) 8 mg in 0.9% NS 250ml infusion, 0.5-120 mcg/min, IntraVENous, TITRATE, Ge Duran MD, Stopped at 02/01/22 1827    lactulose (CHRONULAC) 10 gram/15 mL solution 15 mL, 10 g, Per G Tube, TID, Beverly Singh MD, 15 mL at 02/04/22 0826    glucose chewable tablet 16 g, 4 Tablet, Oral, PRN, Beverly Singh MD    glucagon (GLUCAGEN) injection 1 mg, 1 mg, IntraMUSCular, PRN, Beverly Singh MD    insulin lispro (HUMALOG) injection, , SubCUTAneous, Q4H, Beverly Singh MD, 7 Units at 02/04/22 0500    dextrose 10% infusion 125-250 mL, 125-250 mL, IntraVENous, PRN, Beverly Singh MD, 250 mL at 02/01/22 1518    LORazepam (ATIVAN) injection 2 mg, 2 mg, IntraVENous, Q2H PRN, Jeannette Jorgensen PA-C, 2 mg at 01/29/22 0057    glucose chewable tablet 16 g, 4 Tablet, Oral, PRN, Beverly Singh MD    glucagon (GLUCAGEN) injection 1 mg, 1 mg, IntraMUSCular, PRN, Beverly Singh MD    valproic acid (as sodium salt) (DEPAKENE) 250 mg/5 mL (5 mL) oral solution 500 mg, 500 mg, Oral, BID, Burt Escoto MD, 500 mg at 02/04/22 8095    sodium chloride (23.4%) 0.225 % in sterile water 1,000 mL infusion, , IntraVENous, CONTINUOUS, Luke Brown MD, Last Rate: 75 mL/hr at 02/03/22 0947, New Bag at 02/03/22 0947    dextrose 10% infusion 125-250 mL, 125-250 mL, IntraVENous, PRN, Beverly Singh MD, 250 mL at 02/02/22 0556    PHENYLephrine (ISRAEL-SYNEPHRINE) 30 mg in 0.9% sodium chloride 250 mL infusion,  mcg/min, IntraVENous, TITRATE, Hortencia Jorgensen PA-C, Held at 01/28/22 2300    propofol (DIPRIVAN) 10 mg/mL infusion, 0-50 mcg/kg/min, IntraVENous, TITRATE, Vitor Mclean MD, Stopped at 01/29/22 4478    lacosamide (VIMPAT) tablet 200 mg, 200 mg, Oral, BID, Apolonia Bowie MD, 200 mg at 02/04/22 0825    levothyroxine (SYNTHROID) tablet 75 mcg, 75 mcg, Oral, 7am, Apolonia Bowie MD, 75 mcg at 02/04/22 0533    [Held by provider] aspirin chewable tablet 81 mg, 81 mg, Oral, DAILY, Apolonia Bowie MD, 81 mg at 02/03/22 0933    sodium chloride (NS) flush 5-40 mL, 5-40 mL, IntraVENous, Q8H, Melany Nuñez MD, 10 mL at 02/04/22 0534    sodium chloride (NS) flush 5-40 mL, 5-40 mL, IntraVENous, PRN, Apolonia Bwoie MD    acetaminophen (TYLENOL) tablet 650 mg, 650 mg, Oral, Q6H PRN, 650 mg at 01/28/22 1111 **OR** acetaminophen (TYLENOL) suppository 650 mg, 650 mg, Rectal, Q6H PRN, Apolonia Bowie MD    polyethylene glycol (MIRALAX) packet 17 g, 17 g, Oral, DAILY PRN, Apolonia Bowie MD    ondansetron (ZOFRAN ODT) tablet 4 mg, 4 mg, Oral, Q8H PRN **OR** ondansetron (ZOFRAN) injection 4 mg, 4 mg, IntraVENous, Q6H PRN, Apolonia Bowie MD    atorvastatin (LIPITOR) tablet 80 mg, 80 mg, Oral, QHS, Vitor Mclean MD, 80 mg at 02/03/22 2224    piperacillin-tazobactam (ZOSYN) 3.375 g in 0.9% sodium chloride (MBP/ADV) 100 mL MBP, 3.375 g, IntraVENous, Q8H, Ilene Aceves MD, Last Rate: 25 mL/hr at 02/04/22 0533, 3.375 g at 02/04/22 0533    azithromycin (ZITHROMAX) 500 mg in 0.9% sodium chloride 250 mL (VIAL-MATE), 500 mg, IntraVENous, Q24H, Melany Nuñez MD, Last Rate: 250 mL/hr at 02/03/22 1750, 500 mg at 02/03/22 1750    baricitinib (OLUMIANT) tablet 1 mg, 1 mg, Oral, DAILY, Ge Duran MD, 1 mg at 02/04/22 9217      Assessment:     Active Problems:    Acute encephalopathy (5/18/2021)        Plan:     Case discussed with Collaborating physician Dr. Giovanny Hines and our recommendations are as follows:     1. NSTEMI:   - HS troponin 1354 > 1510. Likely related to demand ischemia in the setting of COVID and sepsis  - continue asa  - Echo: EF 65% with no wall motion abnormalities. - Cr 4.28 for CRRT today     2. Hypotension:   - Blood pressure below goal  - Continue levo and wean as tolerated.     3. COVID pna:   - COVID positive   -  Management per primary. - Will monitor telemetry and to evaluate for possible QTC prolongation. Will continue to monitor.  - Recommend to keep a negative fluid balance to prevent volume overload. 4. Hypokalemia:   - K 4.5, will replete and repeat labs in the am.     Thank you for involving us in the care of this patient. Please do not hesitate to call if additional questions arise. If after hours please call [phone]. Signed By: Jana Hurt NP     February 4, 2022            Dr. Jie Mars Cardiologist    [facility]  [address], [address] [facility]  [phone]

## 2022-02-04 NOTE — PROCEDURES
Interventional Radiology Brief Procedure Note    Patient: Saulo Steward MRN: [id number]  SSN: [id number]    YOB: 1945  Age: 78 y.o. Sex: male      Date of Procedure: 2/4/2022    Pre-Procedure Diagnosis: Bleeding right IJ temporary dialysis catheter site. Post-Procedure Diagnosis: SAME    Procedure(s): Cerclage 2-0 prolene suture placed around right IJ temporary dialysis catheter site. Brief Description of Procedure: Skin around the entry wound of the right IJ temporary dialysis catheter site was prepped and draped in a sterile manner. Retention suture of the temporary dialysis catheter cut and removed. 1% plain lidocaine instilled around the exit site and a cerclage suture of 2-0 prolene was used to obtain hemostasis around catheter exit site. Catheter was secured with 2-0 prolene. Clean sterile dressing placed. Hemostasis obtained. Performed By: Faraz Clarke PA-C     Assistants: None    Anesthesia: Lidocaine    Estimated Blood Loss: Less than 10ml    Specimens: None    Implants: None    Findings: Hemostasis obtained.     Complications: None    Recommendations: None    Follow Up: None

## 2022-02-04 NOTE — PROGRESS NOTES
NEUROLOGY  PROGRESS NOTE    Admission History/Pertinent Events  Vickey Seo is a 78y.o. year old male seen in follow-up and he remains unchanged with no responsiveness to deep painful stimuli except he becomes tachypneic and slightly grimaces. He presented on 1/27/2022 with unresponsiveness. Per chart review, patient's brother reported patient had not been feeling well and been having generalized weakness for a few days prior to presentation. On morning patient presented, patient was found to be unresponsive altogether. In the ED, patient was found to be Covid19+ with a temperature as high as 106 °F along with elevated troponins with concern for NSTEMI for which patient was started on a heparin drip. Emergent CT head was negative for any acute findings and patient was not deemed a candidate for acute neurological intervention. During patient's stay, patient diagnosed with sepsis, respiratory failure requiring intubation, aspiration pneumonia, ANDRES and transaminitis thought to be possibly related to hypoperfusion. On evening of 1/28, patient reportedly had some seizure-like activity with increase in his respiratory rate and blood pressures dropping into the 50s.     Home AEDs: Levetiracetam 1000 BID, Valproic Acid 500 BID, Lacosamide 200 BID     Workup Completed/Reviewed: CT WO 1/27; EEG showed moderate to severe generalized slowing    ASSESSMENT/PLAN      Impression  Patient about the same on examination as he grimaces and becomes tachypneic now to peripheral pain stimulation . Still remains heavily encephalopathic even though patient has been off of sedation for long time. Etiology is multiple metabolic derangements including hepatorenal syndrome sepsis hypotension leading to some degree of hypoxia. Doubt any primary neurological etiology for his unresponsiveness. Continue with the management of acute medical conditions. We will continue patient on his AEDs per below.   On the basis of no significant improvement in neurological status the prognosis seems to be poor. Plan    Seizure-Like Activity  Encephalopathy, Likely Infectious/Metabolic  History of Epilepsy  Associated: COVID-19 Infection, Sepsis, NSTEMI, ANDRES, Transaminitis, Schizophrenia  -Q6Hr NeuroChecks, TELE  -Seizure Precautions  -Seizure Prophylaxis: Levetiracetam 1500 BID, Valproic Acid 500 BID, Lacosamide 200 BID  -STAT IV Lorazepam 2 mg with any clinical seizure activity lasting > 3 minutes and contact Neurology for further recommendations  -Management of metabolic/infectious derangements to referring teams  -Plan for EEG on 2/1 when available  -Follow-up CT head done yesterday did not reveal any acute findings    Will sign off for now, please do not hesitate to call if needed again. SUBJECTIVE   Patient grimacing to pain stimulation today.   Otherwise no significant changes on examination      Physical/Neurological Exam  Patient intubated  Patient does not follow any central or peripheral commands  Does not attempt to speak  Pupils react to light bilaterally  Oculocephalics sluggish  Corneal reflex intact bilaterally  No facial droop  Tongue is midline under ETT  Motor 0/5 throughout to noxious pain stimulation  No abnormal movements  Normal tone throughout    OBJECTIVE  Vital Signs  Temp:  [96.6 °F (35.9 °C)-100.2 °F (37.9 °C)]   Pulse (Heart Rate):  [71-88]   BP: ()/(45-94)   Resp Rate:  [14-31]   O2 Sat (%):  [91 %-100 %]   Weight:  [105.3 kg (232 lb 2.3 oz)]     MEDICATIONS    Current Facility-Administered Medications:     0.9% sodium chloride infusion 250 mL, 250 mL, IntraVENous, PRN, Kardar, Ahmed Idalia Klinefelter, MD    levETIRAcetam (KEPPRA) oral solution 1,500 mg, 1,500 mg, Oral, BID, Kardar, Ahmed Idalia Klinefelter, MD, 1,500 mg at 02/04/22 0949    heparin (porcine) 1,000 unit/mL injection 2,200 Units, 2,200 Units, IntraVENous, DIALYSIS PRN, Clyde Magdaleno MD, 2,200 Units at 02/04/22 1421    insulin glargine (LANTUS) injection 20 Units, 20 Units, SubCUTAneous, QHS, Jesus Aceves MD, 20 Units at 02/03/22 2200    dexamethasone (DECADRON) 4 mg/mL injection 4 mg, 4 mg, IntraVENous, Q12H, Ge Duran MD, 4 mg at 02/04/22 0824    NOREPINephrine (LEVOPHED) 8 mg in 0.9% NS 250ml infusion, 0.5-120 mcg/min, IntraVENous, TITRATE, Ge Duran MD, Stopped at 02/01/22 1827    lactulose (CHRONULAC) 10 gram/15 mL solution 15 mL, 10 g, Per G Tube, TID, Gladys Tucker MD, 15 mL at 02/04/22 0826    glucose chewable tablet 16 g, 4 Tablet, Oral, PRN, Gladys Tucker MD    glucagon (GLUCAGEN) injection 1 mg, 1 mg, IntraMUSCular, PRN, Gladys Tucker MD    insulin lispro (HUMALOG) injection, , SubCUTAneous, Q4H, Gladys Tucker MD, 7 Units at 02/04/22 1130    dextrose 10% infusion 125-250 mL, 125-250 mL, IntraVENous, PRN, Gladys Tucker MD, 250 mL at 02/01/22 1518    LORazepam (ATIVAN) injection 2 mg, 2 mg, IntraVENous, Q2H PRN, Salas Jorgensen PA-C, 2 mg at 01/29/22 0057    glucose chewable tablet 16 g, 4 Tablet, Oral, PRN, Gladys Tucker MD    glucagon (GLUCAGEN) injection 1 mg, 1 mg, IntraMUSCular, PRN, Gladys Tucker MD    valproic acid (as sodium salt) (DEPAKENE) 250 mg/5 mL (5 mL) oral solution 500 mg, 500 mg, Oral, BID, Burt Fish MD, 500 mg at 02/04/22 0826    dextrose 10% infusion 125-250 mL, 125-250 mL, IntraVENous, PRN, Gladys Tucker MD, 250 mL at 02/02/22 0556    PHENYLephrine (ISRAEL-SYNEPHRINE) 30 mg in 0.9% sodium chloride 250 mL infusion,  mcg/min, IntraVENous, TITRATE, Reasoner, Salas Jones PA-C, Held at 01/28/22 2300    propofol (DIPRIVAN) 10 mg/mL infusion, 0-50 mcg/kg/min, IntraVENous, TITRATE, Raoul Wilkerson MD, Stopped at 01/29/22 3794    lacosamide (VIMPAT) tablet 200 mg, 200 mg, Oral, BID, Gladys Tucker MD, 200 mg at 02/04/22 0843    levothyroxine (SYNTHROID) tablet 75 mcg, 75 mcg, Oral, 7am, Gladys Tucker MD, 75 mcg at 02/04/22 0540   [Held by provider] aspirin chewable tablet 81 mg, 81 mg, Oral, DAILY, Jaz Knox MD, 81 mg at 02/03/22 0933    sodium chloride (NS) flush 5-40 mL, 5-40 mL, IntraVENous, Q8H, Corey Nuñez MD, 10 mL at 02/04/22 0534    sodium chloride (NS) flush 5-40 mL, 5-40 mL, IntraVENous, PRN, Jaz Knox MD    acetaminophen (TYLENOL) tablet 650 mg, 650 mg, Oral, Q6H PRN, 650 mg at 01/28/22 1111 **OR** acetaminophen (TYLENOL) suppository 650 mg, 650 mg, Rectal, Q6H PRN, Jaz Knox MD    polyethylene glycol (MIRALAX) packet 17 g, 17 g, Oral, DAILY PRN, Jaz Knox MD    ondansetron (ZOFRAN ODT) tablet 4 mg, 4 mg, Oral, Q8H PRN **OR** ondansetron (ZOFRAN) injection 4 mg, 4 mg, IntraVENous, Q6H PRN, Jaz Knox MD    atorvastatin (LIPITOR) tablet 80 mg, 80 mg, Oral, QHS, Naa Lafleur MD, 80 mg at 02/03/22 2224    piperacillin-tazobactam (ZOSYN) 3.375 g in 0.9% sodium chloride (MBP/ADV) 100 mL MBP, 3.375 g, IntraVENous, Q8H, Freda Aceves MD, Last Rate: 25 mL/hr at 02/04/22 1531, 3.375 g at 02/04/22 1531    azithromycin (ZITHROMAX) 500 mg in 0.9% sodium chloride 250 mL (VIAL-MATE), 500 mg, IntraVENous, Q24H, Corey Nuñez MD, Last Rate: 250 mL/hr at 02/03/22 1750, 500 mg at 02/03/22 1750    baricitinib (OLUMIANT) tablet 1 mg, 1 mg, Oral, DAILY, Markos Druan MD, 1 mg at 02/04/22 0841    Labs: I've reviewed the labs for today     This document has been prepared by the Dragon voice recognition system, typographical errors may have occurred.  Attempts have been made to correct errors, however inadvertent errors may persist.

## 2022-02-04 NOTE — PROGRESS NOTES
IMPRESSION:   1. Acute hypoxic respiratory failure oxygenation well-maintained on the ventilator  2. Malignant hyperthermia resolved   3. COVID-19 pneumonia  4. NSTEMI elevated troponin  5. Shock cardiogenic versus septic vs Hypovolumic Hypotension currently on norepinephrine  will continue to wean  6. Acute bleeding from dialysis catheter which has been corrected  7. Acute kidney injury now on hemodialysis  8. Metabolic acidosis  9. Thrombocytopenia worsened  10. Hypernatremia  11. Symptomatic hypokalemia  12. Shock liver with transaminitis  13. Altered mental status unresponsive on no sedation likely anoxic encephalopathy      RECOMMENDATIONS/PLAN:   1. ICU monitoring  1. Ventilator for mechanical life support and prevent respiratory arrest with protective lung strategies ABG acceptable he is still hemodynamically unstable will continue with the current vent support off sedation  2. On Assist control rate 18  PEEP 5 FiO2 50% decrease FIo2   3. Blood pressure improved off vasopressors  4. Patient on Decadron Zithromax and baricitinib WBC elevated, decrease Decadron  5. Troponin is elevated 2D echo shows ejection fraction of 65%  6. He was bleeding from the dialysis catheter which is corrected received 4 units of packed RBC hemoglobin dropped from 8.8 to 4.0 repeat lab pending  7. Now getting dialysis   8. LDH procalcitonin trending down  9. Remains on quarter saline  10. Liver enzyme elevated shock liver  11. Agree with Empir[facility] IV antibiotics blood and urine cultures no growth on Zosyn and Zithromax   12. Temperature back to normal  13.  IV vasopressors for circulatory shock refractory to fluids to maintain SBP> 90      [x] High complexity decision making was performed  [x] See my orders for details  HPI   79-year-old male came in because as he was found unresponsive and fever 107 past medical history of schizophrenia and diabetes gastroesophageal reflux disease and anemia he is COVID-19 positive initially patient was called as NSTEMI but it was canceled patient is intubated on ventilator hemodynamically unstable unable to get any history out of the patient he seems dehydrated    PMH:  has a past medical history of Acute renal failure ([address] Anemia, Arthritis, DKA (diabetic ketoacidoses), GERD (gastroesophageal reflux disease), HTN (hypertension), Hypercholesterolemia, Schizophrenia ([address] Schizophreniform disorder ([address] Seizure disorder ([facility] [address] and Type II or unspecified type diabetes mellitus without mention of complication, uncontrolled. PSH:   has a past surgical history that includes ir insert non tunl cvc over 5 yrs (1/28/2022) and ir insert non tunl cvc over 5 yrs (1/31/2022). FHX: family history includes Stroke in his father. SHX:  reports that he has never smoked. He has never used smokeless tobacco. He reports that he does not drink alcohol and does not use drugs.     ALL: No Known Allergies     MEDS:   [x] Reviewed - As Below   [] Not reviewed    Current Facility-Administered Medications   Medication    0.9% sodium chloride infusion 250 mL    levETIRAcetam (KEPPRA) oral solution 1,500 mg    insulin glargine (LANTUS) injection 20 Units    dexamethasone (DECADRON) 4 mg/mL injection 4 mg    NOREPINephrine (LEVOPHED) 8 mg in 0.9% NS 250ml infusion    lactulose (CHRONULAC) 10 gram/15 mL solution 15 mL    glucose chewable tablet 16 g    glucagon (GLUCAGEN) injection 1 mg    insulin lispro (HUMALOG) injection    dextrose 10% infusion 125-250 mL    LORazepam (ATIVAN) injection 2 mg    glucose chewable tablet 16 g    glucagon (GLUCAGEN) injection 1 mg    valproic acid (as sodium salt) (DEPAKENE) 250 mg/5 mL (5 mL) oral solution 500 mg    sodium chloride (23.4%) 0.225 % in sterile water 1,000 mL infusion    dextrose 10% infusion 125-250 mL    PHENYLephrine (ISRAEL-SYNEPHRINE) 30 mg in 0.9% sodium chloride 250 mL infusion    propofol (DIPRIVAN) 10 mg/mL infusion    lacosamide (VIMPAT) tablet 200 mg    levothyroxine (SYNTHROID) tablet 75 mcg    [Held by provider] aspirin chewable tablet 81 mg    sodium chloride (NS) flush 5-40 mL    sodium chloride (NS) flush 5-40 mL    acetaminophen (TYLENOL) tablet 650 mg    Or    acetaminophen (TYLENOL) suppository 650 mg    polyethylene glycol (MIRALAX) packet 17 g    ondansetron (ZOFRAN ODT) tablet 4 mg    Or    ondansetron (ZOFRAN) injection 4 mg    atorvastatin (LIPITOR) tablet 80 mg    piperacillin-tazobactam (ZOSYN) 3.375 g in 0.9% sodium chloride (MBP/ADV) 100 mL MBP    azithromycin (ZITHROMAX) 500 mg in 0.9% sodium chloride 250 mL (VIAL-MATE)    baricitinib (OLUMIANT) tablet 1 mg      MAR reviewed and pertinent medications noted or modified as needed   Current Facility-Administered Medications   Medication    0.9% sodium chloride infusion 250 mL    levETIRAcetam (KEPPRA) oral solution 1,500 mg    insulin glargine (LANTUS) injection 20 Units    dexamethasone (DECADRON) 4 mg/mL injection 4 mg    NOREPINephrine (LEVOPHED) 8 mg in 0.9% NS 250ml infusion    lactulose (CHRONULAC) 10 gram/15 mL solution 15 mL    glucose chewable tablet 16 g    glucagon (GLUCAGEN) injection 1 mg    insulin lispro (HUMALOG) injection    dextrose 10% infusion 125-250 mL    LORazepam (ATIVAN) injection 2 mg    glucose chewable tablet 16 g    glucagon (GLUCAGEN) injection 1 mg    valproic acid (as sodium salt) (DEPAKENE) 250 mg/5 mL (5 mL) oral solution 500 mg    sodium chloride (23.4%) 0.225 % in sterile water 1,000 mL infusion    dextrose 10% infusion 125-250 mL    PHENYLephrine (ISRAEL-SYNEPHRINE) 30 mg in 0.9% sodium chloride 250 mL infusion    propofol (DIPRIVAN) 10 mg/mL infusion    lacosamide (VIMPAT) tablet 200 mg    levothyroxine (SYNTHROID) tablet 75 mcg    [Held by provider] aspirin chewable tablet 81 mg    sodium chloride (NS) flush 5-40 mL    sodium chloride (NS) flush 5-40 mL    acetaminophen (TYLENOL) tablet 650 mg    Or    acetaminophen (TYLENOL) suppository 650 mg    polyethylene glycol (MIRALAX) packet 17 g    ondansetron (ZOFRAN ODT) tablet 4 mg    Or    ondansetron (ZOFRAN) injection 4 mg    atorvastatin (LIPITOR) tablet 80 mg    piperacillin-tazobactam (ZOSYN) 3.375 g in 0.9% sodium chloride (MBP/ADV) 100 mL MBP    azithromycin (ZITHROMAX) 500 mg in 0.9% sodium chloride 250 mL (VIAL-MATE)    baricitinib (OLUMIANT) tablet 1 mg      PMH:  has a past medical history of Acute renal failure ([facility] [address] Anemia, Arthritis, DKA (diabetic ketoacidoses), GERD (gastroesophageal reflux disease), HTN (hypertension), Hypercholesterolemia, Schizophrenia ([facility] [address] Schizophreniform disorder ([facility] [address] Seizure disorder ([facility] [address] and Type II or unspecified type diabetes mellitus without mention of complication, uncontrolled. PSH:   has a past surgical history that includes ir insert non tunl cvc over 5 yrs (2022) and ir insert non tunl cvc over 5 yrs (2022). FHX: family history includes Stroke in his father. SHX:  reports that he has never smoked. He has never used smokeless tobacco. He reports that he does not drink alcohol and does not use drugs. ROS:  Unable to obtain intubated on ventilator and sedated    Hemodynamics:    CO:    CI:    CVP:    SVR:   PAP Systolic:    PAP Diastolic:    PVR:    QW97:        Ventilator Settings:      Mode Rate TV Press PEEP FiO2 PIP Min.  Vent   Assist control,Volume control    500 ml    5 cm H20 50 %  19 cm H2O  12.1 l/min        Vital Signs: Telemetry:    normal sinus rhythm Intake/Output:   Visit Vitals  /76   Pulse 81   Temp 99.1 °F (37.3 °C)   Resp 23   Ht 6' (1.829 m)   Wt 105.3 kg (232 lb 2.3 oz)   SpO2 99%   BMI 31.48 kg/m²       Temp (24hrs), Av.6 °F (37.6 °C), Min:98.8 °F (37.1 °C), Max:100.2 °F (37.9 °C)        O2 Device: Bubble CPAP,Ventilator         Wt Readings from Last 4 Encounters:   22 105.3 kg (232 lb 2.3 oz)   22 101.1 kg (222 lb 14.2 oz)   10/06/21 111.1 kg (245 lb) 07/22/21 110.7 kg (244 lb)          Intake/Output Summary (Last 24 hours) at 2/4/2022 1117  Last data filed at 2/4/2022 1038  Gross per 24 hour   Intake 2910 ml   Output 1100 ml   Net 1810 ml       Last shift:      02/04 0701 - 02/04 1900  In: 310   Out: -   Last 3 shifts: 02/02 1901 - 02/04 0700  In: 4626.5 [I.V.:2226.5]  Out: 6412 [Urine:5; Drains:150]       Physical Exam:     General:  intubated on vent unresponsive on no sedation  HEENT: NCAT,   Eyes: anicteric; conjunctiva clear no doll's eye reflex  Neck: no nodes, no cuff leak, trach midline; no accessory MM use. Chest: no deformity,   Cardiac: R regular; no murmur;   Lungs: distant breath sounds; no wheezes a few rales in the base  Abd: soft, NT, hypoactive BS  Ext: Trace edema; no joint swelling;  No clubbing  : No urine  Neuro: Unresponsive on no sedation no doll's eye reflex flaccid extremity  Psych-  unable to assess  Skin: warm, dry, no cyanosis;   Pulses: Brachial and radial pulses intact  Capillary: Slow capillary refill      DATA:    MAR reviewed and pertinent medications noted or modified as needed  MEDS:   Current Facility-Administered Medications   Medication    0.9% sodium chloride infusion 250 mL    levETIRAcetam (KEPPRA) oral solution 1,500 mg    insulin glargine (LANTUS) injection 20 Units    dexamethasone (DECADRON) 4 mg/mL injection 4 mg    NOREPINephrine (LEVOPHED) 8 mg in 0.9% NS 250ml infusion    lactulose (CHRONULAC) 10 gram/15 mL solution 15 mL    glucose chewable tablet 16 g    glucagon (GLUCAGEN) injection 1 mg    insulin lispro (HUMALOG) injection    dextrose 10% infusion 125-250 mL    LORazepam (ATIVAN) injection 2 mg    glucose chewable tablet 16 g    glucagon (GLUCAGEN) injection 1 mg    valproic acid (as sodium salt) (DEPAKENE) 250 mg/5 mL (5 mL) oral solution 500 mg    sodium chloride (23.4%) 0.225 % in sterile water 1,000 mL infusion    dextrose 10% infusion 125-250 mL    PHENYLephrine (ISRAEL-SYNEPHRINE) 30 mg in 0.9% sodium chloride 250 mL infusion    propofol (DIPRIVAN) 10 mg/mL infusion    lacosamide (VIMPAT) tablet 200 mg    levothyroxine (SYNTHROID) tablet 75 mcg    [Held by provider] aspirin chewable tablet 81 mg    sodium chloride (NS) flush 5-40 mL    sodium chloride (NS) flush 5-40 mL    acetaminophen (TYLENOL) tablet 650 mg    Or    acetaminophen (TYLENOL) suppository 650 mg    polyethylene glycol (MIRALAX) packet 17 g    ondansetron (ZOFRAN ODT) tablet 4 mg    Or    ondansetron (ZOFRAN) injection 4 mg    atorvastatin (LIPITOR) tablet 80 mg    piperacillin-tazobactam (ZOSYN) 3.375 g in 0.9% sodium chloride (MBP/ADV) 100 mL MBP    azithromycin (ZITHROMAX) 500 mg in 0.9% sodium chloride 250 mL (VIAL-MATE)    baricitinib (OLUMIANT) tablet 1 mg        Labs:    Recent Labs     02/04/22  0445 02/03/22  2245 02/03/22  0530 02/02/22  0440 02/02/22  0440 02/01/22  1145 02/01/22  1145   WBC 25.7*  --  19.2*  --  19.4*   < > 16.5*   HGB 4.0* 8.8* 10.3*   < > 11.8*   < > 11.3*     --  PLEASE SEE PLT NACITRATE FOR PLT RESULTS DUE TO EDTA SENSISTIVITY CAUSING PLT CLUMPING IN LAV TOP TUBE. --  PLATELET CLUMPS SEEN ON SMEAR, NA CITRATE ORDERED FOR ACCURATE PLT COUNT.   < > PLT CLUMPS SEEN ON SMEAR REVIEW DUE TO EDTA SENSITIV   INR 1.4*  --  1.3*  --  1.2*   < > 1.2*   APTT  --   --   --   --   --   --  42.3*    < > = values in this interval not displayed.      Recent Labs     02/04/22 0445 02/03/22  0530 02/02/22  0440   *  130* 135*  133* 140   K 4.5  4.5 4.6  4.6 4.1     101 105  105 110*   CO2 19*  18* 17*  16* 16*   *  235* 230*  229* 66   BUN 72*  71* 70*  71* 63*   CREA 4.48*  4.40* 4.28*  4.23* 4.06*   CA 7.8*  7.6* 7.5*  7.3* 6.9*   MG  --   --  2.1   PHOS 5.6* 7.4* 3.0   ALB 1.4*  1.4* 1.3*  1.4* 1.5*   * 442* 687*     Recent Labs     02/04/22  0500 02/03/22  0431 02/02/22  0450   PH 7.38 7.31* 7.30*   PCO2 32* 32* 31*   PO2 108* 92 78   HCO3 20* 17* 16*   FIO2 50.0 50.0 50.0       Lab Results   Component Value Date/Time    Culture result: No Growth (<1000 cfu/mL) 01/27/2022 02:00 PM    Culture result: No growth 6 days 01/27/2022 01:30 PM    Culture result: No significant growth, <10,000 CFU/mL 06/05/2021 08:45 AM     Lab Results   Component Value Date/Time    TSH 4.47 (H) 05/21/2021 10:46 AM        Imaging:    Results from Hospital Encounter encounter on 01/27/22    XR CHEST PORT    Narrative  1 view comparison yesterday    ET and NG tube and central lines remain    Continued hypoinflation with unchanged patchy atelectasis/infiltrate left  greater than right base. Upper lungs remain clear. No effusion or pneumothorax. Normal heart and mediastinum      Results from Hospital Encounter encounter on 01/27/22    CT HEAD WO CONT    Narrative  Exam: CT Head without contrast    TECHNIQUE: Multiple transaxial CT images of the head were obtained without  contrast. Coronal and sagittal reformatted images were provided. Dose reduction: All CT scans at this facility are performed using dose reduction  optimization techniques as appropriate to a performed exam including the  following: Automated exposure control, adjustments of the mA and/or kV according  to patient size, or use of iterative reconstruction technique. HISTORY: anoxia    COMPARISON: Head CT 10/6/2021, 1/27/2022    FINDINGS:    Global parenchymal volume loss appears similar to prior with proportional ex  vacuo dilatation of the ventricles and other extra-axial CSF spaces, slightly  more prominent on the left. No significant midline shift or mass effect. Gray-white matter differentiation appears preserved. No findings of acute  intracranial hemorrhage. Basilar cisterns appear preserved. Intracranial  atherosclerosis. There is bilateral opacification of the mastoid air cells, most likely  indicating nonspecific mastoid effusions. Debris within the external auditory  canals is most likely cerumen. There is mild mucosal thickening in the paranasal  sinuses, most pronounced in the maxillary sinuses. Globes and orbits appear  unremarkable. Calvarium appears intact without findings of acute or aggressive  abnormality. Impression  Overall similar exam to prior without findings of acute intracranial  abnormality. · 1/30 remains on the ventilator has metabolic acidosis we will add bicarb per tube. Maintain ventilator on current settings although will decrease PEEP slightly. Platelets continue to drop.   He is unresponsive on no sedation likely anoxic encephalopathy  · 1/31 remain intubated on ventilator hemodynamically worsening of the renal function  · 2/1 remained on ventilator hemodynamically unstable on levofed  · 2/2 on ventilator hypothermic electrolyte imbalance will continue with the current vent settings  · 2/4 acute bleeding from the dialysis catheter which has been corrected received 4 units of packed RBC ABG acceptable

## 2022-02-04 NOTE — PROGRESS NOTES
CM placed call to brother Rosa Latif [phone] to discuss discharge planning. No answer. Left message to return call.

## 2022-02-04 NOTE — PROGRESS NOTES
Dr. Guillen Sep at bedside. Aware of H/H drop overnight to 4/11.9. Orders received for 4 units PRBC. Dr. Jason Box notified, ok to start blood transfusion and plan is for dialysis later today. Attempted to notify IR to come assess the IV site that's bleeding. No answer, will call again.

## 2022-02-04 NOTE — PROGRESS NOTES
Renal Progress Note    Patient: Suha Bell MRN: [id number]  SSN: [id number]    YOB: 1945  Age: 78 y.o.   Sex: male      Admit Date: 1/27/2022    LOS: 8 days     Subjective:   Patient seen in intensive care unit, on ventilator, unresponsive  remains anuric, Hb dropped to 4.0 today  Had some cath site bleeding that is controlled now as per nursing staff  Hemodynamically stable    Current Facility-Administered Medications   Medication Dose Route Frequency    0.9% sodium chloride infusion 250 mL  250 mL IntraVENous PRN    levETIRAcetam (KEPPRA) oral solution 1,500 mg  1,500 mg Oral BID    heparin (porcine) 1,000 unit/mL injection 2,200 Units  2,200 Units IntraVENous DIALYSIS PRN    insulin glargine (LANTUS) injection 20 Units  20 Units SubCUTAneous QHS    dexamethasone (DECADRON) 4 mg/mL injection 4 mg  4 mg IntraVENous Q12H    NOREPINephrine (LEVOPHED) 8 mg in 0.9% NS 250ml infusion  0.5-120 mcg/min IntraVENous TITRATE    lactulose (CHRONULAC) 10 gram/15 mL solution 15 mL  10 g Per G Tube TID    glucose chewable tablet 16 g  4 Tablet Oral PRN    glucagon (GLUCAGEN) injection 1 mg  1 mg IntraMUSCular PRN    insulin lispro (HUMALOG) injection   SubCUTAneous Q4H    dextrose 10% infusion 125-250 mL  125-250 mL IntraVENous PRN    LORazepam (ATIVAN) injection 2 mg  2 mg IntraVENous Q2H PRN    glucose chewable tablet 16 g  4 Tablet Oral PRN    glucagon (GLUCAGEN) injection 1 mg  1 mg IntraMUSCular PRN    valproic acid (as sodium salt) (DEPAKENE) 250 mg/5 mL (5 mL) oral solution 500 mg  500 mg Oral BID    dextrose 10% infusion 125-250 mL  125-250 mL IntraVENous PRN    PHENYLephrine (ISRAEL-SYNEPHRINE) 30 mg in 0.9% sodium chloride 250 mL infusion   mcg/min IntraVENous TITRATE    propofol (DIPRIVAN) 10 mg/mL infusion  0-50 mcg/kg/min IntraVENous TITRATE    lacosamide (VIMPAT) tablet 200 mg  200 mg Oral BID    levothyroxine (SYNTHROID) tablet 75 mcg  75 mcg Oral 7am    [Held by provider] aspirin chewable tablet 81 mg  81 mg Oral DAILY    sodium chloride (NS) flush 5-40 mL  5-40 mL IntraVENous Q8H    sodium chloride (NS) flush 5-40 mL  5-40 mL IntraVENous PRN    acetaminophen (TYLENOL) tablet 650 mg  650 mg Oral Q6H PRN    Or    acetaminophen (TYLENOL) suppository 650 mg  650 mg Rectal Q6H PRN    polyethylene glycol (MIRALAX) packet 17 g  17 g Oral DAILY PRN    ondansetron (ZOFRAN ODT) tablet 4 mg  4 mg Oral Q8H PRN    Or    ondansetron (ZOFRAN) injection 4 mg  4 mg IntraVENous Q6H PRN    atorvastatin (LIPITOR) tablet 80 mg  80 mg Oral QHS    piperacillin-tazobactam (ZOSYN) 3.375 g in 0.9% sodium chloride (MBP/ADV) 100 mL MBP  3.375 g IntraVENous Q8H    azithromycin (ZITHROMAX) 500 mg in 0.9% sodium chloride 250 mL (VIAL-MATE)  500 mg IntraVENous Q24H    baricitinib (OLUMIANT) tablet 1 mg  1 mg Oral DAILY        Vitals:    02/04/22 1430 02/04/22 1442 02/04/22 1500 02/04/22 1600   BP: 131/74 131/68 128/73 131/70   Pulse: 86 85 82 82   Resp: 25 22 23 21   Temp:  99 °F (37.2 °C) 99 °F (37.2 °C)    TempSrc:  Bladder     SpO2:  98% 98% 97%   Weight:       Height:         Objective:   General: On ventilator, unresponsive   HEENT:  no Icterus, Pallor+, ET tube in place, mucosa dry   neck: Neck is supple, No JVD  Lungs: Decreased breath sounds at the bases,   CVS: heart sounds normal,  no murmurs, no rubs. GI: soft, nontender, no distention  Extremeties: no cyanosis, no edema    Neuro: Patient unresponsive, on vent, off sedation   skin: normal skin turgor, no skin rashes.         Intake and Output:  Current Shift: 02/04 0701 - 02/04 1900  In: 1210   Out: -   Last three shifts: 02/02 1901 - 02/04 0700  In: 6043.5 [I.V.:3643.5]  Out: 1155 [Urine:5; Drains:150]      Lab/Data Review:  Recent Labs     02/04/22  0445 02/03/22  2245 02/03/22  0530 02/02/22 0440 02/02/22 0440   WBC 25.7*  --  19.2*  --  19.4*   HGB 4.0* 8.8* 10.3*   < > 11.8*   HCT 11.9* 25.4* 30.0*   < > 34.4*     -- PLEASE SEE PLT NACITRATE FOR PLT RESULTS DUE TO EDTA SENSISTIVITY CAUSING PLT CLUMPING IN LAV TOP TUBE. --  PLATELET CLUMPS SEEN ON SMEAR, NA CITRATE ORDERED FOR ACCURATE PLT COUNT.    < > = values in this interval not displayed.      Recent Labs     02/04/22  0445 02/03/22  0530 02/02/22  0440   *  130* 135*  133* 140   K 4.5  4.5 4.6  4.6 4.1     101 105  105 110*   CO2 19*  18* 17*  16* 16*   *  235* 230*  229* 66   BUN 72*  71* 70*  71* 63*   CREA 4.48*  4.40* 4.28*  4.23* 4.06*   CA 7.8*  7.6* 7.5*  7.3* 6.9*   MG  --   --  2.1   PHOS 5.6* 7.4* 3.0   ALB 1.4*  1.4* 1.3*  1.4* 1.5*   TBILI 0.5 0.6 0.6   * 442* 687*   INR 1.4* 1.3* 1.2*     Recent Labs     02/04/22  0500 02/03/22  0431 02/02/22  0450   PH 7.38 7.31* 7.30*   PCO2 32* 32* 31*   PO2 108* 92 78   HCO3 20* 17* 16*   FIO2 50.0 50.0 50.0     Recent Results (from the past 24 hour(s))   GLUCOSE, POC    Collection Time: 02/03/22  5:58 PM   Result Value Ref Range    Glucose (POC) 199 (H) 65 - 117 mg/dL    Performed by Kevin Victor    CULTURE, RESPIRATORY/SPUTUM/[facility] STAIN    Collection Time: 02/03/22  7:20 PM    Specimen: Sputum   Result Value Ref Range    Special Requests: No Special Requests      GRAM STAIN No wbc's seen      GRAM STAIN No Epithelial cells seen      GRAM STAIN No organisms seen      Culture result: PENDING    GLUCOSE, POC    Collection Time: 02/03/22  8:37 PM   Result Value Ref Range    Glucose (POC) 244 (H) 65 - 117 mg/dL    Performed by Jailyn Nguyen    HEMOGLOBIN    Collection Time: 02/03/22 10:45 PM   Result Value Ref Range    HGB 8.8 (L) 12.1 - 17.0 g/dL   HEMATOCRIT    Collection Time: 02/03/22 10:45 PM   Result Value Ref Range    HCT 25.4 (L) 36.6 - 50.3 %   GLUCOSE, POC    Collection Time: 02/04/22  2:52 AM   Result Value Ref Range    Glucose (POC) 271 (H) 65 - 117 mg/dL    Performed by Jerrica Yusuf + INR    Collection Time: 02/04/22  4:45 AM   Result Value Ref Range    Prothrombin time 16.8 (H) 11.9 - 14.7 sec    INR 1.4 (H) 0.9 - 1.1     LD    Collection Time: 02/04/22  4:45 AM   Result Value Ref Range     (H) 85 - 241 U/L   D DIMER    Collection Time: 02/04/22  4:45 AM   Result Value Ref Range    D DIMER 19.37 (H) <0.50 ug/ml(FEU)   PROCALCITONIN    Collection Time: 02/04/22  4:45 AM   Result Value Ref Range    Procalcitonin 7.15 (H) 0 ng/mL   SED RATE (ESR)    Collection Time: 02/04/22  4:45 AM   Result Value Ref Range    Sed rate, automated 127 mm/hr   CBC W/O DIFF    Collection Time: 02/04/22  4:45 AM   Result Value Ref Range    WBC 25.7 (H) 4.1 - 11.1 K/uL    RBC 1.25 (L) 4.10 - 5.70 M/uL    HGB 4.0 (LL) 12.1 - 17.0 g/dL    HCT 11.9 (LL) 36.6 - 50.3 %    MCV 95.2 80.0 - 99.0 FL    MCH 32.0 26.0 - 34.0 PG    MCHC 33.6 30.0 - 36.5 g/dL    RDW 14.6 (H) 11.5 - 14.5 %    PLATELET 614 663 - 280 K/uL    MPV 12.6 8.9 - 12.9 FL    NRBC 0.2 (H) 0.0  WBC    ABSOLUTE NRBC 0.05 (H) 0.00 - 3.35 K/uL   METABOLIC PANEL, COMPREHENSIVE    Collection Time: 02/04/22  4:45 AM   Result Value Ref Range    Sodium 132 (L) 136 - 145 mmol/L    Potassium 4.5 3.5 - 5.1 mmol/L    Chloride 101 97 - 108 mmol/L    CO2 19 (L) 21 - 32 mmol/L    Anion gap 12 5 - 15 mmol/L    Glucose 262 (H) 65 - 100 mg/dL    BUN 72 (H) 6 - 20 mg/dL    Creatinine 4.48 (H) 0.70 - 1.30 mg/dL    BUN/Creatinine ratio 16 12 - 20      GFR est AA 16 (L) >60 ml/min/1.73m2    GFR est non-AA 13 (L) >60 ml/min/1.73m2    Calcium 7.8 (L) 8.5 - 10.1 mg/dL    Bilirubin, total 0.5 0.2 - 1.0 mg/dL    AST (SGOT) 470 (H) 15 - 37 U/L    ALT (SGPT) 288 (H) 12 - 78 U/L    Alk.  phosphatase 144 (H) 45 - 117 U/L    Protein, total 5.6 (L) 6.4 - 8.2 g/dL    Albumin 1.4 (L) 3.5 - 5.0 g/dL    Globulin 4.2 (H) 2.0 - 4.0 g/dL    A-G Ratio 0.3 (L) 1.1 - 2.2     RENAL FUNCTION PANEL    Collection Time: 02/04/22  4:45 AM   Result Value Ref Range    Sodium 130 (L) 136 - 145 mmol/L    Potassium 4.5 3.5 - 5.1 mmol/L    Chloride 101 97 - 108 mmol/L    CO2 18 (L) 21 - 32 mmol/L    Anion gap 11 5 - 15 mmol/L    Glucose 235 (H) 65 - 100 mg/dL    BUN 71 (H) 6 - 20 mg/dL    Creatinine 4.40 (H) 0.70 - 1.30 mg/dL    BUN/Creatinine ratio 16 12 - 20      GFR est AA 16 (L) >60 ml/min/1.73m2    GFR est non-AA 13 (L) >60 ml/min/1.73m2    Calcium 7.6 (L) 8.5 - 10.1 mg/dL    Phosphorus 5.6 (H) 2.6 - 4.7 mg/dL    Albumin 1.4 (L) 3.5 - 5.0 g/dL   BLOOD GAS, ARTERIAL    Collection Time: 02/04/22  5:00 AM   Result Value Ref Range    pH 7.38 7.35 - 7.45      PCO2 32 (L) 35 - 45 mmHg    PO2 108 (H) 75 - 100 mmHg    O2 SAT 99 >95 %    BICARBONATE 20 (L) 22 - 26 mmol/L    BASE DEFICIT 5.5 (H) 0 - 2 mmol/L    O2 METHOD VENT      FIO2 50.0 %    MODE Assist Control/Volume Control      Tidal volume 500      SET RATE 18      EPAP/CPAP/PEEP 5.0      SITE Right Radial      JULIO'S TEST PASS     GLUCOSE, POC    Collection Time: 02/04/22  5:01 AM   Result Value Ref Range    Glucose (POC) 285 (H) 65 - 117 mg/dL    Performed by Millie Patel    Collection Time: 02/04/22  5:30 AM   Result Value Ref Range    Crossmatch Expiration 02/07/2022,2359     ABO/Rh(D) O Positive     Antibody screen Negative     Unit number [id number]     Blood component type  LR     Unit division 00     Status of unit Issued     [address] to transfuse     Crossmatch result Compatible     Unit number [id number]     Blood component type  LR     Unit division 00     Status of unit Issued     [address] to transfuse     Crossmatch result Compatible     Unit number [id number]     Blood component type  LR     Unit division 00     Status of unit Issued     [address] to transfuse     Crossmatch result Compatible    GLUCOSE, POC    Collection Time: 02/04/22 10:47 AM   Result Value Ref Range    Glucose (POC) 260 (H) 65 - 117 mg/dL    Performed by Ringgold Baylis    GLUCOSE, POC    Collection Time: 02/04/22  5:22 PM   Result Value Ref Range    Glucose (POC) 211 (H) 65 - 117 mg/dL    Performed by Jen Delgado         Assessment and Plan:     #1 acute kidney injury  --ANDRES likely 2/2 ATN from septic shock /anoxic renal injury with ARB on board  Patient remains anuric,   Rhabdomyolysis+, probably ischemic, high CPK 2792--59,011,     Started ob CRRT 1/31, had for 48 hrs, Switched to intermittent hemodialysis, will repeat hemodialysis today and facilitate multiple packed RBC transfusion. will try ultrafiltration as tolerated  we will continue to monitor electrolytes closely and adjust management as needed     #2 severe hypokalemia: Improved potassium level    monitor potassium levels     #3 hypernatremia: Improved sodium level to 133  Dc hypotonic fluids  -Continue water flushes via NG tube,off IV fluids  Continue to monitor sodium levels    #4  COVID-19 pneumonia:   -Septic shock  -With multiorgan failure including renal failure/transaminitis/hemodynamics shock  -Patient currently on Decadron azithromycin and Zosyn  Neurology following the patient for anoxic encephalopathy, remains unresponsive  Shock liver with high liver enzymes( improving),   Rhabdomyolysis+/ANDRES, on HD, will monitor   -Overall prognosis seems guarded     #5 diabetes: hyperglycemia+  Monitor accuchecks and adjust management as needed     #6 seizure disorder  -On antiseizure medications, neurology following the patient  -Currently off sedation and unresponsive    7. Metabolic acidosis: Secondary to acute kidney injury/hypotension  Stable CO2 level of 19, scheduled for HD today.   Repeat CO2 level tomorrow        Signed By: Charline Otoole MD     February 4, 2022

## 2022-02-05 NOTE — PROGRESS NOTES
CARDIOLOGY PROGRESS NOTE - NP    Patient seen and examined. This is a patient who is followed for NSTEMI in the setting of COVID multi-organ failure. Remains intubated. Thought to have anoxic encephalopathy given lack of responsiveness despite d/c of sedation. Nursing weaning pressors as BP is stable. Per nursing family would like to wait until Monday to discuss plan of care. He is, however, DNR. D-dimer elevated, however, LE venous duplexes negative. Hgb stabilized following PRBCs for Hgb of 4 with active bleeding at central line. Telemetry reviewed, there were no events noted in the past 24 hours. Remains in NSR with rare PVCs. Pertinent review of systems items noted above, all other systems are negative. Current medications reviewed. Physical Examination  Vital signs are stable. Blood pressure 133/71, Pulse 74  Intubated/sedated  Heart is regular, rate and rhythm. Further exam deferred to conserve PPE due to COVID status. Labs reviewed:     Case discussed with Dr. Camron Peña and our impression and recommendations are as follows:    1. NSTEMI:   - HS troponin 1354 > 1510. Likely related to demand ischemia in the setting of COVID multi-organ failure  - ASA has been on hold since 1/27. - Echo: EF 65% with no wall motion abnormalities. - Continues with CRRT per renal     2. Hypotension:   -  BP has been stable with nursing's attempt to wean pressors.     3. COVID pna:   - With multi-organ failure and possible anoxic encephalopathy   -  Management per primary.   Av [place] monitor telemetry and to evaluate for possible QTC prolongation. Will continue to monitor.  - Recommend to keep a negative fluid balance to prevent volume overload. Poor prognosis. Family to reconvene with medical team on Monday to discuss goals of care. DNR. Please do not hesitate to call me or Dr. Camron Peña if additional questions arise.

## 2022-02-05 NOTE — PROGRESS NOTES
IMPRESSION:   1. Acute hypoxic respiratory failure oxygenation well-maintained on the ventilator  2. Malignant hyperthermia resolved   3. COVID-19 pneumonia  4. NSTEMI elevated troponin  5. Shock cardiogenic versus septic vs Hypovolumic Hypotension currently on norepinephrine  will continue to wean  6. Acute bleeding from dialysis catheter which has been corrected  7. Acute kidney injury now on hemodialysis  8. Metabolic acidosis  9. Thrombocytopenia worsened  10. Hypernatremia  11. Symptomatic hypokalemia  12. Shock liver with transaminitis  13. Altered mental status unresponsive on no sedation likely anoxic encephalopathy      RECOMMENDATIONS/PLAN:   1. ICU monitoring  1. Ventilator for mechanical life support and prevent respiratory arrest with protective lung strategies ABG acceptable he is still hemodynamically unstable will continue with the current vent support off sedation will continue to wean  2. On Assist control rate 18  PEEP 5 FiO2 50% decrease FIo2   3. Blood pressure improved off vasopressors  4. Patient on Decadron Zithromax and baricitinib WBC elevated, decrease Decadron  5. Troponin is elevated 2D echo shows ejection fraction of 65%  6. He was bleeding from the dialysis catheter which is corrected received 4 units of packed RBC hemoglobin dropped from 8.8 to 4.0 repeat lab pending  7. Now getting dialysis   8. LDH procalcitonin trending down  9. Remains on quarter saline  10. Liver enzyme elevated shock liver  11. Agree with Empir[facility] IV antibiotics blood and urine cultures no growth on Zosyn and Zithromax   12. Temperature back to normal  13.  IV vasopressors for circulatory shock refractory to fluids to maintain SBP> 90      [x] High complexity decision making was performed  [x] See my orders for details  HPI   68-year-old male came in because as he was found unresponsive and fever 107 past medical history of schizophrenia and diabetes gastroesophageal reflux disease and anemia he is COVID-19 positive initially patient was called as NSTEMI but it was canceled patient is intubated on ventilator hemodynamically unstable unable to get any history out of the patient he seems dehydrated    PMH:  has a past medical history of Acute renal failure ([address] Anemia, Arthritis, DKA (diabetic ketoacidoses), GERD (gastroesophageal reflux disease), HTN (hypertension), Hypercholesterolemia, Schizophrenia ([address] Schizophreniform disorder ([address] Seizure disorder ([facility] [address] and Type II or unspecified type diabetes mellitus without mention of complication, uncontrolled. PSH:   has a past surgical history that includes ir insert non tunl cvc over 5 yrs (1/28/2022) and ir insert non tunl cvc over 5 yrs (1/31/2022). FHX: family history includes Stroke in his father. SHX:  reports that he has never smoked. He has never used smokeless tobacco. He reports that he does not drink alcohol and does not use drugs.     ALL: No Known Allergies     MEDS:   [x] Reviewed - As Below   [] Not reviewed    Current Facility-Administered Medications   Medication    0.9% sodium chloride infusion 250 mL    levETIRAcetam (KEPPRA) oral solution 1,500 mg    heparin (porcine) 1,000 unit/mL injection 2,200 Units    insulin glargine (LANTUS) injection 20 Units    dexamethasone (DECADRON) 4 mg/mL injection 4 mg    NOREPINephrine (LEVOPHED) 8 mg in 0.9% NS 250ml infusion    lactulose (CHRONULAC) 10 gram/15 mL solution 15 mL    glucose chewable tablet 16 g    glucagon (GLUCAGEN) injection 1 mg    insulin lispro (HUMALOG) injection    dextrose 10% infusion 125-250 mL    LORazepam (ATIVAN) injection 2 mg    glucose chewable tablet 16 g    glucagon (GLUCAGEN) injection 1 mg    valproic acid (as sodium salt) (DEPAKENE) 250 mg/5 mL (5 mL) oral solution 500 mg    dextrose 10% infusion 125-250 mL    PHENYLephrine (ISRAEL-SYNEPHRINE) 30 mg in 0.9% sodium chloride 250 mL infusion    propofol (DIPRIVAN) 10 mg/mL infusion    lacosamide (VIMPAT) tablet 200 mg    levothyroxine (SYNTHROID) tablet 75 mcg    [Held by provider] aspirin chewable tablet 81 mg    sodium chloride (NS) flush 5-40 mL    sodium chloride (NS) flush 5-40 mL    acetaminophen (TYLENOL) tablet 650 mg    Or    acetaminophen (TYLENOL) suppository 650 mg    polyethylene glycol (MIRALAX) packet 17 g    ondansetron (ZOFRAN ODT) tablet 4 mg    Or    ondansetron (ZOFRAN) injection 4 mg    atorvastatin (LIPITOR) tablet 80 mg    piperacillin-tazobactam (ZOSYN) 3.375 g in 0.9% sodium chloride (MBP/ADV) 100 mL MBP    azithromycin (ZITHROMAX) 500 mg in 0.9% sodium chloride 250 mL (VIAL-MATE)    baricitinib (OLUMIANT) tablet 1 mg      MAR reviewed and pertinent medications noted or modified as needed   Current Facility-Administered Medications   Medication    0.9% sodium chloride infusion 250 mL    levETIRAcetam (KEPPRA) oral solution 1,500 mg    heparin (porcine) 1,000 unit/mL injection 2,200 Units    insulin glargine (LANTUS) injection 20 Units    dexamethasone (DECADRON) 4 mg/mL injection 4 mg    NOREPINephrine (LEVOPHED) 8 mg in 0.9% NS 250ml infusion    lactulose (CHRONULAC) 10 gram/15 mL solution 15 mL    glucose chewable tablet 16 g    glucagon (GLUCAGEN) injection 1 mg    insulin lispro (HUMALOG) injection    dextrose 10% infusion 125-250 mL    LORazepam (ATIVAN) injection 2 mg    glucose chewable tablet 16 g    glucagon (GLUCAGEN) injection 1 mg    valproic acid (as sodium salt) (DEPAKENE) 250 mg/5 mL (5 mL) oral solution 500 mg    dextrose 10% infusion 125-250 mL    PHENYLephrine (ISRAEL-SYNEPHRINE) 30 mg in 0.9% sodium chloride 250 mL infusion    propofol (DIPRIVAN) 10 mg/mL infusion    lacosamide (VIMPAT) tablet 200 mg    levothyroxine (SYNTHROID) tablet 75 mcg    [Held by provider] aspirin chewable tablet 81 mg    sodium chloride (NS) flush 5-40 mL    sodium chloride (NS) flush 5-40 mL    acetaminophen (TYLENOL) tablet 650 mg    Or    acetaminophen (TYLENOL) suppository 650 mg    polyethylene glycol (MIRALAX) packet 17 g    ondansetron (ZOFRAN ODT) tablet 4 mg    Or    ondansetron (ZOFRAN) injection 4 mg    atorvastatin (LIPITOR) tablet 80 mg    piperacillin-tazobactam (ZOSYN) 3.375 g in 0.9% sodium chloride (MBP/ADV) 100 mL MBP    azithromycin (ZITHROMAX) 500 mg in 0.9% sodium chloride 250 mL (VIAL-MATE)    baricitinib (OLUMIANT) tablet 1 mg      PMH:  has a past medical history of Acute renal failure ([facility] [address] Anemia, Arthritis, DKA (diabetic ketoacidoses), GERD (gastroesophageal reflux disease), HTN (hypertension), Hypercholesterolemia, Schizophrenia ([facility] [address] Schizophreniform disorder ([facility] [address] Seizure disorder ([facility] [address] and Type II or unspecified type diabetes mellitus without mention of complication, uncontrolled. PSH:   has a past surgical history that includes ir insert non tunl cvc over 5 yrs (2022) and ir insert non tunl cvc over 5 yrs (2022). FHX: family history includes Stroke in his father. SHX:  reports that he has never smoked. He has never used smokeless tobacco. He reports that he does not drink alcohol and does not use drugs. ROS:  Unable to obtain intubated on ventilator and sedated    Hemodynamics:    CO:    CI:    CVP:    SVR:   PAP Systolic:    PAP Diastolic:    PVR:    SO60:        Ventilator Settings:      Mode Rate TV Press PEEP FiO2 PIP Min.  Vent   Assist control,Volume control    500 ml    5 cm H20 50 %  27 cm H2O  9.36 l/min        Vital Signs: Telemetry:    normal sinus rhythm Intake/Output:   Visit Vitals  /71 (BP 1 Location: Left upper arm, BP Patient Position: At rest)   Pulse 74   Temp 99.5 °F (37.5 °C)   Resp 18   Ht 6' (1.829 m)   Wt 105.3 kg (232 lb 2.3 oz)   SpO2 98%   BMI 31.48 kg/m²       Temp (24hrs), Av.1 °F (37.3 °C), Min:98.1 °F (36.7 °C), Max:100.4 °F (38 °C)        O2 Device: Ventilator         Wt Readings from Last 4 Encounters:   22 105.3 kg (232 lb 2.3 oz)   22 101.1 kg (222 lb 14.2 oz)   10/06/21 111.1 kg (245 lb)   07/22/21 110.7 kg (244 lb)          Intake/Output Summary (Last 24 hours) at 2/5/2022 0950  Last data filed at 2/5/2022 8971  Gross per 24 hour   Intake 2662.96 ml   Output 35 ml   Net 2627.96 ml       Last shift:      02/05 0701 - 02/05 1900  In: -   Out: 35 [Urine:35]  Last 3 shifts: 02/03 1901 - 02/05 0700  In: 0009 [I.V.:2770]  Out: -        Physical Exam:     General:  intubated on vent unresponsive on no sedation  HEENT: NCAT,   Eyes: anicteric; conjunctiva clear no doll's eye reflex  Neck: no nodes, no cuff leak, trach midline; no accessory MM use. Chest: no deformity,   Cardiac: R regular; no murmur;   Lungs: distant breath sounds; no wheezes a few rales in the base  Abd: soft, NT, hypoactive BS  Ext: Trace edema; no joint swelling;  No clubbing  : No urine  Neuro: Unresponsive on no sedation no doll's eye reflex flaccid extremity  Psych-  unable to assess  Skin: warm, dry, no cyanosis;   Pulses: Brachial and radial pulses intact  Capillary: Slow capillary refill      DATA:    MAR reviewed and pertinent medications noted or modified as needed  MEDS:   Current Facility-Administered Medications   Medication    0.9% sodium chloride infusion 250 mL    levETIRAcetam (KEPPRA) oral solution 1,500 mg    heparin (porcine) 1,000 unit/mL injection 2,200 Units    insulin glargine (LANTUS) injection 20 Units    dexamethasone (DECADRON) 4 mg/mL injection 4 mg    NOREPINephrine (LEVOPHED) 8 mg in 0.9% NS 250ml infusion    lactulose (CHRONULAC) 10 gram/15 mL solution 15 mL    glucose chewable tablet 16 g    glucagon (GLUCAGEN) injection 1 mg    insulin lispro (HUMALOG) injection    dextrose 10% infusion 125-250 mL    LORazepam (ATIVAN) injection 2 mg    glucose chewable tablet 16 g    glucagon (GLUCAGEN) injection 1 mg    valproic acid (as sodium salt) (DEPAKENE) 250 mg/5 mL (5 mL) oral solution 500 mg    dextrose 10% infusion 125-250 mL    PHENYLephrine (ISRAEL-SYNEPHRINE) 30 mg in 0.9% sodium chloride 250 mL infusion    propofol (DIPRIVAN) 10 mg/mL infusion    lacosamide (VIMPAT) tablet 200 mg    levothyroxine (SYNTHROID) tablet 75 mcg    [Held by provider] aspirin chewable tablet 81 mg    sodium chloride (NS) flush 5-40 mL    sodium chloride (NS) flush 5-40 mL    acetaminophen (TYLENOL) tablet 650 mg    Or    acetaminophen (TYLENOL) suppository 650 mg    polyethylene glycol (MIRALAX) packet 17 g    ondansetron (ZOFRAN ODT) tablet 4 mg    Or    ondansetron (ZOFRAN) injection 4 mg    atorvastatin (LIPITOR) tablet 80 mg    piperacillin-tazobactam (ZOSYN) 3.375 g in 0.9% sodium chloride (MBP/ADV) 100 mL MBP    azithromycin (ZITHROMAX) 500 mg in 0.9% sodium chloride 250 mL (VIAL-MATE)    baricitinib (OLUMIANT) tablet 1 mg        Labs:    Recent Labs     02/05/22 0435 02/04/22  1720 02/04/22  1655 02/04/22  0445 02/03/22  2245 02/03/22  0530   WBC 23.7*  --   --  25.7*  --  19.2*   HGB 11.3*  --  12.6 4.0*   < > 10.3*     --   --  328  --  PLEASE SEE PLT NACITRATE FOR PLT RESULTS DUE TO EDTA SENSISTIVITY CAUSING PLT CLUMPING IN LAV TOP TUBE. INR 1.2*  --   --  1.4*  --  1.3*   APTT  --  43.7*  --   --   --   --     < > = values in this interval not displayed.      Recent Labs     02/05/22 0435 02/04/22  0445 02/03/22  0530   *  131* 132*  130* 135*  133*   K 4.8  4.6 4.5  4.5 4.6  4.6   CL 98  98 101  101 105  105   CO2 24  24 19*  18* 17*  16*   *  202* 262*  235* 230*  229*   BUN 66*  66* 72*  71* 70*  71*   CREA 4.44*  4.43* 4.48*  4.40* 4.28*  4.23*   CA 7.4*  7.5* 7.8*  7.6* 7.5*  7.3*   PHOS 8.6* 5.6* 7.4*   ALB 1.5*  1.5* 1.4*  1.4* 1.3*  1.4*   * 288* 442*     Recent Labs     02/05/22  0300 02/04/22  0500 02/03/22  0431   PH 7.43 7.38 7.31*   PCO2 35 32* 32*   PO2 81 108* 92   HCO3 23 20* 17*   FIO2 50.0 50.0 50.0       Lab Results   Component Value Date/Time    Culture result: No growth after 19 hours 02/03/2022 07:20 PM    Culture result: No Growth (<1000 cfu/mL) 01/27/2022 02:00 PM    Culture result: No growth 6 days 01/27/2022 01:30 PM     Lab Results   Component Value Date/Time    TSH 4.47 (H) 05/21/2021 10:46 AM        Imaging:    Results from Hospital Encounter encounter on 01/27/22    XR CHEST PORT    Narrative  Exam: XR CHEST PORT    Indication:  chf;    Comparison: Chest radiograph from February 4, 2021    Impression  Findings/impression:    Numerous monitoring leads are coiled about the chest. Stable support lines and  tubes. Low lung volumes. Cardiomediastinal silhouette is within normal limits given the  projection. Left basilar consolidation appears similar to slightly progressed. Unchanged patchy right basilar infiltrates/atelectasis. No pleural effusion. No  pneumothorax. Results from [place] encounter on 01/27/22    CT HEAD WO CONT    Narrative  Exam: CT Head without contrast    TECHNIQUE: Multiple transaxial CT images of the head were obtained without  contrast. Coronal and sagittal reformatted images were provided. Dose reduction: All CT scans at this facility are performed using dose reduction  optimization techniques as appropriate to a performed exam including the  following: Automated exposure control, adjustments of the mA and/or kV according  to patient size, or use of iterative reconstruction technique. HISTORY: anoxia    COMPARISON: Head CT 10/6/2021, 1/27/2022    FINDINGS:    Global parenchymal volume loss appears similar to prior with proportional ex  vacuo dilatation of the ventricles and other extra-axial CSF spaces, slightly  more prominent on the left. No significant midline shift or mass effect. Gray-white matter differentiation appears preserved. No findings of acute  intracranial hemorrhage. Basilar cisterns appear preserved. Intracranial  atherosclerosis.     There is bilateral opacification of the mastoid air cells, most likely  indicating nonspecific mastoid effusions. Debris within the external auditory  canals is most likely cerumen. There is mild mucosal thickening in the paranasal  sinuses, most pronounced in the maxillary sinuses. Globes and orbits appear  unremarkable. Calvarium appears intact without findings of acute or aggressive  abnormality. Impression  Overall similar exam to prior without findings of acute intracranial  abnormality. · 1/30 remains on the ventilator has metabolic acidosis we will add bicarb per tube. Maintain ventilator on current settings although will decrease PEEP slightly. Platelets continue to drop.   He is unresponsive on no sedation likely anoxic encephalopathy  · 1/31 remain intubated on ventilator hemodynamically worsening of the renal function  · 2/1 remained on ventilator hemodynamically unstable on levofed  · 2/2 on ventilator hypothermic electrolyte imbalance will continue with the current vent settings  · 2/4 acute bleeding from the dialysis catheter which has been corrected received 4 units of packed RBC ABG acceptable  · 2/5 remain intubated no more bleeding from the dialysis catheter site

## 2022-02-05 NOTE — PROGRESS NOTES
Infectious Diseases Progress Note  Omid Rodrigues MD  [phone]  Date:2022       Room:[facility]  Samantha Elliott     YOB: 1945     Age:76 y.o. 76M with schizophrenia, seizures, diabetes, chronic renal failure.     24 presents to hospital unresponsive. Reportedly had been increasingly weak and unable to leave the bed for days. Associated with fevers. During the ED course found positive for Covid 19, with increased troponin. Intubated ventilated for acute hypoxic respiratory failure. During the hospital course, declining renal function and initiated on hemodialysis. I have been asked to see the patient in consultation for prolonged respiratory failure. Subjective    Subjective:  Symptoms:  Stable. Review of Systems   Unable to perform ROS: Intubated     Objective         Vitals Last 24 Hours:  TEMPERATURE:  Temp  Av.3 °F (37.4 °C)  Min: 98.1 °F (36.7 °C)  Max: 100.4 °F (38 °C)  RESPIRATIONS RANGE: Resp  Av.3  Min: 17  Max: 26  PULSE OXIMETRY RANGE: SpO2  Av.6 %  Min: 96 %  Max: 99 %  PULSE RANGE: Pulse  Av.7  Min: 73  Max: 108  BLOOD PRESSURE RANGE: Systolic (41JYL), PEX:080 , Min:88 , GNH:856   ; Diastolic (26WFI), KXA:33, Min:53, Max:99    I/O (24Hr): Intake/Output Summary (Last 24 hours) at 2022 1315  Last data filed at 2022 0714  Gross per 24 hour   Intake 1052.96 ml   Output 35 ml   Net 1017.96 ml     Objective:  Vital signs: (most recent): Blood pressure 133/74, pulse 75, temperature 99.3 °F (37.4 °C), temperature source Bladder, resp. rate 19, height 6' (1.829 m), weight 105.3 kg (232 lb 2.3 oz), SpO2 98 %. General:  intubated on vent unresponsive on no sedation  HEENT: NCAT,   Eyes: anicteric; conjunctiva clear no doll's eye reflex  Neck: no nodes, no cuff leak, trach midline; no accessory MM use.   Chest: no deformity,   Cardiac: R regular; no murmur;   Lungs: distant breath sounds; no wheezes a few rales in the base  Abd: soft, NT, hypoactive BS  Ext: Trace edema; no joint swelling; No clubbing  : No urine  Neuro: Unresponsive on no sedation no doll's eye reflex flaccid extremity  Psych-  unable to assess  Skin: warm, dry, no cyanosis;   Pulses: Brachial and radial pulses intact  Capillary: Slow capillary refill    Labs/Imaging/Diagnostics    Labs:  CBC:  Recent Labs     02/05/22 0435 02/04/22  1655 02/04/22 0445 02/03/22  2245 02/03/22  0530   WBC 23.7*  --  25.7*  --  19.2*   RBC 3.55*  --  1.25*  --  3.18*   HGB 11.3* 12.6 4.0*   < > 10.3*   HCT 31.7* 36.3* 11.9*   < > 30.0*   MCV 89.3  --  95.2  --  94.3   RDW 14.1  --  14.6*  --  14.7*     --  328  --  PLEASE SEE PLT NACITRATE FOR PLT RESULTS DUE TO EDTA SENSISTIVITY CAUSING PLT CLUMPING IN LAV TOP TUBE.     < > = values in this interval not displayed. CHEMISTRIES:  Recent Labs     02/05/22 0435 02/04/22 0445 02/03/22  0530   *  131* 132*  130* 135*  133*   K 4.8  4.6 4.5  4.5 4.6  4.6   CL 98  98 101  101 105  105   CO2 24  24 19*  18* 17*  16*   BUN 66*  66* 72*  71* 70*  71*   CA 7.4*  7.5* 7.8*  7.6* 7.5*  7.3*   PHOS 8.6* 5.6* 7.4*   PT/INR:  Recent Labs     02/05/22 0435 02/04/22 0445 02/03/22  0530   INR 1.2* 1.4* 1.3*     APTT:  Recent Labs     02/04/22  1720   APTT 43.7*     LIVER PROFILE:  Recent Labs     02/05/22 0435 02/04/22 0445 02/03/22  0530   * 470* 680*   * 288* 442*     Lab Results   Component Value Date/Time    ALT (SGPT) 270 (H) 02/05/2022 04:35 AM    AST (SGOT) 515 (H) 02/05/2022 04:35 AM    Alk. phosphatase 210 (H) 02/05/2022 04:35 AM    Bilirubin, total 0.5 02/05/2022 04:35 AM       Imaging Last 24 Hours:  XR CHEST PORT    Result Date: 2/5/2022  Exam: XR CHEST PORT  Indication:  chf; Comparison: Chest radiograph from February 4, 2021     Findings/impression: Numerous monitoring leads are coiled about the chest. Stable support lines and tubes. Low lung volumes.  Cardiomediastinal silhouette is within normal limits given the projection. Left basilar consolidation appears similar to slightly progressed. Unchanged patchy right basilar infiltrates/atelectasis. No pleural effusion. No pneumothorax. Assessment//Plan   Active Problems:    Acute encephalopathy (5/18/2021)      Assessment & Plan   76M with schizophrenia, seizures, diabetes, chronic renal failure.     1/27/22 presents to hospital unresponsive. Reportedly had been increasingly weak and unable to leave the bed for days. Associated with fevers. During the ED course found positive for Covid 19, with increased troponin. Intubated ventilated for acute hypoxic respiratory failure. During the hospital course, declining renal function and initiated on hemodialysis.  I have been asked to see the patient in consultation for prolonged respiratory failure.     MICROBIOLOGY     1/27/22            Covid 19          Positive  1/27/22            Blood               Negative  1/27/22            Urine                Negative     2/3/22              Endotrach        Pending     ASSESSMENT AND RECOMMENDATIONS     1) Prolonged acute hypoxic respiratory failure in the setting of multifactorial shock, shock liver, Covid 19 pneumonia, NSTEMI, acute on chronic renal failure, anoxic brain injury.                 Supportive care with respiratory support as needed              Dexamethasone              Olumiant              Zosyn and azithromycin                 Overall prognosis remains very poor    Electronically signed by Patti Grossman MD on 2/5/2022 at 12:38 PM

## 2022-02-05 NOTE — PROGRESS NOTES
Subjective   Subjective:      HPI:pt on ventilator and sedated. Pt had bleeding from dialysis catheter site. Bleeding better.   Objective     Current Facility-Administered Medications:     0.9% sodium chloride infusion 250 mL, 250 mL, IntraVENous, PRN, Jesus Aceves MD    levETIRAcetam (KEPPRA) oral solution 1,500 mg, 1,500 mg, Oral, BID, Marco Antonio Allred MD, 1,500 mg at 02/04/22 0949    heparin (porcine) 1,000 unit/mL injection 2,200 Units, 2,200 Units, IntraVENous, DIALYSIS PRN, Clyde Magdaleno MD, 2,200 Units at 02/04/22 1421    insulin glargine (LANTUS) injection 20 Units, 20 Units, SubCUTAneous, QHS, Jesus Aceves MD, 20 Units at 02/03/22 2200    dexamethasone (DECADRON) 4 mg/mL injection 4 mg, 4 mg, IntraVENous, Q12H, Ge Duran MD, 4 mg at 02/04/22 0824    NOREPINephrine (LEVOPHED) 8 mg in 0.9% NS 250ml infusion, 0.5-120 mcg/min, IntraVENous, TITRATE, Ge Duran MD, Last Rate: 37.5 mL/hr at 02/04/22 2134, 20 mcg/min at 02/04/22 2134    lactulose (CHRONULAC) 10 gram/15 mL solution 15 mL, 10 g, Per G Tube, TID, Oscar Alexandre MD, 15 mL at 02/04/22 1801    glucose chewable tablet 16 g, 4 Tablet, Oral, PRN, Oscar Alexandre MD    glucagon (GLUCAGEN) injection 1 mg, 1 mg, IntraMUSCular, PRN, Oscar Alexandre MD    insulin lispro (HUMALOG) injection, , SubCUTAneous, Q4H, Oscar Alexandre MD, 4 Units at 02/04/22 1800    dextrose 10% infusion 125-250 mL, 125-250 mL, IntraVENous, PRN, Oscar Alexandre MD, 250 mL at 02/01/22 1518    LORazepam (ATIVAN) injection 2 mg, 2 mg, IntraVENous, Q2H PRN, Cristian Jorgensen PA-C, 2 mg at 02/04/22 2010    glucose chewable tablet 16 g, 4 Tablet, Oral, PRN, Oscar Alexandre MD    glucagon [facility]) injection 1 mg, 1 mg, IntraMUSCular, PRN, Oscar Alexandre MD    valproic acid (as sodium salt) (DEPAKENE) 250 mg/5 mL (5 mL) oral solution 500 mg, 500 mg, Oral, BID, Jhonatan Koehler MD, 500 mg at 02/04/22 2091    dextrose 10% infusion 125-250 mL, 125-250 mL, IntraVENous, PRN, Lalo Reyes MD, 250 mL at 02/02/22 0556    PHENYLephrine (ISRAEL-SYNEPHRINE) 30 mg in 0.9% sodium chloride 250 mL infusion,  mcg/min, IntraVENous, TITRATE, Sabrina Jorgensen PA-C, Held at 01/28/22 2300    propofol (DIPRIVAN) 10 mg/mL infusion, 0-50 mcg/kg/min, IntraVENous, TITRATE, Skyler Hutson MD, Last Rate: 12 mL/hr at 02/04/22 1953, 20 mcg/kg/min at 02/04/22 1953    lacosamide (VIMPAT) tablet 200 mg, 200 mg, Oral, BID, Lalo Reyes MD, 200 mg at 02/04/22 0825    levothyroxine (SYNTHROID) tablet 75 mcg, 75 mcg, Oral, 7am, Lalo Reyes MD, 75 mcg at 02/04/22 0533    [Held by provider] aspirin chewable tablet 81 mg, 81 mg, Oral, DAILY, Lalo Reyes MD, 81 mg at 02/03/22 0933    sodium chloride (NS) flush 5-40 mL, 5-40 mL, IntraVENous, Q8H, Christopher Nuñez MD, 10 mL at 02/04/22 1802    sodium chloride (NS) flush 5-40 mL, 5-40 mL, IntraVENous, PRN, Lalo Reyes MD    acetaminophen (TYLENOL) tablet 650 mg, 650 mg, Oral, Q6H PRN, 650 mg at 01/28/22 1111 **OR** acetaminophen (TYLENOL) suppository 650 mg, 650 mg, Rectal, Q6H PRN, Lalo Reyes MD    polyethylene glycol (MIRALAX) packet 17 g, 17 g, Oral, DAILY PRN, Lalo Reyes MD    ondansetron (ZOFRAN ODT) tablet 4 mg, 4 mg, Oral, Q8H PRN **OR** ondansetron (ZOFRAN) injection 4 mg, 4 mg, IntraVENous, Q6H PRN, Lalo Reyes MD    atorvastatin (LIPITOR) tablet 80 mg, 80 mg, Oral, QHS, Skyler Hutson MD, 80 mg at 02/03/22 2224    piperacillin-tazobactam (ZOSYN) 3.375 g in 0.9% sodium chloride (MBP/ADV) 100 mL MBP, 3.375 g, IntraVENous, Q8H, Pierre Aceves MD, Last Rate: 25 mL/hr at 02/04/22 1531, 3.375 g at 02/04/22 1531    azithromycin (ZITHROMAX) 500 mg in 0.9% sodium chloride 250 mL (VIAL-MATE), 500 mg, IntraVENous, Q24H, Lalo Reyes MD, Last Rate: 250 mL/hr at 02/04/22 1756, 500 mg at 02/04/22 1756   baricitinib (OLUMIANT) tablet 1 mg, 1 mg, Oral, DAILY, Ge Duran MD, 1 mg at 02/04/22 0554    Objective:     Visit Vitals  BP (!) 145/70   Pulse 92   Temp 100.4 °F (38 °C)   Resp 18   Ht 6' (1.829 m)   Wt 105.3 kg (232 lb 2.3 oz)   SpO2 98%   BMI 31.48 kg/m²      Physical Exam   Pt on venilator,sedated  @[email or web address]    [email or web address]     Data Review:   Recent Results (from the past 24 hour(s))   GLUCOSE, POC    Collection Time: 02/04/22  2:52 AM   Result Value Ref Range    Glucose (POC) 271 (H) 65 - 117 mg/dL    Performed by Jerrica Yusuf + INR    Collection Time: 02/04/22  4:45 AM   Result Value Ref Range    Prothrombin time 16.8 (H) 11.9 - 14.7 sec    INR 1.4 (H) 0.9 - 1.1     LD    Collection Time: 02/04/22  4:45 AM   Result Value Ref Range     (H) 85 - 241 U/L   D DIMER    Collection Time: 02/04/22  4:45 AM   Result Value Ref Range    D DIMER 19.37 (H) <0.50 ug/ml(FEU)   PROCALCITONIN    Collection Time: 02/04/22  4:45 AM   Result Value Ref Range    Procalcitonin 7.15 (H) 0 ng/mL   SED RATE (ESR)    Collection Time: 02/04/22  4:45 AM   Result Value Ref Range    Sed rate, automated 127 mm/hr   CBC W/O DIFF    Collection Time: 02/04/22  4:45 AM   Result Value Ref Range    WBC 25.7 (H) 4.1 - 11.1 K/uL    RBC 1.25 (L) 4.10 - 5.70 M/uL    HGB 4.0 (LL) 12.1 - 17.0 g/dL    HCT 11.9 (LL) 36.6 - 50.3 %    MCV 95.2 80.0 - 99.0 FL    MCH 32.0 26.0 - 34.0 PG    MCHC 33.6 30.0 - 36.5 g/dL    RDW 14.6 (H) 11.5 - 14.5 %    PLATELET 418 406 - 696 K/uL    MPV 12.6 8.9 - 12.9 FL    NRBC 0.2 (H) 0.0  WBC    ABSOLUTE NRBC 0.05 (H) 0.00 - 9.91 K/uL   METABOLIC PANEL, COMPREHENSIVE    Collection Time: 02/04/22  4:45 AM   Result Value Ref Range    Sodium 132 (L) 136 - 145 mmol/L    Potassium 4.5 3.5 - 5.1 mmol/L    Chloride 101 97 - 108 mmol/L    CO2 19 (L) 21 - 32 mmol/L    Anion gap 12 5 - 15 mmol/L    Glucose 262 (H) 65 - 100 mg/dL    BUN 72 (H) 6 - 20 mg/dL    Creatinine 4.48 (H) 0.70 - 1.30 mg/dL BUN/Creatinine ratio 16 12 - 20      GFR est AA 16 (L) >60 ml/min/1.73m2    GFR est non-AA 13 (L) >60 ml/min/1.73m2    Calcium 7.8 (L) 8.5 - 10.1 mg/dL    Bilirubin, total 0.5 0.2 - 1.0 mg/dL    AST (SGOT) 470 (H) 15 - 37 U/L    ALT (SGPT) 288 (H) 12 - 78 U/L    Alk.  phosphatase 144 (H) 45 - 117 U/L    Protein, total 5.6 (L) 6.4 - 8.2 g/dL    Albumin 1.4 (L) 3.5 - 5.0 g/dL    Globulin 4.2 (H) 2.0 - 4.0 g/dL    A-G Ratio 0.3 (L) 1.1 - 2.2     RENAL FUNCTION PANEL    Collection Time: 02/04/22  4:45 AM   Result Value Ref Range    Sodium 130 (L) 136 - 145 mmol/L    Potassium 4.5 3.5 - 5.1 mmol/L    Chloride 101 97 - 108 mmol/L    CO2 18 (L) 21 - 32 mmol/L    Anion gap 11 5 - 15 mmol/L    Glucose 235 (H) 65 - 100 mg/dL    BUN 71 (H) 6 - 20 mg/dL    Creatinine 4.40 (H) 0.70 - 1.30 mg/dL    BUN/Creatinine ratio 16 12 - 20      GFR est AA 16 (L) >60 ml/min/1.73m2    GFR est non-AA 13 (L) >60 ml/min/1.73m2    Calcium 7.6 (L) 8.5 - 10.1 mg/dL    Phosphorus 5.6 (H) 2.6 - 4.7 mg/dL    Albumin 1.4 (L) 3.5 - 5.0 g/dL   BLOOD GAS, ARTERIAL    Collection Time: 02/04/22  5:00 AM   Result Value Ref Range    pH 7.38 7.35 - 7.45      PCO2 32 (L) 35 - 45 mmHg    PO2 108 (H) 75 - 100 mmHg    O2 SAT 99 >95 %    BICARBONATE 20 (L) 22 - 26 mmol/L    BASE DEFICIT 5.5 (H) 0 - 2 mmol/L    O2 METHOD VENT      FIO2 50.0 %    MODE Assist Control/Volume Control      Tidal volume 500      SET RATE 18      EPAP/CPAP/PEEP 5.0      SITE Right Radial      JULIO'S TEST PASS     GLUCOSE, POC    Collection Time: 02/04/22  5:01 AM   Result Value Ref Range    Glucose (POC) 285 (H) 65 - 117 mg/dL    Performed by Millie Patel    Collection Time: 02/04/22  5:30 AM   Result Value Ref Range    Crossmatch Expiration 02/07/2022,2359     ABO/Rh(D) Amira Morgan Positive     Antibody screen Negative     Unit number [id number]     Blood component type RC LR     Unit division 00     Status of unit Issued     [address] to transfuse     Crossmatch result Compatible     Unit number [id number]     Blood component type  LR     Unit division 00     Status of unit [address] to transfuse     Crossmatch result Compatible     Unit number [id number]     Blood component type  LR     Unit division 00     Status of unit [address] to transfuse     Crossmatch result Compatible    GLUCOSE, POC    Collection Time: 02/04/22 10:47 AM   Result Value Ref Range    Glucose (POC) 260 (H) 65 - 117 mg/dL    Performed by Jen Delgado    HGB & HCT    Collection Time: 02/04/22  4:55 PM   Result Value Ref Range    HGB 12.6 12.1 - 17.0 g/dL    HCT 36.3 (L) 36.6 - 50.3 %   PTT    Collection Time: 02/04/22  5:20 PM   Result Value Ref Range    aPTT 43.7 (H) 21.2 - 34.1 sec    aPTT, therapeutic range   82 - 109 sec   FIBRINOGEN    Collection Time: 02/04/22  5:20 PM   Result Value Ref Range    Fibrinogen 787 (H) 220 - 535 mg/dL   GLUCOSE, POC    Collection Time: 02/04/22  5:22 PM   Result Value Ref Range    Glucose (POC) 211 (H) 65 - 117 mg/dL    Performed by Jen Delgado        Assessment   Assessment/Plan:      1. Thrombocytopenia: Most likely multifactorial.  Patient has clumping reported. Pt has pseudothrombocytopenia. Citrated platelets 30N. D/w . Platelet clumping. No increased schistocytes. LDH high but improving . retic count low. High LDH due to shock liver and COVID. No active hemolysis or TTP. Monitor. Pt actual platelets slighly low due to Covid infection and medications also. Patient on Protonix and antiseizure medications. Monitor. Check platelet count with citrate tube if needed. Platelet count today normal.Monitor.     2.  Elevated D-dimer: Nonspecific. Venous Doppler of bilateral lower extremities negative for DVT. Monitor.     3.  Leukocytosis and granulocytosis: Due to Covid infection. S/p steroids. Monitor. ID following. 4.  Anemia: HB dropped due to bleeding. Pt getting 4 units of PRBC.   5. Acute kidney injury: Nephrology following. Patient on intermittent hemodialysis. 6.  Shock liver     NSTEMI:cardiology following.     Encephalopathy. Aries Gutter

## 2022-02-05 NOTE — DIALYSIS
Tx initiated via a patent right IJ. Dressing changed per protocol. No s/s of infection or skin breakdown.  Net fluid removal goal is 2 kgs

## 2022-02-05 NOTE — PROGRESS NOTES
Progress Note    Patient: Hayde Gray MRN: [id number]  SSN: [id number]    YOB: 1945  Age: 78 y.o. Sex: male      Admit Date: 1/27/2022    LOS: 9 days     Subjective:     76M, H/o Schizophenia, seizures, DMII on oral meds with unresponsive and acute hypoxic respiratory failure s/t COVID-19 pneumonitis complicated by ANDRES, hypokalemia and shock liver.          HSOPITAL COURSE  COVID positive, associated with fever 106, increased troponin, cardiology was consulted ansd recommended ECHO, there is no heparin gtt, was initially called for STEMI and was cancelled. he was intubated for acute hypoxic respiratory failure. On levophed. Complicated by ANDRES, shock liver and hypokalemia. Was treated with azithromycin, barcitinib, and zosyn. Will give 1L bolus and continue IVF. Discussed with family he is very critical- his renal functions increased and now seemingly in ATN, his liver functions have worsened and had a seizure on 1/28 , he is off propofol now and neurology was consulted. Repeat CT scan didn't show any stroke, plan for EEG on 2/1 to assess for neurological activity. The patients liver functions, renal are worsening. Plan for CRRT today    Subjectivepatient seen and evaluated at bedside, currently remains intubated and sedated, discussed with RN      Review of Systems:  Unable to determine s.t mental status    Objective:     Vitals:    02/05/22 0500 02/05/22 0600 02/05/22 0700 02/05/22 0740   BP: (!) 145/72 (!) 158/83 133/71    Pulse: 76 77 78 74   Resp: 18 18 18 18   Temp:   99.5 °F (37.5 °C)    TempSrc:       SpO2: 98% 98% 97% 98%   Weight:       Height:            Intake and Output:  Current Shift: 02/05 0701 - 02/05 1900  In: -   Out: 35 [Urine:35]  Last three shifts: 02/03 1901 - 02/05 0700  In: 4380 [I.V.:2770]  Out: -       Physical Exam:   Physical Exam  Vitals and nursing note reviewed. Constitutional:       General: intubates     Appearance: Normal appearance.  He is normal weight. He is ill-appearing. HENT:      Head: Normocephalic and atraumatic.      Nose: Nose normal.      Mouth/Throat:      Mouth: Mucous membranes are moist.   Eyes:      Extraocular Movements: Extraocular movements intact.      Pupils: Pupils are equal, round, and reactive to light. Cardiovascular:      Rate and Rhythm: Regular rhythm. Tachycardia present.      Pulses: Normal pulses.      Heart sounds: Normal heart sounds. Pulmonary:      Effort: Respiratory distress present.      Breath sounds: Rhonchi present. Abdominal:      General: Abdomen is flat. Bowel sounds are normal.      Palpations: Abdomen is soft. Musculoskeletal:         General: No swelling or tenderness. Normal range of motion.      Cervical back: Normal range of motion and neck supple. Skin:     General: Skin is warm and dry.      Capillary Refill: Capillary refill takes less than 2 seconds. Neurological:      Mental Status: He is unresponsive.      GCS: GCS eye subscore is 4. GCS verbal subscore is 1. GCS motor subscore is 1. Psychiatric:         cannot be assessed      Lab/Data Review:  Recent Results (from the past 24 hour(s))   GLUCOSE, POC    Collection Time: 02/04/22 10:47 AM   Result Value Ref Range    Glucose (POC) 260 (H) 65 - 117 mg/dL    Performed by Jen Delgado    HEP B SURFACE AG    Collection Time: 02/04/22  1:00 PM   Result Value Ref Range    Hepatitis B surface Ag <0.10 Index    Hep B surface Ag Interp. Negative Negative   HEPATITIS C AB    Collection Time: 02/04/22  1:00 PM   Result Value Ref Range    Hepatitis C virus Ab 0.02 Index    Hep C virus Ab Interp. NONREACTIVE NONREACTIVE   HEP B SURFACE AB    Collection Time: 02/04/22  1:00 PM   Result Value Ref Range    Hepatitis B surface Ab 80.04 mIU/mL    Hep B surface Ab Interp.  Reactive (A) NONREACTIVE     HGB & HCT    Collection Time: 02/04/22  4:55 PM   Result Value Ref Range    HGB 12.6 12.1 - 17.0 g/dL    HCT 36.3 (L) 36.6 - 50.3 %   PTT    Collection Time: 02/04/22  5:20 PM   Result Value Ref Range    aPTT 43.7 (H) 21.2 - 34.1 sec    aPTT, therapeutic range   82 - 109 sec   FIBRINOGEN    Collection Time: 02/04/22  5:20 PM   Result Value Ref Range    Fibrinogen 787 (H) 220 - 535 mg/dL   GLUCOSE, POC    Collection Time: 02/04/22  5:22 PM   Result Value Ref Range    Glucose (POC) 211 (H) 65 - 117 mg/dL    Performed by Jen Delgado    BLOOD GAS, ARTERIAL    Collection Time: 02/05/22  3:00 AM   Result Value Ref Range    pH 7.43 7.35 - 7.45      PCO2 35 35 - 45 mmHg    PO2 81 75 - 100 mmHg    O2 SAT 98 >95 %    BICARBONATE 23 22 - 26 mmol/L    BASE DEFICIT 1.2 0 - 2 mmol/L    O2 METHOD VENT      FIO2 50.0 %    MODE Assist Control/Volume Control      Tidal volume 500      SET RATE 18      IPAP/PIP 0      EPAP/CPAP/PEEP 5.0      SITE Right Brachial      JULIO'S TEST PASS     PROTHROMBIN TIME + INR    Collection Time: 02/05/22  4:35 AM   Result Value Ref Range    Prothrombin time 14.7 11.9 - 14.7 sec    INR 1.2 (H) 0.9 - 1.1     LD    Collection Time: 02/05/22  4:35 AM   Result Value Ref Range    LD 1,132 (H) 85 - 241 U/L   D DIMER    Collection Time: 02/05/22  4:35 AM   Result Value Ref Range    D DIMER 17.77 (H) <0.50 ug/ml(FEU)   PROCALCITONIN    Collection Time: 02/05/22  4:35 AM   Result Value Ref Range    Procalcitonin 6.21 (H) 0 ng/mL   SED RATE (ESR)    Collection Time: 02/05/22  4:35 AM   Result Value Ref Range    Sed rate, automated 89 mm/hr   CBC W/O DIFF    Collection Time: 02/05/22  4:35 AM   Result Value Ref Range    WBC 23.7 (H) 4.1 - 11.1 K/uL    RBC 3.55 (L) 4.10 - 5.70 M/uL    HGB 11.3 (L) 12.1 - 17.0 g/dL    HCT 31.7 (L) 36.6 - 50.3 %    MCV 89.3 80.0 - 99.0 FL    MCH 31.8 26.0 - 34.0 PG    MCHC 35.6 30.0 - 36.5 g/dL    RDW 14.1 11.5 - 14.5 %    PLATELET 038 063 - 666 K/uL    MPV 11.7 8.9 - 12.9 FL    NRBC 0.3 (H) 0.0  WBC    ABSOLUTE NRBC 0.07 (H) 0.00 - 4.72 K/uL   METABOLIC PANEL, COMPREHENSIVE    Collection Time: 02/05/22  4:35 AM Result Value Ref Range    Sodium 131 (L) 136 - 145 mmol/L    Potassium 4.8 3.5 - 5.1 mmol/L    Chloride 98 97 - 108 mmol/L    CO2 24 21 - 32 mmol/L    Anion gap 9 5 - 15 mmol/L    Glucose 201 (H) 65 - 100 mg/dL    BUN 66 (H) 6 - 20 mg/dL    Creatinine 4.44 (H) 0.70 - 1.30 mg/dL    BUN/Creatinine ratio 15 12 - 20      GFR est AA 16 (L) >60 ml/min/1.73m2    GFR est non-AA 13 (L) >60 ml/min/1.73m2    Calcium 7.4 (L) 8.5 - 10.1 mg/dL    Bilirubin, total 0.5 0.2 - 1.0 mg/dL    AST (SGOT) 515 (H) 15 - 37 U/L    ALT (SGPT) 270 (H) 12 - 78 U/L    Alk.  phosphatase 210 (H) 45 - 117 U/L    Protein, total 6.2 (L) 6.4 - 8.2 g/dL    Albumin 1.5 (L) 3.5 - 5.0 g/dL    Globulin 4.7 (H) 2.0 - 4.0 g/dL    A-G Ratio 0.3 (L) 1.1 - 2.2     RENAL FUNCTION PANEL    Collection Time: 02/05/22  4:35 AM   Result Value Ref Range    Sodium 131 (L) 136 - 145 mmol/L    Potassium 4.6 3.5 - 5.1 mmol/L    Chloride 98 97 - 108 mmol/L    CO2 24 21 - 32 mmol/L    Anion gap 9 5 - 15 mmol/L    Glucose 202 (H) 65 - 100 mg/dL    BUN 66 (H) 6 - 20 mg/dL    Creatinine 4.43 (H) 0.70 - 1.30 mg/dL    BUN/Creatinine ratio 15 12 - 20      GFR est AA 16 (L) >60 ml/min/1.73m2    GFR est non-AA 13 (L) >60 ml/min/1.73m2    Calcium 7.5 (L) 8.5 - 10.1 mg/dL    Phosphorus 8.6 (H) 2.6 - 4.7 mg/dL    Albumin 1.5 (L) 3.5 - 5.0 g/dL         Assessment and plan:        Acute respiratory failure with hypoxia secondary to COVID-19 pneumoniaof note patient currently remains intubated and sedated at this time, remains in septic shock, secondary to COVID-19 pneumonia  Follow blood cultures  Follow sputum cultures  Continue Decadron 6 mg IV every 12  Continue Zosyn azithromycin for antibiotic coverage  Continue baricitinib once daily for total 14-day course  Attempt to wean oxygen  Continue to follow pulmonology recommendations    Septic shocksecondary to problem #1, status post fluid resuscitationsepsis protocol  Follow-up blood cultures  Continue trend inflammatory markers  Continue antibiotics as documented above  Continue Levophed for pressor support, titrate to mean arterial pressure of over 65  Continue to follow infectious Disease recommendations    Severe encephalopathylikely secondary to septic shock/problem #1 however concerns for anoxic brain injury as patient does not have purposeful movements while off of sedation  Neurology consult appreciated  Management as documented above  Follow-up MRI brain further evaluation  Continue to follow neurology recommendations    Hyperglycemia type 2 diabetesas documented below  Continue lantus at 20 units daily  Continue insulin sliding scale    Type II non-ST elevation MIlikely secondary to demand  Transthoracic echo reviewed  Continue current medications  Continue to follow cardiology recommendations    Hypokalemiacurrently resolved    Acute kidney injurylikely secondary to acute tubular necrosis, status post initiation of dialysis  Continue to trend serum creatinine  Dialysis as per nephrology recommendations  Nephrology consult appreciated, continue to follow recommendations    Shock liversecondary to problems #1 and 2, currently LFTs downtrending  Management as documented above  Continue trend LFTs    Anemia/bleeding from dialysis catheter siteof note patient had significant bleeding from dialysis catheter site s/p 3 units prbc with appropriate response, bleeding stopped after application of quick clot/stitch placement  Continue to trend H/H  Interventional Radiology consult appreciated    ProphylaxisSCDs  FENtube feeding, replete potassium and magnesium  DNR, NOK are patients brothers  Dispositioncontinued ICU care pending clinical improvement    Critical care time spent 35 minutes involving direct patient care as well as reviewing patient's labs and coordination of care with nursing staff      Signed By: Howie Brown MD     February 5, 2022

## 2022-02-06 NOTE — PROGRESS NOTES
Progress Note    Patient: Bella Bojorquez MRN: [id number]  SSN: [id number]    YOB: 1945  Age: 78 y.o. Sex: male      Admit Date: 1/27/2022    LOS: 10 days     Subjective:     76M, H/o Schizophenia, seizures, DMII on oral meds with unresponsive and acute hypoxic respiratory failure s/t COVID-19 pneumonitis complicated by ANDRES, hypokalemia and shock liver.          HSOPITAL COURSE  COVID positive, associated with fever 106, increased troponin, cardiology was consulted ansd recommended ECHO, there is no heparin gtt, was initially called for STEMI and was cancelled. he was intubated for acute hypoxic respiratory failure. On levophed. Complicated by ANDRES, shock liver and hypokalemia. Was treated with azithromycin, barcitinib, and zosyn. Will give 1L bolus and continue IVF. Discussed with family he is very critical- his renal functions increased and now seemingly in ATN, his liver functions have worsened and had a seizure on 1/28 , he is off propofol now and neurology was consulted. Repeat CT scan didn't show any stroke, plan for EEG on 2/1 to assess for neurological activity. The patients liver functions, renal are worsening. Plan for CRRT today    Subjectivepatient seen and evaluated at bedside, currently remains intubated and sedated, discussed with RN      Review of Systems:  Unable to determine s.t mental status    Objective:     Vitals:    02/06/22 0500 02/06/22 0600 02/06/22 0700 02/06/22 0723   BP: 135/81 119/69     Pulse: 70 76  77   Resp: 18 20  18   Temp: 98.4 °F (36.9 °C) 98.2 °F (36.8 °C) 98.2 °F (36.8 °C)    TempSrc:       SpO2: 100% 98%  98%   Weight:       Height:            Intake and Output:  Current Shift: No intake/output data recorded. Last three shifts: 02/04 1901 - 02/06 0700  In: 2567.2 [I.V.:1792.2]  Out: 2240 [Urine:40]      Physical Exam:   Physical Exam  Vitals and nursing note reviewed. Constitutional:       General: intubates     Appearance: Normal appearance.  He is normal weight. He is ill-appearing. HENT:      Head: Normocephalic and atraumatic.      Nose: Nose normal.      Mouth/Throat:      Mouth: Mucous membranes are moist.   Eyes:      Extraocular Movements: Extraocular movements intact.      Pupils: Pupils are equal, round, and reactive to light. Cardiovascular:      Rate and Rhythm: Regular rhythm. Tachycardia present.      Pulses: Normal pulses.      Heart sounds: Normal heart sounds. Pulmonary:      Effort: Respiratory distress present.      Breath sounds: Rhonchi present. Abdominal:      General: Abdomen is flat. Bowel sounds are normal.      Palpations: Abdomen is soft. Musculoskeletal:         General: No swelling or tenderness. Normal range of motion.      Cervical back: Normal range of motion and neck supple. Skin:     General: Skin is warm and dry.      Capillary Refill: Capillary refill takes less than 2 seconds. Neurological:      Mental Status: He is unresponsive.      GCS: GCS eye subscore is 4. GCS verbal subscore is 1. GCS motor subscore is 1.    Psychiatric:         cannot be assessed      Lab/Data Review:  Recent Results (from the past 24 hour(s))   GLUCOSE, POC    Collection Time: 02/05/22 11:00 AM   Result Value Ref Range    Glucose (POC) 192 (H) 65 - 117 mg/dL    Performed by Stevo Zafar, POC    Collection Time: 02/05/22  3:04 PM   Result Value Ref Range    Glucose (POC) 181 (H) 65 - 117 mg/dL    Performed by Jen Delgado    GLUCOSE, POC    Collection Time: 02/05/22  6:25 PM   Result Value Ref Range    Glucose (POC) 233 (H) 65 - 117 mg/dL    Performed by Jen Delgado    GLUCOSE, POC    Collection Time: 02/05/22  9:36 PM   Result Value Ref Range    Glucose (POC) 152 (H) 65 - 117 mg/dL    Performed by Nancy Tripathi RN ([employer])    GLUCOSE, POC    Collection Time: 02/06/22 12:42 AM   Result Value Ref Range    Glucose (POC) 183 (H) 65 - 117 mg/dL    Performed by Sohan Cintron    PROTHROMBIN TIME + INR Collection Time: 02/06/22  4:00 AM   Result Value Ref Range    Prothrombin time 14.7 11.9 - 14.7 sec    INR 1.2 (H) 0.9 - 1.1     LD    Collection Time: 02/06/22  4:00 AM   Result Value Ref Range    LD 1,045 (H) 85 - 241 U/L   D DIMER    Collection Time: 02/06/22  4:00 AM   Result Value Ref Range    D DIMER 14.88 (H) <0.50 ug/ml(FEU)   PROCALCITONIN    Collection Time: 02/06/22  4:00 AM   Result Value Ref Range    Procalcitonin 6.19 (H) 0 ng/mL   SED RATE (ESR)    Collection Time: 02/06/22  4:00 AM   Result Value Ref Range    Sed rate, automated PENDING mm/hr   CBC W/O DIFF    Collection Time: 02/06/22  4:00 AM   Result Value Ref Range    WBC 22.5 (H) 4.1 - 11.1 K/uL    RBC 3.16 (L) 4.10 - 5.70 M/uL    HGB 9.9 (L) 12.1 - 17.0 g/dL    HCT 29.0 (L) 36.6 - 50.3 %    MCV 91.8 80.0 - 99.0 FL    MCH 31.3 26.0 - 34.0 PG    MCHC 34.1 30.0 - 36.5 g/dL    RDW 14.3 11.5 - 14.5 %    PLATELET 891 (H) 406 - 400 K/uL    MPV 11.3 8.9 - 12.9 FL    NRBC 0.0 0.0  WBC    ABSOLUTE NRBC 0.00 0.00 - 6.86 K/uL   METABOLIC PANEL, COMPREHENSIVE    Collection Time: 02/06/22  4:00 AM   Result Value Ref Range    Sodium 132 (L) 136 - 145 mmol/L    Potassium 4.4 3.5 - 5.1 mmol/L    Chloride 101 97 - 108 mmol/L    CO2 21 21 - 32 mmol/L    Anion gap 10 5 - 15 mmol/L    Glucose 208 (H) 65 - 100 mg/dL    BUN 68 (H) 6 - 20 mg/dL    Creatinine 4.63 (H) 0.70 - 1.30 mg/dL    BUN/Creatinine ratio 15 12 - 20      GFR est AA 15 (L) >60 ml/min/1.73m2    GFR est non-AA 12 (L) >60 ml/min/1.73m2    Calcium 6.9 (L) 8.5 - 10.1 mg/dL    Bilirubin, total 0.4 0.2 - 1.0 mg/dL    AST (SGOT) 428 (H) 15 - 37 U/L    ALT (SGPT) 209 (H) 12 - 78 U/L    Alk.  phosphatase 138 (H) 45 - 117 U/L    Protein, total 5.8 (L) 6.4 - 8.2 g/dL    Albumin 1.2 (L) 3.5 - 5.0 g/dL    Globulin 4.6 (H) 2.0 - 4.0 g/dL    A-G Ratio 0.3 (L) 1.1 - 2.2           Assessment and plan:        Acute respiratory failure with hypoxia secondary to COVID-19 pneumoniaof note patient currently remains intubated and sedated at this time, remains in septic shock, secondary to COVID-19 pneumonia  Follow blood cultures  Follow sputum cultures  Continue Decadron 6 mg IV every 12  Continue Zosyn azithromycin for antibiotic coverage  Continue baricitinib once daily for total 14-day course  Attempt to wean oxygen  Continue to follow pulmonology recommendations    Septic shocksecondary to problem #1, status post fluid resuscitationsepsis protocol  Follow-up blood cultures  Continue trend inflammatory markers  Continue antibiotics as documented above  Continue Levophed for pressor support, titrate to mean arterial pressure of over 65  Continue to follow infectious Disease recommendations    Severe encephalopathylikely secondary to septic shock/problem #1 however concerns for anoxic brain injury as patient does not have purposeful movements while off of sedation  Neurology consult appreciated  Management as documented above  Follow-up MRI brain further evaluation  Continue to follow neurology recommendations    Hyperglycemia type 2 diabetesas documented below  Continue lantus at 20 units daily  Continue insulin sliding scale    Type II non-ST elevation MIlikely secondary to demand  Transthoracic echo reviewed  Continue current medications  Continue to follow cardiology recommendations    Hypokalemiacurrently resolved    Acute kidney injurylikely secondary to acute tubular necrosis, status post initiation of dialysis  Continue to trend serum creatinine  Dialysis as per nephrology recommendations  Nephrology consult appreciated, continue to follow recommendations    Shock liversecondary to problems #1 and 2, currently LFTs downtrending  Management as documented above  Continue trend LFTs    Anemia/bleeding from dialysis catheter siteof note patient had significant bleeding from dialysis catheter site s/p 3 units prbc with appropriate response, bleeding stopped after application of quick clot/stitch placement  Continue to trend H/H  Interventional Radiology consult appreciated    ProphylaxisSCDs  FENtube feeding, replete potassium and magnesium  DNR, NOK are patients brothers  Dispositioncontinued ICU care pending clinical improvement    Critical care time spent 35 minutes involving direct patient care as well as reviewing patient's labs and coordination of care with nursing staff      Signed By: Katherine Loyola MD     February 6, 2022

## 2022-02-06 NOTE — PROGRESS NOTES
Infectious Diseases Progress Note  Omid Bgeum MD  [phone]  Date:2022       [address]  Patient Tiffany Nageotte     YOB: 1945     Age:76 y.o. 76M with schizophrenia, seizures, diabetes, chronic renal failure.     24 presents to hospital unresponsive. Reportedly had been increasingly weak and unable to leave the bed for days. Associated with fevers. During the ED course found positive for Covid 19, with increased troponin. Intubated ventilated for acute hypoxic respiratory failure. During the hospital course, declining renal function and initiated on hemodialysis. I have been asked to see the patient in consultation for prolonged respiratory failure. Subjective    Subjective:  Symptoms:  Stable. Review of Systems   Unable to perform ROS: Intubated     Objective         Vitals Last 24 Hours:  TEMPERATURE:  Temp  Av.9 °F (37.2 °C)  Min: 98.2 °F (36.8 °C)  Max: 99.3 °F (37.4 °C)  RESPIRATIONS RANGE: Resp  Av.5  Min: 16  Max: 22  PULSE OXIMETRY RANGE: SpO2  Av.8 %  Min: 95 %  Max: 100 %  PULSE RANGE: Pulse  Av.1  Min: 70  Max: 82  BLOOD PRESSURE RANGE: Systolic (07NTI), CNR:046 , Min:79 , LPX:962   ; Diastolic (82AMN), JRJ:83, Min:53, Max:81    I/O (24Hr): Intake/Output Summary (Last 24 hours) at 2022 1206  Last data filed at 2022 1100  Gross per 24 hour   Intake 2411.83 ml   Output 2205 ml   Net 206.83 ml     Objective:  Vital signs: (most recent): Blood pressure 122/74, pulse 77, temperature 98.6 °F (37 °C), resp. rate 18, height 6' (1.829 m), weight 107.5 kg (236 lb 15.9 oz), SpO2 100 %. General:  intubated on vent unresponsive on no sedation  HEENT: NCAT,   Eyes: anicteric; conjunctiva clear no doll's eye reflex  Neck: no nodes, no cuff leak, trach midline; no accessory MM use.   Chest: no deformity,   Cardiac: R regular; no murmur;   Lungs: distant breath sounds; no wheezes a few rales in the base  Abd: soft, NT, hypoactive BS  Ext: Trace edema; no joint swelling; No clubbing  : No urine  Neuro: Unresponsive on no sedation no doll's eye reflex flaccid extremity  Psych-  unable to assess  Skin: warm, dry, no cyanosis;   Pulses: Brachial and radial pulses intact  Capillary: Slow capillary refill    Labs/Imaging/Diagnostics    Labs:  CBC:  Recent Labs     02/06/22  0400 02/05/22  0435 02/04/22  1655 02/04/22 0445 02/04/22 0445   WBC 22.5* 23.7*  --   --  25.7*   RBC 3.16* 3.55*  --   --  1.25*   HGB 9.9* 11.3* 12.6   < > 4.0*   HCT 29.0* 31.7* 36.3*   < > 11.9*   MCV 91.8 89.3  --   --  95.2   RDW 14.3 14.1  --   --  14.6*   * 378  --   --  328    < > = values in this interval not displayed. CHEMISTRIES:  Recent Labs     02/06/22 0400 02/05/22 0435 02/04/22 0445   * 131*  131* 132*  130*   K 4.4 4.8  4.6 4.5  4.5    98  98 101  101   CO2 21 24  24 19*  18*   BUN 68* 66*  66* 72*  71*   CA 6.9* 7.4*  7.5* 7.8*  7.6*   PHOS  --  8.6* 5.6*   PT/INR:  Recent Labs     02/06/22 0400 02/05/22 0435 02/04/22 0445   INR 1.2* 1.2* 1.4*     APTT:  Recent Labs     02/04/22  1720   APTT 43.7*     LIVER PROFILE:  Recent Labs     02/06/22 0400 02/05/22 0435 02/04/22 0445   * 515* 470*   * 270* 288*     Lab Results   Component Value Date/Time    ALT (SGPT) 209 (H) 02/06/2022 04:00 AM    AST (SGOT) 428 (H) 02/06/2022 04:00 AM    Alk. phosphatase 138 (H) 02/06/2022 04:00 AM    Bilirubin, total 0.4 02/06/2022 04:00 AM       Imaging Last 24 Hours:  XR CHEST PORT    Result Date: 2/6/2022  Exam: XR CHEST PORT  Indication:  cough/pna; Comparison: Chest radiograph from February 5, 2022 Findings: Stable support lines and tubes. Heart is normal in size given the projection. Lung volumes are reduced. Similar left basilar consolidation. Patchy nodular airspace opacities within the right mid lung appears minimally improved. Small left pleural effusion. There is no pneumothorax.      Questionable slight improvement in the patchy right lung airspace disease. Unchanged left basilar infiltrate/atelectasis. Assessment//Plan   Active Problems:    Acute encephalopathy (5/18/2021)      Assessment & Plan   76M with schizophrenia, seizures, diabetes, chronic renal failure.     1/27/22 presents to hospital unresponsive. Reportedly had been increasingly weak and unable to leave the bed for days. Associated with fevers. During the ED course found positive for Covid 19, with increased troponin. Intubated ventilated for acute hypoxic respiratory failure. During the hospital course, declining renal function and initiated on hemodialysis.  I have been asked to see the patient in consultation for prolonged respiratory failure.     MICROBIOLOGY     1/27/22            Covid 19          Positive  1/27/22            Blood               Negative  1/27/22            Urine                Negative     2/3/22              Endotrach        Scant Normal respiratory mark     ASSESSMENT AND RECOMMENDATIONS     1) Prolonged acute hypoxic respiratory failure in the setting of multifactorial shock, shock liver, Covid 19 pneumonia, NSTEMI, acute on chronic renal failure, anoxic brain injury.                 Supportive care with respiratory support as needed              Dexamethasone              Olumiant              Zosyn and azithromycin                 Overall prognosis remains very poor    Electronically signed by Polo Bynum MD on 2/6/2022 at 12:38 PM

## 2022-02-06 NOTE — PROGRESS NOTES
[facility] notified of pt with GCS 3. Notify OPO if pt tests COVID negative, if pt remains on the vent after 21 days from positive COVID result or at the time of cardiac death.

## 2022-02-06 NOTE — PROGRESS NOTES
Problem: Body Temperature -  Risk of, Imbalanced  Goal: Ability to maintain a body temperature within defined limits  Outcome: Progressing Towards Goal     Problem: Nutrition Deficits  Goal: Optimize nutrtional status  Outcome: Progressing Towards Goal     Problem: Falls - Risk of  Goal: *Absence of Falls  Description: Document Abraham Jackson Fall Risk and appropriate interventions in the flowsheet.   Outcome: Progressing Towards Goal  Note: Fall Risk Interventions:       Mentation Interventions: Adequate sleep, hydration, pain control,Bed/chair exit alarm,Room close to nurse's station    Medication Interventions: Bed/chair exit alarm,Evaluate medications/consider consulting pharmacy    Elimination Interventions: Bed/chair exit alarm    History of Falls Interventions: Bed/chair exit alarm,Room close to nurse's station         Problem: Airway Clearance - Ineffective  Goal: Achieve or maintain patent airway  Outcome: Not Progressing Towards Goal     Problem: Ventilator Management  Goal: *Adequate oxygenation and ventilation  Outcome: Not Progressing Towards Goal

## 2022-02-06 NOTE — PROGRESS NOTES
Subjective   Subjective:      HPI:pt on ventilator and sedated. Pt had bleeding from dialysis catheter site. Bleeding stopped.   Objective     Current Facility-Administered Medications:     0.9% sodium chloride infusion 250 mL, 250 mL, IntraVENous, PRN, Jesus Aceves MD    levETIRAcetam [facility]) oral solution 1,500 mg, 1,500 mg, Oral, BID, Dilip Mckenzie MD, 1,500 mg at 02/05/22 2201    heparin (porcine) 1,000 unit/mL injection 2,200 Units, 2,200 Units, IntraVENous, DIALYSIS PRN, Clyde Magdaleno MD, 2,200 Units at 02/04/22 1421    insulin glargine (LANTUS) injection 20 Units, 20 Units, SubCUTAneous, QHS, Jesus Aceves MD, 20 Units at 02/05/22 2200    dexamethasone (DECADRON) 4 mg/mL injection 4 mg, 4 mg, IntraVENous, Q12H, Ge Duran MD, 4 mg at 02/05/22 2201    NOREPINephrine (LEVOPHED) 8 mg in 0.9% NS 250ml infusion, 0.5-120 mcg/min, IntraVENous, TITRATE, Ge Duran MD, Stopped at 02/05/22 1505    lactulose (CHRONULAC) 10 gram/15 mL solution 15 mL, 10 g, Per G Tube, TID, Esme Balderrama MD, 15 mL at 02/05/22 2201    glucose chewable tablet 16 g, 4 Tablet, Oral, PRN, Esme Balderrama MD    glucagon (GLUCAGEN) injection 1 mg, 1 mg, IntraMUSCular, PRN, Esme Balderrama MD    insulin lispro (HUMALOG) injection, , SubCUTAneous, Q4H, Esme Balderrama MD, 3 Units at 02/05/22 2155    dextrose 10% infusion 125-250 mL, 125-250 mL, IntraVENous, PRN, Esme Balderrama MD, 250 mL at 02/01/22 1518    LORazepam (ATIVAN) injection 2 mg, 2 mg, IntraVENous, Q2H PRN, Elmer Jorgensen PA-C, 2 mg at 02/04/22 2010    glucose chewable tablet 16 g, 4 Tablet, Oral, PRN, Esme Balderrama MD    glucagon [facility]) injection 1 mg, 1 mg, IntraMUSCular, PREsme TANG MD    valproic acid (as sodium salt) (DEPAKENE) 250 mg/5 mL (5 mL) oral solution 500 mg, 500 mg, Oral, BID, Sunshine Tate MD, 500 mg at 02/05/22 2201    dextrose 10% infusion 125-250 mL, 125-250 mL, IntraVENous, PRN, Cheryl Cardenas MD, 250 mL at 02/02/22 0556    PHENYLephrine (ISRAEL-SYNEPHRINE) 30 mg in 0.9% sodium chloride 250 mL infusion,  mcg/min, IntraVENous, TITRATE, Luciano Jorgensen PA-C, Held at 01/28/22 2300    propofol (DIPRIVAN) 10 mg/mL infusion, 0-50 mcg/kg/min, IntraVENous, TITRATE, Torri Peters MD, Last Rate: 6 mL/hr at 02/05/22 2212, 10 mcg/kg/min at 02/05/22 2212    lacosamide (VIMPAT) tablet 200 mg, 200 mg, Oral, BID, Cheryl Cardenas MD, 200 mg at 02/05/22 2201    levothyroxine (SYNTHROID) tablet 75 mcg, 75 mcg, Oral, 7am, Cheryl Cardenas MD, 75 mcg at 02/05/22 0708    [Held by provider] aspirin chewable tablet 81 mg, 81 mg, Oral, DAILY, Cheryl Cardenas MD, 81 mg at 02/03/22 0933    sodium chloride (NS) flush 5-40 mL, 5-40 mL, IntraVENous, Q8H, Summer Nuñez MD, 10 mL at 02/05/22 2202    sodium chloride (NS) flush 5-40 mL, 5-40 mL, IntraVENous, PRN, Cheryl Cardenas MD    acetaminophen (TYLENOL) tablet 650 mg, 650 mg, Oral, Q6H PRN, 650 mg at 02/04/22 2355 **OR** acetaminophen (TYLENOL) suppository 650 mg, 650 mg, Rectal, Q6H PRN, Cheryl Cardenas MD    polyethylene glycol (MIRALAX) packet 17 g, 17 g, Oral, DAILY PRN, Cheryl Cardenas MD    ondansetron (ZOFRAN ODT) tablet 4 mg, 4 mg, Oral, Q8H PRN **OR** ondansetron (ZOFRAN) injection 4 mg, 4 mg, IntraVENous, Q6H PRN, Cheryl Cardenas MD    atorvastatin (LIPITOR) tablet 80 mg, 80 mg, Oral, QHS, Torri Peters MD, 80 mg at 02/05/22 2201    piperacillin-tazobactam (ZOSYN) 3.375 g in 0.9% sodium chloride (MBP/ADV) 100 mL MBP, 3.375 g, IntraVENous, Q8H, Omid Jackson MD, Last Rate: 25 mL/hr at 02/05/22 2202, 3.375 g at 02/05/22 2202    azithromycin (ZITHROMAX) 500 mg in 0.9% sodium chloride 250 mL (VIAL-MATE), 500 mg, IntraVENous, Q24H, Cheryl Cardenas MD, Last Rate: 250 mL/hr at 02/05/22 1553, 500 mg at 02/05/22 1553    baricitinib (OLUMIANT) tablet 1 mg, 1 mg, Oral, DAILY, Rohit Murphy MD, 1 mg at 02/05/22 1009    Objective:     Visit Vitals  /65 (BP 1 Location: Left upper arm, BP Patient Position: At rest)   Pulse 71   Temp 99.3 °F (37.4 °C)   Resp 18   Ht 6' (1.829 m)   Wt 105.3 kg (232 lb 2.3 oz)   SpO2 100%   BMI 31.48 kg/m²      Physical Exam   Pt on venilator,sedated  @Objective    [email or web address]     Data Review:   Recent Results (from the past 24 hour(s))   BLOOD GAS, ARTERIAL    Collection Time: 02/05/22  3:00 AM   Result Value Ref Range    pH 7.43 7.35 - 7.45      PCO2 35 35 - 45 mmHg    PO2 81 75 - 100 mmHg    O2 SAT 98 >95 %    BICARBONATE 23 22 - 26 mmol/L    BASE DEFICIT 1.2 0 - 2 mmol/L    O2 METHOD VENT      FIO2 50.0 %    MODE Assist Control/Volume Control      Tidal volume 500      SET RATE 18      IPAP/PIP 0      EPAP/CPAP/PEEP 5.0      SITE Right Brachial      JULIO'S TEST PASS     PROTHROMBIN TIME + INR    Collection Time: 02/05/22  4:35 AM   Result Value Ref Range    Prothrombin time 14.7 11.9 - 14.7 sec    INR 1.2 (H) 0.9 - 1.1     LD    Collection Time: 02/05/22  4:35 AM   Result Value Ref Range    LD 1,132 (H) 85 - 241 U/L   D DIMER    Collection Time: 02/05/22  4:35 AM   Result Value Ref Range    D DIMER 17.77 (H) <0.50 ug/ml(FEU)   PROCALCITONIN    Collection Time: 02/05/22  4:35 AM   Result Value Ref Range    Procalcitonin 6.21 (H) 0 ng/mL   SED RATE (ESR)    Collection Time: 02/05/22  4:35 AM   Result Value Ref Range    Sed rate, automated 89 mm/hr   CBC W/O DIFF    Collection Time: 02/05/22  4:35 AM   Result Value Ref Range    WBC 23.7 (H) 4.1 - 11.1 K/uL    RBC 3.55 (L) 4.10 - 5.70 M/uL    HGB 11.3 (L) 12.1 - 17.0 g/dL    HCT 31.7 (L) 36.6 - 50.3 %    MCV 89.3 80.0 - 99.0 FL    MCH 31.8 26.0 - 34.0 PG    MCHC 35.6 30.0 - 36.5 g/dL    RDW 14.1 11.5 - 14.5 %    PLATELET 005 099 - 432 K/uL    MPV 11.7 8.9 - 12.9 FL    NRBC 0.3 (H) 0.0  WBC    ABSOLUTE NRBC 0.07 (H) 0.00 - 0.22 K/uL   METABOLIC PANEL, COMPREHENSIVE    Collection Time: 02/05/22 4:35 AM   Result Value Ref Range    Sodium 131 (L) 136 - 145 mmol/L    Potassium 4.8 3.5 - 5.1 mmol/L    Chloride 98 97 - 108 mmol/L    CO2 24 21 - 32 mmol/L    Anion gap 9 5 - 15 mmol/L    Glucose 201 (H) 65 - 100 mg/dL    BUN 66 (H) 6 - 20 mg/dL    Creatinine 4.44 (H) 0.70 - 1.30 mg/dL    BUN/Creatinine ratio 15 12 - 20      GFR est AA 16 (L) >60 ml/min/1.73m2    GFR est non-AA 13 (L) >60 ml/min/1.73m2    Calcium 7.4 (L) 8.5 - 10.1 mg/dL    Bilirubin, total 0.5 0.2 - 1.0 mg/dL    AST (SGOT) 515 (H) 15 - 37 U/L    ALT (SGPT) 270 (H) 12 - 78 U/L    Alk.  phosphatase 210 (H) 45 - 117 U/L    Protein, total 6.2 (L) 6.4 - 8.2 g/dL    Albumin 1.5 (L) 3.5 - 5.0 g/dL    Globulin 4.7 (H) 2.0 - 4.0 g/dL    A-G Ratio 0.3 (L) 1.1 - 2.2     RENAL FUNCTION PANEL    Collection Time: 02/05/22  4:35 AM   Result Value Ref Range    Sodium 131 (L) 136 - 145 mmol/L    Potassium 4.6 3.5 - 5.1 mmol/L    Chloride 98 97 - 108 mmol/L    CO2 24 21 - 32 mmol/L    Anion gap 9 5 - 15 mmol/L    Glucose 202 (H) 65 - 100 mg/dL    BUN 66 (H) 6 - 20 mg/dL    Creatinine 4.43 (H) 0.70 - 1.30 mg/dL    BUN/Creatinine ratio 15 12 - 20      GFR est AA 16 (L) >60 ml/min/1.73m2    GFR est non-AA 13 (L) >60 ml/min/1.73m2    Calcium 7.5 (L) 8.5 - 10.1 mg/dL    Phosphorus 8.6 (H) 2.6 - 4.7 mg/dL    Albumin 1.5 (L) 3.5 - 5.0 g/dL   GLUCOSE, POC    Collection Time: 02/05/22 11:00 AM   Result Value Ref Range    Glucose (POC) 192 (H) 65 - 117 mg/dL    Performed by Bruce Gonzalez    GLUCOSE, POC    Collection Time: 02/05/22  3:04 PM   Result Value Ref Range    Glucose (POC) 181 (H) 65 - 117 mg/dL    Performed by Jen Delgado    GLUCOSE, POC    Collection Time: 02/05/22  6:25 PM   Result Value Ref Range    Glucose (POC) 233 (H) 65 - 117 mg/dL    Performed by Jen VELOZ, POC    Collection Time: 02/05/22  9:36 PM   Result Value Ref Range    Glucose (POC) 152 (H) 65 - 117 mg/dL    Performed by Bianka Geller RN ([employer])        Assessment Assessment/Plan:      1. Thrombocytopenia:Resolved. Platelets normal. Most likely multifactorial. Patient had Platelet clumping reported. Pt had pseudothrombocytopenia. Monitor.     2.  Elevated D-dimer: Nonspecific. Venous Doppler of bilateral lower extremities negative for DVT. Monitor.     3.  Leukocytosis and granulocytosis: Due to Covid infection. S/p steroids. Monitor. ID following. 4.  Anemia: Hb dropped due to bleeding. S/p units of PRBC. HBimproved from 4.0 to 12.6 gms/dl. HB 4.0 gms/dl may be lab error. HB 11.3 today. Monitor. 5.  Acute kidney injury: Nephrology following. Patient on intermittent hemodialysis. 6.  Shock liver     NSTEMI:cardiology following.     Encephalopathy. Prognosis poor. Pt  DNR. Will sign off. If any questions please call. Gricelda Mckeon

## 2022-02-06 NOTE — PROGRESS NOTES
IMPRESSION:   1. Acute hypoxic respiratory failure oxygenation well-maintained on the ventilator  2. Malignant hyperthermia resolved   3. COVID-19 pneumonia  4. NSTEMI elevated troponin  5. Shock cardiogenic versus septic vs Hypovolumic Hypotension currently on norepinephrine  will continue to wean  6. Acute bleeding from dialysis catheter which has been corrected  7. Acute kidney injury now on hemodialysis  8. Metabolic acidosis  9. Thrombocytopenia worsened  10. Hypernatremia  11. Symptomatic hypokalemia  12. Shock liver with transaminitis  13. Altered mental status unresponsive on no sedation likely anoxic encephalopathy      RECOMMENDATIONS/PLAN:   1. ICU monitoring  1. Ventilator for mechanical life support and prevent respiratory arrest with protective lung strategies ABG acceptable he is still hemodynamically unstable will continue with the current vent support: on Propofol. 2. On Assist control rate 18  PEEP 5 FiO2 50% decrease FIo2   3. Blood pressure improved off vasopressors  4. Patient on Decadron Zithromax and baricitinib WBC elevated, decrease Decadron  5. Troponin is elevated 2D echo shows ejection fraction of 65%  6. He was bleeding from the dialysis catheter which is corrected received 4 units of packed RBC hemoglobin dropped from 8.8 to 4.0 repeat lab shows Hb 9.9  7. Now getting dialysis   8. LDH procalcitonin trending down  9. Remains on quarter saline  10. Liver enzyme continues to improve as patient was in shock liver  11. Agree with [facility] IV antibiotics blood and urine cultures no growth on Zosyn and Zithromax   12. Temperature back to normal  13.  IV vasopressors for circulatory shock refractory to fluids to maintain SBP> 90      [x] High complexity decision making was performed  [x] See my orders for details  HPI   72-year-old male came in because as he was found unresponsive and fever 107 past medical history of schizophrenia and diabetes gastroesophageal reflux disease and anemia he is COVID-19 positive initially patient was called as NSTEMI but it was canceled patient is intubated on ventilator hemodynamically unstable unable to get any history out of the patient he seems dehydrated    PMH:  has a past medical history of Acute renal failure ([address] Anemia, Arthritis, DKA (diabetic ketoacidoses), GERD (gastroesophageal reflux disease), HTN (hypertension), Hypercholesterolemia, Schizophrenia ([address] Schizophreniform disorder ([address] Seizure disorder ([facility] [address] and Type II or unspecified type diabetes mellitus without mention of complication, uncontrolled. PSH:   has a past surgical history that includes ir insert non tunl cvc over 5 yrs (1/28/2022) and ir insert non tunl cvc over 5 yrs (1/31/2022). FHX: family history includes Stroke in his father. SHX:  reports that he has never smoked. He has never used smokeless tobacco. He reports that he does not drink alcohol and does not use drugs.     ALL: No Known Allergies     MEDS:   [x] Reviewed - As Below   [] Not reviewed    Current Facility-Administered Medications   Medication    0.9% sodium chloride infusion 250 mL    levETIRAcetam (KEPPRA) oral solution 1,500 mg    heparin (porcine) 1,000 unit/mL injection 2,200 Units    insulin glargine (LANTUS) injection 20 Units    dexamethasone (DECADRON) 4 mg/mL injection 4 mg    NOREPINephrine (LEVOPHED) 8 mg in 0.9% NS 250ml infusion    lactulose (CHRONULAC) 10 gram/15 mL solution 15 mL    glucose chewable tablet 16 g    glucagon (GLUCAGEN) injection 1 mg    insulin lispro (HUMALOG) injection    dextrose 10% infusion 125-250 mL    LORazepam (ATIVAN) injection 2 mg    glucose chewable tablet 16 g    glucagon (GLUCAGEN) injection 1 mg    valproic acid (as sodium salt) (DEPAKENE) 250 mg/5 mL (5 mL) oral solution 500 mg    dextrose 10% infusion 125-250 mL    PHENYLephrine (ISRAEL-SYNEPHRINE) 30 mg in 0.9% sodium chloride 250 mL infusion    propofol (DIPRIVAN) 10 mg/mL infusion    lacosamide (VIMPAT) tablet 200 mg    levothyroxine (SYNTHROID) tablet 75 mcg    [Held by provider] aspirin chewable tablet 81 mg    sodium chloride (NS) flush 5-40 mL    sodium chloride (NS) flush 5-40 mL    acetaminophen (TYLENOL) tablet 650 mg    Or    acetaminophen (TYLENOL) suppository 650 mg    polyethylene glycol (MIRALAX) packet 17 g    ondansetron (ZOFRAN ODT) tablet 4 mg    Or    ondansetron (ZOFRAN) injection 4 mg    atorvastatin (LIPITOR) tablet 80 mg    piperacillin-tazobactam (ZOSYN) 3.375 g in 0.9% sodium chloride (MBP/ADV) 100 mL MBP    azithromycin (ZITHROMAX) 500 mg in 0.9% sodium chloride 250 mL (VIAL-MATE)    baricitinib (OLUMIANT) tablet 1 mg      MAR reviewed and pertinent medications noted or modified as needed   Current Facility-Administered Medications   Medication    0.9% sodium chloride infusion 250 mL    levETIRAcetam (KEPPRA) oral solution 1,500 mg    heparin (porcine) 1,000 unit/mL injection 2,200 Units    insulin glargine (LANTUS) injection 20 Units    dexamethasone (DECADRON) 4 mg/mL injection 4 mg    NOREPINephrine (LEVOPHED) 8 mg in 0.9% NS 250ml infusion    lactulose (CHRONULAC) 10 gram/15 mL solution 15 mL    glucose chewable tablet 16 g    glucagon (GLUCAGEN) injection 1 mg    insulin lispro (HUMALOG) injection    dextrose 10% infusion 125-250 mL    LORazepam (ATIVAN) injection 2 mg    glucose chewable tablet 16 g    glucagon (GLUCAGEN) injection 1 mg    valproic acid (as sodium salt) (DEPAKENE) 250 mg/5 mL (5 mL) oral solution 500 mg    dextrose 10% infusion 125-250 mL    PHENYLephrine (ISRAEL-SYNEPHRINE) 30 mg in 0.9% sodium chloride 250 mL infusion    propofol (DIPRIVAN) 10 mg/mL infusion    lacosamide (VIMPAT) tablet 200 mg    levothyroxine (SYNTHROID) tablet 75 mcg    [Held by provider] aspirin chewable tablet 81 mg    sodium chloride (NS) flush 5-40 mL    sodium chloride (NS) flush 5-40 mL    acetaminophen (TYLENOL) tablet 650 mg    Or    acetaminophen (TYLENOL) suppository 650 mg    polyethylene glycol (MIRALAX) packet 17 g    ondansetron (ZOFRAN ODT) tablet 4 mg    Or    ondansetron (ZOFRAN) injection 4 mg    atorvastatin (LIPITOR) tablet 80 mg    piperacillin-tazobactam (ZOSYN) 3.375 g in 0.9% sodium chloride (MBP/ADV) 100 mL MBP    azithromycin (ZITHROMAX) 500 mg in 0.9% sodium chloride 250 mL (VIAL-MATE)    baricitinib (OLUMIANT) tablet 1 mg      PMH:  has a past medical history of Acute renal failure ([facility] [address] Anemia, Arthritis, DKA (diabetic ketoacidoses), GERD (gastroesophageal reflux disease), HTN (hypertension), Hypercholesterolemia, Schizophrenia ([facility] [address] Schizophreniform disorder ([facility] [address] Seizure disorder ([facility] [address] and Type II or unspecified type diabetes mellitus without mention of complication, uncontrolled. PSH:   has a past surgical history that includes ir insert non tunl cvc over 5 yrs (2022) and ir insert non tunl cvc over 5 yrs (2022). FHX: family history includes Stroke in his father. SHX:  reports that he has never smoked. He has never used smokeless tobacco. He reports that he does not drink alcohol and does not use drugs. ROS:  Unable to obtain intubated on ventilator and sedated    Hemodynamics:    CO:    CI:    CVP:    SVR:   PAP Systolic:    PAP Diastolic:    PVR:    DJ21:        Ventilator Settings:      Mode Rate TV Press PEEP FiO2 PIP Min.  Vent   Assist control,Volume control    500 ml    5 cm H20 50 %  33 cm H2O  9.45 l/min        Vital Signs: Telemetry:    normal sinus rhythm Intake/Output:   Visit Vitals  /69 (BP 1 Location: Left upper arm, BP Patient Position: At rest)   Pulse 77   Temp 98.2 °F (36.8 °C)   Resp 18   Ht 6' (1.829 m)   Wt 107.5 kg (236 lb 15.9 oz)   SpO2 98%   BMI 32.14 kg/m²       Temp (24hrs), Av °F (37.2 °C), Min:98.2 °F (36.8 °C), Max:99.3 °F (37.4 °C)        O2 Device: Ventilator         Wt Readings from Last 4 Encounters:   22 107.5 kg (236 lb 15.9 oz)   22 101.1 kg (222 lb 14.2 oz)   10/06/21 111.1 kg (245 lb)   07/22/21 110.7 kg (244 lb)          Intake/Output Summary (Last 24 hours) at 2/6/2022 0811  Last data filed at 2/6/2022 0400  Gross per 24 hour   Intake 1514.22 ml   Output 2205 ml   Net -690.78 ml       Last shift:      No intake/output data recorded. Last 3 shifts: 02/04 1901 - 02/06 0700  In: 2567.2 [I.V.:1792.2]  Out: 2240 [Urine:40]       Physical Exam:     General:  intubated on vent unresponsive on no sedation  HEENT: NCAT,   Eyes: anicteric; conjunctiva clear no doll's eye reflex  Neck: no nodes, no cuff leak, trach midline; no accessory MM use. Chest: no deformity,   Cardiac: R regular; no murmur;   Lungs: distant breath sounds; no wheezes a few rales in the base  Abd: soft, NT, hypoactive BS  Ext: Trace edema; no joint swelling;  No clubbing  : No urine  Neuro: Unresponsive on no sedation no doll's eye reflex flaccid extremity  Psych-  unable to assess  Skin: warm, dry, no cyanosis;   Pulses: Brachial and radial pulses intact  Capillary: Slow capillary refill      DATA:    MAR reviewed and pertinent medications noted or modified as needed  MEDS:   Current Facility-Administered Medications   Medication    0.9% sodium chloride infusion 250 mL    levETIRAcetam (KEPPRA) oral solution 1,500 mg    heparin (porcine) 1,000 unit/mL injection 2,200 Units    insulin glargine (LANTUS) injection 20 Units    dexamethasone (DECADRON) 4 mg/mL injection 4 mg    NOREPINephrine (LEVOPHED) 8 mg in 0.9% NS 250ml infusion    lactulose (CHRONULAC) 10 gram/15 mL solution 15 mL    glucose chewable tablet 16 g    glucagon (GLUCAGEN) injection 1 mg    insulin lispro (HUMALOG) injection    dextrose 10% infusion 125-250 mL    LORazepam (ATIVAN) injection 2 mg    glucose chewable tablet 16 g    glucagon (GLUCAGEN) injection 1 mg    valproic acid (as sodium salt) (DEPAKENE) 250 mg/5 mL (5 mL) oral solution 500 mg    dextrose 10% infusion 125-250 mL    PHENYLephrine (ISRAEL-SYNEPHRINE) 30 mg in 0.9% sodium chloride 250 mL infusion    propofol (DIPRIVAN) 10 mg/mL infusion    lacosamide (VIMPAT) tablet 200 mg    levothyroxine (SYNTHROID) tablet 75 mcg    [Held by provider] aspirin chewable tablet 81 mg    sodium chloride (NS) flush 5-40 mL    sodium chloride (NS) flush 5-40 mL    acetaminophen (TYLENOL) tablet 650 mg    Or    acetaminophen (TYLENOL) suppository 650 mg    polyethylene glycol (MIRALAX) packet 17 g    ondansetron (ZOFRAN ODT) tablet 4 mg    Or    ondansetron (ZOFRAN) injection 4 mg    atorvastatin (LIPITOR) tablet 80 mg    piperacillin-tazobactam (ZOSYN) 3.375 g in 0.9% sodium chloride (MBP/ADV) 100 mL MBP    azithromycin (ZITHROMAX) 500 mg in 0.9% sodium chloride 250 mL (VIAL-MATE)    baricitinib (OLUMIANT) tablet 1 mg        Labs:    Recent Labs     02/06/22  0400 02/05/22  0435 02/04/22  1720 02/04/22  1655 02/04/22  0445 02/04/22 0445   WBC 22.5* 23.7*  --   --   --  25.7*   HGB 9.9* 11.3*  --  12.6   < > 4.0*   * 378  --   --   --  328   INR 1.2* 1.2*  --   --   --  1.4*   APTT  --   --  43.7*  --   --   --     < > = values in this interval not displayed.      Recent Labs     02/06/22  0400 02/05/22  0435 02/04/22  0445   * 131*  131* 132*  130*   K 4.4 4.8  4.6 4.5  4.5    98  98 101  101   CO2 21 24  24 19*  18*   * 201*  202* 262*  235*   BUN 68* 66*  66* 72*  71*   CREA 4.63* 4.44*  4.43* 4.48*  4.40*   CA 6.9* 7.4*  7.5* 7.8*  7.6*   PHOS  --  8.6* 5.6*   ALB 1.2* 1.5*  1.5* 1.4*  1.4*   * 270* 288*     Recent Labs     02/05/22  0300 02/04/22  0500   PH 7.43 7.38   PCO2 35 32*   PO2 81 108*   HCO3 23 20*   FIO2 50.0 50.0       Lab Results   Component Value Date/Time    Culture result: No growth after 19 hours 02/03/2022 07:20 PM    Culture result: No Growth (<1000 cfu/mL) 01/27/2022 02:00 PM    Culture result: No growth 6 days 01/27/2022 01:30 PM     Lab Results   Component Value Date/Time    TSH 4.47 (H) 05/21/2021 10:46 AM        Imaging:    Results from Hospital Encounter encounter on 01/27/22    XR CHEST PORT    Narrative  Exam: XR CHEST PORT    Indication:  chf;    Comparison: Chest radiograph from February 4, 2021    Impression  Findings/impression:    Numerous monitoring leads are coiled about the chest. Stable support lines and  tubes. Low lung volumes. Cardiomediastinal silhouette is within normal limits given the  projection. Left basilar consolidation appears similar to slightly progressed. Unchanged patchy right basilar infiltrates/atelectasis. No pleural effusion. No  pneumothorax. Results from [place] encounter on 01/27/22    CT HEAD WO CONT    Narrative  Exam: CT Head without contrast    TECHNIQUE: Multiple transaxial CT images of the head were obtained without  contrast. Coronal and sagittal reformatted images were provided. Dose reduction: All CT scans at this facility are performed using dose reduction  optimization techniques as appropriate to a performed exam including the  following: Automated exposure control, adjustments of the mA and/or kV according  to patient size, or use of iterative reconstruction technique. HISTORY: anoxia    COMPARISON: Head CT 10/6/2021, 1/27/2022    FINDINGS:    Global parenchymal volume loss appears similar to prior with proportional ex  vacuo dilatation of the ventricles and other extra-axial CSF spaces, slightly  more prominent on the left. No significant midline shift or mass effect. Gray-white matter differentiation appears preserved. No findings of acute  intracranial hemorrhage. Basilar cisterns appear preserved. Intracranial  atherosclerosis. There is bilateral opacification of the mastoid air cells, most likely  indicating nonspecific mastoid effusions. Debris within the external auditory  canals is most likely cerumen.  There is mild mucosal thickening in the paranasal  sinuses, most pronounced in the maxillary sinuses. Globes and orbits appear  unremarkable. Calvarium appears intact without findings of acute or aggressive  abnormality. Impression  Overall similar exam to prior without findings of acute intracranial  abnormality. · 1/30 remains on the ventilator has metabolic acidosis we will add bicarb per tube. Maintain ventilator on current settings although will decrease PEEP slightly. Platelets continue to drop.   He is unresponsive on no sedation likely anoxic encephalopathy  · 1/31 remain intubated on ventilator hemodynamically worsening of the renal function  · 2/1 remained on ventilator hemodynamically unstable on levofed  · 2/2 on ventilator hypothermic electrolyte imbalance will continue with the current vent settings  · 2/4 acute bleeding from the dialysis catheter which has been corrected received 4 units of packed RBC ABG acceptable  · 2/5 remain intubated no more bleeding from the dialysis catheter site  · 2/6 on ventilator assist control mode we will continue to wean

## 2022-02-06 NOTE — PROGRESS NOTES
CARDIOLOGY PROGRESS NOTE - NP     Patient seen and examined. This is a patient who is followed for NSTEMI in the setting of COVID multi-organ failure. Remains intubated and sedated. Thought to have anoxic encephalopathy given lack of responsiveness despite d/c of sedation. Nursing weaning pressors as BP is stable, however, this morning remains on Levo at 4mcgs. Per nursing family would like to wait until Monday to discuss plan of care. He is, however, DNR. Hgb stabilized following PRBCs for Hgb of 4 with active bleeding at central line.     Telemetry reviewed, there were no events noted in the past 24 hours. Remains in NSR with rare PVCs.     Pertinent review of systems items noted above, all other systems are negative. Current medications reviewed.     Physical Examination  Vital signs are stable. Blood pressure 138/75, Pulse 74  Intubated/sedated  Heart is regular, rate and rhythm. Further exam deferred to conserve PPE due to COVID status.     Labs reviewed:      Case discussed with Dr. Alexander Castellano and our impression and recommendations are as follows:     1. NSTEMI:   - HS troponin 1354 > 1510. Likely related to demand ischemia in the setting of COVID multi-organ failure  - ASA has been on hold since 1/27. - Echo: EF 65% with no wall motion abnormalities. - Continues with CRRT per renal     2. Hypotension:   -  BP has been stable with nursing's attempt to wean pressors.     3. COVID pna:   - With multi-organ failure and possible anoxic encephalopathy   -  Management per primary.   Rachell Dunaway monitor telemetry and to evaluate for possible QTC prolongation. Will continue to monitor.  - Recommend to keep a negative fluid balance to prevent volume overload.     Poor prognosis. Family to reconvene with medical team on Monday to discuss goals of care. DNR.      Please do not hesitate to call me or Dr. Alexander Castellano if additional questions arise.

## 2022-02-06 NOTE — PROGRESS NOTES
Renal Progress Note    Patient: Suha Bell MRN: [id number]  SSN: [id number]    YOB: 1945  Age: 78 y.o.   Sex: male      Admit Date: 1/27/2022    LOS: 9 days     Subjective:   Patient seen in intensive care unit, on ventilator, unresponsive  remains anuric,   On HD, tolerated UF well  Hemodynamically stable, Hb stable post transfusion    Current Facility-Administered Medications   Medication Dose Route Frequency    0.9% sodium chloride infusion 250 mL  250 mL IntraVENous PRN    levETIRAcetam (KEPPRA) oral solution 1,500 mg  1,500 mg Oral BID    heparin (porcine) 1,000 unit/mL injection 2,200 Units  2,200 Units IntraVENous DIALYSIS PRN    insulin glargine (LANTUS) injection 20 Units  20 Units SubCUTAneous QHS    dexamethasone (DECADRON) 4 mg/mL injection 4 mg  4 mg IntraVENous Q12H    NOREPINephrine (LEVOPHED) 8 mg in 0.9% NS 250ml infusion  0.5-120 mcg/min IntraVENous TITRATE    lactulose (CHRONULAC) 10 gram/15 mL solution 15 mL  10 g Per G Tube TID    glucose chewable tablet 16 g  4 Tablet Oral PRN    glucagon (GLUCAGEN) injection 1 mg  1 mg IntraMUSCular PRN    insulin lispro (HUMALOG) injection   SubCUTAneous Q4H    dextrose 10% infusion 125-250 mL  125-250 mL IntraVENous PRN    LORazepam (ATIVAN) injection 2 mg  2 mg IntraVENous Q2H PRN    glucose chewable tablet 16 g  4 Tablet Oral PRN    glucagon (GLUCAGEN) injection 1 mg  1 mg IntraMUSCular PRN    valproic acid (as sodium salt) (DEPAKENE) 250 mg/5 mL (5 mL) oral solution 500 mg  500 mg Oral BID    dextrose 10% infusion 125-250 mL  125-250 mL IntraVENous PRN    PHENYLephrine (ISRAEL-SYNEPHRINE) 30 mg in 0.9% sodium chloride 250 mL infusion   mcg/min IntraVENous TITRATE    propofol (DIPRIVAN) 10 mg/mL infusion  0-50 mcg/kg/min IntraVENous TITRATE    lacosamide (VIMPAT) tablet 200 mg  200 mg Oral BID    levothyroxine (SYNTHROID) tablet 75 mcg  75 mcg Oral 7am    [Held by provider] aspirin chewable tablet 81 mg  81 mg Oral DAILY    sodium chloride (NS) flush 5-40 mL  5-40 mL IntraVENous Q8H    sodium chloride (NS) flush 5-40 mL  5-40 mL IntraVENous PRN    acetaminophen (TYLENOL) tablet 650 mg  650 mg Oral Q6H PRN    Or    acetaminophen (TYLENOL) suppository 650 mg  650 mg Rectal Q6H PRN    polyethylene glycol (MIRALAX) packet 17 g  17 g Oral DAILY PRN    ondansetron (ZOFRAN ODT) tablet 4 mg  4 mg Oral Q8H PRN    Or    ondansetron (ZOFRAN) injection 4 mg  4 mg IntraVENous Q6H PRN    atorvastatin (LIPITOR) tablet 80 mg  80 mg Oral QHS    piperacillin-tazobactam (ZOSYN) 3.375 g in 0.9% sodium chloride (MBP/ADV) 100 mL MBP  3.375 g IntraVENous Q8H    azithromycin (ZITHROMAX) 500 mg in 0.9% sodium chloride 250 mL (VIAL-MATE)  500 mg IntraVENous Q24H    baricitinib (OLUMIANT) tablet 1 mg  1 mg Oral DAILY        Vitals:    02/05/22 1700 02/05/22 1800 02/05/22 1901 02/05/22 1923   BP: (!) 92/57 99/62     Pulse: 71 70  71   Resp: 18 18  19   Temp: 99.1 °F (37.3 °C) 99 °F (37.2 °C) 99.1 °F (37.3 °C)    TempSrc:       SpO2: 100% 100%  100%   Weight:       Height:         Objective:   General: On ventilator, unresponsive   HEENT:  no Icterus, Pallor+, ET tube in place, mucosa dry   neck: Neck is supple, No JVD  Lungs: Decreased breath sounds at the bases,   CVS: heart sounds normal,  no murmurs, no rubs. GI: soft, nontender, no distention  Extremeties: no cyanosis, no edema    Neuro: Patient unresponsive, on vent, off sedation   skin: normal skin turgor, no skin rashes.         Intake and Output:  Current Shift: 02/05 1901 - 02/06 0700  In: 100   Out: -   Last three shifts: 02/04 0701 - 02/05 1900  In: 3527.2 [I.V.:2092.2]  Out: 2707 [Urine:35]      Lab/Data Review:  Recent Labs     02/05/22  0435 02/04/22  1655 02/04/22  0445 02/03/22  2245 02/03/22  0530   WBC 23.7*  --  25.7*  --  19.2*   HGB 11.3* 12.6 4.0*   < > 10.3*   HCT 31.7* 36.3* 11.9*   < > 30.0*     --  328  --  PLEASE SEE PLT NACITRATE FOR PLT RESULTS DUE TO EDTA SENSISTIVITY CAUSING PLT CLUMPING IN LAV TOP TUBE.     < > = values in this interval not displayed.      Recent Labs     02/05/22  0435 02/04/22  0445 02/03/22  0530   *  131* 132*  130* 135*  133*   K 4.8  4.6 4.5  4.5 4.6  4.6   CL 98  98 101  101 105  105   CO2 24  24 19*  18* 17*  16*   *  202* 262*  235* 230*  229*   BUN 66*  66* 72*  71* 70*  71*   CREA 4.44*  4.43* 4.48*  4.40* 4.28*  4.23*   CA 7.4*  7.5* 7.8*  7.6* 7.5*  7.3*   PHOS 8.6* 5.6* 7.4*   ALB 1.5*  1.5* 1.4*  1.4* 1.3*  1.4*   TBILI 0.5 0.5 0.6   * 288* 442*   INR 1.2* 1.4* 1.3*     Recent Labs     02/05/22  0300 02/04/22  0500 02/03/22  0431   PH 7.43 7.38 7.31*   PCO2 35 32* 32*   PO2 81 108* 92   HCO3 23 20* 17*   FIO2 50.0 50.0 50.0     Recent Results (from the past 24 hour(s))   BLOOD GAS, ARTERIAL    Collection Time: 02/05/22  3:00 AM   Result Value Ref Range    pH 7.43 7.35 - 7.45      PCO2 35 35 - 45 mmHg    PO2 81 75 - 100 mmHg    O2 SAT 98 >95 %    BICARBONATE 23 22 - 26 mmol/L    BASE DEFICIT 1.2 0 - 2 mmol/L    O2 METHOD VENT      FIO2 50.0 %    MODE Assist Control/Volume Control      Tidal volume 500      SET RATE 18      IPAP/PIP 0      EPAP/CPAP/PEEP 5.0      SITE Right Brachial      JULIO'S TEST PASS     PROTHROMBIN TIME + INR    Collection Time: 02/05/22  4:35 AM   Result Value Ref Range    Prothrombin time 14.7 11.9 - 14.7 sec    INR 1.2 (H) 0.9 - 1.1     LD    Collection Time: 02/05/22  4:35 AM   Result Value Ref Range    LD 1,132 (H) 85 - 241 U/L   D DIMER    Collection Time: 02/05/22  4:35 AM   Result Value Ref Range    D DIMER 17.77 (H) <0.50 ug/ml(FEU)   PROCALCITONIN    Collection Time: 02/05/22  4:35 AM   Result Value Ref Range    Procalcitonin 6.21 (H) 0 ng/mL   SED RATE (ESR)    Collection Time: 02/05/22  4:35 AM   Result Value Ref Range    Sed rate, automated 89 mm/hr   CBC W/O DIFF    Collection Time: 02/05/22  4:35 AM   Result Value Ref Range    WBC 23.7 (H) 4.1 - 11.1 K/uL    RBC 3.55 (L) 4.10 - 5.70 M/uL    HGB 11.3 (L) 12.1 - 17.0 g/dL    HCT 31.7 (L) 36.6 - 50.3 %    MCV 89.3 80.0 - 99.0 FL    MCH 31.8 26.0 - 34.0 PG    MCHC 35.6 30.0 - 36.5 g/dL    RDW 14.1 11.5 - 14.5 %    PLATELET 820 402 - 475 K/uL    MPV 11.7 8.9 - 12.9 FL    NRBC 0.3 (H) 0.0  WBC    ABSOLUTE NRBC 0.07 (H) 0.00 - 6.95 K/uL   METABOLIC PANEL, COMPREHENSIVE    Collection Time: 02/05/22  4:35 AM   Result Value Ref Range    Sodium 131 (L) 136 - 145 mmol/L    Potassium 4.8 3.5 - 5.1 mmol/L    Chloride 98 97 - 108 mmol/L    CO2 24 21 - 32 mmol/L    Anion gap 9 5 - 15 mmol/L    Glucose 201 (H) 65 - 100 mg/dL    BUN 66 (H) 6 - 20 mg/dL    Creatinine 4.44 (H) 0.70 - 1.30 mg/dL    BUN/Creatinine ratio 15 12 - 20      GFR est AA 16 (L) >60 ml/min/1.73m2    GFR est non-AA 13 (L) >60 ml/min/1.73m2    Calcium 7.4 (L) 8.5 - 10.1 mg/dL    Bilirubin, total 0.5 0.2 - 1.0 mg/dL    AST (SGOT) 515 (H) 15 - 37 U/L    ALT (SGPT) 270 (H) 12 - 78 U/L    Alk.  phosphatase 210 (H) 45 - 117 U/L    Protein, total 6.2 (L) 6.4 - 8.2 g/dL    Albumin 1.5 (L) 3.5 - 5.0 g/dL    Globulin 4.7 (H) 2.0 - 4.0 g/dL    A-G Ratio 0.3 (L) 1.1 - 2.2     RENAL FUNCTION PANEL    Collection Time: 02/05/22  4:35 AM   Result Value Ref Range    Sodium 131 (L) 136 - 145 mmol/L    Potassium 4.6 3.5 - 5.1 mmol/L    Chloride 98 97 - 108 mmol/L    CO2 24 21 - 32 mmol/L    Anion gap 9 5 - 15 mmol/L    Glucose 202 (H) 65 - 100 mg/dL    BUN 66 (H) 6 - 20 mg/dL    Creatinine 4.43 (H) 0.70 - 1.30 mg/dL    BUN/Creatinine ratio 15 12 - 20      GFR est AA 16 (L) >60 ml/min/1.73m2    GFR est non-AA 13 (L) >60 ml/min/1.73m2    Calcium 7.5 (L) 8.5 - 10.1 mg/dL    Phosphorus 8.6 (H) 2.6 - 4.7 mg/dL    Albumin 1.5 (L) 3.5 - 5.0 g/dL   GLUCOSE, POC    Collection Time: 02/05/22 11:00 AM   Result Value Ref Range    Glucose (POC) 192 (H) 65 - 117 mg/dL    Performed by Wei Nguyen    GLUCOSE, POC    Collection Time: 02/05/22  3:04 PM   Result Value Ref Range Glucose (POC) 181 (H) 65 - 117 mg/dL    Performed by Jen Delgado    GLUCOSE, POC    Collection Time: 02/05/22  6:25 PM   Result Value Ref Range    Glucose (POC) 233 (H) 65 - 117 mg/dL    Performed by Jen Delgado         Assessment and Plan:     #1 acute kidney injury  --ANDRES likely 2/2 ATN from septic shock /rhabdomyolysis  Patient remains anuric,   Rhabdomyolysis+, probably ischemic, high CPK 1240--37,243,   Repeat CPK tomorrow    Started ob CRRT 1/31, had for 48 hrs, Switched to intermittent hemodialysis,   Status post hemodialysis today, tolerated 2 L ultrafiltration   will plan for next hemodialysis on Monday  will continue to monitor electrolytes closely and adjust management as needed     #2 severe hypokalemia: Improved potassium level    monitor potassium levels     #3 hypernatremia: Improved sodium level to 131  -Continue water flushes via NG tube,off IV fluids  Continue to monitor sodium levels    #4  COVID-19 pneumonia:   -Septic shock  -With multiorgan failure including renal failure/transaminitis/hemodynamics shock  -Patient currently on Decadron azithromycin and Zosyn  Neurology following the patient for anoxic encephalopathy, remains unresponsive  Shock liver with high liver enzymes( improving),   Rhabdomyolysis+/ANDRES, on HD, will monitor   -Overall prognosis seems guarded     #5 diabetes: hyperglycemia+  Monitor accuchecks and adjust management as needed     #6 seizure disorder  -On antiseizure medications, neurology following the patient  -Currently off sedation and unresponsive    7.   Metabolic acidosis: Secondary to acute kidney injury/hypotension  Improved with hemodialysis  Stable CO2 level of 24, continue to monitor CO2          Signed By: Jovani Wade MD     February 5, 2022

## 2022-02-06 NOTE — PROGRESS NOTES
Renal Progress Note    Patient: Pk Gayle MRN: [id number]  SSN: [id number]    YOB: 1945  Age: 78 y.o.   Sex: male      Admit Date: 1/27/2022    LOS: 10 days     Subjective:   Patient seen in intensive care unit, on ventilator, unresponsive  remains anuric,   Hemodynamically stable on low dose levo    Current Facility-Administered Medications   Medication Dose Route Frequency    0.9% sodium chloride infusion 250 mL  250 mL IntraVENous PRN    levETIRAcetam (KEPPRA) oral solution 1,500 mg  1,500 mg Oral BID    heparin (porcine) 1,000 unit/mL injection 2,200 Units  2,200 Units IntraVENous DIALYSIS PRN    insulin glargine (LANTUS) injection 20 Units  20 Units SubCUTAneous QHS    dexamethasone (DECADRON) 4 mg/mL injection 4 mg  4 mg IntraVENous Q12H    NOREPINephrine (LEVOPHED) 8 mg in 0.9% NS 250ml infusion  0.5-120 mcg/min IntraVENous TITRATE    lactulose (CHRONULAC) 10 gram/15 mL solution 15 mL  10 g Per G Tube TID    glucose chewable tablet 16 g  4 Tablet Oral PRN    glucagon (GLUCAGEN) injection 1 mg  1 mg IntraMUSCular PRN    insulin lispro (HUMALOG) injection   SubCUTAneous Q4H    dextrose 10% infusion 125-250 mL  125-250 mL IntraVENous PRN    LORazepam (ATIVAN) injection 2 mg  2 mg IntraVENous Q2H PRN    glucose chewable tablet 16 g  4 Tablet Oral PRN    glucagon (GLUCAGEN) injection 1 mg  1 mg IntraMUSCular PRN    valproic acid (as sodium salt) (DEPAKENE) 250 mg/5 mL (5 mL) oral solution 500 mg  500 mg Oral BID    dextrose 10% infusion 125-250 mL  125-250 mL IntraVENous PRN    PHENYLephrine (ISRAEL-SYNEPHRINE) 30 mg in 0.9% sodium chloride 250 mL infusion   mcg/min IntraVENous TITRATE    propofol (DIPRIVAN) 10 mg/mL infusion  0-50 mcg/kg/min IntraVENous TITRATE    lacosamide (VIMPAT) tablet 200 mg  200 mg Oral BID    levothyroxine (SYNTHROID) tablet 75 mcg  75 mcg Oral 7am    [Held by provider] aspirin chewable tablet 81 mg  81 mg Oral DAILY    sodium chloride (NS) flush 5-40 mL  5-40 mL IntraVENous Q8H    sodium chloride (NS) flush 5-40 mL  5-40 mL IntraVENous PRN    acetaminophen (TYLENOL) tablet 650 mg  650 mg Oral Q6H PRN    Or    acetaminophen (TYLENOL) suppository 650 mg  650 mg Rectal Q6H PRN    polyethylene glycol (MIRALAX) packet 17 g  17 g Oral DAILY PRN    ondansetron (ZOFRAN ODT) tablet 4 mg  4 mg Oral Q8H PRN    Or    ondansetron (ZOFRAN) injection 4 mg  4 mg IntraVENous Q6H PRN    atorvastatin (LIPITOR) tablet 80 mg  80 mg Oral QHS    piperacillin-tazobactam (ZOSYN) 3.375 g in 0.9% sodium chloride (MBP/ADV) 100 mL MBP  3.375 g IntraVENous Q8H    azithromycin (ZITHROMAX) 500 mg in 0.9% sodium chloride 250 mL (VIAL-MATE)  500 mg IntraVENous Q24H    baricitinib (OLUMIANT) tablet 1 mg  1 mg Oral DAILY        Vitals:    02/06/22 1300 02/06/22 1400 02/06/22 1500 02/06/22 1522   BP: 103/60 (!) 103/59 (!) 104/58    Pulse: 76 74 75 76   Resp: 18 18 19 18   Temp:   98.8 °F (37.1 °C)    TempSrc:       SpO2: 100%  100% 100%   Weight:       Height:         Objective:   General: On ventilator, unresponsive   HEENT:  no Icterus, Pallor+, ET tube in place, mucosa dry   neck: Neck is supple, No JVD  Lungs: Decreased breath sounds at the bases,   CVS: heart sounds normal,  no murmurs, no rubs. GI: soft, nontender, no distention  Extremeties: no cyanosis, no edema    Neuro: Patient unresponsive, on vent, off sedation   skin: normal skin turgor, no skin rashes. Intake and Output:  Current Shift: 02/06 0701 - 02/06 1900  In: 211.3 [I.V.:111.3]  Out: -   Last three shifts: 02/04 1901 - 02/06 0700  In: 3303.5 [I.V.:2253.5]  Out: 2240 [Urine:40]      Lab/Data Review:  Recent Labs     02/06/22  0400 02/05/22  0435 02/04/22  1655 02/04/22  0445 02/04/22  0445   WBC 22.5* 23.7*  --   --  25.7*   HGB 9.9* 11.3* 12.6   < > 4.0*   HCT 29.0* 31.7* 36.3*   < > 11.9*   * 378  --   --  328    < > = values in this interval not displayed.      Recent Labs     02/06/22  0400 02/05/22  0435 02/04/22  0445   * 131*  131* 132*  130*   K 4.4 4.8  4.6 4.5  4.5    98  98 101  101   CO2 21 24  24 19*  18*   * 201*  202* 262*  235*   BUN 68* 66*  66* 72*  71*   CREA 4.63* 4.44*  4.43* 4.48*  4.40*   CA 6.9* 7.4*  7.5* 7.8*  7.6*   PHOS  --  8.6* 5.6*   ALB 1.2* 1.5*  1.5* 1.4*  1.4*   TBILI 0.4 0.5 0.5   * 270* 288*   INR 1.2* 1.2* 1.4*     Recent Labs     02/05/22  0300 02/04/22  0500   PH 7.43 7.38   PCO2 35 32*   PO2 81 108*   HCO3 23 20*   FIO2 50.0 50.0     Recent Results (from the past 24 hour(s))   GLUCOSE, POC    Collection Time: 02/05/22  6:25 PM   Result Value Ref Range    Glucose (POC) 233 (H) 65 - 117 mg/dL    Performed by Jen Delgado    GLUCOSE, POC    Collection Time: 02/05/22  9:36 PM   Result Value Ref Range    Glucose (POC) 152 (H) 65 - 117 mg/dL    Performed by Beto Acosta RN ([facility])    GLUCOSE, POC    Collection Time: 02/06/22 12:42 AM   Result Value Ref Range    Glucose (POC) 183 (H) 65 - 117 mg/dL    Performed by Alec Mcneal    PROTHROMBIN TIME + INR    Collection Time: 02/06/22  4:00 AM   Result Value Ref Range    Prothrombin time 14.7 11.9 - 14.7 sec    INR 1.2 (H) 0.9 - 1.1     LD    Collection Time: 02/06/22  4:00 AM   Result Value Ref Range    LD 1,045 (H) 85 - 241 U/L   D DIMER    Collection Time: 02/06/22  4:00 AM   Result Value Ref Range    D DIMER 14.88 (H) <0.50 ug/ml(FEU)   PROCALCITONIN    Collection Time: 02/06/22  4:00 AM   Result Value Ref Range    Procalcitonin 6.19 (H) 0 ng/mL   SED RATE (ESR)    Collection Time: 02/06/22  4:00 AM   Result Value Ref Range    Sed rate, automated 107 mm/hr   CBC W/O DIFF    Collection Time: 02/06/22  4:00 AM   Result Value Ref Range    WBC 22.5 (H) 4.1 - 11.1 K/uL    RBC 3.16 (L) 4.10 - 5.70 M/uL    HGB 9.9 (L) 12.1 - 17.0 g/dL    HCT 29.0 (L) 36.6 - 50.3 %    MCV 91.8 80.0 - 99.0 FL    MCH 31.3 26.0 - 34.0 PG    MCHC 34.1 30.0 - 36.5 g/dL    RDW 14.3 11.5 - 14.5 %    PLATELET 681 (H) 096 - 400 K/uL    MPV 11.3 8.9 - 12.9 FL    NRBC 0.0 0.0  WBC    ABSOLUTE NRBC 0.00 0.00 - 9.31 K/uL   METABOLIC PANEL, COMPREHENSIVE    Collection Time: 02/06/22  4:00 AM   Result Value Ref Range    Sodium 132 (L) 136 - 145 mmol/L    Potassium 4.4 3.5 - 5.1 mmol/L    Chloride 101 97 - 108 mmol/L    CO2 21 21 - 32 mmol/L    Anion gap 10 5 - 15 mmol/L    Glucose 208 (H) 65 - 100 mg/dL    BUN 68 (H) 6 - 20 mg/dL    Creatinine 4.63 (H) 0.70 - 1.30 mg/dL    BUN/Creatinine ratio 15 12 - 20      GFR est AA 15 (L) >60 ml/min/1.73m2    GFR est non-AA 12 (L) >60 ml/min/1.73m2    Calcium 6.9 (L) 8.5 - 10.1 mg/dL    Bilirubin, total 0.4 0.2 - 1.0 mg/dL    AST (SGOT) 428 (H) 15 - 37 U/L    ALT (SGPT) 209 (H) 12 - 78 U/L    Alk.  phosphatase 138 (H) 45 - 117 U/L    Protein, total 5.8 (L) 6.4 - 8.2 g/dL    Albumin 1.2 (L) 3.5 - 5.0 g/dL    Globulin 4.6 (H) 2.0 - 4.0 g/dL    A-G Ratio 0.3 (L) 1.1 - 2.2     GLUCOSE, POC    Collection Time: 02/06/22  7:45 AM   Result Value Ref Range    Glucose (POC) 194 (H) 65 - 117 mg/dL    Performed by Mare Hood    GLUCOSE, POC    Collection Time: 02/06/22 11:51 AM   Result Value Ref Range    Glucose (POC) 204 (H) 65 - 117 mg/dL    Performed by Mare Hood         Assessment and Plan:     #1 acute kidney injury  --ANDRES likely 2/2 ATN from septic shock /rhabdomyolysis  Patient remains anuric,   Rhabdomyolysis+, probably ischemic, high CPK 1481--80,100,   Repeat CPK tomorrow    Started ob CRRT 1/31, had for 48 hrs, Switched to intermittent hemodialysis,   Status post hemodialysis yesterday, tolerated 2 L ultrafiltration   No urgent need for HD today  will plan for next hemodialysis tomorrow  will continue to monitor electrolytes closely and adjust management as needed     #2 severe hypokalemia: Improved potassium level    monitor potassium levels     #3 hypernatremia: Improved sodium level to 132  -Continue water flushes via NG tube,off IV fluids  Continue to monitor sodium levels    #4  COVID-19 pneumonia:   -Septic shock  -With multiorgan failure including renal failure/transaminitis/hemodynamics shock  -Patient currently on Decadron azithromycin and Zosyn  Neurology following the patient for anoxic encephalopathy, remains unresponsive  Shock liver with high liver enzymes( improving),   Rhabdomyolysis+/ANDRES, on HD, will monitor   -Overall prognosis seems guarded     #5 diabetes: hyperglycemia+  Monitor accuchecks and adjust management as needed     #6 seizure disorder  -On antiseizure medications, neurology following the patient  -Currently off sedation and unresponsive    7.   Metabolic acidosis: Secondary to acute kidney injury/hypotension  Improved with hemodialysis  Stable CO2 level of 21, continue to monitor CO2          Signed By: Piotr Brooks MD     February 6, 2022

## 2022-02-07 NOTE — PROGRESS NOTES
Problem: Body Temperature -  Risk of, Imbalanced  Goal: Ability to maintain a body temperature within defined limits  Outcome: Progressing Towards Goal     Problem: Nutrition Deficits  Goal: Optimize nutrtional status  Outcome: Progressing Towards Goal     Problem: Ventilator Management  Goal: *Adequate oxygenation and ventilation  Outcome: Progressing Towards Goal     Problem: Falls - Risk of  Goal: *Absence of Falls  Description: Document Franklin Fall Risk and appropriate interventions in the flowsheet. Outcome: Progressing Towards Goal  Note: Fall Risk Interventions:       Mentation Interventions: Adequate sleep, hydration, pain control,Bed/chair exit alarm,Room close to nurse's station    Medication Interventions: Bed/chair exit alarm,Evaluate medications/consider consulting pharmacy    Elimination Interventions: Bed/chair exit alarm,Call light in reach    History of Falls Interventions: Bed/chair exit alarm,Room close to nurse's station         Problem: Airway Clearance - Ineffective  Goal: Achieve or maintain patent airway  Outcome: Not Progressing Towards Goal     Problem: Pressure Injury - Risk of  Goal: *Prevention of pressure injury  Description: Document Oracio Scale and appropriate interventions in the flowsheet.   Outcome: Not Progressing Towards Goal  Note: Pressure Injury Interventions:  Sensory Interventions: Assess changes in LOC,Float heels,Keep linens dry and wrinkle-free,Minimize linen layers,Monitor skin under medical devices    Moisture Interventions: Absorbent underpads,Apply protective barrier, creams and emollients,Internal/External fecal devices,Internal/External urinary devices,Minimize layers    Activity Interventions: Assess need for specialty bed,Pressure redistribution bed/mattress(bed type)    Mobility Interventions: Assess need for specialty bed,Float heels,HOB 30 degrees or less,Pressure redistribution bed/mattress (bed type)    Nutrition Interventions: Document food/fluid/supplement intake,Discuss nutritional consult with provider    Friction and Shear Interventions: Apply protective barrier, creams and emollients,Minimize layers,Foam dressings/transparent film/skin sealants

## 2022-02-07 NOTE — PROGRESS NOTES
CARDIOLOGY PROGRESS NOTE      Patient seen and examined. This is a patient who is followed for NSTEMI in the setting of COVID multi-organ failure. Remains intubated and sedated. Continuing to wean pressor support. Per nursing family would like to wait until Monday to discuss plan of care.      Telemetry reviewed, there were no events noted in the past 24 hours. Remains in NSR with rare PVCs.     Pertinent review of systems items noted above, all other systems are negative. Current medications reviewed.     Physical Examination  Vital signs are stable. Blood pressure 102/59, Pulse 74  Intubated/sedated  Heart is regular, rate and rhythm.        Labs reviewed:      Case discussed with Dr. hDruv Wilson and our impression and recommendations are as follows:     1. NSTEMI:   - HS troponin 1354 > 1510. Likely related to demand ischemia in the setting of COVID multi-organ failure  - ASA has been on hold since 1/27. - Echo: EF 65% with no wall motion abnormalities. - Continues with CRRT per renal     2. Hypotension:   -  BP has been stable with nursing's attempt to wean pressors.     3. COVID pna:   - With multi-organ failure and possible anoxic encephalopathy   -  Management per primary.   All [facility] monitor telemetry and to evaluate for possible QTC prolongation. Will continue to monitor.  - Recommend to keep a negative fluid balance to prevent volume overload.     Poor prognosis. Family to reconvene with medical team on Monday to discuss goals of care. DNR.      Please do not hesitate to call me or Dr. Dhruv Wilson if additional questions arise.

## 2022-02-07 NOTE — PROGRESS NOTES
IMPRESSION:   1. Acute hypoxic respiratory failure oxygenation well-maintained on the ventilator  2. Malignant hyperthermia resolved   3. COVID-19 pneumonia  4. NSTEMI elevated troponin  5. Shock cardiogenic versus septic vs Hypovolumic Hypotension currently on norepinephrine  will continue to wean  6. Acute bleeding from dialysis catheter which has been corrected  7. Acute kidney injury now on hemodialysis  8. Metabolic acidosis  9. Thrombocytopenia worsened  10. Hypernatremia  11. Symptomatic hypokalemia  12. Shock liver with transaminitis      RECOMMENDATIONS/PLAN:   1. ICU monitoring  1. Ventilator for mechanical life support and prevent respiratory arrest with protective lung strategies ABG acceptable he is still hemodynamically unstable will continue with the current vent support: on Propofol. 2. On Assist control rate 18  PEEP 5 FiO2 50% ABG acceptable  3. Blood pressure improved off vasopressors  4. Patient on Decadron Zithromax and baricitinib WBC elevated, decrease Decadron  5. Troponin is elevated 2D echo shows ejection fraction of 65%  6. He was bleeding from the dialysis catheter which is corrected received 4 units of packed RBC hemoglobin dropped from 8.8 to 4.0 repeat lab shows Hb 9.7  7. Now getting dialysis   8. LDH procalcitonin trending down  9. Remains on quarter saline  10. Liver enzyme continues to improve as patient was in shock liver  11. Agree with [facility] IV antibiotics blood and urine cultures no growth on Zosyn and Zithromax   12. Temperature back to normal  13.  IV vasopressors for circulatory shock refractory to fluids to maintain SBP> 90      [x] High complexity decision making was performed  [x] See my orders for details  HPI   74-year-old male came in because as he was found unresponsive and fever 107 past medical history of schizophrenia and diabetes gastroesophageal reflux disease and anemia he is COVID-19 positive initially patient was called as NSTEMI but it was canceled patient is intubated on ventilator hemodynamically unstable unable to get any history out of the patient he seems dehydrated    PMH:  has a past medical history of Acute renal failure ([address] Anemia, Arthritis, DKA (diabetic ketoacidoses), GERD (gastroesophageal reflux disease), HTN (hypertension), Hypercholesterolemia, Schizophrenia ([address] Schizophreniform disorder ([address] Seizure disorder ([facility] [address] and Type II or unspecified type diabetes mellitus without mention of complication, uncontrolled. PSH:   has a past surgical history that includes ir insert non tunl cvc over 5 yrs (1/28/2022) and ir insert non tunl cvc over 5 yrs (1/31/2022). FHX: family history includes Stroke in his father. SHX:  reports that he has never smoked. He has never used smokeless tobacco. He reports that he does not drink alcohol and does not use drugs.     ALL: No Known Allergies     MEDS:   [x] Reviewed - As Below   [] Not reviewed    Current Facility-Administered Medications   Medication    calcium gluconate 1 gram in sodium chloride (ISO-OSM) 50 mL infusion    0.9% sodium chloride infusion 250 mL    levETIRAcetam (KEPPRA) oral solution 1,500 mg    heparin (porcine) 1,000 unit/mL injection 2,200 Units    insulin glargine (LANTUS) injection 20 Units    dexamethasone (DECADRON) 4 mg/mL injection 4 mg    NOREPINephrine (LEVOPHED) 8 mg in 0.9% NS 250ml infusion    lactulose (CHRONULAC) 10 gram/15 mL solution 15 mL    glucose chewable tablet 16 g    glucagon (GLUCAGEN) injection 1 mg    insulin lispro (HUMALOG) injection    dextrose 10% infusion 125-250 mL    LORazepam (ATIVAN) injection 2 mg    glucose chewable tablet 16 g    glucagon (GLUCAGEN) injection 1 mg    valproic acid (as sodium salt) (DEPAKENE) 250 mg/5 mL (5 mL) oral solution 500 mg    dextrose 10% infusion 125-250 mL    PHENYLephrine (ISRAEL-SYNEPHRINE) 30 mg in 0.9% sodium chloride 250 mL infusion    propofol (DIPRIVAN) 10 mg/mL infusion    lacosamide (VIMPAT) tablet 200 mg    levothyroxine (SYNTHROID) tablet 75 mcg    [Held by provider] aspirin chewable tablet 81 mg    sodium chloride (NS) flush 5-40 mL    sodium chloride (NS) flush 5-40 mL    acetaminophen (TYLENOL) tablet 650 mg    Or    acetaminophen (TYLENOL) suppository 650 mg    polyethylene glycol (MIRALAX) packet 17 g    ondansetron (ZOFRAN ODT) tablet 4 mg    Or    ondansetron (ZOFRAN) injection 4 mg    atorvastatin (LIPITOR) tablet 80 mg    piperacillin-tazobactam (ZOSYN) 3.375 g in 0.9% sodium chloride (MBP/ADV) 100 mL MBP    azithromycin (ZITHROMAX) 500 mg in 0.9% sodium chloride 250 mL (VIAL-MATE)    baricitinib (OLUMIANT) tablet 1 mg      MAR reviewed and pertinent medications noted or modified as needed   Current Facility-Administered Medications   Medication    calcium gluconate 1 gram in sodium chloride (ISO-OSM) 50 mL infusion    0.9% sodium chloride infusion 250 mL    levETIRAcetam (KEPPRA) oral solution 1,500 mg    heparin (porcine) 1,000 unit/mL injection 2,200 Units    insulin glargine (LANTUS) injection 20 Units    dexamethasone (DECADRON) 4 mg/mL injection 4 mg    NOREPINephrine (LEVOPHED) 8 mg in 0.9% NS 250ml infusion    lactulose (CHRONULAC) 10 gram/15 mL solution 15 mL    glucose chewable tablet 16 g    glucagon (GLUCAGEN) injection 1 mg    insulin lispro (HUMALOG) injection    dextrose 10% infusion 125-250 mL    LORazepam (ATIVAN) injection 2 mg    glucose chewable tablet 16 g    glucagon (GLUCAGEN) injection 1 mg    valproic acid (as sodium salt) (DEPAKENE) 250 mg/5 mL (5 mL) oral solution 500 mg    dextrose 10% infusion 125-250 mL    PHENYLephrine (ISRAEL-SYNEPHRINE) 30 mg in 0.9% sodium chloride 250 mL infusion    propofol (DIPRIVAN) 10 mg/mL infusion    lacosamide (VIMPAT) tablet 200 mg    levothyroxine (SYNTHROID) tablet 75 mcg    [Held by provider] aspirin chewable tablet 81 mg    sodium chloride (NS) flush 5-40 mL    sodium chloride (NS) flush 5-40 mL    acetaminophen (TYLENOL) tablet 650 mg    Or    acetaminophen (TYLENOL) suppository 650 mg    polyethylene glycol (MIRALAX) packet 17 g    ondansetron (ZOFRAN ODT) tablet 4 mg    Or    ondansetron (ZOFRAN) injection 4 mg    atorvastatin (LIPITOR) tablet 80 mg    piperacillin-tazobactam (ZOSYN) 3.375 g in 0.9% sodium chloride (MBP/ADV) 100 mL MBP    azithromycin (ZITHROMAX) 500 mg in 0.9% sodium chloride 250 mL (VIAL-MATE)    baricitinib (OLUMIANT) tablet 1 mg      PMH:  has a past medical history of Acute renal failure ([facility] [address] Anemia, Arthritis, DKA (diabetic ketoacidoses), GERD (gastroesophageal reflux disease), HTN (hypertension), Hypercholesterolemia, Schizophrenia ([facility] [address] Schizophreniform disorder ([facility] [address] Seizure disorder ([facility] [address] and Type II or unspecified type diabetes mellitus without mention of complication, uncontrolled. PSH:   has a past surgical history that includes ir insert non tunl cvc over 5 yrs (2022) and ir insert non tunl cvc over 5 yrs (2022). FHX: family history includes Stroke in his father. SHX:  reports that he has never smoked. He has never used smokeless tobacco. He reports that he does not drink alcohol and does not use drugs. ROS:  Unable to obtain intubated on ventilator and sedated    Hemodynamics:    CO:    CI:    CVP:    SVR:   PAP Systolic:    PAP Diastolic:    PVR:    YE02:        Ventilator Settings:      Mode Rate TV Press PEEP FiO2 PIP Min.  Vent   Assist control,Volume control    500 ml    5 cm H20 50 %  29 cm H2O  9.6 l/min        Vital Signs: Telemetry:    normal sinus rhythm Intake/Output:   Visit Vitals  BP (!) 102/59 (BP 1 Location: Left upper arm, BP Patient Position: At rest)   Pulse 74   Temp 97.7 °F (36.5 °C)   Resp 18   Ht 6' (1.829 m)   Wt 107.5 kg (236 lb 15.9 oz)   SpO2 100%   BMI 32.14 kg/m²       Temp (24hrs), Av.8 °F (37.1 °C), Min:97.7 °F (36.5 °C), Max:99.5 °F (37.5 °C)        O2 Device: Ventilator         Wt Readings from Last 4 Encounters:   02/05/22 107.5 kg (236 lb 15.9 oz)   01/20/22 101.1 kg (222 lb 14.2 oz)   10/06/21 111.1 kg (245 lb)   07/22/21 110.7 kg (244 lb)          Intake/Output Summary (Last 24 hours) at 2/7/2022 0929  Last data filed at 2/7/2022 0400  Gross per 24 hour   Intake 1369.17 ml   Output    Net 1369.17 ml       Last shift:      No intake/output data recorded. Last 3 shifts: 02/05 1901 - 02/07 0700  In: 2755.5 [I.V.:880.5]  Out: 5 [Urine:5]       Physical Exam:     General:  intubated on vent unresponsive on no sedation  HEENT: NCAT,   Eyes: anicteric; conjunctiva clear no doll's eye reflex  Neck: no nodes, no cuff leak, trach midline; no accessory MM use. Chest: no deformity,   Cardiac: R regular; no murmur;   Lungs: distant breath sounds; no wheezes a few rales in the base  Abd: soft, NT, hypoactive BS  Ext: Trace edema; no joint swelling;  No clubbing  : No urine  Neuro: Unresponsive on no sedation no doll's eye reflex flaccid extremity  Psych-  unable to assess  Skin: warm, dry, no cyanosis;   Pulses: Brachial and radial pulses intact  Capillary: Slow capillary refill      DATA:    MAR reviewed and pertinent medications noted or modified as needed  MEDS:   Current Facility-Administered Medications   Medication    calcium gluconate 1 gram in sodium chloride (ISO-OSM) 50 mL infusion    0.9% sodium chloride infusion 250 mL    levETIRAcetam (KEPPRA) oral solution 1,500 mg    heparin (porcine) 1,000 unit/mL injection 2,200 Units    insulin glargine (LANTUS) injection 20 Units    dexamethasone (DECADRON) 4 mg/mL injection 4 mg    NOREPINephrine (LEVOPHED) 8 mg in 0.9% NS 250ml infusion    lactulose (CHRONULAC) 10 gram/15 mL solution 15 mL    glucose chewable tablet 16 g    glucagon (GLUCAGEN) injection 1 mg    insulin lispro (HUMALOG) injection    dextrose 10% infusion 125-250 mL    LORazepam (ATIVAN) injection 2 mg    glucose chewable tablet 16 g    glucagon (GLUCAGEN) injection 1 mg    valproic acid (as sodium salt) (DEPAKENE) 250 mg/5 mL (5 mL) oral solution 500 mg    dextrose 10% infusion 125-250 mL    PHENYLephrine (ISRAEL-SYNEPHRINE) 30 mg in 0.9% sodium chloride 250 mL infusion    propofol (DIPRIVAN) 10 mg/mL infusion    lacosamide (VIMPAT) tablet 200 mg    levothyroxine (SYNTHROID) tablet 75 mcg    [Held by provider] aspirin chewable tablet 81 mg    sodium chloride (NS) flush 5-40 mL    sodium chloride (NS) flush 5-40 mL    acetaminophen (TYLENOL) tablet 650 mg    Or    acetaminophen (TYLENOL) suppository 650 mg    polyethylene glycol (MIRALAX) packet 17 g    ondansetron (ZOFRAN ODT) tablet 4 mg    Or    ondansetron (ZOFRAN) injection 4 mg    atorvastatin (LIPITOR) tablet 80 mg    piperacillin-tazobactam (ZOSYN) 3.375 g in 0.9% sodium chloride (MBP/ADV) 100 mL MBP    azithromycin (ZITHROMAX) 500 mg in 0.9% sodium chloride 250 mL (VIAL-MATE)    baricitinib (OLUMIANT) tablet 1 mg        Labs:    Recent Labs     02/07/22  0400 02/06/22  0400 02/05/22  0435 02/04/22  1720 02/04/22  1655   WBC 25.1* 22.5* 23.7*  --   --    HGB 9.7* 9.9* 11.3*  --    < >   * 408* 378  --   --    INR 1.2* 1.2* 1.2*  --   --    APTT  --   --   --  43.7*  --     < > = values in this interval not displayed.      Recent Labs     02/07/22  0400 02/06/22  0400 02/05/22  0435   * 132* 131*  131*   K 4.6 4.4 4.8  4.6   CL 99 101 98  98   CO2 18* 21 24  24   * 208* 201*  202*   BUN 87* 68* 66*  66*   CREA 5.62* 4.63* 4.44*  4.43*   CA 6.4* 6.9* 7.4*  7.5*   PHOS 9.7*  --  8.6*   ALB 1.1* 1.2* 1.5*  1.5*   * 209* 270*     Recent Labs     02/07/22  0400 02/05/22  0300   PH 7.32* 7.43   PCO2 36 35   PO2 83 81   HCO3 19* 23   FIO2 50.0 50.0       Lab Results   Component Value Date/Time    Culture result: Scant Normal respiratory mark 02/03/2022 07:20 PM    Culture result: No Growth (<1000 cfu/mL) 01/27/2022 02:00 PM    Culture result: No growth 6 days 01/27/2022 01:30 PM     Lab Results   Component Value Date/Time    TSH 4.47 (H) 05/21/2021 10:46 AM        Imaging:    Results from Hospital Encounter encounter on 01/27/22    XR CHEST PORT    Narrative  Exam: XR CHEST PORT    Indication:  cough/pna;    Comparison: Chest radiograph from February 5, 2022    Findings:    Stable support lines and tubes. Heart is normal in size given the projection. Lung volumes are reduced. Similar  left basilar consolidation. Patchy nodular airspace opacities within the right  mid lung appears minimally improved. Small left pleural effusion. There is no  pneumothorax. Impression  Questionable slight improvement in the patchy right lung airspace  disease. Unchanged left basilar infiltrate/atelectasis. Results from [place] encounter on 01/27/22    CT HEAD WO CONT    Narrative  Exam: CT Head without contrast    TECHNIQUE: Multiple transaxial CT images of the head were obtained without  contrast. Coronal and sagittal reformatted images were provided. Dose reduction: All CT scans at this facility are performed using dose reduction  optimization techniques as appropriate to a performed exam including the  following: Automated exposure control, adjustments of the mA and/or kV according  to patient size, or use of iterative reconstruction technique. HISTORY: anoxia    COMPARISON: Head CT 10/6/2021, 1/27/2022    FINDINGS:    Global parenchymal volume loss appears similar to prior with proportional ex  vacuo dilatation of the ventricles and other extra-axial CSF spaces, slightly  more prominent on the left. No significant midline shift or mass effect. Gray-white matter differentiation appears preserved. No findings of acute  intracranial hemorrhage. Basilar cisterns appear preserved. Intracranial  atherosclerosis. There is bilateral opacification of the mastoid air cells, most likely  indicating nonspecific mastoid effusions.  Debris within the external auditory  canals is most likely cerumen. There is mild mucosal thickening in the paranasal  sinuses, most pronounced in the maxillary sinuses. Globes and orbits appear  unremarkable. Calvarium appears intact without findings of acute or aggressive  abnormality. Impression  Overall similar exam to prior without findings of acute intracranial  abnormality. · 1/30 remains on the ventilator has metabolic acidosis we will add bicarb per tube. Maintain ventilator on current settings although will decrease PEEP slightly. Platelets continue to drop.   He is unresponsive on no sedation likely anoxic encephalopathy  · 1/31 remain intubated on ventilator hemodynamically worsening of the renal function  · 2/1 remained on ventilator hemodynamically unstable on levofed  · 2/2 on ventilator hypothermic electrolyte imbalance will continue with the current vent settings  · 2/4 acute bleeding from the dialysis catheter which has been corrected received 4 units of packed RBC ABG acceptable  · 2/5 remain intubated no more bleeding from the dialysis catheter site  · 2/6 on ventilator assist control mode we will continue to wean

## 2022-02-07 NOTE — DIALYSIS
Pt tolerated 3 h dialysis w/ 500 ml fluid removal.  Visited with Dr. Eugenia Anderson during treatment. Levophed increased at onset of dialysis and pt maintained bp at 5 mcg. Report to Roseanne Khan RN ICU.

## 2022-02-07 NOTE — PROGRESS NOTES
Progress Note    Patient: Yuli Ann MRN: [id number]  SSN: [id number]    YOB: 1945  Age: 78 y.o. Sex: male      Admit Date: 1/27/2022    LOS: 11 days     Subjective:     76M, H/o Schizophenia, seizures, DMII on oral meds with unresponsive and acute hypoxic respiratory failure s/t COVID-19 pneumonitis complicated by ANDRES, hypokalemia and shock liver.          HSOPITAL COURSE  COVID positive, associated with fever 106, increased troponin, cardiology was consulted ansd recommended ECHO, there is no heparin gtt, was initially called for STEMI and was cancelled. he was intubated for acute hypoxic respiratory failure. On levophed. Complicated by ANDRES, shock liver and hypokalemia. Was treated with azithromycin, barcitinib, and zosyn. Will give 1L bolus and continue IVF. Discussed with family he is very critical- his renal functions increased and now seemingly in ATN, his liver functions have worsened and had a seizure on 1/28 , he is off propofol now and neurology was consulted. Repeat CT scan didn't show any stroke, plan for EEG on 2/1 to assess for neurological activity. The patients liver functions, renal are worsening. Plan for CRRT today    Subjectivepatient seen and evaluated at bedside, currently remains intubated and sedated, discussed with RN      Review of Systems:  Unable to determine s.t mental status    Objective:     Vitals:    02/07/22 0347 02/07/22 0400 02/07/22 0500 02/07/22 0600   BP:  (!) 95/59 (!) 99/59 (!) 99/58   Pulse: 81 81 77 75   Resp: 20 19 21 18   Temp:  98.4 °F (36.9 °C) 98.2 °F (36.8 °C) 98.1 °F (36.7 °C)   TempSrc:       SpO2: 99% 99% 97% 100%   Weight:       Height:            Intake and Output:  Current Shift: No intake/output data recorded. Last three shifts: 02/05 1901 - 02/07 0700  In: 2755.5 [I.V.:880.5]  Out: 5 [Urine:5]      Physical Exam:   Physical Exam  Vitals and nursing note reviewed.    Constitutional:       General: intubates     Appearance: Normal appearance. He is normal weight. He is ill-appearing. HENT:      Head: Normocephalic and atraumatic.      Nose: Nose normal.      Mouth/Throat:      Mouth: Mucous membranes are moist.   Eyes:      Extraocular Movements: Extraocular movements intact.      Pupils: Pupils are equal, round, and reactive to light. Cardiovascular:      Rate and Rhythm: Regular rhythm. Tachycardia present.      Pulses: Normal pulses.      Heart sounds: Normal heart sounds. Pulmonary:      Effort: Respiratory distress present.      Breath sounds: Rhonchi present. Abdominal:      General: Abdomen is flat. Bowel sounds are normal.      Palpations: Abdomen is soft. Musculoskeletal:         General: No swelling or tenderness. Normal range of motion.      Cervical back: Normal range of motion and neck supple. Skin:     General: Skin is warm and dry.      Capillary Refill: Capillary refill takes less than 2 seconds. Neurological:      Mental Status: He is unresponsive.      GCS: GCS eye subscore is 4. GCS verbal subscore is 1. GCS motor subscore is 1.    Psychiatric:         cannot be assessed      Lab/Data Review:  Recent Results (from the past 24 hour(s))   GLUCOSE, POC    Collection Time: 02/06/22  7:45 AM   Result Value Ref Range    Glucose (POC) 194 (H) 65 - 117 mg/dL    Performed by [address] [place] [address], POC    Collection Time: 02/06/22 11:51 AM   Result Value Ref Range    Glucose (POC) 204 (H) 65 - 117 mg/dL    Performed by Ada Villegas    GLUCOSE, POC    Collection Time: 02/06/22  6:24 PM   Result Value Ref Range    Glucose (POC) 250 (H) 65 - 117 mg/dL    Performed by Jen Delgado    GLUCOSE, POC    Collection Time: 02/06/22  9:13 PM   Result Value Ref Range    Glucose (POC) 171 (H) 65 - 117 mg/dL    Performed by Lew Sosa RN (Traveler)    GLUCOSE, POC    Collection Time: 02/07/22  2:05 AM   Result Value Ref Range    Glucose (POC) 243 (H) 65 - 117 mg/dL    Performed by Lew Sosa RN ([facility]) PROTHROMBIN TIME + INR    Collection Time: 02/07/22  4:00 AM   Result Value Ref Range    Prothrombin time 15.0 (H) 11.9 - 14.7 sec    INR 1.2 (H) 0.9 - 1.1     LD    Collection Time: 02/07/22  4:00 AM   Result Value Ref Range    LD 1,162 (H) 85 - 241 U/L   D DIMER    Collection Time: 02/07/22  4:00 AM   Result Value Ref Range    D DIMER 14.98 (H) <0.50 ug/ml(FEU)   PROCALCITONIN    Collection Time: 02/07/22  4:00 AM   Result Value Ref Range    Procalcitonin 5.05 (H) 0 ng/mL   SED RATE (ESR)    Collection Time: 02/07/22  4:00 AM   Result Value Ref Range    Sed rate, automated PENDING mm/hr   CBC W/O DIFF    Collection Time: 02/07/22  4:00 AM   Result Value Ref Range    WBC 25.1 (H) 4.1 - 11.1 K/uL    RBC 3.09 (L) 4.10 - 5.70 M/uL    HGB 9.7 (L) 12.1 - 17.0 g/dL    HCT 28.1 (L) 36.6 - 50.3 %    MCV 90.9 80.0 - 99.0 FL    MCH 31.4 26.0 - 34.0 PG    MCHC 34.5 30.0 - 36.5 g/dL    RDW 14.5 11.5 - 14.5 %    PLATELET 048 (H) 740 - 400 K/uL    MPV 11.1 8.9 - 12.9 FL    NRBC 0.0 0.0  WBC    ABSOLUTE NRBC 0.00 0.00 - 0.72 K/uL   METABOLIC PANEL, COMPREHENSIVE    Collection Time: 02/07/22  4:00 AM   Result Value Ref Range    Sodium 133 (L) 136 - 145 mmol/L    Potassium 4.6 3.5 - 5.1 mmol/L    Chloride 99 97 - 108 mmol/L    CO2 18 (L) 21 - 32 mmol/L    Anion gap 16 (H) 5 - 15 mmol/L    Glucose 220 (H) 65 - 100 mg/dL    BUN 87 (H) 6 - 20 mg/dL    Creatinine 5.62 (H) 0.70 - 1.30 mg/dL    BUN/Creatinine ratio 15 12 - 20      GFR est AA 12 (L) >60 ml/min/1.73m2    GFR est non-AA 10 (L) >60 ml/min/1.73m2    Calcium 6.4 (LL) 8.5 - 10.1 mg/dL    Bilirubin, total 0.4 0.2 - 1.0 mg/dL    AST (SGOT) 415 (H) 15 - 37 U/L    ALT (SGPT) 190 (H) 12 - 78 U/L    Alk.  phosphatase 214 (H) 45 - 117 U/L    Protein, total 5.9 (L) 6.4 - 8.2 g/dL    Albumin 1.1 (L) 3.5 - 5.0 g/dL    Globulin 4.8 (H) 2.0 - 4.0 g/dL    A-G Ratio 0.2 (L) 1.1 - 2.2     BLOOD GAS, ARTERIAL    Collection Time: 02/07/22  4:00 AM   Result Value Ref Range    pH 7.32 (L) 7.35 - 7.45      PCO2 36 35 - 45 mmHg    PO2 83 75 - 100 mmHg    O2 SAT 96 >95 %    BICARBONATE 19 (L) 22 - 26 mmol/L    BASE DEFICIT 6.8 (H) 0 - 2 mmol/L    O2 METHOD VENT      FIO2 50.0 %    MODE Assist Control/Volume Control      Tidal volume 500      SET RATE 18      EPAP/CPAP/PEEP 5.0      SITE Right Radial      JULIO'S TEST PASS     CK    Collection Time: 02/07/22  4:00 AM   Result Value Ref Range    CK 19,678 (HH) 39 - 308 U/L   PHOSPHORUS    Collection Time: 02/07/22  4:00 AM   Result Value Ref Range    Phosphorus 9.7 (H) 2.6 - 4.7 mg/dL         Assessment and plan:        Acute respiratory failure with hypoxia secondary to COVID-19 pneumoniaof note patient currently remains intubated and sedated at this time, remains in septic shock, secondary to COVID-19 pneumonia  Follow blood cultures  Follow sputum cultures  Continue Decadron 6 mg IV every 12  Continue Zosyn azithromycin for antibiotic coverage  Continue baricitinib once daily for total 14-day course  Attempt to wean oxygen  Continue to follow pulmonology recommendations    Septic shocksecondary to problem #1, status post fluid resuscitationsepsis protocol  Follow-up blood cultures  Continue trend inflammatory markers  Continue antibiotics as documented above  Continue Levophed for pressor support, titrate to mean arterial pressure of over 65  Continue to follow infectious Disease recommendations    Severe encephalopathylikely secondary to septic shock/problem #1 however concerns for anoxic brain injury as patient does not have purposeful movements while off of sedation  Neurology consult appreciated  Management as documented above  Follow-up MRI brain further evaluation  Continue to follow neurology recommendations    Hyperglycemia type 2 diabetesas documented below  Continue lantus at 20 units daily  Continue insulin sliding scale    Type II non-ST elevation MIlikely secondary to demand  Transthoracic echo reviewed  Continue current medications  Continue to follow cardiology recommendations    Hypokalemiacurrently resolved    Hypocalcemia-to be repleted with dialysis    Acute kidney injurylikely secondary to acute tubular necrosis, status post initiation of dialysis  Continue to trend serum creatinine  Dialysis as per nephrology recommendations  Nephrology consult appreciated, continue to follow recommendations    Shock liversecondary to problems #1 and 2, currently LFTs downtrending  Management as documented above  Continue trend LFTs    Anemia/bleeding from dialysis catheter siteof note patient had significant bleeding from dialysis catheter site s/p 3 units prbc with appropriate response, bleeding stopped after application of quick clot/stitch placement  Continue to trend H/H  Interventional Radiology consult appreciated    ProphylaxisSCDs  FENtube feeding, replete potassium and magnesium  DNR, NOK are patients brothers  Dispositioncontinued ICU care pending clinical improvement    Critical care time spent 35 minutes involving direct patient care as well as reviewing patient's labs and coordination of care with nursing staff      Signed By: Kiko Castro MD     February 7, 2022

## 2022-02-07 NOTE — PROGRESS NOTES
I contacted Dr. Magi Hensley via [facility] about this pt's Calcium level of 6.4. Now just waiting for his response.

## 2022-02-07 NOTE — PROGRESS NOTES
Infectious Diseases Progress Note  Omid Ospina MD  [phone]  Date:2022       Room:[facility]  Patient Milagro Montgomery     YOB: 1945     Age:76 y.o. 76M with schizophrenia, seizures, diabetes, chronic renal failure.     24 presents to hospital unresponsive. Reportedly had been increasingly weak and unable to leave the bed for days. Associated with fevers. During the ED course found positive for Covid 19, with increased troponin. Intubated ventilated for acute hypoxic respiratory failure. During the hospital course, declining renal function and initiated on hemodialysis. I have been asked to see the patient in consultation for prolonged respiratory failure. Subjective    Subjective:  Symptoms:  Stable. Review of Systems   Unable to perform ROS: Intubated     Objective         Vitals Last 24 Hours:  TEMPERATURE:  Temp  Av.8 °F (37.1 °C)  Min: 97.7 °F (36.5 °C)  Max: 99.5 °F (37.5 °C)  RESPIRATIONS RANGE: Resp  Av.3  Min: 18  Max: 24  PULSE OXIMETRY RANGE: SpO2  Av.1 %  Min: 96 %  Max: 100 %  PULSE RANGE: Pulse  Av.7  Min: 74  Max: 90  BLOOD PRESSURE RANGE: Systolic (53QPH), HPM:798 , Min:94 , BMZ:864   ; Diastolic (24INO), PAH:65, Min:58, Max:102    I/O (24Hr): Intake/Output Summary (Last 24 hours) at 2022 1236  Last data filed at 2022 0700  Gross per 24 hour   Intake 1357.9 ml   Output    Net 1357.9 ml     Objective:  Vital signs: (most recent): Blood pressure 102/60, pulse 79, temperature 98.4 °F (36.9 °C), resp. rate 22, height 6' (1.829 m), weight 107.5 kg (236 lb 15.9 oz), SpO2 99 %. General:  intubated on vent unresponsive on no sedation  HEENT: NCAT,   Eyes: anicteric; conjunctiva clear no doll's eye reflex  Neck: no nodes, no cuff leak, trach midline; no accessory MM use.   Chest: no deformity,   Cardiac: R regular; no murmur;   Lungs: distant breath sounds; no wheezes a few rales in the base  Abd: soft, NT, hypoactive BS  Ext: Trace edema; no joint swelling; No clubbing  : No urine  Neuro: Unresponsive on no sedation no doll's eye reflex flaccid extremity  Psych-  unable to assess  Skin: warm, dry, no cyanosis;   Pulses: Brachial and radial pulses intact  Capillary: Slow capillary refill    Labs/Imaging/Diagnostics    Labs:  CBC:  Recent Labs     02/07/22 0400 02/06/22  0400 02/05/22  0435   WBC 25.1* 22.5* 23.7*   RBC 3.09* 3.16* 3.55*   HGB 9.7* 9.9* 11.3*   HCT 28.1* 29.0* 31.7*   MCV 90.9 91.8 89.3   RDW 14.5 14.3 14.1   * 408* 378     CHEMISTRIES:  Recent Labs     02/07/22 0400 02/06/22  0400 02/05/22  0435   * 132* 131*  131*   K 4.6 4.4 4.8  4.6   CL 99 101 98  98   CO2 18* 21 24  24   BUN 87* 68* 66*  66*   CA 6.4* 6.9* 7.4*  7.5*   PHOS 9.7*  --  8.6*   MG 2.5*  --   --    PT/INR:  Recent Labs     02/07/22 0400 02/06/22  0400 02/05/22  0435   INR 1.2* 1.2* 1.2*     APTT:  Recent Labs     02/04/22  1720   APTT 43.7*     LIVER PROFILE:  Recent Labs     02/07/22 0400 02/06/22  0400 02/05/22  0435   * 428* 515*   * 209* 270*     Lab Results   Component Value Date/Time    ALT (SGPT) 190 (H) 02/07/2022 04:00 AM    AST (SGOT) 415 (H) 02/07/2022 04:00 AM    Alk. phosphatase 214 (H) 02/07/2022 04:00 AM    Bilirubin, total 0.4 02/07/2022 04:00 AM       Imaging Last 24 Hours:  XR CHEST PORT    Result Date: 2/7/2022  Chest, frontal view, 2/7/2022 History: CHF. Comparison: Including chest 2/6/2022. Findings: Right internal jugular catheter tips are at the distal SVC. Endotracheal tube tip is 3.6 cm from the irma. Feeding tube tip is at the stomach. The cardiac silhouette is stable. Lung volumes are mildly improved. Airspace opacities in the right lung are improved as compared to the study performed one day prior. Airspace opacities at the left perihilar region and base are not significantly changed. Left pleural effusion is possible. No pneumothorax is identified.   The osseous structures are stable. Airspace opacities in the lungs, improved on the right. Assessment//Plan   Active Problems:    Acute encephalopathy (5/18/2021)      Assessment & Plan   76M with schizophrenia, seizures, diabetes, chronic renal failure.     1/27/22 presents to hospital unresponsive. Reportedly had been increasingly weak and unable to leave the bed for days. Associated with fevers. During the ED course found positive for Covid 19, with increased troponin. Intubated ventilated for acute hypoxic respiratory failure. During the hospital course, declining renal function and initiated on hemodialysis.  I have been asked to see the patient in consultation for prolonged respiratory failure.     MICROBIOLOGY     1/27/22            Covid 19          Positive  1/27/22            Blood               Negative  1/27/22            Urine                Negative     2/3/22              Endotrach        Scant Normal respiratory mark     ASSESSMENT AND RECOMMENDATIONS     1) Prolonged acute hypoxic respiratory failure in the setting of multifactorial shock, shock liver, Covid 19 pneumonia, NSTEMI, acute on chronic renal failure, anoxic brain injury.                 Supportive care with respiratory support as needed              Dexamethasone              Olumiant              Zosyn and azithromycin                 Overall prognosis remains very poor    Electronically signed by Melanie Kim MD on 2/7/2022 at 12:38 PM

## 2022-02-07 NOTE — WOUND CARE
Attempted to perform wound care assessment at 1323 but patient was receiving dialysis. WCN will follow up tomorrow.

## 2022-02-07 NOTE — PROGRESS NOTES
Renal Progress Note    Patient: Hayde Gray MRN: [id number]  SSN: [id number]    YOB: 1945  Age: 78 y.o.   Sex: male      Admit Date: 1/27/2022    LOS: 11 days     Subjective:     Seen at bedside ,   Still intubated and sedated      Getting Tfs and Free water ,   Vent settings noted     Saw in the ICU again this afternoon while patient was getting HD   Will try for a UF of 500 CC   Spoke to HD  Nurse at Bedside     Current Facility-Administered Medications   Medication Dose Route Frequency    calcium gluconate 1 gram in sodium chloride (ISO-OSM) 50 mL infusion  1 g IntraVENous ONCE    0.9% sodium chloride infusion 250 mL  250 mL IntraVENous PRN    levETIRAcetam (KEPPRA) oral solution 1,500 mg  1,500 mg Oral BID    heparin (porcine) 1,000 unit/mL injection 2,200 Units  2,200 Units IntraVENous DIALYSIS PRN    insulin glargine (LANTUS) injection 20 Units  20 Units SubCUTAneous QHS    dexamethasone (DECADRON) 4 mg/mL injection 4 mg  4 mg IntraVENous Q12H    NOREPINephrine (LEVOPHED) 8 mg in 0.9% NS 250ml infusion  0.5-120 mcg/min IntraVENous TITRATE    lactulose (CHRONULAC) 10 gram/15 mL solution 15 mL  10 g Per G Tube TID    glucose chewable tablet 16 g  4 Tablet Oral PRN    glucagon (GLUCAGEN) injection 1 mg  1 mg IntraMUSCular PRN    insulin lispro (HUMALOG) injection   SubCUTAneous Q4H    dextrose 10% infusion 125-250 mL  125-250 mL IntraVENous PRN    LORazepam (ATIVAN) injection 2 mg  2 mg IntraVENous Q2H PRN    glucose chewable tablet 16 g  4 Tablet Oral PRN    glucagon (GLUCAGEN) injection 1 mg  1 mg IntraMUSCular PRN    valproic acid (as sodium salt) (DEPAKENE) 250 mg/5 mL (5 mL) oral solution 500 mg  500 mg Oral BID    dextrose 10% infusion 125-250 mL  125-250 mL IntraVENous PRN    PHENYLephrine (ISRAEL-SYNEPHRINE) 30 mg in 0.9% sodium chloride 250 mL infusion   mcg/min IntraVENous TITRATE    propofol (DIPRIVAN) 10 mg/mL infusion  0-50 mcg/kg/min IntraVENous TITRATE    lacosamide (VIMPAT) tablet 200 mg  200 mg Oral BID    levothyroxine (SYNTHROID) tablet 75 mcg  75 mcg Oral 7am    [Held by provider] aspirin chewable tablet 81 mg  81 mg Oral DAILY    sodium chloride (NS) flush 5-40 mL  5-40 mL IntraVENous Q8H    sodium chloride (NS) flush 5-40 mL  5-40 mL IntraVENous PRN    acetaminophen (TYLENOL) tablet 650 mg  650 mg Oral Q6H PRN    Or    acetaminophen (TYLENOL) suppository 650 mg  650 mg Rectal Q6H PRN    polyethylene glycol (MIRALAX) packet 17 g  17 g Oral DAILY PRN    ondansetron (ZOFRAN ODT) tablet 4 mg  4 mg Oral Q8H PRN    Or    ondansetron (ZOFRAN) injection 4 mg  4 mg IntraVENous Q6H PRN    atorvastatin (LIPITOR) tablet 80 mg  80 mg Oral QHS    piperacillin-tazobactam (ZOSYN) 3.375 g in 0.9% sodium chloride (MBP/ADV) 100 mL MBP  3.375 g IntraVENous Q8H    azithromycin (ZITHROMAX) 500 mg in 0.9% sodium chloride 250 mL (VIAL-MATE)  500 mg IntraVENous Q24H    baricitinib (OLUMIANT) tablet 1 mg  1 mg Oral DAILY        Vitals:    02/07/22 0600 02/07/22 0700 02/07/22 0751 02/07/22 0800   BP: (!) 99/58 108/65  (!) 102/59   Pulse: 75 74 74 74   Resp: 18 18 18 18   Temp: 98.1 °F (36.7 °C) 97.7 °F (36.5 °C)     TempSrc:       SpO2: 100% 100% 100% 100%   Weight:       Height:         Objective:   General: On ventilator, unresponsive   HEENT:  no Icterus, Pallor+, ET tube in place, mucosa dry   neck: Neck is supple, No JVD  Lungs: Decreased breath sounds at the bases,   CVS: heart sounds normal,  no murmurs, no rubs. GI: soft, nontender, no distention  Extremeties: no cyanosis, no edema    Neuro: Patient unresponsive, on vent, off sedation   skin: normal skin turgor, no skin rashes. Intake and Output:  Current Shift: No intake/output data recorded.   Last three shifts: 02/05 1901 - 02/07 0700  In: 2755.5 [I.V.:880.5]  Out: 5 [Urine:5]      Lab/Data Review:  Recent Labs     02/07/22  0400 02/06/22  0400 02/05/22  0435   WBC 25.1* 22.5* 23.7*   HGB 9.7* 9.9* 11.3* HCT 28.1* 29.0* 31.7*   * 408* 378     Recent Labs     02/07/22  0400 02/06/22  0400 02/05/22  0435   * 132* 131*  131*   K 4.6 4.4 4.8  4.6   CL 99 101 98  98   CO2 18* 21 24  24   * 208* 201*  202*   BUN 87* 68* 66*  66*   CREA 5.62* 4.63* 4.44*  4.43*   CA 6.4* 6.9* 7.4*  7.5*   PHOS 9.7*  --  8.6*   ALB 1.1* 1.2* 1.5*  1.5*   TBILI 0.4 0.4 0.5   * 209* 270*   INR 1.2* 1.2* 1.2*     Recent Labs     02/07/22  0400 02/05/22  0300   PH 7.32* 7.43   PCO2 36 35   PO2 83 81   HCO3 19* 23   FIO2 50.0 50.0     Recent Results (from the past 24 hour(s))   GLUCOSE, POC    Collection Time: 02/06/22 11:51 AM   Result Value Ref Range    Glucose (POC) 204 (H) 65 - 117 mg/dL    Performed by Debbie Almaraz    GLUCOSE, POC    Collection Time: 02/06/22  6:24 PM   Result Value Ref Range    Glucose (POC) 250 (H) 65 - 117 mg/dL    Performed by Jen Delgado    GLUCOSE, POC    Collection Time: 02/06/22  9:13 PM   Result Value Ref Range    Glucose (POC) 171 (H) 65 - 117 mg/dL    Performed by Brittany Wu RN ([facility])    GLUCOSE, POC    Collection Time: 02/07/22  2:05 AM   Result Value Ref Range    Glucose (POC) 243 (H) 65 - 117 mg/dL    Performed by Brittany Wu RN ([employer])    PROTHROMBIN TIME + INR    Collection Time: 02/07/22  4:00 AM   Result Value Ref Range    Prothrombin time 15.0 (H) 11.9 - 14.7 sec    INR 1.2 (H) 0.9 - 1.1     LD    Collection Time: 02/07/22  4:00 AM   Result Value Ref Range    LD 1,162 (H) 85 - 241 U/L   D DIMER    Collection Time: 02/07/22  4:00 AM   Result Value Ref Range    D DIMER 14.98 (H) <0.50 ug/ml(FEU)   PROCALCITONIN    Collection Time: 02/07/22  4:00 AM   Result Value Ref Range    Procalcitonin 5.05 (H) 0 ng/mL   SED RATE (ESR)    Collection Time: 02/07/22  4:00 AM   Result Value Ref Range    Sed rate, automated 122 mm/hr   CBC W/O DIFF    Collection Time: 02/07/22  4:00 AM   Result Value Ref Range    WBC 25.1 (H) 4.1 - 11.1 K/uL    RBC 3.09 (L) 4.10 - 5.70 M/uL    HGB 9.7 (L) 12.1 - 17.0 g/dL    HCT 28.1 (L) 36.6 - 50.3 %    MCV 90.9 80.0 - 99.0 FL    MCH 31.4 26.0 - 34.0 PG    MCHC 34.5 30.0 - 36.5 g/dL    RDW 14.5 11.5 - 14.5 %    PLATELET 878 (H) 208 - 400 K/uL    MPV 11.1 8.9 - 12.9 FL    NRBC 0.0 0.0  WBC    ABSOLUTE NRBC 0.00 0.00 - 0.91 K/uL   METABOLIC PANEL, COMPREHENSIVE    Collection Time: 02/07/22  4:00 AM   Result Value Ref Range    Sodium 133 (L) 136 - 145 mmol/L    Potassium 4.6 3.5 - 5.1 mmol/L    Chloride 99 97 - 108 mmol/L    CO2 18 (L) 21 - 32 mmol/L    Anion gap 16 (H) 5 - 15 mmol/L    Glucose 220 (H) 65 - 100 mg/dL    BUN 87 (H) 6 - 20 mg/dL    Creatinine 5.62 (H) 0.70 - 1.30 mg/dL    BUN/Creatinine ratio 15 12 - 20      GFR est AA 12 (L) >60 ml/min/1.73m2    GFR est non-AA 10 (L) >60 ml/min/1.73m2    Calcium 6.4 (LL) 8.5 - 10.1 mg/dL    Bilirubin, total 0.4 0.2 - 1.0 mg/dL    AST (SGOT) 415 (H) 15 - 37 U/L    ALT (SGPT) 190 (H) 12 - 78 U/L    Alk.  phosphatase 214 (H) 45 - 117 U/L    Protein, total 5.9 (L) 6.4 - 8.2 g/dL    Albumin 1.1 (L) 3.5 - 5.0 g/dL    Globulin 4.8 (H) 2.0 - 4.0 g/dL    A-G Ratio 0.2 (L) 1.1 - 2.2     BLOOD GAS, ARTERIAL    Collection Time: 02/07/22  4:00 AM   Result Value Ref Range    pH 7.32 (L) 7.35 - 7.45      PCO2 36 35 - 45 mmHg    PO2 83 75 - 100 mmHg    O2 SAT 96 >95 %    BICARBONATE 19 (L) 22 - 26 mmol/L    BASE DEFICIT 6.8 (H) 0 - 2 mmol/L    O2 METHOD VENT      FIO2 50.0 %    MODE Assist Control/Volume Control      Tidal volume 500      SET RATE 18      EPAP/CPAP/PEEP 5.0      SITE Right Radial      JULIO'S TEST PASS     CK    Collection Time: 02/07/22  4:00 AM   Result Value Ref Range    CK 19,678 (HH) 39 - 308 U/L   PHOSPHORUS    Collection Time: 02/07/22  4:00 AM   Result Value Ref Range    Phosphorus 9.7 (H) 2.6 - 4.7 mg/dL   GLUCOSE, POC    Collection Time: 02/07/22  8:16 AM   Result Value Ref Range    Glucose (POC) 194 (H) 65 - 117 mg/dL    Performed by Robson Cedeño and Plan:     #1 acute kidney injury  --ANDRES likely 2/2 ATN from septic shock /rhabdomyolysis  Patient remained anuric, with rising BUN and creatinine we will dialyze him today. Started on CRRT 1/31, had for 48 hrs, Switched to intermittent hemodialysis,   Status post hemodialysis 2/5/22 , tolerated 2 L ultrafiltration   will continue to monitor electrolytes closely and adjust management as needed.     #2 severe hypokalemia: Improved potassium level    monitor potassium levels     #3 Hyponatremia :sodium level to 133 , from incr free water input and high glucose levels. Glucose relatively well controlled . Adjusted Free water rate . Continue to monitor sodium levels    #4  COVID-19 pneumonia:   -Septic shock  -With multiorgan failure including renal failure/transaminitis/hemodynamics shock  -Patient currently on Decadron azithromycin and Zosyn  Neurology following the patient for anoxic encephalopathy, remains unresponsive  Shock liver with high liver enzymes( improving),   Rhabdomyolysis+/ANDRES, on HD, will monitor   -Overall prognosis seems guarded     #5 diabetes: hyperglycemia+  Monitor accuchecks and adjust management as needed     #6 seizure disorder  -On antiseizure medications, neurology following the patient    7. Metabolic acidosis: Secondary to acute kidney injury/hypotension  Improved with hemodialysis  Stable CO2 level of 21, continue to monitor CO2    8. Hypocalcemia - corrected calcium WNL 8.7  Received 1 gm IV ca gluconate today       Signed By: Alida Rob MD     February 7, 2022

## 2022-02-07 NOTE — TELEPHONE ENCOUNTER
Lisbeth 15 faxed request for    Olazapine 2.5mg tablets  Take 1 tab by mouth twice daily    NOV 3/24/22  LOV 1/11/22

## 2022-02-08 NOTE — WOUND CARE
IP WOUND CONSULT    [address] [facility] RECORD NUMBER:  [id number]  AGE: 78 y.o. GENDER: male  : 1945  TODAY'S DATE:  2022    GENERAL     [] Follow-up   [x] New Consult    Genie Mccormack is a 78 y.o. male referred by:   [x] Physician  [] Nursing  [] Other:         PAST MEDICAL HISTORY    Past Medical History:   Diagnosis Date    [facility]     Anemia     Arthritis     DKA (diabetic ketoacidoses)     GERD (gastroesophageal reflux disease)     HTN (hypertension)     Hypercholesterolemia     Schizophrenia (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     Seizure disorder (HCC)     Type II or unspecified type diabetes mellitus without mention of complication, uncontrolled         PAST SURGICAL HISTORY    Past Surgical History:   Procedure Laterality Date    IR INSERT NON TUNL CVC OVER 5 YRS  2022    IR INSERT NON TUNL CVC OVER 5 YRS  2022       FAMILY HISTORY    Family History   Problem Relation Age of Onset    Stroke Father          ALLERGIES    No Known Allergies    MEDICATIONS    No current facility-administered medications on file prior to encounter. 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 100 Strip 3    lancets misc Sig: To check sugar twice a day before breakfast and at bedtime 1 Each 11    amLODIPine (NORVASC) 10 mg tablet Take 1 Tablet by mouth daily. 90 Tablet 2    losartan (COZAAR) 50 mg tablet Take 1 Tablet by mouth daily. Please stop hydrochlorothiazide and amlodipine 90 Tablet 2    levothyroxine (SYNTHROID) 75 mcg tablet Take 1 Tablet by mouth every morning. Take in early morning.  90 Tablet 2    metFORMIN (GLUCOPHAGE) 500 mg tablet Take 1 Tablet by mouth two (2) times daily (with meals). 180 Tablet 2    pravastatin (PRAVACHOL) 20 mg tablet Take 1 Tablet by mouth nightly. 90 Tablet 2    lacosamide (VIMPAT) 200 mg tab tablet Take 200 mg by mouth two (2) times a day.  lacosamide (Vimpat) 200 mg tab tablet Take 1 Tablet by mouth two (2) times a day. Max Daily Amount: 400 mg. courtesy refill  Given on behalf of neurologist dr Jaylene Marquez or dr Sherman Gaytan 60 Tablet 1    Blood-Glucose Meter monitoring kit He is having a seizure he needs blood sugar to be checked twice a day before breakfast and at bedtime, 1 Kit 1    levETIRAcetam 1,000 mg tablet Take 1,000 mg by mouth two (2) times a day.  divalproex DR (DEPAKOTE) 500 mg tablet Take 500 mg by mouth two (2) times a day.  Take two tablets twice daily           [unfilled]  Visit Vitals  /68 (BP 1 Location: Left upper arm, BP Patient Position: At rest)   Pulse 89   Temp 99.3 °F (37.4 °C)   Resp 18   Ht 6' (1.829 m)   Wt 107.5 kg (236 lb 15.9 oz)   SpO2 97%   BMI 32.14 kg/m²       ASSESSMENT     Wound Identification & Type: sDTI to right heel; blanchable erythema with friction injury to buttocks and sacrum; stage 1 PI to left lateral ankle  Dressing change: Yes, see flow chart  Verbal consent for picture: ventilated and sedated    Contributing Factors: anticoagulation therapy, diabetes, poor glucose control, chronic pressure, decreased mobility, shear force, incontinence of stool, incontinence of urine, obesity and malnutrition    Wound Buttocks Right Blanchable Erythema 06/05/21 (Active)   Wound Image   02/08/22 1143   Wound Etiology Other (Comment) 02/08/22 1143   Dressing Status New dressing applied 02/08/22 1143   Cleansed Other (Comment) 02/08/22 1143   Dressing/Treatment Foam 02/08/22 1143   Dressing Change Due 02/09/22 02/08/22 1143   Wound Assessment Dry 02/08/22 1143   Drainage Amount None 02/08/22 1143   Wound Odor None 02/08/22 1143   Sunni-Wound/Incision Assessment Blanchable erythema;Dry/flaky;Fragile 02/08/22 1143   Number of days: 248       Wound Heel Right;Lateral (Active)   Wound Image   02/08/22 1144   Wound Etiology Deep Tissue/Injury 02/08/22 1144   Dressing Status Other (Comment) 02/07/22 2000   Dressing/Treatment Open to air 02/08/22 1144   Offloading for Diabetic Foot Ulcers Offloading boot 02/08/22 1144   Wound Length (cm) 5.8 cm 02/08/22 1144   Wound Width (cm) 5.9 cm 02/08/22 1144   Wound Depth (cm) 0 cm 02/08/22 1144   Wound Surface Area (cm^2) 34.22 cm^2 02/08/22 1144   Wound Volume (cm^3) 0 cm^3 02/08/22 1144   Wound Assessment Eschar dry;Dry 02/08/22 1144   Drainage Amount None 02/08/22 1144   Wound Odor None 02/08/22 1144   Sunni-Wound/Incision Assessment Blanchable erythema 02/08/22 1144   Edges Attached edges 02/08/22 1144   Number of days: 10       Wound Ankle Left;Lateral nonblanchable purple spot (Active)   Wound Image   02/08/22 1146   Wound Etiology Pressure Stage 1 02/08/22 1146   Dressing Status Other (Comment) 02/07/22 2000   Dressing/Treatment Open to air 02/08/22 1146   Offloading for Diabetic Foot Ulcers Offloading boot 02/08/22 1146   Wound Length (cm) 1.4 cm 02/08/22 1146   Wound Width (cm) 0.8 cm 02/08/22 1146   Wound Depth (cm) 0 cm 02/08/22 1146   Wound Surface Area (cm^2) 1.12 cm^2 02/08/22 1146   Wound Volume (cm^3) 0 cm^3 02/08/22 1146   Wound Assessment Dry;Non-blanchable erythema;Pink/red 02/08/22 1146   Drainage Amount None 02/08/22 1146   Wound Odor None 02/08/22 1146   Sunni-Wound/Incision Assessment Intact 02/08/22 1146   Edges Attached edges 02/08/22 1146   Number of days: 10          PLAN     Skin Care & Pressure Relief Recommendations  Minimize layers of linen  Turn/reposition approximately every 2 hours  Pillow wedges  Manage incontinence   Promote continence; Skin Protective lotion/cream to buttocks and sacrum daily and as needed with incontinence care  Offloading boots    Oracio 10  Blood Glucose: 189 on 2/8/22 Albumin: 1.0 on 1/8/22  WBCs: 25.3 on 1/8/22    Support Surface: [place]    Physician/Provider notified:   Recommendations: sDTI noted to right heel with eschar beginning to form. Strict offloading with heel boots will help prevent wound from opening. Stage 1 PI noted to left lateral ankle. Reapplied heel boots after assessment. Heel boots are to be applied to both feet at all times daily while in the bed for offloading of the heels and to prevent pressure related skin injury to bony prominences of feet and to prevent foot drop. Sacrum and buttocks are pink and blanchable with minor friction injury. Patient has indwelling servin catheter and rectal tube to manage incontinence. Apply a Optifoam Border Sacral foam dressing to sacrum daily and prn for soiling, for PI prevention. Apply zinc paste TID and prn for soiling to perirectal area to protect skin from incontinence related breakdown and friction injury related to rectal tube. Use foam wedge to turn q2h at 30 degree angle or more to offload sacrum. Patient was repositioned to the right using wedge. Maintain HOB at 30 degrees or less, if not contraindicated, to reduce pressure to buttocks and sacrum. Raise foot of bed to help prevent friction and shear injury from sliding down in the bed. Tube feedings was paused during wound care and resumed after [facility] was returned to 30 degree angle. Will continue to follow. Discharge Wound Care Needs: TBD. Patient will benefit from follow up at the [address] [place] [address] for wound management when discharged.       Teaching completed with:   [] Patient           [] Family member       [] Caregiver          [] Nursing  [] Other    Patient/Caregiver Teaching:  Level of patient/caregiver understanding able to:   [] Indicates understanding       [] Needs reinforcement  [] Unsuccessful      [] Verbal Understanding  [] Demonstrated understanding       [] No evidence of learning  [] Refused teaching         [] N/A       Electronically signed by Nahum Gaspar RN on 2/8/2022 at 11:55 AM

## 2022-02-08 NOTE — PROGRESS NOTES
Hospitalist Progress Note         Monalisa Ramos MD          Daily Progress Note: 2/8/2022      Subjective: The patient is seen for follow  up. 76M, H/o Schizophenia, seizures, DMII on oral meds with unresponsive and acute hypoxic respiratory failure s/t COVID-19 pneumonitis complicated by ANDRES, hypokalemia and shock liver.            HSOPITAL COURSE  COVID positive, associated with fever 106, increased troponin, cardiology was consulted ansd recommended ECHO, there is no heparin gtt, was initially called for STEMI and was cancelled. he was intubated for acute hypoxic respiratory failure. On levophed. Complicated by ANDRES, shock liver and hypokalemia. Was treated with azithromycin, barcitinib, and zosyn. Will give 1L bolus and continue IVF. Discussed with family he is very critical- his renal functions increased and now seemingly in ATN, his liver functions have worsened and had a seizure on 1/28 , he is off propofol now and neurology was consulted. Repeat CT scan didn't show any stroke, plan for EEG on 2/1 to assess for neurological activity. The patients liver functions, renal are reviewed. __    Patient seen in follow-up.   Remains intubated and ventilated with FiO2 of 50% and PEEP of 5.        Problem List:  Problem List as of 2/8/2022 Date Reviewed: 12/30/2020          Codes Class Noted - Resolved    Acute hypernatremia ICD-10-CM: E87.0  ICD-9-CM: 276.0  6/9/2021 - Present        Uncontrolled type 2 diabetes mellitus with hyperglycemia ([facility] ICD-10-CM: E11.65  ICD-9-CM: 250.02  6/9/2021 - Present        Acute metabolic encephalopathy JJO-21-BI: G93.41  ICD-9-CM: 348.31  6/9/2021 - Present        ANDRES (acute kidney injury) ([facility] ICD-10-CM: N17.9  ICD-9-CM: 584.9  6/9/2021 - Present        Dehydration ICD-10-CM: E86.0  ICD-9-CM: 276.51  6/3/2021 - Present        Hypernatremia ICD-10-CM: E87.0  ICD-9-CM: 276.0  5/26/2021 - Present        Acute encephalopathy ICD-10-CM: G93.40  ICD-9-CM: 348.30  5/18/2021 - Present        Vitamin D deficiency ICD-10-CM: E55.9  ICD-9-CM: 268.9  12/30/2020 - Present        Dementia ([facility] ICD-10-CM: F03.90  ICD-9-CM: 294.20  7/26/2017 - Present        Mixed hyperlipidemia ICD-10-CM: E78.2  ICD-9-CM: 272.2  4/16/2016 - Present        Essential hypertension ICD-10-CM: I10  ICD-9-CM: 401.9  4/18/2015 - Present        DM (diabetes mellitus) ([facility] ICD-10-CM: E11.9  ICD-9-CM: 250.00  2/25/2013 - Present        Schizophreniform disorder ([facility] ICD-10-CM: F20.81  ICD-9-CM: 295.40  Unknown - Present        GERD (gastroesophageal reflux disease) ICD-10-CM: K21.9  ICD-9-CM: 530.81  Unknown - Present        Seizure disorder ([facility] ICD-10-CM: G40.909  ICD-9-CM: 345.90  Unknown - Present        RESOLVED: Controlled type 2 diabetes mellitus with complication, without long-term current use of insulin ([facility] ICD-10-CM: E11.8  ICD-9-CM: 250.90  12/30/2020 - 6/28/2021        RESOLVED: Morbid obesity ([facility] ICD-10-CM: E66.01  ICD-9-CM: 278.01  12/30/2020 - 8/3/2021        RESOLVED: Type 2 diabetes with nephropathy ([facility] ICD-10-CM: E11.21  ICD-9-CM: 250.40, 583.81  6/17/2018 - 6/28/2021        RESOLVED: Severe obesity with body mass index (BMI) of 35.0 to 39.9 with serious comorbidity ([facility] ICD-10-CM: E66.01  ICD-9-CM: 278.01  6/15/2018 - 1/1/2022        RESOLVED: Essential hypertension with goal blood pressure less than 140/90 ICD-10-CM: I10  ICD-9-CM: 401.9  4/16/2016 - 5/30/2021        RESOLVED: Hypothyroidism due to acquired atrophy of thyroid ICD-10-CM: E03.4  ICD-9-CM: 244.8, 246.8  4/18/2015 - 10/20/2021        RESOLVED: Hyperlipidemia ICD-10-CM: E78.5  ICD-9-CM: 272.4  5/28/2014 - 1/1/2022        RESOLVED: Hypothyroid ICD-10-CM: E03.9  ICD-9-CM: 244.9  5/28/2014 - 4/18/2015        RESOLVED: Seizures ([address] ICD-10-CM: R56.9  ICD-9-CM: 780.39  2/25/2013 - 1/1/2022        RESOLVED: HTN (hypertension) ICD-10-CM: I10  ICD-9-CM: 401.9  2/25/2013 - 4/18/2015 Medications reviewed  Current Facility-Administered Medications   Medication Dose Route Frequency    0.9% sodium chloride infusion 250 mL  250 mL IntraVENous PRN    levETIRAcetam (KEPPRA) oral solution 1,500 mg  1,500 mg Oral BID    heparin (porcine) 1,000 unit/mL injection 2,200 Units  2,200 Units IntraVENous DIALYSIS PRN    insulin glargine (LANTUS) injection 20 Units  20 Units SubCUTAneous QHS    dexamethasone (DECADRON) 4 mg/mL injection 4 mg  4 mg IntraVENous Q12H    NOREPINephrine (LEVOPHED) 8 mg in 0.9% NS 250ml infusion  0.5-120 mcg/min IntraVENous TITRATE    lactulose (CHRONULAC) 10 gram/15 mL solution 15 mL  10 g Per G Tube TID    glucose chewable tablet 16 g  4 Tablet Oral PRN    glucagon (GLUCAGEN) injection 1 mg  1 mg IntraMUSCular PRN    insulin lispro (HUMALOG) injection   SubCUTAneous Q4H    dextrose 10% infusion 125-250 mL  125-250 mL IntraVENous PRN    LORazepam (ATIVAN) injection 2 mg  2 mg IntraVENous Q2H PRN    glucose chewable tablet 16 g  4 Tablet Oral PRN    glucagon (GLUCAGEN) injection 1 mg  1 mg IntraMUSCular PRN    valproic acid (as sodium salt) (DEPAKENE) 250 mg/5 mL (5 mL) oral solution 500 mg  500 mg Oral BID    dextrose 10% infusion 125-250 mL  125-250 mL IntraVENous PRN    PHENYLephrine (ISRAEL-SYNEPHRINE) 30 mg in 0.9% sodium chloride 250 mL infusion   mcg/min IntraVENous TITRATE    propofol (DIPRIVAN) 10 mg/mL infusion  0-50 mcg/kg/min IntraVENous TITRATE    lacosamide (VIMPAT) tablet 200 mg  200 mg Oral BID    levothyroxine (SYNTHROID) tablet 75 mcg  75 mcg Oral 7am    [Held by provider] aspirin chewable tablet 81 mg  81 mg Oral DAILY    sodium chloride (NS) flush 5-40 mL  5-40 mL IntraVENous Q8H    sodium chloride (NS) flush 5-40 mL  5-40 mL IntraVENous PRN    acetaminophen (TYLENOL) tablet 650 mg  650 mg Oral Q6H PRN    Or    acetaminophen (TYLENOL) suppository 650 mg  650 mg Rectal Q6H PRN    polyethylene glycol (MIRALAX) packet 17 g  17 g Oral DAILY PRN    ondansetron (ZOFRAN ODT) tablet 4 mg  4 mg Oral Q8H PRN    Or    ondansetron (ZOFRAN) injection 4 mg  4 mg IntraVENous Q6H PRN    atorvastatin (LIPITOR) tablet 80 mg  80 mg Oral QHS    piperacillin-tazobactam (ZOSYN) 3.375 g in 0.9% sodium chloride (MBP/ADV) 100 mL MBP  3.375 g IntraVENous Q8H    azithromycin (ZITHROMAX) 500 mg in 0.9% sodium chloride 250 mL (VIAL-MATE)  500 mg IntraVENous Q24H    baricitinib (OLUMIANT) tablet 1 mg  1 mg Oral DAILY       Review of Systems:   Review of systems not obtained due to patient factors. Objective:   Physical Exam:     Visit Vitals  /68 (BP 1 Location: Left upper arm, BP Patient Position: At rest)   Pulse 91   Temp 99.3 °F (37.4 °C)   Resp 23   Ht 6' (1.829 m)   Wt 107.5 kg (236 lb 15.9 oz)   SpO2 91%   BMI 32.14 kg/m²      O2 Device: Ventilator    Temp (24hrs), Av.3 °F (37.4 °C), Min:98.2 °F (36.8 °C), Max:100 °F (37.8 °C)    No intake/output data recorded.  1901 -  0700  In: 1950   Out: 520     General:  Awake but unable to follow commands   Lungs:   Clear to auscultation bilaterally. Chest wall:  No tenderness or deformity. Heart:  Regular rate and rhythm, S1, S2 normal, no murmur, click, rub or gallop. Abdomen:   Soft, non-tender. Bowel sounds normal. No masses,  No organomegaly. Extremities: Extremities normal, atraumatic, no cyanosis or edema. Pulses: 2+ and symmetric all extremities. Skin: Skin color, texture, turgor normal. No rashes or lesions   Neurologic: CNII-XII intact.  No gross sensory or motor deficits     Data Review:       Recent Days:  Recent Labs     22  0400 22  0400 22  0400   WBC 25.3* 25.1* 22.5*   HGB 10.1* 9.7* 9.9*   HCT 29.1* 28.1* 29.0*   * 452* 408*     Recent Labs     22  0400 22  0400 22  0400   * 133* 132*   K 4.4 4.6 4.4   CL 94* 99 101   CO2 19* 18* 21   * 220* 208*   BUN 76* 87* 68*   CREA 5.15* 5.62* 4.63*   CA 6.8* 6.4* 6.9*   MG  --  2.5*  --    PHOS  --  9.7*  --    ALB 1.0* 1.1* 1.2*   TBILI 0.4 0.4 0.4   * 190* 209*   INR 1.1 1.2* 1.2*     Recent Labs     02/08/22  0320 02/07/22  0400   PH 7.36 7.32*   PCO2 35 36   PO2 83 83   HCO3 20* 19*   FIO2 50.0 50.0       24 Hour Results:  Recent Results (from the past 24 hour(s))   GLUCOSE, POC    Collection Time: 02/07/22 10:56 AM   Result Value Ref Range    Glucose (POC) 184 (H) 65 - 117 mg/dL    Performed by [address] [place] [address], POC    Collection Time: 02/07/22  3:31 PM   Result Value Ref Range    Glucose (POC) 102 65 - 117 mg/dL    Performed by [address] [place] [address], POC    Collection Time: 02/07/22 10:00 PM   Result Value Ref Range    Glucose (POC) 172 (H) 65 - 117 mg/dL    Performed by Geronimo Alberto RN (Traveler)    GLUCOSE, POC    Collection Time: 02/08/22  1:48 AM   Result Value Ref Range    Glucose (POC) 169 (H) 65 - 117 mg/dL    Performed by Geronimo Alberto RN (Traveler)    BLOOD GAS, ARTERIAL    Collection Time: 02/08/22  3:20 AM   Result Value Ref Range    pH 7.36 7.35 - 7.45      PCO2 35 35 - 45 mmHg    PO2 83 75 - 100 mmHg    O2 SAT 96 >95 %    BICARBONATE 20 (L) 22 - 26 mmol/L    BASE DEFICIT 5.0 (H) 0 - 2 mmol/L    O2 METHOD VENT      FIO2 50.0 %    MODE Assist Control/Volume Control      Tidal volume 500      SET RATE 18      EPAP/CPAP/PEEP 5.0      SITE Right Radial      JULIO'S TEST PASS     PROTHROMBIN TIME + INR    Collection Time: 02/08/22  4:00 AM   Result Value Ref Range    Prothrombin time 14.5 11.9 - 14.7 sec    INR 1.1 0.9 - 1.1     LD    Collection Time: 02/08/22  4:00 AM   Result Value Ref Range     (H) 85 - 241 U/L   D DIMER    Collection Time: 02/08/22  4:00 AM   Result Value Ref Range    D DIMER 15.68 (H) <0.50 ug/ml(FEU)   PROCALCITONIN    Collection Time: 02/08/22  4:00 AM   Result Value Ref Range    Procalcitonin 4.55 (H) 0 ng/mL   SED RATE (ESR)    Collection Time: 02/08/22  4:00 AM   Result Value Ref Range    Sed rate, automated 126 mm/hr   CBC W/O DIFF    Collection Time: 02/08/22  4:00 AM   Result Value Ref Range    WBC 25.3 (H) 4.1 - 11.1 K/uL    RBC 3.19 (L) 4.10 - 5.70 M/uL    HGB 10.1 (L) 12.1 - 17.0 g/dL    HCT 29.1 (L) 36.6 - 50.3 %    MCV 91.2 80.0 - 99.0 FL    MCH 31.7 26.0 - 34.0 PG    MCHC 34.7 30.0 - 36.5 g/dL    RDW 14.6 (H) 11.5 - 14.5 %    PLATELET 667 (H) 313 - 400 K/uL    MPV 10.9 8.9 - 12.9 FL    NRBC 0.0 0.0  WBC    ABSOLUTE NRBC 0.00 0.00 - 6.01 K/uL   METABOLIC PANEL, COMPREHENSIVE    Collection Time: 02/08/22  4:00 AM   Result Value Ref Range    Sodium 131 (L) 136 - 145 mmol/L    Potassium 4.4 3.5 - 5.1 mmol/L    Chloride 94 (L) 97 - 108 mmol/L    CO2 19 (L) 21 - 32 mmol/L    Anion gap 18 (H) 5 - 15 mmol/L    Glucose 186 (H) 65 - 100 mg/dL    BUN 76 (H) 6 - 20 mg/dL    Creatinine 5.15 (H) 0.70 - 1.30 mg/dL    BUN/Creatinine ratio 15 12 - 20      GFR est AA 13 (L) >60 ml/min/1.73m2    GFR est non-AA 11 (L) >60 ml/min/1.73m2    Calcium 6.8 (L) 8.5 - 10.1 mg/dL    Bilirubin, total 0.4 0.2 - 1.0 mg/dL    AST (SGOT) 365 (H) 15 - 37 U/L    ALT (SGPT) 169 (H) 12 - 78 U/L    Alk.  phosphatase 177 (H) 45 - 117 U/L    Protein, total 6.1 (L) 6.4 - 8.2 g/dL    Albumin 1.0 (L) 3.5 - 5.0 g/dL    Globulin 5.1 (H) 2.0 - 4.0 g/dL    A-G Ratio 0.2 (L) 1.1 - 2.2     GLUCOSE, POC    Collection Time: 02/08/22  7:09 AM   Result Value Ref Range    Glucose (POC) 189 (H) 65 - 117 mg/dL    Performed by Colt Bile            Assessment/     Acute hypoxic respiratory failure secondary to COVID-19  COVID-19 with pneumonitis  Septic shock due to COVID-19 pneumonitis  Acute encephalopathy  Non-STEMI type II due to demand mismatch  Shock liver secondary to septic shock  Acute anemia status post transfusion of packed red blood cells    Plan:  Continue supportive care including Decadron Zosyn and azithromycin  Wean of mechanical ventilator as tolerated  Continue to monitor daily electrolytes  Overall prognosis guarded  We will arrange for family [facility] discussed with: Nurse    Total time spent with patient: 30 minutes.     Edis Castellano MD

## 2022-02-08 NOTE — PROGRESS NOTES
IMPRESSION:   1. Acute hypoxic respiratory failure oxygenation well-maintained on the ventilator  2. Malignant hyperthermia resolved   3. COVID-19 pneumonia  4. NSTEMI elevated troponin  5. Shock cardiogenic versus septic vs Hypovolumic Hypotension currently on norepinephrine  will continue to wean  6. Acute bleeding from dialysis catheter which has been corrected  7. Acute kidney injury now on hemodialysis  8. Metabolic acidosis  9. Thrombocytopenia worsened  10. Hypernatremia  11. Symptomatic hypokalemia  12. Shock liver with transaminitis      RECOMMENDATIONS/PLAN:   1. ICU monitoring  1. Ventilator for mechanical life support and prevent respiratory arrest with protective lung strategies ABG acceptable he is still hemodynamically unstable will continue with the current vent support: on Propofol. 2. On Assist control rate 18  PEEP 5 FiO2 50% ABG acceptable  3. Blood pressure improved off vasopressors  4. Patient on Decadron Zithromax and baricitinib WBC elevated, decrease Decadron  5. Troponin is elevated 2D echo shows ejection fraction of 65%  6. He was bleeding from the dialysis catheter which is corrected received 4 units of packed RBC hemoglobin dropped from 8.8 to 4.0 repeat lab shows Hb 9.7  7. Got dialyzed yesterday 500 cc of fluid removed  8. LDH procalcitonin trending down  9. Remains on quarter saline also on lactulose  10. Liver enzyme continues to improve as patient was in shock liver  11. Agree with [facility] IV antibiotics blood and urine cultures no growth on Zosyn and Zithromax   12. Temperature back to normal and sputum shows normal flow  13.  IV vasopressors for circulatory shock refractory to fluids to maintain SBP> 90      [x] High complexity decision making was performed  [x] See my orders for details  HPI   68-year-old male came in because as he was found unresponsive and fever 107 past medical history of schizophrenia and diabetes gastroesophageal reflux disease and anemia he is COVID-19 positive initially patient was called as NSTEMI but it was canceled patient is intubated on ventilator hemodynamically unstable unable to get any history out of the patient he seems dehydrated    PMH:  has a past medical history of Acute renal failure ([address] Anemia, Arthritis, DKA (diabetic ketoacidoses), GERD (gastroesophageal reflux disease), HTN (hypertension), Hypercholesterolemia, Schizophrenia ([address] Schizophreniform disorder ([address] Seizure disorder ([facility] [address] and Type II or unspecified type diabetes mellitus without mention of complication, uncontrolled. PSH:   has a past surgical history that includes ir insert non tunl cvc over 5 yrs (1/28/2022) and ir insert non tunl cvc over 5 yrs (1/31/2022). FHX: family history includes Stroke in his father. SHX:  reports that he has never smoked. He has never used smokeless tobacco. He reports that he does not drink alcohol and does not use drugs.     ALL: No Known Allergies     MEDS:   [x] Reviewed - As Below   [] Not reviewed    Current Facility-Administered Medications   Medication    0.9% sodium chloride infusion 250 mL    levETIRAcetam (KEPPRA) oral solution 1,500 mg    heparin (porcine) 1,000 unit/mL injection 2,200 Units    insulin glargine (LANTUS) injection 20 Units    dexamethasone (DECADRON) 4 mg/mL injection 4 mg    NOREPINephrine (LEVOPHED) 8 mg in 0.9% NS 250ml infusion    lactulose (CHRONULAC) 10 gram/15 mL solution 15 mL    glucose chewable tablet 16 g    glucagon (GLUCAGEN) injection 1 mg    insulin lispro (HUMALOG) injection    dextrose 10% infusion 125-250 mL    LORazepam (ATIVAN) injection 2 mg    glucose chewable tablet 16 g    glucagon (GLUCAGEN) injection 1 mg    valproic acid (as sodium salt) (DEPAKENE) 250 mg/5 mL (5 mL) oral solution 500 mg    dextrose 10% infusion 125-250 mL    PHENYLephrine (ISRAEL-SYNEPHRINE) 30 mg in 0.9% sodium chloride 250 mL infusion    propofol (DIPRIVAN) 10 mg/mL infusion    lacosamide (VIMPAT) tablet 200 mg    levothyroxine (SYNTHROID) tablet 75 mcg    [Held by provider] aspirin chewable tablet 81 mg    sodium chloride (NS) flush 5-40 mL    sodium chloride (NS) flush 5-40 mL    acetaminophen (TYLENOL) tablet 650 mg    Or    acetaminophen (TYLENOL) suppository 650 mg    polyethylene glycol (MIRALAX) packet 17 g    ondansetron (ZOFRAN ODT) tablet 4 mg    Or    ondansetron (ZOFRAN) injection 4 mg    atorvastatin (LIPITOR) tablet 80 mg    piperacillin-tazobactam (ZOSYN) 3.375 g in 0.9% sodium chloride (MBP/ADV) 100 mL MBP    azithromycin (ZITHROMAX) 500 mg in 0.9% sodium chloride 250 mL (VIAL-MATE)    baricitinib (OLUMIANT) tablet 1 mg      MAR reviewed and pertinent medications noted or modified as needed   Current Facility-Administered Medications   Medication    0.9% sodium chloride infusion 250 mL    levETIRAcetam (KEPPRA) oral solution 1,500 mg    heparin (porcine) 1,000 unit/mL injection 2,200 Units    insulin glargine (LANTUS) injection 20 Units    dexamethasone (DECADRON) 4 mg/mL injection 4 mg    NOREPINephrine (LEVOPHED) 8 mg in 0.9% NS 250ml infusion    lactulose (CHRONULAC) 10 gram/15 mL solution 15 mL    glucose chewable tablet 16 g    glucagon (GLUCAGEN) injection 1 mg    insulin lispro (HUMALOG) injection    dextrose 10% infusion 125-250 mL    LORazepam (ATIVAN) injection 2 mg    glucose chewable tablet 16 g    glucagon (GLUCAGEN) injection 1 mg    valproic acid (as sodium salt) (DEPAKENE) 250 mg/5 mL (5 mL) oral solution 500 mg    dextrose 10% infusion 125-250 mL    PHENYLephrine (ISRAEL-SYNEPHRINE) 30 mg in 0.9% sodium chloride 250 mL infusion    propofol (DIPRIVAN) 10 mg/mL infusion    lacosamide (VIMPAT) tablet 200 mg    levothyroxine (SYNTHROID) tablet 75 mcg    [Held by provider] aspirin chewable tablet 81 mg    sodium chloride (NS) flush 5-40 mL    sodium chloride (NS) flush 5-40 mL    acetaminophen (TYLENOL) tablet 650 mg    Or    acetaminophen (TYLENOL) suppository 650 mg    polyethylene glycol (MIRALAX) packet 17 g    ondansetron (ZOFRAN ODT) tablet 4 mg    Or    ondansetron (ZOFRAN) injection 4 mg    atorvastatin (LIPITOR) tablet 80 mg    piperacillin-tazobactam (ZOSYN) 3.375 g in 0.9% sodium chloride (MBP/ADV) 100 mL MBP    azithromycin (ZITHROMAX) 500 mg in 0.9% sodium chloride 250 mL (VIAL-MATE)    baricitinib (OLUMIANT) tablet 1 mg      PMH:  has a past medical history of Acute renal failure ([facility] [address] Anemia, Arthritis, DKA (diabetic ketoacidoses), GERD (gastroesophageal reflux disease), HTN (hypertension), Hypercholesterolemia, Schizophrenia ([facility] [address] Schizophreniform disorder ([facility] [address] Seizure disorder ([facility] [address] and Type II or unspecified type diabetes mellitus without mention of complication, uncontrolled. PSH:   has a past surgical history that includes ir insert non tunl cvc over 5 yrs (2022) and ir insert non tunl cvc over 5 yrs (2022). FHX: family history includes Stroke in his father. SHX:  reports that he has never smoked. He has never used smokeless tobacco. He reports that he does not drink alcohol and does not use drugs. ROS:  Unable to obtain intubated on ventilator and sedated    Hemodynamics:    CO:    CI:    CVP:    SVR:   PAP Systolic:    PAP Diastolic:    PVR:    HO98:        Ventilator Settings:      Mode Rate TV Press PEEP FiO2 PIP Min.  Vent   Assist control,Volume control    500 ml    5 cm H20 50 %  25 cm H2O  9.75 l/min        Vital Signs: Telemetry:    normal sinus rhythm Intake/Output:   Visit Vitals  /64 (BP 1 Location: Left upper arm, BP Patient Position: At rest)   Pulse 81   Temp 99.3 °F (37.4 °C)   Resp 18   Ht 6' (1.829 m)   Wt 107.5 kg (236 lb 15.9 oz)   SpO2 100%   BMI 32.14 kg/m²       Temp (24hrs), Av.3 °F (37.4 °C), Min:98.2 °F (36.8 °C), Max:100 °F (37.8 °C)        O2 Device: Ventilator         Wt Readings from Last 4 Encounters:   22 107.5 kg (236 lb 15.9 oz)   22 101.1 kg (222 lb 14.2 oz)   10/06/21 111.1 kg (245 lb)   07/22/21 110.7 kg (244 lb)          Intake/Output Summary (Last 24 hours) at 2/8/2022 0801  Last data filed at 2/8/2022 0400  Gross per 24 hour   Intake 800 ml   Output 520 ml   Net 280 ml       Last shift:      No intake/output data recorded. Last 3 shifts: 02/06 1901 - 02/08 0700  In: 1750   Out: 520        Physical Exam:     General:  intubated on vent unresponsive on no sedation  HEENT: NCAT,   Eyes: anicteric; conjunctiva clear no doll's eye reflex  Neck: no nodes, no cuff leak, trach midline; no accessory MM use. Chest: no deformity,   Cardiac: R regular; no murmur;   Lungs: distant breath sounds; no wheezes a few rales in the base  Abd: soft, NT, hypoactive BS  Ext: Trace edema; no joint swelling;  No clubbing  : No urine  Neuro: Unresponsive on no sedation no doll's eye reflex flaccid extremity  Psych-  unable to assess  Skin: warm, dry, no cyanosis;   Pulses: Brachial and radial pulses intact  Capillary: Slow capillary refill      DATA:    MAR reviewed and pertinent medications noted or modified as needed  MEDS:   Current Facility-Administered Medications   Medication    0.9% sodium chloride infusion 250 mL    levETIRAcetam (KEPPRA) oral solution 1,500 mg    heparin (porcine) 1,000 unit/mL injection 2,200 Units    insulin glargine (LANTUS) injection 20 Units    dexamethasone (DECADRON) 4 mg/mL injection 4 mg    NOREPINephrine (LEVOPHED) 8 mg in 0.9% NS 250ml infusion    lactulose (CHRONULAC) 10 gram/15 mL solution 15 mL    glucose chewable tablet 16 g    glucagon (GLUCAGEN) injection 1 mg    insulin lispro (HUMALOG) injection    dextrose 10% infusion 125-250 mL    LORazepam (ATIVAN) injection 2 mg    glucose chewable tablet 16 g    glucagon (GLUCAGEN) injection 1 mg    valproic acid (as sodium salt) (DEPAKENE) 250 mg/5 mL (5 mL) oral solution 500 mg    dextrose 10% infusion 125-250 mL    PHENYLephrine (ISRAEL-SYNEPHRINE) 30 mg in 0.9% sodium chloride 250 mL infusion    propofol (DIPRIVAN) 10 mg/mL infusion    lacosamide (VIMPAT) tablet 200 mg    levothyroxine (SYNTHROID) tablet 75 mcg    [Held by provider] aspirin chewable tablet 81 mg    sodium chloride (NS) flush 5-40 mL    sodium chloride (NS) flush 5-40 mL    acetaminophen (TYLENOL) tablet 650 mg    Or    acetaminophen (TYLENOL) suppository 650 mg    polyethylene glycol (MIRALAX) packet 17 g    ondansetron (ZOFRAN ODT) tablet 4 mg    Or    ondansetron (ZOFRAN) injection 4 mg    atorvastatin (LIPITOR) tablet 80 mg    piperacillin-tazobactam (ZOSYN) 3.375 g in 0.9% sodium chloride (MBP/ADV) 100 mL MBP    azithromycin (ZITHROMAX) 500 mg in 0.9% sodium chloride 250 mL (VIAL-MATE)    baricitinib (OLUMIANT) tablet 1 mg        Labs:    Recent Labs     02/08/22  0400 02/07/22  0400 02/06/22  0400   WBC 25.3* 25.1* 22.5*   HGB 10.1* 9.7* 9.9*   * 452* 408*   INR 1.1 1.2* 1.2*     Recent Labs     02/08/22  0400 02/07/22  0400 02/06/22  0400   * 133* 132*   K 4.4 4.6 4.4   CL 94* 99 101   CO2 19* 18* 21   * 220* 208*   BUN 76* 87* 68*   CREA 5.15* 5.62* 4.63*   CA 6.8* 6.4* 6.9*   MG  --  2.5*  --    PHOS  --  9.7*  --    ALB 1.0* 1.1* 1.2*   * 190* 209*     Recent Labs     02/08/22  0320 02/07/22  0400   PH 7.36 7.32*   PCO2 35 36   PO2 83 83   HCO3 20* 19*   FIO2 50.0 50.0       Lab Results   Component Value Date/Time    Culture result: Scant Normal respiratory mark 02/03/2022 07:20 PM    Culture result: No Growth (<1000 cfu/mL) 01/27/2022 02:00 PM    Culture result: No growth 6 days 01/27/2022 01:30 PM     Lab Results   Component Value Date/Time    TSH 4.47 (H) 05/21/2021 10:46 AM        Imaging:    Results from Hospital Encounter encounter on 01/27/22    XR CHEST PORT    Narrative  Chest, frontal view, 2/7/2022    History: CHF. Comparison: Including chest 2/6/2022. Findings: Right internal jugular catheter tips are at the distal SVC.   Endotracheal tube tip is 3.6 cm from the irma. Feeding tube tip is at the  stomach. The cardiac silhouette is stable. Lung volumes are mildly improved. Airspace opacities in the right lung are improved as compared to the study  performed one day prior. Airspace opacities at the left perihilar region and  base are not significantly changed. Left pleural effusion is possible. No  pneumothorax is identified. The osseous structures are stable. Impression  Airspace opacities in the lungs, improved on the right. Results from [place] encounter on 01/27/22    CT HEAD WO CONT    Narrative  Exam: CT Head without contrast    TECHNIQUE: Multiple transaxial CT images of the head were obtained without  contrast. Coronal and sagittal reformatted images were provided. Dose reduction: All CT scans at this facility are performed using dose reduction  optimization techniques as appropriate to a performed exam including the  following: Automated exposure control, adjustments of the mA and/or kV according  to patient size, or use of iterative reconstruction technique. HISTORY: anoxia    COMPARISON: Head CT 10/6/2021, 1/27/2022    FINDINGS:    Global parenchymal volume loss appears similar to prior with proportional ex  vacuo dilatation of the ventricles and other extra-axial CSF spaces, slightly  more prominent on the left. No significant midline shift or mass effect. Gray-white matter differentiation appears preserved. No findings of acute  intracranial hemorrhage. Basilar cisterns appear preserved. Intracranial  atherosclerosis. There is bilateral opacification of the mastoid air cells, most likely  indicating nonspecific mastoid effusions. Debris within the external auditory  canals is most likely cerumen. There is mild mucosal thickening in the paranasal  sinuses, most pronounced in the maxillary sinuses. Globes and orbits appear  unremarkable.  Calvarium appears intact without findings of acute or aggressive  abnormality. Impression  Overall similar exam to prior without findings of acute intracranial  abnormality. · 1/30 remains on the ventilator has metabolic acidosis we will add bicarb per tube. Maintain ventilator on current settings although will decrease PEEP slightly. Platelets continue to drop.   He is unresponsive on no sedation likely anoxic encephalopathy  · 1/31 remain intubated on ventilator hemodynamically worsening of the renal function  · 2/1 remained on ventilator hemodynamically unstable on levofed  · 2/2 on ventilator hypothermic electrolyte imbalance will continue with the current vent settings  · 2/4 acute bleeding from the dialysis catheter which has been corrected received 4 units of packed RBC ABG acceptable  · 2/5 remain intubated no more bleeding from the dialysis catheter site  · 2/6 on ventilator assist control mode we will continue to wean  · 2/7 dialyzed yesterday remain on ventilator

## 2022-02-08 NOTE — PROGRESS NOTES
Renal Progress Note    Patient: Bella Bojorquez MRN: [id number]  SSN: [id number]    YOB: 1945  Age: 78 y.o.   Sex: male      Admit Date: 1/27/2022    LOS: 12 days     Subjective:     Seen at bedside ,   Still intubated and sedated      Getting Tfs and Free water ,   Vent settings noted     S/p HD yesterday     Current Facility-Administered Medications   Medication Dose Route Frequency    0.9% sodium chloride infusion 250 mL  250 mL IntraVENous PRN    levETIRAcetam (KEPPRA) oral solution 1,500 mg  1,500 mg Oral BID    heparin (porcine) 1,000 unit/mL injection 2,200 Units  2,200 Units IntraVENous DIALYSIS PRN    insulin glargine (LANTUS) injection 20 Units  20 Units SubCUTAneous QHS    dexamethasone (DECADRON) 4 mg/mL injection 4 mg  4 mg IntraVENous Q12H    NOREPINephrine (LEVOPHED) 8 mg in 0.9% NS 250ml infusion  0.5-120 mcg/min IntraVENous TITRATE    lactulose (CHRONULAC) 10 gram/15 mL solution 15 mL  10 g Per G Tube TID    glucose chewable tablet 16 g  4 Tablet Oral PRN    glucagon (GLUCAGEN) injection 1 mg  1 mg IntraMUSCular PRN    insulin lispro (HUMALOG) injection   SubCUTAneous Q4H    dextrose 10% infusion 125-250 mL  125-250 mL IntraVENous PRN    LORazepam (ATIVAN) injection 2 mg  2 mg IntraVENous Q2H PRN    glucose chewable tablet 16 g  4 Tablet Oral PRN    glucagon (GLUCAGEN) injection 1 mg  1 mg IntraMUSCular PRN    valproic acid (as sodium salt) (DEPAKENE) 250 mg/5 mL (5 mL) oral solution 500 mg  500 mg Oral BID    dextrose 10% infusion 125-250 mL  125-250 mL IntraVENous PRN    PHENYLephrine (ISRAEL-SYNEPHRINE) 30 mg in 0.9% sodium chloride 250 mL infusion   mcg/min IntraVENous TITRATE    propofol (DIPRIVAN) 10 mg/mL infusion  0-50 mcg/kg/min IntraVENous TITRATE    lacosamide (VIMPAT) tablet 200 mg  200 mg Oral BID    levothyroxine (SYNTHROID) tablet 75 mcg  75 mcg Oral 7am    [Held by provider] aspirin chewable tablet 81 mg  81 mg Oral DAILY    sodium chloride (NS) flush 5-40 mL  5-40 mL IntraVENous Q8H    sodium chloride (NS) flush 5-40 mL  5-40 mL IntraVENous PRN    acetaminophen (TYLENOL) tablet 650 mg  650 mg Oral Q6H PRN    Or    acetaminophen (TYLENOL) suppository 650 mg  650 mg Rectal Q6H PRN    polyethylene glycol (MIRALAX) packet 17 g  17 g Oral DAILY PRN    ondansetron (ZOFRAN ODT) tablet 4 mg  4 mg Oral Q8H PRN    Or    ondansetron (ZOFRAN) injection 4 mg  4 mg IntraVENous Q6H PRN    atorvastatin (LIPITOR) tablet 80 mg  80 mg Oral QHS    piperacillin-tazobactam (ZOSYN) 3.375 g in 0.9% sodium chloride (MBP/ADV) 100 mL MBP  3.375 g IntraVENous Q8H    azithromycin (ZITHROMAX) 500 mg in 0.9% sodium chloride 250 mL (VIAL-MATE)  500 mg IntraVENous Q24H    baricitinib (OLUMIANT) tablet 1 mg  1 mg Oral DAILY        Vitals:    02/08/22 0600 02/08/22 0700 02/08/22 0720 02/08/22 0730   BP:  122/64     Pulse: 79 82  81   Resp:  20  18   Temp: 99.3 °F (37.4 °C) 99.3 °F (37.4 °C) 99.3 °F (37.4 °C)    TempSrc:       SpO2:  100%  100%   Weight:       Height:         Objective:   General: On ventilator, unresponsive   HEENT:  no Icterus, Pallor+, ET tube in place, mucosa dry   neck: Neck is supple, No JVD  Lungs: Decreased breath sounds at the bases,   CVS: heart sounds normal,  no murmurs, no rubs. GI: soft, nontender, no distention  Extremeties: no cyanosis, no edema    Neuro: Patient unresponsive, on vent, off sedation   skin: normal skin turgor, no skin rashes. Intake and Output:  Current Shift: No intake/output data recorded.   Last three shifts: 02/06 1901 - 02/08 0700  In: 1750   Out: 520       Lab/Data Review:  Recent Labs     02/08/22  0400 02/07/22  0400 02/06/22  0400   WBC 25.3* 25.1* 22.5*   HGB 10.1* 9.7* 9.9*   HCT 29.1* 28.1* 29.0*   * 452* 408*     Recent Labs     02/08/22  0400 02/07/22  0400 02/06/22  0400   * 133* 132*   K 4.4 4.6 4.4   CL 94* 99 101   CO2 19* 18* 21   * 220* 208*   BUN 76* 87* 68*   CREA 5.15* 5.62* 4.63* CA 6.8* 6.4* 6.9*   MG  --  2.5*  --    PHOS  --  9.7*  --    ALB 1.0* 1.1* 1.2*   TBILI 0.4 0.4 0.4   * 190* 209*   INR 1.1 1.2* 1.2*     Recent Labs     02/08/22  0320 02/07/22  0400   PH 7.36 7.32*   PCO2 35 36   PO2 83 83   HCO3 20* 19*   FIO2 50.0 50.0     Recent Results (from the past 24 hour(s))   GLUCOSE, POC    Collection Time: 02/07/22  8:16 AM   Result Value Ref Range    Glucose (POC) 194 (H) 65 - 117 mg/dL    Performed by [address] [place], [place] [address], POC    Collection Time: 02/07/22 10:56 AM   Result Value Ref Range    Glucose (POC) 184 (H) 65 - 117 mg/dL    Performed by [address] [place], [place] [address], POC    Collection Time: 02/07/22  3:31 PM   Result Value Ref Range    Glucose (POC) 102 65 - 117 mg/dL    Performed by [address] [place], [place] [address], POC    Collection Time: 02/07/22 10:00 PM   Result Value Ref Range    Glucose (POC) 172 (H) 65 - 117 mg/dL    Performed by Slime Lala RN (Traveler)    GLUCOSE, POC    Collection Time: 02/08/22  1:48 AM   Result Value Ref Range    Glucose (POC) 169 (H) 65 - 117 mg/dL    Performed by Slime Lala RN (Traveler)    BLOOD GAS, ARTERIAL    Collection Time: 02/08/22  3:20 AM   Result Value Ref Range    pH 7.36 7.35 - 7.45      PCO2 35 35 - 45 mmHg    PO2 83 75 - 100 mmHg    O2 SAT 96 >95 %    BICARBONATE 20 (L) 22 - 26 mmol/L    BASE DEFICIT 5.0 (H) 0 - 2 mmol/L    O2 METHOD VENT      FIO2 50.0 %    MODE Assist Control/Volume Control      Tidal volume 500      SET RATE 18      EPAP/CPAP/PEEP 5.0      SITE Right Radial      JULIO'S TEST PASS     PROTHROMBIN TIME + INR    Collection Time: 02/08/22  4:00 AM   Result Value Ref Range    Prothrombin time 14.5 11.9 - 14.7 sec    INR 1.1 0.9 - 1.1     LD    Collection Time: 02/08/22  4:00 AM   Result Value Ref Range     (H) 85 - 241 U/L   D DIMER    Collection Time: 02/08/22  4:00 AM   Result Value Ref Range    D DIMER 15.68 (H) <0.50 ug/ml(FEU)   PROCALCITONIN    Collection Time: 02/08/22  4:00 AM   Result Value Ref Range    Procalcitonin 4.55 (H) 0 ng/mL   SED RATE (ESR)    Collection Time: 02/08/22  4:00 AM   Result Value Ref Range    Sed rate, automated 126 mm/hr   CBC W/O DIFF    Collection Time: 02/08/22  4:00 AM   Result Value Ref Range    WBC 25.3 (H) 4.1 - 11.1 K/uL    RBC 3.19 (L) 4.10 - 5.70 M/uL    HGB 10.1 (L) 12.1 - 17.0 g/dL    HCT 29.1 (L) 36.6 - 50.3 %    MCV 91.2 80.0 - 99.0 FL    MCH 31.7 26.0 - 34.0 PG    MCHC 34.7 30.0 - 36.5 g/dL    RDW 14.6 (H) 11.5 - 14.5 %    PLATELET 830 (H) 249 - 400 K/uL    MPV 10.9 8.9 - 12.9 FL    NRBC 0.0 0.0  WBC    ABSOLUTE NRBC 0.00 0.00 - 6.05 K/uL   METABOLIC PANEL, COMPREHENSIVE    Collection Time: 02/08/22  4:00 AM   Result Value Ref Range    Sodium 131 (L) 136 - 145 mmol/L    Potassium 4.4 3.5 - 5.1 mmol/L    Chloride 94 (L) 97 - 108 mmol/L    CO2 19 (L) 21 - 32 mmol/L    Anion gap 18 (H) 5 - 15 mmol/L    Glucose 186 (H) 65 - 100 mg/dL    BUN 76 (H) 6 - 20 mg/dL    Creatinine 5.15 (H) 0.70 - 1.30 mg/dL    BUN/Creatinine ratio 15 12 - 20      GFR est AA 13 (L) >60 ml/min/1.73m2    GFR est non-AA 11 (L) >60 ml/min/1.73m2    Calcium 6.8 (L) 8.5 - 10.1 mg/dL    Bilirubin, total 0.4 0.2 - 1.0 mg/dL    AST (SGOT) 365 (H) 15 - 37 U/L    ALT (SGPT) 169 (H) 12 - 78 U/L    Alk.  phosphatase 177 (H) 45 - 117 U/L    Protein, total 6.1 (L) 6.4 - 8.2 g/dL    Albumin 1.0 (L) 3.5 - 5.0 g/dL    Globulin 5.1 (H) 2.0 - 4.0 g/dL    A-G Ratio 0.2 (L) 1.1 - 2.2     GLUCOSE, POC    Collection Time: 02/08/22  7:09 AM   Result Value Ref Range    Glucose (POC) 189 (H) 65 - 117 mg/dL    Performed by Jack Schwarz       FiO2 - stable on Vent   Noted ABG   Chest X ray personally reviewed   Assessment and Plan:     #1 acute kidney injury  --ANDRES likely 2/2 ATN from septic shock /rhabdomyolysis  Patient remained anuric, with rising BUN and creatinine s/p HD yesterday    Started on CRRT 1/31, had for 48 hrs, Switched to intermittent hemodialysis,   Status post hemodialysis 2/5/22 , tolerated 2 L ultrafiltration   will continue to monitor electrolytes closely and adjust management as needed. Will probably dialyze again tomorrow as his renal function is not improving and he remains oliguric      #2 severe hypokalemia: Improved potassium level    monitor potassium levels   Was dialyzed on 3 K bath yesterday     #3 Hyponatremia :sodium level to 133>>131 , from incr free water input and high glucose levels. Glucose relatively well controlled . Adjusted Free water rate . Continue to monitor sodium levels    #4  COVID-19 pneumonia:   -Septic shock  -With multiorgan failure including renal failure/transaminitis/hemodynamics shock  -Patient currently on Decadron azithromycin and Zosyn  Neurology following the patient for anoxic encephalopathy, remains unresponsive  Shock liver with high liver enzymes( improving),   Rhabdomyolysis+/ANDRES, on HD, will monitor   -Overall prognosis seems guarded     #5 diabetes: hyperglycemia+  Monitor accuchecks and adjust management as needed     #6 seizure disorder  -On antiseizure medications, neurology following the patient    7. Metabolic acidosis: Secondary to acute kidney injury/hypotension  Improved with hemodialysis  Stable CO2 level of 21, continue to monitor CO2    8. Hypocalcemia - corrected calcium WNL 8.7  Will dialyze on 3 calcium bath tomorrow       Signed By: Jocelyn Villareal MD     February 8, 2022

## 2022-02-08 NOTE — PROGRESS NOTES
1120: Chart reviewed. CM attempted to contact patient's brother, Senia Denson [phone]. VM received with detailed message left requesting a return call. CM will continue to follow patient and recs of medical team.    2846: CM received a call back from patient's brother, Senia Denson. Family has not made further care planning decisions at this time. Delmy stated he would speak with his brother this evening. Delmy also explained he or his brother Yolanda Daniel [phone] could be telephoned in regards to patient's needs. Their sister, Aliya Muhammad should not be contacted as she is currently receiving medical care. CM to follow-up with patient's brothers tomorrow.

## 2022-02-08 NOTE — PROGRESS NOTES
Infectious Diseases Progress Note  Omid Diego MD  [phone]  Date:2022       Room:[facility]  Patient Evelio Jay     YOB: 1945     Age:76 y.o. 76M with schizophrenia, seizures, diabetes, chronic renal failure.     24 presents to hospital unresponsive. Reportedly had been increasingly weak and unable to leave the bed for days. Associated with fevers. During the ED course found positive for Covid 19, with increased troponin. Intubated ventilated for acute hypoxic respiratory failure. During the hospital course, declining renal function and initiated on hemodialysis. I have been asked to see the patient in consultation for prolonged respiratory failure. Subjective    Subjective:  Symptoms:  Stable. Review of Systems   Unable to perform ROS: Intubated     Objective         Vitals Last 24 Hours:  TEMPERATURE:  Temp  Av.6 °F (37.6 °C)  Min: 99.1 °F (37.3 °C)  Max: 100 °F (37.8 °C)  RESPIRATIONS RANGE: Resp  Av.6  Min: 16  Max: 26  PULSE OXIMETRY RANGE: SpO2  Av.6 %  Min: 91 %  Max: 100 %  PULSE RANGE: Pulse  Av.7  Min: 75  Max: 100  BLOOD PRESSURE RANGE: Systolic (85XIG), GMI:313 , Min:94 , FGD:767   ; Diastolic (83YFR), KIR:74, Min:53, Max:75    I/O (24Hr): Intake/Output Summary (Last 24 hours) at 2022 1620  Last data filed at 2022 0700  Gross per 24 hour   Intake 800 ml   Output    Net 800 ml     Objective:  Vital signs: (most recent): Blood pressure 105/60, pulse 87, temperature 99.9 °F (37.7 °C), resp. rate 19, height 6' 0.01\" (1.829 m), weight 107.5 kg (236 lb 15.9 oz), SpO2 96 %. General:  intubated on vent unresponsive on no sedation  HEENT: NCAT,   Eyes: anicteric; conjunctiva clear no doll's eye reflex  Neck: no nodes, no cuff leak, trach midline; no accessory MM use.   Chest: no deformity,   Cardiac: R regular; no murmur;   Lungs: distant breath sounds; no wheezes a few rales in the base  Abd: soft, NT, hypoactive BS  Ext: Trace edema; no joint swelling; No clubbing  : No urine  Neuro: Unresponsive on no sedation no doll's eye reflex flaccid extremity  Psych-  unable to assess  Skin: warm, dry, no cyanosis;   Pulses: Brachial and radial pulses intact  Capillary: Slow capillary refill    Labs/Imaging/Diagnostics    Labs:  CBC:  Recent Labs     02/08/22 0400 02/07/22 0400 02/06/22 0400   WBC 25.3* 25.1* 22.5*   RBC 3.19* 3.09* 3.16*   HGB 10.1* 9.7* 9.9*   HCT 29.1* 28.1* 29.0*   MCV 91.2 90.9 91.8   RDW 14.6* 14.5 14.3   * 452* 408*     CHEMISTRIES:  Recent Labs     02/08/22 0400 02/07/22 0400 02/06/22 0400   * 133* 132*   K 4.4 4.6 4.4   CL 94* 99 101   CO2 19* 18* 21   BUN 76* 87* 68*   CA 6.8* 6.4* 6.9*   PHOS  --  9.7*  --    MG  --  2.5*  --    PT/INR:  Recent Labs     02/08/22 0400 02/07/22 0400 02/06/22 0400   INR 1.1 1.2* 1.2*     APTT:  No results for input(s): APTT in the last 72 hours. LIVER PROFILE:  Recent Labs     02/08/22 0400 02/07/22 0400 02/06/22  0400   * 415* 428*   * 190* 209*     Lab Results   Component Value Date/Time    ALT (SGPT) 169 (H) 02/08/2022 04:00 AM    AST (SGOT) 365 (H) 02/08/2022 04:00 AM    Alk. phosphatase 177 (H) 02/08/2022 04:00 AM    Bilirubin, total 0.4 02/08/2022 04:00 AM       Imaging Last 24 Hours:  XR CHEST PORT    Result Date: 2/8/2022  1 view at 1352 ET and NG tube and central lines remain Lower lung volumes with mild patchy infiltrate/atelectasis at the left greater than right bases. Upper lungs remain clear. No effusion or pneumothorax. Normal heart and no congestion    XR CHEST PORT    Result Date: 2/8/2022  Chest single view. Comparison single view chest 2/7/2022. Stable tubes and catheters. Obscuration left hemidiaphragm; LLL opacity with likely small dependent left pleural effusion persist. Right lung aerated. Cardiac and mediastinal structures unchanged. No pneumothorax.     Assessment//Plan   Active Problems:    Acute encephalopathy (5/18/2021)      Assessment & Plan   76M with schizophrenia, seizures, diabetes, chronic renal failure.     1/27/22 presents to hospital unresponsive. Reportedly had been increasingly weak and unable to leave the bed for days. Associated with fevers. During the ED course found positive for Covid 19, with increased troponin. Intubated ventilated for acute hypoxic respiratory failure. During the hospital course, declining renal function and initiated on hemodialysis.  I have been asked to see the patient in consultation for prolonged respiratory failure.     MICROBIOLOGY     1/27/22            Covid 19          Positive  1/27/22            Blood               Negative  1/27/22            Urine                Negative     2/3/22              Endotrach        Scant Normal respiratory mark     ASSESSMENT AND RECOMMENDATIONS     1) Prolonged acute hypoxic respiratory failure in the setting of multifactorial shock, shock liver, Covid 19 pneumonia, NSTEMI, acute on chronic renal failure, anoxic brain injury.                 Supportive care with respiratory support as needed              Zosyn and azithromycin through 2/8/22                 Overall prognosis remains very poor        Electronically signed by Trisha Aguilar MD on 2/8/2022 at 12:38 PM

## 2022-02-08 NOTE — PROGRESS NOTES
CARDIOLOGY PROGRESS NOTE      Patient seen and examined. This is a patient who is followed for NSTEMI in the setting of COVID multi-organ failure. Remains intubated and sedated. Critical condition.     Telemetry reviewed, there were no events noted in the past 24 hours. Remains in NSR with rare PVCs.     Pertinent review of systems items noted above, all other systems are negative. Current medications reviewed.     Physical Examination  Vital signs are stable. Blood pressure 112/64, Pulse 87  Intubated/sedated  Heart is regular, rate and rhythm.        Labs reviewed:      Case discussed with Dr. Zach Weeks and our impression and recommendations are as follows:     1. NSTEMI:   - HS troponin 1354 > 1510. Likely related to demand ischemia in the setting of COVID multi-organ failure  - ASA has been on hold since 1/27. - Echo: EF 65% with no wall motion abnormalities. - Continues with CRRT per renal     2. Hypotension:   -  BP has been stable with nursing's attempt to wean pressors.     3. COVID pna:   - With multi-organ failure and possible anoxic encephalopathy   -  Management per primary.   Phillip Days monitor telemetry and to evaluate for possible QTC prolongation. Will continue to monitor.  - Recommend to keep a negative fluid balance to prevent volume overload.     Poor prognosis. Family to reconvene with medical team on Monday to discuss goals of care. DNR.      Please do not hesitate to call me or Dr. Zach Weeks if additional questions arise.

## 2022-02-09 NOTE — DIALYSIS
Difficulty with temp dialysis catheter R IJ, catheter sluggish and trouble with pressures consequentially low blood flow. Unable to aspirate more than 5 ml from red port, resistance met, flushed and reversed lines, no changes. Dr. Wendy Garner notified, orders received for cath cher to dwell until next treatment.

## 2022-02-09 NOTE — PROGRESS NOTES
IMPRESSION:   1. Acute hypoxic respiratory failure oxygenation well-maintained on the ventilator  2. Malignant hyperthermia resolved   3. COVID-19 pneumonia  4. NSTEMI elevated troponin  5. Shock cardiogenic versus septic vs Hypovolumic Hypotension currently on norepinephrine  will continue to wean  6. Acute bleeding from dialysis catheter which has been corrected  7. Acute kidney injury now on hemodialysis  8. Metabolic acidosis  9. Thrombocytopenia worsened  10. Hypernatremia  11. Symptomatic hypokalemia  12. Shock liver with transaminitis      RECOMMENDATIONS/PLAN:   1. ICU monitoring  1. Ventilator for mechanical life support and prevent respiratory arrest with protective lung strategies ABG acceptable he is still hemodynamically unstable will continue with the current vent support: on Propofol. 2. On Assist control rate 18  PEEP 5 FiO2 50% ABG acceptable  3. Blood pressure improved off vasopressors  4. Patient on Decadron Zithromax Zosyn, and baricitinib WBC elevated, decrease Decadron  5. Troponin is elevated 2D echo shows ejection fraction of 65%  6. He was bleeding from the dialysis catheter which is corrected received 4 units of packed RBC hemoglobin dropped from 8.8 to 4.0 repeat lab shows Hb 9.3  7. Got dialyzed yesterday 500 cc of fluid removed  8. LDH procalcitonin trending down  9. Remains on quarter saline also on lactulose  10. Liver enzyme continues to improve as patient was in shock liver  11. Agree with Empiric IV antibiotics blood and urine cultures no growth on Zosyn and Zithromax   12. Temperature back to normal and sputum shows normal flow  13.  IV vasopressors for circulatory shock refractory to fluids to maintain SBP> 90      [x] High complexity decision making was performed  [x] See my orders for details  HPI   44-year-old male came in because as he was found unresponsive and fever 107 past medical history of schizophrenia and diabetes gastroesophageal reflux disease and anemia he is COVID-19 positive initially patient was called as NSTEMI but it was canceled patient is intubated on ventilator hemodynamically unstable unable to get any history out of the patient he seems dehydrated    PMH:  has a past medical history of Acute renal failure ([address] Anemia, Arthritis, DKA (diabetic ketoacidoses), GERD (gastroesophageal reflux disease), HTN (hypertension), Hypercholesterolemia, Schizophrenia ([address] Schizophreniform disorder ([address] Seizure disorder ([address] and Type II or unspecified type diabetes mellitus without mention of complication, uncontrolled. PSH:   has a past surgical history that includes ir insert non tunl cvc over 5 yrs (1/28/2022) and ir insert non tunl cvc over 5 yrs (1/31/2022). FHX: family history includes Stroke in his father. SHX:  reports that he has never smoked. He has never used smokeless tobacco. He reports that he does not drink alcohol and does not use drugs.     ALL: No Known Allergies     MEDS:   [x] Reviewed - As Below   [] Not reviewed    Current Facility-Administered Medications   Medication    zinc oxide-white petrolatum 20-75 % topical paste    0.9% sodium chloride infusion 250 mL    levETIRAcetam (KEPPRA) oral solution 1,500 mg    heparin (porcine) 1,000 unit/mL injection 2,200 Units    insulin glargine (LANTUS) injection 20 Units    dexamethasone (DECADRON) 4 mg/mL injection 4 mg    NOREPINephrine (LEVOPHED) 8 mg in 0.9% NS 250ml infusion    lactulose (CHRONULAC) 10 gram/15 mL solution 15 mL    glucose chewable tablet 16 g    glucagon (GLUCAGEN) injection 1 mg    insulin lispro (HUMALOG) injection    dextrose 10% infusion 125-250 mL    LORazepam (ATIVAN) injection 2 mg    glucose chewable tablet 16 g    glucagon (GLUCAGEN) injection 1 mg    valproic acid (as sodium salt) (DEPAKENE) 250 mg/5 mL (5 mL) oral solution 500 mg    dextrose 10% infusion 125-250 mL    PHENYLephrine (ISRAEL-SYNEPHRINE) 30 mg in 0.9% sodium chloride 250 mL infusion  propofol (DIPRIVAN) 10 mg/mL infusion    lacosamide (VIMPAT) tablet 200 mg    levothyroxine (SYNTHROID) tablet 75 mcg    [Held by provider] aspirin chewable tablet 81 mg    sodium chloride (NS) flush 5-40 mL    sodium chloride (NS) flush 5-40 mL    acetaminophen (TYLENOL) tablet 650 mg    Or    acetaminophen (TYLENOL) suppository 650 mg    polyethylene glycol (MIRALAX) packet 17 g    ondansetron (ZOFRAN ODT) tablet 4 mg    Or    ondansetron (ZOFRAN) injection 4 mg    atorvastatin (LIPITOR) tablet 80 mg    baricitinib (OLUMIANT) tablet 1 mg      MAR reviewed and pertinent medications noted or modified as needed   Current Facility-Administered Medications   Medication    zinc oxide-white petrolatum 20-75 % topical paste    0.9% sodium chloride infusion 250 mL    levETIRAcetam (KEPPRA) oral solution 1,500 mg    heparin (porcine) 1,000 unit/mL injection 2,200 Units    insulin glargine (LANTUS) injection 20 Units    dexamethasone (DECADRON) 4 mg/mL injection 4 mg    NOREPINephrine (LEVOPHED) 8 mg in 0.9% NS 250ml infusion    lactulose (CHRONULAC) 10 gram/15 mL solution 15 mL    glucose chewable tablet 16 g    glucagon (GLUCAGEN) injection 1 mg    insulin lispro (HUMALOG) injection    dextrose 10% infusion 125-250 mL    LORazepam (ATIVAN) injection 2 mg    glucose chewable tablet 16 g    glucagon (GLUCAGEN) injection 1 mg    valproic acid (as sodium salt) (DEPAKENE) 250 mg/5 mL (5 mL) oral solution 500 mg    dextrose 10% infusion 125-250 mL    PHENYLephrine (ISRAEL-SYNEPHRINE) 30 mg in 0.9% sodium chloride 250 mL infusion    propofol (DIPRIVAN) 10 mg/mL infusion    lacosamide (VIMPAT) tablet 200 mg    levothyroxine (SYNTHROID) tablet 75 mcg    [Held by provider] aspirin chewable tablet 81 mg    sodium chloride (NS) flush 5-40 mL    sodium chloride (NS) flush 5-40 mL    acetaminophen (TYLENOL) tablet 650 mg    Or    acetaminophen (TYLENOL) suppository 650 mg    polyethylene glycol (MIRALAX) packet 17 g    ondansetron (ZOFRAN ODT) tablet 4 mg    Or    ondansetron (ZOFRAN) injection 4 mg    atorvastatin (LIPITOR) tablet 80 mg    baricitinib (OLUMIANT) tablet 1 mg      PMH:  has a past medical history of Acute renal failure ([facility] [address] Anemia, Arthritis, DKA (diabetic ketoacidoses), GERD (gastroesophageal reflux disease), HTN (hypertension), Hypercholesterolemia, Schizophrenia ([facility] [address] Sch[facility]iform disorder ([facility] Seizure disorder ([facility] and Type II or unspecified type diabetes mellitus without mention of complication, uncontrolled. PSH:   has a past surgical history that includes ir insert non tunl cvc over 5 yrs (2022) and ir insert non tunl cvc over 5 yrs (2022). FHX: family history includes Stroke in his father. SHX:  reports that he has never smoked. He has never used smokeless tobacco. He reports that he does not drink alcohol and does not use drugs. ROS:  Unable to obtain intubated on ventilator and sedated    Hemodynamics:    CO:    CI:    CVP:    SVR:   PAP Systolic:    PAP Diastolic:    PVR:    OW52:        Ventilator Settings:      Mode Rate TV Press PEEP FiO2 PIP Min.  Vent   Assist control,Volume control    800 ml    5 cm H20 50 %  20 cm H2O  9.58 l/min        Vital Signs: Telemetry:    normal sinus rhythm Intake/Output:   Visit Vitals  /64 (BP 1 Location: Left upper arm, BP Patient Position: At rest)   Pulse 76   Temp 97.9 °F (36.6 °C)   Resp 18   Ht 6' 0.01\" (1.829 m)   Wt 112 kg (246 lb 14.6 oz)   SpO2 99%   BMI 33.48 kg/m²       Temp (24hrs), Av.6 °F (37.6 °C), Min:97.9 °F (36.6 °C), Max:100.6 °F (38.1 °C)        O2 Device: Ventilator         Wt Readings from Last 4 Encounters:   22 112 kg (246 lb 14.6 oz)   22 101.1 kg (222 lb 14.2 oz)   10/06/21 111.1 kg (245 lb)   21 110.7 kg (244 lb)          Intake/Output Summary (Last 24 hours) at 2022 0824  Last data filed at 2022 0551  Gross per 24 hour   Intake 1649 ml   Output  Net 1649 ml       Last shift:      No intake/output data recorded. Last 3 shifts: 02/07 1901 - 02/09 0700  In: 2449 [I.V.:449]  Out: -        Physical Exam:     General:  intubated on vent unresponsive on no sedation  HEENT: NCAT,   Eyes: anicteric; conjunctiva clear no doll's eye reflex  Neck: no nodes, no cuff leak, trach midline; no accessory MM use. Chest: no deformity,   Cardiac: R regular; no murmur;   Lungs: distant breath sounds; no wheezes a few rales in the base  Abd: soft, NT, hypoactive BS  Ext: Trace edema; no joint swelling;  No clubbing  : No urine  Neuro: Unresponsive on no sedation no doll's eye reflex flaccid extremity  Psych-  unable to assess  Skin: warm, dry, no cyanosis;   Pulses: Brachial and radial pulses intact  Capillary: Slow capillary refill      DATA:    MAR reviewed and pertinent medications noted or modified as needed  MEDS:   Current Facility-Administered Medications   Medication    zinc oxide-white petrolatum 20-75 % topical paste    0.9% sodium chloride infusion 250 mL    levETIRAcetam (KEPPRA) oral solution 1,500 mg    heparin (porcine) 1,000 unit/mL injection 2,200 Units    insulin glargine (LANTUS) injection 20 Units    dexamethasone (DECADRON) 4 mg/mL injection 4 mg    NOREPINephrine (LEVOPHED) 8 mg in 0.9% NS 250ml infusion    lactulose (CHRONULAC) 10 gram/15 mL solution 15 mL    glucose chewable tablet 16 g    glucagon (GLUCAGEN) injection 1 mg    insulin lispro (HUMALOG) injection    dextrose 10% infusion 125-250 mL    LORazepam (ATIVAN) injection 2 mg    glucose chewable tablet 16 g    glucagon (GLUCAGEN) injection 1 mg    valproic acid (as sodium salt) (DEPAKENE) 250 mg/5 mL (5 mL) oral solution 500 mg    dextrose 10% infusion 125-250 mL    PHENYLephrine (ISRAEL-SYNEPHRINE) 30 mg in 0.9% sodium chloride 250 mL infusion    propofol (DIPRIVAN) 10 mg/mL infusion    lacosamide (VIMPAT) tablet 200 mg    levothyroxine (SYNTHROID) tablet 75 mcg    [Held by provider] aspirin chewable tablet 81 mg    sodium chloride (NS) flush 5-40 mL    sodium chloride (NS) flush 5-40 mL    acetaminophen (TYLENOL) tablet 650 mg    Or    acetaminophen (TYLENOL) suppository 650 mg    polyethylene glycol (MIRALAX) packet 17 g    ondansetron (ZOFRAN ODT) tablet 4 mg    Or    ondansetron (ZOFRAN) injection 4 mg    atorvastatin (LIPITOR) tablet 80 mg    baricitinib (OLUMIANT) tablet 1 mg        Labs:    Recent Labs     02/09/22 0340 02/08/22  0400 02/07/22  0400   WBC 22.9* 25.3* 25.1*   HGB 9.3* 10.1* 9.7*   * 469* 452*   INR 1.3* 1.1 1.2*     Recent Labs     02/09/22 0340 02/08/22  0400 02/07/22  0400   * 131* 133*   K 5.0 4.4 4.6   CL 95* 94* 99   CO2 19* 19* 18*   * 186* 220*   BUN 92* 76* 87*   CREA 6.12* 5.15* 5.62*   CA 6.8* 6.8* 6.4*   MG  --   --  2.5*   PHOS  --   --  9.7*   ALB 1.0* 1.0* 1.1*   * 169* 190*     Recent Labs     02/09/22  0400 02/08/22  0320 02/07/22  0400   PH 7.30* 7.36 7.32*   PCO2 37 35 36   PO2 78 83 83   HCO3 18* 20* 19*   FIO2 50.0 50.0 50.0       Lab Results   Component Value Date/Time    Culture result: Scant Normal respiratory mark 02/03/2022 07:20 PM    Culture result: No Growth (<1000 cfu/mL) 01/27/2022 02:00 PM    Culture result: No growth 6 days 01/27/2022 01:30 PM     Lab Results   Component Value Date/Time    TSH 4.47 (H) 05/21/2021 10:46 AM        Imaging:    Results from Hospital Encounter encounter on 01/27/22    XR CHEST PORT    Narrative  1 view at 2178    ET and NG tube and central lines remain    Lower lung volumes with mild patchy infiltrate/atelectasis at the left greater  than right bases. Upper lungs remain clear. No effusion or pneumothorax.  Normal  heart and no congestion      Results from Hospital Encounter encounter on 01/27/22    CT HEAD WO CONT    Narrative  Exam: CT Head without contrast    TECHNIQUE: Multiple transaxial CT images of the head were obtained without  contrast. Coronal and sagittal reformatted images were provided. Dose reduction: All CT scans at this facility are performed using dose reduction  optimization techniques as appropriate to a performed exam including the  following: Automated exposure control, adjustments of the mA and/or kV according  to patient size, or use of iterative reconstruction technique. HISTORY: anoxia    COMPARISON: Head CT 10/6/2021, 1/27/2022    FINDINGS:    Global parenchymal volume loss appears similar to prior with proportional ex  vacuo dilatation of the ventricles and other extra-axial CSF spaces, slightly  more prominent on the left. No significant midline shift or mass effect. Gray-white matter differentiation appears preserved. No findings of acute  intracranial hemorrhage. Basilar cisterns appear preserved. Intracranial  atherosclerosis. There is bilateral opacification of the mastoid air cells, most likely  indicating nonspecific mastoid effusions. Debris within the external auditory  canals is most likely cerumen. There is mild mucosal thickening in the paranasal  sinuses, most pronounced in the maxillary sinuses. Globes and orbits appear  unremarkable. Calvarium appears intact without findings of acute or aggressive  abnormality. Impression  Overall similar exam to prior without findings of acute intracranial  abnormality. · 1/30 remains on the ventilator has metabolic acidosis we will add bicarb per tube. Maintain ventilator on current settings although will decrease PEEP slightly. Platelets continue to drop.   He is unresponsive on no sedation likely anoxic encephalopathy  · 1/31 remain intubated on ventilator hemodynamically worsening of the renal function  · 2/1 remained on ventilator hemodynamically unstable on levofed  · 2/2 on ventilator hypothermic electrolyte imbalance will continue with the current vent settings  · 2/4 acute bleeding from the dialysis catheter which has been corrected received 4 units of packed RBC ABG acceptable  · 2/5 remain intubated no more bleeding from the dialysis catheter site  · 2/6 on ventilator assist control mode we will continue to wean  · 2/7 dialyzed yesterday remain on ventilator  · 2/9 sedated on ventilator received dialysis off Boris still on Levophed

## 2022-02-09 NOTE — PROGRESS NOTES
Hospitalist Progress Note         Jessica Sosa MD          Daily Progress Note: 2/9/2022      Subjective: The patient is seen for follow  up. 76M, H/o Schizophenia, seizures, DMII on oral meds with unresponsive and acute hypoxic respiratory failure s/t COVID-19 pneumonitis complicated by ANDRES, hypokalemia and shock liver.            HSOPITAL COURSE  COVID positive, associated with fever 106, increased troponin, cardiology was consulted ansd recommended ECHO, there is no heparin gtt, was initially called for STEMI and was cancelled. he was intubated for acute hypoxic respiratory failure. On levophed. Complicated by ANDRES, shock liver and hypokalemia. Was treated with azithromycin, barcitinib, and zosyn. Will give 1L bolus and continue IVF. Discussed with family he is very critical- his renal functions increased and now seemingly in ATN, his liver functions have worsened and had a seizure on 1/28 , he is off propofol now and neurology was consulted. Repeat CT scan didn't show any stroke, plan for EEG on 2/1 to assess for neurological activity. The patients liver functions, renal are reviewed. __    Patient seen in follow-up.   Remains intubated and ventilated with FiO2 of 50% and PEEP of 5.        Problem List:  Problem List as of 2/9/2022 Date Reviewed: 12/30/2020          Codes Class Noted - Resolved    Acute hypernatremia ICD-10-CM: E87.0  ICD-9-CM: 276.0  6/9/2021 - Present        Uncontrolled type 2 diabetes mellitus with hyperglycemia ([facility] ICD-10-CM: E11.65  ICD-9-CM: 250.02  6/9/2021 - Present        Acute metabolic encephalopathy VTL-75-IV: G93.41  ICD-9-CM: 348.31  6/9/2021 - Present        ANDRES (acute kidney injury) ([facility] ICD-10-CM: N17.9  ICD-9-CM: 584.9  6/9/2021 - Present        Dehydration ICD-10-CM: E86.0  ICD-9-CM: 276.51  6/3/2021 - Present        Hypernatremia ICD-10-CM: E87.0  ICD-9-CM: 276.0  5/26/2021 - Present        Acute encephalopathy ICD-10-CM: G93.40  ICD-9-CM: 348.30  5/18/2021 - Present        Vitamin D deficiency ICD-10-CM: E55.9  ICD-9-CM: 268.9  12/30/2020 - Present        Dementia ([facility] ICD-10-CM: F03.90  ICD-9-CM: 294.20  7/26/2017 - Present        Mixed hyperlipidemia ICD-10-CM: E78.2  ICD-9-CM: 272.2  4/16/2016 - Present        Essential hypertension ICD-10-CM: I10  ICD-9-CM: 401.9  4/18/2015 - Present        DM (diabetes mellitus) ([facility] ICD-10-CM: E11.9  ICD-9-CM: 250.00  2/25/2013 - Present        Schizophreniform disorder ([facility] ICD-10-CM: F20.81  ICD-9-CM: 295.40  Unknown - Present        GERD (gastroesophageal reflux disease) ICD-10-CM: K21.9  ICD-9-CM: 530.81  Unknown - Present        Seizure disorder ([facility] ICD-10-CM: G40.909  ICD-9-CM: 345.90  Unknown - Present        RESOLVED: Controlled type 2 diabetes mellitus with complication, without long-term current use of insulin ([facility] ICD-10-CM: E11.8  ICD-9-CM: 250.90  12/30/2020 - 6/28/2021        RESOLVED: Morbid obesity ([facility] ICD-10-CM: E66.01  ICD-9-CM: 278.01  12/30/2020 - 8/3/2021        RESOLVED: Type 2 diabetes with nephropathy ([facility] ICD-10-CM: E11.21  ICD-9-CM: 250.40, 583.81  6/17/2018 - 6/28/2021        RESOLVED: Severe obesity with body mass index (BMI) of 35.0 to 39.9 with serious comorbidity ([facility] ICD-10-CM: E66.01  ICD-9-CM: 278.01  6/15/2018 - 1/1/2022        RESOLVED: Essential hypertension with goal blood pressure less than 140/90 ICD-10-CM: I10  ICD-9-CM: 401.9  4/16/2016 - 5/30/2021        RESOLVED: Hypothyroidism due to acquired atrophy of thyroid ICD-10-CM: E03.4  ICD-9-CM: 244.8, 246.8  4/18/2015 - 10/20/2021        RESOLVED: Hyperlipidemia ICD-10-CM: E78.5  ICD-9-CM: 272.4  5/28/2014 - 1/1/2022        RESOLVED: Hypothyroid ICD-10-CM: E03.9  ICD-9-CM: 244.9  5/28/2014 - 4/18/2015        RESOLVED: Seizures ([address] ICD-10-CM: R56.9  ICD-9-CM: 780.39  2/25/2013 - 1/1/2022        RESOLVED: HTN (hypertension) ICD-10-CM: I10  ICD-9-CM: 401.9  2/25/2013 - 4/18/2015 Medications reviewed  Current Facility-Administered Medications   Medication Dose Route Frequency    zinc oxide-white petrolatum 20-75 % topical paste   Topical TID    0.9% sodium chloride infusion 250 mL  250 mL IntraVENous PRN    levETIRAcetam (KEPPRA) oral solution 1,500 mg  1,500 mg Oral BID    heparin (porcine) 1,000 unit/mL injection 2,200 Units  2,200 Units IntraVENous DIALYSIS PRN    insulin glargine (LANTUS) injection 20 Units  20 Units SubCUTAneous QHS    dexamethasone (DECADRON) 4 mg/mL injection 4 mg  4 mg IntraVENous Q12H    NOREPINephrine (LEVOPHED) 8 mg in 0.9% NS 250ml infusion  0.5-120 mcg/min IntraVENous TITRATE    lactulose (CHRONULAC) 10 gram/15 mL solution 15 mL  10 g Per G Tube TID    glucose chewable tablet 16 g  4 Tablet Oral PRN    glucagon (GLUCAGEN) injection 1 mg  1 mg IntraMUSCular PRN    insulin lispro (HUMALOG) injection   SubCUTAneous Q4H    dextrose 10% infusion 125-250 mL  125-250 mL IntraVENous PRN    LORazepam (ATIVAN) injection 2 mg  2 mg IntraVENous Q2H PRN    glucose chewable tablet 16 g  4 Tablet Oral PRN    glucagon (GLUCAGEN) injection 1 mg  1 mg IntraMUSCular PRN    valproic acid (as sodium salt) (DEPAKENE) 250 mg/5 mL (5 mL) oral solution 500 mg  500 mg Oral BID    dextrose 10% infusion 125-250 mL  125-250 mL IntraVENous PRN    PHENYLephrine (ISRAEL-SYNEPHRINE) 30 mg in 0.9% sodium chloride 250 mL infusion   mcg/min IntraVENous TITRATE    propofol (DIPRIVAN) 10 mg/mL infusion  0-50 mcg/kg/min IntraVENous TITRATE    lacosamide (VIMPAT) tablet 200 mg  200 mg Oral BID    levothyroxine (SYNTHROID) tablet 75 mcg  75 mcg Oral 7am    [Held by provider] aspirin chewable tablet 81 mg  81 mg Oral DAILY    sodium chloride (NS) flush 5-40 mL  5-40 mL IntraVENous Q8H    sodium chloride (NS) flush 5-40 mL  5-40 mL IntraVENous PRN    acetaminophen (TYLENOL) tablet 650 mg  650 mg Oral Q6H PRN    Or    acetaminophen (TYLENOL) suppository 650 mg  650 mg Rectal Q6H PRN    polyethylene glycol (MIRALAX) packet 17 g  17 g Oral DAILY PRN    ondansetron (ZOFRAN ODT) tablet 4 mg  4 mg Oral Q8H PRN    Or    ondansetron (ZOFRAN) injection 4 mg  4 mg IntraVENous Q6H PRN    atorvastatin (LIPITOR) tablet 80 mg  80 mg Oral QHS       Review of Systems:   Review of systems not obtained due to patient factors. Objective:   Physical Exam:     Visit Vitals  /72   Pulse 77   Temp 98.2 °F (36.8 °C) (Bladder)   Resp 18   Ht 6' 0.01\" (1.829 m)   Wt 112 kg (246 lb 14.6 oz)   SpO2 99%   BMI 33.48 kg/m²      O2 Device: Ventilator    Temp (24hrs), Av.2 °F (37.3 °C), Min:97.9 °F (36.6 °C), Max:100.6 °F (38.1 °C)    No intake/output data recorded.  1901 -  0700  In: 2449 [I.V.:449]  Out: -     General:  Awake but unable to follow commands   Lungs:   Clear to auscultation bilaterally. Chest wall:  No tenderness or deformity. Heart:  Regular rate and rhythm, S1, S2 normal, no murmur, click, rub or gallop. Abdomen:   Soft, non-tender. Bowel sounds normal. No masses,  No organomegaly. Extremities: Extremities normal, atraumatic, no cyanosis or edema. Pulses: 2+ and symmetric all extremities. Skin: Skin color, texture, turgor normal. No rashes or lesions   Neurologic: CNII-XII intact.  No gross sensory or motor deficits     Data Review:       Recent Days:  Recent Labs     22  0340 22  0400 22  0400   WBC 22.9* 25.3* 25.1*   HGB 9.3* 10.1* 9.7*   HCT 26.3* 29.1* 28.1*   * 469* 452*     Recent Labs     22  0340 22  0400 22  0400   * 131* 133*   K 5.0 4.4 4.6   CL 95* 94* 99   CO2 19* 19* 18*   * 186* 220*   BUN 92* 76* 87*   CREA 6.12* 5.15* 5.62*   CA 6.8* 6.8* 6.4*   MG  --   --  2.5*   PHOS  --   --  9.7*   ALB 1.0* 1.0* 1.1*   TBILI 0.4 0.4 0.4   * 169* 190*   INR 1.3* 1.1 1.2*     Recent Labs     22  0400 22  0320 22  0400   PH 7.30* 7.36 7.32*   PCO2 37 35 36   PO2 78 83 83 HCO3 18* 20* 19*   FIO2 50.0 50.0 50.0       24 Hour Results:  Recent Results (from the past 24 hour(s))   GLUCOSE, POC    Collection Time: 02/08/22  3:48 PM   Result Value Ref Range    Glucose (POC) 205 (H) 65 - 117 mg/dL    Performed by [address] [place] [address], POC    Collection Time: 02/08/22  8:47 PM   Result Value Ref Range    Glucose (POC) 176 (H) 65 - 117 mg/dL    Performed by Abhinav Cabrera    GLUCOSE, POC    Collection Time: 02/09/22  1:04 AM   Result Value Ref Range    Glucose (POC) 184 (H) 65 - 117 mg/dL    Performed by Shireen Kim 112 + INR    Collection Time: 02/09/22  3:40 AM   Result Value Ref Range    Prothrombin time 15.5 (H) 11.9 - 14.7 sec    INR 1.3 (H) 0.9 - 1.1     LD    Collection Time: 02/09/22  3:40 AM   Result Value Ref Range     (H) 85 - 241 U/L   D DIMER    Collection Time: 02/09/22  3:40 AM   Result Value Ref Range    D DIMER 17.64 (H) <0.50 ug/ml(FEU)   PROCALCITONIN    Collection Time: 02/09/22  3:40 AM   Result Value Ref Range    Procalcitonin 4.13 (H) 0 ng/mL   SED RATE (ESR)    Collection Time: 02/09/22  3:40 AM   Result Value Ref Range    Sed rate, automated 126 mm/hr   CBC W/O DIFF    Collection Time: 02/09/22  3:40 AM   Result Value Ref Range    WBC 22.9 (H) 4.1 - 11.1 K/uL    RBC 2.90 (L) 4.10 - 5.70 M/uL    HGB 9.3 (L) 12.1 - 17.0 g/dL    HCT 26.3 (L) 36.6 - 50.3 %    MCV 90.7 80.0 - 99.0 FL    MCH 32.1 26.0 - 34.0 PG    MCHC 35.4 30.0 - 36.5 g/dL    RDW 14.6 (H) 11.5 - 14.5 %    PLATELET 668 (H) 241 - 400 K/uL    MPV 10.6 8.9 - 12.9 FL    NRBC 0.0 0.0  WBC    ABSOLUTE NRBC 0.00 0.00 - 7.82 K/uL   METABOLIC PANEL, COMPREHENSIVE    Collection Time: 02/09/22  3:40 AM   Result Value Ref Range    Sodium 129 (L) 136 - 145 mmol/L    Potassium 5.0 3.5 - 5.1 mmol/L    Chloride 95 (L) 97 - 108 mmol/L    CO2 19 (L) 21 - 32 mmol/L    Anion gap 15 5 - 15 mmol/L    Glucose 182 (H) 65 - 100 mg/dL    BUN 92 (H) 6 - 20 mg/dL    Creatinine 6.12 (H) 0.70 - 1.30 mg/dL    BUN/Creatinine ratio 15 12 - 20      GFR est AA 11 (L) >60 ml/min/1.73m2    GFR est non-AA 9 (L) >60 ml/min/1.73m2    Calcium 6.8 (L) 8.5 - 10.1 mg/dL    Bilirubin, total 0.4 0.2 - 1.0 mg/dL    AST (SGOT) 274 (H) 15 - 37 U/L    ALT (SGPT) 137 (H) 12 - 78 U/L    Alk. phosphatase 204 (H) 45 - 117 U/L    Protein, total 6.2 (L) 6.4 - 8.2 g/dL    Albumin 1.0 (L) 3.5 - 5.0 g/dL    Globulin 5.2 (H) 2.0 - 4.0 g/dL    A-G Ratio 0.2 (L) 1.1 - 2.2     BLOOD GAS, ARTERIAL    Collection Time: 02/09/22  4:00 AM   Result Value Ref Range    pH 7.30 (L) 7.35 - 7.45      PCO2 37 35 - 45 mmHg    PO2 78 75 - 100 mmHg    O2 SAT 97 >95 %    BICARBONATE 18 (L) 22 - 26 mmol/L    BASE DEFICIT 7.3 (H) 0 - 2 mmol/L    O2 METHOD VENT      FIO2 50.0 %    MODE Assist Control/Volume Control      Tidal volume 500      SET RATE 18      EPAP/CPAP/PEEP 5.0      SITE Right Radial      JULIO'S TEST PASS     GLUCOSE, POC    Collection Time: 02/09/22  5:01 AM   Result Value Ref Range    Glucose (POC) 176 (H) 65 - 117 mg/dL    Performed by Scotty Douglas    GLUCOSE, POC    Collection Time: 02/09/22  8:20 AM   Result Value Ref Range    Glucose (POC) 153 (H) 65 - 117 mg/dL    Performed by Rinku Lopez    GLUCOSE, POC    Collection Time: 02/09/22 11:32 AM   Result Value Ref Range    Glucose (POC) 138 (H) 65 - 117 mg/dL    Performed by [facility]            Assessment/     Acute hypoxic respiratory failure secondary to COVID-19  COVID-19 with pneumonitis  Septic shock due to COVID-19 pneumonitis  Acute encephalopathy  Non-STEMI type II due to demand mismatch  Shock liver secondary to septic shock  Acute anemia status post transfusion of packed red blood cells    Plan:  Continue supportive care including Decadron Zosyn and azithromycin  Wean of mechanical ventilator as tolerated  Continue to monitor daily electrolytes  Overall prognosis guarded  Updated NSM,BERT([phone]).  Requested son to arrange with other siblings for family get together with physician. Goals ofcare to be d/w family     Care Plan discussed with: Nurse    Total time spent with patient: 30 minutes.     Kenji Coleman MD

## 2022-02-09 NOTE — PROGRESS NOTES
CARDIOLOGY PROGRESS NOTE      Patient seen and examined. This is a patient who is followed for NSTEMI in the setting of COVID multi-organ failure. Remains intubated and sedated. Critical condition.     Telemetry reviewed, there were no events noted in the past 24 hours. Remains in NSR with rare PVCs.     Pertinent review of systems items noted above, all other systems are negative. Current medications reviewed.     Physical Examination  Vital signs are stable. Blood pressure 112/64, Pulse 87  Intubated/sedated  Heart is regular, rate and rhythm.        Labs reviewed:      Case discussed with Dr. Willian Hernández and our impression and recommendations are as follows:     1. NSTEMI:   - HS troponin 1354 > 1510. Likely related to demand ischemia in the setting of COVID multi-organ failure  - ASA has been on hold since 1/27. - Echo: EF 65% with no wall motion abnormalities. - Continues with CRRT per renal     2. Hypotension:   -  BP has been stable with nursing's attempt to wean pressors.     3. COVID pna:   - With multi-organ failure and possible anoxic encephalopathy   -  Management per primary.   Caitie May monitor telemetry and to evaluate for possible QTC prolongation. Will continue to monitor.  - Recommend to keep a negative fluid balance to prevent volume overload.     Overall prognosis is poor. DNR. Family to make decisions regarding treatment plan.      Please do not hesitate to call me or Dr. Willian Hernández if additional questions arise.

## 2022-02-09 NOTE — PROGRESS NOTES
CM reviewed clinical chart. Discharge plan is pending patient's progress. CM left a voice message for Paty Forbes ([phone]) to discuss further discharge planning. CM also called patient's other brother, Carrol Hodge ([phone]). Carrol Hodge stated that he and Clemtine both want \"everything, everything done for Jack Bras. \" He stated that they do not want him off the ventilator until \"he good and ready. \" Harmony Palmer that patient is being followed by pulmonologist who would not wean patient from ventilator unless it was medically appropriate. Discharge plans are pending patient's progress. CM will continue to follow.

## 2022-02-09 NOTE — PROGRESS NOTES
Infectious Diseases Progress Note  Omid Santos MD  [phone]  Date:2022       Room:[facility]  Samantha William     YOB: 1945     Age:76 y.o. 76M with schizophrenia, seizures, diabetes, chronic renal failure.     24 presents to hospital unresponsive. Reportedly had been increasingly weak and unable to leave the bed for days. Associated with fevers. During the ED course found positive for Covid 19, with increased troponin. Intubated ventilated for acute hypoxic respiratory failure. During the hospital course, declining renal function and initiated on hemodialysis. I have been asked to see the patient in consultation for prolonged respiratory failure. Subjective    Subjective:  Symptoms:  Stable. Review of Systems   Unable to perform ROS: Intubated     Objective         Vitals Last 24 Hours:  TEMPERATURE:  Temp  Av.1 °F (37.3 °C)  Min: 97.9 °F (36.6 °C)  Max: 100.6 °F (38.1 °C)  RESPIRATIONS RANGE: Resp  Av.7  Min: 18  Max: 21  PULSE OXIMETRY RANGE: SpO2  Av %  Min: 95 %  Max: 100 %  PULSE RANGE: Pulse  Av.2  Min: 72  Max: 89  BLOOD PRESSURE RANGE: Systolic (85PAK), IHA:387 , Min:102 , LHK:538   ; Diastolic (49ZQP), VZW:24, Min:60, Max:78    I/O (24Hr): Intake/Output Summary (Last 24 hours) at 2022 1418  Last data filed at 2022 0551  Gross per 24 hour   Intake 1449 ml   Output    Net 1449 ml     Objective:  Vital signs: (most recent): Blood pressure 128/72, pulse 75, temperature 98.1 °F (36.7 °C), resp. rate 19, height 6' 0.01\" (1.829 m), weight 112 kg (246 lb 14.6 oz), SpO2 99 %. General:  intubated on vent unresponsive on no sedation  HEENT: NCAT,   Eyes: anicteric; conjunctiva clear no doll's eye reflex  Neck: no nodes, no cuff leak, trach midline; no accessory MM use.   Chest: no deformity,   Cardiac: R regular; no murmur;   Lungs: distant breath sounds; no wheezes a few rales in the base  Abd: soft, NT, hypoactive BS  Ext: Trace edema; no joint swelling; No clubbing  : No urine  Neuro: Unresponsive on no sedation no doll's eye reflex flaccid extremity  Psych-  unable to assess  Skin: warm, dry, no cyanosis;   Pulses: Brachial and radial pulses intact  Capillary: Slow capillary refill    Labs/Imaging/Diagnostics    Labs:  CBC:  Recent Labs     02/09/22 0340 02/08/22  0400 02/07/22  0400   WBC 22.9* 25.3* 25.1*   RBC 2.90* 3.19* 3.09*   HGB 9.3* 10.1* 9.7*   HCT 26.3* 29.1* 28.1*   MCV 90.7 91.2 90.9   RDW 14.6* 14.6* 14.5   * 469* 452*     CHEMISTRIES:  Recent Labs     02/09/22 0340 02/08/22  0400 02/07/22  0400   * 131* 133*   K 5.0 4.4 4.6   CL 95* 94* 99   CO2 19* 19* 18*   BUN 92* 76* 87*   CA 6.8* 6.8* 6.4*   PHOS  --   --  9.7*   MG  --   --  2.5*   PT/INR:  Recent Labs     02/09/22 0340 02/08/22  0400 02/07/22  0400   INR 1.3* 1.1 1.2*     APTT:  No results for input(s): APTT in the last 72 hours. LIVER PROFILE:  Recent Labs     02/09/22 0340 02/08/22  0400 02/07/22  0400   * 365* 415*   * 169* 190*     Lab Results   Component Value Date/Time    ALT (SGPT) 137 (H) 02/09/2022 03:40 AM    AST (SGOT) 274 (H) 02/09/2022 03:40 AM    Alk. phosphatase 204 (H) 02/09/2022 03:40 AM    Bilirubin, total 0.4 02/09/2022 03:40 AM       Imaging Last 24 Hours:  XR CHEST PORT    Result Date: 2/9/2022  EXAM: XR CHEST PORT INDICATION: chf COMPARISON: 2/8/2022 FINDINGS: No significant interval change of the hardware. Low lung volumes with bilateral opacities. Unchanged cardiomediastinal contours. No segmental perfusion. No pneumothorax. Low lung volumes with bilateral opacities. Assessment//Plan   Active Problems:    Acute encephalopathy (5/18/2021)      Assessment & Plan   76M with schizophrenia, seizures, diabetes, chronic renal failure.     1/27/22 presents to hospital unresponsive. Reportedly had been increasingly weak and unable to leave the bed for days. Associated with fevers.  During the ED course found positive for Covid 19, with increased troponin. Intubated ventilated for acute hypoxic respiratory failure. During the hospital course, declining renal function and initiated on hemodialysis.  I have been asked to see the patient in consultation for prolonged respiratory failure.     MICROBIOLOGY     1/27/22            Covid 19          Positive  1/27/22            Blood               Negative  1/27/22            Urine                Negative     2/3/22              Endotrach        Scant Normal respiratory mark     ASSESSMENT AND RECOMMENDATIONS     1) Prolonged acute hypoxic respiratory failure in the setting of multifactorial shock, shock liver, Covid 19 pneumonia, NSTEMI, acute on chronic renal failure, anoxic brain injury.                 Supportive care with respiratory support as needed              Zosyn for now                 Overall prognosis remains very poor   Tracheostomy anticipated        Electronically signed by Gail Boland MD on 2/9/2022 at 12:38 PM

## 2022-02-09 NOTE — PROGRESS NOTES
Renal Progress Note    Patient: Leoncio Aparicio MRN: [id number]  SSN: [id number]    YOB: 1945  Age: 78 y.o. Sex: male      Admit Date: 1/27/2022    LOS: 13 days     Subjective:     Seen at bedside , he is getting Hemodialysis   Still intubated and sedated      Getting Tfs and Free water ,   Vent settings noted     -Getting dialysis today. Patient has poor blood flow rates from the arterial side of the catheter. Will try Cathflo. -Aim to remove 1.5 L of fluid, subjective blood flow rates.   Discussed with Dialysis nurse     Current Facility-Administered Medications   Medication Dose Route Frequency    zinc oxide-white petrolatum 20-75 % topical paste   Topical TID    0.9% sodium chloride infusion 250 mL  250 mL IntraVENous PRN    levETIRAcetam (KEPPRA) oral solution 1,500 mg  1,500 mg Oral BID    heparin (porcine) 1,000 unit/mL injection 2,200 Units  2,200 Units IntraVENous DIALYSIS PRN    insulin glargine (LANTUS) injection 20 Units  20 Units SubCUTAneous QHS    dexamethasone (DECADRON) 4 mg/mL injection 4 mg  4 mg IntraVENous Q12H    NOREPINephrine (LEVOPHED) 8 mg in 0.9% NS 250ml infusion  0.5-120 mcg/min IntraVENous TITRATE    lactulose (CHRONULAC) 10 gram/15 mL solution 15 mL  10 g Per G Tube TID    glucose chewable tablet 16 g  4 Tablet Oral PRN    glucagon (GLUCAGEN) injection 1 mg  1 mg IntraMUSCular PRN    insulin lispro (HUMALOG) injection   SubCUTAneous Q4H    dextrose 10% infusion 125-250 mL  125-250 mL IntraVENous PRN    LORazepam (ATIVAN) injection 2 mg  2 mg IntraVENous Q2H PRN    glucose chewable tablet 16 g  4 Tablet Oral PRN    glucagon (GLUCAGEN) injection 1 mg  1 mg IntraMUSCular PRN    valproic acid (as sodium salt) (DEPAKENE) 250 mg/5 mL (5 mL) oral solution 500 mg  500 mg Oral BID    dextrose 10% infusion 125-250 mL  125-250 mL IntraVENous PRN    PHENYLephrine (ISRAEL-SYNEPHRINE) 30 mg in 0.9% sodium chloride 250 mL infusion   mcg/min IntraVENous TITRATE  propofol (DIPRIVAN) 10 mg/mL infusion  0-50 mcg/kg/min IntraVENous TITRATE    lacosamide (VIMPAT) tablet 200 mg  200 mg Oral BID    levothyroxine (SYNTHROID) tablet 75 mcg  75 mcg Oral 7am    [Held by provider] aspirin chewable tablet 81 mg  81 mg Oral DAILY    sodium chloride (NS) flush 5-40 mL  5-40 mL IntraVENous Q8H    sodium chloride (NS) flush 5-40 mL  5-40 mL IntraVENous PRN    acetaminophen (TYLENOL) tablet 650 mg  650 mg Oral Q6H PRN    Or    acetaminophen (TYLENOL) suppository 650 mg  650 mg Rectal Q6H PRN    polyethylene glycol (MIRALAX) packet 17 g  17 g Oral DAILY PRN    ondansetron (ZOFRAN ODT) tablet 4 mg  4 mg Oral Q8H PRN    Or    ondansetron (ZOFRAN) injection 4 mg  4 mg IntraVENous Q6H PRN    atorvastatin (LIPITOR) tablet 80 mg  80 mg Oral QHS        Vitals:    02/09/22 0800 02/09/22 0815 02/09/22 0830 02/09/22 0845   BP: 122/68 114/65 112/66 114/68   Pulse: 75 75 73 72   Resp: 18 18 18 18   Temp: 97.9 °F (36.6 °C)      TempSrc: Bladder      SpO2: 99% 100% 100% 100%   Weight:       Height:         Objective:   General: On ventilator, unresponsive   HEENT:  no Icterus, Pallor+, ET tube in place, mucosa dry   neck: Neck is supple, No JVD  Lungs: Decreased breath sounds at the bases,   CVS: heart sounds normal,  no murmurs, no rubs. GI: soft, nontender, no distention  Extremeties: no cyanosis, no edema    Neuro: Patient unresponsive, on vent, off sedation   skin: normal skin turgor, no skin rashes. Intake and Output:  Current Shift: No intake/output data recorded.   Last three shifts: 02/07 1901 - 02/09 0700  In: 2449 [I.V.:449]  Out: -       Lab/Data Review:  Recent Labs     02/09/22  0340 02/08/22  0400 02/07/22  0400   WBC 22.9* 25.3* 25.1*   HGB 9.3* 10.1* 9.7*   HCT 26.3* 29.1* 28.1*   * 469* 452*     Recent Labs     02/09/22  0340 02/08/22  0400 02/07/22  0400   * 131* 133*   K 5.0 4.4 4.6   CL 95* 94* 99   CO2 19* 19* 18*   * 186* 220*   BUN 92* 76* 87*   CREA 6.12* 5.15* 5.62*   CA 6.8* 6.8* 6.4*   MG  --   --  2.5*   PHOS  --   --  9.7*   ALB 1.0* 1.0* 1.1*   TBILI 0.4 0.4 0.4   * 169* 190*   INR 1.3* 1.1 1.2*     Recent Labs     02/09/22  0400 02/08/22  0320 02/07/22  0400   PH 7.30* 7.36 7.32*   PCO2 37 35 36   PO2 78 83 83   HCO3 18* 20* 19*   FIO2 50.0 50.0 50.0     Recent Results (from the past 24 hour(s))   GLUCOSE, POC    Collection Time: 02/08/22 11:03 AM   Result Value Ref Range    Glucose (POC) 193 (H) 65 - 117 mg/dL    Performed by [address] [place] [address], POC    Collection Time: 02/08/22  3:48 PM   Result Value Ref Range    Glucose (POC) 205 (H) 65 - 117 mg/dL    Performed by [address] [place] [address], POC    Collection Time: 02/08/22  8:47 PM   Result Value Ref Range    Glucose (POC) 176 (H) 65 - 117 mg/dL    Performed by [facility]    GLUCOSE, POC    Collection Time: 02/09/22  1:04 AM   Result Value Ref Range    Glucose (POC) 184 (H) 65 - 117 mg/dL    Performed by Shireen Kim 112 + INR    Collection Time: 02/09/22  3:40 AM   Result Value Ref Range    Prothrombin time 15.5 (H) 11.9 - 14.7 sec    INR 1.3 (H) 0.9 - 1.1     LD    Collection Time: 02/09/22  3:40 AM   Result Value Ref Range     (H) 85 - 241 U/L   D DIMER    Collection Time: 02/09/22  3:40 AM   Result Value Ref Range    D DIMER 17.64 (H) <0.50 ug/ml(FEU)   PROCALCITONIN    Collection Time: 02/09/22  3:40 AM   Result Value Ref Range    Procalcitonin 4.13 (H) 0 ng/mL   SED RATE (ESR)    Collection Time: 02/09/22  3:40 AM   Result Value Ref Range    Sed rate, automated 126 mm/hr   CBC W/O DIFF    Collection Time: 02/09/22  3:40 AM   Result Value Ref Range    WBC 22.9 (H) 4.1 - 11.1 K/uL    RBC 2.90 (L) 4.10 - 5.70 M/uL    HGB 9.3 (L) 12.1 - 17.0 g/dL    HCT 26.3 (L) 36.6 - 50.3 %    MCV 90.7 80.0 - 99.0 FL    MCH 32.1 26.0 - 34.0 PG    MCHC 35.4 30.0 - 36.5 g/dL    RDW 14.6 (H) 11.5 - 14.5 %    PLATELET 039 (H) 130 - 400 K/uL    MPV 10.6 8.9 - 12.9 FL    NRBC 0.0 0.0  WBC    ABSOLUTE NRBC 0.00 0.00 - 8.54 K/uL   METABOLIC PANEL, COMPREHENSIVE    Collection Time: 02/09/22  3:40 AM   Result Value Ref Range    Sodium 129 (L) 136 - 145 mmol/L    Potassium 5.0 3.5 - 5.1 mmol/L    Chloride 95 (L) 97 - 108 mmol/L    CO2 19 (L) 21 - 32 mmol/L    Anion gap 15 5 - 15 mmol/L    Glucose 182 (H) 65 - 100 mg/dL    BUN 92 (H) 6 - 20 mg/dL    Creatinine 6.12 (H) 0.70 - 1.30 mg/dL    BUN/Creatinine ratio 15 12 - 20      GFR est AA 11 (L) >60 ml/min/1.73m2    GFR est non-AA 9 (L) >60 ml/min/1.73m2    Calcium 6.8 (L) 8.5 - 10.1 mg/dL    Bilirubin, total 0.4 0.2 - 1.0 mg/dL    AST (SGOT) 274 (H) 15 - 37 U/L    ALT (SGPT) 137 (H) 12 - 78 U/L    Alk.  phosphatase 204 (H) 45 - 117 U/L    Protein, total 6.2 (L) 6.4 - 8.2 g/dL    Albumin 1.0 (L) 3.5 - 5.0 g/dL    Globulin 5.2 (H) 2.0 - 4.0 g/dL    A-G Ratio 0.2 (L) 1.1 - 2.2     BLOOD GAS, ARTERIAL    Collection Time: 02/09/22  4:00 AM   Result Value Ref Range    pH 7.30 (L) 7.35 - 7.45      PCO2 37 35 - 45 mmHg    PO2 78 75 - 100 mmHg    O2 SAT 97 >95 %    BICARBONATE 18 (L) 22 - 26 mmol/L    BASE DEFICIT 7.3 (H) 0 - 2 mmol/L    O2 METHOD VENT      FIO2 50.0 %    MODE Assist Control/Volume Control      Tidal volume 500      SET RATE 18      EPAP/CPAP/PEEP 5.0      SITE Right Radial      JULIO'S TEST PASS     GLUCOSE, POC    Collection Time: 02/09/22  5:01 AM   Result Value Ref Range    Glucose (POC) 176 (H) 65 - 117 mg/dL    Performed by Brynn Valadez    GLUCOSE, POC    Collection Time: 02/09/22  8:20 AM   Result Value Ref Range    Glucose (POC) 153 (H) 65 - 117 mg/dL    Performed by Kashmir Jarrell       FiO2 - stable on Vent   Noted ABG   Chest X ray personally reviewed   Assessment and Plan:     #1 acute kidney injury  --ANDRES likely 2/2 ATN from septic shock /rhabdomyolysis  Patient remained anuric, with rising BUN and creatinine s/p HD yesterday    Started on CRRT 1/31, had for 48 hrs, Switched to intermittent hemodialysis, Status post hemodialysis 2/5/22 , tolerated 2 L ultrafiltration   will continue to monitor electrolytes closely and adjust management as needed. Will probably dialyze again today  as his renal function is not improving and he remains anuric .     #2 severe hypokalemia: Improved potassium level    monitor potassium levels   Will dialyze of=n 2 K bath today     #3 Hyponatremia :sodium level to 133>>131>>129 , from incr free water input and high glucose levels. Glucose relatively well controlled . Adjusted Free water rate . Will do UF with HD     #4  COVID-19 pneumonia:   -Septic shock  -With multiorgan failure including renal failure/transaminitis/hemodynamics shock  -Patient currently on Decadron azithromycin and Zosyn  Neurology following the patient for anoxic encephalopathy, remains unresponsive  Shock liver with high liver enzymes( improving),   Rhabdomyolysis+/ANDRES, on HD, will monitor   -Overall prognosis seems guarded     #5 diabetes: hyperglycemia+  Monitor accuchecks and adjust management as needed     #6 seizure disorder  -On antiseizure medications, neurology following the patient    7. Metabolic acidosis: Secondary to acute kidney injury/hypotension  Improved with hemodialysis  Stable CO2 level of 21, continue to monitor CO2    8. Hypocalcemia - corrected calcium WNL 8.7  Will dialyze on 3 calcium bath today     Signed By: Eliseo Mariano MD     February 9, 2022

## 2022-02-10 NOTE — PROGRESS NOTES
Infectious Diseases Progress Note  Omid Elena MD  [phone]  Date:2/10/2022       Room:[facility]  Patient Mari Iyer     YOB: 1945     Age:76 y.o. 76M with schizophrenia, seizures, diabetes, chronic renal failure.     24 presents to hospital unresponsive. Reportedly had been increasingly weak and unable to leave the bed for days. Associated with fevers. During the ED course found positive for Covid 19, with increased troponin. Intubated ventilated for acute hypoxic respiratory failure. During the hospital course, declining renal function and initiated on hemodialysis. I have been asked to see the patient in consultation for prolonged respiratory failure. Subjective    Subjective:  Symptoms:  Stable. Review of Systems   Unable to perform ROS: Intubated     Objective         Vitals Last 24 Hours:  TEMPERATURE:  Temp  Av.2 °F (36.8 °C)  Min: 97.1 °F (36.2 °C)  Max: 99.1 °F (37.3 °C)  RESPIRATIONS RANGE: Resp  Av.4  Min: 16  Max: 26  PULSE OXIMETRY RANGE: SpO2  Av.6 %  Min: 94 %  Max: 100 %  PULSE RANGE: Pulse  Av.8  Min: 73  Max: 86  BLOOD PRESSURE RANGE: Systolic (32PME), LJX:791 , Min:86 , AXB:958   ; Diastolic (66AGI), GIY:41, Min:45, Max:74    I/O (24Hr): Intake/Output Summary (Last 24 hours) at 2/10/2022 1454  Last data filed at 2/10/2022 1104  Gross per 24 hour   Intake 2155.5 ml   Output 30 ml   Net 2125.5 ml     Objective:  Vital signs: (most recent): Blood pressure (!) 115/59, pulse 86, temperature 99.1 °F (37.3 °C), resp. rate 20, height 6' 0.01\" (1.829 m), weight 115.8 kg (255 lb 4.7 oz), SpO2 96 %. General:  intubated on vent unresponsive on no sedation  HEENT: NCAT,   Eyes: anicteric; conjunctiva clear no doll's eye reflex  Neck: no nodes, no cuff leak, trach midline; no accessory MM use.   Chest: no deformity,   Cardiac: R regular; no murmur;   Lungs: distant breath sounds; no wheezes a few rales in the base  Abd: soft, NT, hypoactive BS  Ext: Trace edema; no joint swelling; No clubbing  : No urine  Neuro: Unresponsive on no sedation no doll's eye reflex flaccid extremity  Psych-  unable to assess  Skin: warm, dry, no cyanosis;   Pulses: Brachial and radial pulses intact  Capillary: Slow capillary refill    Labs/Imaging/Diagnostics    Labs:  CBC:  Recent Labs     02/10/22  0400 02/09/22  0340 02/08/22  0400   WBC 22.0* 22.9* 25.3*   RBC 3.05* 2.90* 3.19*   HGB 9.7* 9.3* 10.1*   HCT 27.1* 26.3* 29.1*   MCV 88.9 90.7 91.2   RDW 14.5 14.6* 14.6*   * 429* 469*     CHEMISTRIES:  Recent Labs     02/10/22  0500 02/09/22  0340 02/08/22  0400   * 129* 131*   K 4.7 5.0 4.4   CL 94* 95* 94*   CO2 18* 19* 19*   BUN 84* 92* 76*   CA 7.3* 6.8* 6.8*   PT/INR:  Recent Labs     02/10/22  0500 02/09/22  0340 02/08/22  0400   INR 1.1 1.3* 1.1     APTT:  No results for input(s): APTT in the last 72 hours. LIVER PROFILE:  Recent Labs     02/10/22  0500 02/09/22  0340 02/08/22  0400   * 274* 365*   * 137* 169*     Lab Results   Component Value Date/Time    ALT (SGPT) 123 (H) 02/10/2022 05:00 AM    AST (SGOT) 222 (H) 02/10/2022 05:00 AM    Alk. phosphatase 214 (H) 02/10/2022 05:00 AM    Bilirubin, total 0.4 02/10/2022 05:00 AM       Imaging Last 24 Hours:  XR CHEST PORT    Result Date: 2/10/2022  Semiupright portable radiograph of the chest 5:09 a.m. comparison with February 9, 2022. INDICATION: CHF. Patient is rotated to the right. Endotracheal tube tip is approximately 3.7 cm above the irma. Nasogastric tube projects over the stomach. 2 right IJ central lines are unchanged. Shallow depth of inspiration with persistent right basilar airspace disease or atelectasis.  Airspace disease in the left lung appears slightly worsened although some of this could be due to projection    Assessment//Plan   Active Problems:    Acute encephalopathy (5/18/2021)      Assessment & Plan   76M with schizophrenia, seizures, diabetes, chronic renal failure.     1/27/22 presents to hospital unresponsive. Reportedly had been increasingly weak and unable to leave the bed for days. Associated with fevers. During the ED course found positive for Covid 19, with increased troponin. Intubated ventilated for acute hypoxic respiratory failure. During the hospital course, declining renal function and initiated on hemodialysis.  I have been asked to see the patient in consultation for prolonged respiratory failure.     MICROBIOLOGY     1/27/22            Covid 19          Positive  2/10/22 Covid 19 Positive    1/27/22            Blood               Negative  1/27/22            Urine                Negative     2/3/22              Endotrach        Scant Normal respiratory mark     ASSESSMENT AND RECOMMENDATIONS     1) Prolonged acute hypoxic respiratory failure in the setting of multifactorial shock, shock liver, Covid 19 pneumonia, NSTEMI, acute on chronic renal failure, anoxic brain injury.                 Supportive care with respiratory support as needed              Zosyn for now                 Overall prognosis remains very poor   Tracheostomy anticipated        Electronically signed by Jaylene Noland MD on 2/10/2022 at 12:38 PM

## 2022-02-10 NOTE — PROGRESS NOTES
Hospitalist Progress Note         Kathy Arteaga MD          Daily Progress Note: 2/10/2022      Subjective: The patient is seen for follow  up. 76M, H/o Schizophenia, seizures, DMII on oral meds with unresponsive and acute hypoxic respiratory failure s/t COVID-19 pneumonitis complicated by ANDRES, hypokalemia and shock liver.            HSOPITAL COURSE  COVID positive, associated with fever 106, increased troponin, cardiology was consulted ansd recommended ECHO, there is no heparin gtt, was initially called for STEMI and was cancelled. he was intubated for acute hypoxic respiratory failure. On levophed. Complicated by ANDRES, shock liver and hypokalemia. Was treated with azithromycin, barcitinib, and zosyn. Will give 1L bolus and continue IVF. Discussed with family he is very critical- his renal functions increased and now seemingly in ATN, his liver functions have worsened and had a seizure on 1/28 , he is off propofol now and neurology was consulted. Repeat CT scan didn't show any stroke, plan for EEG on 2/1 to assess for neurological activity. The patients liver functions, renal are reviewed. __    Patient seen in follow-up.   Remains intubated and ventilated with FiO2 of 50% and PEEP of 5.        Problem List:  Problem List as of 2/10/2022 Date Reviewed: 12/30/2020          Codes Class Noted - Resolved    Acute hypernatremia ICD-10-CM: E87.0  ICD-9-CM: 276.0  6/9/2021 - Present        Uncontrolled type 2 diabetes mellitus with hyperglycemia ([facility] ICD-10-CM: E11.65  ICD-9-CM: 250.02  6/9/2021 - Present        Acute metabolic encephalopathy [id number]: G93.41  ICD-9-CM: 348.31  6/9/2021 - Present        ANDRES (acute kidney injury) ([facility] ICD-10-CM: N17.9  ICD-9-CM: 584.9  6/9/2021 - Present        Dehydration ICD-10-CM: E86.0  ICD-9-CM: 276.51  6/3/2021 - Present        Hypernatremia ICD-10-CM: E87.0  ICD-9-CM: 276.0  5/26/2021 - Present        Acute encephalopathy ICD-10-CM: G93.40  ICD-9-CM: 348.30  5/18/2021 - Present        Vitamin D deficiency ICD-10-CM: E55.9  ICD-9-CM: 268.9  12/30/2020 - Present        Dementia ([facility] ICD-10-CM: F03.90  ICD-9-CM: 294.20  7/26/2017 - Present        Mixed hyperlipidemia ICD-10-CM: E78.2  ICD-9-CM: 272.2  4/16/2016 - Present        Essential hypertension ICD-10-CM: I10  ICD-9-CM: 401.9  4/18/2015 - Present        DM (diabetes mellitus) ([facility] ICD-10-CM: E11.9  ICD-9-CM: 250.00  2/25/2013 - Present        Schizophreniform disorder ([facility] ICD-10-CM: F20.81  ICD-9-CM: 295.40  Unknown - Present        GERD (gastroesophageal reflux disease) ICD-10-CM: K21.9  ICD-9-CM: 530.81  Unknown - Present        Seizure disorder ([facility] ICD-10-CM: G40.909  ICD-9-CM: 345.90  Unknown - Present        RESOLVED: Controlled type 2 diabetes mellitus with complication, without long-term current use of insulin ([facility] ICD-10-CM: E11.8  ICD-9-CM: 250.90  12/30/2020 - 6/28/2021        RESOLVED: Morbid obesity ([facility] ICD-10-CM: E66.01  ICD-9-CM: 278.01  12/30/2020 - 8/3/2021        RESOLVED: Type 2 diabetes with nephropathy ([facility] ICD-10-CM: E11.21  ICD-9-CM: 250.40, 583.81  6/17/2018 - 6/28/2021        RESOLVED: Severe obesity with body mass index (BMI) of 35.0 to 39.9 with serious comorbidity ([facility] ICD-10-CM: E66.01  ICD-9-CM: 278.01  6/15/2018 - 1/1/2022        RESOLVED: Essential hypertension with goal blood pressure less than 140/90 ICD-10-CM: I10  ICD-9-CM: 401.9  4/16/2016 - 5/30/2021        RESOLVED: Hypothyroidism due to acquired atrophy of thyroid ICD-10-CM: E03.4  ICD-9-CM: 244.8, 246.8  4/18/2015 - 10/20/2021        RESOLVED: Hyperlipidemia ICD-10-CM: E78.5  ICD-9-CM: 272.4  5/28/2014 - 1/1/2022        RESOLVED: Hypothyroid ICD-10-CM: E03.9  ICD-9-CM: 244.9  5/28/2014 - 4/18/2015        RESOLVED: Seizures ([address] ICD-10-CM: R56.9  ICD-9-CM: 780.39  2/25/2013 - 1/1/2022        RESOLVED: HTN (hypertension) ICD-10-CM: I10  ICD-9-CM: 401.9  2/25/2013 - 4/18/2015 Medications reviewed  Current Facility-Administered Medications   Medication Dose Route Frequency    insulin lispro (HUMALOG) injection   SubCUTAneous Q6H    piperacillin-tazobactam (ZOSYN) 3.375 g in 0.9% sodium chloride (MBP/ADV) 100 mL MBP  3.375 g IntraVENous Q8H    zinc oxide-white petrolatum 20-75 % topical paste   Topical TID    0.9% sodium chloride infusion 250 mL  250 mL IntraVENous PRN    levETIRAcetam (KEPPRA) oral solution 1,500 mg  1,500 mg Oral BID    heparin (porcine) 1,000 unit/mL injection 2,200 Units  2,200 Units IntraVENous DIALYSIS PRN    insulin glargine (LANTUS) injection 20 Units  20 Units SubCUTAneous QHS    dexamethasone (DECADRON) 4 mg/mL injection 4 mg  4 mg IntraVENous Q12H    NOREPINephrine (LEVOPHED) 8 mg in 0.9% NS 250ml infusion  0.5-120 mcg/min IntraVENous TITRATE    lactulose (CHRONULAC) 10 gram/15 mL solution 15 mL  10 g Per G Tube TID    glucose chewable tablet 16 g  4 Tablet Oral PRN    glucagon (GLUCAGEN) injection 1 mg  1 mg IntraMUSCular PRN    dextrose 10% infusion 125-250 mL  125-250 mL IntraVENous PRN    LORazepam (ATIVAN) injection 2 mg  2 mg IntraVENous Q2H PRN    glucose chewable tablet 16 g  4 Tablet Oral PRN    glucagon (GLUCAGEN) injection 1 mg  1 mg IntraMUSCular PRN    valproic acid (as sodium salt) (DEPAKENE) 250 mg/5 mL (5 mL) oral solution 500 mg  500 mg Oral BID    dextrose 10% infusion 125-250 mL  125-250 mL IntraVENous PRN    PHENYLephrine (ISRAEL-SYNEPHRINE) 30 mg in 0.9% sodium chloride 250 mL infusion   mcg/min IntraVENous TITRATE    propofol (DIPRIVAN) 10 mg/mL infusion  0-50 mcg/kg/min IntraVENous TITRATE    lacosamide (VIMPAT) tablet 200 mg  200 mg Oral BID    levothyroxine (SYNTHROID) tablet 75 mcg  75 mcg Oral 7am    [Held by provider] aspirin chewable tablet 81 mg  81 mg Oral DAILY    sodium chloride (NS) flush 5-40 mL  5-40 mL IntraVENous Q8H    sodium chloride (NS) flush 5-40 mL  5-40 mL IntraVENous PRN    acetaminophen (TYLENOL) tablet 650 mg  650 mg Oral Q6H PRN    Or    acetaminophen (TYLENOL) suppository 650 mg  650 mg Rectal Q6H PRN    polyethylene glycol (MIRALAX) packet 17 g  17 g Oral DAILY PRN    ondansetron (ZOFRAN ODT) tablet 4 mg  4 mg Oral Q8H PRN    Or    ondansetron (ZOFRAN) injection 4 mg  4 mg IntraVENous Q6H PRN    atorvastatin (LIPITOR) tablet 80 mg  80 mg Oral QHS       Review of Systems:   Review of systems not obtained due to patient factors. Objective:   Physical Exam:     Visit Vitals  BP (!) 112/58 (BP 1 Location: Left upper arm, BP Patient Position: At rest)   Pulse 80   Temp 98.4 °F (36.9 °C)   Resp 16   Ht 6' 0.01\" (1.829 m)   Wt 115.8 kg (255 lb 4.7 oz)   SpO2 98%   BMI 34.62 kg/m²      O2 Device: Ventilator    Temp (24hrs), Av °F (36.7 °C), Min:97.1 °F (36.2 °C), Max:98.4 °F (36.9 °C)    No intake/output data recorded.  1901 - 02/10 0700  In: 3304.5 [I.V.:944.5]  Out: 30 [Urine:30]    General:  Awake but unable to follow commands   Lungs:   Clear to auscultation bilaterally. Chest wall:  No tenderness or deformity. Heart:  Regular rate and rhythm, S1, S2 normal, no murmur, click, rub or gallop. Abdomen:   Soft, non-tender. Bowel sounds normal. No masses,  No organomegaly. Extremities: Extremities normal, atraumatic, no cyanosis or edema. Pulses: 2+ and symmetric all extremities. Skin: Skin color, texture, turgor normal. No rashes or lesions   Neurologic: CNII-XII intact.  No gross sensory or motor deficits     Data Review:       Recent Days:  Recent Labs     02/10/22  0400 22  0340 22  0400   WBC 22.0* 22.9* 25.3*   HGB 9.7* 9.3* 10.1*   HCT 27.1* 26.3* 29.1*   * 429* 469*     Recent Labs     02/10/22  0500 22  0340 22  0400   * 129* 131*   K 4.7 5.0 4.4   CL 94* 95* 94*   CO2 18* 19* 19*   * 182* 186*   BUN 84* 92* 76*   CREA 5.73* 6.12* 5.15*   CA 7.3* 6.8* 6.8*   ALB 1.0* 1.0* 1.0*   TBILI 0.4 0.4 0.4   * 137* 169*   INR 1.1 1.3* 1.1     Recent Labs     02/10/22  0455 02/09/22  0400 02/08/22  0320   PH 7.33* 7.30* 7.36   PCO2 38 37 35   PO2 66* 78 83   HCO3 20* 18* 20*   FIO2 50.0 50.0 50.0       24 Hour Results:  Recent Results (from the past 24 hour(s))   GLUCOSE, POC    Collection Time: 02/09/22 11:32 AM   Result Value Ref Range    Glucose (POC) 138 (H) 65 - 117 mg/dL    Performed by Jorge 33, POC    Collection Time: 02/09/22  3:45 PM   Result Value Ref Range    Glucose (POC) 148 (H) 65 - 117 mg/dL    Performed by Anand Man    GLUCOSE, POC    Collection Time: 02/09/22  9:30 PM   Result Value Ref Range    Glucose (POC) 166 (H) 65 - 117 mg/dL    Performed by Twin Martinez    GLUCOSE, POC    Collection Time: 02/10/22 12:13 AM   Result Value Ref Range    Glucose (POC) 178 (H) 65 - 117 mg/dL    Performed by Twin Martinez    CBC WITH AUTOMATED DIFF    Collection Time: 02/10/22  4:00 AM   Result Value Ref Range    WBC 22.0 (H) 4.1 - 11.1 K/uL    RBC 3.05 (L) 4.10 - 5.70 M/uL    HGB 9.7 (L) 12.1 - 17.0 g/dL    HCT 27.1 (L) 36.6 - 50.3 %    MCV 88.9 80.0 - 99.0 FL    MCH 31.8 26.0 - 34.0 PG    MCHC 35.8 30.0 - 36.5 g/dL    RDW 14.5 11.5 - 14.5 %    PLATELET 947 (H) 083 - 400 K/uL    MPV 10.5 8.9 - 12.9 FL    NRBC 0.0 0.0  WBC    ABSOLUTE NRBC 0.00 0.00 - 0.01 K/uL    NEUTROPHILS 86 (H) 32 - 75 %    LYMPHOCYTES 7 (L) 12 - 49 %    MONOCYTES 4 (L) 5 - 13 %    EOSINOPHILS 0 0 - 7 %    BASOPHILS 0 0 - 1 %    MYELOCYTES 3 (H) 0 %    IMMATURE GRANULOCYTES 0 %    ABS. NEUTROPHILS 18.9 (H) 1.8 - 8.0 K/UL    ABS. LYMPHOCYTES 1.5 0.8 - 3.5 K/UL    ABS. MONOCYTES 0.9 0.0 - 1.0 K/UL    ABS. EOSINOPHILS 0.0 0.0 - 0.4 K/UL    ABS. BASOPHILS 0.0 0.0 - 0.1 K/UL    ABS. IMM.  GRANS. 0.0 K/UL    DF Manual      PLATELET COMMENTS Large Platelets      RBC COMMENTS Yanira cells  1+        RBC COMMENTS Polychromasia  1+        RBC COMMENTS Teardrop cells  1+       BLOOD GAS, ARTERIAL    Collection Time: 02/10/22  4:55 AM Result Value Ref Range    pH 7.33 (L) 7.35 - 7.45      PCO2 38 35 - 45 mmHg    PO2 66 (L) 75 - 100 mmHg    O2 SAT 92 (L) >95 %    BICARBONATE 20 (L) 22 - 26 mmol/L    BASE DEFICIT 5.7 (H) 0 - 2 mmol/L    O2 METHOD VENT      FIO2 50.0 %    MODE Assist Control/Volume Control      Tidal volume 500      SET RATE 18      EPAP/CPAP/PEEP 5.0      SITE Right Radial      JULIO'S TEST PASS     GLUCOSE, POC    Collection Time: 02/10/22  4:57 AM   Result Value Ref Range    Glucose (POC) 169 (H) 65 - 117 mg/dL    Performed by [employer]    PROTHROMBIN TIME + INR    Collection Time: 02/10/22  5:00 AM   Result Value Ref Range    Prothrombin time 14.2 11.9 - 14.6 sec    INR 1.1 0.9 - 1.1     LD    Collection Time: 02/10/22  5:00 AM   Result Value Ref Range     (H) 85 - 241 U/L   D DIMER    Collection Time: 02/10/22  5:00 AM   Result Value Ref Range    D DIMER >20.00 (H) <0.50 ug/ml(FEU)   PROCALCITONIN    Collection Time: 02/10/22  5:00 AM   Result Value Ref Range    Procalcitonin 3.37 (H) 0 ng/mL   SED RATE (ESR)    Collection Time: 02/10/22  5:00 AM   Result Value Ref Range    Sed rate, automated 824 mm/hr   METABOLIC PANEL, COMPREHENSIVE    Collection Time: 02/10/22  5:00 AM   Result Value Ref Range    Sodium 127 (L) 136 - 145 mmol/L    Potassium 4.7 3.5 - 5.1 mmol/L    Chloride 94 (L) 97 - 108 mmol/L    CO2 18 (L) 21 - 32 mmol/L    Anion gap 15 5 - 15 mmol/L    Glucose 167 (H) 65 - 100 mg/dL    BUN 84 (H) 6 - 20 mg/dL    Creatinine 5.73 (H) 0.70 - 1.30 mg/dL    BUN/Creatinine ratio 15 12 - 20      GFR est AA 12 (L) >60 ml/min/1.73m2    GFR est non-AA 10 (L) >60 ml/min/1.73m2    Calcium 7.3 (L) 8.5 - 10.1 mg/dL    Bilirubin, total 0.4 0.2 - 1.0 mg/dL    AST (SGOT) 222 (H) 15 - 37 U/L    ALT (SGPT) 123 (H) 12 - 78 U/L    Alk.  phosphatase 214 (H) 45 - 117 U/L    Protein, total 6.4 6.4 - 8.2 g/dL    Albumin 1.0 (L) 3.5 - 5.0 g/dL    Globulin 5.4 (H) 2.0 - 4.0 g/dL    A-G Ratio 0.2 (L) 1.1 - 2.2             Assessment/ Acute hypoxic respiratory failure secondary to COVID-19  COVID-19 with pneumonitis  Septic shock due to COVID-19 pneumonitis  Acute encephalopathy  Non-STEMI type II due to demand mismatch  Shock liver secondary to septic shock  Acute anemia status post transfusion of packed red blood cells    Plan:  Continue supportive care including Decadron Zosyn and azithromycin  Wean of mechanical ventilator as tolerated  Continue to monitor daily electrolytes  Overall prognosis guarded  Updated [facility]([phone]). Requested son to arrange with other siblings for family get together with physician. Goals of care to be d/w family     Care Plan discussed with: Nurse    Total time spent with patient: 30 minutes.     Vincenzo Jones MD

## 2022-02-10 NOTE — PROGRESS NOTES
Comprehensive Nutrition Assessment    Type and Reason for Visit: Reassess (goal)    Nutrition Recommendations/Plan:   Continue TF of Nepro to Goal of 20ml/hr  Flush 200ml q6hrs  Provide 2 pkts prosource q6hrs (8pkt/d; 480kcals, 120g protein)  Provides 1344kcals (65%), 159g pro (106%), 1149kcals (57%)  Monitor EN initiation, TF tolerance, PO advancement, weight, labs, skin          Propofol at 45mcg/kg/min providing 710kcal/d (100%)     Please document TF rate, flushes, prosource provision, and GRVS     Nutrition Assessment:  Admitted for encephalopathy, +COVID-1, +NSTEMI. (1/27) intubated in ED, pt initially on pressors and sedation, have been off and on since. Pt remains intubated today, now back on low-dose pressors and max dose sedation, +MOSF and possible anoxic encephalopathy . (1/31) CRRT started, (2/3) changed to intermittent HD. (2/2) TF of Nepro started by nephrology, overfeeding pt so RD adjusted to current regimen that remains today and is tolerated well. No changes to TF, family meeting today for goals of care. Labs: H/H 9.7/27.1, Na 127, BUN 84, Cr 5.73, -177, LFT elevated, Alb 1.0, A1c% 7.6. Meds: lantus, SSI, lactulose, keppra, levothyroxine, levophed, zosyn, propofol at 45 mcg/kg/min, valproic acid. Malnutrition Assessment:  Malnutrition Status:  No malnutrition    Context:  Acute illness     Findings of the 6 clinical characteristics of malnutrition:   Energy Intake:  No significant decrease in energy intake  Weight Loss:  No significant weight loss     Body Fat Loss:  Unable to assess,     Muscle Mass Loss:  Unable to assess,    Fluid Accumulation:  No significant fluid accumulation,        Estimated Daily Nutrient Needs:  Energy (kcal): 2,052kcals (PSU 2003b); Weight Used for Energy Requirements: Admission (100kg EDW)  Protein (g): 150g (1.5g/kg EDW); Weight Used for Protein Requirements: Current (100kg edw)  Fluid (ml/day): 2000ml (20ml/kg EDW);  Method Used for Fluid Requirements: ml/kg      Nutrition Related Findings:  No NFPE performed d/t COVID-19, appears nourished. No N/V/C/D. FMS in place, no OP recently documented. Generalized anasarca, non-pitting edema to x4 extremities. Wounds:    Stage I,Deep tissue injury (Erythema -R buttocks, sacrum; Stage 1 L ankle; DTI R heel;)       Current Nutrition Therapies:  DIET NPO  ADULT TUBE FEEDING Orogastric; Renal; Delivery Method: Continuous; Continuous Initial Rate (mL/hr): 20; Continuous Advance Tube Feeding: No; Water Flush Volume (mL): 150; Water Flush Frequency: Q 6 hours; Modulars/Additives: Protein; Specify How t... Anthropometric Measures:  · Height:  6' 0.01\" (182.9 cm)  · Current Body Wt:  115.8 kg (255 lb 4.7 oz) (2/10)   · Admission Body Wt:  220 lb    · Usual Body Wt:        · Ideal Body Wt:  178 lbs:  143.4 %   · Adjusted Body Weight:   ; Weight Adjustment for:  (100kg EDW, BMI 29.89)   · Adjusted BMI:       · BMI Category:  Obese class 1 (BMI 30.0-34. 9)       Nutrition Diagnosis:   · Inadequate oral intake related to cognitive or neurological impairment as evidenced by NPO or clear liquid status due to medical condition,intubation      Nutrition Interventions:   Food and/or Nutrient Delivery: Continue NPO,Continue tube feeding  Nutrition Education and Counseling: No recommendations at this time  Coordination of Nutrition Care: Continue to monitor while inpatient    Goals:  Meet 75% EEN, Maintain CBW +/-0.5kg x1wk, Maintain skin integrity, Lytes WNL       Nutrition Monitoring and Evaluation:   Behavioral-Environmental Outcomes: None identified  Food/Nutrient Intake Outcomes: Enteral nutrition intake/tolerance  Physical Signs/Symptoms Outcomes: Weight,Hemodynamic status,Fluid status or edema,Biochemical data    Discharge Planning:     Too soon to determine     Electronically signed by [employer] on 2/10/2022 at 11:56 AM    Contact: Ext [phone], or via [employer]

## 2022-02-10 NOTE — PROGRESS NOTES
CARDIOLOGY PROGRESS NOTE      Patient seen and examined. This is a patient who is followed for NSTEMI in the setting of COVID multi-organ failure. Attempting to wean today.      Telemetry reviewed, there were no events noted in the past 24 hours. Remains in NSR with rare PVCs.     Pertinent review of systems items noted above, all other systems are negative. Current medications reviewed.     Physical Examination  Vital signs are stable. Blood pressure 112/58, Pulse 80  Intubated/sedated  Heart is regular, rate and rhythm.      Labs reviewed:      Case discussed with Dr. Sylwia Yun and our impression and recommendations are as follows:     1. NSTEMI:   - HS troponin 1354 > 1510. Likely related to demand ischemia in the setting of COVID multi-organ failure  - ASA has been on hold since 1/27. - Echo: EF 65% with no wall motion abnormalities. - Continues with CRRT per renal     2. Hypotension:   -  BP has been stable with nursing's attempt to wean pressors.     3. COVID pna:   - weaning trial this morning.  - With multi-organ failure and possible anoxic encephalopathy   -  Management per primary.   Milly Reagan monitor telemetry and to evaluate for possible QTC prolongation. Will continue to monitor.  - Recommend to keep a negative fluid balance to prevent volume overload.     Overall prognosis is poor. DNR. Family to make decisions regarding treatment plan.      Please do not hesitate to call me or Dr. Sylwia Yun if additional questions arise.

## 2022-02-10 NOTE — PROGRESS NOTES
Clinical chart reviewed by CM. Discharge disposition is pending patient's progress. Per attending physician's note, patient's brother is gathering all siblings to come together for a goals of care meeting with physician. CM will continue to follow.

## 2022-02-10 NOTE — PROGRESS NOTES
IMPRESSION:   1. Acute hypoxic respiratory failure oxygenation well-maintained on the ventilator  2. Malignant hyperthermia resolved   3. COVID-19 pneumonia  4. NSTEMI elevated troponin trending down  5. Shock cardiogenic versus septic vs Hypovolumic Hypotension currently on norepinephrine  will continue to wean  6. Acute bleeding from dialysis catheter which has been corrected  7. Acute kidney injury now on hemodialysis  8. Metabolic acidosis resolved  9. Thrombocytopenia worsened  10. Hypernatremia  11. Symptomatic hypokalemia  12. Shock liver with transaminitis significantly improved      RECOMMENDATIONS/PLAN:   1. ICU monitoring  1. Ventilator for mechanical life support and prevent respiratory arrest with protective lung strategies ABG acceptable he is still hemodynamically unstable will continue with the current vent support: on Propofol. We will start weaning we will do sedation vacation  2. Thick mucus secretions present we will try to give nebulizer treatment along with Mucomyst  will repeat Covid testing  3. On Assist control rate 18  PEEP 5 FiO2 50% ABG acceptable  4. Blood pressure improved off vasopressors  5. Patient on Decadron Zithromax Zosyn, and baricitinib WBC elevated, decrease Decadron  6. Troponin is elevated 2D echo shows ejection fraction of 65%  7. He was bleeding from the dialysis catheter which is corrected received 4 units of packed RBC hemoglobin dropped from 8.8 to 4.0 repeat lab shows Hb 9.7  8. Got dialyzed 500 cc of fluid removed  9. LDH procalcitonin trending down  10. Remains on quarter saline also on lactulose  11. Liver enzyme continues to improve as patient was in shock liver  12. Agree with [facility] IV antibiotics blood and urine cultures no growth on Zosyn   13. Temperature back to normal and sputum shows normal flow  14.  IV vasopressors for circulatory shock refractory to fluids to maintain SBP> 90      [x] High complexity decision making was performed  [x] See my orders for details  HPI   72-year-old male came in because as he was found unresponsive and fever 107 past medical history of schizophrenia and diabetes gastroesophageal reflux disease and anemia he is COVID-19 positive initially patient was called as NSTEMI but it was canceled patient is intubated on ventilator hemodynamically unstable unable to get any history out of the patient he seems dehydrated    PMH:  has a past medical history of Acute renal failure ([address] Anemia, Arthritis, DKA (diabetic ketoacidoses), GERD (gastroesophageal reflux disease), HTN (hypertension), Hypercholesterolemia, Schizophrenia ([address] Schizophreniform disorder ([address] Seizure disorder ([facility] [address] and Type II or unspecified type diabetes mellitus without mention of complication, uncontrolled. PSH:   has a past surgical history that includes ir insert non tunl cvc over 5 yrs (1/28/2022) and ir insert non tunl cvc over 5 yrs (1/31/2022). FHX: family history includes Stroke in his father. SHX:  reports that he has never smoked. He has never used smokeless tobacco. He reports that he does not drink alcohol and does not use drugs.     ALL: No Known Allergies     MEDS:   [x] Reviewed - As Below   [] Not reviewed    Current Facility-Administered Medications   Medication    insulin lispro (HUMALOG) injection    piperacillin-tazobactam (ZOSYN) 3.375 g in 0.9% sodium chloride (MBP/ADV) 100 mL MBP    zinc oxide-white petrolatum 20-75 % topical paste    0.9% sodium chloride infusion 250 mL    levETIRAcetam (KEPPRA) oral solution 1,500 mg    heparin (porcine) 1,000 unit/mL injection 2,200 Units    insulin glargine (LANTUS) injection 20 Units    dexamethasone (DECADRON) 4 mg/mL injection 4 mg    NOREPINephrine (LEVOPHED) 8 mg in 0.9% NS 250ml infusion    lactulose (CHRONULAC) 10 gram/15 mL solution 15 mL    glucose chewable tablet 16 g    glucagon (GLUCAGEN) injection 1 mg    dextrose 10% infusion 125-250 mL    LORazepam (ATIVAN) injection 2 mg    glucose chewable tablet 16 g    glucagon (GLUCAGEN) injection 1 mg    valproic acid (as sodium salt) (DEPAKENE) 250 mg/5 mL (5 mL) oral solution 500 mg    dextrose 10% infusion 125-250 mL    PHENYLephrine (ISRAEL-SYNEPHRINE) 30 mg in 0.9% sodium chloride 250 mL infusion    propofol (DIPRIVAN) 10 mg/mL infusion    lacosamide (VIMPAT) tablet 200 mg    levothyroxine (SYNTHROID) tablet 75 mcg    [Held by provider] aspirin chewable tablet 81 mg    sodium chloride (NS) flush 5-40 mL    sodium chloride (NS) flush 5-40 mL    acetaminophen (TYLENOL) tablet 650 mg    Or    acetaminophen (TYLENOL) suppository 650 mg    polyethylene glycol (MIRALAX) packet 17 g    ondansetron (ZOFRAN ODT) tablet 4 mg    Or    ondansetron (ZOFRAN) injection 4 mg    atorvastatin (LIPITOR) tablet 80 mg      MAR reviewed and pertinent medications noted or modified as needed   Current Facility-Administered Medications   Medication    insulin lispro (HUMALOG) injection    piperacillin-tazobactam (ZOSYN) 3.375 g in 0.9% sodium chloride (MBP/ADV) 100 mL MBP    zinc oxide-white petrolatum 20-75 % topical paste    0.9% sodium chloride infusion 250 mL    levETIRAcetam (KEPPRA) oral solution 1,500 mg    heparin (porcine) 1,000 unit/mL injection 2,200 Units    insulin glargine (LANTUS) injection 20 Units    dexamethasone (DECADRON) 4 mg/mL injection 4 mg    NOREPINephrine (LEVOPHED) 8 mg in 0.9% NS 250ml infusion    lactulose (CHRONULAC) 10 gram/15 mL solution 15 mL    glucose chewable tablet 16 g    glucagon (GLUCAGEN) injection 1 mg    dextrose 10% infusion 125-250 mL    LORazepam (ATIVAN) injection 2 mg    glucose chewable tablet 16 g    glucagon (GLUCAGEN) injection 1 mg    valproic acid (as sodium salt) (DEPAKENE) 250 mg/5 mL (5 mL) oral solution 500 mg    dextrose 10% infusion 125-250 mL    PHENYLephrine (ISRAEL-SYNEPHRINE) 30 mg in 0.9% sodium chloride 250 mL infusion    propofol (DIPRIVAN) 10 mg/mL infusion    lacosamide (VIMPAT) tablet 200 mg    levothyroxine (SYNTHROID) tablet 75 mcg    [Held by provider] aspirin chewable tablet 81 mg    sodium chloride (NS) flush 5-40 mL    sodium chloride (NS) flush 5-40 mL    acetaminophen (TYLENOL) tablet 650 mg    Or    acetaminophen (TYLENOL) suppository 650 mg    polyethylene glycol (MIRALAX) packet 17 g    ondansetron (ZOFRAN ODT) tablet 4 mg    Or    ondansetron (ZOFRAN) injection 4 mg    atorvastatin (LIPITOR) tablet 80 mg      PMH:  has a past medical history of Acute renal failure ([facility] [address] Anemia, Arthritis, DKA (diabetic ketoacidoses), GERD (gastroesophageal reflux disease), HTN (hypertension), Hypercholesterolemia, Schizophrenia ([facility] [address] Schizophreniform disorder ([facility] [address] Seizure disorder ([facility] [address] and Type II or unspecified type diabetes mellitus without mention of complication, uncontrolled. PSH:   has a past surgical history that includes ir insert non tunl cvc over 5 yrs (2022) and ir insert non tunl cvc over 5 yrs (2022). FHX: family history includes Stroke in his father. SHX:  reports that he has never smoked. He has never used smokeless tobacco. He reports that he does not drink alcohol and does not use drugs. ROS:  Unable to obtain intubated on ventilator and sedated    Hemodynamics:    CO:    CI:    CVP:    SVR:   PAP Systolic:    PAP Diastolic:    PVR:    WD55:        Ventilator Settings:      Mode Rate TV Press PEEP FiO2 PIP Min.  Vent   Assist control,Volume control    500 ml    5 cm H20 50 %  26 cm H2O  9.65 l/min        Vital Signs: Telemetry:    normal sinus rhythm Intake/Output:   Visit Vitals  BP (!) 112/58 (BP 1 Location: Left upper arm, BP Patient Position: At rest)   Pulse 80   Temp 98.4 °F (36.9 °C)   Resp 16   Ht 6' 0.01\" (1.829 m)   Wt 115.8 kg (255 lb 4.7 oz)   SpO2 98%   BMI 34.62 kg/m²       Temp (24hrs), Av °F (36.7 °C), Min:97.1 °F (36.2 °C), Max:98.4 °F (36.9 °C)        O2 Device: Ventilator         Wt Readings from Last 4 Encounters:   02/10/22 115.8 kg (255 lb 4.7 oz)   01/20/22 101.1 kg (222 lb 14.2 oz)   10/06/21 111.1 kg (245 lb)   07/22/21 110.7 kg (244 lb)          Intake/Output Summary (Last 24 hours) at 2/10/2022 0811  Last data filed at 2/10/2022 0500  Gross per 24 hour   Intake 1855.5 ml   Output 30 ml   Net 1825.5 ml       Last shift:      No intake/output data recorded. Last 3 shifts: 02/08 1901 - 02/10 0700  In: 3304.5 [I.V.:944.5]  Out: 30 [Urine:30]       Physical Exam:     General:  intubated on vent unresponsive on no sedation  HEENT: NCAT,   Eyes: anicteric; conjunctiva clear no doll's eye reflex  Neck: no nodes, no cuff leak, trach midline; no accessory MM use. Chest: no deformity,   Cardiac: R regular; no murmur;   Lungs: distant breath sounds; no wheezes a few rales in the base  Abd: soft, NT, hypoactive BS  Ext: Trace edema; no joint swelling;  No clubbing  : No urine  Neuro: Unresponsive on no sedation no doll's eye reflex flaccid extremity  Psych-  unable to assess  Skin: warm, dry, no cyanosis;   Pulses: Brachial and radial pulses intact  Capillary: Slow capillary refill      DATA:    MAR reviewed and pertinent medications noted or modified as needed  MEDS:   Current Facility-Administered Medications   Medication    insulin lispro (HUMALOG) injection    piperacillin-tazobactam (ZOSYN) 3.375 g in 0.9% sodium chloride (MBP/ADV) 100 mL MBP    zinc oxide-white petrolatum 20-75 % topical paste    0.9% sodium chloride infusion 250 mL    levETIRAcetam (KEPPRA) oral solution 1,500 mg    heparin (porcine) 1,000 unit/mL injection 2,200 Units    insulin glargine (LANTUS) injection 20 Units    dexamethasone (DECADRON) 4 mg/mL injection 4 mg    NOREPINephrine (LEVOPHED) 8 mg in 0.9% NS 250ml infusion    lactulose (CHRONULAC) 10 gram/15 mL solution 15 mL    glucose chewable tablet 16 g    glucagon (GLUCAGEN) injection 1 mg    dextrose 10% infusion 125-250 mL    LORazepam (ATIVAN) injection 2 mg    glucose chewable tablet 16 g    glucagon (GLUCAGEN) injection 1 mg    valproic acid (as sodium salt) (DEPAKENE) 250 mg/5 mL (5 mL) oral solution 500 mg    dextrose 10% infusion 125-250 mL    PHENYLephrine (ISRAEL-SYNEPHRINE) 30 mg in 0.9% sodium chloride 250 mL infusion    propofol (DIPRIVAN) 10 mg/mL infusion    lacosamide (VIMPAT) tablet 200 mg    levothyroxine (SYNTHROID) tablet 75 mcg    [Held by provider] aspirin chewable tablet 81 mg    sodium chloride (NS) flush 5-40 mL    sodium chloride (NS) flush 5-40 mL    acetaminophen (TYLENOL) tablet 650 mg    Or    acetaminophen (TYLENOL) suppository 650 mg    polyethylene glycol (MIRALAX) packet 17 g    ondansetron (ZOFRAN ODT) tablet 4 mg    Or    ondansetron (ZOFRAN) injection 4 mg    atorvastatin (LIPITOR) tablet 80 mg        Labs:    Recent Labs     02/10/22  0500 02/10/22  0400 02/09/22  0340 02/08/22  0400   WBC  --  22.0* 22.9* 25.3*   HGB  --  9.7* 9.3* 10.1*   PLT  --  434* 429* 469*   INR 1.1  --  1.3* 1.1     Recent Labs     02/10/22  0500 02/09/22  0340 02/08/22  0400   * 129* 131*   K 4.7 5.0 4.4   CL 94* 95* 94*   CO2 18* 19* 19*   * 182* 186*   BUN 84* 92* 76*   CREA 5.73* 6.12* 5.15*   CA 7.3* 6.8* 6.8*   ALB 1.0* 1.0* 1.0*   * 137* 169*     Recent Labs     02/10/22  0455 02/09/22  0400 02/08/22  0320   PH 7.33* 7.30* 7.36   PCO2 38 37 35   PO2 66* 78 83   HCO3 20* 18* 20*   FIO2 50.0 50.0 50.0       Lab Results   Component Value Date/Time    Culture result: Scant Normal respiratory mark 02/03/2022 07:20 PM    Culture result: No Growth (<1000 cfu/mL) 01/27/2022 02:00 PM    Culture result: No growth 6 days 01/27/2022 01:30 PM     Lab Results   Component Value Date/Time    TSH 4.47 (H) 05/21/2021 10:46 AM        Imaging:    Results from Hospital Encounter encounter on 01/27/22    XR CHEST PORT    Narrative  1 view at 0037    ET and NG tube and central lines remain    Lower lung volumes with mild patchy infiltrate/atelectasis at the left greater  than right bases. Upper lungs remain clear. No effusion or pneumothorax. Normal  heart and no congestion      Results from [place] encounter on 01/27/22    CT HEAD WO CONT    Narrative  Exam: CT Head without contrast    TECHNIQUE: Multiple transaxial CT images of the head were obtained without  contrast. Coronal and sagittal reformatted images were provided. Dose reduction: All CT scans at this facility are performed using dose reduction  optimization techniques as appropriate to a performed exam including the  following: Automated exposure control, adjustments of the mA and/or kV according  to patient size, or use of iterative reconstruction technique. HISTORY: anoxia    COMPARISON: Head CT 10/6/2021, 1/27/2022    FINDINGS:    Global parenchymal volume loss appears similar to prior with proportional ex  vacuo dilatation of the ventricles and other extra-axial CSF spaces, slightly  more prominent on the left. No significant midline shift or mass effect. Gray-white matter differentiation appears preserved. No findings of acute  intracranial hemorrhage. Basilar cisterns appear preserved. Intracranial  atherosclerosis. There is bilateral opacification of the mastoid air cells, most likely  indicating nonspecific mastoid effusions. Debris within the external auditory  canals is most likely cerumen. There is mild mucosal thickening in the paranasal  sinuses, most pronounced in the maxillary sinuses. Globes and orbits appear  unremarkable. Calvarium appears intact without findings of acute or aggressive  abnormality. Impression  Overall similar exam to prior without findings of acute intracranial  abnormality. · 1/30 remains on the ventilator has metabolic acidosis we will add bicarb per tube. Maintain ventilator on current settings although will decrease PEEP slightly. Platelets continue to drop.   He is unresponsive on no sedation likely anoxic encephalopathy  · 1/31 remain intubated on ventilator hemodynamically worsening of the renal function  · 2/1 remained on ventilator hemodynamically unstable on levofed  · 2/2 on ventilator hypothermic electrolyte imbalance will continue with the current vent settings  · 2/4 acute bleeding from the dialysis catheter which has been corrected received 4 units of packed RBC ABG acceptable  · 2/5 remain intubated no more bleeding from the dialysis catheter site  · 2/6 on ventilator assist control mode we will continue to wean  · 2/7 dialyzed yesterday remain on ventilator  · 2/9 sedated on ventilator received dialysis off Boris still on Levophed  · 2/10 remain on ventilator on Levofed

## 2022-02-10 NOTE — PROGRESS NOTES
Renal Progress Note    Patient: Solis Ochoa MRN: [id number]  SSN: [id number]    YOB: 1945  Age: 78 y.o.   Sex: male      Admit Date: 1/27/2022    LOS: 14 days     Subjective:     Seen at bedside , no acute venets overnight   Still on propofol     Getting Tfs and Free water ,   Vent settings noted       Current Facility-Administered Medications   Medication Dose Route Frequency    insulin lispro (HUMALOG) injection   SubCUTAneous Q6H    piperacillin-tazobactam (ZOSYN) 3.375 g in 0.9% sodium chloride (MBP/ADV) 100 mL MBP  3.375 g IntraVENous Q8H    zinc oxide-white petrolatum 20-75 % topical paste   Topical TID    0.9% sodium chloride infusion 250 mL  250 mL IntraVENous PRN    levETIRAcetam (KEPPRA) oral solution 1,500 mg  1,500 mg Oral BID    heparin (porcine) 1,000 unit/mL injection 2,200 Units  2,200 Units IntraVENous DIALYSIS PRN    insulin glargine (LANTUS) injection 20 Units  20 Units SubCUTAneous QHS    dexamethasone (DECADRON) 4 mg/mL injection 4 mg  4 mg IntraVENous Q12H    NOREPINephrine (LEVOPHED) 8 mg in 0.9% NS 250ml infusion  0.5-120 mcg/min IntraVENous TITRATE    lactulose (CHRONULAC) 10 gram/15 mL solution 15 mL  10 g Per G Tube TID    glucose chewable tablet 16 g  4 Tablet Oral PRN    glucagon (GLUCAGEN) injection 1 mg  1 mg IntraMUSCular PRN    dextrose 10% infusion 125-250 mL  125-250 mL IntraVENous PRN    LORazepam (ATIVAN) injection 2 mg  2 mg IntraVENous Q2H PRN    glucose chewable tablet 16 g  4 Tablet Oral PRN    glucagon (GLUCAGEN) injection 1 mg  1 mg IntraMUSCular PRN    valproic acid (as sodium salt) (DEPAKENE) 250 mg/5 mL (5 mL) oral solution 500 mg  500 mg Oral BID    dextrose 10% infusion 125-250 mL  125-250 mL IntraVENous PRN    PHENYLephrine (ISRAEL-SYNEPHRINE) 30 mg in 0.9% sodium chloride 250 mL infusion   mcg/min IntraVENous TITRATE    propofol (DIPRIVAN) 10 mg/mL infusion  0-50 mcg/kg/min IntraVENous TITRATE    lacosamide (VIMPAT) tablet 200 mg  200 mg Oral BID    levothyroxine (SYNTHROID) tablet 75 mcg  75 mcg Oral 7am    [Held by provider] aspirin chewable tablet 81 mg  81 mg Oral DAILY    sodium chloride (NS) flush 5-40 mL  5-40 mL IntraVENous Q8H    sodium chloride (NS) flush 5-40 mL  5-40 mL IntraVENous PRN    acetaminophen (TYLENOL) tablet 650 mg  650 mg Oral Q6H PRN    Or    acetaminophen (TYLENOL) suppository 650 mg  650 mg Rectal Q6H PRN    polyethylene glycol (MIRALAX) packet 17 g  17 g Oral DAILY PRN    ondansetron (ZOFRAN ODT) tablet 4 mg  4 mg Oral Q8H PRN    Or    ondansetron (ZOFRAN) injection 4 mg  4 mg IntraVENous Q6H PRN    atorvastatin (LIPITOR) tablet 80 mg  80 mg Oral QHS        Vitals:    02/10/22 0600 02/10/22 0700 02/10/22 0718 02/10/22 0800   BP: 121/60 132/67  (!) 112/58   Pulse: 77 81 80    Resp: 18 16 18 16   Temp:  98.4 °F (36.9 °C)     TempSrc:       SpO2: 96% 98% 98% 98%   Weight:       Height:         Objective:   General: On ventilator, unresponsive   HEENT:  no Icterus, Pallor+, ET tube in place, mucosa dry   neck: Neck is supple, No JVD  Lungs: Decreased breath sounds at the bases,   CVS: heart sounds normal,  no murmurs, no rubs. GI: soft, nontender, no distention  Extremeties: no cyanosis, no edema    Neuro: Patient unresponsive, on vent, off sedation   skin: normal skin turgor, no skin rashes. Intake and Output:  Current Shift: No intake/output data recorded.   Last three shifts: 02/08 1901 - 02/10 0700  In: 3304.5 [I.V.:944.5]  Out: 30 [Urine:30]      Lab/Data Review:  Recent Labs     02/10/22  0400 02/09/22  0340 02/08/22  0400   WBC 22.0* 22.9* 25.3*   HGB 9.7* 9.3* 10.1*   HCT 27.1* 26.3* 29.1*   * 429* 469*     Recent Labs     02/10/22  0500 02/09/22  0340 02/08/22  0400   * 129* 131*   K 4.7 5.0 4.4   CL 94* 95* 94*   CO2 18* 19* 19*   * 182* 186*   BUN 84* 92* 76*   CREA 5.73* 6.12* 5.15*   CA 7.3* 6.8* 6.8*   ALB 1.0* 1.0* 1.0*   TBILI 0.4 0.4 0.4   * 137* 169*   INR 1.1 1.3* 1.1     Recent Labs     02/10/22  0455 02/09/22  0400 02/08/22  0320   PH 7.33* 7.30* 7.36   PCO2 38 37 35   PO2 66* 78 83   HCO3 20* 18* 20*   FIO2 50.0 50.0 50.0     Recent Results (from the past 24 hour(s))   GLUCOSE, POC    Collection Time: 02/09/22 11:32 AM   Result Value Ref Range    Glucose (POC) 138 (H) 65 - 117 mg/dL    Performed by Jorge 33, POC    Collection Time: 02/09/22  3:45 PM   Result Value Ref Range    Glucose (POC) 148 (H) 65 - 117 mg/dL    Performed by Lashell Gonzales    GLUCOSE, POC    Collection Time: 02/09/22  9:30 PM   Result Value Ref Range    Glucose (POC) 166 (H) 65 - 117 mg/dL    Performed by Michelle Minaya    GLUCOSE, POC    Collection Time: 02/10/22 12:13 AM   Result Value Ref Range    Glucose (POC) 178 (H) 65 - 117 mg/dL    Performed by Mcihelle Minaya    CBC WITH AUTOMATED DIFF    Collection Time: 02/10/22  4:00 AM   Result Value Ref Range    WBC 22.0 (H) 4.1 - 11.1 K/uL    RBC 3.05 (L) 4.10 - 5.70 M/uL    HGB 9.7 (L) 12.1 - 17.0 g/dL    HCT 27.1 (L) 36.6 - 50.3 %    MCV 88.9 80.0 - 99.0 FL    MCH 31.8 26.0 - 34.0 PG    MCHC 35.8 30.0 - 36.5 g/dL    RDW 14.5 11.5 - 14.5 %    PLATELET 349 (H) 461 - 400 K/uL    MPV 10.5 8.9 - 12.9 FL    NRBC 0.0 0.0  WBC    ABSOLUTE NRBC 0.00 0.00 - 0.01 K/uL    NEUTROPHILS PENDING %    LYMPHOCYTES PENDING %    MONOCYTES PENDING %    EOSINOPHILS PENDING %    BASOPHILS PENDING %    IMMATURE GRANULOCYTES PENDING %    ABS. NEUTROPHILS PENDING K/UL    ABS. LYMPHOCYTES PENDING K/UL    ABS. MONOCYTES PENDING K/UL    ABS. EOSINOPHILS PENDING K/UL    ABS. BASOPHILS PENDING K/UL    ABS. IMM. GRANS.  PENDING K/UL    DF PENDING    BLOOD GAS, ARTERIAL    Collection Time: 02/10/22  4:55 AM   Result Value Ref Range    pH 7.33 (L) 7.35 - 7.45      PCO2 38 35 - 45 mmHg    PO2 66 (L) 75 - 100 mmHg    O2 SAT 92 (L) >95 %    BICARBONATE 20 (L) 22 - 26 mmol/L    BASE DEFICIT 5.7 (H) 0 - 2 mmol/L    O2 METHOD VENT      FIO2 50.0 %    MODE Assist Control/Volume Control      Tidal volume 500      SET RATE 18      EPAP/CPAP/PEEP 5.0      SITE Right Radial      JULIO'S TEST PASS     GLUCOSE, POC    Collection Time: 02/10/22  4:57 AM   Result Value Ref Range    Glucose (POC) 169 (H) 65 - 117 mg/dL    Performed by Shireen Kim 112 + INR    Collection Time: 02/10/22  5:00 AM   Result Value Ref Range    Prothrombin time 14.2 11.9 - 14.6 sec    INR 1.1 0.9 - 1.1     LD    Collection Time: 02/10/22  5:00 AM   Result Value Ref Range     (H) 85 - 241 U/L   D DIMER    Collection Time: 02/10/22  5:00 AM   Result Value Ref Range    D DIMER >20.00 (H) <0.50 ug/ml(FEU)   PROCALCITONIN    Collection Time: 02/10/22  5:00 AM   Result Value Ref Range    Procalcitonin 3.37 (H) 0 ng/mL   SED RATE (ESR)    Collection Time: 02/10/22  5:00 AM   Result Value Ref Range    Sed rate, automated 300 mm/hr   METABOLIC PANEL, COMPREHENSIVE    Collection Time: 02/10/22  5:00 AM   Result Value Ref Range    Sodium 127 (L) 136 - 145 mmol/L    Potassium 4.7 3.5 - 5.1 mmol/L    Chloride 94 (L) 97 - 108 mmol/L    CO2 18 (L) 21 - 32 mmol/L    Anion gap 15 5 - 15 mmol/L    Glucose 167 (H) 65 - 100 mg/dL    BUN 84 (H) 6 - 20 mg/dL    Creatinine 5.73 (H) 0.70 - 1.30 mg/dL    BUN/Creatinine ratio 15 12 - 20      GFR est AA 12 (L) >60 ml/min/1.73m2    GFR est non-AA 10 (L) >60 ml/min/1.73m2    Calcium 7.3 (L) 8.5 - 10.1 mg/dL    Bilirubin, total 0.4 0.2 - 1.0 mg/dL    AST (SGOT) 222 (H) 15 - 37 U/L    ALT (SGPT) 123 (H) 12 - 78 U/L    Alk.  phosphatase 214 (H) 45 - 117 U/L    Protein, total 6.4 6.4 - 8.2 g/dL    Albumin 1.0 (L) 3.5 - 5.0 g/dL    Globulin 5.4 (H) 2.0 - 4.0 g/dL    A-G Ratio 0.2 (L) 1.1 - 2.2        FiO2 - stable on Vent   Noted ABG   Chest X ray personally reviewed   Assessment and Plan:     #1 acute kidney injury  --ANDRES likely 2/2 ATN from septic shock /rhabdomyolysis  Patient remained anuric, with rising BUN and creatinine  Started on CRRT 1/31, had for 48 hrs, Switched to intermittent hemodialysis,   Status post hemodialysis 2/9/22 ,    He remains anuric   - Had poor Blood flow with HD cather yesterday and cathflo was used to dissolve any clots   - He appears to be having poor clearance - as his BUN is persistently high   He is also getting TFs and prosource protein supplements every day which is causing BUN to be high .  -Will check pre-dialysis and post dialysis BUN tomorrow for URR and to estimate clearance .     #2 severe hypokalemia: Improved potassium level    monitor potassium levels   Will dialyze of=n 2 K bath today     #3 Hyponatremia :sodium level to 133>>131>>129 , from incr free water input   Glucose relatively well controlled . Adjusted free water rate down to 150cc Q6 hours today   - will do HD tomorrow       #4  COVID-19 pneumonia:   -Septic shock  -With multiorgan failure including renal failure/transaminitis/hemodynamics shock  -Patient currently on Decadron azithromycin and Zosyn  Neurology following the patient for anoxic encephalopathy, remains unresponsive  Shock liver with high liver enzymes( improving),   Rhabdomyolysis+/ANDRES, on HD, will monitor   -Overall prognosis seems guarded     #5 diabetes: hyperglycemia+  Monitor accuchecks and adjust management as needed     #6 seizure disorder  -On antiseizure medications, neurology following the patient    7. Metabolic acidosis: Secondary to acute kidney injury/hypotension  Improved with hemodialysis  Stable CO2 level of 21, continue to monitor CO2  Lactate levels last checked on 1/28/22     8. Hypocalcemia - corrected calcium WNL   Will dialyze on 3 calcium bath today     Signed By: Aleshia Mcfarlane MD     February 10, 2022

## 2022-02-11 NOTE — PROGRESS NOTES
IMPRESSION:   1. Acute hypoxic respiratory failure oxygenation well-maintained on the ventilator  2. Malignant hyperthermia resolved   3. COVID-19 pneumonia  4. NSTEMI elevated troponin trending down  5. Shock cardiogenic versus septic vs Hypovolumic Hypotension currently on norepinephrine  will continue to wean  6. Acute bleeding from dialysis catheter which has been corrected  7. Acute kidney injury now on hemodialysis  8. Metabolic acidosis resolved  9. Thrombocytopenia worsened  10. Hypernatremia resolved now hyponatremia  11. Rule out anoxic encephalopathy  12. Symptomatic hypokalemia  13. Shock liver with transaminitis significantly improved      RECOMMENDATIONS/PLAN:   1. ICU monitoring  1. Ventilator for mechanical life support and prevent respiratory arrest with protective lung strategies ABG acceptable he is still hemodynamically unstable will continue with the current vent support: He is off propofol since yesterday most likely patient has anoxic encephalopathy  2. Thick mucus secretions present we will try to give nebulizer treatment along with Mucomyst  will repeat Covid testing  3. On Assist control rate 18  PEEP 5 FiO2 50% ABG acceptable  4. Blood pressure improved off vasopressors  5. Patient on Decadron Zithromax Zosyn, and baricitinib WBC elevated, decrease Decadron  6. Troponin is elevated 2D echo shows ejection fraction of 65%  7. He was bleeding from the dialysis catheter which is corrected received 4 units of packed RBC hemoglobin dropped from 8.8 to 4.0 repeat lab shows Hb 9.7  8. Got dialyzed 500 cc of fluid removed  9. LDH procalcitonin trending down  10. Remains on quarter saline also on lactulose  11. Liver enzyme continues to improve as patient was in shock liver  12. Agree with Empiric IV antibiotics blood and urine cultures no growth on Zosyn   13. Temperature back to normal and sputum shows normal flow  14.  IV vasopressors for circulatory shock refractory to fluids to maintain SBP> 90      [x] High complexity decision making was performed  [x] See my orders for details  HPI   72-year-old male came in because as he was found unresponsive and fever 107 past medical history of schizophrenia and diabetes gastroesophageal reflux disease and anemia he is COVID-19 positive initially patient was called as NSTEMI but it was canceled patient is intubated on ventilator hemodynamically unstable unable to get any history out of the patient he seems dehydrated    PMH:  has a past medical history of Acute renal failure ([address] Anemia, Arthritis, DKA (diabetic ketoacidoses), GERD (gastroesophageal reflux disease), HTN (hypertension), Hypercholesterolemia, Schizophrenia ([address] Schizophreniform disorder ([address] Seizure disorder ([address] and Type II or unspecified type diabetes mellitus without mention of complication, uncontrolled. PSH:   has a past surgical history that includes ir insert non tunl cvc over 5 yrs (1/28/2022) and ir insert non tunl cvc over 5 yrs (1/31/2022). FHX: family history includes Stroke in his father. SHX:  reports that he has never smoked. He has never used smokeless tobacco. He reports that he does not drink alcohol and does not use drugs.     ALL: No Known Allergies     MEDS:   [x] Reviewed - As Below   [] Not reviewed    Current Facility-Administered Medications   Medication    insulin lispro (HUMALOG) injection    dexamethasone (DECADRON) 4 mg/mL injection 4 mg    piperacillin-tazobactam (ZOSYN) 3.375 g in 0.9% sodium chloride (MBP/ADV) 100 mL MBP    zinc oxide-white petrolatum 20-75 % topical paste    0.9% sodium chloride infusion 250 mL    levETIRAcetam (KEPPRA) oral solution 1,500 mg    heparin (porcine) 1,000 unit/mL injection 2,200 Units    insulin glargine (LANTUS) injection 20 Units    NOREPINephrine (LEVOPHED) 8 mg in 0.9% NS 250ml infusion    lactulose (CHRONULAC) 10 gram/15 mL solution 15 mL    glucose chewable tablet 16 g    glucagon (GLUCAGEN) injection 1 mg    dextrose 10% infusion 125-250 mL    LORazepam (ATIVAN) injection 2 mg    glucose chewable tablet 16 g    glucagon (GLUCAGEN) injection 1 mg    valproic acid (as sodium salt) (DEPAKENE) 250 mg/5 mL (5 mL) oral solution 500 mg    dextrose 10% infusion 125-250 mL    PHENYLephrine (ISRAEL-SYNEPHRINE) 30 mg in 0.9% sodium chloride 250 mL infusion    propofol (DIPRIVAN) 10 mg/mL infusion    lacosamide (VIMPAT) tablet 200 mg    levothyroxine (SYNTHROID) tablet 75 mcg    [Held by provider] aspirin chewable tablet 81 mg    sodium chloride (NS) flush 5-40 mL    sodium chloride (NS) flush 5-40 mL    acetaminophen (TYLENOL) tablet 650 mg    Or    acetaminophen (TYLENOL) suppository 650 mg    polyethylene glycol (MIRALAX) packet 17 g    ondansetron (ZOFRAN ODT) tablet 4 mg    Or    ondansetron (ZOFRAN) injection 4 mg    atorvastatin (LIPITOR) tablet 80 mg      MAR reviewed and pertinent medications noted or modified as needed   Current Facility-Administered Medications   Medication    insulin lispro (HUMALOG) injection    dexamethasone (DECADRON) 4 mg/mL injection 4 mg    piperacillin-tazobactam (ZOSYN) 3.375 g in 0.9% sodium chloride (MBP/ADV) 100 mL MBP    zinc oxide-white petrolatum 20-75 % topical paste    0.9% sodium chloride infusion 250 mL    levETIRAcetam (KEPPRA) oral solution 1,500 mg    heparin (porcine) 1,000 unit/mL injection 2,200 Units    insulin glargine (LANTUS) injection 20 Units    NOREPINephrine (LEVOPHED) 8 mg in 0.9% NS 250ml infusion    lactulose (CHRONULAC) 10 gram/15 mL solution 15 mL    glucose chewable tablet 16 g    glucagon (GLUCAGEN) injection 1 mg    dextrose 10% infusion 125-250 mL    LORazepam (ATIVAN) injection 2 mg    glucose chewable tablet 16 g    glucagon (GLUCAGEN) injection 1 mg    valproic acid (as sodium salt) (DEPAKENE) 250 mg/5 mL (5 mL) oral solution 500 mg    dextrose 10% infusion 125-250 mL    PHENYLephrine (ISRAEL-SYNEPHRINE) 30 mg in 0.9% sodium chloride 250 mL infusion    propofol (DIPRIVAN) 10 mg/mL infusion    lacosamide (VIMPAT) tablet 200 mg    levothyroxine (SYNTHROID) tablet 75 mcg    [Held by provider] aspirin chewable tablet 81 mg    sodium chloride (NS) flush 5-40 mL    sodium chloride (NS) flush 5-40 mL    acetaminophen (TYLENOL) tablet 650 mg    Or    acetaminophen (TYLENOL) suppository 650 mg    polyethylene glycol (MIRALAX) packet 17 g    ondansetron (ZOFRAN ODT) tablet 4 mg    Or    ondansetron (ZOFRAN) injection 4 mg    atorvastatin (LIPITOR) tablet 80 mg      PMH:  has a past medical history of Acute renal failure ([facility] [address] Anemia, Arthritis, DKA (diabetic ketoacidoses), GERD (gastroesophageal reflux disease), HTN (hypertension), Hypercholesterolemia, Schizophrenia ([facility] [address] Schizophreniform disorder ([facility] [address] Seizure disorder ([facility] [address] and Type II or unspecified type diabetes mellitus without mention of complication, uncontrolled. PSH:   has a past surgical history that includes ir insert non tunl cvc over 5 yrs (2022) and ir insert non tunl cvc over 5 yrs (2022). FHX: family history includes Stroke in his father. SHX:  reports that he has never smoked. He has never used smokeless tobacco. He reports that he does not drink alcohol and does not use drugs. ROS:  Unable to obtain intubated on ventilator unresponsive    Hemodynamics:    CO:    CI:    CVP:    SVR:   PAP Systolic:    PAP Diastolic:    PVR:    NI91:        Ventilator Settings:      Mode Rate TV Press PEEP FiO2 PIP Min.  Vent   Assist control,Volume control    500 ml    5 cm H20 50 %  20 cm H2O  10.2 l/min        Vital Signs: Telemetry:    normal sinus rhythm Intake/Output:   Visit Vitals  BP (!) 145/64 (BP 1 Location: Left upper arm, BP Patient Position: At rest)   Pulse 90   Temp 98.7 °F (37.1 °C)   Resp 19   Ht 6' 0.01\" (1.829 m)   Wt 117.6 kg (259 lb 4.2 oz)   SpO2 98%   BMI 35.15 kg/m²       Temp (24hrs), Av.7 °F (37.1 °C), Min:98.2 °F (36.8 °C), Max:99.3 °F (37.4 °C)        O2 Device: Ventilator         Wt Readings from Last 4 Encounters:   02/11/22 117.6 kg (259 lb 4.2 oz)   01/20/22 101.1 kg (222 lb 14.2 oz)   10/06/21 111.1 kg (245 lb)   07/22/21 110.7 kg (244 lb)          Intake/Output Summary (Last 24 hours) at 2/11/2022 0821  Last data filed at 2/11/2022 0300  Gross per 24 hour   Intake 1746 ml   Output 300 ml   Net 1446 ml       Last shift:      No intake/output data recorded. Last 3 shifts: 02/09 1901 - 02/11 0700  In: 3601.5 [I.V.:621.5]  Out: 330 [Urine:30; Drains:300]       Physical Exam:     General:  intubated on vent unresponsive on no sedation  HEENT: NCAT,   Eyes: anicteric; conjunctiva clear no doll's eye reflex  Neck: no nodes, no cuff leak, trach midline; no accessory MM use. Chest: no deformity,   Cardiac: R regular; no murmur;   Lungs: distant breath sounds; no wheezes a few rales in the base  Abd: soft, NT, hypoactive BS  Ext: Trace edema; no joint swelling;  No clubbing  : No urine  Neuro: Unresponsive on no sedation no doll's eye reflex flaccid extremity  Psych-  unable to assess  Skin: warm, dry, no cyanosis;   Pulses: Brachial and radial pulses intact  Capillary: Slow capillary refill      DATA:    MAR reviewed and pertinent medications noted or modified as needed  MEDS:   Current Facility-Administered Medications   Medication    insulin lispro (HUMALOG) injection    dexamethasone (DECADRON) 4 mg/mL injection 4 mg    piperacillin-tazobactam (ZOSYN) 3.375 g in 0.9% sodium chloride (MBP/ADV) 100 mL MBP    zinc oxide-white petrolatum 20-75 % topical paste    0.9% sodium chloride infusion 250 mL    levETIRAcetam (KEPPRA) oral solution 1,500 mg    heparin (porcine) 1,000 unit/mL injection 2,200 Units    insulin glargine (LANTUS) injection 20 Units    NOREPINephrine (LEVOPHED) 8 mg in 0.9% NS 250ml infusion    lactulose (CHRONULAC) 10 gram/15 mL solution 15 mL    glucose chewable tablet 16 g    glucagon (GLUCAGEN) injection 1 mg    dextrose 10% infusion 125-250 mL    LORazepam (ATIVAN) injection 2 mg    glucose chewable tablet 16 g    glucagon (GLUCAGEN) injection 1 mg    valproic acid (as sodium salt) (DEPAKENE) 250 mg/5 mL (5 mL) oral solution 500 mg    dextrose 10% infusion 125-250 mL    PHENYLephrine (ISRAEL-SYNEPHRINE) 30 mg in 0.9% sodium chloride 250 mL infusion    propofol (DIPRIVAN) 10 mg/mL infusion    lacosamide (VIMPAT) tablet 200 mg    levothyroxine (SYNTHROID) tablet 75 mcg    [Held by provider] aspirin chewable tablet 81 mg    sodium chloride (NS) flush 5-40 mL    sodium chloride (NS) flush 5-40 mL    acetaminophen (TYLENOL) tablet 650 mg    Or    acetaminophen (TYLENOL) suppository 650 mg    polyethylene glycol (MIRALAX) packet 17 g    ondansetron (ZOFRAN ODT) tablet 4 mg    Or    ondansetron (ZOFRAN) injection 4 mg    atorvastatin (LIPITOR) tablet 80 mg        Labs:    Recent Labs     02/11/22  0505 02/10/22  0500 02/10/22  0400 02/09/22  0340   WBC  --   --  22.0* 22.9*   HGB  --   --  9.7* 9.3*   PLT  --   --  434* 429*   INR 1.3* 1.1  --  1.3*     Recent Labs     02/11/22  0505 02/10/22  0500 02/09/22  0340   * 127* 129*   K 4.8 4.7 5.0   CL 93* 94* 95*   CO2 18* 18* 19*   * 167* 182*   * 84* 92*   CREA 6.58* 5.73* 6.12*   CA 7.2* 7.3* 6.8*   PHOS 9.0*  9.0*  --   --    LAC 2.1*  --   --    ALB 1.0* 1.0* 1.0*   ALT  --  123* 137*     Recent Labs     02/11/22  0501 02/10/22  0455 02/09/22  0400   PH 7.30* 7.33* 7.30*   PCO2 36 38 37   PO2 141* 66* 78   HCO3 18* 20* 18*   FIO2 50.0 50.0 50.0       Lab Results   Component Value Date/Time    Culture result: Scant Normal respiratory mark 02/03/2022 07:20 PM    Culture result: No Growth (<1000 cfu/mL) 01/27/2022 02:00 PM    Culture result: No growth 6 days 01/27/2022 01:30 PM     Lab Results   Component Value Date/Time    TSH 4.47 (H) 05/21/2021 10:46 AM        Imaging:    Results from [facility] [place] Encounter encounter on 01/27/22    XR CHEST PORT    Narrative  Chest single view. Comparison single view chest February 10, 2022. Stable ET tube, NG tube, and right neck vascular catheters. Patchy reticular markings central and basilar lungs unchanged. Cardiac and  mediastinal structures unchanged. No pneumothorax or sizable pleural effusion. Results from [place] encounter on 01/27/22    CT HEAD WO CONT    Narrative  Exam: CT Head without contrast    TECHNIQUE: Multiple transaxial CT images of the head were obtained without  contrast. Coronal and sagittal reformatted images were provided. Dose reduction: All CT scans at this facility are performed using dose reduction  optimization techniques as appropriate to a performed exam including the  following: Automated exposure control, adjustments of the mA and/or kV according  to patient size, or use of iterative reconstruction technique. HISTORY: anoxia    COMPARISON: Head CT 10/6/2021, 1/27/2022    FINDINGS:    Global parenchymal volume loss appears similar to prior with proportional ex  vacuo dilatation of the ventricles and other extra-axial CSF spaces, slightly  more prominent on the left. No significant midline shift or mass effect. Gray-white matter differentiation appears preserved. No findings of acute  intracranial hemorrhage. Basilar cisterns appear preserved. Intracranial  atherosclerosis. There is bilateral opacification of the mastoid air cells, most likely  indicating nonspecific mastoid effusions. Debris within the external auditory  canals is most likely cerumen. There is mild mucosal thickening in the paranasal  sinuses, most pronounced in the maxillary sinuses. Globes and orbits appear  unremarkable. Calvarium appears intact without findings of acute or aggressive  abnormality. Impression  Overall similar exam to prior without findings of acute intracranial  abnormality.       · 1/30 remains on the ventilator has metabolic acidosis we will add bicarb per tube. Maintain ventilator on current settings although will decrease PEEP slightly. Platelets continue to drop.   He is unresponsive on no sedation likely anoxic encephalopathy  · 1/31 remain intubated on ventilator hemodynamically worsening of the renal function  · 2/1 remained on ventilator hemodynamically unstable on levofed  · 2/2 on ventilator hypothermic electrolyte imbalance will continue with the current vent settings  · 2/4 acute bleeding from the dialysis catheter which has been corrected received 4 units of packed RBC ABG acceptable  · 2/5 remain intubated no more bleeding from the dialysis catheter site  · 2/6 on ventilator assist control mode we will continue to wean  · 2/7 dialyzed yesterday remain on ventilator  · 2/9 sedated on ventilator received dialysis off Boris still on Levophed  · 2/10 remain on ventilator off Levofed  · 2/11 patient is off pressors and also sedation still unresponsive

## 2022-02-11 NOTE — PROGRESS NOTES
CARDIOLOGY PROGRESS NOTE      Patient seen and examined. This is a patient who is followed for NSTEMI in the setting of COVID multi-organ failure. Remains intubated and sedated. No longer requiring pressor support       Telemetry reviewed, there were no events noted in the past 24 hours. Remains in NSR with rare PVCs.     Pertinent review of systems items noted above, all other systems are negative. Current medications reviewed.     Physical Examination  Vital signs are stable. Blood pressure 145/64, Pulse 92  Intubated/sedated  Heart is regular, rate and rhythm.      Labs reviewed:      Case discussed with Dr. Emeterio Cabrera and our impression and recommendations are as follows:     1. NSTEMI:   - HS troponin 1354 > 1510. Likely related to demand ischemia in the setting of COVID multi-organ failure  - ASA has been on hold since 1/27. - Echo: EF 65% with no wall motion abnormalities. - Continues with CRRT per renal     2. Hypotension:   -  BP has been stable with nursing's attempt to wean pressors.     3. COVID pna:   - weaning trial this morning.  - With multi-organ failure and possible anoxic encephalopathy   -  Management per primary.   Mariya Chaudhry monitor telemetry and to evaluate for possible QTC prolongation. Will continue to monitor.  - Recommend to keep a negative fluid balance to prevent volume overload.     Overall prognosis is poor. DNR. Family to make decisions regarding treatment plan.        Dr. Luz Elena Mendoza Cardiology  [facility].    [address] [facility], [address] [facility]  [phone]

## 2022-02-11 NOTE — DIALYSIS
Pt had 3 hours of dialysis tolerated well. 2L removed w 300ml rinseback. Pt CVC rewrapped and heparin locked after tx.  Pt handed back off to ICU nurse

## 2022-02-11 NOTE — PROGRESS NOTES
Hospitalist Progress Note         Haresh Garcias MD          Daily Progress Note: 2/11/2022      Subjective: The patient is seen for follow  up. 76M, H/o Schizophenia, seizures, DMII on oral meds with unresponsive and acute hypoxic respiratory failure s/t COVID-19 pneumonitis complicated by ANDRES, hypokalemia and shock liver.            HSOPITAL COURSE  COVID positive, associated with fever 106, increased troponin, cardiology was consulted ansd recommended ECHO, there is no heparin gtt, was initially called for STEMI and was cancelled. he was intubated for acute hypoxic respiratory failure. On levophed. Complicated by ANDRES, shock liver and hypokalemia. Was treated with azithromycin, barcitinib, and zosyn. Will give 1L bolus and continue IVF. Discussed with family he is very critical- his renal functions increased and now seemingly in ATN, his liver functions have worsened and had a seizure on 1/28 , he is off propofol now and neurology was consulted. Repeat CT scan didn't show any stroke, plan for EEG on 2/1 to assess for neurological activity. The patients liver functions, renal are reviewed. __    Patient seen in follow-up.   Remains intubated and ventilated with FiO2 of 50% and PEEP of 5.        Problem List:  Problem List as of 2/11/2022 Date Reviewed: 12/30/2020          Codes Class Noted - Resolved    Acute hypernatremia ICD-10-CM: E87.0  ICD-9-CM: 276.0  6/9/2021 - Present        Uncontrolled type 2 diabetes mellitus with hyperglycemia ([facility] ICD-10-CM: E11.65  ICD-9-CM: 250.02  6/9/2021 - Present        Acute metabolic encephalopathy AOP-43-OK: G93.41  ICD-9-CM: 348.31  6/9/2021 - Present        ANDRES (acute kidney injury) ([facility] ICD-10-CM: N17.9  ICD-9-CM: 584.9  6/9/2021 - Present        Dehydration ICD-10-CM: E86.0  ICD-9-CM: 276.51  6/3/2021 - Present        Hypernatremia ICD-10-CM: E87.0  ICD-9-CM: 276.0  5/26/2021 - Present        Acute encephalopathy ICD-10-CM: G93.40  ICD-9-CM: 348.30  5/18/2021 - Present        Vitamin D deficiency ICD-10-CM: E55.9  ICD-9-CM: 268.9  12/30/2020 - Present        Dementia ([facility] ICD-10-CM: F03.90  ICD-9-CM: 294.20  7/26/2017 - Present        Mixed hyperlipidemia ICD-10-CM: E78.2  ICD-9-CM: 272.2  4/16/2016 - Present        Essential hypertension ICD-10-CM: I10  ICD-9-CM: 401.9  4/18/2015 - Present        DM (diabetes mellitus) ([facility] ICD-10-CM: E11.9  ICD-9-CM: 250.00  2/25/2013 - Present        Schizophreniform disorder ([facility] ICD-10-CM: F20.81  ICD-9-CM: 295.40  Unknown - Present        GERD (gastroesophageal reflux disease) ICD-10-CM: K21.9  ICD-9-CM: 530.81  Unknown - Present        Seizure disorder ([facility] ICD-10-CM: G40.909  ICD-9-CM: 345.90  Unknown - Present        RESOLVED: Controlled type 2 diabetes mellitus with complication, without long-term current use of insulin ([facility] ICD-10-CM: E11.8  ICD-9-CM: 250.90  12/30/2020 - 6/28/2021        RESOLVED: Morbid obesity ([facility] ICD-10-CM: E66.01  ICD-9-CM: 278.01  12/30/2020 - 8/3/2021        RESOLVED: Type 2 diabetes with nephropathy ([facility] ICD-10-CM: E11.21  ICD-9-CM: 250.40, 583.81  6/17/2018 - 6/28/2021        RESOLVED: Severe obesity with body mass index (BMI) of 35.0 to 39.9 with serious comorbidity ([facility] ICD-10-CM: E66.01  ICD-9-CM: 278.01  6/15/2018 - 1/1/2022        RESOLVED: Essential hypertension with goal blood pressure less than 140/90 ICD-10-CM: I10  ICD-9-CM: 401.9  4/16/2016 - 5/30/2021        RESOLVED: Hypothyroidism due to acquired atrophy of thyroid ICD-10-CM: E03.4  ICD-9-CM: 244.8, 246.8  4/18/2015 - 10/20/2021        RESOLVED: Hyperlipidemia ICD-10-CM: E78.5  ICD-9-CM: 272.4  5/28/2014 - 1/1/2022        RESOLVED: Hypothyroid ICD-10-CM: E03.9  ICD-9-CM: 244.9  5/28/2014 - 4/18/2015        RESOLVED: Seizures ([address] ICD-10-CM: R56.9  ICD-9-CM: 780.39  2/25/2013 - 1/1/2022        RESOLVED: HTN (hypertension) ICD-10-CM: I10  ICD-9-CM: 401.9  2/25/2013 - 4/18/2015 Medications reviewed  Current Facility-Administered Medications   Medication Dose Route Frequency    insulin lispro (HUMALOG) injection   SubCUTAneous Q6H    dexamethasone (DECADRON) 4 mg/mL injection 4 mg  4 mg IntraVENous Q24H    piperacillin-tazobactam (ZOSYN) 3.375 g in 0.9% sodium chloride (MBP/ADV) 100 mL MBP  3.375 g IntraVENous Q8H    zinc oxide-white petrolatum 20-75 % topical paste   Topical TID    0.9% sodium chloride infusion 250 mL  250 mL IntraVENous PRN    levETIRAcetam (KEPPRA) oral solution 1,500 mg  1,500 mg Oral BID    heparin (porcine) 1,000 unit/mL injection 2,200 Units  2,200 Units IntraVENous DIALYSIS PRN    insulin glargine (LANTUS) injection 20 Units  20 Units SubCUTAneous QHS    NOREPINephrine (LEVOPHED) 8 mg in 0.9% NS 250ml infusion  0.5-120 mcg/min IntraVENous TITRATE    lactulose (CHRONULAC) 10 gram/15 mL solution 15 mL  10 g Per G Tube TID    glucose chewable tablet 16 g  4 Tablet Oral PRN    glucagon (GLUCAGEN) injection 1 mg  1 mg IntraMUSCular PRN    dextrose 10% infusion 125-250 mL  125-250 mL IntraVENous PRN    LORazepam (ATIVAN) injection 2 mg  2 mg IntraVENous Q2H PRN    glucose chewable tablet 16 g  4 Tablet Oral PRN    glucagon (GLUCAGEN) injection 1 mg  1 mg IntraMUSCular PRN    valproic acid (as sodium salt) (DEPAKENE) 250 mg/5 mL (5 mL) oral solution 500 mg  500 mg Oral BID    dextrose 10% infusion 125-250 mL  125-250 mL IntraVENous PRN    PHENYLephrine (ISRAEL-SYNEPHRINE) 30 mg in 0.9% sodium chloride 250 mL infusion   mcg/min IntraVENous TITRATE    propofol (DIPRIVAN) 10 mg/mL infusion  0-50 mcg/kg/min IntraVENous TITRATE    lacosamide (VIMPAT) tablet 200 mg  200 mg Oral BID    levothyroxine (SYNTHROID) tablet 75 mcg  75 mcg Oral 7am    [Held by provider] aspirin chewable tablet 81 mg  81 mg Oral DAILY    sodium chloride (NS) flush 5-40 mL  5-40 mL IntraVENous Q8H    sodium chloride (NS) flush 5-40 mL  5-40 mL IntraVENous PRN    acetaminophen (TYLENOL) tablet 650 mg  650 mg Oral Q6H PRN    Or    acetaminophen (TYLENOL) suppository 650 mg  650 mg Rectal Q6H PRN    polyethylene glycol (MIRALAX) packet 17 g  17 g Oral DAILY PRN    ondansetron (ZOFRAN ODT) tablet 4 mg  4 mg Oral Q8H PRN    Or    ondansetron (ZOFRAN) injection 4 mg  4 mg IntraVENous Q6H PRN    atorvastatin (LIPITOR) tablet 80 mg  80 mg Oral QHS       Review of Systems:   Review of systems not obtained due to patient factors. Objective:   Physical Exam:     Visit Vitals  BP (!) 142/58 (BP 1 Location: Left upper arm, BP Patient Position: At rest)   Pulse 90   Temp 98.7 °F (37.1 °C)   Resp 19   Ht 6' 0.01\" (1.829 m)   Wt 117.6 kg (259 lb 4.2 oz)   SpO2 98%   BMI 35.15 kg/m²      O2 Device: Ventilator    Temp (24hrs), Av.7 °F (37.1 °C), Min:98.2 °F (36.8 °C), Max:99.3 °F (37.4 °C)    No intake/output data recorded.  1901 -  0700  In: 3601.5 [I.V.:621.5]  Out: 330 [Urine:30; Drains:300]    General:  Awake but unable to follow commands   Lungs:   Clear to auscultation bilaterally. Chest wall:  No tenderness or deformity. Heart:  Regular rate and rhythm, S1, S2 normal, no murmur, click, rub or gallop. Abdomen:   Soft, non-tender. Bowel sounds normal. No masses,  No organomegaly. Extremities: Extremities normal, atraumatic, no cyanosis or edema. Pulses: 2+ and symmetric all extremities. Skin: Skin color, texture, turgor normal. No rashes or lesions   Neurologic: CNII-XII intact.  No gross sensory or motor deficits     Data Review:       Recent Days:  Recent Labs     02/10/22  0400 22  0340   WBC 22.0* 22.9*   HGB 9.7* 9.3*   HCT 27.1* 26.3*   * 429*     Recent Labs     22  0505 02/10/22  0500 22  0340   * 127* 129*   K 4.8 4.7 5.0   CL 93* 94* 95*   CO2 18* 18* 19*   * 167* 182*   * 84* 92*   CREA 6.58* 5.73* 6.12*   CA 7.2* 7.3* 6.8*   PHOS 9.0*  9.0*  --   --    ALB 1.0* 1.0* 1.0*   TBILI  --  0.4 0.4 ALT  --  123* 137*   INR 1.3* 1.1 1.3*     Recent Labs     02/11/22  0501 02/10/22  0455 02/09/22  0400   PH 7.30* 7.33* 7.30*   PCO2 36 38 37   PO2 141* 66* 78   HCO3 18* 20* 18*   FIO2 50.0 50.0 50.0       24 Hour Results:  Recent Results (from the past 24 hour(s))   GLUCOSE, POC    Collection Time: 02/10/22 11:43 AM   Result Value Ref Range    Glucose (POC) 177 (H) 65 - 117 mg/dL    Performed by Jodi Verma    GLUCOSE, POC    Collection Time: 02/10/22  5:51 PM   Result Value Ref Range    Glucose (POC) 153 (H) 65 - 117 mg/dL    Performed by Jodi Verma    GLUCOSE, POC    Collection Time: 02/10/22  8:44 PM   Result Value Ref Range    Glucose (POC) 129 (H) 65 - 117 mg/dL    Performed by Leona Stoll    GLUCOSE, POC    Collection Time: 02/10/22 10:35 PM   Result Value Ref Range    Glucose (POC) 231 (H) 65 - 117 mg/dL    Performed by Leona Stoll    GLUCOSE, POC    Collection Time: 02/10/22 11:36 PM   Result Value Ref Range    Glucose (POC) 111 65 - 117 mg/dL    Performed by Chayo Ayala    BLOOD GAS, ARTERIAL    Collection Time: 02/11/22  5:01 AM   Result Value Ref Range    pH 7.30 (L) 7.35 - 7.45      PCO2 36 35 - 45 mmHg    PO2 141 (H) 75 - 100 mmHg    O2 SAT 99 >95 %    BICARBONATE 18 (L) 22 - 26 mmol/L    BASE DEFICIT 8.2 (H) 0 - 2 mmol/L    O2 METHOD VENT      FIO2 50.0 %    MODE Assist Control/Volume Control      Tidal volume 500      SET RATE 12      EPAP/CPAP/PEEP 5.0      Sample source Arterial      SITE Right Radial      JULIO'S TEST PASS     PROTHROMBIN TIME + INR    Collection Time: 02/11/22  5:05 AM   Result Value Ref Range    Prothrombin time 15.6 (H) 11.9 - 14.6 sec    INR 1.3 (H) 0.9 - 1.1     LD    Collection Time: 02/11/22  5:05 AM   Result Value Ref Range     (H) 85 - 241 U/L   D DIMER    Collection Time: 02/11/22  5:05 AM   Result Value Ref Range    D DIMER >20.00 (H) <0.50 ug/ml(FEU)   PROCALCITONIN    Collection Time: 02/11/22  5:05 AM   Result Value Ref Range    Procalcitonin 2.91 (H) 0 ng/mL   SED RATE (ESR)    Collection Time: 02/11/22  5:05 AM   Result Value Ref Range    Sed rate, automated 128 mm/hr   RENAL FUNCTION PANEL    Collection Time: 02/11/22  5:05 AM   Result Value Ref Range    Sodium 128 (L) 136 - 145 mmol/L    Potassium 4.8 3.5 - 5.1 mmol/L    Chloride 93 (L) 97 - 108 mmol/L    CO2 18 (L) 21 - 32 mmol/L    Anion gap 17 (H) 5 - 15 mmol/L    Glucose 132 (H) 65 - 100 mg/dL     (H) 6 - 20 mg/dL    Creatinine 6.58 (H) 0.70 - 1.30 mg/dL    BUN/Creatinine ratio 15 12 - 20      GFR est AA 10 (L) >60 ml/min/1.73m2    GFR est non-AA 8 (L) >60 ml/min/1.73m2    Calcium 7.2 (L) 8.5 - 10.1 mg/dL    Phosphorus 9.0 (H) 2.6 - 4.7 mg/dL    Albumin 1.0 (L) 3.5 - 5.0 g/dL   PHOSPHORUS    Collection Time: 02/11/22  5:05 AM   Result Value Ref Range    Phosphorus 9.0 (H) 2.6 - 4.7 mg/dL   CK    Collection Time: 02/11/22  5:05 AM   Result Value Ref Range    CK 3,010 (H) 39 - 308 U/L   LACTIC ACID    Collection Time: 02/11/22  5:05 AM   Result Value Ref Range    Lactic acid 2.1 (HH) 0.4 - 2.0 mmol/L   GLUCOSE, POC    Collection Time: 02/11/22  5:37 AM   Result Value Ref Range    Glucose (POC) 138 (H) 65 - 117 mg/dL    Performed by Jennett Ringer            Assessment/     Acute hypoxic respiratory failure secondary to COVID-19  COVID-19 with pneumonitis  Septic shock due to COVID-19 pneumonitis  Acute encephalopathy  Non-STEMI type II due to demand mismatch  Shock liver secondary to septic shock  Acute anemia status post transfusion of packed red blood cells    Plan:  Continue supportive care including Decadron Zosyn and azithromycin  Wean of mechanical ventilator as tolerated  Continue to monitor daily electrolytes  Overall prognosis guarded  Updated Y,IRALMACN([id number]). Requested son to arrange with other siblings for family get together with physician. Goals of care to be d/w family     Care Plan discussed with: Nurse    Total time spent with patient: 30 minutes.     Zain Jasso, MD

## 2022-02-11 NOTE — PROGRESS NOTES
CM reviewed clinical chart. Patient's family is working on making a decision regarding his care; discharge plan is pending at this time. CM will continue to follow.

## 2022-02-11 NOTE — PROGRESS NOTES
Renal Progress Note    Patient: Ava Matias MRN: [id number]  SSN: [id number]    YOB: 1945  Age: 78 y.o. Sex: male      Admit Date: 1/27/2022    LOS: 15 days     Subjective:     Seen at bedside , no acute venets overnight   Still on propofol     Will get dialysis today.     Current Facility-Administered Medications   Medication Dose Route Frequency    insulin lispro (HUMALOG) injection   SubCUTAneous Q6H    dexamethasone (DECADRON) 4 mg/mL injection 4 mg  4 mg IntraVENous Q24H    piperacillin-tazobactam (ZOSYN) 3.375 g in 0.9% sodium chloride (MBP/ADV) 100 mL MBP  3.375 g IntraVENous Q8H    zinc oxide-white petrolatum 20-75 % topical paste   Topical TID    0.9% sodium chloride infusion 250 mL  250 mL IntraVENous PRN    levETIRAcetam (KEPPRA) oral solution 1,500 mg  1,500 mg Oral BID    heparin (porcine) 1,000 unit/mL injection 2,200 Units  2,200 Units IntraVENous DIALYSIS PRN    insulin glargine (LANTUS) injection 20 Units  20 Units SubCUTAneous QHS    NOREPINephrine (LEVOPHED) 8 mg in 0.9% NS 250ml infusion  0.5-120 mcg/min IntraVENous TITRATE    lactulose (CHRONULAC) 10 gram/15 mL solution 15 mL  10 g Per G Tube TID    glucose chewable tablet 16 g  4 Tablet Oral PRN    glucagon (GLUCAGEN) injection 1 mg  1 mg IntraMUSCular PRN    dextrose 10% infusion 125-250 mL  125-250 mL IntraVENous PRN    LORazepam (ATIVAN) injection 2 mg  2 mg IntraVENous Q2H PRN    glucose chewable tablet 16 g  4 Tablet Oral PRN    glucagon (GLUCAGEN) injection 1 mg  1 mg IntraMUSCular PRN    valproic acid (as sodium salt) (DEPAKENE) 250 mg/5 mL (5 mL) oral solution 500 mg  500 mg Oral BID    dextrose 10% infusion 125-250 mL  125-250 mL IntraVENous PRN    PHENYLephrine (ISRAEL-SYNEPHRINE) 30 mg in 0.9% sodium chloride 250 mL infusion   mcg/min IntraVENous TITRATE    propofol (DIPRIVAN) 10 mg/mL infusion  0-50 mcg/kg/min IntraVENous TITRATE    lacosamide (VIMPAT) tablet 200 mg  200 mg Oral BID    levothyroxine (SYNTHROID) tablet 75 mcg  75 mcg Oral 7am    [Held by provider] aspirin chewable tablet 81 mg  81 mg Oral DAILY    sodium chloride (NS) flush 5-40 mL  5-40 mL IntraVENous Q8H    sodium chloride (NS) flush 5-40 mL  5-40 mL IntraVENous PRN    acetaminophen (TYLENOL) tablet 650 mg  650 mg Oral Q6H PRN    Or    acetaminophen (TYLENOL) suppository 650 mg  650 mg Rectal Q6H PRN    polyethylene glycol (MIRALAX) packet 17 g  17 g Oral DAILY PRN    ondansetron (ZOFRAN ODT) tablet 4 mg  4 mg Oral Q8H PRN    Or    ondansetron (ZOFRAN) injection 4 mg  4 mg IntraVENous Q6H PRN    atorvastatin (LIPITOR) tablet 80 mg  80 mg Oral QHS        Vitals:    02/11/22 0700 02/11/22 0731 02/11/22 0800 02/11/22 0900   BP: (!) 149/63  (!) 149/64 (!) 142/58   Pulse: 90 90     Resp: 18 19 19 19   Temp: 98.7 °F (37.1 °C)      TempSrc:       SpO2: 97% 98% 98% 98%   Weight:       Height:         Objective:   General: On ventilator, unresponsive   HEENT:  no Icterus, Pallor+, ET tube in place, mucosa dry   neck: Neck is supple, No JVD  Lungs: Decreased breath sounds at the bases,   CVS: heart sounds normal,  no murmurs, no rubs. GI: soft, nontender, no distention  Extremeties: no cyanosis, no edema    Neuro: Patient unresponsive, on vent, off sedation   skin: normal skin turgor, no skin rashes.         Intake and Output:  Current Shift: 02/11 0701 - 02/11 1900  In: 120   Out: -   Last three shifts: 02/09 1901 - 02/11 0700  In: 3601.5 [I.V.:621.5]  Out: 330 [Urine:30; Drains:300]      Lab/Data Review:  Recent Labs     02/10/22  0400 02/09/22  0340   WBC 22.0* 22.9*   HGB 9.7* 9.3*   HCT 27.1* 26.3*   * 429*     Recent Labs     02/11/22  0505 02/10/22  0500 02/09/22  0340   * 127* 129*   K 4.8 4.7 5.0   CL 93* 94* 95*   CO2 18* 18* 19*   * 167* 182*   * 84* 92*   CREA 6.58* 5.73* 6.12*   CA 7.2* 7.3* 6.8*   PHOS 9.0*  9.0*  --   --    ALB 1.0* 1.0* 1.0*   TBILI  --  0.4 0.4   ALT  --  123* 137* INR 1.3* 1.1 1.3*     Recent Labs     02/11/22  0501 02/10/22  0455 02/09/22  0400   PH 7.30* 7.33* 7.30*   PCO2 36 38 37   PO2 141* 66* 78   HCO3 18* 20* 18*   FIO2 50.0 50.0 50.0     Recent Results (from the past 24 hour(s))   GLUCOSE, POC    Collection Time: 02/10/22 11:43 AM   Result Value Ref Range    Glucose (POC) 177 (H) 65 - 117 mg/dL    Performed by Dee Hudson    GLUCOSE, POC    Collection Time: 02/10/22  5:51 PM   Result Value Ref Range    Glucose (POC) 153 (H) 65 - 117 mg/dL    Performed by Dee Hudson    GLUCOSE, POC    Collection Time: 02/10/22  8:44 PM   Result Value Ref Range    Glucose (POC) 129 (H) 65 - 117 mg/dL    Performed by Purvi Shaffer    GLUCOSE, POC    Collection Time: 02/10/22 10:35 PM   Result Value Ref Range    Glucose (POC) 231 (H) 65 - 117 mg/dL    Performed by Purvi Shaffer    GLUCOSE, POC    Collection Time: 02/10/22 11:36 PM   Result Value Ref Range    Glucose (POC) 111 65 - 117 mg/dL    Performed by Stephanie Fontanez    BLOOD GAS, ARTERIAL    Collection Time: 02/11/22  5:01 AM   Result Value Ref Range    pH 7.30 (L) 7.35 - 7.45      PCO2 36 35 - 45 mmHg    PO2 141 (H) 75 - 100 mmHg    O2 SAT 99 >95 %    BICARBONATE 18 (L) 22 - 26 mmol/L    BASE DEFICIT 8.2 (H) 0 - 2 mmol/L    O2 METHOD VENT      FIO2 50.0 %    MODE Assist Control/Volume Control      Tidal volume 500      SET RATE 12      EPAP/CPAP/PEEP 5.0      Sample source Arterial      SITE Right Radial      JULIO'S TEST PASS     PROTHROMBIN TIME + INR    Collection Time: 02/11/22  5:05 AM   Result Value Ref Range    Prothrombin time 15.6 (H) 11.9 - 14.6 sec    INR 1.3 (H) 0.9 - 1.1     LD    Collection Time: 02/11/22  5:05 AM   Result Value Ref Range     (H) 85 - 241 U/L   D DIMER    Collection Time: 02/11/22  5:05 AM   Result Value Ref Range    D DIMER >20.00 (H) <0.50 ug/ml(FEU)   PROCALCITONIN    Collection Time: 02/11/22  5:05 AM   Result Value Ref Range    Procalcitonin 2.91 (H) 0 ng/mL   SED RATE (ESR) Collection Time: 02/11/22  5:05 AM   Result Value Ref Range    Sed rate, automated 128 mm/hr   RENAL FUNCTION PANEL    Collection Time: 02/11/22  5:05 AM   Result Value Ref Range    Sodium 128 (L) 136 - 145 mmol/L    Potassium 4.8 3.5 - 5.1 mmol/L    Chloride 93 (L) 97 - 108 mmol/L    CO2 18 (L) 21 - 32 mmol/L    Anion gap 17 (H) 5 - 15 mmol/L    Glucose 132 (H) 65 - 100 mg/dL     (H) 6 - 20 mg/dL    Creatinine 6.58 (H) 0.70 - 1.30 mg/dL    BUN/Creatinine ratio 15 12 - 20      GFR est AA 10 (L) >60 ml/min/1.73m2    GFR est non-AA 8 (L) >60 ml/min/1.73m2    Calcium 7.2 (L) 8.5 - 10.1 mg/dL    Phosphorus 9.0 (H) 2.6 - 4.7 mg/dL    Albumin 1.0 (L) 3.5 - 5.0 g/dL   PHOSPHORUS    Collection Time: 02/11/22  5:05 AM   Result Value Ref Range    Phosphorus 9.0 (H) 2.6 - 4.7 mg/dL   CK    Collection Time: 02/11/22  5:05 AM   Result Value Ref Range    CK 3,010 (H) 39 - 308 U/L   LACTIC ACID    Collection Time: 02/11/22  5:05 AM   Result Value Ref Range    Lactic acid 2.1 (HH) 0.4 - 2.0 mmol/L   GLUCOSE, POC    Collection Time: 02/11/22  5:37 AM   Result Value Ref Range    Glucose (POC) 138 (H) 65 - 117 mg/dL    Performed by Rohit Bro       FiO2 - stable on Vent   Noted ABG   Chest X ray personally reviewed   Assessment and Plan:     #1 acute kidney injury  --ANDRES likely 2/2 ATN from septic shock /rhabdomyolysis. CPK trending down   Patient remained anuric, with rising BUN and creatinine  Started on CRRT 1/31, had for 48 hrs, Switched to intermittent hemodialysis,   Status post hemodialysis 2/9/22 ,    He remains anuric   - Had poor Blood flow with HD cather on 2/9/22 and cathflo was used to dissolve any clots   - He appears to be having poor clearance - as his BUN is persistently high   He is also getting TFs and prosource protein supplements every day which is causing BUN to be high .  -Will check pre-dialysis and post dialysis BUN tomorrow for URR and to estimate clearance .   BUN Creatinine still high and remains anuric. Flush servin today      #2 severe hypokalemia: Improved potassium level    monitor potassium levels   Will dialyze of=n 2 K bath today     #3 Hyponatremia :sodium level to 133>>131>>129 , from incr free water input   Glucose relatively well controlled . Adjusted free water rate down to 150cc Q6 hours today   - will do HD tomorrow       #4  COVID-19 pneumonia:   -Septic shock  -With multiorgan failure including renal failure/transaminitis/hemodynamics shock  -Patient currently on Decadron azithromycin and Zosyn  Neurology following the patient for anoxic encephalopathy, remains unresponsive  Shock liver with high liver enzymes( improving),   Rhabdomyolysis+/ANDRES, on HD, will monitor   -Overall prognosis seems guarded     #5 diabetes: hyperglycemia+  Monitor accuchecks and adjust management as needed     #6 seizure disorder  -On antiseizure medications, neurology following the patient    7. Metabolic acidosis: Secondary to acute kidney injury/hypotension  Improved with hemodialysis  Stable CO2 level of 21, continue to monitor CO2  Lactate levels last checked on 1/28/22     8. Hypocalcemia - corrected calcium WNL   Will dialyze on 3 calcium bath today     Signed By: Steve Solorio MD     February 11, 2022

## 2022-02-11 NOTE — PROGRESS NOTES
Infectious Diseases Progress Note  Omid Negrete MD  [phone]  Date:2022       Room:[facility]  Patient Randa Nielsen     YOB: 1945     Age:76 y.o. 76M with schizophrenia, seizures, diabetes, chronic renal failure.     24 presents to hospital unresponsive. Reportedly had been increasingly weak and unable to leave the bed for days. Associated with fevers. During the ED course found positive for Covid 19, with increased troponin. Intubated ventilated for acute hypoxic respiratory failure. During the hospital course, declining renal function and initiated on hemodialysis. I have been asked to see the patient in consultation for prolonged respiratory failure. Subjective    Subjective:  Symptoms:  Stable. Review of Systems   Unable to perform ROS: Intubated     Objective         Vitals Last 24 Hours:  TEMPERATURE:  Temp  Av.7 °F (37.1 °C)  Min: 98.2 °F (36.8 °C)  Max: 99.3 °F (37.4 °C)  RESPIRATIONS RANGE: Resp  Av.2  Min: 14  Max: 29  PULSE OXIMETRY RANGE: SpO2  Av.9 %  Min: 92 %  Max: 99 %  PULSE RANGE: Pulse  Av.2  Min: 87  Max: 97  BLOOD PRESSURE RANGE: Systolic (57NBB), MHV:625 , Min:112 , WIN:464   ; Diastolic (13PAG), XSL:88, Min:56, Max:68    I/O (24Hr): Intake/Output Summary (Last 24 hours) at 2022 1240  Last data filed at 2022 1108  Gross per 24 hour   Intake 1686 ml   Output 300 ml   Net 1386 ml     Objective:  Vital signs: (most recent): Blood pressure (!) 145/63, pulse 91, temperature 98.9 °F (37.2 °C), resp. rate 18, height 6' 0.01\" (1.829 m), weight 117.6 kg (259 lb 4.2 oz), SpO2 97 %. General:  intubated on vent unresponsive on no sedation  HEENT: NCAT,   Eyes: anicteric; conjunctiva clear no doll's eye reflex  Neck: no nodes, no cuff leak, trach midline; no accessory MM use.   Chest: no deformity,   Cardiac: R regular; no murmur;   Lungs: distant breath sounds; no wheezes a few rales in the base  Abd: soft, NT, hypoactive BS  Ext: Trace edema; no joint swelling; No clubbing  : No urine  Neuro: Unresponsive on no sedation no doll's eye reflex flaccid extremity  Psych-  unable to assess  Skin: warm, dry, no cyanosis;   Pulses: Brachial and radial pulses intact  Capillary: Slow capillary refill    Labs/Imaging/Diagnostics    Labs:  CBC:  Recent Labs     02/10/22  0400 02/09/22  0340   WBC 22.0* 22.9*   RBC 3.05* 2.90*   HGB 9.7* 9.3*   HCT 27.1* 26.3*   MCV 88.9 90.7   RDW 14.5 14.6*   * 429*     CHEMISTRIES:  Recent Labs     02/11/22  0505 02/10/22  0500 02/09/22  0340   * 127* 129*   K 4.8 4.7 5.0   CL 93* 94* 95*   CO2 18* 18* 19*   * 84* 92*   CA 7.2* 7.3* 6.8*   PHOS 9.0*  9.0*  --   --    MG 2.4  --   --    PT/INR:  Recent Labs     02/11/22  0505 02/10/22  0500 02/09/22  0340   INR 1.3* 1.1 1.3*     APTT:  No results for input(s): APTT in the last 72 hours. LIVER PROFILE:  Recent Labs     02/10/22  0500 02/09/22  0340   * 274*   * 137*     Lab Results   Component Value Date/Time    ALT (SGPT) 123 (H) 02/10/2022 05:00 AM    AST (SGOT) 222 (H) 02/10/2022 05:00 AM    Alk. phosphatase 214 (H) 02/10/2022 05:00 AM    Bilirubin, total 0.4 02/10/2022 05:00 AM       Imaging Last 24 Hours:  XR CHEST PORT    Result Date: 2/11/2022  Chest single view. Comparison single view chest February 10, 2022. Stable ET tube, NG tube, and right neck vascular catheters. Patchy reticular markings central and basilar lungs unchanged. Cardiac and mediastinal structures unchanged. No pneumothorax or sizable pleural effusion. Assessment//Plan   Active Problems:    Acute encephalopathy (5/18/2021)      Assessment & Plan   76M with schizophrenia, seizures, diabetes, chronic renal failure.     1/27/22 presents to hospital unresponsive. Reportedly had been increasingly weak and unable to leave the bed for days. Associated with fevers. During the ED course found positive for Covid 19, with increased troponin.  Intubated ventilated for acute hypoxic respiratory failure. During the hospital course, declining renal function and initiated on hemodialysis.  I have been asked to see the patient in consultation for prolonged respiratory failure.     MICROBIOLOGY     1/27/22            Covid 19          Positive  2/10/22 Covid 19 Positive    1/27/22            Blood               Negative  1/27/22            Urine                Negative     2/3/22              Endotrach        Scant Normal respiratory mark     ASSESSMENT AND RECOMMENDATIONS     1) Prolonged acute hypoxic respiratory failure in the setting of multifactorial shock, shock liver, Covid 19 pneumonia, NSTEMI, acute on chronic renal failure, anoxic brain injury.                 Supportive care with respiratory support as needed              Zosyn through 2/14/22                 Overall prognosis remains very poor   Tracheostomy anticipated        Electronically signed by Bhargavi Barraza MD on 2/11/2022 at 12:38 PM

## 2022-02-12 NOTE — PROGRESS NOTES
Infectious Diseases Progress Note  Omid Collins MD  [phone]  Date:2022       Room:[facility] [place]  Patient Alex Medrano     YOB: 1945     Age:76 y.o. 76M with schizophrenia, seizures, diabetes, chronic renal failure.     22 presents to hospital unresponsive. Reportedly had been increasingly weak and unable to leave the bed for days. Associated with fevers. During the ED course found positive for Covid 19, with increased troponin. Intubated ventilated for acute hypoxic respiratory failure. During the hospital course, declining renal function and initiated on hemodialysis. I have been asked to see the patient in consultation for prolonged respiratory failure. Subjective    Subjective:  Symptoms:  Stable. Review of Systems   Unable to perform ROS: Intubated     Objective         Vitals Last 24 Hours:  TEMPERATURE:  Temp  Av.3 °F (37.4 °C)  Min: 98.5 °F (36.9 °C)  Max: 100.2 °F (37.9 °C)  RESPIRATIONS RANGE: Resp  Av.8  Min: 16  Max: 22  PULSE OXIMETRY RANGE: SpO2  Av.4 %  Min: 94 %  Max: 99 %  PULSE RANGE: Pulse  Av.5  Min: 78  Max: 101  BLOOD PRESSURE RANGE: Systolic (71ZPF), CXS:716 , Min:106 , SSS:409   ; Diastolic (97EFL), ARJ:83, Min:55, Max:78    I/O (24Hr): Intake/Output Summary (Last 24 hours) at 2022 1021  Last data filed at 2022 0741  Gross per 24 hour   Intake 1980 ml   Output 2000 ml   Net -20 ml     Objective:  Vital signs: (most recent): Blood pressure 136/62, pulse 97, temperature 99.6 °F (37.6 °C), resp. rate 17, height 6' 0.01\" (1.829 m), weight 112.4 kg (247 lb 12.8 oz), SpO2 98 %. General:  intubated on vent unresponsive on no sedation  HEENT: NCAT,   Eyes: anicteric; conjunctiva clear no doll's eye reflex  Neck: no nodes, no cuff leak, trach midline; no accessory MM use.   Chest: no deformity,   Cardiac: R regular; no murmur;   Lungs: distant breath sounds; no wheezes a few rales in the base  Abd: soft, NT, hypoactive BS  Ext: Trace edema; no joint swelling; No clubbing  : No urine  Neuro: Unresponsive on no sedation no doll's eye reflex flaccid extremity  Psych-  unable to assess  Skin: warm, dry, no cyanosis;   Pulses: Brachial and radial pulses intact  Capillary: Slow capillary refill    Labs/Imaging/Diagnostics    Labs:  CBC:  Recent Labs     02/10/22  0400   WBC 22.0*   RBC 3.05*   HGB 9.7*   HCT 27.1*   MCV 88.9   RDW 14.5   *     CHEMISTRIES:  Recent Labs     02/11/22  1535 02/11/22  0505 02/10/22  0500   NA  --  128* 127*   K  --  4.8 4.7   CL  --  93* 94*   CO2  --  18* 18*   * 100* 84*   CA  --  7.2* 7.3*   PHOS  --  9.0*  9.0*  --    MG  --  2.4  --    PT/INR:  Recent Labs     02/12/22  0448 02/11/22  0505 02/10/22  0500   INR 1.3* 1.3* 1.1     APTT:  No results for input(s): APTT in the last 72 hours. LIVER PROFILE:  Recent Labs     02/10/22  0500   *   *     Lab Results   Component Value Date/Time    ALT (SGPT) 123 (H) 02/10/2022 05:00 AM    AST (SGOT) 222 (H) 02/10/2022 05:00 AM    Alk. phosphatase 214 (H) 02/10/2022 05:00 AM    Bilirubin, total 0.4 02/10/2022 05:00 AM       Imaging Last 24 Hours:  No results found. Assessment//Plan   Active Problems:    Acute encephalopathy (5/18/2021)      Assessment & Plan   76M with schizophrenia, seizures, diabetes, chronic renal failure.     1/27/22 presents to hospital unresponsive. Reportedly had been increasingly weak and unable to leave the bed for days. Associated with fevers. During the ED course found positive for Covid 19, with increased troponin. Intubated ventilated for acute hypoxic respiratory failure. During the hospital course, declining renal function and initiated on hemodialysis.  I have been asked to see the patient in consultation for prolonged respiratory failure.     MICROBIOLOGY     1/27/22            Covid 19          Positive  2/10/22 Covid 19 Positive    1/27/22            Blood               Negative  1/27/22 Urine                Negative     2/3/22              Endotrach        Scant Normal respiratory mark     ASSESSMENT AND RECOMMENDATIONS     1) Prolonged acute hypoxic respiratory failure in the setting of multifactorial shock, shock liver, Covid 19 pneumonia, NSTEMI, acute on chronic renal failure, anoxic brain injury.                 Supportive care with respiratory support as needed              Zosyn through 2/14/22                 Overall prognosis remains very poor   Tracheostomy anticipated        Electronically signed by Manav Clark MD on 2/12/2022 at 12:38 PM

## 2022-02-12 NOTE — PROGRESS NOTES
Hospitalist Progress Note         Aidan Hammond MD          Daily Progress Note: 2/12/2022      Subjective: The patient is seen for follow  up. 76M, H/o Schizophenia, seizures, DMII on oral meds with unresponsive and acute hypoxic respiratory failure s/t COVID-19 pneumonitis complicated by ANDRES, hypokalemia and shock liver.            HSOPITAL COURSE  COVID positive, associated with fever 106, increased troponin, cardiology was consulted ansd recommended ECHO, there is no heparin gtt, was initially called for STEMI and was cancelled. he was intubated for acute hypoxic respiratory failure. On levophed. Complicated by ANDRES, shock liver and hypokalemia. Was treated with azithromycin, barcitinib, and zosyn. Will give 1L bolus and continue IVF. Discussed with family he is very critical- his renal functions increased and now seemingly in ATN, his liver functions have worsened and had a seizure on 1/28 , he is off propofol now and neurology was consulted. Repeat CT scan didn't show any stroke, plan for EEG on 2/1 to assess for neurological activity. The patients liver functions, renal are reviewed. __    Patient seen in follow-up. Remains intubated and ventilated with FiO2 of 50% and PEEP of 5.  Unresponsive       Problem List:  Problem List as of 2/12/2022 Date Reviewed: 12/30/2020          Codes Class Noted - Resolved    Acute hypernatremia ICD-10-CM: E87.0  ICD-9-CM: 276.0  6/9/2021 - Present        Uncontrolled type 2 diabetes mellitus with hyperglycemia ([facility] ICD-10-CM: E11.65  ICD-9-CM: 250.02  6/9/2021 - Present        Acute metabolic encephalopathy SXB-99-IV: G93.41  ICD-9-CM: 348.31  6/9/2021 - Present        ANDRES (acute kidney injury) ([facility] ICD-10-CM: N17.9  ICD-9-CM: 584.9  6/9/2021 - Present        Dehydration ICD-10-CM: E86.0  ICD-9-CM: 276.51  6/3/2021 - Present        Hypernatremia ICD-10-CM: E87.0  ICD-9-CM: 276.0  5/26/2021 - Present        Acute encephalopathy ICD-10-CM: G93.40  ICD-9-CM: 348.30  5/18/2021 - Present        Vitamin D deficiency ICD-10-CM: E55.9  ICD-9-CM: 268.9  12/30/2020 - Present        Dementia ([facility] ICD-10-CM: F03.90  ICD-9-CM: 294.20  7/26/2017 - Present        Mixed hyperlipidemia ICD-10-CM: E78.2  ICD-9-CM: 272.2  4/16/2016 - Present        Essential hypertension ICD-10-CM: I10  ICD-9-CM: 401.9  4/18/2015 - Present        DM (diabetes mellitus) ([facility] ICD-10-CM: E11.9  ICD-9-CM: 250.00  2/25/2013 - Present        Schizophreniform disorder ([facility] ICD-10-CM: F20.81  ICD-9-CM: 295.40  Unknown - Present        GERD (gastroesophageal reflux disease) ICD-10-CM: K21.9  ICD-9-CM: 530.81  Unknown - Present        Seizure disorder ([facility] ICD-10-CM: G40.909  ICD-9-CM: 345.90  Unknown - Present        RESOLVED: Controlled type 2 diabetes mellitus with complication, without long-term current use of insulin ([facility] ICD-10-CM: E11.8  ICD-9-CM: 250.90  12/30/2020 - 6/28/2021        RESOLVED: Morbid obesity ([facility] ICD-10-CM: E66.01  ICD-9-CM: 278.01  12/30/2020 - 8/3/2021        RESOLVED: Type 2 diabetes with nephropathy ([facility] ICD-10-CM: E11.21  ICD-9-CM: 250.40, 583.81  6/17/2018 - 6/28/2021        RESOLVED: Severe obesity with body mass index (BMI) of 35.0 to 39.9 with serious comorbidity ([facility] ICD-10-CM: E66.01  ICD-9-CM: 278.01  6/15/2018 - 1/1/2022        RESOLVED: Essential hypertension with goal blood pressure less than 140/90 ICD-10-CM: I10  ICD-9-CM: 401.9  4/16/2016 - 5/30/2021        RESOLVED: Hypothyroidism due to acquired atrophy of thyroid ICD-10-CM: E03.4  ICD-9-CM: 244.8, 246.8  4/18/2015 - 10/20/2021        RESOLVED: Hyperlipidemia ICD-10-CM: E78.5  ICD-9-CM: 272.4  5/28/2014 - 1/1/2022        RESOLVED: Hypothyroid ICD-10-CM: E03.9  ICD-9-CM: 244.9  5/28/2014 - 4/18/2015        RESOLVED: Seizures ([address] ICD-10-CM: R56.9  ICD-9-CM: 780.39  2/25/2013 - 1/1/2022        RESOLVED: HTN (hypertension) ICD-10-CM: I10  ICD-9-CM: 401.9  2/25/2013 - 4/18/2015 Medications reviewed  Current Facility-Administered Medications   Medication Dose Route Frequency    insulin lispro (HUMALOG) injection   SubCUTAneous Q6H    dexamethasone (DECADRON) 4 mg/mL injection 4 mg  4 mg IntraVENous Q24H    piperacillin-tazobactam (ZOSYN) 3.375 g in 0.9% sodium chloride (MBP/ADV) 100 mL MBP  3.375 g IntraVENous Q8H    zinc oxide-white petrolatum 20-75 % topical paste   Topical TID    0.9% sodium chloride infusion 250 mL  250 mL IntraVENous PRN    levETIRAcetam (KEPPRA) oral solution 1,500 mg  1,500 mg Oral BID    heparin (porcine) 1,000 unit/mL injection 2,200 Units  2,200 Units IntraVENous DIALYSIS PRN    insulin glargine (LANTUS) injection 20 Units  20 Units SubCUTAneous QHS    NOREPINephrine (LEVOPHED) 8 mg in 0.9% NS 250ml infusion  0.5-120 mcg/min IntraVENous TITRATE    lactulose (CHRONULAC) 10 gram/15 mL solution 15 mL  10 g Per G Tube TID    glucose chewable tablet 16 g  4 Tablet Oral PRN    glucagon (GLUCAGEN) injection 1 mg  1 mg IntraMUSCular PRN    dextrose 10% infusion 125-250 mL  125-250 mL IntraVENous PRN    LORazepam (ATIVAN) injection 2 mg  2 mg IntraVENous Q2H PRN    glucose chewable tablet 16 g  4 Tablet Oral PRN    glucagon (GLUCAGEN) injection 1 mg  1 mg IntraMUSCular PRN    valproic acid (as sodium salt) (DEPAKENE) 250 mg/5 mL (5 mL) oral solution 500 mg  500 mg Oral BID    dextrose 10% infusion 125-250 mL  125-250 mL IntraVENous PRN    PHENYLephrine (ISRAEL-SYNEPHRINE) 30 mg in 0.9% sodium chloride 250 mL infusion   mcg/min IntraVENous TITRATE    propofol (DIPRIVAN) 10 mg/mL infusion  0-50 mcg/kg/min IntraVENous TITRATE    lacosamide (VIMPAT) tablet 200 mg  200 mg Oral BID    levothyroxine (SYNTHROID) tablet 75 mcg  75 mcg Oral 7am    [Held by provider] aspirin chewable tablet 81 mg  81 mg Oral DAILY    sodium chloride (NS) flush 5-40 mL  5-40 mL IntraVENous Q8H    sodium chloride (NS) flush 5-40 mL  5-40 mL IntraVENous PRN    acetaminophen (TYLENOL) tablet 650 mg  650 mg Oral Q6H PRN    Or    acetaminophen (TYLENOL) suppository 650 mg  650 mg Rectal Q6H PRN    polyethylene glycol (MIRALAX) packet 17 g  17 g Oral DAILY PRN    ondansetron (ZOFRAN ODT) tablet 4 mg  4 mg Oral Q8H PRN    Or    ondansetron (ZOFRAN) injection 4 mg  4 mg IntraVENous Q6H PRN    atorvastatin (LIPITOR) tablet 80 mg  80 mg Oral QHS       Review of Systems:   Review of systems not obtained due to patient factors. Objective:   Physical Exam:     Visit Vitals  /62 (BP 1 Location: Left leg, BP Patient Position: At rest)   Pulse 97   Temp 99.6 °F (37.6 °C)   Resp 17   Ht 6' 0.01\" (1.829 m)   Wt 112.4 kg (247 lb 12.8 oz)   SpO2 98%   BMI 33.60 kg/m²      O2 Device: Ventilator    Temp (24hrs), Av.3 °F (37.4 °C), Min:98.5 °F (36.9 °C), Max:100.2 °F (37.9 °C)    701 -  1900  In: 300   Out: -    02/10 190 -  0700  In: 2400 [I.V.:100]  Out: 2300 [Drains:300]    General:  Awake but unable to follow commands   Lungs:   Clear to auscultation bilaterally. Chest wall:  No tenderness or deformity. Heart:  Regular rate and rhythm, S1, S2 normal, no murmur, click, rub or gallop. Abdomen:   Soft, non-tender. Bowel sounds normal. No masses,  No organomegaly. Extremities: Extremities normal, atraumatic, no cyanosis or edema. Pulses: 2+ and symmetric all extremities. Skin: Skin color, texture, turgor normal. No rashes or lesions   Neurologic: CNII-XII intact.  No gross sensory or motor deficits     Data Review:       Recent Days:  Recent Labs     02/10/22  0400   WBC 22.0*   HGB 9.7*   HCT 27.1*   *     Recent Labs     22  0448 22  1535 22  0505 02/10/22  0500   NA  --   --  128* 127*   K  --   --  4.8 4.7   CL  --   --  93* 94*   CO2  --   --  18* 18*   GLU  --   --  132* 167*   BUN  --  121* 100* 84*   CREA  --   --  6.58* 5.73*   CA  --   --  7.2* 7.3*   MG  --   --  2.4  --    PHOS  --   --  9.0*  9.0*  --    ALB --   --  1.0* 1.0*   TBILI  --   --   --  0.4   ALT  --   --   --  123*   INR 1.3*  --  1.3* 1.1     Recent Labs     02/12/22  0445 02/11/22  0501 02/10/22  0455   PH 7.40 7.30* 7.33*   PCO2 36 36 38   PO2 86 141* 66*   HCO3 22 18* 20*   FIO2 50.0 50.0 50.0       24 Hour Results:  Recent Results (from the past 24 hour(s))   GLUCOSE, POC    Collection Time: 02/11/22 11:09 AM   Result Value Ref Range    Glucose (POC) 170 (H) 65 - 117 mg/dL    Performed by Pk Zimmer    BUN    Collection Time: 02/11/22  3:35 PM   Result Value Ref Range     (H) 6 - 20 mg/dL   GLUCOSE, POC    Collection Time: 02/11/22  6:01 PM   Result Value Ref Range    Glucose (POC) 108 65 - 117 mg/dL    Performed by Lynda Moya 124, POC    Collection Time: 02/11/22 10:09 PM   Result Value Ref Range    Glucose (POC) 126 (H) 65 - 117 mg/dL    Performed by Jolinda Bence    BLOOD GAS, ARTERIAL    Collection Time: 02/12/22  4:45 AM   Result Value Ref Range    pH 7.40 7.35 - 7.45      PCO2 36 35 - 45 mmHg    PO2 86 75 - 100 mmHg    O2 SAT 98 >95 %    BICARBONATE 22 22 - 26 mmol/L    BASE DEFICIT 2.5 (H) 0 - 2 mmol/L    O2 METHOD VENT      FIO2 50.0 %    MODE Assist Control/Volume Control      Tidal volume 500      SET RATE 12      EPAP/CPAP/PEEP 5.0      SITE Right Radial      JULIO'S TEST PASS     PROTHROMBIN TIME + INR    Collection Time: 02/12/22  4:48 AM   Result Value Ref Range    Prothrombin time 16.0 (H) 11.9 - 14.6 sec    INR 1.3 (H) 0.9 - 1.1     LD    Collection Time: 02/12/22  4:48 AM   Result Value Ref Range     (H) 85 - 241 U/L   D DIMER    Collection Time: 02/12/22  4:48 AM   Result Value Ref Range    D DIMER >20.00 (H) <0.50 ug/ml(FEU)   PROCALCITONIN    Collection Time: 02/12/22  4:48 AM   Result Value Ref Range    Procalcitonin 3.36 (H) 0 ng/mL   SED RATE (ESR)    Collection Time: 02/12/22  4:48 AM   Result Value Ref Range    Sed rate, automated 128 mm/hr           Assessment/     Acute hypoxic respiratory failure secondary to COVID-19  COVID-19 with pneumonitis  Septic shock due to COVID-19 pneumonitis  Acute encephalopathy  Non-STEMI type II due to demand mismatch  Shock liver secondary to septic shock  Acute anemia status post transfusion of packed red blood cells    Plan:  Continue supportive care including Decadron Zosyn and azithromycin  Wean of mechanical ventilator as tolerated  Continue to monitor daily electrolytes  Overall prognosis guarded  Updated WLMACHELLE ROSA([phone]). Requested son to arrange with other siblings for family get together with physician. Goals of care to be d/w family     Care Plan discussed with: Nurse    Total time spent with patient: 30 minutes.     Curtis Tripathi MD

## 2022-02-12 NOTE — PROGRESS NOTES
Renal Progress Note    Patient: Phillip Bradley MRN: [id number]  SSN: [id number]    YOB: 1945  Age: 78 y.o.   Sex: male      Admit Date: 1/27/2022    LOS: 16 days     Subjective:     Seen at bedside , no acute venets overnight   Still on propofol     S/p HD dialysis yesterday     Current Facility-Administered Medications   Medication Dose Route Frequency    insulin lispro (HUMALOG) injection   SubCUTAneous Q6H    dexamethasone (DECADRON) 4 mg/mL injection 4 mg  4 mg IntraVENous Q24H    piperacillin-tazobactam (ZOSYN) 3.375 g in 0.9% sodium chloride (MBP/ADV) 100 mL MBP  3.375 g IntraVENous Q8H    zinc oxide-white petrolatum 20-75 % topical paste   Topical TID    0.9% sodium chloride infusion 250 mL  250 mL IntraVENous PRN    levETIRAcetam (KEPPRA) oral solution 1,500 mg  1,500 mg Oral BID    heparin (porcine) 1,000 unit/mL injection 2,200 Units  2,200 Units IntraVENous DIALYSIS PRN    insulin glargine (LANTUS) injection 20 Units  20 Units SubCUTAneous QHS    NOREPINephrine (LEVOPHED) 8 mg in 0.9% NS 250ml infusion  0.5-120 mcg/min IntraVENous TITRATE    lactulose (CHRONULAC) 10 gram/15 mL solution 15 mL  10 g Per G Tube TID    glucose chewable tablet 16 g  4 Tablet Oral PRN    glucagon (GLUCAGEN) injection 1 mg  1 mg IntraMUSCular PRN    dextrose 10% infusion 125-250 mL  125-250 mL IntraVENous PRN    LORazepam (ATIVAN) injection 2 mg  2 mg IntraVENous Q2H PRN    glucose chewable tablet 16 g  4 Tablet Oral PRN    glucagon (GLUCAGEN) injection 1 mg  1 mg IntraMUSCular PRN    valproic acid (as sodium salt) (DEPAKENE) 250 mg/5 mL (5 mL) oral solution 500 mg  500 mg Oral BID    dextrose 10% infusion 125-250 mL  125-250 mL IntraVENous PRN    PHENYLephrine (ISRAEL-SYNEPHRINE) 30 mg in 0.9% sodium chloride 250 mL infusion   mcg/min IntraVENous TITRATE    propofol (DIPRIVAN) 10 mg/mL infusion  0-50 mcg/kg/min IntraVENous TITRATE    lacosamide (VIMPAT) tablet 200 mg  200 mg Oral BID    levothyroxine (SYNTHROID) tablet 75 mcg  75 mcg Oral 7am    [Held by provider] aspirin chewable tablet 81 mg  81 mg Oral DAILY    sodium chloride (NS) flush 5-40 mL  5-40 mL IntraVENous Q8H    sodium chloride (NS) flush 5-40 mL  5-40 mL IntraVENous PRN    acetaminophen (TYLENOL) tablet 650 mg  650 mg Oral Q6H PRN    Or    acetaminophen (TYLENOL) suppository 650 mg  650 mg Rectal Q6H PRN    polyethylene glycol (MIRALAX) packet 17 g  17 g Oral DAILY PRN    ondansetron (ZOFRAN ODT) tablet 4 mg  4 mg Oral Q8H PRN    Or    ondansetron (ZOFRAN) injection 4 mg  4 mg IntraVENous Q6H PRN    atorvastatin (LIPITOR) tablet 80 mg  80 mg Oral QHS        Vitals:    02/12/22 1100 02/12/22 1107 02/12/22 1200 02/12/22 1300   BP: 122/60  (!) 155/78 (!) 150/73   Pulse: 99 97     Resp: 17 17 18 21   Temp: 99.1 °F (37.3 °C)      TempSrc:       SpO2: 96% 97% 95% 95%   Weight:       Height:         Objective:   General: On ventilator, unresponsive   HEENT:  no Icterus, Pallor+, ET tube in place, mucosa dry   neck: Neck is supple, No JVD  Lungs: Decreased breath sounds at the bases,   CVS: heart sounds normal,  no murmurs, no rubs. GI: soft, nontender, no distention  Extremeties: no cyanosis, no edema    Neuro: Patient unresponsive, on vent, off sedation   skin: normal skin turgor, no skin rashes.         Intake and Output:  Current Shift: 02/12 0701 - 02/12 1900  In: 450   Out: -   Last three shifts: 02/10 1901 - 02/12 0700  In: 2400 [I.V.:100]  Out: 2300 [Drains:300]      Lab/Data Review:  Recent Labs     02/10/22  0400   WBC 22.0*   HGB 9.7*   HCT 27.1*   *     Recent Labs     02/12/22  0448 02/11/22  1535 02/11/22  0505 02/10/22  0500   NA  --   --  128* 127*   K  --   --  4.8 4.7   CL  --   --  93* 94*   CO2  --   --  18* 18*   GLU  --   --  132* 167*   BUN  --  121* 100* 84*   CREA  --   --  6.58* 5.73*   CA  --   --  7.2* 7.3*   MG  --   --  2.4  --    PHOS  --   --  9.0*  9.0*  --    ALB  --   --  1.0* 1.0*   TBILI --   --   --  0.4   ALT  --   --   --  123*   INR 1.3*  --  1.3* 1.1     Recent Labs     02/12/22  0445 02/11/22  0501 02/10/22  0455   PH 7.40 7.30* 7.33*   PCO2 36 36 38   PO2 86 141* 66*   HCO3 22 18* 20*   FIO2 50.0 50.0 50.0     Recent Results (from the past 24 hour(s))   BUN    Collection Time: 02/11/22  3:35 PM   Result Value Ref Range     (H) 6 - 20 mg/dL   GLUCOSE, POC    Collection Time: 02/11/22  6:01 PM   Result Value Ref Range    Glucose (POC) 108 65 - 117 mg/dL    Performed by Lynda Moya 124, POC    Collection Time: 02/11/22 10:09 PM   Result Value Ref Range    Glucose (POC) 126 (H) 65 - 117 mg/dL    Performed by [facility]    BLOOD GAS, ARTERIAL    Collection Time: 02/12/22  4:45 AM   Result Value Ref Range    pH 7.40 7.35 - 7.45      PCO2 36 35 - 45 mmHg    PO2 86 75 - 100 mmHg    O2 SAT 98 >95 %    BICARBONATE 22 22 - 26 mmol/L    BASE DEFICIT 2.5 (H) 0 - 2 mmol/L    O2 METHOD VENT      FIO2 50.0 %    MODE Assist Control/Volume Control      Tidal volume 500      SET RATE 12      EPAP/CPAP/PEEP 5.0      SITE Right Radial      JULIO'S TEST PASS     PROTHROMBIN TIME + INR    Collection Time: 02/12/22  4:48 AM   Result Value Ref Range    Prothrombin time 16.0 (H) 11.9 - 14.6 sec    INR 1.3 (H) 0.9 - 1.1     LD    Collection Time: 02/12/22  4:48 AM   Result Value Ref Range     (H) 85 - 241 U/L   D DIMER    Collection Time: 02/12/22  4:48 AM   Result Value Ref Range    D DIMER >20.00 (H) <0.50 ug/ml(FEU)   PROCALCITONIN    Collection Time: 02/12/22  4:48 AM   Result Value Ref Range    Procalcitonin 3.36 (H) 0 ng/mL   SED RATE (ESR)    Collection Time: 02/12/22  4:48 AM   Result Value Ref Range    Sed rate, automated 128 mm/hr   GLUCOSE, POC    Collection Time: 02/12/22 12:24 PM   Result Value Ref Range    Glucose (POC) 137 (H) 65 - 117 mg/dL    Performed by Vince Denise       FiO2 - stable on Vent   Noted ABG   Chest X ray personally reviewed   Assessment and Plan:     #1 acute kidney injury  --ANDRES likely 2/2 ATN from septic shock /rhabdomyolysis. CPK trending down   Patient remained anuric, with rising BUN and creatinine  Started on CRRT 1/31, had for 48 hrs, Switched to intermittent hemodialysis,   Status post hemodialysis 2/11/22 ,    He remains anuric , servin removed yesterday   Will order bladder croft every day    - Had poor Blood flow with HD cather on 2/9/22 and cathflo was used to dissolve any clots   - He appears to be having poor clearance - as his BUN is persistently high   He is also getting TFs and prosource protein supplements every day which is causing BUN to be high .  -Will check pre-dialysis and post dialysis BUN tomorrow for URR and to estimate clearance .       #2 severe hypokalemia: Improved potassium level    monitor potassium levels   Will dialyze of=n 2 K bath today     #3 Hyponatremia :sodium level to 133>>131>>129 , from incr free water input   Glucose relatively well controlled . Adjusted free water rate down to 150cc Q6 hours today   - will do HD tomorrow   No labs today . #4  COVID-19 pneumonia:   -Septic shock  -With multiorgan failure including renal failure/transaminitis/hemodynamics shock  -Patient currently on Decadron azithromycin and Zosyn  Neurology following the patient for anoxic encephalopathy, remains unresponsive  Shock liver with high liver enzymes( improving),   Rhabdomyolysis+/ANDRES, on HD, will monitor   -Overall prognosis seems guarded     #5 diabetes: hyperglycemia+  Monitor accuchecks and adjust management as needed     #6 seizure disorder  -On antiseizure medications, neurology following the patient    7. Metabolic acidosis: Secondary to acute kidney injury/hypotension  Improved with hemodialysis  Stable CO2 level of 21, continue to monitor CO2  Lactate levels last checked on 1/28/22     8. Hypocalcemia - corrected calcium WNL   Will dialyze on 2.5 calcium bath .     Signed By: Carmel Hernandez MD     February 12, 2022

## 2022-02-12 NOTE — PROGRESS NOTES
CARDIOLOGY PROGRESS NOTE      Patient seen and examined. This is a patient who is followed for NSTEMI in the setting of COVID multi-organ failure. Remains intubated and sedated. Remains off pressors.     Telemetry reviewed, SR 90s     Pertinent review of systems items noted above, all other systems are negative. Current medications reviewed.     Physical Examination  Vital signs are stable. Blood pressure 122/60, Pulse 99  Intubated/sedated  Eyes closed  Heart is regular, rate and rhythm. Diminished lungs  No lower extremity swelling     Labs reviewed     Case discussed with Dr. Adelaide Brittle and our impression and recommendations are as follows:     1. NSTEMI:   - HS troponin 1354 > 1510. Likely related to demand ischemia in the setting of COVID multi-organ failure  - ASA has been on hold  - Echo: EF 65% with no wall motion abnormalities. - Continues with CRRT per renal     2. Hypotension:   -  BP has been stable, off pressor support     3. COVID pna:   - With multi-organ failure and possible anoxic encephalopathy   -  Management per primary/pulm. Americo Sears monitor telemetry, serial ECGs for QTc. Will continue to monitor.  - Recommend to keep a negative fluid balance to prevent volume overload.     Overall prognosis is poor. DNR. Family to discuss goals of care. Dr. Adelfo [facility]  [address]    [address] [place] [address] [address], [address] [facility]  [phone]

## 2022-02-12 NOTE — PROGRESS NOTES
IMPRESSION:   1. Acute hypoxic respiratory failure oxygenation well-maintained on the ventilator  2. Anoxic encephalopathy  3. Malignant hyperthermia resolved   4. COVID-19 pneumonia  5. NSTEMI elevated troponin trending down  6. Shock cardiogenic versus septic vs Hypovolumic Hypotension currently on norepinephrine  will continue to wean  7. Acute bleeding from dialysis catheter which has been corrected  8. Acute kidney injury now on hemodialysis  9. Metabolic acidosis resolved  10. Thrombocytopenia worsened  11. Hypernatremia resolved now hyponatremia  12. Rule out anoxic encephalopathy  13. Symptomatic hypokalemia  14. Shock liver with transaminitis significantly improved      RECOMMENDATIONS/PLAN:   1. ICU monitoring  1. Ventilator for mechanical life support and prevent respiratory arrest with protective lung strategies ABG acceptable he is still hemodynamically unstable will continue with the current vent support: He is off sedation most likely patient has anoxic encephalopathy  2. On Assist control rate 18  PEEP 5 FiO2 50% ABG acceptable  3. Blood pressure improved off vasopressors  4. Patient on Decadron Zithromax Zosyn, and baricitinib WBC elevated, decrease Decadron  5. Troponin is elevated 2D echo shows ejection fraction of 65%  6. He was bleeding from the dialysis catheter which is corrected received 4 units of packed RBC hemoglobin dropped from 8.8 to 4.0 repeat lab shows Hb 9.7  7. Got dialyzed 500 cc of fluid removed  8. LDH procalcitonin trending down  9. Remains on quarter saline also on lactulose  10. Liver enzyme continues to improve as patient was in shock liver  11. Agree with Empir[facility] IV antibiotics blood and urine cultures no growth on Zosyn   12. Temperature back to normal and sputum shows normal flow  13.  IV vasopressors for circulatory shock refractory to fluids to maintain SBP> 90      [x] High complexity decision making was performed  [x] See my orders for details  HPI   71-year-old male came in because as he was found unresponsive and fever 107 past medical history of schizophrenia and diabetes gastroesophageal reflux disease and anemia he is COVID-19 positive initially patient was called as NSTEMI but it was canceled patient is intubated on ventilator hemodynamically unstable unable to get any history out of the patient he seems dehydrated    PMH:  has a past medical history of Acute renal failure ([address] Anemia, Arthritis, DKA (diabetic ketoacidoses), GERD (gastroesophageal reflux disease), HTN (hypertension), Hypercholesterolemia, Schizophrenia ([address] Schizophreniform disorder ([address] Seizure disorder ([address] and Type II or unspecified type diabetes mellitus without mention of complication, uncontrolled. PSH:   has a past surgical history that includes ir insert non tunl cvc over 5 yrs (1/28/2022) and ir insert non tunl cvc over 5 yrs (1/31/2022). FHX: family history includes Stroke in his father. SHX:  reports that he has never smoked. He has never used smokeless tobacco. He reports that he does not drink alcohol and does not use drugs.     ALL: No Known Allergies     MEDS:   [x] Reviewed - As Below   [] Not reviewed    Current Facility-Administered Medications   Medication    insulin lispro (HUMALOG) injection    dexamethasone (DECADRON) 4 mg/mL injection 4 mg    piperacillin-tazobactam (ZOSYN) 3.375 g in 0.9% sodium chloride (MBP/ADV) 100 mL MBP    zinc oxide-white petrolatum 20-75 % topical paste    0.9% sodium chloride infusion 250 mL    levETIRAcetam (KEPPRA) oral solution 1,500 mg    heparin (porcine) 1,000 unit/mL injection 2,200 Units    insulin glargine (LANTUS) injection 20 Units    NOREPINephrine (LEVOPHED) 8 mg in 0.9% NS 250ml infusion    lactulose (CHRONULAC) 10 gram/15 mL solution 15 mL    glucose chewable tablet 16 g    glucagon (GLUCAGEN) injection 1 mg    dextrose 10% infusion 125-250 mL    LORazepam (ATIVAN) injection 2 mg    glucose chewable tablet 16 g    glucagon (GLUCAGEN) injection 1 mg    valproic acid (as sodium salt) (DEPAKENE) 250 mg/5 mL (5 mL) oral solution 500 mg    dextrose 10% infusion 125-250 mL    PHENYLephrine (ISRAEL-SYNEPHRINE) 30 mg in 0.9% sodium chloride 250 mL infusion    propofol (DIPRIVAN) 10 mg/mL infusion    lacosamide (VIMPAT) tablet 200 mg    levothyroxine (SYNTHROID) tablet 75 mcg    [Held by provider] aspirin chewable tablet 81 mg    sodium chloride (NS) flush 5-40 mL    sodium chloride (NS) flush 5-40 mL    acetaminophen (TYLENOL) tablet 650 mg    Or    acetaminophen (TYLENOL) suppository 650 mg    polyethylene glycol (MIRALAX) packet 17 g    ondansetron (ZOFRAN ODT) tablet 4 mg    Or    ondansetron (ZOFRAN) injection 4 mg    atorvastatin (LIPITOR) tablet 80 mg      MAR reviewed and pertinent medications noted or modified as needed   Current Facility-Administered Medications   Medication    insulin lispro (HUMALOG) injection    dexamethasone (DECADRON) 4 mg/mL injection 4 mg    piperacillin-tazobactam (ZOSYN) 3.375 g in 0.9% sodium chloride (MBP/ADV) 100 mL MBP    zinc oxide-white petrolatum 20-75 % topical paste    0.9% sodium chloride infusion 250 mL    levETIRAcetam (KEPPRA) oral solution 1,500 mg    heparin (porcine) 1,000 unit/mL injection 2,200 Units    insulin glargine (LANTUS) injection 20 Units    NOREPINephrine (LEVOPHED) 8 mg in 0.9% NS 250ml infusion    lactulose (CHRONULAC) 10 gram/15 mL solution 15 mL    glucose chewable tablet 16 g    glucagon (GLUCAGEN) injection 1 mg    dextrose 10% infusion 125-250 mL    LORazepam (ATIVAN) injection 2 mg    glucose chewable tablet 16 g    glucagon (GLUCAGEN) injection 1 mg    valproic acid (as sodium salt) (DEPAKENE) 250 mg/5 mL (5 mL) oral solution 500 mg    dextrose 10% infusion 125-250 mL    PHENYLephrine (ISRAEL-SYNEPHRINE) 30 mg in 0.9% sodium chloride 250 mL infusion    propofol (DIPRIVAN) 10 mg/mL infusion    lacosamide (VIMPAT) tablet 200 mg    levothyroxine (SYNTHROID) tablet 75 mcg    [Held by provider] aspirin chewable tablet 81 mg    sodium chloride (NS) flush 5-40 mL    sodium chloride (NS) flush 5-40 mL    acetaminophen (TYLENOL) tablet 650 mg    Or    acetaminophen (TYLENOL) suppository 650 mg    polyethylene glycol (MIRALAX) packet 17 g    ondansetron (ZOFRAN ODT) tablet 4 mg    Or    ondansetron (ZOFRAN) injection 4 mg    atorvastatin (LIPITOR) tablet 80 mg      PMH:  has a past medical history of Acute renal failure ([facility] [address] Anemia, Arthritis, DKA (diabetic ketoacidoses), GERD (gastroesophageal reflux disease), HTN (hypertension), Hypercholesterolemia, Schizophrenia ([facility] [address] Schizophreniform disorder ([facility] [address] Seizure disorder ([facility] [address] and Type II or unspecified type diabetes mellitus without mention of complication, uncontrolled. PSH:   has a past surgical history that includes ir insert non tunl cvc over 5 yrs (2022) and ir insert non tunl cvc over 5 yrs (2022). FHX: family history includes Stroke in his father. SHX:  reports that he has never smoked. He has never used smokeless tobacco. He reports that he does not drink alcohol and does not use drugs. ROS:  Unable to obtain intubated on ventilator unresponsive    Hemodynamics:    CO:    CI:    CVP:    SVR:   PAP Systolic:    PAP Diastolic:    PVR:    BO82:        Ventilator Settings:      Mode Rate TV Press PEEP FiO2 PIP Min.  Vent   Assist control,Volume control    500 ml    5 cm H20 50 %  23 cm H2O  8.75 l/min        Vital Signs: Telemetry:    normal sinus rhythm Intake/Output:   Visit Vitals  /61 (BP 1 Location: Left leg, BP Patient Position: At rest)   Pulse 97   Temp 99.6 °F (37.6 °C)   Resp 18   Ht 6' 0.01\" (1.829 m)   Wt 112.4 kg (247 lb 12.8 oz)   SpO2 97%   BMI 33.60 kg/m²       Temp (24hrs), Av.3 °F (37.4 °C), Min:98.5 °F (36.9 °C), Max:100.2 °F (37.9 °C)        O2 Device: Ventilator         Wt Readings from Last 4 Encounters:   22 112.4 kg (247 lb 12.8 oz)   01/20/22 101.1 kg (222 lb 14.2 oz)   10/06/21 111.1 kg (245 lb)   07/22/21 110.7 kg (244 lb)          Intake/Output Summary (Last 24 hours) at 2/12/2022 0837  Last data filed at 2/12/2022 0741  Gross per 24 hour   Intake 2100 ml   Output 2000 ml   Net 100 ml       Last shift:      02/12 0701 - 02/12 1900  In: 300   Out: -   Last 3 shifts: 02/10 1901 - 02/12 0700  In: 2400 [I.V.:100]  Out: 2300 [Drains:300]       Physical Exam:     General:  intubated on vent unresponsive on no sedation  HEENT: NCAT,   Eyes: anicteric; conjunctiva clear no doll's eye reflex  Neck: no nodes, no cuff leak, trach midline; no accessory MM use. Chest: no deformity,   Cardiac: R regular; no murmur;   Lungs: distant breath sounds; no wheezes a few rales in the base  Abd: soft, NT, hypoactive BS  Ext: Trace edema; no joint swelling;  No clubbing  : No urine  Neuro: Unresponsive on no sedation no doll's eye reflex flaccid extremity  Psych-  unable to assess  Skin: warm, dry, no cyanosis;   Pulses: Brachial and radial pulses intact  Capillary: Slow capillary refill      DATA:    MAR reviewed and pertinent medications noted or modified as needed  MEDS:   Current Facility-Administered Medications   Medication    insulin lispro (HUMALOG) injection    dexamethasone (DECADRON) 4 mg/mL injection 4 mg    piperacillin-tazobactam (ZOSYN) 3.375 g in 0.9% sodium chloride (MBP/ADV) 100 mL MBP    zinc oxide-white petrolatum 20-75 % topical paste    0.9% sodium chloride infusion 250 mL    levETIRAcetam (KEPPRA) oral solution 1,500 mg    heparin (porcine) 1,000 unit/mL injection 2,200 Units    insulin glargine (LANTUS) injection 20 Units    NOREPINephrine (LEVOPHED) 8 mg in 0.9% NS 250ml infusion    lactulose (CHRONULAC) 10 gram/15 mL solution 15 mL    glucose chewable tablet 16 g    glucagon (GLUCAGEN) injection 1 mg    dextrose 10% infusion 125-250 mL    LORazepam (ATIVAN) injection 2 mg    glucose chewable tablet 16 g  glucagon (GLUCAGEN) injection 1 mg    valproic acid (as sodium salt) (DEPAKENE) 250 mg/5 mL (5 mL) oral solution 500 mg    dextrose 10% infusion 125-250 mL    PHENYLephrine (ISRAEL-SYNEPHRINE) 30 mg in 0.9% sodium chloride 250 mL infusion    propofol (DIPRIVAN) 10 mg/mL infusion    lacosamide (VIMPAT) tablet 200 mg    levothyroxine (SYNTHROID) tablet 75 mcg    [Held by provider] aspirin chewable tablet 81 mg    sodium chloride (NS) flush 5-40 mL    sodium chloride (NS) flush 5-40 mL    acetaminophen (TYLENOL) tablet 650 mg    Or    acetaminophen (TYLENOL) suppository 650 mg    polyethylene glycol (MIRALAX) packet 17 g    ondansetron (ZOFRAN ODT) tablet 4 mg    Or    ondansetron (ZOFRAN) injection 4 mg    atorvastatin (LIPITOR) tablet 80 mg        Labs:    Recent Labs     02/12/22 0448 02/11/22  0505 02/10/22  0500 02/10/22  0400   WBC  --   --   --  22.0*   HGB  --   --   --  9.7*   PLT  --   --   --  434*   INR 1.3* 1.3* 1.1  --      Recent Labs     02/11/22  1535 02/11/22  0505 02/10/22  0500   NA  --  128* 127*   K  --  4.8 4.7   CL  --  93* 94*   CO2  --  18* 18*   GLU  --  132* 167*   * 100* 84*   CREA  --  6.58* 5.73*   CA  --  7.2* 7.3*   MG  --  2.4  --    PHOS  --  9.0*  9.0*  --    LAC  --  2.1*  --    ALB  --  1.0* 1.0*   ALT  --   --  123*     Recent Labs     02/12/22 0445 02/11/22  0501 02/10/22  0455   PH 7.40 7.30* 7.33*   PCO2 36 36 38   PO2 86 141* 66*   HCO3 22 18* 20*   FIO2 50.0 50.0 50.0       Lab Results   Component Value Date/Time    Culture result: Scant Normal respiratory mark 02/03/2022 07:20 PM    Culture result: No Growth (<1000 cfu/mL) 01/27/2022 02:00 PM    Culture result: No growth 6 days 01/27/2022 01:30 PM     Lab Results   Component Value Date/Time    TSH 4.47 (H) 05/21/2021 10:46 AM        Imaging:    Results from Hospital Encounter encounter on 01/27/22    XR CHEST PORT    Narrative  Chest single view.     Comparison single view chest February 10, 2022.    Stable ET tube, NG tube, and right neck vascular catheters. Patchy reticular markings central and basilar lungs unchanged. Cardiac and  mediastinal structures unchanged. No pneumothorax or sizable pleural effusion. Results from [facility] encounter on 01/27/22    CT HEAD WO CONT    Narrative  Exam: CT Head without contrast    TECHNIQUE: Multiple transaxial CT images of the head were obtained without  contrast. Coronal and sagittal reformatted images were provided. Dose reduction: All CT scans at this facility are performed using dose reduction  optimization techniques as appropriate to a performed exam including the  following: Automated exposure control, adjustments of the mA and/or kV according  to patient size, or use of iterative reconstruction technique. HISTORY: anoxia    COMPARISON: Head CT 10/6/2021, 1/27/2022    FINDINGS:    Global parenchymal volume loss appears similar to prior with proportional ex  vacuo dilatation of the ventricles and other extra-axial CSF spaces, slightly  more prominent on the left. No significant midline shift or mass effect. Gray-white matter differentiation appears preserved. No findings of acute  intracranial hemorrhage. Basilar cisterns appear preserved. Intracranial  atherosclerosis. There is bilateral opacification of the mastoid air cells, most likely  indicating nonspecific mastoid effusions. Debris within the external auditory  canals is most likely cerumen. There is mild mucosal thickening in the paranasal  sinuses, most pronounced in the maxillary sinuses. Globes and orbits appear  unremarkable. Calvarium appears intact without findings of acute or aggressive  abnormality. Impression  Overall similar exam to prior without findings of acute intracranial  abnormality. · 1/30 remains on the ventilator has metabolic acidosis we will add bicarb per tube. Maintain ventilator on current settings although will decrease PEEP slightly. Platelets continue to drop.   He is unresponsive on no sedation likely anoxic encephalopathy  · 1/31 remain intubated on ventilator hemodynamically worsening of the renal function  · 2/1 remained on ventilator hemodynamically unstable on levofed  · 2/2 on ventilator hypothermic electrolyte imbalance will continue with the current vent settings  · 2/4 acute bleeding from the dialysis catheter which has been corrected received 4 units of packed RBC ABG acceptable  · 2/5 remain intubated no more bleeding from the dialysis catheter site  · 2/6 on ventilator assist control mode we will continue to wean  · 2/7 dialyzed yesterday remain on ventilator  · 2/9 sedated on ventilator received dialysis off Boris still on Levophed  · 2/10 remain on ventilator off Levofed  · 2/11 patient is off pressors and also sedation still unresponsive  · 2/12 remain on ventilator unresponsive off sedation off vasopressors on Zosyn

## 2022-02-13 NOTE — PROGRESS NOTES
Infectious Diseases Progress Note  Omid Mendez MD  [phone]  Date:2022       Room:[facility]  Patient Lyric Hill     YOB: 1945     Age:76 y.o. 76M with schizophrenia, seizures, diabetes, chronic renal failure.     24 presents to hospital unresponsive. Reportedly had been increasingly weak and unable to leave the bed for days. Associated with fevers. During the ED course found positive for Covid 19, with increased troponin. Intubated ventilated for acute hypoxic respiratory failure. During the hospital course, declining renal function and initiated on hemodialysis. I have been asked to see the patient in consultation for prolonged respiratory failure. Subjective    Subjective:  Symptoms:  Stable. Review of Systems   Unable to perform ROS: Intubated     Objective         Vitals Last 24 Hours:  TEMPERATURE:  Temp  Av.3 °F (37.4 °C)  Min: 98.7 °F (37.1 °C)  Max: 99.8 °F (37.7 °C)  RESPIRATIONS RANGE: Resp  Av.4  Min: 14  Max: 22  PULSE OXIMETRY RANGE: SpO2  Av.6 %  Min: 91 %  Max: 100 %  PULSE RANGE: Pulse  Av.8  Min: 87  Max: 94  BLOOD PRESSURE RANGE: Systolic (25YWH), PCI:764 , Min:121 , HIN:240   ; Diastolic (13SHS), CMC:04, Min:59, Max:75    I/O (24Hr): Intake/Output Summary (Last 24 hours) at 2022 1205  Last data filed at 2022 1000  Gross per 24 hour   Intake 2130 ml   Output    Net 2130 ml     Objective:  Vital signs: (most recent): Blood pressure (!) 151/70, pulse 88, temperature 99.3 °F (37.4 °C), resp. rate 15, height 6' 0.01\" (1.829 m), weight 115.2 kg (253 lb 15.5 oz), SpO2 99 %. General:  intubated on vent unresponsive on no sedation  HEENT: NCAT,   Eyes: anicteric; conjunctiva clear no doll's eye reflex  Neck: no nodes, no cuff leak, trach midline; no accessory MM use.   Chest: no deformity,   Cardiac: R regular; no murmur;   Lungs: distant breath sounds; no wheezes a few rales in the base  Abd: soft, NT, hypoactive BS  Ext: Trace edema; no joint swelling; No clubbing  : No urine  Neuro: Unresponsive on no sedation no doll's eye reflex flaccid extremity  Psych-  unable to assess  Skin: warm, dry, no cyanosis;   Pulses: Brachial and radial pulses intact  Capillary: Slow capillary refill    Labs/Imaging/Diagnostics    Labs:  CBC:  No results for input(s): WBC, RBC, HGB, HCT, MCV, RDW, PLT, HGBEXT, HCTEXT, PLTEXT, HGBEXT, HCTEXT, PLTEXT in the last 72 hours. CHEMISTRIES:  Recent Labs     02/13/22  0330 02/11/22  1535 02/11/22  0505   *  --  128*   K 4.4  --  4.8   CL 91*  --  93*   CO2 21  --  18*   * 121* 100*   CA 8.5  --  7.2*   PHOS 8.7*  --  9.0*  9.0*   MG  --   --  2.4   PT/INR:  Recent Labs     02/13/22  0330 02/12/22  0448 02/11/22  0505   INR 1.3* 1.3* 1.3*     APTT:  No results for input(s): APTT in the last 72 hours. LIVER PROFILE:  No results for input(s): AST, ALT in the last 72 hours. No lab exists for component: BILIDIR, BILITOT, ALKPHOS  Lab Results   Component Value Date/Time    ALT (SGPT) 123 (H) 02/10/2022 05:00 AM    AST (SGOT) 222 (H) 02/10/2022 05:00 AM    Alk. phosphatase 214 (H) 02/10/2022 05:00 AM    Bilirubin, total 0.4 02/10/2022 05:00 AM       Imaging Last 24 Hours:  XR CHEST PORT    Result Date: 2/13/2022  Examination: XR CHEST PORT History: chf Comparison: Chest radiograph 2/11/2022 FINDINGS: Single frontal portable view of the chest. Endotracheal tube projects over the mid intrathoracic trachea. Right neck central vascular catheter tips project over the superior cavoatrial junction. Enteric tube courses below the diaphragm with tip excluded from view. Symmetric low lung volumes. There are basilar predominant opacities that appear similar to prior. No radiographically evident pneumothorax. Small layering effusions could contribute to the above described opacities. The cardiac silhouette is normal in size. Mediastinal contours and osseous structures appear unchanged.      No significant interval change, including bibasilar opacities. Assessment//Plan   Active Problems:    Acute encephalopathy (5/18/2021)      Assessment & Plan   76M with schizophrenia, seizures, diabetes, chronic renal failure.     1/27/22 presents to hospital unresponsive. Reportedly had been increasingly weak and unable to leave the bed for days. Associated with fevers. During the ED course found positive for Covid 19, with increased troponin. Intubated ventilated for acute hypoxic respiratory failure. During the hospital course, declining renal function and initiated on hemodialysis.  I have been asked to see the patient in consultation for prolonged respiratory failure.     MICROBIOLOGY     1/27/22            Covid 19          Positive  2/10/22 Covid 19 Positive    1/27/22            Blood               Negative  1/27/22            Urine                Negative     2/3/22              Endotrach        Scant Normal respiratory mark     ASSESSMENT AND RECOMMENDATIONS     1) Prolonged acute hypoxic respiratory failure in the setting of multifactorial shock, shock liver, Covid 19 pneumonia, NSTEMI, acute on chronic renal failure, anoxic brain injury.                 Supportive care with respiratory support as needed              Zosyn through 2/14/22                 Overall prognosis remains very poor   Tracheostomy vs hospice anticipated        Electronically signed by Ashley Charles MD on 2/13/2022 at 12:38 PM

## 2022-02-13 NOTE — PROGRESS NOTES
IMPRESSION:   1. Acute hypoxic respiratory failure oxygenation well-maintained on the ventilator will decrease FiO2 to 40%  2. Anoxic encephalopathy unresponsive no gag reflex does have self respiratory drive  3. Malignant hyperthermia resolved   4. COVID-19 pneumonia  5. NSTEMI   6. Shock cardiogenic versus septic vs Hypovolumic Hypotension resolved now on no pressor  7. Acute bleeding from dialysis catheter resolved  8. Acute kidney injury now requiring hemodialysis  9. Metabolic acidosis resolved  10. Thrombocytopenia resolved  11. Hypernatremia resolved now hyponatremia  12.  hypokalemia repleted  13. Shock liver with transaminitis  improved      RECOMMENDATIONS/PLAN:   1. ICU monitoring  1. Ventilator for mechanical life support and prevent respiratory arrest with protective lung strategies ABG acceptable he is still hemodynamically unstable will continue with the current vent support but decrease FiO2 to 40 per  2. On Assist control rate 18  PEEP 5 FiO2 50% ABG acceptable decrease FiO2 40%  3. Patient on Decadron   4. Troponin was elevated 2D echo shows ejection fraction of 65%  5. He was bleeding from the dialysis catheter which is corrected received 4 units of packed RBC hemoglobin dropped from 8.8 to 4.0 hemoglobin now stable  6. LDH procalcitonin trending down  7. Last liver enzyme continue to improve   8. Off all antibiotics at this  9. Temperature back to normal and sputum shows normal flow  10.       [x] High complexity decision making was performed  [x] See my orders for details  HPI   72-year-old male came in because as he was found unresponsive and fever 107 past medical history of schizophrenia and diabetes gastroesophageal reflux disease and anemia he is COVID-19 positive initially patient was called as NSTEMI but it was canceled patient is intubated on ventilator hemodynamically unstable unable to get any history out of the patient he seems dehydrated    PMH:  has a past medical history of Acute renal failure ([facility] [address] Anemia, Arthritis, DKA (diabetic ketoacidoses), GERD (gastroesophageal reflux disease), HTN (hypertension), Hypercholesterolemia, Schizophrenia ([facility] [address] Schizophreniform disorder ([facility] [address] Seizure disorder ([facility] [address] and Type II or unspecified type diabetes mellitus without mention of complication, uncontrolled. PSH:   has a past surgical history that includes ir insert non tunl cvc over 5 yrs (1/28/2022) and ir insert non tunl cvc over 5 yrs (1/31/2022). FHX: family history includes Stroke in his father. SHX:  reports that he has never smoked. He has never used smokeless tobacco. He reports that he does not drink alcohol and does not use drugs.     ALL: No Known Allergies     MEDS:   [x] Reviewed - As Below   [] Not reviewed    Current Facility-Administered Medications   Medication    insulin lispro (HUMALOG) injection    dexamethasone (DECADRON) 4 mg/mL injection 4 mg    piperacillin-tazobactam (ZOSYN) 3.375 g in 0.9% sodium chloride (MBP/ADV) 100 mL MBP    zinc oxide-white petrolatum 20-75 % topical paste    0.9% sodium chloride infusion 250 mL    levETIRAcetam (KEPPRA) oral solution 1,500 mg    heparin (porcine) 1,000 unit/mL injection 2,200 Units    insulin glargine (LANTUS) injection 20 Units    NOREPINephrine (LEVOPHED) 8 mg in 0.9% NS 250ml infusion    lactulose (CHRONULAC) 10 gram/15 mL solution 15 mL    glucose chewable tablet 16 g    glucagon (GLUCAGEN) injection 1 mg    dextrose 10% infusion 125-250 mL    LORazepam (ATIVAN) injection 2 mg    glucose chewable tablet 16 g    glucagon (GLUCAGEN) injection 1 mg    valproic acid (as sodium salt) (DEPAKENE) 250 mg/5 mL (5 mL) oral solution 500 mg    dextrose 10% infusion 125-250 mL    PHENYLephrine (ISRAEL-SYNEPHRINE) 30 mg in 0.9% sodium chloride 250 mL infusion    propofol (DIPRIVAN) 10 mg/mL infusion    lacosamide (VIMPAT) tablet 200 mg    levothyroxine (SYNTHROID) tablet 75 mcg    [Held by provider] aspirin chewable tablet 81 mg    sodium chloride (NS) flush 5-40 mL    sodium chloride (NS) flush 5-40 mL    acetaminophen (TYLENOL) tablet 650 mg    Or    acetaminophen (TYLENOL) suppository 650 mg    polyethylene glycol (MIRALAX) packet 17 g    ondansetron (ZOFRAN ODT) tablet 4 mg    Or    ondansetron (ZOFRAN) injection 4 mg    atorvastatin (LIPITOR) tablet 80 mg      MAR reviewed and pertinent medications noted or modified as needed   Current Facility-Administered Medications   Medication    insulin lispro (HUMALOG) injection    dexamethasone (DECADRON) 4 mg/mL injection 4 mg    piperacillin-tazobactam (ZOSYN) 3.375 g in 0.9% sodium chloride (MBP/ADV) 100 mL MBP    zinc oxide-white petrolatum 20-75 % topical paste    0.9% sodium chloride infusion 250 mL    levETIRAcetam (KEPPRA) oral solution 1,500 mg    heparin (porcine) 1,000 unit/mL injection 2,200 Units    insulin glargine (LANTUS) injection 20 Units    NOREPINephrine (LEVOPHED) 8 mg in 0.9% NS 250ml infusion    lactulose (CHRONULAC) 10 gram/15 mL solution 15 mL    glucose chewable tablet 16 g    glucagon (GLUCAGEN) injection 1 mg    dextrose 10% infusion 125-250 mL    LORazepam (ATIVAN) injection 2 mg    glucose chewable tablet 16 g    glucagon (GLUCAGEN) injection 1 mg    valproic acid (as sodium salt) (DEPAKENE) 250 mg/5 mL (5 mL) oral solution 500 mg    dextrose 10% infusion 125-250 mL    PHENYLephrine (ISRAEL-SYNEPHRINE) 30 mg in 0.9% sodium chloride 250 mL infusion    propofol (DIPRIVAN) 10 mg/mL infusion    lacosamide (VIMPAT) tablet 200 mg    levothyroxine (SYNTHROID) tablet 75 mcg    [Held by provider] aspirin chewable tablet 81 mg    sodium chloride (NS) flush 5-40 mL    sodium chloride (NS) flush 5-40 mL    acetaminophen (TYLENOL) tablet 650 mg    Or    acetaminophen (TYLENOL) suppository 650 mg    polyethylene glycol (MIRALAX) packet 17 g    ondansetron (ZOFRAN ODT) tablet 4 mg    Or    ondansetron (ZOFRAN) injection 4 mg    atorvastatin (LIPITOR) tablet 80 mg      PMH:  has a past medical history of Acute renal failure ([facility] [address] Anemia, Arthritis, DKA (diabetic ketoacidoses), GERD (gastroesophageal reflux disease), HTN (hypertension), Hypercholesterolemia, Schizophrenia ([facility] [address] Schizophreniform disorder ([facility] [address] Seizure disorder ([facility] [address] and Type II or unspecified type diabetes mellitus without mention of complication, uncontrolled. PSH:   has a past surgical history that includes ir insert non tunl cvc over 5 yrs (2022) and ir insert non tunl cvc over 5 yrs (2022). FHX: family history includes Stroke in his father. SHX:  reports that he has never smoked. He has never used smokeless tobacco. He reports that he does not drink alcohol and does not use drugs. ROS:  Unable to obtain intubated on ventilator unresponsive    Hemodynamics:    CO:    CI:    CVP:    SVR:   PAP Systolic:    PAP Diastolic:    PVR:    CK82:        Ventilator Settings:      Mode Rate TV Press PEEP FiO2 PIP Min. Vent   Assist control,Volume control    500 ml    5 cm H20 50 %  25 cm H2O  7.42 l/min        Vital Signs: Telemetry:    normal sinus rhythm Intake/Output:   Visit Vitals  /65 (BP 1 Location: Left leg, BP Patient Position: At rest)   Pulse 90   Temp 99.3 °F (37.4 °C)   Resp 22   Ht 6' 0.01\" (1.829 m)   Wt 115.2 kg (253 lb 15.5 oz)   SpO2 91%   BMI 34.44 kg/m²       Temp (24hrs), Av.2 °F (37.3 °C), Min:98.7 °F (37.1 °C), Max:99.8 °F (37.7 °C)        O2 Device: Ventilator         Wt Readings from Last 4 Encounters:   22 115.2 kg (253 lb 15.5 oz)   22 101.1 kg (222 lb 14.2 oz)   10/06/21 111.1 kg (245 lb)   21 110.7 kg (244 lb)          Intake/Output Summary (Last 24 hours) at 2022 0958  Last data filed at 2022 0310  Gross per 24 hour   Intake 1690 ml   Output    Net 1690 ml       Last shift:      No intake/output data recorded.   Last 3 shifts: 1901 -  0700  In: 2290 [I.V.:100]  Out: - Physical Exam:     General:  intubated on vent unresponsive on no sedation  HEENT: NCAT,   Eyes: anicteric; conjunctiva clear no doll's eye reflex  Neck: no nodes, no cuff leak, trach midline; no accessory MM use. Chest: no deformity,   Cardiac: R regular; no murmur;   Lungs: distant breath sounds; no wheezes a few rales in the bases no gag reflex to tracheal suctioning does have spontaneous respirations above the ventilator  Abd: soft, NT, hypoactive BS  Ext: Trace edema; no joint swelling;  No clubbing  : No urine  Neuro: Unresponsive on no sedation no doll's eye reflex flaccid extremities  Psych-  unable to assess  Skin: warm, dry, no cyanosis;   Pulses: Brachial and radial pulses intact  Capillary: Normal capillary refill      DATA:    MAR reviewed and pertinent medications noted or modified as needed  MEDS:   Current Facility-Administered Medications   Medication    insulin lispro (HUMALOG) injection    dexamethasone (DECADRON) 4 mg/mL injection 4 mg    piperacillin-tazobactam (ZOSYN) 3.375 g in 0.9% sodium chloride (MBP/ADV) 100 mL MBP    zinc oxide-white petrolatum 20-75 % topical paste    0.9% sodium chloride infusion 250 mL    levETIRAcetam (KEPPRA) oral solution 1,500 mg    heparin (porcine) 1,000 unit/mL injection 2,200 Units    insulin glargine (LANTUS) injection 20 Units    NOREPINephrine (LEVOPHED) 8 mg in 0.9% NS 250ml infusion    lactulose (CHRONULAC) 10 gram/15 mL solution 15 mL    glucose chewable tablet 16 g    glucagon (GLUCAGEN) injection 1 mg    dextrose 10% infusion 125-250 mL    LORazepam (ATIVAN) injection 2 mg    glucose chewable tablet 16 g    glucagon (GLUCAGEN) injection 1 mg    valproic acid (as sodium salt) (DEPAKENE) 250 mg/5 mL (5 mL) oral solution 500 mg    dextrose 10% infusion 125-250 mL    PHENYLephrine (ISRAEL-SYNEPHRINE) 30 mg in 0.9% sodium chloride 250 mL infusion    propofol (DIPRIVAN) 10 mg/mL infusion    lacosamide (VIMPAT) tablet 200 mg    levothyroxine (SYNTHROID) tablet 75 mcg    [Held by provider] aspirin chewable tablet 81 mg    sodium chloride (NS) flush 5-40 mL    sodium chloride (NS) flush 5-40 mL    acetaminophen (TYLENOL) tablet 650 mg    Or    acetaminophen (TYLENOL) suppository 650 mg    polyethylene glycol (MIRALAX) packet 17 g    ondansetron (ZOFRAN ODT) tablet 4 mg    Or    ondansetron (ZOFRAN) injection 4 mg    atorvastatin (LIPITOR) tablet 80 mg        Labs:    Recent Labs     02/13/22  0330 02/12/22  0448 02/11/22  0505   INR 1.3* 1.3* 1.3*     Recent Labs     02/13/22  0330 02/11/22  1535 02/11/22  0505   *  --  128*   K 4.4  --  4.8   CL 91*  --  93*   CO2 21  --  18*   *  --  132*   * 121* 100*   CREA 6.71*  --  6.58*   CA 8.5  --  7.2*   MG  --   --  2.4   PHOS 8.7*  --  9.0*  9.0*   LAC  --   --  2.1*   ALB 1.0*  --  1.0*     Recent Labs     02/13/22  0300 02/12/22  0445 02/11/22  0501   PH 7.34* 7.40 7.30*   PCO2 39 36 36   PO2 152* 86 141*   HCO3 21* 22 18*   FIO2 50.0 50.0 50.0       Lab Results   Component Value Date/Time    Culture result: Scant Normal respiratory mark 02/03/2022 07:20 PM    Culture result: No Growth (<1000 cfu/mL) 01/27/2022 02:00 PM    Culture result: No growth 6 days 01/27/2022 01:30 PM     Lab Results   Component Value Date/Time    TSH 4.47 (H) 05/21/2021 10:46 AM        Imaging:    Results from Hospital Encounter encounter on 01/27/22    XR CHEST PORT    Narrative  Examination: XR CHEST PORT    History: chf    Comparison: Chest radiograph 2/11/2022    FINDINGS:    Single frontal portable view of the chest. Endotracheal tube projects over the  mid intrathoracic trachea. Right neck central vascular catheter tips project  over the superior cavoatrial junction. Enteric tube courses below the diaphragm  with tip excluded from view. Symmetric low lung volumes. There are basilar predominant opacities that appear  similar to prior. No radiographically evident pneumothorax. Small layering  effusions could contribute to the above described opacities. The cardiac  silhouette is normal in size. Mediastinal contours and osseous structures appear  unchanged. Impression  No significant interval change, including bibasilar opacities. Results from [facility] encounter on 01/27/22    CT HEAD WO CONT    Narrative  Exam: CT Head without contrast    TECHNIQUE: Multiple transaxial CT images of the head were obtained without  contrast. Coronal and sagittal reformatted images were provided. Dose reduction: All CT scans at this facility are performed using dose reduction  optimization techniques as appropriate to a performed exam including the  following: Automated exposure control, adjustments of the mA and/or kV according  to patient size, or use of iterative reconstruction technique. HISTORY: anoxia    COMPARISON: Head CT 10/6/2021, 1/27/2022    FINDINGS:    Global parenchymal volume loss appears similar to prior with proportional ex  vacuo dilatation of the ventricles and other extra-axial CSF spaces, slightly  more prominent on the left. No significant midline shift or mass effect. Gray-white matter differentiation appears preserved. No findings of acute  intracranial hemorrhage. Basilar cisterns appear preserved. Intracranial  atherosclerosis. There is bilateral opacification of the mastoid air cells, most likely  indicating nonspecific mastoid effusions. Debris within the external auditory  canals is most likely cerumen. There is mild mucosal thickening in the paranasal  sinuses, most pronounced in the maxillary sinuses. Globes and orbits appear  unremarkable. Calvarium appears intact without findings of acute or aggressive  abnormality. Impression  Overall similar exam to prior without findings of acute intracranial  abnormality. · 1/30 remains on the ventilator has metabolic acidosis we will add bicarb per tube.   Maintain ventilator on current settings although will decrease PEEP slightly. Platelets continue to drop. He is unresponsive on no sedation likely anoxic encephalopathy  · 1/31 remain intubated on ventilator hemodynamically worsening of the renal function  · 2/1 remained on ventilator hemodynamically unstable on levofed  · 2/2 on ventilator hypothermic electrolyte imbalance will continue with the current vent settings  · 2/4 acute bleeding from the dialysis catheter which has been corrected received 4 units of packed RBC ABG acceptable  · 2/5 remain intubated no more bleeding from the dialysis catheter site  · 2/6 on ventilator assist control mode we will continue to wean  · 2/7 dialyzed yesterday remain on ventilator  · 2/9 sedated on ventilator received dialysis off Boris still on Levophed  · 2/10 remain on ventilator off Levofed  · 2/11 patient is off pressors and also sedation still unresponsive  · 2/12 remain on ventilator unresponsive off sedation off vasopressors on Zosyn  · 2/13 ABGs well-maintained today we will decrease FiO2 to 40%.   Remains unresponsive no doll's eye reflex no gag reflex on no sedation

## 2022-02-13 NOTE — PROGRESS NOTES
Renal Progress Note    Patient: Arslan Hester MRN: [id number]  SSN: [id number]    YOB: 1945  Age: 78 y.o. Sex: male      Admit Date: 1/27/2022    LOS: 17 days     Subjective:     Seen at bedside , no acute venets overnight   Still on propofol     S/p HD dialysis on 2/11/22 .     Current Facility-Administered Medications   Medication Dose Route Frequency    insulin lispro (HUMALOG) injection   SubCUTAneous Q6H    dexamethasone (DECADRON) 4 mg/mL injection 4 mg  4 mg IntraVENous Q24H    piperacillin-tazobactam (ZOSYN) 3.375 g in 0.9% sodium chloride (MBP/ADV) 100 mL MBP  3.375 g IntraVENous Q8H    zinc oxide-white petrolatum 20-75 % topical paste   Topical TID    0.9% sodium chloride infusion 250 mL  250 mL IntraVENous PRN    levETIRAcetam (KEPPRA) oral solution 1,500 mg  1,500 mg Oral BID    heparin (porcine) 1,000 unit/mL injection 2,200 Units  2,200 Units IntraVENous DIALYSIS PRN    insulin glargine (LANTUS) injection 20 Units  20 Units SubCUTAneous QHS    NOREPINephrine (LEVOPHED) 8 mg in 0.9% NS 250ml infusion  0.5-120 mcg/min IntraVENous TITRATE    lactulose (CHRONULAC) 10 gram/15 mL solution 15 mL  10 g Per G Tube TID    glucose chewable tablet 16 g  4 Tablet Oral PRN    glucagon (GLUCAGEN) injection 1 mg  1 mg IntraMUSCular PRN    dextrose 10% infusion 125-250 mL  125-250 mL IntraVENous PRN    LORazepam (ATIVAN) injection 2 mg  2 mg IntraVENous Q2H PRN    glucose chewable tablet 16 g  4 Tablet Oral PRN    glucagon (GLUCAGEN) injection 1 mg  1 mg IntraMUSCular PRN    valproic acid (as sodium salt) (DEPAKENE) 250 mg/5 mL (5 mL) oral solution 500 mg  500 mg Oral BID    dextrose 10% infusion 125-250 mL  125-250 mL IntraVENous PRN    PHENYLephrine (ISRAEL-SYNEPHRINE) 30 mg in 0.9% sodium chloride 250 mL infusion   mcg/min IntraVENous TITRATE    propofol (DIPRIVAN) 10 mg/mL infusion  0-50 mcg/kg/min IntraVENous TITRATE    lacosamide (VIMPAT) tablet 200 mg  200 mg Oral BID    levothyroxine (SYNTHROID) tablet 75 mcg  75 mcg Oral 7am    [Held by provider] aspirin chewable tablet 81 mg  81 mg Oral DAILY    sodium chloride (NS) flush 5-40 mL  5-40 mL IntraVENous Q8H    sodium chloride (NS) flush 5-40 mL  5-40 mL IntraVENous PRN    acetaminophen (TYLENOL) tablet 650 mg  650 mg Oral Q6H PRN    Or    acetaminophen (TYLENOL) suppository 650 mg  650 mg Rectal Q6H PRN    polyethylene glycol (MIRALAX) packet 17 g  17 g Oral DAILY PRN    ondansetron (ZOFRAN ODT) tablet 4 mg  4 mg Oral Q8H PRN    Or    ondansetron (ZOFRAN) injection 4 mg  4 mg IntraVENous Q6H PRN    atorvastatin (LIPITOR) tablet 80 mg  80 mg Oral QHS        Vitals:    02/13/22 0722 02/13/22 0800 02/13/22 0900 02/13/22 1000   BP:  (!) 150/69 137/67 121/63   Pulse: 90 88 87 90   Resp: 22 16 16 16   Temp:       TempSrc:       SpO2: 91% 99% 100% 99%   Weight:       Height:         Objective:   General: On ventilator, unresponsive   HEENT:  no Icterus, Pallor+, ET tube in place, mucosa dry   neck: Neck is supple, No JVD  Lungs: Decreased breath sounds at the bases,   CVS: heart sounds normal,  no murmurs, no rubs. GI: soft, nontender, no distention  Extremeties: no cyanosis, no edema    Neuro: Patient unresponsive, on vent, off sedation   skin: normal skin turgor, no skin rashes. Intake and Output:  Current Shift: 02/13 0701 - 02/13 1900  In: 60   Out: -   Last three shifts: 02/11 1901 - 02/13 0700  In: 2670 [I.V.:100]  Out: -       Lab/Data Review:  No results for input(s): WBC, HGB, HCT, PLT, HGBEXT, HCTEXT, PLTEXT, HGBEXT, HCTEXT, PLTEXT in the last 72 hours.   Recent Labs     02/13/22  0330 02/12/22  0448 02/11/22  1535 02/11/22  0505   *  --   --  128*   K 4.4  --   --  4.8   CL 91*  --   --  93*   CO2 21  --   --  18*   *  --   --  132*   *  --  121* 100*   CREA 6.71*  --   --  6.58*   CA 8.5  --   --  7.2*   MG  --   --   --  2.4   PHOS 8.7*  --   --  9.0*  9.0*   ALB 1.0*  --   --  1.0*   INR 1. 3* 1.3*  --  1.3*     Recent Labs     02/13/22  0300 02/12/22  0445 02/11/22  0501   PH 7.34* 7.40 7.30*   PCO2 39 36 36   PO2 152* 86 141*   HCO3 21* 22 18*   FIO2 50.0 50.0 50.0     Recent Results (from the past 24 hour(s))   GLUCOSE, POC    Collection Time: 02/12/22 12:24 PM   Result Value Ref Range    Glucose (POC) 137 (H) 65 - 117 mg/dL    Performed by Lynda Moya 124, POC    Collection Time: 02/12/22  5:24 PM   Result Value Ref Range    Glucose (POC) 132 (H) 65 - 117 mg/dL    Performed by Lynda Childs, POC    Collection Time: 02/12/22 10:56 PM   Result Value Ref Range    Glucose (POC) 150 (H) 65 - 117 mg/dL    Performed by Domenic [facility]    BLOOD GAS, ARTERIAL    Collection Time: 02/13/22  3:00 AM   Result Value Ref Range    pH 7.34 (L) 7.35 - 7.45      PCO2 39 35 - 45 mmHg    PO2 152 (H) 75 - 100 mmHg    O2  >95 %    BICARBONATE 21 (L) 22 - 26 mmol/L    BASE DEFICIT 4.6 (H) 0 - 2 mmol/L    O2 METHOD VENT      FIO2 50.0 %    MODE Assist Control/Volume Control      Tidal volume 500      SET RATE 12      IPAP/PIP 0      EPAP/CPAP/PEEP 5.0      SITE Heel, right      JULIO'S TEST PASS     PROTHROMBIN TIME + INR    Collection Time: 02/13/22  3:30 AM   Result Value Ref Range    Prothrombin time 15.6 (H) 11.9 - 14.6 sec    INR 1.3 (H) 0.9 - 1.1     LD    Collection Time: 02/13/22  3:30 AM   Result Value Ref Range     (H) 85 - 241 U/L   D DIMER    Collection Time: 02/13/22  3:30 AM   Result Value Ref Range    D DIMER >20.00 (H) <0.50 ug/ml(FEU)   PROCALCITONIN    Collection Time: 02/13/22  3:30 AM   Result Value Ref Range    Procalcitonin 2.17 (H) 0 ng/mL   SED RATE (ESR)    Collection Time: 02/13/22  3:30 AM   Result Value Ref Range    Sed rate, automated 128 mm/hr   RENAL FUNCTION PANEL    Collection Time: 02/13/22  3:30 AM   Result Value Ref Range    Sodium 127 (L) 136 - 145 mmol/L    Potassium 4.4 3.5 - 5.1 mmol/L    Chloride 91 (L) 97 - 108 mmol/L    CO2 21 21 - 32 mmol/L    Anion gap 15 5 - 15 mmol/L    Glucose 149 (H) 65 - 100 mg/dL     (H) 6 - 20 mg/dL    Creatinine 6.71 (H) 0.70 - 1.30 mg/dL    BUN/Creatinine ratio 16 12 - 20      GFR est AA 10 (L) >60 ml/min/1.73m2    GFR est non-AA 8 (L) >60 ml/min/1.73m2    Calcium 8.5 8.5 - 10.1 mg/dL    Phosphorus 8.7 (H) 2.6 - 4.7 mg/dL    Albumin 1.0 (L) 3.5 - 5.0 g/dL   GLUCOSE, POC    Collection Time: 02/13/22  5:03 AM   Result Value Ref Range    Glucose (POC) 135 (H) 65 - 117 mg/dL    Performed by Lissett Hutson       FiO2 - stable on Vent   Noted ABG   Chest X ray personally reviewed   Assessment and Plan:     #1 acute kidney injury  --ANDRES likely 2/2 ATN from septic shock /rhabdomyolysis. CPK trending down   Patient remained anuric, with rising BUN and creatinine  Started on CRRT 1/31, had for 48 hrs, Switched to intermittent hemodialysis,   Status post hemodialysis 2/11/22 ,    He remains anuric , servin removed yesterday   Will order bladder croft every day    - Had poor Blood flow with HD cather on 2/9/22 and cathflo was used to dissolve any clots   - He appears to be having poor clearance - as his BUN is persistently high   He is also getting TFs and prosource protein supplements every day which is causing BUN to be high .  -Will check pre-dialysis and post dialysis BUN tomorrow for URR and to estimate clearance .       #2 severe hypokalemia: Improved potassium level    monitor potassium levels   Will dialyze of=n 2 K bath today     #3 Hyponatremia :sodium level to 133>>131>>129 , from incr free water input   Glucose relatively well controlled .   Adjusted free water rate down to 150cc Q8 hours today   - will do HD tomorrow - target fluid removal upto 3 Litres         #4  COVID-19 pneumonia:   -Septic shock  -With multiorgan failure including renal failure/transaminitis/hemodynamics shock  -Patient currently on Decadron azithromycin and Zosyn  Neurology following the patient for anoxic encephalopathy, remains unresponsive  Shock liver with high liver enzymes( improving),   Rhabdomyolysis+/ANDRES, on HD, will monitor   -Overall prognosis seems guarded     #5 diabetes: hyperglycemia+  Monitor accuchecks and adjust management as needed     #6 seizure disorder  -On antiseizure medications, neurology following the patient    7. Metabolic acidosis: Secondary to acute kidney injury/hypotension  Improved with hemodialysis  Stable CO2 level of 21, continue to monitor CO2  Lactate levels last checked on 1/28/22     8. Hypocalcemia - corrected calcium WNL   Will dialyze on 2.5 calcium bath . 9. Anoxic encephalopathy -family to decide about CODE STATUS and goals of care.     Signed By: Sampson Tuttle MD     February 13, 2022

## 2022-02-13 NOTE — PROGRESS NOTES
Hospitalist Progress Note         Cilff Rucker MD          Daily Progress Note: 2/13/2022      Subjective: The patient is seen for follow  up. 76M, H/o Schizophenia, seizures, DMII on oral meds with unresponsive and acute hypoxic respiratory failure s/t COVID-19 pneumonitis complicated by ANDRES, hypokalemia and shock liver.            HSOPITAL COURSE  COVID positive, associated with fever 106, increased troponin, cardiology was consulted ansd recommended ECHO, there is no heparin gtt, was initially called for STEMI and was cancelled. he was intubated for acute hypoxic respiratory failure. On levophed. Complicated by ANDRES, shock liver and hypokalemia. Was treated with azithromycin, barcitinib, and zosyn. Will give 1L bolus and continue IVF. Discussed with family he is very critical- his renal functions increased and now seemingly in ATN, his liver functions have worsened and had a seizure on 1/28 , he is off propofol now and neurology was consulted. Repeat CT scan didn't show any stroke, plan for EEG on 2/1 to assess for neurological activity. The patients liver functions, renal are reviewed. __    Patient seen in follow-up. Remains intubated and ventilated with FiO2 of 50% and PEEP of 5.  Unresponsive       Problem List:  Problem List as of 2/13/2022 Date Reviewed: 12/30/2020          Codes Class Noted - Resolved    Acute hypernatremia ICD-10-CM: E87.0  ICD-9-CM: 276.0  6/9/2021 - Present        Uncontrolled type 2 diabetes mellitus with hyperglycemia ([facility] ICD-10-CM: E11.65  ICD-9-CM: 250.02  6/9/2021 - Present        Acute metabolic encephalopathy DGX-70-FS: G93.41  ICD-9-CM: 348.31  6/9/2021 - Present        ANDRES (acute kidney injury) ([facility] ICD-10-CM: N17.9  ICD-9-CM: 584.9  6/9/2021 - Present        Dehydration ICD-10-CM: E86.0  ICD-9-CM: 276.51  6/3/2021 - Present        Hypernatremia ICD-10-CM: E87.0  ICD-9-CM: 276.0  5/26/2021 - Present        Acute encephalopathy ICD-10-CM: G93.40  ICD-9-CM: 348.30  5/18/2021 - Present        Vitamin D deficiency ICD-10-CM: E55.9  ICD-9-CM: 268.9  12/30/2020 - Present        Dementia ([facility] ICD-10-CM: F03.90  ICD-9-CM: 294.20  7/26/2017 - Present        Mixed hyperlipidemia ICD-10-CM: E78.2  ICD-9-CM: 272.2  4/16/2016 - Present        Essential hypertension ICD-10-CM: I10  ICD-9-CM: 401.9  4/18/2015 - Present        DM (diabetes mellitus) ([facility] ICD-10-CM: E11.9  ICD-9-CM: 250.00  2/25/2013 - Present        Schizophreniform disorder ([facility] ICD-10-CM: F20.81  ICD-9-CM: 295.40  Unknown - Present        GERD (gastroesophageal reflux disease) ICD-10-CM: K21.9  ICD-9-CM: 530.81  Unknown - Present        Seizure disorder ([facility] ICD-10-CM: G40.909  ICD-9-CM: 345.90  Unknown - Present        RESOLVED: Controlled type 2 diabetes mellitus with complication, without long-term current use of insulin ([facility] ICD-10-CM: E11.8  ICD-9-CM: 250.90  12/30/2020 - 6/28/2021        RESOLVED: Morbid obesity ([facility] ICD-10-CM: E66.01  ICD-9-CM: 278.01  12/30/2020 - 8/3/2021        RESOLVED: Type 2 diabetes with nephropathy ([facility] ICD-10-CM: E11.21  ICD-9-CM: 250.40, 583.81  6/17/2018 - 6/28/2021        RESOLVED: Severe obesity with body mass index (BMI) of 35.0 to 39.9 with serious comorbidity ([facility] ICD-10-CM: E66.01  ICD-9-CM: 278.01  6/15/2018 - 1/1/2022        RESOLVED: Essential hypertension with goal blood pressure less than 140/90 ICD-10-CM: I10  ICD-9-CM: 401.9  4/16/2016 - 5/30/2021        RESOLVED: Hypothyroidism due to acquired atrophy of thyroid ICD-10-CM: E03.4  ICD-9-CM: 244.8, 246.8  4/18/2015 - 10/20/2021        RESOLVED: Hyperlipidemia ICD-10-CM: E78.5  ICD-9-CM: 272.4  5/28/2014 - 1/1/2022        RESOLVED: Hypothyroid ICD-10-CM: E03.9  ICD-9-CM: 244.9  5/28/2014 - 4/18/2015        RESOLVED: Seizures ([address] ICD-10-CM: R56.9  ICD-9-CM: 780.39  2/25/2013 - 1/1/2022        RESOLVED: HTN (hypertension) ICD-10-CM: I10  ICD-9-CM: 401.9  2/25/2013 - 4/18/2015 Medications reviewed  Current Facility-Administered Medications   Medication Dose Route Frequency    insulin lispro (HUMALOG) injection   SubCUTAneous Q6H    dexamethasone (DECADRON) 4 mg/mL injection 4 mg  4 mg IntraVENous Q24H    piperacillin-tazobactam (ZOSYN) 3.375 g in 0.9% sodium chloride (MBP/ADV) 100 mL MBP  3.375 g IntraVENous Q8H    zinc oxide-white petrolatum 20-75 % topical paste   Topical TID    0.9% sodium chloride infusion 250 mL  250 mL IntraVENous PRN    levETIRAcetam (KEPPRA) oral solution 1,500 mg  1,500 mg Oral BID    heparin (porcine) 1,000 unit/mL injection 2,200 Units  2,200 Units IntraVENous DIALYSIS PRN    insulin glargine (LANTUS) injection 20 Units  20 Units SubCUTAneous QHS    NOREPINephrine (LEVOPHED) 8 mg in 0.9% NS 250ml infusion  0.5-120 mcg/min IntraVENous TITRATE    lactulose (CHRONULAC) 10 gram/15 mL solution 15 mL  10 g Per G Tube TID    glucose chewable tablet 16 g  4 Tablet Oral PRN    glucagon (GLUCAGEN) injection 1 mg  1 mg IntraMUSCular PRN    dextrose 10% infusion 125-250 mL  125-250 mL IntraVENous PRN    LORazepam (ATIVAN) injection 2 mg  2 mg IntraVENous Q2H PRN    glucose chewable tablet 16 g  4 Tablet Oral PRN    glucagon (GLUCAGEN) injection 1 mg  1 mg IntraMUSCular PRN    valproic acid (as sodium salt) (DEPAKENE) 250 mg/5 mL (5 mL) oral solution 500 mg  500 mg Oral BID    dextrose 10% infusion 125-250 mL  125-250 mL IntraVENous PRN    PHENYLephrine (ISRAEL-SYNEPHRINE) 30 mg in 0.9% sodium chloride 250 mL infusion   mcg/min IntraVENous TITRATE    propofol (DIPRIVAN) 10 mg/mL infusion  0-50 mcg/kg/min IntraVENous TITRATE    lacosamide (VIMPAT) tablet 200 mg  200 mg Oral BID    levothyroxine (SYNTHROID) tablet 75 mcg  75 mcg Oral 7am    [Held by provider] aspirin chewable tablet 81 mg  81 mg Oral DAILY    sodium chloride (NS) flush 5-40 mL  5-40 mL IntraVENous Q8H    sodium chloride (NS) flush 5-40 mL  5-40 mL IntraVENous PRN    acetaminophen (TYLENOL) tablet 650 mg  650 mg Oral Q6H PRN    Or    acetaminophen (TYLENOL) suppository 650 mg  650 mg Rectal Q6H PRN    polyethylene glycol (MIRALAX) packet 17 g  17 g Oral DAILY PRN    ondansetron (ZOFRAN ODT) tablet 4 mg  4 mg Oral Q8H PRN    Or    ondansetron (ZOFRAN) injection 4 mg  4 mg IntraVENous Q6H PRN    atorvastatin (LIPITOR) tablet 80 mg  80 mg Oral QHS       Review of Systems:   Review of systems not obtained due to patient factors. Objective:   Physical Exam:     Visit Vitals  /65 (BP 1 Location: Left leg, BP Patient Position: At rest)   Pulse 90   Temp 99.3 °F (37.4 °C)   Resp 22   Ht 6' 0.01\" (1.829 m)   Wt 115.2 kg (253 lb 15.5 oz)   SpO2 91%   BMI 34.44 kg/m²      O2 Device: Ventilator    Temp (24hrs), Av.2 °F (37.3 °C), Min:98.7 °F (37.1 °C), Max:99.8 °F (37.7 °C)    No intake/output data recorded.  1901 -  0700  In: 2290 [I.V.:100]  Out: -     General:  Awake but unable to follow commands   Lungs:   Clear to auscultation bilaterally. Chest wall:  No tenderness or deformity. Heart:  Regular rate and rhythm, S1, S2 normal, no murmur, click, rub or gallop. Abdomen:   Soft, non-tender. Bowel sounds normal. No masses,  No organomegaly. Extremities: Extremities normal, atraumatic, no cyanosis or edema. Pulses: 2+ and symmetric all extremities. Skin: Skin color, texture, turgor normal. No rashes or lesions   Neurologic: CNII-XII intact. No gross sensory or motor deficits     Data Review:       Recent Days:  No results for input(s): WBC, HGB, HCT, PLT, HGBEXT, HCTEXT, PLTEXT, HGBEXT, HCTEXT, PLTEXT in the last 72 hours.   Recent Labs     22  0330 22  0448 22  1535 22  0505   *  --   --  128*   K 4.4  --   --  4.8   CL 91*  --   --  93*   CO2 21  --   --  18*   *  --   --  132*   *  --  121* 100*   CREA 6.71*  --   --  6.58*   CA 8.5  --   --  7.2*   MG  --   --   --  2.4   PHOS 8.7*  --   --  9.0*  9.0* ALB 1.0*  --   --  1.0*   INR 1.3* 1.3*  --  1.3*     Recent Labs     02/13/22  0300 02/12/22  0445 02/11/22  0501   PH 7.34* 7.40 7.30*   PCO2 39 36 36   PO2 152* 86 141*   HCO3 21* 22 18*   FIO2 50.0 50.0 50.0       24 Hour Results:  Recent Results (from the past 24 hour(s))   GLUCOSE, POC    Collection Time: 02/12/22 12:24 PM   Result Value Ref Range    Glucose (POC) 137 (H) 65 - 117 mg/dL    Performed by Lynda Moya 124, POC    Collection Time: 02/12/22  5:24 PM   Result Value Ref Range    Glucose (POC) 132 (H) 65 - 117 mg/dL    Performed by Lynda Childs, POC    Collection Time: 02/12/22 10:56 PM   Result Value Ref Range    Glucose (POC) 150 (H) 65 - 117 mg/dL    Performed by Bryan Tucker    BLOOD GAS, ARTERIAL    Collection Time: 02/13/22  3:00 AM   Result Value Ref Range    pH 7.34 (L) 7.35 - 7.45      PCO2 39 35 - 45 mmHg    PO2 152 (H) 75 - 100 mmHg    O2  >95 %    BICARBONATE 21 (L) 22 - 26 mmol/L    BASE DEFICIT 4.6 (H) 0 - 2 mmol/L    O2 METHOD VENT      FIO2 50.0 %    MODE Assist Control/Volume Control      Tidal volume 500      SET RATE 12      IPAP/PIP 0      EPAP/CPAP/PEEP 5.0      SITE Heel, right      JULIO'S TEST PASS     PROTHROMBIN TIME + INR    Collection Time: 02/13/22  3:30 AM   Result Value Ref Range    Prothrombin time 15.6 (H) 11.9 - 14.6 sec    INR 1.3 (H) 0.9 - 1.1     LD    Collection Time: 02/13/22  3:30 AM   Result Value Ref Range     (H) 85 - 241 U/L   D DIMER    Collection Time: 02/13/22  3:30 AM   Result Value Ref Range    D DIMER >20.00 (H) <0.50 ug/ml(FEU)   PROCALCITONIN    Collection Time: 02/13/22  3:30 AM   Result Value Ref Range    Procalcitonin 2.17 (H) 0 ng/mL   SED RATE (ESR)    Collection Time: 02/13/22  3:30 AM   Result Value Ref Range    Sed rate, automated 128 mm/hr   RENAL FUNCTION PANEL    Collection Time: 02/13/22  3:30 AM   Result Value Ref Range    Sodium 127 (L) 136 - 145 mmol/L    Potassium 4.4 3.5 - 5.1 mmol/L    Chloride 91 (L) 97 - 108 mmol/L    CO2 21 21 - 32 mmol/L    Anion gap 15 5 - 15 mmol/L    Glucose 149 (H) 65 - 100 mg/dL     (H) 6 - 20 mg/dL    Creatinine 6.71 (H) 0.70 - 1.30 mg/dL    BUN/Creatinine ratio 16 12 - 20      GFR est AA 10 (L) >60 ml/min/1.73m2    GFR est non-AA 8 (L) >60 ml/min/1.73m2    Calcium 8.5 8.5 - 10.1 mg/dL    Phosphorus 8.7 (H) 2.6 - 4.7 mg/dL    Albumin 1.0 (L) 3.5 - 5.0 g/dL   GLUCOSE, POC    Collection Time: 02/13/22  5:03 AM   Result Value Ref Range    Glucose (POC) 135 (H) 65 - 117 mg/dL    Performed by Radha Rg            Assessment/     Acute hypoxic respiratory failure secondary to COVID-19  COVID-19 with pneumonitis  Septic shock due to COVID-19 pneumonitis  Acute encephalopathy  Non-STEMI type II due to demand mismatch  Shock liver secondary to septic shock  Acute anemia status post transfusion of packed red blood cells    Plan:  Continue supportive care including Decadron Zosyn and azithromycin  Wean of mechanical ventilator as tolerated  Continue to monitor daily electrolytes  Overall prognosis guarded  Updated DAMON KING([phone]). Family plans on getting here Monday 02/14 in am  Care Plan discussed with: Nurse    Total time spent with patient: 30 minutes.     Sandra Maza MD

## 2022-02-14 NOTE — WOUND CARE
IP WOUND CONSULT    [address] [facility] RECORD NUMBER:  [id number]  AGE: 78 y.o. GENDER: male  : 1945  TODAY'S DATE:  2022    GENERAL     [x] Follow-up   [] New Consult    Stevenson Perez is a 78 y.o. male referred by:   [x] Physician  [] Nursing  [] Other:         PAST MEDICAL HISTORY    Past Medical History:   Diagnosis Date    Acute renal failure ([facility]     Anemia     Arthritis     DKA (diabetic ketoacidoses)     GERD (gastroesophageal reflux disease)     HTN (hypertension)     Hypercholesterolemia     Schizophrenia ([facility]     Schizophreniform disorder ([facility]     Seizure disorder (HCC)     Type II or unspecified type diabetes mellitus without mention of complication, uncontrolled         PAST SURGICAL HISTORY    Past Surgical History:   Procedure Laterality Date    IR INSERT NON TUNL CVC OVER 5 YRS  2022    IR INSERT NON TUNL CVC OVER 5 YRS  2022       FAMILY HISTORY    Family History   Problem Relation Age of Onset    Stroke Father          ALLERGIES    No Known Allergies    MEDICATIONS    No current facility-administered medications on file prior to encounter. 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 100 Strip 3    lancets misc Sig: To check sugar twice a day before breakfast and at bedtime 1 Each 11    amLODIPine (NORVASC) 10 mg tablet Take 1 Tablet by mouth daily. 90 Tablet 2    losartan (COZAAR) 50 mg tablet Take 1 Tablet by mouth daily. Please stop hydrochlorothiazide and amlodipine 90 Tablet 2    levothyroxine (SYNTHROID) 75 mcg tablet Take 1 Tablet by mouth every morning. Take in early morning.  90 Tablet 2    metFORMIN (GLUCOPHAGE) 500 mg tablet Take 1 Tablet by mouth two (2) times daily (with meals). 180 Tablet 2    pravastatin (PRAVACHOL) 20 mg tablet Take 1 Tablet by mouth nightly. 90 Tablet 2    lacosamide (VIMPAT) 200 mg tab tablet Take 200 mg by mouth two (2) times a day.  lacosamide (Vimpat) 200 mg tab tablet Take 1 Tablet by mouth two (2) times a day. Max Daily Amount: 400 mg. courtesy refill  Given on behalf of neurologist dr Jacob Villeda or dr Gonzalo Kumar 60 Tablet 1    Blood-Glucose Meter monitoring kit He is having a seizure he needs blood sugar to be checked twice a day before breakfast and at bedtime, 1 Kit 1    levETIRAcetam 1,000 mg tablet Take 1,000 mg by mouth two (2) times a day.  divalproex DR (DEPAKOTE) 500 mg tablet Take 500 mg by mouth two (2) times a day. Take two tablets twice daily           [unfilled]  Visit Vitals  BP (!) 150/71 (BP 1 Location: Left leg, BP Patient Position: At rest)   Pulse 94   Temp 98.6 °F (37 °C)   Resp 17   Ht 6' 0.01\" (1.829 m)   Wt 119.6 kg (263 lb 10.7 oz)   SpO2 97%   BMI 35.75 kg/m²       ASSESSMENT     Wound Identification & Type: sDTI to right heel; friction / shear injury to sacrum and buttocks; non-blanchable area to left lateral ankle  Dressing change: Yes, see flow chart  Verbal consent for picture: Ventilated and non-responsive    Contributing Factors: anticoagulation therapy, edema, diabetes, poor glucose control, chronic pressure, decreased mobility, shear force, incontinence of stool and obesity    Wound Buttocks Right Blanchable Erythema 06/05/21 (Active)   Wound Image   02/14/22 1239   Wound Etiology Other (Comment) 02/14/22 1239   Dressing Status New dressing applied 02/14/22 1239   Cleansed Other (Comment) 02/14/22 1239   Dressing/Treatment Honey gel/honey paste; Foam 02/14/22 1239   Dressing Change Due 02/15/22 02/14/22 1239   Wound Length (cm) 0.8 cm 02/14/22 1239   Wound Width (cm) 0.8 cm 02/14/22 1239   Wound Depth (cm) 0.1 cm 02/14/22 1239   Wound Surface Area (cm^2) 0.64 cm^2 02/14/22 1239   Wound Volume (cm^3) 0.064 cm^3 02/14/22 1239   Wound Assessment Pink/red 02/14/22 1239   Drainage Amount None 02/14/22 1239   Wound Odor None 02/14/22 1239   Sunni-Wound/Incision Assessment Blanchable erythema;Fragile 02/14/22 1239   Edges Undefined edges 02/14/22 1239   Wound Thickness Description Partial thickness 02/14/22 1239   Number of days: 254       Wound Heel Right;Lateral (Active)   Wound Image   02/14/22 1236   Wound Etiology Deep Tissue/Injury 02/14/22 1236   Dressing Status Other (Comment) 02/12/22 0742   Dressing/Treatment Open to air 02/14/22 1236   Offloading for Diabetic Foot Ulcers Offloading boot 02/14/22 1236   Wound Length (cm) 5.5 cm 02/14/22 1236   Wound Width (cm) 3.8 cm 02/14/22 1236   Wound Depth (cm) 0 cm 02/14/22 1236   Wound Surface Area (cm^2) 20.9 cm^2 02/14/22 1236   Change in Wound Size % 38.92 02/14/22 1236   Wound Volume (cm^3) 0 cm^3 02/14/22 1236   Wound Assessment Non-blanchable erythema;Purple/maroon; Other (Comment);Dry 02/14/22 1236   Drainage Amount None 02/14/22 1236   Wound Odor None 02/14/22 1236   Sunni-Wound/Incision Assessment Intact 02/14/22 1236   Edges Undefined edges; Attached edges 02/14/22 1236   Number of days: 16       [REMOVED] Wound Ankle Left;Lateral nonblanchable purple spot (Removed)   Wound Image   02/08/22 1146   Wound Etiology Pressure Stage 1 02/10/22 1930   Dressing Status Other (Comment) 02/07/22 2000   Dressing/Treatment Open to air 02/10/22 1930   Offloading for Diabetic Foot Ulcers Offloading boot 02/10/22 1930   Wound Length (cm) 1.4 cm 02/08/22 1146   Wound Width (cm) 0.8 cm 02/08/22 1146   Wound Depth (cm) 0 cm 02/08/22 1146   Wound Surface Area (cm^2) 1.12 cm^2 02/08/22 1146   Wound Volume (cm^3) 0 cm^3 02/08/22 1146   Wound Assessment Dry;Non-blanchable erythema;Pink/red 02/10/22 1930   Drainage Amount None 02/10/22 1930   Wound Odor None 02/10/22 1930   Sunni-Wound/Incision Assessment Intact 02/10/22 1930   Edges Attached edges 02/10/22 1930   Number of days: 14          PLAN     Skin Care & Pressure Relief Recommendations  Minimize layers of linen  Turn/reposition approximately every 2 hours  Pillow wedges  Manage incontinence   Promote continence; Skin Protective lotion/cream to buttocks and sacrum daily and as needed with incontinence care  Offloading boots    [address] 9  Blood Glucose: 129 on 2/14/22                             Albumin: 1.0 on 2/13/22  WBCs: 22.0 on 2/10/22    Support Surface: [place]    Physician/Provider notified:   Recommendations: sDTI to right heel is relatively unchanged, skin intact. Apply zinc paste every other day, see dressing order. Maintain heel boots on both feet at all times while in the bed for offloading of the heels and to prevent foot drop. Friction / shear injury noted to sacrum and buttocks. Small open area to right buttock, will continue to monitor. Apply Therahoney gel to open area and cover sacrum and buttocks with Optifoam Sacral foam dressing daily, see dressing order. Non-blanchable area to left lateral ankle remains unchanged. Generalized edema noted. Ensure boot straps are not tight across anterior feet. Patient is anuric. Rectal tube in place to manage GI incontinence. Apply zinc paste TID and prn for soiling to perirectal area to protect skin from incontinence related breakdown. DO NOT apply zinc paste beneath foam dressing. Use foam wedge to turn q2h at 30 degree angle or more to offload sacrum. Maintain HOB at 30 degrees or less, if not contraindicated, to reduce pressure to buttocks and sacrum. Raise foot of bed to help prevent friction and shear injury from sliding down in the bed. Tube feeding was paused during wound care assessment and restarted once HOB was back at 30 degree angle. Patient was turned to left using foam wedge to offload sacrum and buttocks. Will continue to follow.          Discharge Wound Care Needs: TBD    Teaching completed with:   [] Patient           [] Family member       [] Caregiver          [] Nursing  [] Other    Patient/Caregiver Teaching:  Level of patient/caregiver understanding able to:   [] Indicates understanding       [] Needs reinforcement  [] Unsuccessful      [] Verbal Understanding  [] Demonstrated understanding       [] No evidence of learning  [] Refused teaching         [] N/A       Electronically signed by Lola Nicole RN on 2/14/2022 at 12:43 PM

## 2022-02-14 NOTE — PROGRESS NOTES
IMPRESSION:   1. Acute hypoxic respiratory failure oxygenation well-maintained on the ventilator will decrease FiO2 to 40%  2. Anoxic encephalopathy unresponsive no gag reflex does have self respiratory drive  3. Malignant hyperthermia resolved   4. COVID-19 pneumonia  5. NSTEMI   6. Shock cardiogenic versus septic vs Hypovolumic Hypotension resolved now on no pressor  7. Acute bleeding from dialysis catheter resolved  8. Acute kidney injury now requiring hemodialysis  9. Metabolic acidosis resolved  10. Thrombocytopenia resolved  11. Hypernatremia resolved now hyponatremia  12.  hypokalemia repleted  13. Shock liver with transaminitis  improved      RECOMMENDATIONS/PLAN:   1. ICU monitoring  1. Ventilator for mechanical life support and prevent respiratory arrest with protective lung strategies ABG acceptable he is still hemodynamically unstable will continue with the current vent support but decrease FiO2 to 40%  2. On Assist control rate 18  PEEP 5 FiO2 50% ABG acceptable decrease FiO2 40%  3. Patient on Decadron   4. Troponin was elevated 2D echo shows ejection fraction of 65%  5. He was bleeding from the dialysis catheter which is corrected received 4 units of packed RBC hemoglobin dropped from 8.8 to 4.0 hemoglobin now stable  6. LDH procalcitonin trending down  7. Last liver enzyme continue to improve   8. Off all antibiotics at this  9. Temperature back to normal and sputum shows normal flow  10.  Patient is unresponsive not in any sedation will discuss with family other comfort care at the ostomy     [x] High complexity decision making was performed  [x] See my orders for details  HPI   79-year-old male came in because as he was found unresponsive and fever 107 past medical history of schizophrenia and diabetes gastroesophageal reflux disease and anemia he is COVID-19 positive initially patient was called as NSTEMI but it was canceled patient is intubated on ventilator hemodynamically unstable unable to get any history out of the patient he seems dehydrated    PMH:  has a past medical history of Acute renal failure ([facility] [address] Anemia, Arthritis, DKA (diabetic ketoacidoses), GERD (gastroesophageal reflux disease), HTN (hypertension), Hypercholesterolemia, Schizophrenia ([facility] [address] Schizophreniform disorder ([facility] [address] Seizure disorder ([facility] [address] and Type II or unspecified type diabetes mellitus without mention of complication, uncontrolled. PSH:   has a past surgical history that includes ir insert non tunl cvc over 5 yrs (1/28/2022) and ir insert non tunl cvc over 5 yrs (1/31/2022). FHX: family history includes Stroke in his father. SHX:  reports that he has never smoked. He has never used smokeless tobacco. He reports that he does not drink alcohol and does not use drugs.     ALL: No Known Allergies     MEDS:   [x] Reviewed - As Below   [] Not reviewed    Current Facility-Administered Medications   Medication    insulin lispro (HUMALOG) injection    dexamethasone (DECADRON) 4 mg/mL injection 4 mg    piperacillin-tazobactam (ZOSYN) 3.375 g in 0.9% sodium chloride (MBP/ADV) 100 mL MBP    zinc oxide-white petrolatum 20-75 % topical paste    0.9% sodium chloride infusion 250 mL    levETIRAcetam (KEPPRA) oral solution 1,500 mg    heparin (porcine) 1,000 unit/mL injection 2,200 Units    insulin glargine (LANTUS) injection 20 Units    NOREPINephrine (LEVOPHED) 8 mg in 0.9% NS 250ml infusion    lactulose (CHRONULAC) 10 gram/15 mL solution 15 mL    glucose chewable tablet 16 g    glucagon (GLUCAGEN) injection 1 mg    dextrose 10% infusion 125-250 mL    LORazepam (ATIVAN) injection 2 mg    glucose chewable tablet 16 g    glucagon (GLUCAGEN) injection 1 mg    valproic acid (as sodium salt) (DEPAKENE) 250 mg/5 mL (5 mL) oral solution 500 mg    dextrose 10% infusion 125-250 mL    PHENYLephrine (ISRAEL-SYNEPHRINE) 30 mg in 0.9% sodium chloride 250 mL infusion    propofol (DIPRIVAN) 10 mg/mL infusion    lacosamide (VIMPAT) tablet 200 mg    levothyroxine (SYNTHROID) tablet 75 mcg    [Held by provider] aspirin chewable tablet 81 mg    sodium chloride (NS) flush 5-40 mL    sodium chloride (NS) flush 5-40 mL    acetaminophen (TYLENOL) tablet 650 mg    Or    acetaminophen (TYLENOL) suppository 650 mg    polyethylene glycol (MIRALAX) packet 17 g    ondansetron (ZOFRAN ODT) tablet 4 mg    Or    ondansetron (ZOFRAN) injection 4 mg    atorvastatin (LIPITOR) tablet 80 mg      MAR reviewed and pertinent medications noted or modified as needed   Current Facility-Administered Medications   Medication    insulin lispro (HUMALOG) injection    dexamethasone (DECADRON) 4 mg/mL injection 4 mg    piperacillin-tazobactam (ZOSYN) 3.375 g in 0.9% sodium chloride (MBP/ADV) 100 mL MBP    zinc oxide-white petrolatum 20-75 % topical paste    0.9% sodium chloride infusion 250 mL    levETIRAcetam (KEPPRA) oral solution 1,500 mg    heparin (porcine) 1,000 unit/mL injection 2,200 Units    insulin glargine (LANTUS) injection 20 Units    NOREPINephrine (LEVOPHED) 8 mg in 0.9% NS 250ml infusion    lactulose (CHRONULAC) 10 gram/15 mL solution 15 mL    glucose chewable tablet 16 g    glucagon (GLUCAGEN) injection 1 mg    dextrose 10% infusion 125-250 mL    LORazepam (ATIVAN) injection 2 mg    glucose chewable tablet 16 g    glucagon (GLUCAGEN) injection 1 mg    valproic acid (as sodium salt) (DEPAKENE) 250 mg/5 mL (5 mL) oral solution 500 mg    dextrose 10% infusion 125-250 mL    PHENYLephrine (ISRAEL-SYNEPHRINE) 30 mg in 0.9% sodium chloride 250 mL infusion    propofol (DIPRIVAN) 10 mg/mL infusion    lacosamide (VIMPAT) tablet 200 mg    levothyroxine (SYNTHROID) tablet 75 mcg    [Held by provider] aspirin chewable tablet 81 mg    sodium chloride (NS) flush 5-40 mL    sodium chloride (NS) flush 5-40 mL    acetaminophen (TYLENOL) tablet 650 mg    Or    acetaminophen (TYLENOL) suppository 650 mg    polyethylene glycol (MIRALAX) packet 17 g    ondansetron (ZOFRAN ODT) tablet 4 mg    Or    ondansetron (ZOFRAN) injection 4 mg    atorvastatin (LIPITOR) tablet 80 mg      PMH:  has a past medical history of Acute renal failure ([facility] [address] Anemia, Arthritis, DKA (diabetic ketoacidoses), GERD (gastroesophageal reflux disease), HTN (hypertension), Hypercholesterolemia, Schizophrenia (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 [address] Seizure disorder ([facility] [address] and Type II or unspecified type diabetes mellitus without mention of complication, uncontrolled. PSH:   has a past surgical history that includes ir insert non tunl cvc over 5 yrs (2022) and ir insert non tunl cvc over 5 yrs (2022). FHX: family history includes Stroke in his father. SHX:  reports that he has never smoked. He has never used smokeless tobacco. He reports that he does not drink alcohol and does not use drugs. ROS:  Unable to obtain intubated on ventilator unresponsive    Hemodynamics:    CO:    CI:    CVP:    SVR:   PAP Systolic:    PAP Diastolic:    PVR:    HM46:        Ventilator Settings:      Mode Rate TV Press PEEP FiO2 PIP Min.  Vent   Assist control,Volume control    500 ml    5 cm H20 35 %  22 cm H2O  9.55 l/min        Vital Signs: Telemetry:    normal sinus rhythm Intake/Output:   Visit Vitals  BP (!) 152/72 (BP 1 Location: Left leg, BP Patient Position: At rest)   Pulse 92   Temp 98.3 °F (36.8 °C)   Resp 18   Ht 6' 0.01\" (1.829 m)   Wt 119.6 kg (263 lb 10.7 oz)   SpO2 96%   BMI 35.75 kg/m²       Temp (24hrs), Av.5 °F (36.9 °C), Min:98.2 °F (36.8 °C), Max:99.1 °F (37.3 °C)        O2 Device: Ventilator         Wt Readings from Last 4 Encounters:   22 119.6 kg (263 lb 10.7 oz)   22 101.1 kg (222 lb 14.2 oz)   10/06/21 111.1 kg (245 lb)   21 110.7 kg (244 lb)          Intake/Output Summary (Last 24 hours) at 2022 0808  Last data filed at 2022 0300  Gross per 24 hour   Intake 660 ml   Output 800 ml   Net -140 ml       Last shift:      No intake/output data recorded. Last 3 shifts: 02/12 1901 - 02/14 0700  In: 1510   Out: 800 [Drains:800]       Physical Exam:     General:  intubated on vent unresponsive on no sedation  HEENT: NCAT,   Eyes: anicteric; conjunctiva clear no doll's eye reflex  Neck: no nodes, no cuff leak, trach midline; no accessory MM use. Chest: no deformity,   Cardiac: R regular; no murmur;   Lungs: distant breath sounds; no wheezes a few rales in the bases no gag reflex to tracheal suctioning does have spontaneous respirations above the ventilator  Abd: soft, NT, hypoactive BS  Ext: Trace edema; no joint swelling;  No clubbing  : No urine  Neuro: Unresponsive on no sedation no doll's eye reflex flaccid extremities  Psych-  unable to assess  Skin: warm, dry, no cyanosis;   Pulses: Brachial and radial pulses intact  Capillary: Normal capillary refill      DATA:    MAR reviewed and pertinent medications noted or modified as needed  MEDS:   Current Facility-Administered Medications   Medication    insulin lispro (HUMALOG) injection    dexamethasone (DECADRON) 4 mg/mL injection 4 mg    piperacillin-tazobactam (ZOSYN) 3.375 g in 0.9% sodium chloride (MBP/ADV) 100 mL MBP    zinc oxide-white petrolatum 20-75 % topical paste    0.9% sodium chloride infusion 250 mL    levETIRAcetam (KEPPRA) oral solution 1,500 mg    heparin (porcine) 1,000 unit/mL injection 2,200 Units    insulin glargine (LANTUS) injection 20 Units    NOREPINephrine (LEVOPHED) 8 mg in 0.9% NS 250ml infusion    lactulose (CHRONULAC) 10 gram/15 mL solution 15 mL    glucose chewable tablet 16 g    glucagon (GLUCAGEN) injection 1 mg    dextrose 10% infusion 125-250 mL    LORazepam (ATIVAN) injection 2 mg    glucose chewable tablet 16 g    glucagon (GLUCAGEN) injection 1 mg    valproic acid (as sodium salt) (DEPAKENE) 250 mg/5 mL (5 mL) oral solution 500 mg    dextrose 10% infusion 125-250 mL    PHENYLephrine (ISRAEL-SYNEPHRINE) 30 mg in 0.9% sodium chloride 250 mL infusion    propofol (DIPRIVAN) 10 mg/mL infusion    lacosamide (VIMPAT) tablet 200 mg    levothyroxine (SYNTHROID) tablet 75 mcg    [Held by provider] aspirin chewable tablet 81 mg    sodium chloride (NS) flush 5-40 mL    sodium chloride (NS) flush 5-40 mL    acetaminophen (TYLENOL) tablet 650 mg    Or    acetaminophen (TYLENOL) suppository 650 mg    polyethylene glycol (MIRALAX) packet 17 g    ondansetron (ZOFRAN ODT) tablet 4 mg    Or    ondansetron (ZOFRAN) injection 4 mg    atorvastatin (LIPITOR) tablet 80 mg        Labs:    Recent Labs     02/14/22  0530 02/13/22  0330 02/12/22  0448   INR 1.4* 1.3* 1.3*     Recent Labs     02/13/22  0330 02/11/22  1535   *  --    K 4.4  --    CL 91*  --    CO2 21  --    *  --    * 121*   CREA 6.71*  --    CA 8.5  --    PHOS 8.7*  --    ALB 1.0*  --      Recent Labs     02/13/22  0300 02/12/22  0445   PH 7.34* 7.40   PCO2 39 36   PO2 152* 86   HCO3 21* 22   FIO2 50.0 50.0       Lab Results   Component Value Date/Time    Culture result: Scant Normal respiratory mark 02/03/2022 07:20 PM    Culture result: No Growth (<1000 cfu/mL) 01/27/2022 02:00 PM    Culture result: No growth 6 days 01/27/2022 01:30 PM     Lab Results   Component Value Date/Time    TSH 4.47 (H) 05/21/2021 10:46 AM        Imaging:    Results from Hospital Encounter encounter on 01/27/22    XR CHEST PORT    Narrative  Examination: XR CHEST PORT    History: chf    Comparison: Chest radiograph 2/11/2022    FINDINGS:    Single frontal portable view of the chest. Endotracheal tube projects over the  mid intrathoracic trachea. Right neck central vascular catheter tips project  over the superior cavoatrial junction. Enteric tube courses below the diaphragm  with tip excluded from view. Symmetric low lung volumes. There are basilar predominant opacities that appear  similar to prior. No radiographically evident pneumothorax.  Small layering  effusions could contribute to the above described opacities. The cardiac  silhouette is normal in size. Mediastinal contours and osseous structures appear  unchanged. Impression  No significant interval change, including bibasilar opacities. Results from [facility] encounter on 01/27/22    CT HEAD WO CONT    Narrative  Exam: CT Head without contrast    TECHNIQUE: Multiple transaxial CT images of the head were obtained without  contrast. Coronal and sagittal reformatted images were provided. Dose reduction: All CT scans at this facility are performed using dose reduction  optimization techniques as appropriate to a performed exam including the  following: Automated exposure control, adjustments of the mA and/or kV according  to patient size, or use of iterative reconstruction technique. HISTORY: anoxia    COMPARISON: Head CT 10/6/2021, 1/27/2022    FINDINGS:    Global parenchymal volume loss appears similar to prior with proportional ex  vacuo dilatation of the ventricles and other extra-axial CSF spaces, slightly  more prominent on the left. No significant midline shift or mass effect. Gray-white matter differentiation appears preserved. No findings of acute  intracranial hemorrhage. Basilar cisterns appear preserved. Intracranial  atherosclerosis. There is bilateral opacification of the mastoid air cells, most likely  indicating nonspecific mastoid effusions. Debris within the external auditory  canals is most likely cerumen. There is mild mucosal thickening in the paranasal  sinuses, most pronounced in the maxillary sinuses. Globes and orbits appear  unremarkable. Calvarium appears intact without findings of acute or aggressive  abnormality. Impression  Overall similar exam to prior without findings of acute intracranial  abnormality. · 1/30 remains on the ventilator has metabolic acidosis we will add bicarb per tube. Maintain ventilator on current settings although will decrease PEEP slightly.   Platelets continue to drop. He is unresponsive on no sedation likely anoxic encephalopathy  · 1/31 remain intubated on ventilator hemodynamically worsening of the renal function  · 2/1 remained on ventilator hemodynamically unstable on levofed  · 2/2 on ventilator hypothermic electrolyte imbalance will continue with the current vent settings  · 2/4 acute bleeding from the dialysis catheter which has been corrected received 4 units of packed RBC ABG acceptable  · 2/5 remain intubated no more bleeding from the dialysis catheter site  · 2/6 on ventilator assist control mode we will continue to wean  · 2/7 dialyzed yesterday remain on ventilator  · 2/9 sedated on ventilator received dialysis off Boris still on Levophed  · 2/10 remain on ventilator off Levofed  · 2/11 patient is off pressors and also sedation still unresponsive  · 2/12 remain on ventilator unresponsive off sedation off vasopressors on Zosyn  · 2/13 ABGs well-maintained today we will decrease FiO2 to 40%.   Remains unresponsive no doll's eye reflex no gag reflex on no sedation  · 2/14 remains on ventilator unresponsive not on any sedation

## 2022-02-14 NOTE — PROGRESS NOTES
Hospitalist Progress Note         Jailyn Ortega MD          Daily Progress Note: 2/14/2022      Subjective: The patient is seen for follow  up. 76M, H/o Schizophenia, seizures, DMII on oral meds with unresponsive and acute hypoxic respiratory failure s/t COVID-19 pneumonitis complicated by ANDRES, hypokalemia and shock liver.            HSOPITAL COURSE  COVID positive, associated with fever 106, increased troponin, cardiology was consulted ansd recommended ECHO, there is no heparin gtt, was initially called for STEMI and was cancelled. he was intubated for acute hypoxic respiratory failure. On levophed. Complicated by ANDRES, shock liver and hypokalemia. Was treated with azithromycin, barcitinib, and zosyn. Will give 1L bolus and continue IVF. Discussed with family he is very critical- his renal functions increased and now seemingly in ATN, his liver functions have worsened and had a seizure on 1/28 , he is off propofol now and neurology was consulted. Repeat CT scan didn't show any stroke, plan for EEG on 2/1 to assess for neurological activity. The patients liver functions, renal are reviewed. __    Patient seen in follow-up. Remains intubated and ventilated with FiO2 of 50% and PEEP of 5.  Unresponsive       Problem List:  Problem List as of 2/14/2022 Date Reviewed: 12/30/2020          Codes Class Noted - Resolved    Acute hypernatremia ICD-10-CM: E87.0  ICD-9-CM: 276.0  6/9/2021 - Present        Uncontrolled type 2 diabetes mellitus with hyperglycemia ([facility] ICD-10-CM: E11.65  ICD-9-CM: 250.02  6/9/2021 - Present        Acute metabolic encephalopathy HEY-42-GA: G93.41  ICD-9-CM: 348.31  6/9/2021 - Present        ANDRES (acute kidney injury) ([facility] ICD-10-CM: N17.9  ICD-9-CM: 584.9  6/9/2021 - Present        Dehydration ICD-10-CM: E86.0  ICD-9-CM: 276.51  6/3/2021 - Present        Hypernatremia ICD-10-CM: E87.0  ICD-9-CM: 276.0  5/26/2021 - Present        Acute encephalopathy ICD-10-CM: G93.40  ICD-9-CM: 348.30  5/18/2021 - Present        Vitamin D deficiency ICD-10-CM: E55.9  ICD-9-CM: 268.9  12/30/2020 - Present        Dementia ([facility] ICD-10-CM: F03.90  ICD-9-CM: 294.20  7/26/2017 - Present        Mixed hyperlipidemia ICD-10-CM: E78.2  ICD-9-CM: 272.2  4/16/2016 - Present        Essential hypertension ICD-10-CM: I10  ICD-9-CM: 401.9  4/18/2015 - Present        DM (diabetes mellitus) ([facility] ICD-10-CM: E11.9  ICD-9-CM: 250.00  2/25/2013 - Present        Schizophreniform disorder ([facility] ICD-10-CM: F20.81  ICD-9-CM: 295.40  Unknown - Present        GERD (gastroesophageal reflux disease) ICD-10-CM: K21.9  ICD-9-CM: 530.81  Unknown - Present        Seizure disorder ([facility] ICD-10-CM: G40.909  ICD-9-CM: 345.90  Unknown - Present        RESOLVED: Controlled type 2 diabetes mellitus with complication, without long-term current use of insulin ([facility] ICD-10-CM: E11.8  ICD-9-CM: 250.90  12/30/2020 - 6/28/2021        RESOLVED: Morbid obesity ([facility] ICD-10-CM: E66.01  ICD-9-CM: 278.01  12/30/2020 - 8/3/2021        RESOLVED: Type 2 diabetes with nephropathy ([facility] ICD-10-CM: E11.21  ICD-9-CM: 250.40, 583.81  6/17/2018 - 6/28/2021        RESOLVED: Severe obesity with body mass index (BMI) of 35.0 to 39.9 with serious comorbidity ([facility] ICD-10-CM: E66.01  ICD-9-CM: 278.01  6/15/2018 - 1/1/2022        RESOLVED: Essential hypertension with goal blood pressure less than 140/90 ICD-10-CM: I10  ICD-9-CM: 401.9  4/16/2016 - 5/30/2021        RESOLVED: Hypothyroidism due to acquired atrophy of thyroid ICD-10-CM: E03.4  ICD-9-CM: 244.8, 246.8  4/18/2015 - 10/20/2021        RESOLVED: Hyperlipidemia ICD-10-CM: E78.5  ICD-9-CM: 272.4  5/28/2014 - 1/1/2022        RESOLVED: Hypothyroid ICD-10-CM: E03.9  ICD-9-CM: 244.9  5/28/2014 - 4/18/2015        RESOLVED: Seizures ([address] ICD-10-CM: R56.9  ICD-9-CM: 780.39  2/25/2013 - 1/1/2022        RESOLVED: HTN (hypertension) ICD-10-CM: I10  ICD-9-CM: 401.9  2/25/2013 - 4/18/2015 Medications reviewed  Current Facility-Administered Medications   Medication Dose Route Frequency    insulin lispro (HUMALOG) injection   SubCUTAneous Q6H    dexamethasone (DECADRON) 4 mg/mL injection 4 mg  4 mg IntraVENous Q24H    piperacillin-tazobactam (ZOSYN) 3.375 g in 0.9% sodium chloride (MBP/ADV) 100 mL MBP  3.375 g IntraVENous Q8H    zinc oxide-white petrolatum 20-75 % topical paste   Topical TID    0.9% sodium chloride infusion 250 mL  250 mL IntraVENous PRN    levETIRAcetam (KEPPRA) oral solution 1,500 mg  1,500 mg Oral BID    heparin (porcine) 1,000 unit/mL injection 2,200 Units  2,200 Units IntraVENous DIALYSIS PRN    insulin glargine (LANTUS) injection 20 Units  20 Units SubCUTAneous QHS    NOREPINephrine (LEVOPHED) 8 mg in 0.9% NS 250ml infusion  0.5-120 mcg/min IntraVENous TITRATE    lactulose (CHRONULAC) 10 gram/15 mL solution 15 mL  10 g Per G Tube TID    glucose chewable tablet 16 g  4 Tablet Oral PRN    glucagon (GLUCAGEN) injection 1 mg  1 mg IntraMUSCular PRN    dextrose 10% infusion 125-250 mL  125-250 mL IntraVENous PRN    LORazepam (ATIVAN) injection 2 mg  2 mg IntraVENous Q2H PRN    glucose chewable tablet 16 g  4 Tablet Oral PRN    glucagon (GLUCAGEN) injection 1 mg  1 mg IntraMUSCular PRN    valproic acid (as sodium salt) (DEPAKENE) 250 mg/5 mL (5 mL) oral solution 500 mg  500 mg Oral BID    dextrose 10% infusion 125-250 mL  125-250 mL IntraVENous PRN    PHENYLephrine (ISRAEL-SYNEPHRINE) 30 mg in 0.9% sodium chloride 250 mL infusion   mcg/min IntraVENous TITRATE    propofol (DIPRIVAN) 10 mg/mL infusion  0-50 mcg/kg/min IntraVENous TITRATE    lacosamide (VIMPAT) tablet 200 mg  200 mg Oral BID    levothyroxine (SYNTHROID) tablet 75 mcg  75 mcg Oral 7am    [Held by provider] aspirin chewable tablet 81 mg  81 mg Oral DAILY    sodium chloride (NS) flush 5-40 mL  5-40 mL IntraVENous Q8H    sodium chloride (NS) flush 5-40 mL  5-40 mL IntraVENous PRN    acetaminophen (TYLENOL) tablet 650 mg  650 mg Oral Q6H PRN    Or    acetaminophen (TYLENOL) suppository 650 mg  650 mg Rectal Q6H PRN    polyethylene glycol (MIRALAX) packet 17 g  17 g Oral DAILY PRN    ondansetron (ZOFRAN ODT) tablet 4 mg  4 mg Oral Q8H PRN    Or    ondansetron (ZOFRAN) injection 4 mg  4 mg IntraVENous Q6H PRN    atorvastatin (LIPITOR) tablet 80 mg  80 mg Oral QHS       Review of Systems:   Review of systems not obtained due to patient factors. Objective:   Physical Exam:     Visit Vitals  BP (!) 150/71 (BP 1 Location: Left leg, BP Patient Position: At rest)   Pulse 91   Temp 98.3 °F (36.8 °C)   Resp 18   Ht 6' 0.01\" (1.829 m)   Wt 119.6 kg (263 lb 10.7 oz)   SpO2 96%   BMI 35.75 kg/m²      O2 Device: Ventilator    Temp (24hrs), Av.5 °F (36.9 °C), Min:98.2 °F (36.8 °C), Max:99.1 °F (37.3 °C)    No intake/output data recorded.  1901 -  0700  In: 1510   Out: 800 [Drains:800]    General:  Awake but unable to follow commands   Lungs:   Clear to auscultation bilaterally. Chest wall:  No tenderness or deformity. Heart:  Regular rate and rhythm, S1, S2 normal, no murmur, click, rub or gallop. Abdomen:   Soft, non-tender. Bowel sounds normal. No masses,  No organomegaly. Extremities: Extremities normal, atraumatic, no cyanosis or edema. Pulses: 2+ and symmetric all extremities. Skin: Skin color, texture, turgor normal. No rashes or lesions   Neurologic: CNII-XII intact. No gross sensory or motor deficits     Data Review:       Recent Days:  No results for input(s): WBC, HGB, HCT, PLT, HGBEXT, HCTEXT, PLTEXT, HGBEXT, HCTEXT, PLTEXT in the last 72 hours.   Recent Labs     22  0530 22  0330 22  0448 22  1535   NA  --  127*  --   --    K  --  4.4  --   --    CL  --  91*  --   --    CO2  --  21  --   --    GLU  --  149*  --   --    BUN  --  105*  --  121*   CREA  --  6.71*  --   --    CA  --  8.5  --   --    PHOS  --  8.7*  --   --    ALB  --  1.0*  -- --    INR 1.4* 1.3* 1.3*  --      Recent Labs     02/13/22  0300 02/12/22  0445   PH 7.34* 7.40   PCO2 39 36   PO2 152* 86   HCO3 21* 22   FIO2 50.0 50.0       24 Hour Results:  Recent Results (from the past 24 hour(s))   GLUCOSE, POC    Collection Time: 02/13/22 11:31 AM   Result Value Ref Range    Glucose (POC) 155 (H) 65 - 117 mg/dL    Performed by Cristy Maldonado    GLUCOSE, POC    Collection Time: 02/13/22  5:06 PM   Result Value Ref Range    Glucose (POC) 153 (H) 65 - 117 mg/dL    Performed by Cristy Maldonado    GLUCOSE, POC    Collection Time: 02/13/22 11:22 PM   Result Value Ref Range    Glucose (POC) 107 65 - 117 mg/dL    Performed by Domenic Nails    PROTHROMBIN TIME + INR    Collection Time: 02/14/22  5:30 AM   Result Value Ref Range    Prothrombin time 16.6 (H) 11.9 - 14.6 sec    INR 1.4 (H) 0.9 - 1.1     LD    Collection Time: 02/14/22  5:30 AM   Result Value Ref Range     (H) 85 - 241 U/L   D DIMER    Collection Time: 02/14/22  5:30 AM   Result Value Ref Range    D DIMER >20.00 (H) <0.50 ug/ml(FEU)   PROCALCITONIN    Collection Time: 02/14/22  5:30 AM   Result Value Ref Range    Procalcitonin 1.70 (H) 0 ng/mL   SED RATE (ESR)    Collection Time: 02/14/22  5:30 AM   Result Value Ref Range    Sed rate, automated 128 mm/hr   GLUCOSE, POC    Collection Time: 02/14/22  6:01 AM   Result Value Ref Range    Glucose (POC) 129 (H) 65 - 117 mg/dL    Performed by Domenic Nails            Assessment/     Acute hypoxic respiratory failure secondary to COVID-19  COVID-19 with pneumonitis  Septic shock due to COVID-19 pneumonitis  Acute encephalopathy  Non-STEMI type II due to demand mismatch  Shock liver secondary to septic shock  Acute anemia status post transfusion of packed red blood cells    Plan:  Continue supportive care including Decadron Zosyn and azithromycin  Wean of mechanical ventilator as tolerated  Continue to monitor daily electrolytes  Overall prognosis guarded  Updated sonChuy([phone]). Family plans on getting here Monday 02/14 in am for possible withdrawal of care  Care Plan discussed with: Nurse    Total time spent with patient: 30 minutes.     Haresh Garcias MD

## 2022-02-14 NOTE — PROGRESS NOTES
Renal Progress Note    Patient: Harini Cedilol MRN: [id number]  SSN: [id number]    YOB: 1945  Age: 78 y.o. Sex: male      Admit Date: 1/27/2022    LOS: 18 days     Subjective:     Seen at bedside , intubated and sedated. Unresponsive   S/p HD dialysis on 2/11/22 .   On 50% FiO2 with PEEP of 5    Current Facility-Administered Medications   Medication Dose Route Frequency    insulin lispro (HUMALOG) injection   SubCUTAneous Q6H    dexamethasone (DECADRON) 4 mg/mL injection 4 mg  4 mg IntraVENous Q24H    zinc oxide-white petrolatum 20-75 % topical paste   Topical TID    0.9% sodium chloride infusion 250 mL  250 mL IntraVENous PRN    levETIRAcetam (KEPPRA) oral solution 1,500 mg  1,500 mg Oral BID    heparin (porcine) 1,000 unit/mL injection 2,200 Units  2,200 Units IntraVENous DIALYSIS PRN    insulin glargine (LANTUS) injection 20 Units  20 Units SubCUTAneous QHS    NOREPINephrine (LEVOPHED) 8 mg in 0.9% NS 250ml infusion  0.5-120 mcg/min IntraVENous TITRATE    lactulose (CHRONULAC) 10 gram/15 mL solution 15 mL  10 g Per G Tube TID    glucose chewable tablet 16 g  4 Tablet Oral PRN    glucagon (GLUCAGEN) injection 1 mg  1 mg IntraMUSCular PRN    dextrose 10% infusion 125-250 mL  125-250 mL IntraVENous PRN    LORazepam (ATIVAN) injection 2 mg  2 mg IntraVENous Q2H PRN    glucose chewable tablet 16 g  4 Tablet Oral PRN    glucagon (GLUCAGEN) injection 1 mg  1 mg IntraMUSCular PRN    valproic acid (as sodium salt) (DEPAKENE) 250 mg/5 mL (5 mL) oral solution 500 mg  500 mg Oral BID    dextrose 10% infusion 125-250 mL  125-250 mL IntraVENous PRN    PHENYLephrine (ISRAEL-SYNEPHRINE) 30 mg in 0.9% sodium chloride 250 mL infusion   mcg/min IntraVENous TITRATE    propofol (DIPRIVAN) 10 mg/mL infusion  0-50 mcg/kg/min IntraVENous TITRATE    lacosamide (VIMPAT) tablet 200 mg  200 mg Oral BID    levothyroxine (SYNTHROID) tablet 75 mcg  75 mcg Oral 7am    [Held by provider] aspirin chewable tablet 81 mg  81 mg Oral DAILY    sodium chloride (NS) flush 5-40 mL  5-40 mL IntraVENous Q8H    sodium chloride (NS) flush 5-40 mL  5-40 mL IntraVENous PRN    acetaminophen (TYLENOL) tablet 650 mg  650 mg Oral Q6H PRN    Or    acetaminophen (TYLENOL) suppository 650 mg  650 mg Rectal Q6H PRN    polyethylene glycol (MIRALAX) packet 17 g  17 g Oral DAILY PRN    ondansetron (ZOFRAN ODT) tablet 4 mg  4 mg Oral Q8H PRN    Or    ondansetron (ZOFRAN) injection 4 mg  4 mg IntraVENous Q6H PRN    atorvastatin (LIPITOR) tablet 80 mg  80 mg Oral QHS        Vitals:    02/14/22 0721 02/14/22 0800 02/14/22 0900 02/14/22 1107   BP:  (!) 150/71     Pulse: 92 91  94   Resp: 18 18  17   Temp:  98.6 °F (37 °C)     TempSrc:       SpO2: 96% 96% 98% 97%   Weight:       Height:         Objective:   General: On ventilator, unresponsive   HEENT:  no Icterus, Pallor+, ET tube in place, mucosa dry   neck: Neck is supple, No JVD  Lungs: Decreased breath sounds at the bases,   CVS: heart sounds normal,  no murmurs, no rubs. GI: soft, nontender, no distention  Extremeties: no cyanosis, no edema    Neuro: Patient unresponsive, on vent, off sedation   skin: normal skin turgor, no skin rashes. Intake and Output:  Current Shift: No intake/output data recorded. Last three shifts: 02/12 1901 - 02/14 0700  In: 1510   Out: 800 [Drains:800]      Lab/Data Review:  No results for input(s): WBC, HGB, HCT, PLT, HGBEXT, HCTEXT, PLTEXT, HGBEXT, HCTEXT, PLTEXT in the last 72 hours.   Recent Labs     02/14/22  0530 02/13/22  0330 02/12/22  0448 02/11/22  1535   NA  --  127*  --   --    K  --  4.4  --   --    CL  --  91*  --   --    CO2  --  21  --   --    GLU  --  149*  --   --    BUN  --  105*  --  121*   CREA  --  6.71*  --   --    CA  --  8.5  --   --    PHOS  --  8.7*  --   --    ALB  --  1.0*  --   --    INR 1.4* 1.3* 1.3*  --      Recent Labs     02/13/22  0300 02/12/22  0445   PH 7.34* 7.40   PCO2 39 36   PO2 152* 86   HCO3 21* 22   FIO2 50.0 50.0     Recent Results (from the past 24 hour(s))   GLUCOSE, POC    Collection Time: 02/13/22  5:06 PM   Result Value Ref Range    Glucose (POC) 153 (H) 65 - 117 mg/dL    Performed by Tonio Crowell    GLUCOSE, POC    Collection Time: 02/13/22 11:22 PM   Result Value Ref Range    Glucose (POC) 107 65 - 117 mg/dL    Performed by Mavis Chavez    PROTHROMBIN TIME + INR    Collection Time: 02/14/22  5:30 AM   Result Value Ref Range    Prothrombin time 16.6 (H) 11.9 - 14.6 sec    INR 1.4 (H) 0.9 - 1.1     LD    Collection Time: 02/14/22  5:30 AM   Result Value Ref Range     (H) 85 - 241 U/L   D DIMER    Collection Time: 02/14/22  5:30 AM   Result Value Ref Range    D DIMER >20.00 (H) <0.50 ug/ml(FEU)   PROCALCITONIN    Collection Time: 02/14/22  5:30 AM   Result Value Ref Range    Procalcitonin 1.70 (H) 0 ng/mL   SED RATE (ESR)    Collection Time: 02/14/22  5:30 AM   Result Value Ref Range    Sed rate, automated 128 mm/hr   GLUCOSE, POC    Collection Time: 02/14/22  6:01 AM   Result Value Ref Range    Glucose (POC) 129 (H) 65 - 117 mg/dL    Performed by Mavis Chavez    GLUCOSE, POC    Collection Time: 02/14/22 11:12 AM   Result Value Ref Range    Glucose (POC) 120 (H) 65 - 117 mg/dL    Performed by Ann-Marie Cheng       FiO2 - stable on Vent   Noted ABG   Chest X ray personally reviewed   Assessment and Plan:     #1 acute kidney injury  -ANDRES likely 2/2 ATN from septic shock /rhabdomyolysis. -CPK trending down   -Patient remained anuric, with rising BUN and creatinine  -Started on CRRT 1/31, had for 48 hrs, Switched to intermittent hemodialysis,   -Status post hemodialysis 2/11/22 ,    -He remains anuric , servin removed   -Will order bladder croft every day  -Plan for dialysis today with 2/2.5 bath. Goal to remove 1 to 2 L of fluid     #2 severe hypokalemia:   Improved potassium level   monitor potassium levels   Potassium yesterday was 4.4.   We will plan dialysis with 2K bath    #3 Hyponatremia :  sodium was 127 yesterday. I will plan for dialysis with 5  Adjusted free water rate down to 150cc Q8 hours today   will do HD tomorrow - target fluid removal upto 3 Litres     #4  COVID-19 pneumonia:   -Septic shock  -With multiorgan failure including renal failure/transaminitis/hemodynamics shock  -Patient currently on Decadron azithromycin and Zosyn  Neurology following the patient for anoxic encephalopathy, remains unresponsive  Shock liver with high liver enzymes( improving),   Rhabdomyolysis+/ANDRES, on HD, will monitor   -Overall prognosis seems guarded     #5 diabetes: hyperglycemia+  Monitor accuchecks and adjust management as needed     #6 seizure disorder  -On antiseizure medications, neurology following the patient    7. Metabolic acidosis: Secondary to acute kidney injury/hypotension  Improved with hemodialysis  Stable CO2 level of 21, continue to monitor CO2  Lactate levels last checked on 1/28/22     8. Hypocalcemia - corrected calcium WNL   Will dialyze on 2.5 calcium bath . 9. Anoxic encephalopathy -family to decide today about CODE STATUS and goals of care.     Signed By: Skyler Lala MD     February 14, 2022

## 2022-02-14 NOTE — PROGRESS NOTES
Infectious Diseases Progress Note  Omid Franco MD  [phone]  Date:2022       Room:[facility]  Patient Rigo Barrera     YOB: 1945     Age:76 y.o. 76M with schizophrenia, seizures, diabetes, chronic renal failure.     24 presents to hospital unresponsive. Reportedly had been increasingly weak and unable to leave the bed for days. Associated with fevers. During the ED course found positive for Covid 19, with increased troponin. Intubated ventilated for acute hypoxic respiratory failure. During the hospital course, declining renal function and initiated on hemodialysis. I have been asked to see the patient in consultation for prolonged respiratory failure. Subjective    Subjective:  Symptoms:  Stable. Review of Systems   Unable to perform ROS: Intubated     Objective         Vitals Last 24 Hours:  TEMPERATURE:  Temp  Av.4 °F (36.9 °C)  Min: 98.2 °F (36.8 °C)  Max: 98.6 °F (37 °C)  RESPIRATIONS RANGE: Resp  Av.1  Min: 12  Max: 33  PULSE OXIMETRY RANGE: SpO2  Av %  Min: 93 %  Max: 99 %  PULSE RANGE: Pulse  Av.7  Min: 87  Max: 98  BLOOD PRESSURE RANGE: Systolic (41XUG), QXY:334 , Min:145 , JLZ:502   ; Diastolic (85COQ), MEP:84, Min:61, Max:75    I/O (24Hr): Intake/Output Summary (Last 24 hours) at 2022 1238  Last data filed at 2022 0300  Gross per 24 hour   Intake 450 ml   Output    Net 450 ml     Objective:  Vital signs: (most recent): Blood pressure (!) 150/71, pulse 94, temperature 98.6 °F (37 °C), resp. rate 17, height 6' 0.01\" (1.829 m), weight 119.6 kg (263 lb 10.7 oz), SpO2 97 %. General:  intubated on vent unresponsive on no sedation  HEENT: NCAT,   Eyes: anicteric; conjunctiva clear no doll's eye reflex  Neck: no nodes, no cuff leak, trach midline; no accessory MM use.   Chest: no deformity,   Cardiac: R regular; no murmur;   Lungs: distant breath sounds; no wheezes a few rales in the base  Abd: soft, NT, hypoactive BS  Ext: Trace edema; no joint swelling; No clubbing  : No urine  Neuro: Unresponsive on no sedation no doll's eye reflex flaccid extremity  Psych-  unable to assess  Skin: warm, dry, no cyanosis;   Pulses: Brachial and radial pulses intact  Capillary: Slow capillary refill    Labs/Imaging/Diagnostics    Labs:  CBC:  No results for input(s): WBC, RBC, HGB, HCT, MCV, RDW, PLT, HGBEXT, HCTEXT, PLTEXT, HGBEXT, HCTEXT, PLTEXT in the last 72 hours. CHEMISTRIES:  Recent Labs     02/13/22  0330 02/11/22  1535   *  --    K 4.4  --    CL 91*  --    CO2 21  --    * 121*   CA 8.5  --    PHOS 8.7*  --    PT/INR:  Recent Labs     02/14/22  0530 02/13/22  0330 02/12/22  0448   INR 1.4* 1.3* 1.3*     APTT:  No results for input(s): APTT in the last 72 hours. LIVER PROFILE:  No results for input(s): AST, ALT in the last 72 hours. No lab exists for component: BILIDIR, BILITOT, ALKPHOS  Lab Results   Component Value Date/Time    ALT (SGPT) 123 (H) 02/10/2022 05:00 AM    AST (SGOT) 222 (H) 02/10/2022 05:00 AM    Alk. phosphatase 214 (H) 02/10/2022 05:00 AM    Bilirubin, total 0.4 02/10/2022 05:00 AM       Imaging Last 24 Hours:  No results found. Assessment//Plan   Active Problems:    Acute encephalopathy (5/18/2021)      Assessment & Plan   76M with schizophrenia, seizures, diabetes, chronic renal failure.     1/27/22 presents to hospital unresponsive. Reportedly had been increasingly weak and unable to leave the bed for days. Associated with fevers. During the ED course found positive for Covid 19, with increased troponin. Intubated ventilated for acute hypoxic respiratory failure. During the hospital course, declining renal function and initiated on hemodialysis.  I have been asked to see the patient in consultation for prolonged respiratory failure.     MICROBIOLOGY     1/27/22            Covid 19          Positive  2/10/22 Covid 19 Positive    1/27/22            Blood               Negative  1/27/22            Urine Negative     2/3/22              Endotrach        Scant Normal respiratory mark     ASSESSMENT AND RECOMMENDATIONS     1) Prolonged acute hypoxic respiratory failure in the setting of multifactorial shock, shock liver, Covid 19 pneumonia, NSTEMI, acute on chronic renal failure, anoxic brain injury.                 Supportive care with respiratory support as needed              Zosyn through 2/14/22                 Overall prognosis remains very poor   Tracheostomy vs hospice anticipated        Electronically signed by Rosanna Corral MD on 2/14/2022 at 12:38 PM

## 2022-02-14 NOTE — PROGRESS NOTES
CARDIOLOGY PROGRESS NOTE      Patient seen and examined. This is a patient who is followed for NSTEMI in the setting of COVID multi-organ failure. Remains intubated and sedated. Condition unchanged.     Telemetry reviewed, SR 90s     Pertinent review of systems items noted above, all other systems are negative. Current medications reviewed.     Physical Examination  Vital signs are stable. Blood pressure 150/71, Pulse 96  Intubated/sedated  Heart is regular, rate and rhythm. Diminished lungs  No lower extremity swelling     Labs reviewed     Case discussed with Dr. Hailey Call and our impression and recommendations are as follows:     1. NSTEMI:   - HS troponin 1354 > 1510. Likely related to demand ischemia in the setting of COVID multi-organ failure  - ASA has been on hold  - Echo: EF 65% with no wall motion abnormalities. - Continues with CRRT per renal     2. Hypotension:   -  BP has been stable, off pressor support     3. COVID pna:   - With multi-organ failure and possible anoxic encephalopathy   -  Management per primary/pulm. Hilton Jones monitor telemetry, serial ECGs for QTc. Will continue to monitor.  - Recommend to keep a negative fluid balance to prevent volume overload.     Overall prognosis is poor. DNR. Family to discuss goals of care. Dr. Ojeda [facility]  [address] [facility] [address], [address] [facility]  [phone]

## 2022-02-14 NOTE — PROGRESS NOTES
Clinical chart reviewed by KARIN. Family is still working on making a decision regarding patient's care. CM will continue to follow.

## 2022-02-15 NOTE — PROGRESS NOTES
Hospitalist Progress Note         Cari Fitzgerald MD          Daily Progress Note: 2/15/2022      Subjective:   Admitted on 1/27.  76M, H/o Schizophenia, seizures, DMII on oral meds with unresponsive and acute hypoxic respiratory failure s/t COVID-19 pneumonitis complicated by ANDRES, hypokalemia and shock liver.            HSOPITAL COURSE  COVID positive, associated with fever 106, increased troponin, cardiology was consulted ansd recommended ECHO, there is no heparin gtt, was initially called for STEMI and was cancelled. he was intubated for acute hypoxic respiratory failure. Started on levophed. Hospital course complicated by ANDRES, shock liver and hypokalemia. Transaminases improved. There was concern for anoxic brain injury    He was started on CRRT on 1/31 which he had for 48 hours, then switched to intermittent hemodialysis. Patient also had malignant hyperthermia. Was treated with azithromycin, barcitinib, and zosyn. Echo showed an EF of 65%    He was initiated on hemodialysis    Blood and urine cultures were negative. Sputum culture showed respiratory mark    Patient has remained unresponsive    __    Patient seen in follow-up.   Brant Stone remains unresponsive to painful stimuli, on no sedation     Family reportedly was considering withdrawal of care.  Unable to get a hold of them yesterday and I am unable to do so today    Problem List:  Problem List as of 2/15/2022 Date Reviewed: 12/30/2020          Codes Class Noted - Resolved    Acute hypernatremia ICD-10-CM: E87.0  ICD-9-CM: 276.0  6/9/2021 - Present        Uncontrolled type 2 diabetes mellitus with hyperglycemia ([facility] ICD-10-CM: E11.65  ICD-9-CM: 250.02  6/9/2021 - Present        Acute metabolic encephalopathy [id number]: G93.41  ICD-9-CM: 348.31  6/9/2021 - Present        ANDRES (acute kidney injury) ([facility] ICD-10-CM: N17.9  ICD-9-CM: 584.9  6/9/2021 - Present        Dehydration ICD-10-CM: E86.0  ICD-9-CM: 276.51 6/3/2021 - Present        Hypernatremia ICD-10-CM: E87.0  ICD-9-CM: 276.0  5/26/2021 - Present        Acute encephalopathy ICD-10-CM: G93.40  ICD-9-CM: 348.30  5/18/2021 - Present        Vitamin D deficiency ICD-10-CM: E55.9  ICD-9-CM: 268.9  12/30/2020 - Present        Dementia ([facility] ICD-10-CM: F03.90  ICD-9-CM: 294.20  7/26/2017 - Present        Mixed hyperlipidemia ICD-10-CM: E78.2  ICD-9-CM: 272.2  4/16/2016 - Present        Essential hypertension ICD-10-CM: I10  ICD-9-CM: 401.9  4/18/2015 - Present        DM (diabetes mellitus) ([facility] ICD-10-CM: E11.9  ICD-9-CM: 250.00  2/25/2013 - Present        Schizophreniform disorder ([facility] ICD-10-CM: F20.81  ICD-9-CM: 295.40  Unknown - Present        GERD (gastroesophageal reflux disease) ICD-10-CM: K21.9  ICD-9-CM: 530.81  Unknown - Present        Seizure disorder ([facility] ICD-10-CM: G40.909  ICD-9-CM: 345.90  Unknown - Present        RESOLVED: Controlled type 2 diabetes mellitus with complication, without long-term current use of insulin ([facility] ICD-10-CM: E11.8  ICD-9-CM: 250.90  12/30/2020 - 6/28/2021        RESOLVED: Morbid obesity ([facility] ICD-10-CM: E66.01  ICD-9-CM: 278.01  12/30/2020 - 8/3/2021        RESOLVED: Type 2 diabetes with nephropathy ([facility] ICD-10-CM: E11.21  ICD-9-CM: 250.40, 583.81  6/17/2018 - 6/28/2021        RESOLVED: Severe obesity with body mass index (BMI) of 35.0 to 39.9 with serious comorbidity ([facility] ICD-10-CM: E66.01  ICD-9-CM: 278.01  6/15/2018 - 1/1/2022        RESOLVED: Essential hypertension with goal blood pressure less than 140/90 ICD-10-CM: I10  ICD-9-CM: 401.9  4/16/2016 - 5/30/2021        RESOLVED: Hypothyroidism due to acquired atrophy of thyroid ICD-10-CM: E03.4  ICD-9-CM: 244.8, 246.8  4/18/2015 - 10/20/2021        RESOLVED: Hyperlipidemia ICD-10-CM: E78.5  ICD-9-CM: 272.4  5/28/2014 - 1/1/2022        RESOLVED: Hypothyroid ICD-10-CM: E03.9  ICD-9-CM: 244.9  5/28/2014 - 4/18/2015        RESOLVED: Seizures ([facility] ICD-10-CM: R56.9  ICD-9-CM: 780.39  2/25/2013 - 1/1/2022        RESOLVED: HTN (hypertension) ICD-10-CM: I10  ICD-9-CM: 401.9  2/25/2013 - 4/18/2015              Medications reviewed  Current Facility-Administered Medications   Medication Dose Route Frequency    insulin lispro (HUMALOG) injection   SubCUTAneous Q6H    dexamethasone (DECADRON) 4 mg/mL injection 4 mg  4 mg IntraVENous Q24H    zinc oxide-white petrolatum 20-75 % topical paste   Topical TID    0.9% sodium chloride infusion 250 mL  250 mL IntraVENous PRN    levETIRAcetam (KEPPRA) oral solution 1,500 mg  1,500 mg Oral BID    heparin (porcine) 1,000 unit/mL injection 2,200 Units  2,200 Units IntraVENous DIALYSIS PRN    insulin glargine (LANTUS) injection 20 Units  20 Units SubCUTAneous QHS    NOREPINephrine (LEVOPHED) 8 mg in 0.9% NS 250ml infusion  0.5-120 mcg/min IntraVENous TITRATE    lactulose (CHRONULAC) 10 gram/15 mL solution 15 mL  10 g Per G Tube TID    glucose chewable tablet 16 g  4 Tablet Oral PRN    glucagon (GLUCAGEN) injection 1 mg  1 mg IntraMUSCular PRN    dextrose 10% infusion 125-250 mL  125-250 mL IntraVENous PRN    LORazepam (ATIVAN) injection 2 mg  2 mg IntraVENous Q2H PRN    glucose chewable tablet 16 g  4 Tablet Oral PRN    glucagon (GLUCAGEN) injection 1 mg  1 mg IntraMUSCular PRN    valproic acid (as sodium salt) (DEPAKENE) 250 mg/5 mL (5 mL) oral solution 500 mg  500 mg Oral BID    dextrose 10% infusion 125-250 mL  125-250 mL IntraVENous PRN    PHENYLephrine (ISRAEL-SYNEPHRINE) 30 mg in 0.9% sodium chloride 250 mL infusion   mcg/min IntraVENous TITRATE    propofol (DIPRIVAN) 10 mg/mL infusion  0-50 mcg/kg/min IntraVENous TITRATE    lacosamide (VIMPAT) tablet 200 mg  200 mg Oral BID    levothyroxine (SYNTHROID) tablet 75 mcg  75 mcg Oral 7am    [Held by provider] aspirin chewable tablet 81 mg  81 mg Oral DAILY    sodium chloride (NS) flush 5-40 mL  5-40 mL IntraVENous Q8H    sodium chloride (NS) flush 5-40 mL  5-40 mL IntraVENous PRN  acetaminophen (TYLENOL) tablet 650 mg  650 mg Oral Q6H PRN    Or    acetaminophen (TYLENOL) suppository 650 mg  650 mg Rectal Q6H PRN    polyethylene glycol (MIRALAX) packet 17 g  17 g Oral DAILY PRN    ondansetron (ZOFRAN ODT) tablet 4 mg  4 mg Oral Q8H PRN    Or    ondansetron (ZOFRAN) injection 4 mg  4 mg IntraVENous Q6H PRN    atorvastatin (LIPITOR) tablet 80 mg  80 mg Oral QHS       Review of Systems:   Review of systems not obtained due to patient factors. Objective:   Physical Exam:     Visit Vitals  BP (!) 147/79 (BP Patient Position: At rest)   Pulse 89   Temp 98.5 °F (36.9 °C)   Resp 18   Ht 6' 0.01\" (1.829 m)   Wt 110.3 kg (243 lb 2.7 oz)   SpO2 100%   BMI 32.97 kg/m²      O2 Device: Ventilator    Temp (24hrs), Av.6 °F (37 °C), Min:98.4 °F (36.9 °C), Max:98.8 °F (37.1 °C)    No intake/output data recorded.  1901 - 02/15 0700  In: 690   Out: 2530     General:   Unresponsive   Lungs:   Clear to auscultation bilaterally. Chest wall:  No tenderness or deformity. Heart:  Regular rate and rhythm, S1, S2 normal, no murmur, click, rub or gallop. Abdomen:   Soft, non-tender. Bowel sounds normal. No masses,  No organomegaly. Extremities: Extremities normal, atraumatic, no cyanosis or edema. Pulses: 2+ and symmetric all extremities. Skin: Skin color, texture, turgor normal. No rashes or lesions   Neurologic: CNII-XII intact. Unable to fully assess. Oculocephalic reflex intact     Data Review:       Recent Days:  No results for input(s): WBC, HGB, HCT, PLT, HGBEXT, HCTEXT, PLTEXT, HGBEXT, HCTEXT, PLTEXT in the last 72 hours.   Recent Labs     02/15/22  0450 22  0530 22  0330   *  --  127*   K 4.4  --  4.4   CL 93*  --  91*   CO2 22  --  21   GLU 88  --  149*   BUN 94*  --  105*   CREA 6.11*  --  6.71*   CA 8.6  --  8.5   PHOS 7.9*  --  8.7*   ALB 1.0*  --  1.0*   INR 1.4* 1.4* 1.3*     Recent Labs     22  0300   PH 7.34*   PCO2 39   PO2 152*   HCO3 21*   FIO2 50.0       24 Hour Results:  Recent Results (from the past 24 hour(s))   GLUCOSE, POC    Collection Time: 02/14/22 11:12 AM   Result Value Ref Range    Glucose (POC) 120 (H) 65 - 117 mg/dL    Performed by Giulia Harris, POC    Collection Time: 02/14/22  5:41 PM   Result Value Ref Range    Glucose (POC) 123 (H) 65 - 117 mg/dL    Performed by Asad Villareal    GLUCOSE, POC    Collection Time: 02/15/22 12:52 AM   Result Value Ref Range    Glucose (POC) 128 (H) 65 - 117 mg/dL    Performed by Nick Chavez + INR    Collection Time: 02/15/22  4:50 AM   Result Value Ref Range    Prothrombin time 16.4 (H) 11.9 - 14.6 sec    INR 1.4 (H) 0.9 - 1.1     LD    Collection Time: 02/15/22  4:50 AM   Result Value Ref Range     (H) 85 - 241 U/L   D DIMER    Collection Time: 02/15/22  4:50 AM   Result Value Ref Range    D DIMER >20.00 (H) <0.50 ug/ml(FEU)   PROCALCITONIN    Collection Time: 02/15/22  4:50 AM   Result Value Ref Range    Procalcitonin 1.70 (H) 0 ng/mL   SED RATE (ESR)    Collection Time: 02/15/22  4:50 AM   Result Value Ref Range    Sed rate, automated 129 mm/hr   RENAL FUNCTION PANEL    Collection Time: 02/15/22  4:50 AM   Result Value Ref Range    Sodium 132 (L) 136 - 145 mmol/L    Potassium 4.4 3.5 - 5.1 mmol/L    Chloride 93 (L) 97 - 108 mmol/L    CO2 22 21 - 32 mmol/L    Anion gap 17 (H) 5 - 15 mmol/L    Glucose 88 65 - 100 mg/dL    BUN 94 (H) 6 - 20 mg/dL    Creatinine 6.11 (H) 0.70 - 1.30 mg/dL    BUN/Creatinine ratio 15 12 - 20      GFR est AA 11 (L) >60 ml/min/1.73m2    GFR est non-AA 9 (L) >60 ml/min/1.73m2    Calcium 8.6 8.5 - 10.1 mg/dL    Phosphorus 7.9 (H) 2.6 - 4.7 mg/dL    Albumin 1.0 (L) 3.5 - 5.0 g/dL   GLUCOSE, POC    Collection Time: 02/15/22  5:59 AM   Result Value Ref Range    Glucose (POC) 96 65 - 117 mg/dL    Performed by Juan Daniel Sheikh            Assessment/     Acute hypoxic respiratory failure secondary to COVID-19    COVID-19 with pneumonitis    Septic shock due to COVID-19 pneumonitis    Acute encephalopathy, multifactorial including Covid and anoxic encephalopathy    Non-STEMI type II due to demand mismatch    Shock liver secondary to septic shock, improved    Acute anemia status post transfusion of packed red blood cells    Plan:  Continue supportive care including Decadron Zosyn and azithromycin  Wean of mechanical ventilator as tolerated  Continue to monitor daily electrolytes  Overall prognosis appears grim    Family reportedly considering withdrawal of care but unable to get hold of them    I attempted to contact his son, CRISTOPHER([phone]) but he did not answer. I also attempted to contact his Sister Chase Victor but her number is out of service. His brother Delmy  did not answer either    Care Plan discussed with: Nurse    Total time spent with patient: 30 minutes.     Da Vazquez MD

## 2022-02-15 NOTE — DIALYSIS
PT had 3.5 hours of dialysis tolerated well. Pt had 2530ml removed with 300ml rinseback. Pt CVC rewrapped and heparin locked. Pt handed back off to ICU nurse. Azathioprine Counseling:  I discussed with the patient the risks of azathioprine including but not limited to myelosuppression, immunosuppression, hepatotoxicity, lymphoma, and infections.  The patient understands that monitoring is required including baseline LFTs, Creatinine, possible TPMP genotyping and weekly CBCs for the first month and then every 2 weeks thereafter.  The patient verbalized understanding of the proper use and possible adverse effects of azathioprine.  All of the patient's questions and concerns were addressed.

## 2022-02-15 NOTE — PROGRESS NOTES
Bedside shift change report given to Charley Ge 1724 (oncoming nurse) by Scott Mendoza RN (offgoing nurse). Report included the following information SBAR.

## 2022-02-15 NOTE — PROGRESS NOTES
Renal Progress Note    Patient: Geraldo Prasad MRN: [id number]  SSN: [id number]    YOB: 1945  Age: 78 y.o. Sex: male      Admit Date: 1/27/2022    LOS: 19 days     Subjective:     Seen at bedside , intubated .   He is off sedation and still unresponsive  S/p HD dialysis today with 2.5 L fluid removal  On 50% FiO2 with PEEP of 5    Current Facility-Administered Medications   Medication Dose Route Frequency    insulin lispro (HUMALOG) injection   SubCUTAneous Q6H    dexamethasone (DECADRON) 4 mg/mL injection 4 mg  4 mg IntraVENous Q24H    zinc oxide-white petrolatum 20-75 % topical paste   Topical TID    0.9% sodium chloride infusion 250 mL  250 mL IntraVENous PRN    levETIRAcetam (KEPPRA) oral solution 1,500 mg  1,500 mg Oral BID    heparin (porcine) 1,000 unit/mL injection 2,200 Units  2,200 Units IntraVENous DIALYSIS PRN    insulin glargine (LANTUS) injection 20 Units  20 Units SubCUTAneous QHS    NOREPINephrine (LEVOPHED) 8 mg in 0.9% NS 250ml infusion  0.5-120 mcg/min IntraVENous TITRATE    lactulose (CHRONULAC) 10 gram/15 mL solution 15 mL  10 g Per G Tube TID    glucose chewable tablet 16 g  4 Tablet Oral PRN    glucagon (GLUCAGEN) injection 1 mg  1 mg IntraMUSCular PRN    dextrose 10% infusion 125-250 mL  125-250 mL IntraVENous PRN    LORazepam (ATIVAN) injection 2 mg  2 mg IntraVENous Q2H PRN    glucose chewable tablet 16 g  4 Tablet Oral PRN    glucagon (GLUCAGEN) injection 1 mg  1 mg IntraMUSCular PRN    valproic acid (as sodium salt) (DEPAKENE) 250 mg/5 mL (5 mL) oral solution 500 mg  500 mg Oral BID    dextrose 10% infusion 125-250 mL  125-250 mL IntraVENous PRN    PHENYLephrine (ISRAEL-SYNEPHRINE) 30 mg in 0.9% sodium chloride 250 mL infusion   mcg/min IntraVENous TITRATE    propofol (DIPRIVAN) 10 mg/mL infusion  0-50 mcg/kg/min IntraVENous TITRATE    lacosamide (VIMPAT) tablet 200 mg  200 mg Oral BID    levothyroxine (SYNTHROID) tablet 75 mcg  75 mcg Oral 7am    [Held by provider] aspirin chewable tablet 81 mg  81 mg Oral DAILY    sodium chloride (NS) flush 5-40 mL  5-40 mL IntraVENous Q8H    sodium chloride (NS) flush 5-40 mL  5-40 mL IntraVENous PRN    acetaminophen (TYLENOL) tablet 650 mg  650 mg Oral Q6H PRN    Or    acetaminophen (TYLENOL) suppository 650 mg  650 mg Rectal Q6H PRN    polyethylene glycol (MIRALAX) packet 17 g  17 g Oral DAILY PRN    ondansetron (ZOFRAN ODT) tablet 4 mg  4 mg Oral Q8H PRN    Or    ondansetron (ZOFRAN) injection 4 mg  4 mg IntraVENous Q6H PRN    atorvastatin (LIPITOR) tablet 80 mg  80 mg Oral QHS        Vitals:    02/15/22 1000 02/15/22 1100 02/15/22 1130 02/15/22 1200   BP: (!) 152/81 (!) 155/81  (!) 150/80   Pulse: 87 88 89 87   Resp: 17 17 18 16   Temp:  98.7 °F (37.1 °C)     TempSrc:       SpO2: 100% 100% 100% 100%   Weight:       Height:         Objective:   General: On ventilator, unresponsive   HEENT:  no Icterus, Pallor+, ET tube in place, mucosa dry   neck: Neck is supple, No JVD  Lungs: Decreased breath sounds at the bases,   CVS: heart sounds normal,  no murmurs, no rubs. GI: soft, nontender, no distention  Extremeties: no cyanosis, no edema    Neuro: Patient unresponsive, on vent, off sedation   skin: normal skin turgor, no skin rashes. Intake and Output:  Current Shift: No intake/output data recorded. Last three shifts: 02/13 1901 - 02/15 0700  In: 690   Out: 2530       Lab/Data Review:  No results for input(s): WBC, HGB, HCT, PLT, HGBEXT, HCTEXT, PLTEXT, HGBEXT, HCTEXT, PLTEXT in the last 72 hours.   Recent Labs     02/15/22  0450 02/14/22  0530 02/13/22  0330   *  --  127*   K 4.4  --  4.4   CL 93*  --  91*   CO2 22  --  21   GLU 88  --  149*   BUN 94*  --  105*   CREA 6.11*  --  6.71*   CA 8.6  --  8.5   PHOS 7.9*  --  8.7*   ALB 1.0*  --  1.0*   INR 1.4* 1.4* 1.3*     Recent Labs     02/13/22  0300   PH 7.34*   PCO2 39   PO2 152*   HCO3 21*   FIO2 50.0     Recent Results (from the past 24 hour(s)) GLUCOSE, POC    Collection Time: 02/14/22  5:41 PM   Result Value Ref Range    Glucose (POC) 123 (H) 65 - 117 mg/dL    Performed by Madina Shafer    GLUCOSE, POC    Collection Time: 02/15/22 12:52 AM   Result Value Ref Range    Glucose (POC) 128 (H) 65 - 117 mg/dL    Performed by Nick Almazany + INR    Collection Time: 02/15/22  4:50 AM   Result Value Ref Range    Prothrombin time 16.4 (H) 11.9 - 14.6 sec    INR 1.4 (H) 0.9 - 1.1     LD    Collection Time: 02/15/22  4:50 AM   Result Value Ref Range     (H) 85 - 241 U/L   D DIMER    Collection Time: 02/15/22  4:50 AM   Result Value Ref Range    D DIMER >20.00 (H) <0.50 ug/ml(FEU)   PROCALCITONIN    Collection Time: 02/15/22  4:50 AM   Result Value Ref Range    Procalcitonin 1.70 (H) 0 ng/mL   SED RATE (ESR)    Collection Time: 02/15/22  4:50 AM   Result Value Ref Range    Sed rate, automated 129 mm/hr   RENAL FUNCTION PANEL    Collection Time: 02/15/22  4:50 AM   Result Value Ref Range    Sodium 132 (L) 136 - 145 mmol/L    Potassium 4.4 3.5 - 5.1 mmol/L    Chloride 93 (L) 97 - 108 mmol/L    CO2 22 21 - 32 mmol/L    Anion gap 17 (H) 5 - 15 mmol/L    Glucose 88 65 - 100 mg/dL    BUN 94 (H) 6 - 20 mg/dL    Creatinine 6.11 (H) 0.70 - 1.30 mg/dL    BUN/Creatinine ratio 15 12 - 20      GFR est AA 11 (L) >60 ml/min/1.73m2    GFR est non-AA 9 (L) >60 ml/min/1.73m2    Calcium 8.6 8.5 - 10.1 mg/dL    Phosphorus 7.9 (H) 2.6 - 4.7 mg/dL    Albumin 1.0 (L) 3.5 - 5.0 g/dL   GLUCOSE, POC    Collection Time: 02/15/22  5:59 AM   Result Value Ref Range    Glucose (POC) 96 65 - 117 mg/dL    Performed by Sammi Yañez    GLUCOSE, POC    Collection Time: 02/15/22 11:26 AM   Result Value Ref Range    Glucose (POC) 139 (H) 65 - 117 mg/dL    Performed by Colby Kumari       FiO2 - stable on Vent   Noted ABG   Chest X ray personally reviewed   Assessment and Plan:     #1 acute kidney injury  -ANDRES likely 2/2 ATN from septic shock /rhabdomyolysis.   -CPK trending down -Patient remained anuric, with rising BUN and creatinine  -Started on CRRT 1/31, had for 48 hrs, Switched to intermittent hemodialysis,   -Status post hemodialysis 2/14 with 2.5 L fluid removal  -He remains anuric , servin removed   -Plan for dialysis in a.m. with 2 L fluid removal  -Patient remains unresponsive despite being off sedation. Withdrawal of care would not be an unreasonable option    #2 severe hypokalemia:   Improved potassium level   monitor potassium levels   Potassium yesterday was 4.4. We will plan dialysis with 2K bath    #3 Hyponatremia :  sodium has improved from 127 -> 132 today.    -Continue dialysis with high sodium bath   -Adjusted free water rate down to 150cc Q8 hours today   will do HD tomorrow - target fluid removal upto 3 Litres     #4  COVID-19 pneumonia:   -Septic shock  -With multiorgan failure including renal failure/transaminitis/hemodynamics shock  -Patient currently on Decadron azithromycin and Zosyn  Neurology following the patient for anoxic encephalopathy, remains unresponsive  Shock liver with high liver enzymes( improving),   Rhabdomyolysis+/ANDRES, on HD, will monitor   -Overall prognosis seems guarded     #5 diabetes: hyperglycemia+  Monitor accuchecks and adjust management as needed     #6 seizure disorder  -On antiseizure medications, neurology following the patient    7. Metabolic acidosis: Secondary to acute kidney injury/hypotension  Improved with hemodialysis  Stable CO2 level of 22, continue to monitor CO2  Lactate levels last checked on 1/28/22     8. Hypocalcemia - corrected calcium WNL   Will dialyze on 2.5 calcium bath . 9. Anoxic encephalopathy -family to decide today about CODE STATUS and goals of care.     Signed By: Tiffany Baeza MD     February 15, 2022

## 2022-02-15 NOTE — PROGRESS NOTES
CM and director of ICU had telephone conference with patient's brother, Rachael Florez ([phone]). Patient's brother was given update on his status and options for moving forward. Delmy stated that he has met with his siblings and they want to terminally extubate patient and implement comfort care measures only. Patient's brother will be speaking to Dr. Teja Mayo (attending MD) to confirm their choice. They would like to visit patient prior to extubation. CM will continue to follow.

## 2022-02-15 NOTE — PROGRESS NOTES
Infectious Diseases Progress Note  Omid Milton MD  [phone]  Date:2/15/2022       Room:[facility]  Patient Cristian Cordova     YOB: 1945     Age:76 y.o. 76M with schizophrenia, seizures, diabetes, chronic renal failure.     24 presents to hospital unresponsive. Reportedly had been increasingly weak and unable to leave the bed for days. Associated with fevers. During the ED course found positive for Covid 19, with increased troponin. Intubated ventilated for acute hypoxic respiratory failure. During the hospital course, declining renal function and initiated on hemodialysis. I have been asked to see the patient in consultation for prolonged respiratory failure. Subjective    Subjective:  Symptoms:  Stable. Review of Systems   Unable to perform ROS: Intubated     Objective         Vitals Last 24 Hours:  TEMPERATURE:  Temp  Av.6 °F (37 °C)  Min: 98.4 °F (36.9 °C)  Max: 98.8 °F (37.1 °C)  RESPIRATIONS RANGE: Resp  Av.7  Min: 12  Max: 26  PULSE OXIMETRY RANGE: SpO2  Av.1 %  Min: 85 %  Max: 100 %  PULSE RANGE: Pulse  Av.6  Min: 85  Max: 99  BLOOD PRESSURE RANGE: Systolic (52GNC), KF , Min:116 , IXZ:248   ; Diastolic (20RVN), APJ:06, Min:68, Max:88    I/O (24Hr): Intake/Output Summary (Last 24 hours) at 2/15/2022 1413  Last data filed at 2022 1905  Gross per 24 hour   Intake 240 ml   Output 2530 ml   Net -2290 ml     Objective:  Vital signs: (most recent): Blood pressure (!) 150/80, pulse 87, temperature 98.7 °F (37.1 °C), resp. rate 16, height 6' 0.01\" (1.829 m), weight 110.3 kg (243 lb 2.7 oz), SpO2 100 %. General:  intubated on vent unresponsive on no sedation  HEENT: NCAT,   Eyes: anicteric; conjunctiva clear no doll's eye reflex  Neck: no nodes, no cuff leak, trach midline; no accessory MM use.   Chest: no deformity,   Cardiac: R regular; no murmur;   Lungs: distant breath sounds; no wheezes a few rales in the base  Abd: soft, NT, hypoactive BS  Ext: Trace edema; no joint swelling; No clubbing  : No urine  Neuro: Unresponsive on no sedation no doll's eye reflex flaccid extremity  Psych-  unable to assess  Skin: warm, dry, no cyanosis;   Pulses: Brachial and radial pulses intact  Capillary: Slow capillary refill    Labs/Imaging/Diagnostics    Labs:  CBC:  No results for input(s): WBC, RBC, HGB, HCT, MCV, RDW, PLT, HGBEXT, HCTEXT, PLTEXT, HGBEXT, HCTEXT, PLTEXT in the last 72 hours. CHEMISTRIES:  Recent Labs     02/15/22  0450 02/13/22  0330   * 127*   K 4.4 4.4   CL 93* 91*   CO2 22 21   BUN 94* 105*   CA 8.6 8.5   PHOS 7.9* 8.7*   PT/INR:  Recent Labs     02/15/22  0450 02/14/22  0530 02/13/22  0330   INR 1.4* 1.4* 1.3*     APTT:  No results for input(s): APTT in the last 72 hours. LIVER PROFILE:  No results for input(s): AST, ALT in the last 72 hours. No lab exists for component: BILIDIR, BILITOT, ALKPHOS  Lab Results   Component Value Date/Time    ALT (SGPT) 123 (H) 02/10/2022 05:00 AM    AST (SGOT) 222 (H) 02/10/2022 05:00 AM    Alk. phosphatase 214 (H) 02/10/2022 05:00 AM    Bilirubin, total 0.4 02/10/2022 05:00 AM       Imaging Last 24 Hours:  No results found. Assessment//Plan   Active Problems:    Acute encephalopathy (5/18/2021)      Assessment & Plan   76M with schizophrenia, seizures, diabetes, chronic renal failure.     1/27/22 presents to hospital unresponsive. Reportedly had been increasingly weak and unable to leave the bed for days. Associated with fevers. During the ED course found positive for Covid 19, with increased troponin. Intubated ventilated for acute hypoxic respiratory failure. During the hospital course, declining renal function and initiated on hemodialysis.  I have been asked to see the patient in consultation for prolonged respiratory failure.     MICROBIOLOGY     1/27/22            Covid 19          Positive  2/10/22 Covid 19 Positive    1/27/22            Blood               Negative  1/27/22 Urine                Negative     2/3/22              Endotrach        Scant Normal respiratory mark     ASSESSMENT AND RECOMMENDATIONS     1) Prolonged acute hypoxic respiratory failure in the setting of multifactorial shock, shock liver, Covid 19 pneumonia, NSTEMI, acute on chronic renal failure, anoxic brain injury.                 Supportive care with respiratory support as needed              Zosyn through 2/14/22                 Overall prognosis remains very poor   Tracheostomy vs hospice anticipated        Electronically signed by Jasvir Schuster MD on 2/15/2022 at 12:38 PM

## 2022-02-15 NOTE — PROGRESS NOTES
CARDIOLOGY PROGRESS NOTE      Patient seen and examined. This is a patient who is followed for NSTEMI in the setting of COVID multi-organ failure. Remains intubated and sedated. Condition unchanged. Awaiting family to make decisions regarding treatment plan.     Telemetry reviewed, SR 90s     Pertinent review of systems items noted above, all other systems are negative. Current medications reviewed.     Physical Examination  Vital signs are stable. Blood pressure 147/79, Pulse 89  Intubated/sedated  Heart is regular, rate and rhythm. Diminished lungs  No lower extremity swelling     Labs reviewed     Case discussed with Dr. Hailey Call and our impression and recommendations are as follows:     1. NSTEMI:   - HS troponin 1354 > 1510. Likely related to demand ischemia in the setting of COVID multi-organ failure  - ASA has been on hold  - Echo: EF 65% with no wall motion abnormalities. - Continues with CRRT per renal     2. Hypotension:   -  BP has been stable, off pressor support     3. COVID pna:   - With multi-organ failure and possible anoxic encephalopathy   -  Management per primary/pulm. Hilton Jones monitor telemetry, serial ECGs for QTc. Will continue to monitor.  - Recommend to keep a negative fluid balance to prevent volume overload.     Overall prognosis is poor. DNR. Family to discuss goals of care. Dr. Ojeda [facility]  [address], [address] [facility]  [phone]

## 2022-02-15 NOTE — PROGRESS NOTES
Nutrition Assessment     Type and Reason for Visit: Reassess (intermin)    Nutrition Recommendations/Plan:   Continue w/ current TF regimens   Monitor and Record wts, TF rates, water flushes, residuals in I/O    Nutrition Assessment:  Admitted for encephalopathy, +COVID-1, +NSTEMI. (1/27) intubated in ED, pt initially on pressors and sedation, have been off and on since. Pt remains intubated today, on  low-dose. Propofol stopped (2/10). +MOSF and possible anoxic encephalopathy . (1/31) CRRT started, (2/3) changed to intermittent HD. (2/2) TF of Nepro started by nephrology, overfeeding pt so (2/10) RD adjusted to current regimen that remains today and is tolerated well. No changes to TF, family meeting today for goals of care. (2/15) no change in status, remains the same, tolerating TF regimens w/o complications, waiting for family to discussion goals of care. Labs: Na (132), BUN (94), Cre (6.11), phos (7.9), GFR (1.0). Meds: lantus, SSI, lactulose, keppra, levothyroxine, levophed, zosyn, valproic acid. Malnutrition Assessment:  Malnutrition Status: No malnutrition     Estimated Daily Nutrient Needs:  Energy (kcal):  2,052kcals (PSU 2003b)  Protein (g):  150g (1.5g/kg EDW)       Fluid (ml/day):  2000ml (20ml/kg EDW)    Nutrition Related Findings:  No NFPE performed d/t COVID-19, appears nourished. No N/V/C/D. FMS in place, min. OP documented. Generalized anasarca, non-pitting edema to x4 extremities      Current Nutrition Therapies:  DIET NPO  ADULT TUBE FEEDING Orogastric; Renal; Delivery Method: Continuous; Continuous Initial Rate (mL/hr): 20; Continuous Advance Tube Feeding: No; Water Flush Volume (mL): 150; Water Flush Frequency: Q 8 hours; Modulars/Additives: Protein; Specify How t...     Anthropometric Measures:  · Height:  6' 0.01\" (182.9 cm)  · Current Body Wt:  115.8 kg (255 lb 4.7 oz) (2/10)  · BMI: 34.6    Nutrition Diagnosis:   · Inadequate oral intake related to cognitive or neurological impairment as evidenced by NPO or clear liquid status due to medical condition,intubation      Nutrition Intervention:  Food and/or Nutrient Delivery: Continue NPO,Continue tube feeding  Nutrition Education and Counseling: No recommendations at this time  Coordination of Nutrition Care: Continue to monitor while inpatient    Goals:  Meet 75% EEN, Maintain CBW +/-0.5kg x1wk, Maintain skin integrity, Lytes WNL       Nutrition Monitoring and Evaluation:   Behavioral-Environmental Outcomes: None identified  Food/Nutrient Intake Outcomes: Enteral nutrition intake/tolerance  Physical Signs/Symptoms Outcomes: Weight,Hemodynamic status,Fluid status or edema,Biochemical data    Discharge Planning:    No discharge needs at this time     Electronically signed by Alexander Ramos on 2/15/2022 at 10:16 AM

## 2022-02-15 NOTE — PROGRESS NOTES
IMPRESSION:   1. Acute hypoxic respiratory failure oxygenation well-maintained on the ventilator will decrease FiO2 to 40%  2. Anoxic encephalopathy unresponsive no gag reflex does have self respiratory drive  3. Malignant hyperthermia resolved   4. COVID-19 pneumonia  5. NSTEMI   6. Shock cardiogenic versus septic vs Hypovolumic Hypotension resolved now on no pressor  7. Acute bleeding from dialysis catheter resolved  8. Acute kidney injury now requiring hemodialysis  9. Metabolic acidosis resolved  10. Thrombocytopenia resolved  11. Hypernatremia resolved now hyponatremia  12.  hypokalemia repleted  13. Shock liver with transaminitis  improved      RECOMMENDATIONS/PLAN:   1. ICU monitoring  1. Ventilator for mechanical life support and prevent respiratory arrest with protective lung strategies ABG acceptable he is still hemodynamically unstable will continue with the current vent support but decrease FiO2 to 40%  2. On Assist control rate 18  PEEP 5 FiO2 50% ABG acceptable decrease FiO2 40%  3. Patient on Decadron   4. Troponin was elevated 2D echo shows ejection fraction of 65%  5. He was bleeding from the dialysis catheter which is corrected received 4 units of packed RBC hemoglobin dropped from 8.8 to 4.0 hemoglobin now stable  6. Dialyzed on 2/14 for 3.5 hours about 2-1/2 L fluid removed  7. LDH procalcitonin trending down  8. Last liver enzyme continue to improve   9. Off all antibiotics at this  10. Temperature back to normal and sputum shows normal flow  11.  Patient is unresponsive not in any sedation will discuss with family other comfort care at the ostomy     [x] High complexity decision making was performed  [x] See my orders for details  HPI   81-year-old male came in because as he was found unresponsive and fever 107 past medical history of schizophrenia and diabetes gastroesophageal reflux disease and anemia he is COVID-19 positive initially patient was called as NSTEMI but it was canceled patient is intubated on ventilator hemodynamically unstable unable to get any history out of the patient he seems dehydrated    PMH:  has a past medical history of Acute renal failure ([address] Anemia, Arthritis, DKA (diabetic ketoacidoses), GERD (gastroesophageal reflux disease), HTN (hypertension), Hypercholesterolemia, Schizophrenia ([address] Schizophreniform disorder ([address] Seizure disorder ([facility] [address] and Type II or unspecified type diabetes mellitus without mention of complication, uncontrolled. PSH:   has a past surgical history that includes ir insert non tunl cvc over 5 yrs (1/28/2022) and ir insert non tunl cvc over 5 yrs (1/31/2022). FHX: family history includes Stroke in his father. SHX:  reports that he has never smoked. He has never used smokeless tobacco. He reports that he does not drink alcohol and does not use drugs.     ALL: No Known Allergies     MEDS:   [x] Reviewed - As Below   [] Not reviewed    Current Facility-Administered Medications   Medication    insulin lispro (HUMALOG) injection    dexamethasone (DECADRON) 4 mg/mL injection 4 mg    zinc oxide-white petrolatum 20-75 % topical paste    0.9% sodium chloride infusion 250 mL    levETIRAcetam (KEPPRA) oral solution 1,500 mg    heparin (porcine) 1,000 unit/mL injection 2,200 Units    insulin glargine (LANTUS) injection 20 Units    NOREPINephrine (LEVOPHED) 8 mg in 0.9% NS 250ml infusion    lactulose (CHRONULAC) 10 gram/15 mL solution 15 mL    glucose chewable tablet 16 g    glucagon (GLUCAGEN) injection 1 mg    dextrose 10% infusion 125-250 mL    LORazepam (ATIVAN) injection 2 mg    glucose chewable tablet 16 g    glucagon (GLUCAGEN) injection 1 mg    valproic acid (as sodium salt) (DEPAKENE) 250 mg/5 mL (5 mL) oral solution 500 mg    dextrose 10% infusion 125-250 mL    PHENYLephrine (ISRAEL-SYNEPHRINE) 30 mg in 0.9% sodium chloride 250 mL infusion    propofol (DIPRIVAN) 10 mg/mL infusion    lacosamide (VIMPAT) tablet 200 mg  levothyroxine (SYNTHROID) tablet 75 mcg    [Held by provider] aspirin chewable tablet 81 mg    sodium chloride (NS) flush 5-40 mL    sodium chloride (NS) flush 5-40 mL    acetaminophen (TYLENOL) tablet 650 mg    Or    acetaminophen (TYLENOL) suppository 650 mg    polyethylene glycol (MIRALAX) packet 17 g    ondansetron (ZOFRAN ODT) tablet 4 mg    Or    ondansetron (ZOFRAN) injection 4 mg    atorvastatin (LIPITOR) tablet 80 mg      MAR reviewed and pertinent medications noted or modified as needed   Current Facility-Administered Medications   Medication    insulin lispro (HUMALOG) injection    dexamethasone (DECADRON) 4 mg/mL injection 4 mg    zinc oxide-white petrolatum 20-75 % topical paste    0.9% sodium chloride infusion 250 mL    levETIRAcetam (KEPPRA) oral solution 1,500 mg    heparin (porcine) 1,000 unit/mL injection 2,200 Units    insulin glargine (LANTUS) injection 20 Units    NOREPINephrine (LEVOPHED) 8 mg in 0.9% NS 250ml infusion    lactulose (CHRONULAC) 10 gram/15 mL solution 15 mL    glucose chewable tablet 16 g    glucagon (GLUCAGEN) injection 1 mg    dextrose 10% infusion 125-250 mL    LORazepam (ATIVAN) injection 2 mg    glucose chewable tablet 16 g    glucagon (GLUCAGEN) injection 1 mg    valproic acid (as sodium salt) (DEPAKENE) 250 mg/5 mL (5 mL) oral solution 500 mg    dextrose 10% infusion 125-250 mL    PHENYLephrine (ISRAEL-SYNEPHRINE) 30 mg in 0.9% sodium chloride 250 mL infusion    propofol (DIPRIVAN) 10 mg/mL infusion    lacosamide (VIMPAT) tablet 200 mg    levothyroxine (SYNTHROID) tablet 75 mcg    [Held by provider] aspirin chewable tablet 81 mg    sodium chloride (NS) flush 5-40 mL    sodium chloride (NS) flush 5-40 mL    acetaminophen (TYLENOL) tablet 650 mg    Or    acetaminophen (TYLENOL) suppository 650 mg    polyethylene glycol (MIRALAX) packet 17 g    ondansetron (ZOFRAN ODT) tablet 4 mg    Or    ondansetron (ZOFRAN) injection 4 mg    atorvastatin (LIPITOR) tablet 80 mg      PMH:  has a past medical history of Acute renal failure ([facility] [address] Anemia, Arthritis, DKA (diabetic ketoacidoses), GERD (gastroesophageal reflux disease), HTN (hypertension), Hypercholesterolemia, Schizophrenia ([facility] [address] Schizophreniform disorder ([facility] [address] Seizure disorder ([facility] [address] and Type II or unspecified type diabetes mellitus without mention of complication, uncontrolled. PSH:   has a past surgical history that includes ir insert non tunl cvc over 5 yrs (2022) and ir insert non tunl cvc over 5 yrs (2022). FHX: family history includes Stroke in his father. SHX:  reports that he has never smoked. He has never used smokeless tobacco. He reports that he does not drink alcohol and does not use drugs. ROS:  Unable to obtain intubated on ventilator unresponsive    Hemodynamics:    CO:    CI:    CVP:    SVR:   PAP Systolic:    PAP Diastolic:    PVR:    IM78:        Ventilator Settings:      Mode Rate TV Press PEEP FiO2 PIP Min. Vent   Assist control,Volume control    500 ml    5 cm H20 50 %  25 cm H2O  9.67 l/min        Vital Signs: Telemetry:    normal sinus rhythm Intake/Output:   Visit Vitals  BP (!) 147/79 (BP Patient Position: At rest)   Pulse 89   Temp 98.5 °F (36.9 °C)   Resp 18   Ht 6' 0.01\" (1.829 m)   Wt 110.3 kg (243 lb 2.7 oz)   SpO2 100%   BMI 32.97 kg/m²       Temp (24hrs), Av.6 °F (37 °C), Min:98.4 °F (36.9 °C), Max:98.8 °F (37.1 °C)        O2 Device: Ventilator         Wt Readings from Last 4 Encounters:   02/15/22 110.3 kg (243 lb 2.7 oz)   22 101.1 kg (222 lb 14.2 oz)   10/06/21 111.1 kg (245 lb)   21 110.7 kg (244 lb)          Intake/Output Summary (Last 24 hours) at 2/15/2022 0819  Last data filed at 2022 1905  Gross per 24 hour   Intake 240 ml   Output 2530 ml   Net -2290 ml       Last shift:      No intake/output data recorded.   Last 3 shifts:  190 - 02/15 0700  In: 690   Out: 2530        Physical Exam:     General: intubated on vent unresponsive on no sedation  HEENT: NCAT,   Eyes: anicteric; conjunctiva clear no doll's eye reflex  Neck: no nodes, no cuff leak, trach midline; no accessory MM use. Chest: no deformity,   Cardiac: R regular; no murmur;   Lungs: distant breath sounds; no wheezes a few rales in the bases no gag reflex to tracheal suctioning does have spontaneous respirations above the ventilator  Abd: soft, NT, hypoactive BS  Ext: Trace edema; no joint swelling;  No clubbing  : No urine  Neuro: Unresponsive on no sedation no doll's eye reflex flaccid extremities  Psych-  unable to assess  Skin: warm, dry, no cyanosis;   Pulses: Brachial and radial pulses intact  Capillary: Normal capillary refill      DATA:    MAR reviewed and pertinent medications noted or modified as needed  MEDS:   Current Facility-Administered Medications   Medication    insulin lispro (HUMALOG) injection    dexamethasone (DECADRON) 4 mg/mL injection 4 mg    zinc oxide-white petrolatum 20-75 % topical paste    0.9% sodium chloride infusion 250 mL    levETIRAcetam (KEPPRA) oral solution 1,500 mg    heparin (porcine) 1,000 unit/mL injection 2,200 Units    insulin glargine (LANTUS) injection 20 Units    NOREPINephrine (LEVOPHED) 8 mg in 0.9% NS 250ml infusion    lactulose (CHRONULAC) 10 gram/15 mL solution 15 mL    glucose chewable tablet 16 g    glucagon (GLUCAGEN) injection 1 mg    dextrose 10% infusion 125-250 mL    LORazepam (ATIVAN) injection 2 mg    glucose chewable tablet 16 g    glucagon (GLUCAGEN) injection 1 mg    valproic acid (as sodium salt) (DEPAKENE) 250 mg/5 mL (5 mL) oral solution 500 mg    dextrose 10% infusion 125-250 mL    PHENYLephrine (ISRAEL-SYNEPHRINE) 30 mg in 0.9% sodium chloride 250 mL infusion    propofol (DIPRIVAN) 10 mg/mL infusion    lacosamide (VIMPAT) tablet 200 mg    levothyroxine (SYNTHROID) tablet 75 mcg    [Held by provider] aspirin chewable tablet 81 mg    sodium chloride (NS) flush 5-40 mL    sodium chloride (NS) flush 5-40 mL    acetaminophen (TYLENOL) tablet 650 mg    Or    acetaminophen (TYLENOL) suppository 650 mg    polyethylene glycol (MIRALAX) packet 17 g    ondansetron (ZOFRAN ODT) tablet 4 mg    Or    ondansetron (ZOFRAN) injection 4 mg    atorvastatin (LIPITOR) tablet 80 mg        Labs:    Recent Labs     02/15/22  0450 02/14/22  0530 02/13/22  0330   INR 1.4* 1.4* 1.3*     Recent Labs     02/15/22  0450 02/13/22  0330   * 127*   K 4.4 4.4   CL 93* 91*   CO2 22 21   GLU 88 149*   BUN 94* 105*   CREA 6.11* 6.71*   CA 8.6 8.5   PHOS 7.9* 8.7*   ALB 1.0* 1.0*     Recent Labs     02/13/22  0300   PH 7.34*   PCO2 39   PO2 152*   HCO3 21*   FIO2 50.0       Lab Results   Component Value Date/Time    Culture result: Scant Normal respiratory mark 02/03/2022 07:20 PM    Culture result: No Growth (<1000 cfu/mL) 01/27/2022 02:00 PM    Culture result: No growth 6 days 01/27/2022 01:30 PM     Lab Results   Component Value Date/Time    TSH 4.47 (H) 05/21/2021 10:46 AM        Imaging:    Results from Hospital Encounter encounter on 01/27/22    XR CHEST PORT    Narrative  Examination: XR CHEST PORT    History: chf    Comparison: Chest radiograph 2/11/2022    FINDINGS:    Single frontal portable view of the chest. Endotracheal tube projects over the  mid intrathoracic trachea. Right neck central vascular catheter tips project  over the superior cavoatrial junction. Enteric tube courses below the diaphragm  with tip excluded from view. Symmetric low lung volumes. There are basilar predominant opacities that appear  similar to prior. No radiographically evident pneumothorax. Small layering  effusions could contribute to the above described opacities. The cardiac  silhouette is normal in size. Mediastinal contours and osseous structures appear  unchanged. Impression  No significant interval change, including bibasilar opacities.       Results from [place] encounter on 01/27/22    CT HEAD WO CONT    Narrative  Exam: CT Head without contrast    TECHNIQUE: Multiple transaxial CT images of the head were obtained without  contrast. Coronal and sagittal reformatted images were provided. Dose reduction: All CT scans at this facility are performed using dose reduction  optimization techniques as appropriate to a performed exam including the  following: Automated exposure control, adjustments of the mA and/or kV according  to patient size, or use of iterative reconstruction technique. HISTORY: anoxia    COMPARISON: Head CT 10/6/2021, 1/27/2022    FINDINGS:    Global parenchymal volume loss appears similar to prior with proportional ex  vacuo dilatation of the ventricles and other extra-axial CSF spaces, slightly  more prominent on the left. No significant midline shift or mass effect. Gray-white matter differentiation appears preserved. No findings of acute  intracranial hemorrhage. Basilar cisterns appear preserved. Intracranial  atherosclerosis. There is bilateral opacification of the mastoid air cells, most likely  indicating nonspecific mastoid effusions. Debris within the external auditory  canals is most likely cerumen. There is mild mucosal thickening in the paranasal  sinuses, most pronounced in the maxillary sinuses. Globes and orbits appear  unremarkable. Calvarium appears intact without findings of acute or aggressive  abnormality. Impression  Overall similar exam to prior without findings of acute intracranial  abnormality. · 1/30 remains on the ventilator has metabolic acidosis we will add bicarb per tube. Maintain ventilator on current settings although will decrease PEEP slightly. Platelets continue to drop.   He is unresponsive on no sedation likely anoxic encephalopathy  · 1/31 remain intubated on ventilator hemodynamically worsening of the renal function  · 2/1 remained on ventilator hemodynamically unstable on levofed  · 2/2 on ventilator hypothermic electrolyte imbalance will continue with the current vent settings  · 2/4 acute bleeding from the dialysis catheter which has been corrected received 4 units of packed RBC ABG acceptable  · 2/5 remain intubated no more bleeding from the dialysis catheter site  · 2/6 on ventilator assist control mode we will continue to wean  · 2/7 dialyzed yesterday remain on ventilator  · 2/9 sedated on ventilator received dialysis off Boris still on Levophed  · 2/10 remain on ventilator off Levofed  · 2/11 patient is off pressors and also sedation still unresponsive  · 2/12 remain on ventilator unresponsive off sedation off vasopressors on Zosyn  · 2/13 ABGs well-maintained today we will decrease FiO2 to 40%.   Remains unresponsive no doll's eye reflex no gag reflex on no sedation  · 2/14 remains on ventilator unresponsive not on any sedation  · 2/15 patient was dialyzed yesterday but 1/2 L of fluid removed remains unresponsive on ventilator

## 2022-02-16 NOTE — PROGRESS NOTES
TRANSFER - OUT REPORT:    Verbal report given to Chele Delong (name) on Vin Acosta  being transferred to [address] [place] [address] room 507 (unit) for routine progression of care       Report consisted of patients Situation, Background, Assessment and   Recommendations(SBAR). Information from the following report(s) SBAR was reviewed with the receiving nurse. Lines:   Triple Lumen triple lumen cvc 01/28/22 Anterior;Right Neck (Active)   Central Line Being Utilized Yes 02/16/22 0700   Criteria for Appropriate Use Hemodynamically unstable, requiring monitoring lines, vasopressors, or volume resuscitation 02/16/22 0700   Site Assessment Clean, dry, & intact 02/16/22 0700   Infiltration Assessment 0 02/16/22 0700   Affected Extremity/Extremities Color distal to insertion site pink (or appropriate for race); Pulses palpable;Range of motion performed 02/16/22 0700   Date of Last Dressing Change 02/13/22 02/16/22 0700   Dressing Status Clean, dry, & intact 02/16/22 0700   Dressing Type Transparent 02/16/22 0700   Action Taken Open ports on tubing capped 02/16/22 0700   Proximal Hub Color/Line Status Capped;Flushed 02/16/22 0700   Positive Blood Return (Medial Site) Yes 02/16/22 0700   Medial Hub Color/Line Status Capped;Flushed 02/16/22 0700   Positive Blood Return (Lateral Site) Yes 02/16/22 0700   Distal Hub Color/Line Status Capped;Flushed 02/16/22 0700   Positive Blood Return (Site #3) Yes 02/16/22 0700   Alcohol Cap Used Yes 02/16/22 0700        Opportunity for questions and clarification was provided.       Patient transported with:   Registered Nurse

## 2022-02-16 NOTE — PROGRESS NOTES
Infectious Diseases Progress Note  Omid Collins MD  [phone]  Date:2022       Room:[facility]  Patient Alex Medrano     YOB: 1945     Age:76 y.o. 76M with schizophrenia, seizures, diabetes, chronic renal failure.     22 presents to hospital unresponsive. Reportedly had been increasingly weak and unable to leave the bed for days. Associated with fevers. During the ED course found positive for Covid 19, with increased troponin. Intubated ventilated for acute hypoxic respiratory failure. During the hospital course, declining renal function and initiated on hemodialysis. I have been asked to see the patient in consultation for prolonged respiratory failure. Subjective    Subjective:  Symptoms:  Stable. Review of Systems   Unable to perform ROS: Intubated     Objective         Vitals Last 24 Hours:  TEMPERATURE:  Temp  Av.3 °F (36.8 °C)  Min: 98 °F (36.7 °C)  Max: 98.6 °F (37 °C)  RESPIRATIONS RANGE: Resp  Av.5  Min: 11  Max: 25  PULSE OXIMETRY RANGE: SpO2  Av.5 %  Min: 87 %  Max: 100 %  PULSE RANGE: Pulse  Av.6  Min: 81  Max: 98  BLOOD PRESSURE RANGE: Systolic (29UFL), UUY:682 , Min:131 , NDZ:100   ; Diastolic (72NYN), CHK:44, Min:72, Max:88    I/O (24Hr): Intake/Output Summary (Last 24 hours) at 2022 1224  Last data filed at 2022 0618  Gross per 24 hour   Intake 930 ml   Output 300 ml   Net 630 ml     Objective:  Vital signs: (most recent): Blood pressure (!) 147/80, pulse 87, temperature 98.3 °F (36.8 °C), resp. rate 17, height 6' 0.01\" (1.829 m), weight 109.4 kg (241 lb 2.9 oz), SpO2 100 %. General:  intubated on vent unresponsive on no sedation  HEENT: NCAT,   Eyes: anicteric; conjunctiva clear no doll's eye reflex  Neck: no nodes, no cuff leak, trach midline; no accessory MM use.   Chest: no deformity,   Cardiac: R regular; no murmur;   Lungs: distant breath sounds; no wheezes a few rales in the base  Abd: soft, NT, hypoactive BS  Ext: Trace edema; no joint swelling; No clubbing  : No urine  Neuro: Unresponsive on no sedation no doll's eye reflex flaccid extremity  Psych-  unable to assess  Skin: warm, dry, no cyanosis;   Pulses: Brachial and radial pulses intact  Capillary: Slow capillary refill    Labs/Imaging/Diagnostics    Labs:  CBC:  No results for input(s): WBC, RBC, HGB, HCT, MCV, RDW, PLT, HGBEXT, HCTEXT, PLTEXT, HGBEXT, HCTEXT, PLTEXT in the last 72 hours. CHEMISTRIES:  Recent Labs     02/15/22  0450   *   K 4.4   CL 93*   CO2 22   BUN 94*   CA 8.6   PHOS 7.9*   PT/INR:  Recent Labs     02/16/22  0430 02/15/22  0450 02/14/22  0530   INR 1.3* 1.4* 1.4*     APTT:  No results for input(s): APTT in the last 72 hours. LIVER PROFILE:  No results for input(s): AST, ALT in the last 72 hours. No lab exists for component: BILIDIR, BILITOT, ALKPHOS  Lab Results   Component Value Date/Time    ALT (SGPT) 123 (H) 02/10/2022 05:00 AM    AST (SGOT) 222 (H) 02/10/2022 05:00 AM    Alk. phosphatase 214 (H) 02/10/2022 05:00 AM    Bilirubin, total 0.4 02/10/2022 05:00 AM       Imaging Last 24 Hours:  No results found. Assessment//Plan   Active Problems:    Acute encephalopathy (5/18/2021)      Assessment & Plan   76M with schizophrenia, seizures, diabetes, chronic renal failure.     1/27/22 presents to hospital unresponsive. Reportedly had been increasingly weak and unable to leave the bed for days. Associated with fevers. During the ED course found positive for Covid 19, with increased troponin. Intubated ventilated for acute hypoxic respiratory failure. During the hospital course, declining renal function and initiated on hemodialysis.  I have been asked to see the patient in consultation for prolonged respiratory failure.     MICROBIOLOGY     1/27/22            Covid 19          Positive  2/10/22 Covid 19 Positive    1/27/22            Blood               Negative  1/27/22            Urine                Negative     2/3/22 Endotrach        Scant Normal respiratory mark     ASSESSMENT AND RECOMMENDATIONS     1) Prolonged acute hypoxic respiratory failure in the setting of multifactorial shock, shock liver, Covid 19 pneumonia, NSTEMI, acute on chronic renal failure, anoxic brain injury.                 Supportive care with respiratory support as needed              Zosyn through 2/14/22                 Overall prognosis remains very poor   Hospice anticipated        Electronically signed by Sanjay Abreu MD on 2/16/2022 at 12:38 PM

## 2022-02-16 NOTE — PROGRESS NOTES
Bedside shift change report given to Artemio Milian (oncoming nurse) by Segun Fernandez (offgoing nurse). Report included the following information SBAR.

## 2022-02-16 NOTE — PROGRESS NOTES
Pressure ulcer on sacrum and DTI noted on R heel. Dressed appropriately. Double skin check done with [facility] LPN .

## 2022-02-16 NOTE — PROGRESS NOTES
Family at bedside, ready to transition patient to comfort care. Dr. Gloria Contreras notified, orders received.

## 2022-02-16 NOTE — PROGRESS NOTES
Hospitalist Progress Note         Uzma Wright MD          Daily Progress Note: 2/16/2022      Subjective:   Admitted on 1/27.  76M, H/o Schizophenia, seizures, DMII on oral meds with unresponsive and acute hypoxic respiratory failure s/t COVID-19 pneumonitis complicated by ANDRES, hypokalemia and shock liver.            HSOPITAL COURSE  COVID positive, associated with fever 106, increased troponin, cardiology was consulted ansd recommended ECHO, there is no heparin gtt, was initially called for STEMI and was cancelled. he was intubated for acute hypoxic respiratory failure. Started on levophed. Hospital course complicated by ANDRES, shock liver and hypokalemia. Transaminases improved. There was concern for anoxic brain injury    He was started on CRRT on 1/31 which he had for 48 hours, then switched to intermittent hemodialysis. Patient also had malignant hyperthermia. Was treated with azithromycin, barcitinib, and zosyn. Echo showed an EF of 65%    He was initiated on hemodialysis    Blood and urine cultures were negative. Sputum culture showed respiratory mark    Patient has remained unresponsive    __    Patient seen in follow-up.   Elsy Najera remains unresponsive to painful stimuli, on no sedation     I spoke with patient's family yesterday.   Plan is for transition to comfort care and terminal extubation at noon today    Problem List:  Problem List as of 2/16/2022 Date Reviewed: 12/30/2020          Codes Class Noted - Resolved    Acute hypernatremia ICD-10-CM: E87.0  ICD-9-CM: 276.0  6/9/2021 - Present        Uncontrolled type 2 diabetes mellitus with hyperglycemia ([facility] ICD-10-CM: E11.65  ICD-9-CM: 250.02  6/9/2021 - Present        Acute metabolic encephalopathy GTO-71-GE: G93.41  ICD-9-CM: 348.31  6/9/2021 - Present        ANDRES (acute kidney injury) ([facility] ICD-10-CM: N17.9  ICD-9-CM: 584.9  6/9/2021 - Present        Dehydration ICD-10-CM: E86.0  ICD-9-CM: 276.51 6/3/2021 - Present        Hypernatremia ICD-10-CM: E87.0  ICD-9-CM: 276.0  5/26/2021 - Present        Acute encephalopathy ICD-10-CM: G93.40  ICD-9-CM: 348.30  5/18/2021 - Present        Vitamin D deficiency ICD-10-CM: E55.9  ICD-9-CM: 268.9  12/30/2020 - Present        Dementia ([facility] ICD-10-CM: F03.90  ICD-9-CM: 294.20  7/26/2017 - Present        Mixed hyperlipidemia ICD-10-CM: E78.2  ICD-9-CM: 272.2  4/16/2016 - Present        Essential hypertension ICD-10-CM: I10  ICD-9-CM: 401.9  4/18/2015 - Present        DM (diabetes mellitus) ([facility] ICD-10-CM: E11.9  ICD-9-CM: 250.00  2/25/2013 - Present        Schizophreniform disorder ([facility] ICD-10-CM: F20.81  ICD-9-CM: 295.40  Unknown - Present        GERD (gastroesophageal reflux disease) ICD-10-CM: K21.9  ICD-9-CM: 530.81  Unknown - Present        Seizure disorder ([facility] ICD-10-CM: G40.909  ICD-9-CM: 345.90  Unknown - Present        RESOLVED: Controlled type 2 diabetes mellitus with complication, without long-term current use of insulin ([facility] ICD-10-CM: E11.8  ICD-9-CM: 250.90  12/30/2020 - 6/28/2021        RESOLVED: Morbid obesity ([facility] ICD-10-CM: E66.01  ICD-9-CM: 278.01  12/30/2020 - 8/3/2021        RESOLVED: Type 2 diabetes with nephropathy ([facility] ICD-10-CM: E11.21  ICD-9-CM: 250.40, 583.81  6/17/2018 - 6/28/2021        RESOLVED: Severe obesity with body mass index (BMI) of 35.0 to 39.9 with serious comorbidity ([facility] ICD-10-CM: E66.01  ICD-9-CM: 278.01  6/15/2018 - 1/1/2022        RESOLVED: Essential hypertension with goal blood pressure less than 140/90 ICD-10-CM: I10  ICD-9-CM: 401.9  4/16/2016 - 5/30/2021        RESOLVED: Hypothyroidism due to acquired atrophy of thyroid ICD-10-CM: E03.4  ICD-9-CM: 244.8, 246.8  4/18/2015 - 10/20/2021        RESOLVED: Hyperlipidemia ICD-10-CM: E78.5  ICD-9-CM: 272.4  5/28/2014 - 1/1/2022        RESOLVED: Hypothyroid ICD-10-CM: E03.9  ICD-9-CM: 244.9  5/28/2014 - 4/18/2015        RESOLVED: Seizures ([facility] ICD-10-CM: R56.9  ICD-9-CM: 780.39  2/25/2013 - 1/1/2022        RESOLVED: HTN (hypertension) ICD-10-CM: I10  ICD-9-CM: 401.9  2/25/2013 - 4/18/2015              Medications reviewed  Current Facility-Administered Medications   Medication Dose Route Frequency    insulin lispro (HUMALOG) injection   SubCUTAneous Q6H    dexamethasone (DECADRON) 4 mg/mL injection 4 mg  4 mg IntraVENous Q24H    zinc oxide-white petrolatum 20-75 % topical paste   Topical TID    0.9% sodium chloride infusion 250 mL  250 mL IntraVENous PRN    levETIRAcetam (KEPPRA) oral solution 1,500 mg  1,500 mg Oral BID    heparin (porcine) 1,000 unit/mL injection 2,200 Units  2,200 Units IntraVENous DIALYSIS PRN    insulin glargine (LANTUS) injection 20 Units  20 Units SubCUTAneous QHS    NOREPINephrine (LEVOPHED) 8 mg in 0.9% NS 250ml infusion  0.5-120 mcg/min IntraVENous TITRATE    lactulose (CHRONULAC) 10 gram/15 mL solution 15 mL  10 g Per G Tube TID    glucose chewable tablet 16 g  4 Tablet Oral PRN    glucagon (GLUCAGEN) injection 1 mg  1 mg IntraMUSCular PRN    dextrose 10% infusion 125-250 mL  125-250 mL IntraVENous PRN    LORazepam (ATIVAN) injection 2 mg  2 mg IntraVENous Q2H PRN    glucose chewable tablet 16 g  4 Tablet Oral PRN    glucagon (GLUCAGEN) injection 1 mg  1 mg IntraMUSCular PRN    valproic acid (as sodium salt) (DEPAKENE) 250 mg/5 mL (5 mL) oral solution 500 mg  500 mg Oral BID    dextrose 10% infusion 125-250 mL  125-250 mL IntraVENous PRN    PHENYLephrine (ISRAEL-SYNEPHRINE) 30 mg in 0.9% sodium chloride 250 mL infusion   mcg/min IntraVENous TITRATE    propofol (DIPRIVAN) 10 mg/mL infusion  0-50 mcg/kg/min IntraVENous TITRATE    lacosamide (VIMPAT) tablet 200 mg  200 mg Oral BID    levothyroxine (SYNTHROID) tablet 75 mcg  75 mcg Oral 7am    [Held by provider] aspirin chewable tablet 81 mg  81 mg Oral DAILY    sodium chloride (NS) flush 5-40 mL  5-40 mL IntraVENous Q8H    sodium chloride (NS) flush 5-40 mL  5-40 mL IntraVENous PRN  acetaminophen (TYLENOL) tablet 650 mg  650 mg Oral Q6H PRN    Or    acetaminophen (TYLENOL) suppository 650 mg  650 mg Rectal Q6H PRN    polyethylene glycol (MIRALAX) packet 17 g  17 g Oral DAILY PRN    ondansetron (ZOFRAN ODT) tablet 4 mg  4 mg Oral Q8H PRN    Or    ondansetron (ZOFRAN) injection 4 mg  4 mg IntraVENous Q6H PRN    atorvastatin (LIPITOR) tablet 80 mg  80 mg Oral QHS       Review of Systems:   Review of systems not obtained due to patient factors. Objective:   Physical Exam:     Visit Vitals  BP (!) 148/82 (BP Patient Position: At rest)   Pulse 84   Temp 98 °F (36.7 °C)   Resp 16   Ht 6' 0.01\" (1.829 m)   Wt 109.4 kg (241 lb 2.9 oz)   SpO2 100%   BMI 32.70 kg/m²      O2 Device: Ventilator    Temp (24hrs), Av.4 °F (36.9 °C), Min:98 °F (36.7 °C), Max:98.7 °F (37.1 °C)    No intake/output data recorded.  1901 -  0700  In: 930   Out: 2830 [Drains:300]    General:   Unresponsive   Lungs:   Clear to auscultation bilaterally. Chest wall:  No tenderness or deformity. Heart:  Regular rate and rhythm, S1, S2 normal, no murmur, click, rub or gallop. Abdomen:   Soft, non-tender. Bowel sounds normal. No masses,  No organomegaly. Extremities: Extremities normal, atraumatic, no cyanosis or edema. Pulses: 2+ and symmetric all extremities. Skin: Skin color, texture, turgor normal. No rashes or lesions   Neurologic: CNII-XII intact. Unable to fully assess. Oculocephalic reflex intact     Data Review:       Recent Days:  No results for input(s): WBC, HGB, HCT, PLT, HGBEXT, HCTEXT, PLTEXT, HGBEXT, HCTEXT, PLTEXT in the last 72 hours.   Recent Labs     22  0430 02/15/22  0450 22  0530   NA  --  132*  --    K  --  4.4  --    CL  --  93*  --    CO2  --  22  --    GLU  --  88  --    BUN  --  94*  --    CREA  --  6.11*  --    CA  --  8.6  --    PHOS  --  7.9*  --    ALB  --  1.0*  --    INR 1.3* 1.4* 1.4*     No results for input(s): PH, PCO2, PO2, HCO3, FIO2 in the last 72 hours. 24 Hour Results:  Recent Results (from the past 24 hour(s))   GLUCOSE, POC    Collection Time: 02/15/22 11:26 AM   Result Value Ref Range    Glucose (POC) 139 (H) 65 - 117 mg/dL    Performed by Walker Rowe, POC    Collection Time: 02/15/22  6:22 PM   Result Value Ref Range    Glucose (POC) 148 (H) 65 - 117 mg/dL    Performed by Alexandru Splinter    GLUCOSE, POC    Collection Time: 02/16/22 12:46 AM   Result Value Ref Range    Glucose (POC) 125 (H) 65 - 117 mg/dL    Performed by Nick Chavez + INR    Collection Time: 02/16/22  4:30 AM   Result Value Ref Range    Prothrombin time 15.9 (H) 11.9 - 14.6 sec    INR 1.3 (H) 0.9 - 1.1     LD    Collection Time: 02/16/22  4:30 AM   Result Value Ref Range     (H) 85 - 241 U/L   D DIMER    Collection Time: 02/16/22  4:30 AM   Result Value Ref Range    D DIMER >20.00 (H) <0.50 ug/ml(FEU)   PROCALCITONIN    Collection Time: 02/16/22  4:30 AM   Result Value Ref Range    Procalcitonin 1.50 (H) 0 ng/mL   SED RATE (ESR)    Collection Time: 02/16/22  4:30 AM   Result Value Ref Range    Sed rate, automated 126 mm/hr   GLUCOSE, POC    Collection Time: 02/16/22  6:04 AM   Result Value Ref Range    Glucose (POC) 146 (H) 65 - 117 mg/dL    Performed by Dilcia Burger            Assessment/     Acute hypoxic respiratory failure secondary to COVID-19    COVID-19 with pneumonitis    Septic shock due to COVID-19 pneumonitis    Acute encephalopathy, multifactorial including Covid and anoxic encephalopathy    Non-STEMI type II due to demand mismatch    Shock liver secondary to septic shock, improved    Acute anemia status post transfusion of packed red blood cells    Plan:  Plan transition to comfort care with terminal extubation at noon today per family request    Care Plan discussed with: Nurse    Total time spent with patient: 30 minutes.      Jose M Davila MD

## 2022-02-16 NOTE — PROGRESS NOTES
Renal Progress Note    Patient: Toby Ch MRN: [id number]  SSN: [id number]    YOB: 1945  Age: 78 y.o. Sex: male      Admit Date: 1/27/2022    LOS: 20 days     Subjective:     Seen at bedside , intubated . He is off sedation and still unresponsive  S/p HD dialysis yesetrday with 2.5 L fluid removal  On 50% FiO2 with PEEP of 5  Family has decided to make him comfort care. Plan to terminally extubate today    Current Facility-Administered Medications   Medication Dose Route Frequency    LORazepam (ATIVAN) injection 1 mg  1 mg IntraVENous Q15MIN PRN    scopolamine (TRANSDERM-SCOP) 1 mg over 3 days 1 Patch  1 Patch TransDERmal Q72H PRN    glycopyrrolate (ROBINUL) injection 0.2 mg  0.2 mg IntraVENous Q4H PRN    morphine injection 2 mg  2 mg IntraVENous Q15MIN PRN    0.9% sodium chloride infusion 250 mL  250 mL IntraVENous PRN    heparin (porcine) 1,000 unit/mL injection 2,200 Units  2,200 Units IntraVENous DIALYSIS PRN    glucose chewable tablet 16 g  4 Tablet Oral PRN    glucagon (GLUCAGEN) injection 1 mg  1 mg IntraMUSCular PRN    sodium chloride (NS) flush 5-40 mL  5-40 mL IntraVENous PRN    acetaminophen (TYLENOL) tablet 650 mg  650 mg Oral Q6H PRN    Or    acetaminophen (TYLENOL) suppository 650 mg  650 mg Rectal Q6H PRN        Vitals:    02/16/22 1000 02/16/22 1100 02/16/22 1304 02/16/22 1500   BP: (!) 149/78 (!) 147/80     Pulse: 87 87 89    Resp:  17 17    Temp:  98.3 °F (36.8 °C)  98.4 °F (36.9 °C)   TempSrc:       SpO2: 100% 100% 99%    Weight:       Height:         Objective:   General: On ventilator, unresponsive   HEENT:  no Icterus, Pallor+, ET tube in place, mucosa dry   neck: Neck is supple, No JVD  Lungs: Decreased breath sounds at the bases,   CVS: heart sounds normal,  no murmurs, no rubs. GI: soft, nontender, no distention  Extremeties: no cyanosis, no edema    Neuro: Patient unresponsive, on vent, off sedation   skin: normal skin turgor, no skin rashes. Intake and Output:  Current Shift: No intake/output data recorded. Last three shifts: 02/14 1901 - 02/16 0700  In: 930   Out: 2830 [Drains:300]      Lab/Data Review:  No results for input(s): WBC, HGB, HCT, PLT, HGBEXT, HCTEXT, PLTEXT, HGBEXT, HCTEXT, PLTEXT in the last 72 hours. Recent Labs     02/16/22  0430 02/15/22  0450 02/14/22  0530   NA  --  132*  --    K  --  4.4  --    CL  --  93*  --    CO2  --  22  --    GLU  --  88  --    BUN  --  94*  --    CREA  --  6.11*  --    CA  --  8.6  --    PHOS  --  7.9*  --    ALB  --  1.0*  --    INR 1.3* 1.4* 1.4*     No results for input(s): PH, PCO2, PO2, HCO3, FIO2 in the last 72 hours.   Recent Results (from the past 24 hour(s))   GLUCOSE, POC    Collection Time: 02/15/22  6:22 PM   Result Value Ref Range    Glucose (POC) 148 (H) 65 - 117 mg/dL    Performed by Cely Slade    GLUCOSE, POC    Collection Time: 02/16/22 12:46 AM   Result Value Ref Range    Glucose (POC) 125 (H) 65 - 117 mg/dL    Performed by Nick Chavez + INR    Collection Time: 02/16/22  4:30 AM   Result Value Ref Range    Prothrombin time 15.9 (H) 11.9 - 14.6 sec    INR 1.3 (H) 0.9 - 1.1     LD    Collection Time: 02/16/22  4:30 AM   Result Value Ref Range     (H) 85 - 241 U/L   D DIMER    Collection Time: 02/16/22  4:30 AM   Result Value Ref Range    D DIMER >20.00 (H) <0.50 ug/ml(FEU)   PROCALCITONIN    Collection Time: 02/16/22  4:30 AM   Result Value Ref Range    Procalcitonin 1.50 (H) 0 ng/mL   SED RATE (ESR)    Collection Time: 02/16/22  4:30 AM   Result Value Ref Range    Sed rate, automated 126 mm/hr   GLUCOSE, POC    Collection Time: 02/16/22  6:04 AM   Result Value Ref Range    Glucose (POC) 146 (H) 65 - 117 mg/dL    Performed by Praveen Kwong 15, POC    Collection Time: 02/16/22 11:41 AM   Result Value Ref Range    Glucose (POC) 128 (H) 65 - 117 mg/dL    Performed by Cely Slade       FiO2 - stable on Vent   Noted ABG   Chest X ray personally reviewed Assessment and Plan:     #1 acute kidney injury  -ANDRES likely 2/2 ATN from septic shock /rhabdomyolysis. -CPK trending down   -Patient remained anuric, with rising BUN and creatinine  -Started on CRRT 1/31, had for 48 hrs, Switched to intermittent hemodialysis,   -Status post hemodialysis 2/14 with 2.5 L fluid removal  -He remains anuric , servin removed   -Plan for comfort care now. No further dialysis    #2 severe hypokalemia:   Improved potassium level   monitor potassium levels   Potassium yesterday was 4.4. We will plan dialysis with 2K bath    #3 Hyponatremia :  sodium has improved from 127 -> 132 today.    -Continue dialysis with high sodium bath   -Adjusted free water rate down to 150cc Q8 hours today   will do HD tomorrow - target fluid removal upto 3 Litres     #4  COVID-19 pneumonia:   -Septic shock  -With multiorgan failure including renal failure/transaminitis/hemodynamics shock  -Patient currently on Decadron azithromycin and Zosyn  Neurology following the patient for anoxic encephalopathy, remains unresponsive  Shock liver with high liver enzymes( improving),   Rhabdomyolysis+/ANDRES, on HD, will monitor   -Overall prognosis seems guarded     #5 diabetes: hyperglycemia+  Monitor accuchecks and adjust management as needed     #6 seizure disorder  -On antiseizure medications, neurology following the patient    7. Metabolic acidosis: Secondary to acute kidney injury/hypotension  Improved with hemodialysis  Stable CO2 level of 22, continue to monitor CO2  Lactate levels last checked on 1/28/22     8. Hypocalcemia - corrected calcium WNL   Will dialyze on 2.5 calcium bath . 9. Anoxic encephalopathy -  -family has decided for comfort care and terminal extubation planned for today    Signed By: Jony Franklin MD     February 16, 2022

## 2022-02-16 NOTE — PROGRESS NOTES
Visited patient in ICU to provide follow up care. Patient was alone during the visit and appeared to be non-engaging. Provided silent support. Collaborated with nurse to receive updated care plan for today. Advised nurse to contact [facility] for any further referrals. Contact chaplains for further referrals. Chaplain Maxine Drummond, M.Div.    can be reached by calling the  at [facility]  [phone]

## 2022-02-16 NOTE — PROGRESS NOTES
CARDIOLOGY PROGRESS NOTE      Patient seen and examined. This is a patient who is followed for NSTEMI in the setting of COVID multi-organ failure. Remains intubated and sedated. No longer on pressor support. Awaiting family to make decisions regarding treatment plan.     Telemetry reviewed, SR 90s     Pertinent review of systems items noted above, all other systems are negative. Current medications reviewed.     Physical Examination  Vital signs are stable. Blood pressure 148/82, Pulse 85  Intubated/sedated  Heart is regular, rate and rhythm. Diminished lungs  No lower extremity swelling     Labs reviewed     Case discussed with Dr. Hailey Call and our impression and recommendations are as follows:     1. NSTEMI:   - HS troponin 1354 > 1510. Likely related to demand ischemia in the setting of COVID multi-organ failure  - ASA has been on hold  - Echo: EF 65% with no wall motion abnormalities. - Continues with CRRT per renal     2. Hypotension:   -  BP has been stable, off pressor support     3. COVID pna:   - With multi-organ failure and possible anoxic encephalopathy   -  Management per primary/pulm. Hilton Jones monitor telemetry, serial ECGs for QTc. Will continue to monitor.  - Recommend to keep a negative fluid balance to prevent volume overload.     Overall prognosis is poor. DNR. Family to discuss goals of care. Dr. Ojeda Lykens Cardiology  [address] [facility] [address] [place], [address] [facility]  [phone]

## 2022-02-16 NOTE — PROGRESS NOTES
Visited patient in ICU for staff request.  Patient seemed to be non-engaging during the visit and provided silent support. Provided support to family members while they were engaging with one of the physicians as well as afterwards. They shared about their family members in the area, the death of patient's death last years and their trust in God in difficult times. They seemed to accept the patient's current situation and future outcome as being in God's hands. Provided supportive presence and listened with empathy and reflection while exploring their needs. Normalized their experience and affirmed their familiar support and spiritual resources. Advised of  availability. Contact chaplains for further referrals. samantha Guevara M.Div.    can be reached by calling the  at [facility]  [phone] = swallows foods/liquids without difficulty

## 2022-02-16 NOTE — PROGRESS NOTES
IMPRESSION:   1. Acute hypoxic respiratory failure oxygenation well-maintained   2. Anoxic encephalopathy unresponsive no gag reflex does have self respiratory drive  3. Malignant hyperthermia resolved   4. COVID-19 pneumonia  5. NSTEMI   6. Shock cardiogenic versus septic vs Hypovolumic Hypotension resolved now on no pressor  7. Acute bleeding from dialysis catheter resolved  8. Acute kidney injury now requiring hemodialysis  9. Metabolic acidosis resolved  10. Thrombocytopenia resolved  11. Hypernatremia resolved now hyponatremia  12.  hypokalemia repleted  13. Shock liver       RECOMMENDATIONS/PLAN:   1. ICU monitoring  1. Ventilator for mechanical life support and prevent respiratory arrest with protective lung strategies ABG acceptable he is still hemodynamically unstable no change in vent settings  2. On Assist control rate 18  PEEP 5 FiO2 50% ABG acceptable decrease FiO2 40%  3. Patient on Decadron   4. Troponin was elevated 2D echo shows ejection fraction of 65%  5. He was bleeding from the dialysis catheter which is corrected received 4 units of packed RBC hemoglobin dropped from 8.8 to 4.0 hemoglobin now stable  6. Dialyzed on 2/14 for 3.5 hours about 2-1/2 L fluid removed  7. Temperature back to normal and sputum shows normal flow  8.  Patient is unresponsive not in any sedation will discuss with family other comfort care for possible terminal extubation today     [x] High complexity decision making was performed  [x] See my orders for details  HPI   77-year-old male came in because as he was found unresponsive and fever 107 past medical history of schizophrenia and diabetes gastroesophageal reflux disease and anemia he is COVID-19 positive initially patient was called as NSTEMI but it was canceled patient is intubated on ventilator hemodynamically unstable unable to get any history out of the patient he seems dehydrated    PMH:  has a past medical history of Acute renal failure ([address] Anemia, Arthritis, DKA (diabetic ketoacidoses), GERD (gastroesophageal reflux disease), HTN (hypertension), Hypercholesterolemia, Schizophrenia ([address] Schizophreniform disorder ([facility] [address] Seizure disorder ([facility] [address] and Type II or unspecified type diabetes mellitus without mention of complication, uncontrolled. PSH:   has a past surgical history that includes ir insert non tunl cvc over 5 yrs (1/28/2022) and ir insert non tunl cvc over 5 yrs (1/31/2022). FHX: family history includes Stroke in his father. SHX:  reports that he has never smoked. He has never used smokeless tobacco. He reports that he does not drink alcohol and does not use drugs.     ALL: No Known Allergies     MEDS:   [x] Reviewed - As Below   [] Not reviewed    Current Facility-Administered Medications   Medication    insulin lispro (HUMALOG) injection    dexamethasone (DECADRON) 4 mg/mL injection 4 mg    zinc oxide-white petrolatum 20-75 % topical paste    0.9% sodium chloride infusion 250 mL    levETIRAcetam (KEPPRA) oral solution 1,500 mg    heparin (porcine) 1,000 unit/mL injection 2,200 Units    insulin glargine (LANTUS) injection 20 Units    NOREPINephrine (LEVOPHED) 8 mg in 0.9% NS 250ml infusion    lactulose (CHRONULAC) 10 gram/15 mL solution 15 mL    glucose chewable tablet 16 g    glucagon (GLUCAGEN) injection 1 mg    dextrose 10% infusion 125-250 mL    LORazepam (ATIVAN) injection 2 mg    glucose chewable tablet 16 g    glucagon (GLUCAGEN) injection 1 mg    valproic acid (as sodium salt) (DEPAKENE) 250 mg/5 mL (5 mL) oral solution 500 mg    dextrose 10% infusion 125-250 mL    PHENYLephrine (ISRAEL-SYNEPHRINE) 30 mg in 0.9% sodium chloride 250 mL infusion    propofol (DIPRIVAN) 10 mg/mL infusion    lacosamide (VIMPAT) tablet 200 mg    levothyroxine (SYNTHROID) tablet 75 mcg    [Held by provider] aspirin chewable tablet 81 mg    sodium chloride (NS) flush 5-40 mL    sodium chloride (NS) flush 5-40 mL    acetaminophen (TYLENOL) tablet 650 mg    Or    acetaminophen (TYLENOL) suppository 650 mg    polyethylene glycol (MIRALAX) packet 17 g    ondansetron (ZOFRAN ODT) tablet 4 mg    Or    ondansetron (ZOFRAN) injection 4 mg    atorvastatin (LIPITOR) tablet 80 mg      MAR reviewed and pertinent medications noted or modified as needed   Current Facility-Administered Medications   Medication    insulin lispro (HUMALOG) injection    dexamethasone (DECADRON) 4 mg/mL injection 4 mg    zinc oxide-white petrolatum 20-75 % topical paste    0.9% sodium chloride infusion 250 mL    levETIRAcetam (KEPPRA) oral solution 1,500 mg    heparin (porcine) 1,000 unit/mL injection 2,200 Units    insulin glargine (LANTUS) injection 20 Units    NOREPINephrine (LEVOPHED) 8 mg in 0.9% NS 250ml infusion    lactulose (CHRONULAC) 10 gram/15 mL solution 15 mL    glucose chewable tablet 16 g    glucagon (GLUCAGEN) injection 1 mg    dextrose 10% infusion 125-250 mL    LORazepam (ATIVAN) injection 2 mg    glucose chewable tablet 16 g    glucagon (GLUCAGEN) injection 1 mg    valproic acid (as sodium salt) (DEPAKENE) 250 mg/5 mL (5 mL) oral solution 500 mg    dextrose 10% infusion 125-250 mL    PHENYLephrine (ISRAEL-SYNEPHRINE) 30 mg in 0.9% sodium chloride 250 mL infusion    propofol (DIPRIVAN) 10 mg/mL infusion    lacosamide (VIMPAT) tablet 200 mg    levothyroxine (SYNTHROID) tablet 75 mcg    [Held by provider] aspirin chewable tablet 81 mg    sodium chloride (NS) flush 5-40 mL    sodium chloride (NS) flush 5-40 mL    acetaminophen (TYLENOL) tablet 650 mg    Or    acetaminophen (TYLENOL) suppository 650 mg    polyethylene glycol (MIRALAX) packet 17 g    ondansetron (ZOFRAN ODT) tablet 4 mg    Or    ondansetron (ZOFRAN) injection 4 mg    atorvastatin (LIPITOR) tablet 80 mg      PMH:  has a past medical history of Acute renal failure (HCC), Anemia, Arthritis, DKA (diabetic ketoacidoses), GERD (gastroesophageal reflux disease), HTN (hypertension), Hypercholesterolemia, Schizophrenia (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 [address] Seizure disorder ([facility] [address] and Type II or unspecified type diabetes mellitus without mention of complication, uncontrolled. PSH:   has a past surgical history that includes ir insert non tunl cvc over 5 yrs (2022) and ir insert non tunl cvc over 5 yrs (2022). FHX: family history includes Stroke in his father. SHX:  reports that he has never smoked. He has never used smokeless tobacco. He reports that he does not drink alcohol and does not use drugs. ROS:  Unable to obtain intubated on ventilator unresponsive    Hemodynamics:    CO:    CI:    CVP:    SVR:   PAP Systolic:    PAP Diastolic:    PVR:    RW29:        Ventilator Settings:      Mode Rate TV Press PEEP FiO2 PIP Min. Vent   Assist control,Volume control    500 ml    5 cm H20 50 %  9 cm H2O  8.43 l/min        Vital Signs: Telemetry:    normal sinus rhythm Intake/Output:   Visit Vitals  BP (!) 148/82 (BP Patient Position: At rest)   Pulse 84   Temp 98 °F (36.7 °C)   Resp 16   Ht 6' 0.01\" (1.829 m)   Wt 109.4 kg (241 lb 2.9 oz)   SpO2 100%   BMI 32.70 kg/m²       Temp (24hrs), Av.4 °F (36.9 °C), Min:98 °F (36.7 °C), Max:98.7 °F (37.1 °C)        O2 Device: Ventilator         Wt Readings from Last 4 Encounters:   22 109.4 kg (241 lb 2.9 oz)   22 101.1 kg (222 lb 14.2 oz)   10/06/21 111.1 kg (245 lb)   21 110.7 kg (244 lb)          Intake/Output Summary (Last 24 hours) at 2022 0752  Last data filed at 2022 0618  Gross per 24 hour   Intake 930 ml   Output 300 ml   Net 630 ml       Last shift:      No intake/output data recorded. Last 3 shifts:  1901 -  0700  In: 930   Out: 2830 [Drains:300]       Physical Exam:     General:  intubated on vent unresponsive on no sedation  HEENT: NCAT,   Eyes: anicteric; conjunctiva clear no doll's eye reflex  Neck: no nodes, no cuff leak, trach midline; no accessory MM use.   Chest: no deformity,   Cardiac: R regular; no murmur;   Lungs: distant breath sounds; no wheezes a few rales in the bases no gag reflex to tracheal suctioning does have spontaneous respirations above the ventilator  Abd: soft, NT, hypoactive BS  Ext: Trace edema; no joint swelling;  No clubbing  : No urine  Neuro: Unresponsive on no sedation no doll's eye reflex flaccid extremities  Psych-  unable to assess  Skin: warm, dry, no cyanosis;   Pulses: Brachial and radial pulses intact  Capillary: Normal capillary refill      DATA:    MAR reviewed and pertinent medications noted or modified as needed  MEDS:   Current Facility-Administered Medications   Medication    insulin lispro (HUMALOG) injection    dexamethasone (DECADRON) 4 mg/mL injection 4 mg    zinc oxide-white petrolatum 20-75 % topical paste    0.9% sodium chloride infusion 250 mL    levETIRAcetam (KEPPRA) oral solution 1,500 mg    heparin (porcine) 1,000 unit/mL injection 2,200 Units    insulin glargine (LANTUS) injection 20 Units    NOREPINephrine (LEVOPHED) 8 mg in 0.9% NS 250ml infusion    lactulose (CHRONULAC) 10 gram/15 mL solution 15 mL    glucose chewable tablet 16 g    glucagon (GLUCAGEN) injection 1 mg    dextrose 10% infusion 125-250 mL    LORazepam (ATIVAN) injection 2 mg    glucose chewable tablet 16 g    glucagon (GLUCAGEN) injection 1 mg    valproic acid (as sodium salt) (DEPAKENE) 250 mg/5 mL (5 mL) oral solution 500 mg    dextrose 10% infusion 125-250 mL    PHENYLephrine (ISRAEL-SYNEPHRINE) 30 mg in 0.9% sodium chloride 250 mL infusion    propofol (DIPRIVAN) 10 mg/mL infusion    lacosamide (VIMPAT) tablet 200 mg    levothyroxine (SYNTHROID) tablet 75 mcg    [Held by provider] aspirin chewable tablet 81 mg    sodium chloride (NS) flush 5-40 mL    sodium chloride (NS) flush 5-40 mL    acetaminophen (TYLENOL) tablet 650 mg    Or    acetaminophen (TYLENOL) suppository 650 mg    polyethylene glycol (MIRALAX) packet 17 g    ondansetron (ZOFRAN ODT) tablet 4 mg    Or    ondansetron (ZOFRAN) injection 4 mg    atorvastatin (LIPITOR) tablet 80 mg        Labs:    Recent Labs     02/16/22  0430 02/15/22  0450 02/14/22  0530   INR 1.3* 1.4* 1.4*     Recent Labs     02/15/22  0450   *   K 4.4   CL 93*   CO2 22   GLU 88   BUN 94*   CREA 6.11*   CA 8.6   PHOS 7.9*   ALB 1.0*     No results for input(s): PH, PCO2, PO2, HCO3, FIO2 in the last 72 hours. Lab Results   Component Value Date/Time    Culture result: Scant Normal respiratory mark 02/03/2022 07:20 PM    Culture result: No Growth (<1000 cfu/mL) 01/27/2022 02:00 PM    Culture result: No growth 6 days 01/27/2022 01:30 PM     Lab Results   Component Value Date/Time    TSH 4.47 (H) 05/21/2021 10:46 AM        Imaging:    Results from Hospital Encounter encounter on 01/27/22    XR CHEST PORT    Narrative  Examination: XR CHEST PORT    History: chf    Comparison: Chest radiograph 2/11/2022    FINDINGS:    Single frontal portable view of the chest. Endotracheal tube projects over the  mid intrathoracic trachea. Right neck central vascular catheter tips project  over the superior cavoatrial junction. Enteric tube courses below the diaphragm  with tip excluded from view. Symmetric low lung volumes. There are basilar predominant opacities that appear  similar to prior. No radiographically evident pneumothorax. Small layering  effusions could contribute to the above described opacities. The cardiac  silhouette is normal in size. Mediastinal contours and osseous structures appear  unchanged. Impression  No significant interval change, including bibasilar opacities. Results from [place] encounter on 01/27/22    CT HEAD WO CONT    Narrative  Exam: CT Head without contrast    TECHNIQUE: Multiple transaxial CT images of the head were obtained without  contrast. Coronal and sagittal reformatted images were provided.     Dose reduction: All CT scans at this facility are performed using dose reduction  optimization techniques as appropriate to a performed exam including the  following: Automated exposure control, adjustments of the mA and/or kV according  to patient size, or use of iterative reconstruction technique. HISTORY: anoxia    COMPARISON: Head CT 10/6/2021, 1/27/2022    FINDINGS:    Global parenchymal volume loss appears similar to prior with proportional ex  vacuo dilatation of the ventricles and other extra-axial CSF spaces, slightly  more prominent on the left. No significant midline shift or mass effect. Gray-white matter differentiation appears preserved. No findings of acute  intracranial hemorrhage. Basilar cisterns appear preserved. Intracranial  atherosclerosis. There is bilateral opacification of the mastoid air cells, most likely  indicating nonspecific mastoid effusions. Debris within the external auditory  canals is most likely cerumen. There is mild mucosal thickening in the paranasal  sinuses, most pronounced in the maxillary sinuses. Globes and orbits appear  unremarkable. Calvarium appears intact without findings of acute or aggressive  abnormality. Impression  Overall similar exam to prior without findings of acute intracranial  abnormality. · 1/30 remains on the ventilator has metabolic acidosis we will add bicarb per tube. Maintain ventilator on current settings although will decrease PEEP slightly. Platelets continue to drop.   He is unresponsive on no sedation likely anoxic encephalopathy  · 1/31 remain intubated on ventilator hemodynamically worsening of the renal function  · 2/1 remained on ventilator hemodynamically unstable on levofed  · 2/2 on ventilator hypothermic electrolyte imbalance will continue with the current vent settings  · 2/4 acute bleeding from the dialysis catheter which has been corrected received 4 units of packed RBC ABG acceptable  · 2/5 remain intubated no more bleeding from the dialysis catheter site  · 2/6 on ventilator assist control mode we will continue to wean  · 2/7 dialyzed yesterday remain on ventilator  · 2/9 sedated on ventilator received dialysis off Boris still on Levophed  · 2/10 remain on ventilator off Levofed  · 2/11 patient is off pressors and also sedation still unresponsive  · 2/12 remain on ventilator unresponsive off sedation off vasopressors on Zosyn  · 2/13 ABGs well-maintained today we will decrease FiO2 to 40%.   Remains unresponsive no doll's eye reflex no gag reflex on no sedation  · 2/14 remains on ventilator unresponsive not on any sedation  · 2/15 patient was dialyzed yesterday but 1/2 L of fluid removed remains unresponsive on ventilator  · 2/16 possible terminal extubation today

## 2022-02-16 NOTE — PROGRESS NOTES
CM reviewed clinical chart. Patient's family plans for terminal extubation and comfort care measures today. CM will continue to follow.

## 2022-02-17 PROBLEM — J96.01 ACUTE RESPIRATORY FAILURE WITH HYPOXIA (HCC): Status: ACTIVE | Noted: 2022-01-01

## 2022-02-17 NOTE — HOSPICE
[facility]  [phone]     Patient Name: Licha Bernal  YOB: 1945  Age: 78 y.o. [address] RN Note:  Hospice consult received, reviewing chart. Will follow up with Unit Nurse and Care Manager to discuss plan of care, patient status and discharge disposition within the hour. Brother will be here at 2;30pm to sign consents for inpatient hospice. Thank you for the opportunity to be of service to this patient.     Dannie Puckett RN  Clinical Nurse Liaison   [address]  [phone] (r) [phone]

## 2022-02-17 NOTE — H&P
[facility]  [phone]    Patient Name: Toby Ch  YOB: 1945    Date of Provider Hospice Visit: 02/17/22    Level of Care:   [x] General Inpatient (GIP)    [] Routine   [] Respite    Current Location of Care:  [] [facility] [] [facility] [x] [facility] [] [address] [place] [] Hospice [facility], patient referred from:  [] [facility] [] [facility] [] [facility] [] [address] [] Home [] Other:     Date of Original Hospice Admission: 2/17/2022    Hospice Medical Director at time of admission: Dr Cyrus Stanton Diagnosis: acute respiratory failure with hypoxia  Diagnoses RELATED to the terminal prognosis: COVID 19 Pneumonia        HOSPICE SUMMARY        Toby Ch is a 78y.o. year old who was admitted to [place] [address]. The patient's principle diagnosis has resulted in weakness, debility, hypoxia, shortness of breath, pneumonia, respiratory failure, Anoxic encephalopathy unresponsive no gag reflex, terminal extubation with now comfort care  Functionally, the patient's Karnofsky and/or Palliative Performance Scale has declined over a period of days to weeks and is estimated at 10%. The patient is dependent for all ADLs. Objective information that support this patients limited prognosis includes: The patient/family chose comfort measures with the support of Hospice. HOSPICE DIAGNOSES   Active Symptoms:  1. Shortness of breath  2. Acute encephalopathy   3. Agitation  4. Airway secretions  5. Weakness and fatigue     PLAN   1. Admit pt to [facility] level of care. PT has overwhelming symptoms which cannot be managed at home. Patient requires frequent assessment and IV medications. Pt is high risk for sudden decline. 2. Pt medications to include: lorazepam 1 mg IV Q 3 hrs scheduled and as needed, morphine 2 mg IV Q 3 hrs scheduled and as needed every 15 min, scop patch and robinul  3.  Discussion with family, caregiver, Hospice team has occurred    4.  and SW to support family needs  5. Disposition:  pt is not safe for transport  6. Hospice Plan of care was reviewed in detail and agree with current plan of care    Prognosis estimated based on 02/17/22 clinical assessment is:   [x] Hours to Days    [] Days to Weeks    [] Other:    Communicated plan of care with: Hospice Case Manager; Hospice IDT; Care Team     GOALS OF CARE     Patient/Medical POA stated Goal of Care: comfort care    [] I have reviewed and/or updated ACP information in the Advance Care Planning Navigator. This information is available in the [address] link in the patient's chart header. Pt does not have ADLW document    X Primary Decision [facility] ([address]):   Primary Decision Maker: Delmy Sauceda - Brother - [phone]    Secondary Decision Maker: Tariq Lino - Sister - [phone]    Resuscitation Status: DNR  If DNR is there a Durable DNR on file? : [] Yes [] No (If no, complete Durable DNR)    HISTORY     History obtained from: chart, Hospice Team, nursing staff    CHIEF COMPLAINT: pt is unresponsive  The patient is:   [] Verbal  [] Nonverbal  [x] Unresponsive    HPI/SUBJECTIVE:  70year old male found unresponsive at home with a high fever of 107. past medical history of schizophrenia and diabetes gastroesophageal reflux disease and anemia he is COVID-19 positive initially patient was called as NSTEMI but it was canceled patient is intubated on ventilator hemodynamically unstable unable, dehydrated. PMH:  has a past medical history of Acute renal failure ([address] Anemia, Arthritis, DKA (diabetic ketoacidoses), GERD (gastroesophageal reflux disease), HTN (hypertension), Hypercholesterolemia, Schizophrenia ([address] Schizophreniform disorder ([address] Seizure disorder ([address] and Type II or unspecified type diabetes mellitus without mention of complication, uncontrolled.      REVIEW OF SYSTEMS     The following systems were: [] reviewed  [x] unable to be reviewed    Positive ROS include:  Constitutional: fatigue, weakness, in pain, short of breath  Ears/nose/mouth/throat: increased airway secretions  Respiratory:shortness of breath, wheezing  Gastrointestinal:poor appetite, nausea, vomiting, abdominal pain, constipation, diarrhea  Musculoskeletal:pain, deformities, swelling legs  Neurologic:confusion, hallucinations, weakness  Psychiatric:anxiety, feeling depressed, poor sleep  Endocrine:     Adult Non-Verbal Pain Assessment Score: in the past 24 hrs:     Face  [x] 0   No particular expression or smile  [] 1   Occasional grimace, tearing, frowning, wrinkled forehead  [] 2   Frequent grimace, tearing, frowning, wrinkled forehead    Activity (movement)  [x] 0   Lying quietly, normal position  [] 1   Seeking attention through movement or slow, cautious movement  [] 2   Restless, excessive activity and/or withdrawal reflexes    Guarding  [x] 0   Lying quietly, no positioning of hands over areas of body  [] 1   Splinting areas of the body, tense  [] 2   Rigid, stiff    Physiology (vital signs)  [x] 0   Stable vital signs  [] 1   Change in any of the following: SBP > 20mm Hg; HR > 20/minute  [] 2   Change in any of the following: SBP > 30mm Hg; HR > 25/minute    Respiratory  [x] 0   Baseline RR/SpO2, compliant with ventilator  [] 1   RR > 10 above baseline, or 5% drop SpO2, mild asynchrony with ventilator  [] 2   RR > 20 above baseline, or 10% drop SpO2, asynchrony with ventilator     FUNCTIONAL ASSESSMENT     Palliative Performance Scale (PPS): 10%     PSYCHOSOCIAL/SPIRITUAL ASSESSMENT     Active Problems:    Acute respiratory failure with hypoxia (HCC) (2/17/2022)      Past Medical History:   Diagnosis Date    Acute renal failure (HCC)     Anemia     Arthritis     DKA (diabetic ketoacidoses)     GERD (gastroesophageal reflux disease)     HTN (hypertension)     Hypercholesterolemia     [facility]     Schizophreniform disorder ([facility]     [facility]     Type II or unspecified type diabetes mellitus without mention of complication, uncontrolled       Past Surgical History:   Procedure Laterality Date    IR INSERT NON TUNL CVC OVER 5 YRS  1/28/2022    IR INSERT NON TUNL CVC OVER 5 YRS  1/31/2022      Social History     Tobacco Use    Smoking status: Never Smoker    Smokeless tobacco: Never Used   Substance Use Topics    Alcohol use: No     Family History   Problem Relation Age of Onset    Stroke Father       No Known Allergies   Current Facility-Administered Medications   Medication Dose Route Frequency    morphine injection 2 mg  2 mg IntraVENous Q3H    LORazepam (ATIVAN) injection 1 mg  1 mg IntraVENous Q3H    glycopyrrolate (ROBINUL) injection 0.2 mg  0.2 mg IntraVENous Q3H    scopolamine (TRANSDERM-SCOP) 1 mg over 3 days 1 Patch  1 Patch TransDERmal Q72H    LORazepam (ATIVAN) injection 1 mg  1 mg IntraVENous Q15MIN PRN    ketorolac (TORADOL) injection 30 mg  30 mg IntraVENous Q8H PRN    morphine injection 2 mg  2 mg IntraVENous Q15MIN PRN        PHYSICAL EXAM     Wt Readings from Last 3 Encounters:   02/16/22 109.4 kg (241 lb 2.9 oz)   01/20/22 101.1 kg (222 lb 14.2 oz)   10/06/21 111.1 kg (245 lb)       There were no vitals taken for this visit.     Supplemental O2  [x] Yes  [] NO       Currently this patient has:  [x] Peripheral IV [] PICC  [] PORT [] ICD    [x] Storey Catheter [] NG Tube   [] PEG Tube    [] Rectal Tube [] Drain  [] Other:     Constitutional: unresponsive  Eyes: pupils equal, anicteric  ENMT: ++ airway secretions  Cardiovascular: regular rhythm, distal pulses intact  Respiratory: breathing labored, symmetric  Gastrointestinal: soft non-tender, +bowel sounds  Musculoskeletal: no deformity, no tenderness to palpation  Skin: warm, dry, pale  Neurologic:pt is not able to follow commands, pt Is not moving all extremities  Psychiatric: NA      Pertinent Lab and or Imaging Tests:  Lab Results   Component Value Date/Time    Sodium 132 (L) 02/15/2022 04:50 AM    Potassium 4.4 02/15/2022 04:50 AM    Chloride 93 (L) 02/15/2022 04:50 AM    CO2 22 02/15/2022 04:50 AM    Anion gap 17 (H) 02/15/2022 04:50 AM    Glucose 88 02/15/2022 04:50 AM    BUN 94 (H) 02/15/2022 04:50 AM    Creatinine 6.11 (H) 02/15/2022 04:50 AM    BUN/Creatinine ratio 15 02/15/2022 04:50 AM    GFR est AA 11 (L) 02/15/2022 04:50 AM    GFR est non-AA 9 (L) 02/15/2022 04:50 AM    Calcium 8.6 02/15/2022 04:50 AM     Lab Results   Component Value Date/Time    Protein, total 6.4 02/10/2022 05:00 AM    Albumin 1.0 (L) 02/15/2022 04:50 AM            Abiola Lora NP

## 2022-02-17 NOTE — PROGRESS NOTES
Problem: Gas Exchange - Impaired  Goal: Absence of hypoxia  Outcome: Progressing Towards Goal  Note: Patient is on 2L NC and his O2 saturations are remaining above 90%         Bedside shift change report given to Nora Duran (oncoming nurse) by Danielle Almazan RN (offgoing nurse). Report included the following information SBAR, Kardex, Intake/Output, MAR, Accordion and Med Rec Status.

## 2022-02-17 NOTE — PROGRESS NOTES
Nutrition Assessment     Type and Reason for Visit: Reassess (Goal)    Nutrition Recommendations/Plan:   Continue NPO as medically appropriate. If PO diet resumes; consider pleasure meals. Nutrition sign-off; please consult nutrition services as needed. Nutrition Assessment:    Admitted for encephalopathy, +COVID-19, +NSTEMI. (1/27) intubated in ED, pt initially on pressors and sedation, have been off and on since. Propofol stopped (2/10). +MOSF and possible anoxic encephalopathy . (1/31) CRRT started, (2/3) changed to intermittent HD. (2/2) TF of Nepro started by nephrology, overfeeding pt so (2/10) RD adjusted TF regimen. (2/15) received HD w/ 0.5 L removal- remains on vent. (2/16) terminally extubated, placed on comfort care w/ NPO status and transferred to 5th floor. Hospice consult in place. Nutrition to sign off. Labs: POC -146 mg/dL. Meds: morphine, ativan, scopolamine, heparin, robinul, tylenol. Estimated Daily Nutrient Needs:  Energy (kcal):  2,052kcals (PSU 2003b)  Protein (g):  150g (1.5g/kg EDW)       Fluid (ml/day):  2000ml (20ml/kg EDW)    Nutrition Related Findings:   No NFPE completed- continues on isolation. No N/V/D/C per Nsg. NPO status. Trace edema per MD Duran. Last BM on 2/16- liquid via fecal management bag.       Current Nutrition Therapies:  DIET NPO    Anthropometric Measures:  · Height:  6' 0.01\" (182.9 cm)  · Current Body Wt:  115.8 kg (255 lb 4.7 oz) (2/10)  · BMI: 34.6    Nutrition Diagnosis:   · Inadequate oral intake related to cognitive or neurological impairment as evidenced by NPO or clear liquid status due to medical condition,intubation    Nutrition Intervention:  Food and/or Nutrient Delivery: Continue NPO,Continue tube feeding  Nutrition Education and Counseling: No recommendations at this time  Coordination of Nutrition Care: Continue to monitor while inpatient    Goals:  Meet 75% EEN, Maintain CBW +/-0.5kg x1wk, Maintain skin integrity, Lytes WNL Nutrition Monitoring and Evaluation:   Behavioral-Environmental Outcomes: None identified  Food/Nutrient Intake Outcomes: Other (specify)  Physical Signs/Symptoms Outcomes: None identified    Discharge Planning:    No discharge needs at this time     Electronically signed by Cheryl Lorenzo RD on 2/17/2022 at 1:20 PM    Contact Number: ext. [phone] or Patve.

## 2022-02-17 NOTE — PROGRESS NOTES
Consult obtained for Hospice services. Referral sent to [facility].   Hospice liaison notified of referral.

## 2022-02-17 NOTE — PROGRESS NOTES
IMPRESSION:   1. Acute hypoxic respiratory failure oxygenation well-maintained   2. Anoxic encephalopathy unresponsive no gag reflex does have self respiratory drive  3. Malignant hyperthermia resolved   4. COVID-19 pneumonia  5. NSTEMI   6. Shock cardiogenic versus septic vs Hypovolumic Hypotension resolved now on no pressor  7. Acute bleeding from dialysis catheter resolved  8. Acute kidney injury now requiring hemodialysis  9. Metabolic acidosis resolved  10. Thrombocytopenia resolved  11. Hypernatremia resolved now hyponatremia  12.  hypokalemia repleted  13. Shock liver       RECOMMENDATIONS/PLAN:     1. Transferred to [address] [place] [address] after family decided on terminal extubation and comfort care  2. Troponin was elevated 2D echo shows ejection fraction of 65%  3. He was bleeding from the dialysis catheter which is corrected received 4 units of packed RBC hemoglobin dropped from 8.8 to 4.0 hemoglobin now stable  4. Dialyzed on 2/14 for 3.5 hours about 2-1/2 L fluid removed  5. Temperature back to normal and sputum shows normal flow  6. Patient is unresponsive not in any sedation     [x] High complexity decision making was performed  [x] See my orders for details  HPI   73-year-old male came in because as he was found unresponsive and fever 107 past medical history of schizophrenia and diabetes gastroesophageal reflux disease and anemia he is COVID-19 positive initially patient was called as NSTEMI but it was canceled patient is intubated on ventilator hemodynamically unstable unable to get any history out of the patient he seems dehydrated    PMH:  has a past medical history of Acute renal failure ([address] Anemia, Arthritis, DKA (diabetic ketoacidoses), GERD (gastroesophageal reflux disease), HTN (hypertension), Hypercholesterolemia, Schizophrenia ([address] Schizophreniform disorder ([address] Seizure disorder ([address] and Type II or unspecified type diabetes mellitus without mention of complication, uncontrolled.     PSH:   has a past surgical history that includes ir insert non tunl cvc over 5 yrs (1/28/2022) and ir insert non tunl cvc over 5 yrs (1/31/2022). FHX: family history includes Stroke in his father. SHX:  reports that he has never smoked. He has never used smokeless tobacco. He reports that he does not drink alcohol and does not use drugs. ALL: No Known Allergies     MEDS:   [x] Reviewed - As Below   [] Not reviewed    Current Facility-Administered Medications   Medication    LORazepam (ATIVAN) injection 1 mg    scopolamine (TRANSDERM-SCOP) 1 mg over 3 days 1 Patch    glycopyrrolate (ROBINUL) injection 0.2 mg    morphine injection 2 mg    0.9% sodium chloride infusion 250 mL    heparin (porcine) 1,000 unit/mL injection 2,200 Units    glucose chewable tablet 16 g    glucagon (GLUCAGEN) injection 1 mg    sodium chloride (NS) flush 5-40 mL    acetaminophen (TYLENOL) tablet 650 mg    Or    acetaminophen (TYLENOL) suppository 650 mg      MAR reviewed and pertinent medications noted or modified as needed   Current Facility-Administered Medications   Medication    LORazepam (ATIVAN) injection 1 mg    scopolamine (TRANSDERM-SCOP) 1 mg over 3 days 1 Patch    glycopyrrolate (ROBINUL) injection 0.2 mg    morphine injection 2 mg    0.9% sodium chloride infusion 250 mL    heparin (porcine) 1,000 unit/mL injection 2,200 Units    glucose chewable tablet 16 g    glucagon (GLUCAGEN) injection 1 mg    sodium chloride (NS) flush 5-40 mL    acetaminophen (TYLENOL) tablet 650 mg    Or    acetaminophen (TYLENOL) suppository 650 mg      PMH:  has a past medical history of Acute renal failure ([address] Anemia, Arthritis, DKA (diabetic ketoacidoses), GERD (gastroesophageal reflux disease), HTN (hypertension), Hypercholesterolemia, Schizophrenia ([address] Schizophreniform disorder ([address] Seizure disorder ([address] and Type II or unspecified type diabetes mellitus without mention of complication, uncontrolled.   PSH:   has a past surgical history that includes ir insert non tunl cvc over 5 yrs (2022) and ir insert non tunl cvc over 5 yrs (2022). FHX: family history includes Stroke in his father. SHX:  reports that he has never smoked. He has never used smokeless tobacco. He reports that he does not drink alcohol and does not use drugs. ROS:  Unable to obtain intubated on ventilator unresponsive    Hemodynamics:    CO:    CI:    CVP:    SVR:   PAP Systolic:    PAP Diastolic:    PVR:    MS35:        Ventilator Settings:      Mode Rate TV Press PEEP FiO2 PIP Min. Vent   Assist control,Volume control    500 ml    5 cm H20 50 %  18 cm H2O  8.54 l/min        Vital Signs: Telemetry:    normal sinus rhythm Intake/Output:   Visit Vitals  BP (!) 149/91 (BP 1 Location: Left upper arm, BP Patient Position: At rest)   Pulse (!) 101   Temp 98.8 °F (37.1 °C)   Resp 18   Ht 6' 0.01\" (1.829 m)   Wt 241 lb 2.9 oz (109.4 kg)   SpO2 90%   BMI 32.70 kg/m²       Temp (24hrs), Av.5 °F (36.9 °C), Min:98.3 °F (36.8 °C), Max:98.8 °F (37.1 °C)        O2 Device: Nasal cannula O2 Flow Rate (L/min): 2 l/min       Wt Readings from Last 4 Encounters:   22 241 lb 2.9 oz (109.4 kg)   22 222 lb 14.2 oz (101.1 kg)   10/06/21 245 lb (111.1 kg)   21 244 lb (110.7 kg)          Intake/Output Summary (Last 24 hours) at 2022 0906  Last data filed at 2022 1653  Gross per 24 hour   Intake    Output 400 ml   Net -400 ml       Last shift:      No intake/output data recorded. Last 3 shifts: 02/15 1901 -  0700  In: 1010   Out: 700 [Drains:700]       Physical Exam:     General:  intubated on vent unresponsive on no sedation  HEENT: NCAT,   Eyes: anicteric; conjunctiva clear no doll's eye reflex  Neck: no nodes, no cuff leak, trach midline; no accessory MM use.   Chest: no deformity,   Cardiac: R regular; no murmur;   Lungs: distant breath sounds; no wheezes a few rales in the bases no gag reflex to tracheal suctioning does have spontaneous respirations above the ventilator  Abd: soft, NT, hypoactive BS  Ext: Trace edema; no joint swelling; No clubbing  : No urine  Neuro: Unresponsive on no sedation no doll's eye reflex flaccid extremities  Psych-  unable to assess  Skin: warm, dry, no cyanosis;   Pulses: Brachial and radial pulses intact  Capillary: Normal capillary refill      DATA:    MAR reviewed and pertinent medications noted or modified as needed  MEDS:   Current Facility-Administered Medications   Medication    LORazepam (ATIVAN) injection 1 mg    scopolamine (TRANSDERM-SCOP) 1 mg over 3 days 1 Patch    glycopyrrolate (ROBINUL) injection 0.2 mg    morphine injection 2 mg    0.9% sodium chloride infusion 250 mL    heparin (porcine) 1,000 unit/mL injection 2,200 Units    glucose chewable tablet 16 g    glucagon (GLUCAGEN) injection 1 mg    sodium chloride (NS) flush 5-40 mL    acetaminophen (TYLENOL) tablet 650 mg    Or    acetaminophen (TYLENOL) suppository 650 mg        Labs:    Recent Labs     02/16/22  0430 02/15/22  0450   INR 1.3* 1.4*     Recent Labs     02/15/22  0450   *   K 4.4   CL 93*   CO2 22   GLU 88   BUN 94*   CREA 6.11*   CA 8.6   PHOS 7.9*   ALB 1.0*     No results for input(s): PH, PCO2, PO2, HCO3, FIO2 in the last 72 hours. Lab Results   Component Value Date/Time    Culture result: Scant Normal respiratory mark 02/03/2022 07:20 PM    Culture result: No Growth (<1000 cfu/mL) 01/27/2022 02:00 PM    Culture result: No growth 6 days 01/27/2022 01:30 PM     Lab Results   Component Value Date/Time    TSH 4.47 (H) 05/21/2021 10:46 AM        Imaging:    Results from Hospital Encounter encounter on 01/27/22    XR CHEST PORT    Narrative  Examination: XR CHEST PORT    History: chf    Comparison: Chest radiograph 2/11/2022    FINDINGS:    Single frontal portable view of the chest. Endotracheal tube projects over the  mid intrathoracic trachea.  Right neck central vascular catheter tips project  over the superior cavoatrial junction. Enteric tube courses below the diaphragm  with tip excluded from view. Symmetric low lung volumes. There are basilar predominant opacities that appear  similar to prior. No radiographically evident pneumothorax. Small layering  effusions could contribute to the above described opacities. The cardiac  silhouette is normal in size. Mediastinal contours and osseous structures appear  unchanged. Impression  No significant interval change, including bibasilar opacities. Results from [place] encounter on 01/27/22    CT HEAD WO CONT    Narrative  Exam: CT Head without contrast    TECHNIQUE: Multiple transaxial CT images of the head were obtained without  contrast. Coronal and sagittal reformatted images were provided. Dose reduction: All CT scans at this facility are performed using dose reduction  optimization techniques as appropriate to a performed exam including the  following: Automated exposure control, adjustments of the mA and/or kV according  to patient size, or use of iterative reconstruction technique. HISTORY: anoxia    COMPARISON: Head CT 10/6/2021, 1/27/2022    FINDINGS:    Global parenchymal volume loss appears similar to prior with proportional ex  vacuo dilatation of the ventricles and other extra-axial CSF spaces, slightly  more prominent on the left. No significant midline shift or mass effect. Gray-white matter differentiation appears preserved. No findings of acute  intracranial hemorrhage. Basilar cisterns appear preserved. Intracranial  atherosclerosis. There is bilateral opacification of the mastoid air cells, most likely  indicating nonspecific mastoid effusions. Debris within the external auditory  canals is most likely cerumen. There is mild mucosal thickening in the paranasal  sinuses, most pronounced in the maxillary sinuses. Globes and orbits appear  unremarkable.  Calvarium appears intact without findings of acute or aggressive  abnormality. Impression  Overall similar exam to prior without findings of acute intracranial  abnormality. · 1/30 remains on the ventilator has metabolic acidosis we will add bicarb per tube. Maintain ventilator on current settings although will decrease PEEP slightly. Platelets continue to drop. He is unresponsive on no sedation likely anoxic encephalopathy  · 1/31 remain intubated on ventilator hemodynamically worsening of the renal function  · 2/1 remained on ventilator hemodynamically unstable on levofed  · 2/2 on ventilator hypothermic electrolyte imbalance will continue with the current vent settings  · 2/4 acute bleeding from the dialysis catheter which has been corrected received 4 units of packed RBC ABG acceptable  · 2/5 remain intubated no more bleeding from the dialysis catheter site  · 2/6 on ventilator assist control mode we will continue to wean  · 2/7 dialyzed yesterday remain on ventilator  · 2/9 sedated on ventilator received dialysis off Boris still on Levophed  · 2/10 remain on ventilator off Levofed  · 2/11 patient is off pressors and also sedation still unresponsive  · 2/12 remain on ventilator unresponsive off sedation off vasopressors on Zosyn  · 2/13 ABGs well-maintained today we will decrease FiO2 to 40%.   Remains unresponsive no doll's eye reflex no gag reflex on no sedation  · 2/14 remains on ventilator unresponsive not on any sedation  · 2/15 patient was dialyzed yesterday but 1/2 L of fluid removed remains unresponsive on ventilator  · 2/16 possible terminal extubation today

## 2022-02-17 NOTE — PROGRESS NOTES
Problem: Airway Clearance - Ineffective  Goal: Achieve or maintain patent airway  Outcome: Progressing Towards Goal     Problem: Gas Exchange - Impaired  Goal: Absence of hypoxia  Outcome: Progressing Towards Goal  Goal: Promote optimal lung function  Outcome: Progressing Towards Goal     Problem: Breathing Pattern - Ineffective  Goal: Ability to achieve and maintain a regular respiratory rate  Outcome: Progressing Towards Goal     Problem:  Body Temperature -  Risk of, Imbalanced  Goal: Ability to maintain a body temperature within defined limits  Outcome: Progressing Towards Goal  Goal: Will regain or maintain usual level of consciousness  Outcome: Progressing Towards Goal  Goal: Complications related to the disease process, condition or treatment will be avoided or minimized  Outcome: Progressing Towards Goal     Problem: Isolation Precautions - Risk of Spread of Infection  Goal: Prevent transmission of infectious organism to others  Outcome: Progressing Towards Goal     Problem: Nutrition Deficits  Goal: Optimize nutrtional status  Outcome: Progressing Towards Goal     Problem: Risk for Fluid Volume Deficit  Goal: Maintain normal heart rhythm  Outcome: Progressing Towards Goal  Goal: Maintain absence of muscle cramping  Outcome: Progressing Towards Goal  Goal: Maintain normal serum potassium, sodium, calcium, phosphorus, and pH  Outcome: Progressing Towards Goal     Problem: Loneliness or Risk for Loneliness  Goal: Demonstrate positive use of time alone when socialization is not possible  Outcome: Progressing Towards Goal     Problem: Fatigue  Goal: Verbalize increase energy and improved vitality  Outcome: Progressing Towards Goal     Problem: Patient Education: Go to Patient Education Activity  Goal: Patient/Family Education  Outcome: Progressing Towards Goal     Problem: Diabetes Self-Management  Goal: *Disease process and treatment process  Description: Define diabetes and identify own type of diabetes; list 3 options for treating diabetes. Outcome: Progressing Towards Goal  Goal: *Incorporating nutritional management into lifestyle  Description: Describe effect of type, amount and timing of food on blood glucose; list 3 methods for planning meals. Outcome: Progressing Towards Goal  Goal: *Incorporating physical activity into lifestyle  Description: State effect of exercise on blood glucose levels. Outcome: Progressing Towards Goal  Goal: *Developing strategies to promote health/change behavior  Description: Define the ABC's of diabetes; identify appropriate screenings, schedule and personal plan for screenings. Outcome: Progressing Towards Goal  Goal: *Using medications safely  Description: State effect of diabetes medications on diabetes; name diabetes medication taking, action and side effects. Outcome: Progressing Towards Goal  Goal: *Monitoring blood glucose, interpreting and using results  Description: Identify recommended blood glucose targets  and personal targets. Outcome: Progressing Towards Goal  Goal: *Prevention, detection, treatment of acute complications  Description: List symptoms of hyper- and hypoglycemia; describe how to treat low blood sugar and actions for lowering  high blood glucose level. Outcome: Progressing Towards Goal  Goal: *Prevention, detection and treatment of chronic complications  Description: Define the natural course of diabetes and describe the relationship of blood glucose levels to long term complications of diabetes.   Outcome: Progressing Towards Goal  Goal: *Developing strategies to address psychosocial issues  Description: Describe feelings about living with diabetes; identify support needed and support network  Outcome: Progressing Towards Goal  Goal: *Insulin pump training  Outcome: Progressing Towards Goal  Goal: *Sick day guidelines  Outcome: Progressing Towards Goal  Goal: *Patient Specific Goal (EDIT GOAL, INSERT TEXT)  Outcome: Progressing Towards Goal     Problem: Patient Education: Go to Patient Education Activity  Goal: Patient/Family Education  Outcome: Progressing Towards Goal     Problem: Risk for Spread of Infection  Goal: Prevent transmission of infectious organism to others  Description: Prevent the transmission of infectious organisms to other patients, staff members, and visitors. Outcome: Progressing Towards Goal     Problem: Patient Education:  Go to Education Activity  Goal: Patient/Family Education  Outcome: Progressing Towards Goal     Problem: Dyspnea Due to End of Life  Goal: Demonstrate understanding of and ability to manage respiratory symptoms at end of life  Outcome: Progressing Towards Goal     Problem: Communication Deficit  Goal: Effectively communicate symptoms, needs, and concerns  Outcome: Progressing Towards Goal     Problem: Imminent Death  Goal: Collaborate with patient/family/caregiver/interdisciplinary team to minimize and manage end of life symptoms  Outcome: Progressing Towards Goal     Problem: Hospice Orientation  Goal: Demonstrate understanding of hospice philosophy, plan of care, and home hospice program  Description: The patient/family/caregiver will demonstrate understanding of hospice philosophy, plan of care and the home hospice program as evidenced by participation in meeting the patient's psychosocial, spiritual, medical, and physical needs inclusive of medical supplies/equipment focusing on symptoms. Outcome: Progressing Towards Goal     Problem: Potential for Alteration in Skin Integrity  Goal: Monitor skin for areas of alteration in skin integrity  Description: Patient/family/caregiver will demonstrate ability to care for patient's skin, monitor for areas of breakdown, and demonstrate methods to prevent breakdown during hospice care. Outcome: Progressing Towards Goal     Problem: Risk for Falls  Goal: Free of falls during inpatient stay  Description: Patient will be free of falls during inpatient stay.   Outcome: Progressing Towards Goal     Problem: Anticipatory Grief  Goal: Explore reactions to and verbalize acceptance of impending loss  Description: Patient/family/caregiver will explore reactions to and verbalize acceptance of impending loss. Outcome: Progressing Towards Goal     Problem: Anxiety/Agitation  Goal: Verbalize or staff assess the ability to manage anxiety  Description: The patient/family/caregiver will verbalize and demonstrate ability to manage the patient's anxiety throughout hospice care.   Outcome: Progressing Towards Goal     Problem: Breathing Pattern - Ineffective  Goal: *Use of effective breathing techniques  Outcome: Progressing Towards Goal     Problem: Pressure Injury - Risk of  Goal: *Prevention of pressure injury  Outcome: Progressing Towards Goal  Note: Pressure Injury Interventions:                                            Problem: Grieving  Goal: *Able to identify stages of grieving process  Outcome: Progressing Towards Goal     Problem: Infection - Risk of, Central Venous Catheter-Associated Bloodstream Infection  Goal: *Absence of infection signs and symptoms  Outcome: Progressing Towards Goal     Problem: Infection - Risk of, Urinary Catheter-Associated Urinary Tract Infection  Goal: *Absence of infection signs and symptoms  Outcome: Progressing Towards Goal

## 2022-02-17 NOTE — PROGRESS NOTES
IMPRESSION:   1. Acute hypoxic respiratory failure extubated  2. Anoxic encephalopathy unresponsive no gag reflex does have self respiratory drive        RECOMMENDATIONS/PLAN:     1. Transferred to [address] [place] [address] after family decided on terminal extubation and comfort care  2. Patient is unresponsive not in any sedation     [x] High complexity decision making was performed  [x] See my orders for details  HPI   42-year-old male came in because as he was found unresponsive and fever 107 past medical history of schizophrenia and diabetes gastroesophageal reflux disease and anemia he is COVID-19 positive initially patient was called as NSTEMI but it was canceled patient is intubated on ventilator hemodynamically unstable unable to get any history out of the patient he seems dehydrated    PMH:  has a past medical history of Acute renal failure ([address] Anemia, Arthritis, DKA (diabetic ketoacidoses), GERD (gastroesophageal reflux disease), HTN (hypertension), Hypercholesterolemia, Schizophrenia ([address] Schizophreniform disorder ([address] Seizure disorder ([address] and Type II or unspecified type diabetes mellitus without mention of complication, uncontrolled. PSH:   has a past surgical history that includes ir insert non tunl cvc over 5 yrs (1/28/2022) and ir insert non tunl cvc over 5 yrs (1/31/2022). FHX: family history includes Stroke in his father. SHX:  reports that he has never smoked. He has never used smokeless tobacco. He reports that he does not drink alcohol and does not use drugs.     ALL: No Known Allergies     MEDS:   [x] Reviewed - As Below   [] Not reviewed    Current Facility-Administered Medications   Medication    LORazepam (ATIVAN) injection 1 mg    scopolamine (TRANSDERM-SCOP) 1 mg over 3 days 1 Patch    glycopyrrolate (ROBINUL) injection 0.2 mg    morphine injection 2 mg    0.9% sodium chloride infusion 250 mL    heparin (porcine) 1,000 unit/mL injection 2,200 Units    glucose chewable tablet 16 g    glucagon (GLUCAGEN) injection 1 mg    sodium chloride (NS) flush 5-40 mL    acetaminophen (TYLENOL) tablet 650 mg    Or    acetaminophen (TYLENOL) suppository 650 mg      MAR reviewed and pertinent medications noted or modified as needed   Current Facility-Administered Medications   Medication    LORazepam (ATIVAN) injection 1 mg    scopolamine (TRANSDERM-SCOP) 1 mg over 3 days 1 Patch    glycopyrrolate (ROBINUL) injection 0.2 mg    morphine injection 2 mg    0.9% sodium chloride infusion 250 mL    heparin (porcine) 1,000 unit/mL injection 2,200 Units    glucose chewable tablet 16 g    glucagon (GLUCAGEN) injection 1 mg    sodium chloride (NS) flush 5-40 mL    acetaminophen (TYLENOL) tablet 650 mg    Or    acetaminophen (TYLENOL) suppository 650 mg      PMH:  has a past medical history of Acute renal failure ([facility] Anemia, Arthritis, DKA (diabetic ketoacidoses), GERD (gastroesophageal reflux disease), HTN (hypertension), Hypercholesterolemia, Schizophrenia ([facility] [address] Schizophreniform disorder ([facility] [address] [facility] Seizure disorder ([facility] [address] and Type II or unspecified type diabetes mellitus without mention of complication, uncontrolled. PSH:   has a past surgical history that includes ir insert non tunl cvc over 5 yrs (1/28/2022) and ir insert non tunl cvc over 5 yrs (1/31/2022). FHX: family history includes Stroke in his father. SHX:  reports that he has never smoked. He has never used smokeless tobacco. He reports that he does not drink alcohol and does not use drugs. ROS:   unresponsive    Hemodynamics:    CO:    CI:    CVP:    SVR:   PAP Systolic:    PAP Diastolic:    PVR:    [place]37:        Ventilator Settings:      Mode Rate TV Press PEEP FiO2 PIP Min.  Vent   Assist control,Volume control    500 ml    5 cm H20 50 %  18 cm H2O  8.54 l/min        Vital Signs: Telemetry:    normal sinus rhythm Intake/Output:   Visit Vitals  BP (!) 149/91 (BP 1 Location: Left upper arm, BP Patient Position: At rest)   Pulse (!) 101 Temp 98.8 °F (37.1 °C)   Resp 18   Ht 6' 0.01\" (1.829 m)   Wt 109.4 kg (241 lb 2.9 oz)   SpO2 90%   BMI 32.70 kg/m²       Temp (24hrs), Av.5 °F (36.9 °C), Min:98.3 °F (36.8 °C), Max:98.8 °F (37.1 °C)        O2 Device: Nasal cannula O2 Flow Rate (L/min): 2 l/min       Wt Readings from Last 4 Encounters:   22 109.4 kg (241 lb 2.9 oz)   22 101.1 kg (222 lb 14.2 oz)   10/06/21 111.1 kg (245 lb)   21 110.7 kg (244 lb)          Intake/Output Summary (Last 24 hours) at 2022 0933  Last data filed at 2022 1653  Gross per 24 hour   Intake    Output 400 ml   Net -400 ml       Last shift:      No intake/output data recorded. Last 3 shifts: 02/15 1901 -  0700  In: 1010   Out: 700 [Drains:700]       Physical Exam:     General: On nasal cannula  Chest: no deformity,   Cardiac: R regular; no murmur;   Lungs: distant breath sounds; no wheezes  Abd: soft, NT, hypoactive BS  Ext: Trace edema; no joint swelling; No clubbing  : No urine  Neuro: Unresponsive       DATA:    MAR reviewed and pertinent medications noted or modified as needed  MEDS:   Current Facility-Administered Medications   Medication    LORazepam (ATIVAN) injection 1 mg    scopolamine (TRANSDERM-SCOP) 1 mg over 3 days 1 Patch    glycopyrrolate (ROBINUL) injection 0.2 mg    morphine injection 2 mg    0.9% sodium chloride infusion 250 mL    heparin (porcine) 1,000 unit/mL injection 2,200 Units    glucose chewable tablet 16 g    glucagon (GLUCAGEN) injection 1 mg    sodium chloride (NS) flush 5-40 mL    acetaminophen (TYLENOL) tablet 650 mg    Or    acetaminophen (TYLENOL) suppository 650 mg        Labs:    Recent Labs     22  0430 02/15/22  0450   INR 1.3* 1.4*     Recent Labs     02/15/22  0450   *   K 4.4   CL 93*   CO2 22   GLU 88   BUN 94*   CREA 6.11*   CA 8.6   PHOS 7.9*   ALB 1.0*     No results for input(s): PH, PCO2, PO2, HCO3, FIO2 in the last 72 hours.     Lab Results   Component Value Date/Time Culture result: Scant Normal respiratory mark 02/03/2022 07:20 PM    Culture result: No Growth (<1000 cfu/mL) 01/27/2022 02:00 PM    Culture result: No growth 6 days 01/27/2022 01:30 PM     Lab Results   Component Value Date/Time    TSH 4.47 (H) 05/21/2021 10:46 AM        Imaging:    Results from Hospital Encounter encounter on 01/27/22    XR CHEST PORT    Narrative  Examination: XR CHEST PORT    History: chf    Comparison: Chest radiograph 2/11/2022    FINDINGS:    Single frontal portable view of the chest. Endotracheal tube projects over the  mid intrathoracic trachea. Right neck central vascular catheter tips project  over the superior cavoatrial junction. Enteric tube courses below the diaphragm  with tip excluded from view. Symmetric low lung volumes. There are basilar predominant opacities that appear  similar to prior. No radiographically evident pneumothorax. Small layering  effusions could contribute to the above described opacities. The cardiac  silhouette is normal in size. Mediastinal contours and osseous structures appear  unchanged. Impression  No significant interval change, including bibasilar opacities. Results from [place] encounter on 01/27/22    CT HEAD WO CONT    Narrative  Exam: CT Head without contrast    TECHNIQUE: Multiple transaxial CT images of the head were obtained without  contrast. Coronal and sagittal reformatted images were provided. Dose reduction: All CT scans at this facility are performed using dose reduction  optimization techniques as appropriate to a performed exam including the  following: Automated exposure control, adjustments of the mA and/or kV according  to patient size, or use of iterative reconstruction technique.     HISTORY: anoxia    COMPARISON: Head CT 10/6/2021, 1/27/2022    FINDINGS:    Global parenchymal volume loss appears similar to prior with proportional ex  vacuo dilatation of the ventricles and other extra-axial CSF spaces, slightly  more prominent on the left. No significant midline shift or mass effect. Gray-white matter differentiation appears preserved. No findings of acute  intracranial hemorrhage. Basilar cisterns appear preserved. Intracranial  atherosclerosis. There is bilateral opacification of the mastoid air cells, most likely  indicating nonspecific mastoid effusions. Debris within the external auditory  canals is most likely cerumen. There is mild mucosal thickening in the paranasal  sinuses, most pronounced in the maxillary sinuses. Globes and orbits appear  unremarkable. Calvarium appears intact without findings of acute or aggressive  abnormality. Impression  Overall similar exam to prior without findings of acute intracranial  abnormality. · 1/30 remains on the ventilator has metabolic acidosis we will add bicarb per tube. Maintain ventilator on current settings although will decrease PEEP slightly. Platelets continue to drop. He is unresponsive on no sedation likely anoxic encephalopathy  · 1/31 remain intubated on ventilator hemodynamically worsening of the renal function  · 2/1 remained on ventilator hemodynamically unstable on levofed  · 2/2 on ventilator hypothermic electrolyte imbalance will continue with the current vent settings  · 2/4 acute bleeding from the dialysis catheter which has been corrected received 4 units of packed RBC ABG acceptable  · 2/5 remain intubated no more bleeding from the dialysis catheter site  · 2/6 on ventilator assist control mode we will continue to wean  · 2/7 dialyzed yesterday remain on ventilator  · 2/9 sedated on ventilator received dialysis off Boris still on Levophed  · 2/10 remain on ventilator off Levofed  · 2/11 patient is off pressors and also sedation still unresponsive  · 2/12 remain on ventilator unresponsive off sedation off vasopressors on Zosyn  · 2/13 ABGs well-maintained today we will decrease FiO2 to 40%.   Remains unresponsive no doll's eye reflex no gag reflex on no sedation  · 2/14 remains on ventilator unresponsive not on any sedation  · 2/15 patient was dialyzed yesterday but 1/2 L of fluid removed remains unresponsive on ventilator  · 2/16 possible terminal extubation today  · 2/17 comfort measures will sign off

## 2022-02-17 NOTE — HOSPICE
[facility]  [phone]    Inpatient Nursing Admission   Patient Name: Florian Walsh  YOB: 1945  Age: 78 y.o. Date of Hospice Admission: 2/17/2022  Hospice Attending Elected by Patient: Nito Shaw MD  Primary Care Physician: Elana Campa MD  Admitting RN: Vidal Bright RN  : Guille Cat LMSW     Level of Care (GIP/Routine/Respite): [facility]  Facility of Care: [facility]  Patient Room: [address] [place] [address] Dr   ER Visits/ Hospitalizations in past year: 3  Hospice Diagnosis: Acute respiratory failure with hypoxia ([address] [J96.01]  Onset Date of Hospice Diagnosis: 01/27/2022  Summary of Disease Progression Leading to Hospice Diagnosis:   82-year-old male came in because as he was found unresponsive and fever 107 past medical history of schizophrenia and diabetes gastroesophageal reflux disease and anemia he is COVID-19 positive initially patient was called as NSTEMI but it was canceled patient is intubated on ventilator hemodynamically unstable unable to get any history out of the patient he seems dehydrated     PMH:  has a past medical history of Acute renal failure ([address] Anemia, Arthritis, DKA (diabetic ketoacidoses), GERD (gastroesophageal reflux disease), HTN (hypertension), Hypercholesterolemia, Schizophrenia ([address] Schizophreniform disorder ([address] Seizure disorder ([address] and Type II or unspecified type diabetes mellitus without mention of complication, uncontrolled. Hospice evaluation/admission:  Patient is unresponsive, labored breathing with use of accessory muscles, audible rhonci and requiring oral suctioning. Generalize pain noted when moved. He was extubated yesterday. Family is in agreement with inpatient hospice to manage end of life symptoms.   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    GERD (gastroesophageal reflux disease) K21.9    Seizure disorder ([facility] G40.909    Essential hypertension I10    Mixed hyperlipidemia E78.2    Dementia ([facility] F03.90    Vitamin D deficiency E55.9    Acute encephalopathy G93.40    Hypernatremia E87.0    Dehydration E86.0    Acute hypernatremia E87.0    Uncontrolled type 2 diabetes mellitus with hyperglycemia (HCC) E11.65    Acute metabolic encephalopathy Q17.00    ANDRES (acute kidney injury) ([facility] N17.9    Acute respiratory failure with hypoxia (HCC) J96.01     Diagnoses RELATED to the terminal prognosis: COVID pneumonia, encephalopathy,   Other Diagnoses: ANDRES    Rationale for a prognosis of life expectancy of 6 months or less if the disease follows its normal course (Disease Specific History):     Harini Cedillo is a 78 y. o. who was admitted to [facility]. The patient's principle diagnosis of acute hypoxic respiratory failure has resulted in pursuit of hospice. Functionally, the patient's Palliative Performance Scale has declined over a period of weeks and is estimated at 10 Objective information that support this patients limited prognosis includes: unresponsive, labored breathing, hypoxia, rhonci, tachycardia and anasarca. The patient/family chose comfort measures with the support of Hospice. Patient meets for GIP  LOC as evidenced by : requiring frequent skilled nursing assessments and administration of parenteral medications to manage symptoms. Unsafe for transport or administration of oral/SL medications.      Prognosis estimated based on 02/17/22 clinical assessment is:   [] Few to Many Hours  [x] Hours to Days   [] Few to Many Days   [] Days to Weeks   [] Few to Many Weeks   [] Weeks to Months   [] Few to Many Months    ASSESSMENT    Patient self-reports:  []  Yes    [x] No    SYMPTOMS: unresponsive, dyspnea, pain, secretions, edema  SIGNS/PHYSICAL FINDINGS: unresponsive, labored breathing, rhonci, anasarca    KARNOFSKY: 10    FAST for all dementia:      Learning Assessment:  Patient  Is patient willing/able to learn? no  What is the highest level of education completed? Learning preference (written material, demonstration, visual)? Learning barriers (ESOL, False Pass, poor vision)? Caregiver  Is caregiver willing to learn care for patient? No  What is the highest level of education completed? Learning preference (written material, demonstration, visual)? Learning barriers (ESOL, False Pass, poor vision)? CLINICAL INFORMATION     Wt Readings from Last 3 Encounters:   02/16/22 109.4 kg (241 lb 2.9 oz)   01/20/22 101.1 kg (222 lb 14.2 oz)   10/06/21 111.1 kg (245 lb)     Ht Readings from Last 3 Encounters:   02/10/22 6' 0.01\" (1.829 m)   01/20/22 5' 10\" (1.778 m)   10/06/21 6' (1.829 m)     There is no height or weight on file to calculate BMI. There were no vitals taken for this visit. LAB VALUES  No results found for this visit on 02/17/22 (from the past 12 hour(s)). No results found for this visit on 02/17/22 (from the past 6 hour(s)). Lab Results   Component Value Date/Time    Protein, total 6.4 02/10/2022 05:00 AM    Albumin 1.0 (L) 02/15/2022 04:50 AM       Currently this patient has:  [x] Supplemental O2 [x] Peripheral IV  [] PICC    [] PORT   [] Storey Catheter [] NG Tube   [] PEG Tube [] Ostomy    [] AICD: Has ICD been deactivated? [] Yes [] No:______  Fecal management system  Triple lumen catheter    PLAN     1. Education of patient and family regarding end of life care and Hospice plan of care  2. Provide education and support to unit staff caring for hospice patient and family. Provide staff with direct contact information to reach hospice team [phone]   3. Provide support and frequent rounds for patient comfort and safety ongoing  4.  Provide  support ongoing, continue to discuss discharge plan if patient becomes alyssa and does not require acute nursing care interventions for GIP level of care  5. Provide  and bereavement support ongoing  6. Continue with oxygen via NC  7. Schedule morphine 2mg, robinul 0.2mg and ativan 1mg Iv every 3 hours for pain, air hunger and secretions. 8. Add PRN comfort set for breakthrough symptoms. 9. Maintain skin integrity as tolerated for hospice patient, turning and repositioning for comfort, and specialty mattress if appropriate  10. Storey care per hospital policy for infection prevention  11. Central line care as per hospital policy for infection prevention  12. Caregiver Support: provide daily phone updates to brother    Hospice Team Frequency Orders:  Skilled Nurse -   Daily x 7 days /every other day x 7 days  with 5 PRN visits for symptom control. MSW - 1 visit for initial assessment/evaluation for family support and need for volunteer services. Quang Prabhakar - 1 visit for initial assessment/evaluation for spiritual support. ADVANCE CARE PLANNING (Complete in ACP Flow Sheet)   Code Status: DNR  Durable DNR: []  Yes  [x]  No  Code Status Discussed/Confirmed:YESPreference for Other Life Sustaining Treatment Discussed/Confirmed:YES  Hospitalization [address]  Advance Care Planning 2022   Confirm Advance Directive None   Patient Would Like to Complete Advance Directive -        Service: [] Yes  []  No      [x] Unknown  Appropriate for Pinning Ceremony:  [] Yes     [] No  Mosque: NO PREFERENCE   Home: [address]     1. Discharge Plan: will likely  in the hospital but should he stabilize will need facility placement. 2. Patient/Family teaching: Hospice philosophy, levels of care and services to be provided. End of life symptoms and care. 3. Response to patient/family teaching: brother verbalized understanding.      SOCIAL/EMOTIONAL/SPIRITUAL NEEDS     Spiritual Issues Identified: none     Psych/ Social/ Emotional Issues Identified: family all in agreement with hospice admission. Family has visited and do not plan to return. Call brother, Nelson Jaffe, daily with updates. Caregiver Support:  [x] Provided information on End of Life Care   [x] Material Provided: Gone From My Sight or Journey's End     CARE COORDINATION     contacted, discharge to hospice order received  Dr. Fran Mcneill contacted, agrees to serve as attending provider for hospice and provided verbal certification of terminal illness with life expectancy of 6 months or less. Orders for hospice admission, medications and plan of treatment received. Medication reconciliation completed.   MEDS: See medication list below  DME: Per hospital  Supplies: Per hospital  IDT communication to include MD, SN, SW, CH and support team    ALLERGIES AND MEDICATIONS     Allergies: No Known Allergies  Current Facility-Administered Medications   Medication Dose Route Frequency    morphine injection 2 mg  2 mg IntraVENous Q3H    LORazepam (ATIVAN) injection 1 mg  1 mg IntraVENous Q3H    glycopyrrolate (ROBINUL) injection 0.2 mg  0.2 mg IntraVENous Q3H    LORazepam (ATIVAN) injection 1 mg  1 mg IntraVENous Q15MIN PRN    ketorolac (TORADOL) injection 30 mg  30 mg IntraVENous Q8H PRN    morphine injection 2 mg  2 mg IntraVENous Q15MIN PRN    [START ON 2/18/2022] scopolamine (TRANSDERM-SCOP) 1 mg over 3 days 1 Patch  1 Patch TransDERmal Q72H

## 2022-02-18 NOTE — PROGRESS NOTES
follow up visit in room 507 in response to RN notification of patient's death.  consulted with RN upon arrival to unit, patient's family not present. It is uncertain if family will arrive.  provided spiritual presence at bedside, consulted with RN post visit.  Advised nurse to contact [facility] for any further referrals.        Visited by Hillary [facility], [facility]    can be contacted by calling  and requesting  on call

## 2022-02-18 NOTE — HOSPICE
[facility] LCSW Bereavement/Condolence Call: This LCSW called pts family to offer condolences and support, LCSW could not reach his brother or sister, called niece. LCSW offered [facility] information for ongoing support.        [facility]    [phone]

## 2022-02-18 NOTE — HOSPICE
4: 20 p.m. Brother returned called and informed of patient death. Family will not be returning to hospital.  Please notify Dimitry PULLIAM. Unable to reach patient's family to inform of patient death - left VM and sent text asking Yaritza Dejesus (brother) at [phone] to return my call - will continue to try.      Baron Tyler RN  Clinical Liaison  [phone]

## 2022-02-18 NOTE — PROGRESS NOTES
[facility]  [phone]    Patient Name: Florian Walsh  YOB: 1945    Date of Provider Hospice Visit: 2/17/2023  Level of Care:   [x] General Inpatient (GIP)    [] Routine   [] Respite    Current Location of Care:  [] [facility] [] [facility] [x] [facility] [] [facility] [] Hospice [facility], patient referred from:  [] [facility] [] [facility] [] [facility] [] [facility] [] Home [] Other:     Date of Original Hospice Admission: 2/18/2022    Hospice Medical Director at time of admission: Dr Salazar Samuel Diagnosis: acute respiratory failure with hypoxia  Diagnoses RELATED to the terminal prognosis: COVID 19 Pneumonia        HOSPICE SUMMARY        Florian Walsh is a 78y.o. year old who was admitted to [place] [address]. The patient's principle diagnosis has resulted in weakness, debility, hypoxia, shortness of breath, pneumonia, respiratory failure, Anoxic encephalopathy unresponsive no gag reflex, terminal extubation with now comfort care  Functionally, the patient's Karnofsky and/or Palliative Performance Scale has declined over a period of days to weeks and is estimated at 10%. The patient is dependent for all ADLs. Objective information that support this patients limited prognosis includes: The patient/family chose comfort measures with the support of Hospice. HOSPICE DIAGNOSES   Active Symptoms:  1. Shortness of breath  2. Acute encephalopathy   3. Agitation  4. Airway secretions  5. Weakness and fatigue     PLAN   1. Admit pt to [facility] level of care. PT has overwhelming symptoms which cannot be managed at home. Patient requires frequent assessment and IV medications. Pt is high risk for sudden decline. 2. Pt medications to include: lorazepam 1 mg IV Q 3 hrs scheduled and as needed, morphine 2 mg IV Q 3 hrs scheduled and as needed every 15 min, scop patch and robinul  3.  Discussion with family, caregiver, Hospice team has occurred    4.  and SW to support family needs  5. Disposition:  pt is not safe for transport  6. Hospice Plan of care was reviewed in detail and agree with current plan of care    Prognosis estimated based on 02/18/22 clinical assessment is:   [x] Hours to Days    [] Days to Weeks    [] Other:    Communicated plan of care with: Hospice Case Manager; Hospice IDT; Care Team     GOALS OF CARE     Patient/Medical POA stated Goal of Care: comfort care    [] I have reviewed and/or updated ACP information in the Advance Care Planning Navigator. This information is available in the [address] link in the patient's chart header. Pt does not have ADLW document    X Primary Decision [facility] ([address]):   Primary Decision Maker: Delmy Sauceda - Brother - [phone]    Secondary Decision Maker: Roselia William - Sister - [phone]    Resuscitation Status: DNR  If DNR is there a Durable DNR on file? : [] Yes [] No (If no, complete Durable DNR)    HISTORY     History obtained from: chart, Hospice Team, nursing staff    CHIEF COMPLAINT: pt is unresponsive  The patient is:   [] Verbal  [] Nonverbal  [x] Unresponsive    HPI/SUBJECTIVE:  70year old male found unresponsive at home with a high fever of 107. past medical history of schizophrenia and diabetes gastroesophageal reflux disease and anemia he is COVID-19 positive initially patient was called as NSTEMI but it was canceled patient is intubated on ventilator hemodynamically unstable unable, dehydrated. PMH:  has a past medical history of Acute renal failure ([address] Anemia, Arthritis, DKA (diabetic ketoacidoses), GERD (gastroesophageal reflux disease), HTN (hypertension), Hypercholesterolemia, Schizophrenia ([address] Schizophreniform disorder ([address] Seizure disorder ([address] and Type II or unspecified type diabetes mellitus without mention of complication, uncontrolled.      REVIEW OF SYSTEMS     The following systems were: [] reviewed  [x] unable to be reviewed    Positive ROS include:  Constitutional: fatigue, weakness, in pain, short of breath  Ears/nose/mouth/throat: increased airway secretions  Respiratory:shortness of breath, wheezing  Gastrointestinal:poor appetite, nausea, vomiting, abdominal pain, constipation, diarrhea  Musculoskeletal:pain, deformities, swelling legs  Neurologic:confusion, hallucinations, weakness  Psychiatric:anxiety, feeling depressed, poor sleep  Endocrine:     Adult Non-Verbal Pain Assessment Score: in the past 24 hrs:     Face  [x] 0   No particular expression or smile  [] 1   Occasional grimace, tearing, frowning, wrinkled forehead  [] 2   Frequent grimace, tearing, frowning, wrinkled forehead    Activity (movement)  [x] 0   Lying quietly, normal position  [] 1   Seeking attention through movement or slow, cautious movement  [] 2   Restless, excessive activity and/or withdrawal reflexes    Guarding  [x] 0   Lying quietly, no positioning of hands over areas of body  [] 1   Splinting areas of the body, tense  [] 2   Rigid, stiff    Physiology (vital signs)  [x] 0   Stable vital signs  [] 1   Change in any of the following: SBP > 20mm Hg; HR > 20/minute  [] 2   Change in any of the following: SBP > 30mm Hg; HR > 25/minute    Respiratory  [x] 0   Baseline RR/SpO2, compliant with ventilator  [] 1   RR > 10 above baseline, or 5% drop SpO2, mild asynchrony with ventilator  [] 2   RR > 20 above baseline, or 10% drop SpO2, asynchrony with ventilator     FUNCTIONAL ASSESSMENT     Palliative Performance Scale (PPS): 10%     PSYCHOSOCIAL/SPIRITUAL ASSESSMENT     Active Problems:    Acute respiratory failure with hypoxia (HCC) (2/17/2022)      Past Medical History:   Diagnosis Date    Acute renal failure (HCC)     Anemia     Arthritis     DKA (diabetic ketoacidoses)     GERD (gastroesophageal reflux disease)     HTN (hypertension)     Hypercholesterolemia     [facility]     Schizophreniform disorder ([facility]     [facility]     Type II or unspecified type diabetes mellitus without mention of complication, uncontrolled       Past Surgical History:   Procedure Laterality Date    IR INSERT NON TUNL CVC OVER 5 YRS  1/28/2022    IR INSERT NON TUNL CVC OVER 5 YRS  1/31/2022      Social History     Tobacco Use    Smoking status: Never Smoker    Smokeless tobacco: Never Used   Substance Use Topics    Alcohol use: No     Family History   Problem Relation Age of Onset    Stroke Father       No Known Allergies   Current Facility-Administered Medications   Medication Dose Route Frequency    morphine injection 2 mg  2 mg IntraVENous Q3H    LORazepam (ATIVAN) injection 1 mg  1 mg IntraVENous Q3H    glycopyrrolate (ROBINUL) injection 0.2 mg  0.2 mg IntraVENous Q3H    LORazepam (ATIVAN) injection 1 mg  1 mg IntraVENous Q15MIN PRN    ketorolac (TORADOL) injection 30 mg  30 mg IntraVENous Q8H PRN    morphine injection 2 mg  2 mg IntraVENous Q15MIN PRN    scopolamine (TRANSDERM-SCOP) 1 mg over 3 days 1 Patch  1 Patch TransDERmal Q72H        PHYSICAL EXAM     Wt Readings from Last 3 Encounters:   02/16/22 109.4 kg (241 lb 2.9 oz)   01/20/22 101.1 kg (222 lb 14.2 oz)   10/06/21 111.1 kg (245 lb)       Visit Vitals  BP (!) 151/86   Pulse (!) 110   Temp 99.4 °F (37.4 °C)   Resp 24   SpO2 90%       Supplemental O2  [x] Yes  [] NO       Currently this patient has:  [x] Peripheral IV [] PICC  [] PORT [] ICD    [x] Storey Catheter [] NG Tube   [] PEG Tube    [] Rectal Tube [] Drain  [] Other:     Constitutional: unresponsive  Eyes: pupils equal, anicteric  ENMT: ++ airway secretions  Cardiovascular: regular rhythm, distal pulses intact  Respiratory: breathing labored, symmetric  Gastrointestinal: soft non-tender, +bowel sounds  Musculoskeletal: no deformity, no tenderness to palpation  Skin: warm, dry, pale  Neurologic:pt is not able to follow commands, pt Is not moving all extremities  Psychiatric: NA      Pertinent Lab and or Imaging Tests:  Lab Results   Component Value Date/Time    Sodium 132 (L) 02/15/2022 04:50 AM    Potassium 4.4 02/15/2022 04:50 AM    Chloride 93 (L) 02/15/2022 04:50 AM    CO2 22 02/15/2022 04:50 AM    Anion gap 17 (H) 02/15/2022 04:50 AM    Glucose 88 02/15/2022 04:50 AM    BUN 94 (H) 02/15/2022 04:50 AM    Creatinine 6.11 (H) 02/15/2022 04:50 AM    BUN/Creatinine ratio 15 02/15/2022 04:50 AM    GFR est AA 11 (L) 02/15/2022 04:50 AM    GFR est non-AA 9 (L) 02/15/2022 04:50 AM    Calcium 8.6 02/15/2022 04:50 AM     Lab Results   Component Value Date/Time    Protein, total 6.4 02/10/2022 05:00 AM    Albumin 1.0 (L) 02/15/2022 04:50 AM            Higinio Breaux, NP

## 2022-02-18 NOTE — PROGRESS NOTES
[facility] Those in Need  [phone]    Patient Name: José Miguel Bender  YOB: 1945    Date of Provider Hospice Visit: 02/18/22    Level of Care:   [x] General Inpatient (GIP)    [] Routine   [] Respite    Current Location of Care:  [] [facility] [] [facility] [x] [facility] [] [facility] [] [facility], patient referred from:  [] [facility] [] [facility] [] [facility] [] [facility] [] Home [] Other:     Date of Original Hospice Admission: 2/18/2022    Hospice Medical Director at time of admission: Dr Olegario Dykes Diagnosis: acute respiratory failure with hypoxia  Diagnoses RELATED to the terminal prognosis: COVID 19 Pneumonia        HOSPICE SUMMARY        José Miguel Bender is a 78y.o. year old who was admitted to [place] [address]. The patient's principle diagnosis has resulted in weakness, debility, hypoxia, shortness of breath, pneumonia, respiratory failure, Anoxic encephalopathy unresponsive no gag reflex, terminal extubation with now comfort care  Functionally, the patient's Karnofsky and/or Palliative Performance Scale has declined over a period of days to weeks and is estimated at 10%. The patient is dependent for all ADLs. Objective information that support this patients limited prognosis includes: The patient/family chose comfort measures with the support of Hospice. HOSPICE DIAGNOSES   Active Symptoms:  1. Shortness of breath  2. Acute encephalopathy   3. Agitation  4. Airway secretions  5. Weakness and fatigue     PLAN   1. Ad[facility] to [facility] level of care. PT has overwhelming symptoms which cannot be managed at home. Patient requires frequent assessment and IV medications. Pt is high risk for sudden decline. 2. Pt medications to include: lorazepam 1 mg IV Q 3 hrs scheduled and as needed, morphine 2 mg IV Q 3 hrs scheduled and as needed every 15 min, scop patch and robinul. One extra  dose of morphine given   3.  Has transderm scope patch  4. Discussion with family, caregiver, Hospice team has occurred    5.  and SW to support family needs  6. Disposition:  pt is not safe for transport  7. Hospice Plan of care was reviewed in detail and agree with current plan of care    Prognosis estimated based on 02/18/22 clinical assessment is:   [x] Hours to Days    [] Days to Weeks    [] Other:    Communicated plan of care with: Hospice Case Manager; Hospice IDT; Care Team     GOALS OF CARE     Patient/Medical POA stated Goal of Care: comfort care    [] I have reviewed and/or updated ACP information in the Advance Care Planning Navigator. This information is available in the [address] link in the patient's chart header. Pt does not have ADLW document    X Primary Decision [facility]):   Primary Decision Maker: Delmy Sauceda - Brother - [phone]    Secondary Decision Maker: Rianna Wilkerson - Sister - [phone]    Resuscitation Status: DNR  If DNR is there a Durable DNR on file? : [] Yes [] No (If no, complete Durable DNR)    HISTORY     History obtained from: chart, Hospice Team, nursing staff    CHIEF COMPLAINT: pt is unresponsive  The patient is:   [] Verbal  [] Nonverbal  [x] Unresponsive    HPI/SUBJECTIVE:  70year old male found unresponsive at home with a high fever of 107. past medical history of schizophrenia and diabetes gastroesophageal reflux disease and anemia he is COVID-19 positive initially patient was called as NSTEMI but it was canceled patient is intubated on ventilator hemodynamically unstable unable, dehydrated. PMH:  has a past medical history of Acute renal failure ([address] Anemia, Arthritis, DKA (diabetic ketoacidoses), GERD (gastroesophageal reflux disease), HTN (hypertension), Hypercholesterolemia, Schizophrenia ([address] Schizophreniform disorder ([address] Seizure disorder ([address] and Type II or unspecified type diabetes mellitus without mention of complication, uncontrolled.      REVIEW OF SYSTEMS The following systems were: [] reviewed  [x] unable to be reviewed    Positive ROS include:  Constitutional: fatigue, weakness, in pain, short of breath  Ears/nose/mouth/throat: increased airway secretions  Respiratory:shortness of breath, wheezing  Gastrointestinal:poor appetite, nausea, vomiting, abdominal pain, constipation, diarrhea  Musculoskeletal:pain, deformities, swelling legs  Neurologic:confusion, hallucinations, weakness  Psychiatric:anxiety, feeling depressed, poor sleep  Endocrine:     Adult Non-Verbal Pain Assessment Score: in the past 24 hrs:     Face  [x] 0   No particular expression or smile  [] 1   Occasional grimace, tearing, frowning, wrinkled forehead  [] 2   Frequent grimace, tearing, frowning, wrinkled forehead    Activity (movement)  [x] 0   Lying quietly, normal position  [] 1   Seeking attention through movement or slow, cautious movement  [] 2   Restless, excessive activity and/or withdrawal reflexes    Guarding  [x] 0   Lying quietly, no positioning of hands over areas of body  [] 1   Splinting areas of the body, tense  [] 2   Rigid, stiff    Physiology (vital signs)  [x] 0   Stable vital signs  [] 1   Change in any of the following: SBP > 20mm Hg; HR > 20/minute  [] 2   Change in any of the following: SBP > 30mm Hg; HR > 25/minute    Respiratory  [x] 0   Baseline RR/SpO2, compliant with ventilator  [] 1   RR > 10 above baseline, or 5% drop SpO2, mild asynchrony with ventilator  [] 2   RR > 20 above baseline, or 10% drop SpO2, asynchrony with ventilator     FUNCTIONAL ASSESSMENT     Palliative Performance Scale (PPS): 10%     PSYCHOSOCIAL/SPIRITUAL ASSESSMENT     Active Problems:    Acute respiratory failure with hypoxia (HCC) (2/17/2022)      Past Medical History:   Diagnosis Date    Acute renal failure (HCC)     Anemia     Arthritis     DKA (diabetic ketoacidoses)     GERD (gastroesophageal reflux disease)     HTN (hypertension)     Hypercholesterolemia     Schizophrenia ([facility]     Schizophreniform disorder ([facility]     Seizure disorder ([facility]     Type II or unspecified type diabetes mellitus without mention of complication, uncontrolled       Past Surgical History:   Procedure Laterality Date    IR INSERT NON TUNL CVC OVER 5 YRS  1/28/2022    IR INSERT NON TUNL CVC OVER 5 YRS  1/31/2022      Social History     Tobacco Use    Smoking status: Never Smoker    Smokeless tobacco: Never Used   Substance Use Topics    Alcohol use: No     Family History   Problem Relation Age of Onset    Stroke Father       No Known Allergies   Current Facility-Administered Medications   Medication Dose Route Frequency    morphine injection 2 mg  2 mg IntraVENous Q3H    LORazepam (ATIVAN) injection 1 mg  1 mg IntraVENous Q3H    glycopyrrolate (ROBINUL) injection 0.2 mg  0.2 mg IntraVENous Q3H    LORazepam (ATIVAN) injection 1 mg  1 mg IntraVENous Q15MIN PRN    ketorolac (TORADOL) injection 30 mg  30 mg IntraVENous Q8H PRN    morphine injection 2 mg  2 mg IntraVENous Q15MIN PRN    scopolamine (TRANSDERM-SCOP) 1 mg over 3 days 1 Patch  1 Patch TransDERmal Q72H        PHYSICAL EXAM     Wt Readings from Last 3 Encounters:   02/16/22 109.4 kg (241 lb 2.9 oz)   01/20/22 101.1 kg (222 lb 14.2 oz)   10/06/21 111.1 kg (245 lb)       Visit Vitals  BP (!) 151/86   Pulse (!) 110   Temp 99.4 °F (37.4 °C)   Resp 24   SpO2 90%       Supplemental O2  [x] Yes  [] NO       Currently this patient has:  [x] Peripheral IV [] PICC  [] PORT [] ICD    [x] Storey Catheter [] NG Tube   [] PEG Tube    [] Rectal Tube [] Drain  [] Other:     Constitutional: unresponsive  Eyes: pupils equal, anicteric  ENMT: ++ airway secretions  Cardiovascular: regular rhythm, distal pulses intact  Respiratory: breathing labored, symmetric  Gastrointestinal: soft non-tender, +bowel sounds  Musculoskeletal: no deformity, no tenderness to palpation  Skin: warm, dry, pale  Neurologic:pt is not able to follow commands, pt Is not moving all extremities  Psychiatric: NA      Pertinent Lab and or Imaging Tests:  Lab Results   Component Value Date/Time    Sodium 132 (L) 02/15/2022 04:50 AM    Potassium 4.4 02/15/2022 04:50 AM    Chloride 93 (L) 02/15/2022 04:50 AM    CO2 22 02/15/2022 04:50 AM    Anion gap 17 (H) 02/15/2022 04:50 AM    Glucose 88 02/15/2022 04:50 AM    BUN 94 (H) 02/15/2022 04:50 AM    Creatinine 6.11 (H) 02/15/2022 04:50 AM    BUN/Creatinine ratio 15 02/15/2022 04:50 AM    GFR est AA 11 (L) 02/15/2022 04:50 AM    GFR est non-AA 9 (L) 02/15/2022 04:50 AM    Calcium 8.6 02/15/2022 04:50 AM     Lab Results   Component Value Date/Time    Protein, total 6.4 02/10/2022 05:00 AM    Albumin 1.0 (L) 02/15/2022 04:50 AM            Payam Wilkins NP

## 2022-02-18 NOTE — HOSPICE
[facility]  [phone]    Discharge/Death Nursing Note   Patient Name: Bella Bojorquez  YOB: 1945  Age: 78 y.o. Date of Death: 22  Admitted Date: 2022  Time of Death: 3:42 p.m. Facility of [facility]: [facility]  Level of Care: [facility]  Patient Room: [facility]     Hospice Attending: Leonides Montgomery MD  Hospice Diagnosis: Acute respiratory failure with hypoxia ([address] [J96.01]    Death Pronouncement   Pronouncement of death completed by: AMIE Ray RN    Agency staff was present at the time of death    At the time of death the patient was documented as without signs of breathing  The pt  within [facility]    The following were notified of the patient's death: Geoffrey Party, brother    Medications were disposed of per facility protocol     Discharge Summary   Discharge Reason: Death    Summary of Care Provided:    [x] Post mortem care provided by bedside nurse  [x] Notification of  home by nursing supervisor  [] Referrals/Community resources provided:   [] Goals completed  [] Durable Medical Equipment vendor notified     Disciplines involved: [x] RN [x] SW [x]  [] BRAVO [] Vol [] PT [] OT [] ST [] BC    [x] IDT communication/notification    Attending Physician, Dr. García Prieto, notified of death    Bereaved

## 2022-02-18 NOTE — PROGRESS NOTES
RN entered patient's room for medication administration. RN found patient to be without signs of breathing. RN notified nurse manager, Charmaine Santana.  paged. Hospice nurse, Jazmin Reed, phoned. Jazmin Reed will call Dr. Flower Sahu to make aware. Jazmin Reed will reach out to family. [facility] called to make aware that patient passed.

## 2022-02-18 NOTE — PROGRESS NOTES
Post mortem care completed. Unable to remove gold colored watch from right wrist, sent with patient.

## 2022-02-19 ENCOUNTER — HOME CARE VISIT (OUTPATIENT)
Dept: HOSPICE | Facility: HOSPICE | Age: 77
End: 2022-02-19
Payer: MEDICAID

## 2022-02-19 NOTE — PROGRESS NOTES
Was  unable to visit with Mr Leopoldo Fisher because of his isolation status. Reached out to his daughter via phone who noted her and her brother are doing well. They are appreciative of prayer support and are supporting each other through this time. Assured her of our prayer support and invited her to call if any needs developed.

## 2022-02-22 NOTE — DISCHARGE SUMMARY
Discharge Summary    [address] [place] [address]  [facility]  [phone]      Date of Admission: 2/17/2022  Date of Discharge: 2/18/2022    Florian Walsh is a 78y.o. year old who was admitted to [address] [place] [address] at [facility] with a Hospice diagnosis of Acute respiratory failure with hypoxia ([facility] [J96.01]. Pt was admitted for [facility] level care. Per HPI  Florian Walsh is a 78y.o. year old who was admitted to [place] [address] Level of Care.     The patient's principle diagnosis has resulted in weakness, debility, hypoxia, shortness of breath, pneumonia, respiratory failure, Anoxic encephalopathy unresponsive no gag reflex, terminal extubation with now comfort care  Functionally, the patient's Karnofsky and/or Palliative Performance Scale has declined over a period of days to weeks and is estimated at 10%. The patient is dependent for all ADLs.    Objective information that support this patients limited prognosis includes:        The patient/family chose comfort measures with the support of Hospice.      HOSPICE DIAGNOSES   Active Symptoms:  1. Shortness of breath  2. Acute encephalopathy   3. Agitation  4. Airway secretions  5. Weakness and fatigue      PLAN   1. Admit pt to [facility] level of care. PT has overwhelming symptoms which cannot be managed at home. Patient requires frequent assessment and IV medications. Pt is high risk for sudden decline. 2. Pt medications to include: lorazepam 1 mg IV Q 3 hrs scheduled and as needed, morphine 2 mg IV Q 3 hrs scheduled and as needed every 15 min, scop patch and robinul  3. Discussion with family, caregiver, Hospice team has occurred     4.  and SW to support family needs  5. Disposition:  pt is not safe for transport  6. Hospice Plan of care was reviewed in detail and agree with current plan of care      The patient's care was focused on comfort and the patient passed away on 2/18/2022.

## 2022-02-28 NOTE — PROGRESS NOTES
Hospitalist Progress Note         [place] NP          Daily Progress Note: 2/28/2022      Subjective:   Admitted on 1/27.  76M, H/o Schizophenia, seizures, DMII on oral meds with unresponsive and acute hypoxic respiratory failure s/t COVID-19 pneumonitis complicated by ANDRES, hypokalemia and shock liver.            HSOPITAL COURSE  COVID positive, associated with fever 106, increased troponin, cardiology was consulted ansd recommended ECHO, there is no heparin gtt, was initially called for STEMI and was cancelled. he was intubated for acute hypoxic respiratory failure. Started on levophed. Hospital course complicated by ANDRES, shock liver and hypokalemia. Transaminases improved. There was concern for anoxic brain injury    He was started on CRRT on 1/31 which he had for 48 hours, then switched to intermittent hemodialysis. Patient also had malignant hyperthermia. Was treated with azithromycin, barcitinib, and zosyn. Echo showed an EF of 65%    He was initiated on hemodialysis    Blood and urine cultures were negative. Sputum culture showed respiratory mark    Patient has remained unresponsive. Patient has been placed on comfort care per family. Consult for inpatient hospice has been made and he has been accepted by [facility].     __  Subjective:  Patient seen in follow-up.   Lisbet Thapa remains unresponsive to painful stimuli, on no sedation       Problem List:  Problem List as of 2/17/2022 Date Reviewed: 12/30/2020          Codes Class Noted - Resolved    Acute respiratory failure with hypoxia [facility]) ICD-10-CM: J96.01  ICD-9-CM: 518.81  2/17/2022 - Present        Acute hypernatremia ICD-10-CM: E87.0  ICD-9-CM: 276.0  6/9/2021 - Present        Uncontrolled type 2 diabetes mellitus with hyperglycemia ([facility] ICD-10-CM: E11.65  ICD-9-CM: 250.02  6/9/2021 - Present        Acute metabolic encephalopathy VWT-11-AO: G93.41  ICD-9-CM: 348.31  6/9/2021 - Present ANDRES (acute kidney injury) ([place] [address] ICD-10-CM: N17.9  ICD-9-CM: 584.9  6/9/2021 - Present        Dehydration ICD-10-CM: E86.0  ICD-9-CM: 276.51  6/3/2021 - Present        Hypernatremia ICD-10-CM: E87.0  ICD-9-CM: 276.0  5/26/2021 - Present        Acute encephalopathy ICD-10-CM: G93.40  ICD-9-CM: 348.30  5/18/2021 - Present        Vitamin D deficiency ICD-10-CM: E55.9  ICD-9-CM: 268.9  12/30/2020 - Present        Dementia ([place] [address].) ICD-10-CM: F03.90  ICD-9-CM: 294.20  7/26/2017 - Present        Mixed hyperlipidemia ICD-10-CM: E78.2  ICD-9-CM: 272.2  4/16/2016 - Present        Essential hypertension ICD-10-CM: I10  ICD-9-CM: 401.9  4/18/2015 - Present        DM (diabetes mellitus) ([place] [address] ICD-10-CM: E11.9  ICD-9-CM: 250.00  2/25/2013 - Present        Schizophreniform disorder ([facility] ICD-10-CM: F20.81  ICD-9-CM: 295.40  Unknown - Present        GERD (gastroesophageal reflux disease) ICD-10-CM: K21.9  ICD-9-CM: 530.81  Unknown - Present        Seizure disorder ([place] [address] ICD-10-CM: G40.909  ICD-9-CM: 345.90  Unknown - Present        RESOLVED: Controlled type 2 diabetes mellitus with complication, without long-term current use of insulin ([facility] ICD-10-CM: E11.8  ICD-9-CM: 250.90  12/30/2020 - 6/28/2021        RESOLVED: Morbid obesity ([facility] ICD-10-CM: E66.01  ICD-9-CM: 278.01  12/30/2020 - 8/3/2021        RESOLVED: Type 2 diabetes with nephropathy ([facility] ICD-10-CM: E11.21  ICD-9-CM: 250.40, 583.81  6/17/2018 - 6/28/2021        RESOLVED: Severe obesity with body mass index (BMI) of 35.0 to 39.9 with serious comorbidity ([facility] ICD-10-CM: E66.01  ICD-9-CM: 278.01  6/15/2018 - 1/1/2022        RESOLVED: Essential hypertension with goal blood pressure less than 140/90 ICD-10-CM: I10  ICD-9-CM: 401.9  4/16/2016 - 5/30/2021        RESOLVED: Hypothyroidism due to acquired atrophy of thyroid ICD-10-CM: E03.4  ICD-9-CM: 244.8, 246.8  4/18/2015 - 10/20/2021        RESOLVED: Hyperlipidemia ICD-10-CM: E78.5  ICD-9-CM: 272.4  5/28/2014 - 1/1/2022 RESOLVED: Hypothyroid ICD-10-CM: E03.9  ICD-9-CM: 244.9  5/28/2014 - 4/18/2015        RESOLVED: Seizures ([address] ICD-10-CM: R56.9  ICD-9-CM: 780.39  2/25/2013 - 1/1/2022        RESOLVED: HTN (hypertension) ICD-10-CM: I10  ICD-9-CM: 401.9  2/25/2013 - 4/18/2015              Medications reviewed  No current facility-administered medications for this encounter. Current Outpatient Medications   Medication Sig    OLANZapine (ZyPREXA) 2.5 mg tablet Take 1 Tablet by mouth two (2) times a day.  Shower Chair XX braeden 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  lancets misc Sig: To check sugar twice a day before breakfast and at bedtime    amLODIPine (NORVASC) 10 mg tablet Take 1 Tablet by mouth daily.  losartan (COZAAR) 50 mg tablet Take 1 Tablet by mouth daily. Please stop hydrochlorothiazide and amlodipine    levothyroxine (SYNTHROID) 75 mcg tablet Take 1 Tablet by mouth every morning. Take in early morning.  metFORMIN (GLUCOPHAGE) 500 mg tablet Take 1 Tablet by mouth two (2) times daily (with meals).  pravastatin (PRAVACHOL) 20 mg tablet Take 1 Tablet by mouth nightly.  lacosamide (VIMPAT) 200 mg tab tablet Take 200 mg by mouth two (2) times a day.  lacosamide (Vimpat) 200 mg tab tablet Take 1 Tablet by mouth two (2) times a day. Max Daily Amount: 400 mg. courtesy refill  Given on behalf of neurologist dr Kala Tse or dr Mariana Ortega Blood-Glucose Meter monitoring kit He is having a seizure he needs blood sugar to be checked twice a day before breakfast and at bedtime,    levETIRAcetam 1,000 mg tablet Take 1,000 mg by mouth two (2) times a day.  divalproex DR (DEPAKOTE) 500 mg tablet Take 500 mg by mouth two (2) times a day.  Take two tablets twice daily       Review of Systems:   Review of systems not obtained due to patient factors. Objective:   Physical Exam:     Visit Vitals  BP (!) 145/78 (BP 1 Location: Right lower arm, BP Patient Position: At rest)   Pulse (!) 121   Temp (!) 100.7 °F (38.2 °C)   Resp 24   Ht 6' 0.01\" (1.829 m)   Wt 109.4 kg (241 lb 2.9 oz)   SpO2 90%   BMI 32.70 kg/m²    O2 Flow Rate (L/min): 2 l/min O2 Device: Nasal cannula    No data recorded. No intake/output data recorded. No intake/output data recorded. General:   Unresponsive   Lungs:   Clear to auscultation bilaterally. Chest wall:  No tenderness or deformity. Heart:  Regular rate and rhythm, S1, S2 normal, no murmur, click, rub or gallop. Abdomen:   Soft, non-tender. Bowel sounds normal. No masses,  No organomegaly. Extremities: Extremities normal, atraumatic, no cyanosis or edema. Pulses: 2+ and symmetric all extremities. Skin: Skin color, texture, turgor normal. No rashes or lesions   Neurologic: CNII-XII intact. Unable to fully assess. Oculocephalic reflex intact     Data Review:       Recent Days:  No results for input(s): WBC, HGB, HCT, PLT, HGBEXT, HCTEXT, PLTEXT, HGBEXT, HCTEXT, PLTEXT in the last 72 hours. No results for input(s): NA, K, CL, CO2, GLU, BUN, CREA, CA, MG, PHOS, ALB, TBIL, TBILI, ALT, INR, INREXT, INREXT in the last 72 hours. No lab exists for component: SGOT  No results for input(s): PH, PCO2, PO2, HCO3, FIO2 in the last 72 hours. 24 Hour Results:  No results found for this or any previous visit (from the past 24 hour(s)).         Assessment/     Acute hypoxic respiratory failure secondary to COVID-19    COVID-19 with pneumonitis    Septic shock due to COVID-19 pneumonitis    Acute encephalopathy, multifactorial including Covid and anoxic encephalopathy    Non-STEMI type II due to demand mismatch    Shock liver secondary to septic shock, improved    Acute anemia status post transfusion of packed red blood cells    Plan:  Accepted for inpatient hospice by [facility] Plan discussed with: Nurse, IDR team and hospice    Total time spent with patient: 30 minutes.      Logan Hernandez NP